# Patient Record
Sex: MALE | Race: WHITE | NOT HISPANIC OR LATINO | Employment: OTHER | URBAN - METROPOLITAN AREA
[De-identification: names, ages, dates, MRNs, and addresses within clinical notes are randomized per-mention and may not be internally consistent; named-entity substitution may affect disease eponyms.]

---

## 2024-01-08 ENCOUNTER — HOSPITAL ENCOUNTER (INPATIENT)
Facility: HOSPITAL | Age: 84
LOS: 9 days | Discharge: NURSING, REHAB OR LONG-TERM CARE | End: 2024-01-18
Attending: EMERGENCY MEDICINE | Admitting: INTERNAL MEDICINE
Payer: MEDICARE

## 2024-01-08 ENCOUNTER — APPOINTMENT (OUTPATIENT)
Dept: GENERAL RADIOLOGY | Facility: HOSPITAL | Age: 84
End: 2024-01-08
Payer: MEDICARE

## 2024-01-08 ENCOUNTER — APPOINTMENT (OUTPATIENT)
Dept: CT IMAGING | Facility: HOSPITAL | Age: 84
End: 2024-01-08
Payer: MEDICARE

## 2024-01-08 DIAGNOSIS — U07.1 COVID-19: Primary | ICD-10-CM

## 2024-01-08 DIAGNOSIS — R53.1 GENERALIZED WEAKNESS: ICD-10-CM

## 2024-01-08 DIAGNOSIS — R73.9 HYPERGLYCEMIA: ICD-10-CM

## 2024-01-08 PROBLEM — E11.9 TYPE 2 DIABETES MELLITUS, WITHOUT LONG-TERM CURRENT USE OF INSULIN: Status: ACTIVE | Noted: 2024-01-08

## 2024-01-08 LAB
ALBUMIN SERPL-MCNC: 3.1 G/DL (ref 3.5–5.2)
ALBUMIN/GLOB SERPL: 0.9 G/DL
ALP SERPL-CCNC: 71 U/L (ref 39–117)
ALT SERPL W P-5'-P-CCNC: 30 U/L (ref 1–41)
ANION GAP SERPL CALCULATED.3IONS-SCNC: 9 MMOL/L (ref 5–15)
AST SERPL-CCNC: 56 U/L (ref 1–40)
BASOPHILS # BLD AUTO: 0.02 10*3/MM3 (ref 0–0.2)
BASOPHILS NFR BLD AUTO: 0.4 % (ref 0–1.5)
BILIRUB SERPL-MCNC: 0.8 MG/DL (ref 0–1.2)
BUN SERPL-MCNC: 36 MG/DL (ref 8–23)
BUN/CREAT SERPL: 31.9 (ref 7–25)
CALCIUM SPEC-SCNC: 8.8 MG/DL (ref 8.6–10.5)
CHLORIDE SERPL-SCNC: 102 MMOL/L (ref 98–107)
CO2 SERPL-SCNC: 28 MMOL/L (ref 22–29)
CREAT SERPL-MCNC: 1.13 MG/DL (ref 0.76–1.27)
CRP SERPL-MCNC: 9.5 MG/DL (ref 0–0.5)
D DIMER PPP FEU-MCNC: 0.65 MCGFEU/ML (ref 0–0.84)
DEPRECATED RDW RBC AUTO: 41.5 FL (ref 37–54)
EGFRCR SERPLBLD CKD-EPI 2021: 64.1 ML/MIN/1.73
EOSINOPHIL # BLD AUTO: 0.01 10*3/MM3 (ref 0–0.4)
EOSINOPHIL NFR BLD AUTO: 0.2 % (ref 0.3–6.2)
ERYTHROCYTE [DISTWIDTH] IN BLOOD BY AUTOMATED COUNT: 12.4 % (ref 12.3–15.4)
FERRITIN SERPL-MCNC: 240.1 NG/ML (ref 30–400)
FLUAV RNA RESP QL NAA+PROBE: NOT DETECTED
FLUBV RNA RESP QL NAA+PROBE: NOT DETECTED
GLOBULIN UR ELPH-MCNC: 3.3 GM/DL
GLUCOSE BLDC GLUCOMTR-MCNC: 177 MG/DL (ref 70–130)
GLUCOSE BLDC GLUCOMTR-MCNC: 183 MG/DL (ref 70–130)
GLUCOSE SERPL-MCNC: 200 MG/DL (ref 65–99)
HCT VFR BLD AUTO: 41.3 % (ref 37.5–51)
HGB BLD-MCNC: 13.9 G/DL (ref 13–17.7)
IMM GRANULOCYTES # BLD AUTO: 0.02 10*3/MM3 (ref 0–0.05)
IMM GRANULOCYTES NFR BLD AUTO: 0.4 % (ref 0–0.5)
INR PPP: 1.35 (ref 0.89–1.12)
LDH SERPL-CCNC: 246 U/L (ref 135–225)
LYMPHOCYTES # BLD AUTO: 1.28 10*3/MM3 (ref 0.7–3.1)
LYMPHOCYTES NFR BLD AUTO: 22.5 % (ref 19.6–45.3)
MAGNESIUM SERPL-MCNC: 1.9 MG/DL (ref 1.6–2.4)
MCH RBC QN AUTO: 30.8 PG (ref 26.6–33)
MCHC RBC AUTO-ENTMCNC: 33.7 G/DL (ref 31.5–35.7)
MCV RBC AUTO: 91.6 FL (ref 79–97)
MONOCYTES # BLD AUTO: 0.93 10*3/MM3 (ref 0.1–0.9)
MONOCYTES NFR BLD AUTO: 16.3 % (ref 5–12)
NEUTROPHILS NFR BLD AUTO: 3.43 10*3/MM3 (ref 1.7–7)
NEUTROPHILS NFR BLD AUTO: 60.2 % (ref 42.7–76)
NRBC BLD AUTO-RTO: 0 /100 WBC (ref 0–0.2)
NT-PROBNP SERPL-MCNC: 518.2 PG/ML (ref 0–1800)
PLATELET # BLD AUTO: 151 10*3/MM3 (ref 140–450)
PMV BLD AUTO: 10.2 FL (ref 6–12)
POTASSIUM SERPL-SCNC: 4.6 MMOL/L (ref 3.5–5.2)
PROCALCITONIN SERPL-MCNC: 0.09 NG/ML (ref 0–0.25)
PROT SERPL-MCNC: 6.4 G/DL (ref 6–8.5)
PROTHROMBIN TIME: 16.8 SECONDS (ref 12.2–14.5)
RBC # BLD AUTO: 4.51 10*6/MM3 (ref 4.14–5.8)
SARS-COV-2 RNA RESP QL NAA+PROBE: DETECTED
SODIUM SERPL-SCNC: 139 MMOL/L (ref 136–145)
T4 FREE SERPL-MCNC: 1.98 NG/DL (ref 0.93–1.7)
TROPONIN T SERPL HS-MCNC: 29 NG/L
TSH SERPL DL<=0.05 MIU/L-ACNC: 0.44 UIU/ML (ref 0.27–4.2)
WBC NRBC COR # BLD AUTO: 5.69 10*3/MM3 (ref 3.4–10.8)

## 2024-01-08 PROCEDURE — 25810000003 SODIUM CHLORIDE 0.9 % SOLUTION: Performed by: INTERNAL MEDICINE

## 2024-01-08 PROCEDURE — 99221 1ST HOSP IP/OBS SF/LOW 40: CPT | Performed by: INTERNAL MEDICINE

## 2024-01-08 PROCEDURE — 85379 FIBRIN DEGRADATION QUANT: CPT | Performed by: INTERNAL MEDICINE

## 2024-01-08 PROCEDURE — 87636 SARSCOV2 & INF A&B AMP PRB: CPT | Performed by: EMERGENCY MEDICINE

## 2024-01-08 PROCEDURE — 84439 ASSAY OF FREE THYROXINE: CPT | Performed by: EMERGENCY MEDICINE

## 2024-01-08 PROCEDURE — G0378 HOSPITAL OBSERVATION PER HR: HCPCS

## 2024-01-08 PROCEDURE — 99285 EMERGENCY DEPT VISIT HI MDM: CPT

## 2024-01-08 PROCEDURE — 86140 C-REACTIVE PROTEIN: CPT | Performed by: INTERNAL MEDICINE

## 2024-01-08 PROCEDURE — 84484 ASSAY OF TROPONIN QUANT: CPT | Performed by: EMERGENCY MEDICINE

## 2024-01-08 PROCEDURE — 82728 ASSAY OF FERRITIN: CPT | Performed by: INTERNAL MEDICINE

## 2024-01-08 PROCEDURE — 84443 ASSAY THYROID STIM HORMONE: CPT | Performed by: EMERGENCY MEDICINE

## 2024-01-08 PROCEDURE — 63710000001 INSULIN LISPRO (HUMAN) PER 5 UNITS: Performed by: INTERNAL MEDICINE

## 2024-01-08 PROCEDURE — 83036 HEMOGLOBIN GLYCOSYLATED A1C: CPT | Performed by: INTERNAL MEDICINE

## 2024-01-08 PROCEDURE — 83735 ASSAY OF MAGNESIUM: CPT | Performed by: EMERGENCY MEDICINE

## 2024-01-08 PROCEDURE — 25810000003 SODIUM CHLORIDE 0.9 % SOLUTION: Performed by: EMERGENCY MEDICINE

## 2024-01-08 PROCEDURE — 83880 ASSAY OF NATRIURETIC PEPTIDE: CPT | Performed by: EMERGENCY MEDICINE

## 2024-01-08 PROCEDURE — 71045 X-RAY EXAM CHEST 1 VIEW: CPT

## 2024-01-08 PROCEDURE — 85610 PROTHROMBIN TIME: CPT | Performed by: EMERGENCY MEDICINE

## 2024-01-08 PROCEDURE — 70450 CT HEAD/BRAIN W/O DYE: CPT

## 2024-01-08 PROCEDURE — 93005 ELECTROCARDIOGRAM TRACING: CPT | Performed by: EMERGENCY MEDICINE

## 2024-01-08 PROCEDURE — 83615 LACTATE (LD) (LDH) ENZYME: CPT | Performed by: INTERNAL MEDICINE

## 2024-01-08 PROCEDURE — 85025 COMPLETE CBC W/AUTO DIFF WBC: CPT | Performed by: EMERGENCY MEDICINE

## 2024-01-08 PROCEDURE — 82948 REAGENT STRIP/BLOOD GLUCOSE: CPT

## 2024-01-08 PROCEDURE — 84145 PROCALCITONIN (PCT): CPT | Performed by: INTERNAL MEDICINE

## 2024-01-08 PROCEDURE — 80053 COMPREHEN METABOLIC PANEL: CPT | Performed by: EMERGENCY MEDICINE

## 2024-01-08 RX ORDER — SODIUM CHLORIDE 0.9 % (FLUSH) 0.9 %
10 SYRINGE (ML) INJECTION AS NEEDED
Status: DISCONTINUED | OUTPATIENT
Start: 2024-01-08 | End: 2024-01-18 | Stop reason: HOSPADM

## 2024-01-08 RX ORDER — ACETAMINOPHEN 650 MG/1
650 SUPPOSITORY RECTAL EVERY 4 HOURS PRN
Status: DISCONTINUED | OUTPATIENT
Start: 2024-01-08 | End: 2024-01-18 | Stop reason: HOSPADM

## 2024-01-08 RX ORDER — ACETAMINOPHEN 325 MG/1
650 TABLET ORAL EVERY 4 HOURS PRN
Status: DISCONTINUED | OUTPATIENT
Start: 2024-01-08 | End: 2024-01-18 | Stop reason: HOSPADM

## 2024-01-08 RX ORDER — INSULIN LISPRO 100 [IU]/ML
2-7 INJECTION, SOLUTION INTRAVENOUS; SUBCUTANEOUS
Status: DISCONTINUED | OUTPATIENT
Start: 2024-01-08 | End: 2024-01-18 | Stop reason: HOSPADM

## 2024-01-08 RX ORDER — POLYETHYLENE GLYCOL 3350 17 G/17G
17 POWDER, FOR SOLUTION ORAL DAILY PRN
Status: DISCONTINUED | OUTPATIENT
Start: 2024-01-08 | End: 2024-01-18 | Stop reason: HOSPADM

## 2024-01-08 RX ORDER — SODIUM CHLORIDE 9 MG/ML
40 INJECTION, SOLUTION INTRAVENOUS AS NEEDED
Status: DISCONTINUED | OUTPATIENT
Start: 2024-01-08 | End: 2024-01-18 | Stop reason: HOSPADM

## 2024-01-08 RX ORDER — NICOTINE POLACRILEX 4 MG
15 LOZENGE BUCCAL
Status: DISCONTINUED | OUTPATIENT
Start: 2024-01-08 | End: 2024-01-18 | Stop reason: HOSPADM

## 2024-01-08 RX ORDER — BENZONATATE 100 MG/1
200 CAPSULE ORAL 3 TIMES DAILY PRN
Status: DISCONTINUED | OUTPATIENT
Start: 2024-01-08 | End: 2024-01-18 | Stop reason: HOSPADM

## 2024-01-08 RX ORDER — BISACODYL 5 MG/1
5 TABLET, DELAYED RELEASE ORAL DAILY PRN
Status: DISCONTINUED | OUTPATIENT
Start: 2024-01-08 | End: 2024-01-18 | Stop reason: HOSPADM

## 2024-01-08 RX ORDER — DEXTROSE MONOHYDRATE 25 G/50ML
25 INJECTION, SOLUTION INTRAVENOUS
Status: DISCONTINUED | OUTPATIENT
Start: 2024-01-08 | End: 2024-01-18 | Stop reason: HOSPADM

## 2024-01-08 RX ORDER — SODIUM CHLORIDE 0.9 % (FLUSH) 0.9 %
10 SYRINGE (ML) INJECTION EVERY 12 HOURS SCHEDULED
Status: DISCONTINUED | OUTPATIENT
Start: 2024-01-08 | End: 2024-01-18 | Stop reason: HOSPADM

## 2024-01-08 RX ORDER — AMOXICILLIN 250 MG
2 CAPSULE ORAL 2 TIMES DAILY
Status: DISCONTINUED | OUTPATIENT
Start: 2024-01-08 | End: 2024-01-18 | Stop reason: HOSPADM

## 2024-01-08 RX ORDER — IBUPROFEN 600 MG/1
1 TABLET ORAL
Status: DISCONTINUED | OUTPATIENT
Start: 2024-01-08 | End: 2024-01-18 | Stop reason: HOSPADM

## 2024-01-08 RX ORDER — ACETAMINOPHEN 160 MG/5ML
650 SOLUTION ORAL EVERY 4 HOURS PRN
Status: DISCONTINUED | OUTPATIENT
Start: 2024-01-08 | End: 2024-01-18 | Stop reason: HOSPADM

## 2024-01-08 RX ORDER — BISACODYL 10 MG
10 SUPPOSITORY, RECTAL RECTAL DAILY PRN
Status: DISCONTINUED | OUTPATIENT
Start: 2024-01-08 | End: 2024-01-18 | Stop reason: HOSPADM

## 2024-01-08 RX ADMIN — SODIUM CHLORIDE 500 ML: 9 INJECTION, SOLUTION INTRAVENOUS at 19:41

## 2024-01-08 RX ADMIN — INSULIN LISPRO 2 UNITS: 100 INJECTION, SOLUTION INTRAVENOUS; SUBCUTANEOUS at 23:20

## 2024-01-08 RX ADMIN — APIXABAN 5 MG: 5 TABLET, FILM COATED ORAL at 23:21

## 2024-01-08 RX ADMIN — SODIUM CHLORIDE 500 ML: 9 INJECTION, SOLUTION INTRAVENOUS at 17:04

## 2024-01-08 RX ADMIN — Medication 10 ML: at 23:21

## 2024-01-08 RX ADMIN — Medication 10 ML: at 23:20

## 2024-01-08 NOTE — LETTER
"    EMS Transport Request  For use at [facility], [place], [place], [place], and [place] only   Patient Name: Apollo Haro : 1940   Weight:79.4 kg (175 lb) Pick-up Location: [facility] BLS/ALS: BLS/ALS: BLS   Insurance: [employer] MEDICARE REPLACEMENT Auth End Date: 2024   Pre-Cert #: D/C Summary complete:    Destination: Other [place]   Contact Precautions: None   Equipment (O2, Fluids, etc.): O2, settings 2L NC   Arrive By Date/Time: 2024 Stretcher/WC: Stretcher   CM Requesting: Artur Joseph RN Ext: [phone]   Notes/Medical Necessity: Impaired mobility, endurance, gait and balance.      ______________________________________________________________________    *Only 2 patient bags OR 1 carry-on size bag are permitted.  Wheelchairs and walkers CANNOT transported with the patient. Acknowledge: {Acknowledge:[id number]::\"Yes\"}    "

## 2024-01-08 NOTE — Clinical Note
Level of Care: Telemetry [5]   Diagnosis: COVID-19 virus infection [[id number]]   Admitting Physician: KARRI SHERWOOD [[id number]]   Attending Physician: KARRI SHERWOOD [[id number]]

## 2024-01-08 NOTE — LETTER
EMS Transport Request  For use at [facility], [place], [place], [place], and [place] only   Patient Name: Apollo Haro : 1940   Weight:79.4 kg (175 lb) Pick-up Location: [facility] BLS/ALS: BLS/ALS: BLS   Insurance: [employer] MEDICARE REPLACEMENT Auth End Date: 1/15/2024   Pre-Cert #: D/C Summary complete:    Destination: Other [place]   Contact Precautions: COVID   Equipment (O2, Fluids, etc.): O2, settings 2L NC   Arrive By Date/Time: 2024 Stretcher/WC: Stretcher   CM Requesting: Artur Joseph RN Ext: [phone]   Notes/Medical Necessity: Impaired mobility, Endurance, gait and balance     ______________________________________________________________________    *Only 2 patient bags OR 1 carry-on size bag are permitted.  Wheelchairs and walkers CANNOT transported with the patient. Acknowledge: Yes

## 2024-01-09 LAB
ANION GAP SERPL CALCULATED.3IONS-SCNC: 8 MMOL/L (ref 5–15)
BUN SERPL-MCNC: 28 MG/DL (ref 8–23)
BUN/CREAT SERPL: 31.8 (ref 7–25)
CALCIUM SPEC-SCNC: 8.1 MG/DL (ref 8.6–10.5)
CHLORIDE SERPL-SCNC: 101 MMOL/L (ref 98–107)
CK SERPL-CCNC: 1019 U/L (ref 20–200)
CO2 SERPL-SCNC: 29 MMOL/L (ref 22–29)
CREAT SERPL-MCNC: 0.88 MG/DL (ref 0.76–1.27)
CRP SERPL-MCNC: 7.46 MG/DL (ref 0–0.5)
EGFRCR SERPLBLD CKD-EPI 2021: 84.8 ML/MIN/1.73
GLUCOSE BLDC GLUCOMTR-MCNC: 128 MG/DL (ref 70–130)
GLUCOSE BLDC GLUCOMTR-MCNC: 137 MG/DL (ref 70–130)
GLUCOSE BLDC GLUCOMTR-MCNC: 143 MG/DL (ref 70–130)
GLUCOSE BLDC GLUCOMTR-MCNC: 270 MG/DL (ref 70–130)
GLUCOSE SERPL-MCNC: 139 MG/DL (ref 65–99)
HBA1C MFR BLD: 6.6 % (ref 4.8–5.6)
MAGNESIUM SERPL-MCNC: 1.7 MG/DL (ref 1.6–2.4)
POTASSIUM SERPL-SCNC: 3.8 MMOL/L (ref 3.5–5.2)
SODIUM SERPL-SCNC: 138 MMOL/L (ref 136–145)

## 2024-01-09 PROCEDURE — 97530 THERAPEUTIC ACTIVITIES: CPT

## 2024-01-09 PROCEDURE — 83735 ASSAY OF MAGNESIUM: CPT | Performed by: INTERNAL MEDICINE

## 2024-01-09 PROCEDURE — 82948 REAGENT STRIP/BLOOD GLUCOSE: CPT

## 2024-01-09 PROCEDURE — 63710000001 INSULIN LISPRO (HUMAN) PER 5 UNITS: Performed by: INTERNAL MEDICINE

## 2024-01-09 PROCEDURE — 82550 ASSAY OF CK (CPK): CPT | Performed by: INTERNAL MEDICINE

## 2024-01-09 PROCEDURE — 99232 SBSQ HOSP IP/OBS MODERATE 35: CPT | Performed by: INTERNAL MEDICINE

## 2024-01-09 PROCEDURE — 80048 BASIC METABOLIC PNL TOTAL CA: CPT | Performed by: INTERNAL MEDICINE

## 2024-01-09 PROCEDURE — 25810000003 SODIUM CHLORIDE 0.9 % SOLUTION: Performed by: INTERNAL MEDICINE

## 2024-01-09 PROCEDURE — 86140 C-REACTIVE PROTEIN: CPT | Performed by: INTERNAL MEDICINE

## 2024-01-09 PROCEDURE — 97162 PT EVAL MOD COMPLEX 30 MIN: CPT

## 2024-01-09 RX ORDER — TAMSULOSIN HYDROCHLORIDE 0.4 MG/1
0.4 CAPSULE ORAL DAILY
Status: DISCONTINUED | OUTPATIENT
Start: 2024-01-09 | End: 2024-01-18 | Stop reason: HOSPADM

## 2024-01-09 RX ORDER — PRAVASTATIN SODIUM 20 MG
20 TABLET ORAL DAILY
COMMUNITY

## 2024-01-09 RX ORDER — FLECAINIDE ACETATE 50 MG/1
50 TABLET ORAL EVERY 12 HOURS
COMMUNITY

## 2024-01-09 RX ORDER — SODIUM CHLORIDE 9 MG/ML
75 INJECTION, SOLUTION INTRAVENOUS CONTINUOUS
Status: ACTIVE | OUTPATIENT
Start: 2024-01-09 | End: 2024-01-10

## 2024-01-09 RX ORDER — CASTOR OIL AND BALSAM, PERU 788; 87 MG/G; MG/G
1 OINTMENT TOPICAL EVERY 12 HOURS SCHEDULED
Status: DISCONTINUED | OUTPATIENT
Start: 2024-01-09 | End: 2024-01-18 | Stop reason: HOSPADM

## 2024-01-09 RX ORDER — ASPIRIN 81 MG/1
81 TABLET ORAL DAILY
COMMUNITY

## 2024-01-09 RX ORDER — MAGNESIUM 200 MG
1 TABLET ORAL 2 TIMES DAILY
COMMUNITY

## 2024-01-09 RX ORDER — MULTIPLE VITAMINS W/ MINERALS TAB 9MG-400MCG
1 TAB ORAL DAILY
COMMUNITY

## 2024-01-09 RX ADMIN — APIXABAN 5 MG: 5 TABLET, FILM COATED ORAL at 07:44

## 2024-01-09 RX ADMIN — TAMSULOSIN HYDROCHLORIDE 0.4 MG: 0.4 CAPSULE ORAL at 14:48

## 2024-01-09 RX ADMIN — APIXABAN 5 MG: 5 TABLET, FILM COATED ORAL at 21:15

## 2024-01-09 RX ADMIN — MICONAZOLE NITRATE 1 APPLICATION: 20 POWDER TOPICAL at 14:49

## 2024-01-09 RX ADMIN — Medication 10 ML: at 21:15

## 2024-01-09 RX ADMIN — MICONAZOLE NITRATE 1 APPLICATION: 20 POWDER TOPICAL at 21:15

## 2024-01-09 RX ADMIN — INSULIN LISPRO 4 UNITS: 100 INJECTION, SOLUTION INTRAVENOUS; SUBCUTANEOUS at 21:44

## 2024-01-09 RX ADMIN — MICONAZOLE NITRATE 1 APPLICATION: 20 POWDER TOPICAL at 01:22

## 2024-01-09 RX ADMIN — SODIUM CHLORIDE 75 ML/HR: 9 INJECTION, SOLUTION INTRAVENOUS at 14:48

## 2024-01-09 RX ADMIN — SENNOSIDES AND DOCUSATE SODIUM 2 TABLET: 8.6; 5 TABLET ORAL at 21:15

## 2024-01-09 NOTE — THERAPY EVALUATION
Patient Name: Apollo Haro  : 1940    MRN: [id number]                              Today's Date: 2024       Admit Date: 2024    Visit Dx:     ICD-10-CM ICD-9-CM   1. COVID-19  U07.1 079.89   2. Hyperglycemia  R73.9 790.29   3. Generalized weakness  R53.1 780.79     Patient Active Problem List   Diagnosis    Weakness    Type 2 diabetes mellitus, without long-term current use of insulin     Past Medical History:   Diagnosis Date    A-fib     CAD (coronary artery disease)     DM (diabetes mellitus)      History reviewed. No pertinent surgical history.   General Information       Row Name 24 1451          Physical Therapy Time and Intention    Document Type evaluation  -MB     Mode of Treatment physical therapy  -MB       Row Name 24 1451          General Information    Patient Profile Reviewed yes  -MB     Prior Level of Function independent:;bed mobility;ADL's;transfer;all household mobility;gait  Prior to recent decline, pt. was independent w/ household ambulation w/ RW. Endorses recent falls.  -MB     Existing Precautions/Restrictions fall;oxygen therapy device and L/min;other (see comments)  Contact and airborne  -MB     Barriers to Rehab medically complex;previous functional deficit;cognitive status  -MB       Row Name 24 1451          Living Environment    People in Home spouse  -MB       Row Name 24 1451          Home Main Entrance    Number of Stairs, Main Entrance two  1+1  -MB     Stair Railings, Main Entrance none  -MB       Row Name 24 1451          Stairs Within Home, Primary    Number of Stairs, Within Home, Primary none  -MB       Row Name 24 1451          Cognition    Orientation Status (Cognition) oriented to;person;place  -MB       Row Name 24 1451          Safety Issues, Functional Mobility    Safety Issues Affecting Function (Mobility) ability to follow commands;awareness of need for assistance;insight into  deficits/self-awareness;judgment;positioning of assistive device;problem-solving;safety precaution awareness;safety precautions follow-through/compliance;sequencing abilities  -MB     Impairments Affecting Function (Mobility) balance;cognition;coordination;endurance/activity tolerance;sensation/sensory awareness;strength;pain;range of motion (ROM)  -MB               User Key  (r) = Recorded By, (t) = Taken By, (c) = Cosigned By      Initials Name Provider Type    MB Luli Stephens, PT Physical Therapist                   Mobility       Row Name 01/09/24 1548          Bed Mobility    Bed Mobility supine-sit;sit-supine;rolling left;rolling right  -MB     Rolling Left [place] (Bed Mobility) moderate assist (50% patient effort);verbal cues  -MB     Rolling Right [place] (Bed Mobility) moderate assist (50% patient effort);verbal cues  -MB     Supine-Sit [place] (Bed Mobility) maximum assist (25% patient effort);verbal cues  -MB     Sit-Supine [place] (Bed Mobility) maximum assist (25% patient effort);verbal cues  -MB     Assistive Device (Bed Mobility) bed rails;draw sheet;head of bed elevated  -MB     Comment, (Bed Mobility) Pt. rolled L/R for dependent hygiene and required assist to manage BLEs, support trunk, and scoot hips forward to EOB. Pt. unable to clear hips from bed; returned to supine and transferred to chair via mechanical lift.  -MB       Row Name 01/09/24 1548          Transfers    Comment, (Transfers) STS x 3 reps w/ assist for set up and VCs for safe hand placement and sequencing,. Pt. demo improved independence w/ STS from recliner.  -MB       Row Name 01/09/24 1548          Bed-Chair Transfer    Bed-Chair [place] (Transfers) dependent (less than 25% patient effort)  -MB     Assistive Device (Bed-Chair Transfers) lift device  -MB       Row Name 01/09/24 1548          Sit-Stand Transfer    Sit-Stand [place] (Transfers) moderate assist (50% patient effort);2 person assist   -MB     Assistive Device (Sit-Stand Transfers) walker, front-wheeled  -MB       Row Name 01/09/24 1548          Gait/Stairs (Locomotion)    [place] Level (Gait) unable to assess  -MB     Comment, (Gait/Stairs) Unable to safely take steps.  -MB               User Key  (r) = Recorded By, (t) = Taken By, (c) = Cosigned By      Initials Name Provider Type    MB Luli Stephens, PT Physical Therapist                   Obj/Interventions       Row Name 01/09/24 1550          Range of Motion Comprehensive    General Range of Motion upper extremity range of motion deficits identified;bilateral lower extremity ROM WFL  -MB     Comment, General Range of Motion BLEs grossly WFL for age, BUE shoulder AROM limited  -MB       Row Name 01/09/24 1550          Strength Comprehensive (MMT)    General Manual Muscle Testing (MMT) Assessment lower extremity strength deficits identified;upper extremity strength deficits identified  -MB     Comment, General Manual Muscle Testing (MMT) Assessment BLEs grossly 3+/5  -MB       Row Name 01/09/24 1550          Motor Skills    Therapeutic Exercise hip;knee;ankle  -MB       Row Name 01/09/24 1550          Hip (Therapeutic Exercise)    Hip (Therapeutic Exercise) strengthening exercise  -MB     Hip Strengthening (Therapeutic Exercise) bilateral;heel slides;aBduction;aDduction;5 repetitions  -MB       Row Name 01/09/24 1550          Knee (Therapeutic Exercise)    Knee (Therapeutic Exercise) strengthening exercise  -MB     Knee Strengthening (Therapeutic Exercise) bilateral;LAQ (long arc quad);5 repetitions  -MB       Row Name 01/09/24 1550          Ankle (Therapeutic Exercise)    Ankle (Therapeutic Exercise) AAROM (active assistive range of motion)  -MB     Ankle AAROM (Therapeutic Exercise) bilateral;dorsiflexion;plantarflexion;5 repetitions  -MB       Row Name 01/09/24 1550          Balance    Balance Assessment sitting static balance;standing static balance;standing dynamic balance  -MB      Static Sitting Balance contact guard  -MB     Position, Sitting Balance unsupported;sitting edge of bed  -MB     Static Standing Balance moderate assist  -MB     Dynamic Standing Balance maximum assist  -MB     Position/Device Used, Standing Balance supported;walker, front-wheeled  -MB     Balance Interventions standing;sit to stand;weight shifting activity  -MB       Row Name 01/09/24 1559          Sensory Assessment (Somatosensory)    Sensory Assessment (Somatosensory) bilateral LE  -MB     Bilateral LE Sensory Assessment light touch awareness;impaired  -MB               User Key  (r) = Recorded By, (t) = Taken By, (c) = Cosigned By      Initials Name Provider Type    Luli Hui, PT Physical Therapist                   Goals/Plan       Row Name 01/09/24 6228          Bed Mobility Goal 1 (PT)    Activity/Assistive Device (Bed Mobility Goal 1, PT) sit to supine/supine to sit  -MB     White Level/Cues Needed (Bed Mobility Goal 1, PT) moderate assist (50-74% patient effort)  -MB     Time Frame (Bed Mobility Goal 1, PT) 1 week  -MB       Row Name 01/09/24 1460          Transfer Goal 1 (PT)    Activity/Assistive Device (Transfer Goal 1, PT) sit-to-stand/stand-to-sit;bed-to-chair/chair-to-bed;walker, rolling  -MB     White Level/Cues Needed (Transfer Goal 1, PT) minimum assist (75% or more patient effort)  -MB     Time Frame (Transfer Goal 1, PT) 10 days  -MB       Row Name 01/09/24 3256          Gait Training Goal 1 (PT)    Activity/Assistive Device (Gait Training Goal 1, PT) gait (walking locomotion);decrease fall risk;improve balance and speed;increase endurance/gait distance;walker, rolling  -MB     White Level (Gait Training Goal 1, PT) moderate assist (50-74% patient effort)  -MB     Distance (Gait Training Goal 1, PT) 25  -MB     Time Frame (Gait Training Goal 1, PT) 10 days  -MB       Row Name 01/09/24 6667          Stairs Goal 1 (PT)    Activity/Assistive Device (Stairs Goal  1, PT) ascending stairs;descending stairs  -MB     [place] Level/Cues Needed (Stairs Goal 1, PT) minimum assist (75% or more patient effort)  -MB     Number of Stairs (Stairs Goal 1, PT) 1  -MB     Time Frame (Stairs Goal 1, PT) by discharge  -MB       Row Name 01/09/24 2847          Therapy Assessment/Plan (PT)    Planned Therapy Interventions (PT) balance training;bed mobility training;gait training;home exercise program;motor coordination training;patient/family education;postural re-education;strengthening;transfer training  -MB               User Key  (r) = Recorded By, (t) = Taken By, (c) = Cosigned By      Initials Name Provider Type    Luli Hui, PT Physical Therapist                   Clinical Impression       Row Name 01/09/24 7412          Pain    Additional Documentation Pain Scale: FACES Pre/Post-Treatment (Group)  -MB       Row Name 01/09/24 3374          Pain Scale: FACES Pre/Post-Treatment    Pain: FACES Scale, Pretreatment 2-->hurts little bit  -MB     Posttreatment Pain Rating 2-->hurts little bit  -MB     Pain Location lower  -MB     Pain Location - back  -MB       Row Name 01/09/24 8670          Plan of Care Review    Plan of Care Reviewed With patient;spouse  -MB     Progress no change  -MB     Outcome Evaluation PT eval completed. Patient presents w/ decreased strength, balance deficits, gait instability, decreased activity tolerance, and is below his baseline. He required max A  for bed mobility and mod A  x 2 for transfers; unable to safely take steps. Skilled IPPT warranted to promote return to PLOF. Recommend SNF rehab at D/C.  -MB       Row Name 01/09/24 1654          Therapy Assessment/Plan (PT)    Rehab Potential (PT) fair, will monitor progress closely  -MB     Criteria for Skilled Interventions Met (PT) yes;meets criteria;skilled treatment is necessary  -MB     Therapy Frequency (PT) daily  -MB       Row Name 01/09/24 1130          Vital Signs    Pre Systolic BP Rehab  117  -MB     Pre Treatment Diastolic BP 79  -MB     Pretreatment Heart Rate (beats/min) 78  -MB     Pre SpO2 (%) 96  -MB     O2 Delivery Pre Treatment nasal cannula  -MB     O2 Delivery Intra Treatment nasal cannula  -MB     O2 Delivery Post Treatment nasal cannula  -MB     Pre Patient Position Supine  -MB     Intra Patient Position Standing  -MB     Post Patient Position Sitting  -MB       Row Name 01/09/24 9452          Positioning and Restraints    Pre-Treatment Position in bed  -MB     Post Treatment Position chair  -MB     In Chair notified nsg;reclined;call light within reach;encouraged to call for assist;exit alarm on;with family/caregiver;on mechanical lift sling;waffle cushion;legs elevated  -MB               User Key  (r) = Recorded By, (t) = Taken By, (c) = Cosigned By      Initials Name Provider Type    Luli Hui, PT Physical Therapist                   Outcome Measures       Row Name 01/09/24 2946          How much help from another person do you currently need...    Turning from your back to your side while in flat bed without using bedrails? 2  -MB     Moving from lying on back to sitting on the side of a flat bed without bedrails? 2  -MB     Moving to and from a bed to a chair (including a wheelchair)? 1  -MB     Standing up from a chair using your arms (e.g., wheelchair, bedside chair)? 2  -MB     Climbing 3-5 steps with a railing? 1  -MB     To walk in hospital room? 2  -MB     AM-PAC 6 Clicks Score (PT) 10  -MB     Highest Level of Mobility Goal 4 --> Transfer to chair/commode  -MB       Row Name 01/09/24 0526          Functional Assessment    Outcome Measure Options AM-PAC 6 Clicks Basic Mobility (PT)  -MB               User Key  (r) = Recorded By, (t) = Taken By, (c) = Cosigned By      Initials Name Provider Type    Luli Hui, PT Physical Therapist                                 Physical Therapy Education       Title: PT OT SLP Therapies (Done)       Topic: Physical  Therapy (Done)       Point: Mobility training (Done)       Learning Progress Summary             Patient Acceptance, E,D, VU,NR by MB at 1/9/2024 1557   Family Acceptance, E,D, VU,NR by MB at 1/9/2024 1557                         Point: Home exercise program (Done)       Learning Progress Summary             Patient Acceptance, E,D, VU,NR by MB at 1/9/2024 1557   Family Acceptance, E,D, VU,NR by MB at 1/9/2024 1557                         Point: Body mechanics (Done)       Learning Progress Summary             Patient Acceptance, E,D, VU,NR by MB at 1/9/2024 1557   Family Acceptance, E,D, VU,NR by MB at 1/9/2024 1557                         Point: Precautions (Done)       Learning Progress Summary             Patient Acceptance, E,D, VU,NR by MB at 1/9/2024 1557   Family Acceptance, E,D, VU,NR by MB at 1/9/2024 1557                                         User Key       Initials Effective Dates Name Provider Type Discipline    MB 06/16/21 -  Luli Stephens, PT Physical Therapist PT                  PT Recommendation and Plan  Planned Therapy Interventions (PT): balance training, bed mobility training, gait training, home exercise program, motor coordination training, patient/family education, postural re-education, strengthening, transfer training  Plan of Care Reviewed With: patient, spouse  Progress: no change  Outcome Evaluation: PT eval completed. Patient presents w/ decreased strength, balance deficits, gait instability, decreased activity tolerance, and is below his baseline. He required max A  for bed mobility and mod A  x 2 for transfers; unable to safely take steps. Skilled IPPT warranted to promote return to PLOF. Recommend SNF rehab at D/C.     Time Calculation:   PT Evaluation Complexity  History, PT Evaluation Complexity: 1-2 personal factors and/or comorbidities  Examination of Body Systems (PT Eval Complexity): total of 3 or more elements  Clinical Presentation (PT Evaluation Complexity):  evolving  Clinical Decision Making (PT Evaluation Complexity): moderate complexity  Overall Complexity (PT Evaluation Complexity): moderate complexity     PT Charges       Row Name 01/09/24 1558             Time Calculation    Start Time 1451  -MB      PT Received On 01/09/24  -MB      PT Goal Re-Cert Due Date 01/19/24  -MB         Timed Charges    74691 - PT Therapeutic Activity Minutes 14  -MB         Untimed Charges    PT Eval/Re-eval Minutes 46  -MB         Total Minutes    Timed Charges Total Minutes 14  -MB      Untimed Charges Total Minutes 46  -MB       Total Minutes 60  -MB                User Key  (r) = Recorded By, (t) = Taken By, (c) = Cosigned By      Initials Name Provider Type    Luli Hui, PT Physical Therapist                  Therapy Charges for Today       Code Description Service Date Service Provider Modifiers Qty    84365744839 HC PT THERAPEUTIC ACT EA 15 MIN 1/9/2024 Luli Stephens, PT GP 1    10728513894 HC PT EVAL MOD COMPLEXITY 4 1/9/2024 Luli Stephens, PT GP 1            PT G-Codes  Outcome Measure Options: AM-PAC 6 Clicks Basic Mobility (PT)  AM-PAC 6 Clicks Score (PT): 10  PT Discharge Summary  Anticipated Discharge Disposition (PT): skilled nursing facility    Luli Stephens PT  1/9/2024

## 2024-01-09 NOTE — PLAN OF CARE
Goal Outcome Evaluation:  Plan of Care Reviewed With: patient, spouse        Progress: no change  Outcome Evaluation: PT eval completed. Patient presents w/ decreased strength, balance deficits, gait instability, decreased activity tolerance, and is below his baseline. He required max A  for bed mobility and mod A  x 2 for transfers; unable to safely take steps. Skilled IPPT warranted to promote return to PLOF. Recommend SNF rehab at D/C.      Anticipated Discharge Disposition (PT): skilled nursing facility

## 2024-01-09 NOTE — PROGRESS NOTES
[facility] Medicine Services  PROGRESS NOTE    Patient Name: Apollo Haro  : 1940  MRN: [id number]    Date of Admission: 2024  Primary Care Physician: Provider, No Known    Subjective   Subjective     CC:  Gen weakness    HPI:  Fatigued globally  No dyspnea  No cough currnetly  No n/v/d      Objective   Objective     Vital Signs:   Temp:  [97.1 °F (36.2 °C)-98.3 °F (36.8 °C)] 97.5 °F (36.4 °C)  Heart Rate:  [] 85  Resp:  [16-18] 16  BP: ()/(58-79) 117/79  Flow (L/min):  [2] 2     Physical Exam:  Constitutional:Alert, confused to details  Psych:Normal/appropriate affect  HEENT:NCAT, oropharynx clear  Neck: neck supple, full range of motion  Neuro: Face symmetric, speech clear, equal , moves all extremities, nonfocal exam  Cardiac: RRR; No pretibial pitting edema  Resp: CTAB, normal effort  GI: abd soft, nontender to palpation  Skin: sacral decub noted  Musculoskeletal/extremities: no cyanosis of extremities; no significant ankle edema          Results Reviewed:  LAB RESULTS:      Lab 24  0614 24  17324  1646   WBC  --   --  5.69   HEMOGLOBIN  --   --  13.9   HEMATOCRIT  --   --  41.3   PLATELETS  --   --  151   NEUTROS ABS  --   --  3.43   IMMATURE GRANS (ABS)  --   --  0.02   LYMPHS ABS  --   --  1.28   MONOS ABS  --   --  0.93*   EOS ABS  --   --  0.01   MCV  --   --  91.6   CRP 7.46* 9.50*  --    PROCALCITONIN  --  0.09  --    LDH  --  246*  --    PROTIME  --   --  16.8*   D DIMER QUANT  --   --  0.65         Lab 24  0614 24  17324  1646   SODIUM 138 139  --    POTASSIUM 3.8 4.6  --    CHLORIDE 101 102  --    CO2 29.0 28.0  --    ANION GAP 8.0 9.0  --    BUN 28* 36*  --    CREATININE 0.88 1.13  --    EGFR 84.8 64.1  --    GLUCOSE 139* 200*  --    CALCIUM 8.1* 8.8  --    MAGNESIUM 1.7 1.9  --    HEMOGLOBIN A1C  --   --  6.60*   TSH  --  0.438  --          Lab 24  1739   TOTAL PROTEIN 6.4   ALBUMIN 3.1*   GLOBULIN  3.3   ALT (SGPT) 30   AST (SGOT) 56*   BILIRUBIN 0.8   ALK PHOS 71         Lab 01/08/24  1739 01/08/24  1646   PROBNP 518.2  --    HSTROP T 29*  --    PROTIME  --  16.8*   INR  --  1.35*             Lab 01/08/24  1739   FERRITIN 240.10         Brief Urine Lab Results       None            Microbiology Results Abnormal       None            CT Head Without Contrast    Result Date: 1/8/2024  CT HEAD WO CONTRAST Date of Exam: 1/8/2024 4:44 PM CST Indication: gen weakness. Comparison: None available. Technique: Axial CT images were obtained of the head without contrast administration.  Automated exposure control and iterative construction methods were used. Findings: There is no evidence of acute territorial infarction. There is no acute intracranial hemorrhage. There are no extra-axial collections. Ventricles and CSF spaces are symmetrically prominent.. No mass effect nor hydrocephalus. Brain parenchyma appears normal for age.  There is near complete opacification of the left ethmoid air cells and left frontal sinus. Correlate for signs of sinusitis. Osseous structures and orbits appear intact.     Impression: Impression: 1.No acute intracranial process identified. 2.Generalized senescent changes of the brain. 3.Left ethmoid and frontal sinus mucosal disease. Correlate for signs of sinusitis. Electronically Signed: Wang Bingham MD  1/8/2024 4:55 PM CST  Workstation ID: [id number]    XR Chest 1 View    Result Date: 1/8/2024  XR CHEST 1 VW Date of Exam: 1/8/2024 4:20 PM EST Indication: gen weakness Comparison: None available. Findings: The lungs are clear. The heart and mediastinal contours appear normal. There is no pleural effusion. The pulmonary vasculature appears normal. The osseous structures appear intact.     Impression: Impression: No acute cardiopulmonary process. Electronically Signed: Apollo Abdalla MD  1/8/2024 4:34 PM EST  Workstation ID: [id number]         Current medications:  Scheduled Meds:apixaban, 5  mg, Oral, BID  castor oil-balsam peru, 1 application , Topical, Q12H  insulin lispro, 2-7 Units, Subcutaneous, 4x Daily AC & at Bedtime  miconazole, 1 application , Topical, Q12H  senna-docusate sodium, 2 tablet, Oral, BID  sodium chloride, 10 mL, Intravenous, Q12H      Continuous Infusions:Pharmacy Consult,   sodium chloride, 50 mL/hr      PRN Meds:.  acetaminophen **OR** acetaminophen **OR** acetaminophen    benzonatate    senna-docusate sodium **AND** polyethylene glycol **AND** bisacodyl **AND** bisacodyl    Calcium Replacement - Follow Nurse / BPA Driven Protocol    dextrose    dextrose    glucagon (human recombinant)    Magnesium Low Dose Replacement - Follow Nurse / BPA Driven Protocol    Pharmacy Consult    Phosphorus Replacement - Follow Nurse / BPA Driven Protocol    Potassium Replacement - Follow Nurse / BPA Driven Protocol    [COMPLETED] Insert Peripheral IV **AND** sodium chloride    sodium chloride    sodium chloride    Assessment & Plan   Assessment & Plan     Active Hospital Problems    Diagnosis  POA    **Weakness [R53.1]  Yes    Type 2 diabetes mellitus, without long-term current use of insulin [E11.9]  Yes      Resolved Hospital Problems   No resolved problems to display.        Brief Hospital Course to date:  Apollo Haro is a 83 y.o. male w/ hx parox afib, htn, hl, dm2, chronic back pain who was brought to [facility] ED due to generalized weakness. History if limited as no family bedside and patient poor historian and no answer when phone family. Patient states was diagnosed w/ covid 19 within past couple of weeks and treated as outpatient, but due to worsening generalized weakness in leg and walking was brought to [facility] for evaluation. Cxr negative. Ct head no acute intracranial process, left enthoid and frontal sinus musosal dz noted.       Generalized weakness w/ decreased mobility/ability to walk  Falls  Back pain  -recent [facility] procedure note 7/28/23 w/ diagnosis code of normal pressure hydrocephalus  (patient poor historian regarding this)  -exam nonfocal bedside on 1/9/24  -ct head negative  -tsh ok  -generalized weakness could be due to recent covid 19 infection as well  -u/a pending  -cpk 1019  -b12/folate in a.m.  -MRI L-& T spine  -pt/ot evals pending  -consider neurology consultation if w/u negative  -I have again tried to contact wife/family x 2 without success    Mild confusion  -ct head ok  -unable to contact family, plan to assess baseline mental status    Elevated cpk  ? Rhabdomyolysis  -cpk 1019  -hold statin  -NS 75cc/hr, trend cpk in na.m.    Cad  Afib  Htn  Hl  -continue eliquis (asking pharmacy to assist regarding home meds (? Flecainide, asa, or pravachol?)    Dm2, A1c 6.6  -ssi      Sacral decub, poa  -wound care      Am labs: cbc, cmp, b12/folate, cpk (f/u u/a)      Expected Discharge Location and Transportation: ? rehab  Expected Discharge ? 1/11/24 depending on clinical course    DVT prophylaxis:  Medical DVT prophylaxis orders are present.     AM-PAC 6 Clicks Score (PT): 10 (01/08/24 2629)    CODE STATUS:   Code Status and Medical Interventions:   Ordered at: 01/08/24 2053     Code Status (Patient has no pulse and is not breathing):    CPR (Attempt to Resuscitate)     Medical Interventions (Patient has pulse or is breathing):    Full Support       Rebel Santiago MD  01/09/24

## 2024-01-09 NOTE — CASE MANAGEMENT/SOCIAL WORK
Continued Stay Note  [facility]     Patient Name: Apollo Haro  MRN: [id number]  Today's Date: 1/9/2024    Admit Date: 1/8/2024    Plan: TBD   Discharge Plan       Row Name 01/09/24 1554       Plan    Plan TBD    Patient/Family in Agreement with Plan other (see comments)    Plan Comments I attempted to call spouse Bee Haro on number that is in Epic, received VM , I left  to return my call for IDP. I also called Dr. Payal Gomes office for any other phone numbers, I was provided w/ [phone], unable to leave VM on that number. Will continue trying to contact.    Final Discharge Disposition Code 30 - still a patient                   Discharge Codes    No documentation.                 Expected Discharge Date and Time       Expected Discharge Date Expected Discharge Time    Ata 10, 2024               Win Horowitz RN

## 2024-01-09 NOTE — H&P
"    [facility] Medicine Services  HISTORY AND PHYSICAL    Patient Name: Apollo Haro  : 1940  MRN: [id number]  Primary Care Physician: Provider, No Known  Date of admission: 2024      Subjective   Subjective     Chief Complaint:  weakness    HPI:  Apollo Haro is a 83 y.o. male w/ CAD, pAFib, HTN, HLD, DM2, chronic back pain, who was brought to the ED via EMS for weakness. There is no family at bedside and the phone number for his wife in the chart rings to voicemail (x2 attempts). He tells me he fell and broke his hip and \"broke my back in two places\" in October and was managed in [facility]. He states that \"about two weeks ago\" he was diagnosed with Covid 19 and managed outpatient, he thinks this was before [place]. Over the last week he has had progressive weakness in his legs with difficulty walking. Over the last 3 days he developed a dry cough. He otherwise denies acute complaints. The ED was unable to mobilize him and requested admission. He states he is on 4-5 medicines, however his medication fill history only shows eliquis.      Personal History     Past Medical History:   Diagnosis Date   • A-fib    • CAD (coronary artery disease)    • DM (diabetes mellitus)            History reviewed. No pertinent surgical history.    Family History: family history is not on file.     Social History:    Social History     Social History Narrative   • Not on file       Medications:  Available home medication information reviewed.  apixaban and metFORMIN    No Known Allergies    Objective   Objective     Vital Signs:   Temp:  [98 °F (36.7 °C)] 98 °F (36.7 °C)  Heart Rate:  [] 93  Resp:  [18] 18  BP: (105-122)/(59-78) 105/59       Physical Exam   Constitutional: Awake, alert, laying on ED stretcher in NAD  HENT: NCAT, mucous membranes moist  Respiratory: Clear to auscultation bilaterally, respiratory effort normal   Cardiovascular: RRR, palpable radial " pulses  Gastrointestinal: Positive bowel sounds, soft, nontender, nondistended  Musculoskeletal: No bilateral ankle edema  Psychiatric: Appropriate affect, cooperative  Neurologic: Speech clear and fluent    Result Review:  I have personally reviewed the results from the time of this admission to 1/8/2024 20:58 EST and agree with these findings:  []  Laboratory list / accordion  []  Microbiology  [x]  Radiology  []  EKG/Telemetry   []  Cardiology/Vascular   []  Pathology  []  Old records  []  Other:  Most notable findings include: clear CXR      LAB RESULTS:      Lab 01/08/24  1739 01/08/24  1646   WBC  --  5.69   HEMOGLOBIN  --  13.9   HEMATOCRIT  --  41.3   PLATELETS  --  151   NEUTROS ABS  --  3.43   IMMATURE GRANS (ABS)  --  0.02   LYMPHS ABS  --  1.28   MONOS ABS  --  0.93*   EOS ABS  --  0.01   MCV  --  91.6   CRP 9.50*  --    PROCALCITONIN 0.09  --    *  --    PROTIME  --  16.8*   INR  --  1.35*   D DIMER QUANT  --  0.65         Lab 01/08/24  1739   SODIUM 139   POTASSIUM 4.6   CHLORIDE 102   CO2 28.0   ANION GAP 9.0   BUN 36*   CREATININE 1.13   EGFR 64.1   GLUCOSE 200*   CALCIUM 8.8   MAGNESIUM 1.9   TSH 0.438         Lab 01/08/24  1739   TOTAL PROTEIN 6.4   ALBUMIN 3.1*   GLOBULIN 3.3   ALT (SGPT) 30   AST (SGOT) 56*   BILIRUBIN 0.8   ALK PHOS 71         Lab 01/08/24  1739   PROBNP 518.2   HSTROP T 29*             Lab 01/08/24  1739   FERRITIN 240.10             Microbiology Results (last 10 days)       Procedure Component Value - Date/Time    COVID-19 and FLU A/B PCR, 1 HR TAT - Swab, Nasopharynx [[id number]]  (Abnormal) Collected: 01/08/24 1646    Lab Status: Final result Specimen: Swab from Nasopharynx Updated: 01/08/24 1751     COVID19 Detected     Influenza A PCR Not Detected     Influenza B PCR Not Detected    Narrative:      Fact sheet for providers: https://www.fda.gov/media/910601/download    Fact sheet for patients: https://www.fda.gov/media/724512/download    Test performed by  PCR.  Influenza A and Influenza B negative results should be considered presumptive in samples that have a positive SARS-CoV-2 result.    Competitive inhibition studies showed that SARS-CoV-2 virus, when present at concentrations above 3.6E+04 copies/mL, can inhibit the detection and amplification of influenza A and influenza B virus RNA if present at or below 1.8E+02 copies/mL or 4.9E+02 copies/mL, respectively, and may lead to false negative influenza virus results. If co-infection with influenza A or influenza B virus is suspected in samples with a positive SARS-CoV-2 result, the sample should be re-tested with another FDA cleared, approved, or authorized influenza test, if influenza virus detection would change clinical management.            CT Head Without Contrast    Result Date: 1/8/2024  CT HEAD WO CONTRAST Date of Exam: 1/8/2024 4:44 PM CST Indication: gen weakness. Comparison: None available. Technique: Axial CT images were obtained of the head without contrast administration.  Automated exposure control and iterative construction methods were used. Findings: There is no evidence of acute territorial infarction. There is no acute intracranial hemorrhage. There are no extra-axial collections. Ventricles and CSF spaces are symmetrically prominent.. No mass effect nor hydrocephalus. Brain parenchyma appears normal for age.  There is near complete opacification of the left ethmoid air cells and left frontal sinus. Correlate for signs of sinusitis. Osseous structures and orbits appear intact.     Impression: Impression: 1.No acute intracranial process identified. 2.Generalized senescent changes of the brain. 3.Left ethmoid and frontal sinus mucosal disease. Correlate for signs of sinusitis. Electronically Signed: Wang Bingham MD  1/8/2024 4:55 PM CST  Workstation ID: [id number]    XR Chest 1 View    Result Date: 1/8/2024  XR CHEST 1 VW Date of Exam: 1/8/2024 4:20 PM EST Indication: gen weakness Comparison: None  available. Findings: The lungs are clear. The heart and mediastinal contours appear normal. There is no pleural effusion. The pulmonary vasculature appears normal. The osseous structures appear intact.     Impression: Impression: No acute cardiopulmonary process. Electronically Signed: Apollo Abdalla MD  1/8/2024 4:34 PM EST  Workstation ID: [id number]         Assessment & Plan   Assessment & Plan       Weakness    Type 2 diabetes mellitus, without long-term current use of insulin    Summary: This is an 82 y/o male w/ CAD, HTN, HLD, pAfib, DM2, chronic back pain, with reported recent Covid 19 infection, who was brought via EMS for evaluation of progressive weakness    *Home meds are unknown at this time, his home med list shows Metformin and Eliquis, his medication dispense history shows eliquis only, recent note from [facility] shows Pravastatin, Metformin, Eliquis, Flecainide, and ASA; pharmacy consult placed for the AM    Assessment/Plan    Generalized weakness w/ decreased ability to walk  -remote notes document chronic history of recurrent falls and weakness  -there is a recent [facility] procedure note 7/28/23 w/ a Diagnosis code of Normal Pressure Hydrocephalus  -weakness also may be from reported recent Covid 19 infection  -supportive care, PT/OT; need further details once we can get a hold of family, I have tried the number for his wife several times tonight with no answer    Recent Covid 19 infection  -reports he was diagnosed several days before [place] and managed at home, which would mean he is well clear of required isolation period, once we can get a family member or his PCP's office to corroborate we can drop his enhanced isolation requirements; currently I am not certain that he is clear on the exact timeline    CAD  HTN  HLD  pAfib  -cont home eliquis; need to clarify if he is actually taking flecainide, ASA, and/or pravastatin as noted    DM type 2, unknown A1c, w/o insulin use  -check A1c  -SSI      DVT  prophylaxis:  Medical DVT prophylaxis orders are present.      CODE STATUS:    Code Status and Medical Interventions:   Ordered at: 01/08/24 2053     Code Status (Patient has no pulse and is not breathing):    CPR (Attempt to Resuscitate)     Medical Interventions (Patient has pulse or is breathing):    Full Support       Expected Discharge   Expected Discharge Date: 1/10/2024; Expected Discharge Time:      Terrence Tong DO  01/08/24

## 2024-01-09 NOTE — ED PROVIDER NOTES
"Subjective   History of Present Illness  83-year-old male presents for evaluation of generalized weakness.  Of note, the patient tells me that he was diagnosed with COVID-19 about 2 weeks ago.  Since that time he has been experiencing progressively worsening generalized weakness and fatigue.  He tells me that today he felt so weak that \"he could not walk.\"  He called EMS today as he noted that his blood sugar was elevated at home.  He is unsure as to what might be spiking his blood sugar readings.  On EMS arrival, the patient was noted to be mildly hyperglycemic with a glucose of greater than 200.  He was subsequently brought to our facility to be evaluated.  His vital signs were reassuring.  He was not hypoxic.  The patient is not typically on home oxygen.      Review of Systems   Constitutional:  Positive for fatigue.   Neurological:  Positive for weakness.   All other systems reviewed and are negative.      No past medical history on file.    No Known Allergies    No past surgical history on file.    No family history on file.    Social History     Socioeconomic History    Marital status:            Objective   Physical Exam  Vitals and nursing note reviewed.   Constitutional:       General: He is not in acute distress.     Appearance: He is well-developed. He is not diaphoretic.      Comments: Chronically ill-appearing elderly male   HENT:      Head: Normocephalic and atraumatic.   Neck:      Vascular: No JVD.      Comments: No meningeal signs or nuchal rigidity  Cardiovascular:      Rate and Rhythm: Normal rate and regular rhythm.      Heart sounds: Normal heart sounds. No murmur heard.     No friction rub. No gallop.   Pulmonary:      Effort: Pulmonary effort is normal. No respiratory distress.      Breath sounds: Normal breath sounds. No wheezing or rales.   Abdominal:      General: Bowel sounds are normal. There is no distension.      Palpations: Abdomen is soft. There is no mass.      Tenderness: " There is no abdominal tenderness. There is no guarding.   Musculoskeletal:         General: Normal range of motion.      Cervical back: Normal range of motion.   Skin:     General: Skin is warm and dry.      Coloration: Skin is not pale.      Findings: No erythema or rash.   Neurological:      Mental Status: He is alert and oriented to person, place, and time.   Psychiatric:         Mood and Affect: Mood normal.         Thought Content: Thought content normal.         Judgment: Judgment normal.         Procedures           ED Course  ED Course as of 01/08/24 1943 Mon Jan 08, 2024 1646 83-year-old male presents for evaluation of generalized weakness.  Of note, the patient tells me that he was diagnosed with COVID-19 about 2 weeks ago.  Since that time he has been experiencing progressively worsening generalized weakness and fatigue.  He called EMS today as he noted his blood sugar to be elevated at home.  On EMS arrival, the patient was hyperglycemic with a blood glucose of greater than 200.  He was subsequently brought to our facility to be evaluated.  On arrival, patient is nontoxic-appearing.  Benign exam.  Differential diagnosis is quite broad.  We will obtain labs and imaging, and we will reassess following initial interventions. [DD]   1647 I personally and independently viewed the patient's x-ray images myself, and I am in agreement with the radiologist's reading for final interpretation--particularly, there is no pneumonia noted. [DD]   1754 COVID-19 swab is positive. [DD]   1833 I personally and independently reviewed the patient's CT images and findings, and I am in agreement with the radiologist regarding CT interpretation--particularly, there is no emergent intracranial process present. [DD]   1836 Aside from mild hyperglycemia, labs are otherwise unrevealing.  No DKA noted.  High-sensitivity troponin is mildly elevated but clinical presentation clearly is not consistent with ACS. [DD]   1915 I  attempted to stand and ambulate the patient but he was unable to stand, even with significant assistance.  Given his generalized weakness, he does not feel comfortable going home in his current state.  His wife does not feel that he is safe to go home either.  As a result, I reached out to our hospitalist, Dr. Dale, the patient will be admitted under her care for observation and further evaluation/treatment.  The patient is hemodynamically stable at this time and is aware/agreeable with the plan. [DD]      ED Course User Index  [DD] Apollo Griffin MD                                        Recent Results (from the past 24 hour(s))   COVID-19 and FLU A/B PCR, 1 HR TAT - Swab, Nasopharynx    Collection Time: 01/08/24  4:46 PM    Specimen: Nasopharynx; Swab   Result Value Ref Range    COVID19 Detected (C) Not Detected - Ref. Range    Influenza A PCR Not Detected Not Detected    Influenza B PCR Not Detected Not Detected   Protime-INR    Collection Time: 01/08/24  4:46 PM    Specimen: Blood   Result Value Ref Range    Protime 16.8 (H) 12.2 - 14.5 Seconds    INR 1.35 (H) 0.89 - 1.12   CBC Auto Differential    Collection Time: 01/08/24  4:46 PM    Specimen: Blood   Result Value Ref Range    WBC 5.69 3.40 - 10.80 10*3/mm3    RBC 4.51 4.14 - 5.80 10*6/mm3    Hemoglobin 13.9 13.0 - 17.7 g/dL    Hematocrit 41.3 37.5 - 51.0 %    MCV 91.6 79.0 - 97.0 fL    MCH 30.8 26.6 - 33.0 pg    MCHC 33.7 31.5 - 35.7 g/dL    RDW 12.4 12.3 - 15.4 %    RDW-SD 41.5 37.0 - 54.0 fl    MPV 10.2 6.0 - 12.0 fL    Platelets 151 140 - 450 10*3/mm3    Neutrophil % 60.2 42.7 - 76.0 %    Lymphocyte % 22.5 19.6 - 45.3 %    Monocyte % 16.3 (H) 5.0 - 12.0 %    Eosinophil % 0.2 (L) 0.3 - 6.2 %    Basophil % 0.4 0.0 - 1.5 %    Immature Grans % 0.4 0.0 - 0.5 %    Neutrophils, Absolute 3.43 1.70 - 7.00 10*3/mm3    Lymphocytes, Absolute 1.28 0.70 - 3.10 10*3/mm3    Monocytes, Absolute 0.93 (H) 0.10 - 0.90 10*3/mm3    Eosinophils, Absolute 0.01 0.00 - 0.40  10*3/mm3    Basophils, Absolute 0.02 0.00 - 0.20 10*3/mm3    Immature Grans, Absolute 0.02 0.00 - 0.05 10*3/mm3    nRBC 0.0 0.0 - 0.2 /100 WBC   ECG 12 Lead Other; gen weakness    Collection Time: 01/08/24  4:55 PM   Result Value Ref Range    QT Interval 370 ms    QTC Interval 477 ms   Comprehensive Metabolic Panel    Collection Time: 01/08/24  5:39 PM    Specimen: Blood   Result Value Ref Range    Glucose 200 (H) 65 - 99 mg/dL    BUN 36 (H) 8 - 23 mg/dL    Creatinine 1.13 0.76 - 1.27 mg/dL    Sodium 139 136 - 145 mmol/L    Potassium 4.6 3.5 - 5.2 mmol/L    Chloride 102 98 - 107 mmol/L    CO2 28.0 22.0 - 29.0 mmol/L    Calcium 8.8 8.6 - 10.5 mg/dL    Total Protein 6.4 6.0 - 8.5 g/dL    Albumin 3.1 (L) 3.5 - 5.2 g/dL    ALT (SGPT) 30 1 - 41 U/L    AST (SGOT) 56 (H) 1 - 40 U/L    Alkaline Phosphatase 71 39 - 117 U/L    Total Bilirubin 0.8 0.0 - 1.2 mg/dL    Globulin 3.3 gm/dL    A/G Ratio 0.9 g/dL    BUN/Creatinine Ratio 31.9 (H) 7.0 - 25.0    Anion Gap 9.0 5.0 - 15.0 mmol/L    eGFR 64.1 >60.0 mL/min/1.73   T4, Free    Collection Time: 01/08/24  5:39 PM    Specimen: Blood   Result Value Ref Range    Free T4 1.98 (H) 0.93 - 1.70 ng/dL   TSH    Collection Time: 01/08/24  5:39 PM    Specimen: Blood   Result Value Ref Range    TSH 0.438 0.270 - 4.200 uIU/mL   Magnesium    Collection Time: 01/08/24  5:39 PM    Specimen: Blood   Result Value Ref Range    Magnesium 1.9 1.6 - 2.4 mg/dL   Single High Sensitivity Troponin T    Collection Time: 01/08/24  5:39 PM    Specimen: Blood   Result Value Ref Range    HS Troponin T 29 (H) <22 ng/L   BNP    Collection Time: 01/08/24  5:39 PM    Specimen: Blood   Result Value Ref Range    proBNP 518.2 0.0 - 1,800.0 pg/mL   POC Glucose Once    Collection Time: 01/08/24  6:24 PM    Specimen: Blood   Result Value Ref Range    Glucose 183 (H) 70 - 130 mg/dL     Note: In addition to lab results from this visit, the labs listed above may include labs taken at another facility or during a  different encounter within the last 24 hours. Please correlate lab times with ED admission and discharge times for further clarification of the services performed during this visit.    CT Head Without Contrast   Final Result   Impression:   1.No acute intracranial process identified.   2.Generalized senescent changes of the brain.   3.Left ethmoid and frontal sinus mucosal disease. Correlate for signs of sinusitis.            Electronically Signed: Wang Bingham MD     1/8/2024 4:55 PM CST     Workstation ID: [id number]      XR Chest 1 View   Final Result   Impression:   No acute cardiopulmonary process.         Electronically Signed: Apollo Abdalla MD     1/8/2024 4:34 PM EST     Workstation ID: [id number]        Vitals:    01/08/24 1800 01/08/24 1807 01/08/24 1808 01/08/24 1809   BP: 105/59      Pulse: 105 102 99 93   Resp:       Temp:       TempSrc:       SpO2: 91% 95% 99% 99%   Weight:       Height:         Medications   sodium chloride 0.9 % flush 10 mL (has no administration in time range)   sodium chloride 0.9 % bolus 500 mL (500 mL Intravenous New Bag 1/8/24 1941)   sodium chloride 0.9 % bolus 500 mL (0 mL Intravenous Stopped 1/8/24 1800)     ECG/EMG Results (last 24 hours)       Procedure Component Value Units Date/Time    ECG 12 Lead Other; gen weakness [[id number]] Collected: 01/08/24 1655     Updated: 01/08/24 1655     QT Interval 370 ms      QTC Interval 477 ms     Narrative:      Test Reason : Other~  Blood Pressure :   */*   mmHG  Vent. Rate : 100 BPM     Atrial Rate : 100 BPM     P-R Int :   * ms          QRS Dur : 122 ms      QT Int : 370 ms       P-R-T Axes :   *   6  34 degrees     QTc Int : 477 ms    Atrial fibrillation  Cannot rule out Anterior infarct , age undetermined  Abnormal ECG  No previous ECGs available    Referred By: ERMD           Confirmed By:           ECG 12 Lead Other; gen weakness   Preliminary Result   Test Reason : Other~   Blood Pressure :   */*   mmHG   Vent. Rate : 100 BPM      Atrial Rate : 100 BPM      P-R Int :   * ms          QRS Dur : 122 ms       QT Int : 370 ms       P-R-T Axes :   *   6  34 degrees      QTc Int : 477 ms      Atrial fibrillation   Cannot rule out Anterior infarct , age undetermined   Abnormal ECG   No previous ECGs available      Referred By: ROBERTO           Confirmed By:                  Medical Decision Making  Amount and/or Complexity of Data Reviewed  Labs: ordered.  Radiology: ordered.  ECG/medicine tests: ordered.    Risk  Prescription drug management.  Decision regarding hospitalization.        Final diagnoses:   COVID-19   Hyperglycemia   Generalized weakness       ED Disposition  ED Disposition       ED Disposition   Decision to Admit    Condition   --    Comment   Level of Care: Telemetry [5]   Diagnosis: COVID-19 virus infection [[id number]]   Admitting Physician: KARRI SHERWOOD [[id number]]   Attending Physician: KARRI SHERWOOD [[id number]]                 No follow-up provider specified.       Medication List      No changes were made to your prescriptions during this visit.            Apollo Griffin MD  01/08/24 1943

## 2024-01-09 NOTE — PROGRESS NOTES
"                  Clinical Nutrition   Nutrition Support Assessment  Reason for Visit: Identified at risk by screening criteria, MST score 2+      Patient Name: Apollo Haro  YOB: 1940  MRN: [id number]  Date of Encounter: 01/09/24 13:59 EST  Admission date: 1/8/2024    Comments:    Nutrition Assessment   Admission Diagnosis:  COVID-19 virus infection [U07.1]    Problem List:    Weakness    Type 2 diabetes mellitus, without long-term current use of insulin      PMH:   He  has a past medical history of A-fib, CAD (coronary artery disease), and DM (diabetes mellitus).    PSH:  He  has no past surgical history on file.    Applicable Nutrition Concerns:   Skin: deep tissue injury on coccyx   Oral:  GI:    Applicable Interval History:       Reported/Observed/Food/Nutrition Related History:   1/7  Patient in [facility] isolation, attempt to call patient room without success.  Attempt to call family with no answer.     Anthropometrics     Flowsheet Rows      Flowsheet Row First Filed Value   Admission Height 175.3 cm (69\") Documented at 01/08/2024 1617   Admission Weight 79.4 kg (175 lb) Documented at 01/08/2024 1617     Height: Height: 175.3 cm (69\")  Last Filed Weight: Weight: 79.4 kg (175 lb) (01/08/24 1617)  Method:  none  BMI: BMI (Calculated): 25.8  BMI classification: Overweight: 25.0-29.9kg/m2   IBW:  160lb    UBW: unable to determine at this time, no weight history to review    Weight change:   unable to determine at this time     Nutrition Focused Physical Exam     Date:     1/9    Unable to perform exam due to: COVID Isolation     Current Nutrition Prescription   PO: Diet: Regular/House Diet; Texture: Regular Texture (IDDSI 7); Fluid Consistency: Thin (IDDSI 0)  Oral Nutrition Supplement:   Intake: Insufficient data 50% x 1 meal       Nutrition Diagnosis   Date:  1/9            Updated:    Problem Predicted suboptimal energy intake   Etiology COVID virus    Signs/Symptoms Issuf PO intake; unable to " interview patient regarding intake history.    Status:       Goal:   General: Nutrition support treatment  PO: Establish PO  EN/PN: N/A    Nutrition Intervention      Follow treatment progress, Care plan reviewed    - unable to complete MSA at this time.  - Monitor intake during admission     Monitoring/Evaluation:   Per protocol, I&O, PO intake, Pertinent labs, Symptoms      Karlee Romero RD  Time Spent:

## 2024-01-09 NOTE — ED NOTES
Apollo Haro    Nursing Report ED to Floor:  Mental status: gcs14  Ambulatory status: assist x2-3 with walker  Oxygen Therapy:  RA  Cardiac Rhythm: NSR  Admitted from: ED/HOME  Safety Concerns:  FALL RISK  Social Issues: NA  ED Room #:  16    ED Nurse Phone Extension - [phone] or may call [phone].      HPI:   Chief Complaint   Patient presents with    Hyperglycemia       Past Medical History:  Past Medical History:   Diagnosis Date    A-fib     CAD (coronary artery disease)     DM (diabetes mellitus)         Past Surgical History:  History reviewed. No pertinent surgical history.     Admitting Doctor:   Terrence Tong DO    Consulting Provider(s):  Consults       No orders found from 12/10/2023 to 1/9/2024.             Admitting Diagnosis:   The primary encounter diagnosis was COVID-19. Diagnoses of Hyperglycemia and Generalized weakness were also pertinent to this visit.    Most Recent Vitals:   Vitals:    01/08/24 1800 01/08/24 1807 01/08/24 1808 01/08/24 1809   BP: 105/59      Pulse: 105 102 99 93   Resp:       Temp:       TempSrc:       SpO2: 91% 95% 99% 99%   Weight:       Height:           Active LDAs/IV Access:   Lines, Drains & Airways       Active LDAs       Name Placement date Placement time Site Days    Peripheral IV 01/08/24 1615 Right Antecubital 01/08/24  1615  Antecubital  less than 1    Peripheral IV 01/08/24 1648 Posterior;Right Hand 01/08/24  1648  Hand  less than 1                    Labs (abnormal labs have a star):   Labs Reviewed   COVID-19 AND FLU A/B, NP SWAB IN TRANSPORT MEDIA 1 HR TAT - Abnormal; Notable for the following components:       Result Value    COVID19 Detected (*)     All other components within normal limits    Narrative:     Fact sheet for providers: https://www.fda.gov/media/785091/download    Fact sheet for patients: https://www.fda.gov/media/011806/download    Test performed by PCR.  Influenza A and Influenza B negative results should be considered presumptive in samples  that have a positive SARS-CoV-2 result.    Competitive inhibition studies showed that SARS-CoV-2 virus, when present at concentrations above 3.6E+04 copies/mL, can inhibit the detection and amplification of influenza A and influenza B virus RNA if present at or below 1.8E+02 copies/mL or 4.9E+02 copies/mL, respectively, and may lead to false negative influenza virus results. If co-infection with influenza A or influenza B virus is suspected in samples with a positive SARS-CoV-2 result, the sample should be re-tested with another FDA cleared, approved, or authorized influenza test, if influenza virus detection would change clinical management.   COMPREHENSIVE METABOLIC PANEL - Abnormal; Notable for the following components:    Glucose 200 (*)     BUN 36 (*)     Albumin 3.1 (*)     AST (SGOT) 56 (*)     BUN/Creatinine Ratio 31.9 (*)     All other components within normal limits    Narrative:     GFR Normal >60  Chronic Kidney Disease <60  Kidney Failure <15    The GFR formula is only valid for adults with stable renal function between ages 18 and 70.   PROTIME-INR - Abnormal; Notable for the following components:    Protime 16.8 (*)     INR 1.35 (*)     All other components within normal limits   T4, FREE - Abnormal; Notable for the following components:    Free T4 1.98 (*)     All other components within normal limits    Narrative:     Results may be falsely increased if patient taking Biotin.     SINGLE HSTROPONIN T - Abnormal; Notable for the following components:    HS Troponin T 29 (*)     All other components within normal limits    Narrative:     High Sensitive Troponin T Reference Range:  <14.0 ng/L- Negative Female for AMI  <22.0 ng/L- Negative Male for AMI  >=14 - Abnormal Female indicating possible myocardial injury.  >=22 - Abnormal Male indicating possible myocardial injury.   Clinicians would have to utilize clinical acumen, EKG, Troponin, and serial changes to determine if it is an Acute Myocardial  Infarction or myocardial injury due to an underlying chronic condition.        CBC WITH AUTO DIFFERENTIAL - Abnormal; Notable for the following components:    Monocyte % 16.3 (*)     Eosinophil % 0.2 (*)     Monocytes, Absolute 0.93 (*)     All other components within normal limits   C-REACTIVE PROTEIN - Abnormal; Notable for the following components:    C-Reactive Protein 9.50 (*)     All other components within normal limits   LACTATE DEHYDROGENASE - Abnormal; Notable for the following components:     (*)     All other components within normal limits   POCT GLUCOSE FINGERSTICK - Abnormal; Notable for the following components:    Glucose 183 (*)     All other components within normal limits   TSH - Normal   MAGNESIUM - Normal   BNP (IN-HOUSE) - Normal    Narrative:     This assay is used as an aid in the diagnosis of individuals suspected of having heart failure. It can be used as an aid in the diagnosis of acute decompensated heart failure (ADHF) in patients presenting with signs and symptoms of ADHF to the emergency department (ED). In addition, NT-proBNP of <300 pg/mL indicates ADHF is not likely.    Age Range Result Interpretation  NT-proBNP Concentration (pg/mL:      <50             Positive            >450                   Gray                 300-450                    Negative             <300    50-75           Positive            >900                  Gray                300-900                  Negative            <300      >75             Positive            >1800                  Gray                300-1800                  Negative            <300   D-DIMER, QUANTITATIVE - Normal    Narrative:     According to the assay 's published package insert, a normal (<0.50 MCGFEU/mL) D-dimer result in conjunction with a non-high clinical probability assessment, excludes deep vein thrombosis (DVT) and pulmonary embolism (PE) with high sensitivity.    D-dimer values increase with age and  "this can make VTE exclusion of an older population difficult. To address this, the [employer], based on best available evidence and recent guidelines, recommends that clinicians use age-adjusted D-dimer thresholds in patients greater than 50 years of age with: a) a low probability of PE who do not meet all Pulmonary Embolism Rule Out Criteria, or b) in those with intermediate probability of PE.   The formula for an age-adjusted D-dimer cut-off is \"age/100\".  For example, a 60 year old patient would have an age-adjusted cut-off of 0.60 MCGFEU/mL and an 80 year old 0.80 MCGFEU/mL.   PROCALCITONIN - Normal    Narrative:     As a Marker for Sepsis (Non-Neonates):    1. <0.5 ng/mL represents a low risk of severe sepsis and/or septic shock.  2. >2 ng/mL represents a high risk of severe sepsis and/or septic shock.    As a Marker for Lower Respiratory Tract Infections that require antibiotic therapy:    PCT on Admission    Antibiotic Therapy       6-12 Hrs later    >0.5                Strongly Recommended  >0.25 - <0.5        Recommended   0.1 - 0.25          Discouraged              Remeasure/reassess PCT  <0.1                Strongly Discouraged     Remeasure/reassess PCT    As 28 day mortality risk marker: \"Change in Procalcitonin Result\" (>80% or <=80%) if Day 0 (or Day 1) and Day 4 values are available. Refer to http://www.[employer]-pct-calculator.com    Change in PCT <=80%  A decrease of PCT levels below or equal to 80% defines a positive change in PCT test result representing a higher risk for 28-day all-cause mortality of patients diagnosed with severe sepsis for septic shock.    Change in PCT >80%  A decrease of PCT levels of more than 80% defines a negative change in PCT result representing a lower risk for 28-day all-cause mortality of patients diagnosed with severe sepsis or septic shock.      FERRITIN - Normal    Narrative:     Results may be falsely decreased if patient taking Biotin.   "   HEMOGLOBIN A1C   POCT GLUCOSE FINGERSTICK   POCT GLUCOSE FINGERSTICK   POCT GLUCOSE FINGERSTICK   POCT GLUCOSE FINGERSTICK   CBC AND DIFFERENTIAL    Narrative:     The following orders were created for panel order CBC & Differential.  Procedure                               Abnormality         Status                     ---------                               -----------         ------                     CBC Auto Differential[[id number]]        Abnormal            Final result                 Please view results for these tests on the individual orders.       Meds Given in ED:   Medications   sodium chloride 0.9 % flush 10 mL (has no administration in time range)   Pharmacy Consult (has no administration in time range)   dextrose (GLUTOSE) oral gel 15 g (has no administration in time range)   dextrose (D50W) (25 g/50 mL) IV injection 25 g (has no administration in time range)   glucagon (GLUCAGEN) injection 1 mg (has no administration in time range)   Insulin Lispro (humaLOG) injection 2-7 Units (has no administration in time range)   sodium chloride 0.9 % bolus 500 mL (0 mL Intravenous Stopped 1/8/24 1800)     [START ON 1/9/2024] Pharmacy Consult,

## 2024-01-09 NOTE — NURSING NOTE
"WOC consulted for pressure injury to coccyx    Patient presents with what appears to be a deep tissue pressure injury to the coccyx.  Wound edges are defined, with a \"red halo\" surrounding the central wound bed.  The wound bed is maroonish purple, nonblanching, skin intact and over bony prominence.  Patient states that he has not fallen recently.  Wound measures 3 x 1.5x0 cm.  Will order Venelex for sacrococcygeal deep tissue pressure injury.        Patient also has suspected deep tissue pressure injury at the right ischial tuberosity that measures 0.5 x 0.5 x 0 cm.     MASD related to urine and fecal incontinence noted at bilateral gluteals.  Treat per [facility] protocol.    Patient is on a waffle overlay mattress.  Unable to order low-air-loss mattress due to COVID-19 infection.  Patient is at high risk for pressure development.  Please turn every 2 hours per [facility] policy.    Will follow.    Garcia Vang RN, BSN, CCRN, CWOCN  Wound, Ostomy and Continence (WOC) Department  [facility]    "

## 2024-01-10 ENCOUNTER — APPOINTMENT (OUTPATIENT)
Dept: GENERAL RADIOLOGY | Facility: HOSPITAL | Age: 84
End: 2024-01-10
Payer: MEDICARE

## 2024-01-10 ENCOUNTER — APPOINTMENT (OUTPATIENT)
Dept: MRI IMAGING | Facility: HOSPITAL | Age: 84
End: 2024-01-10
Payer: MEDICARE

## 2024-01-10 LAB
ALBUMIN SERPL-MCNC: 2.8 G/DL (ref 3.5–5.2)
ALBUMIN/GLOB SERPL: 1.2 G/DL
ALP SERPL-CCNC: 66 U/L (ref 39–117)
ALT SERPL W P-5'-P-CCNC: 30 U/L (ref 1–41)
ANION GAP SERPL CALCULATED.3IONS-SCNC: 7 MMOL/L (ref 5–15)
AST SERPL-CCNC: 61 U/L (ref 1–40)
BACTERIA UR QL AUTO: ABNORMAL /HPF
BILIRUB SERPL-MCNC: 0.7 MG/DL (ref 0–1.2)
BILIRUB UR QL STRIP: NEGATIVE
BUN SERPL-MCNC: 20 MG/DL (ref 8–23)
BUN/CREAT SERPL: 22 (ref 7–25)
CALCIUM SPEC-SCNC: 8.6 MG/DL (ref 8.6–10.5)
CHLORIDE SERPL-SCNC: 104 MMOL/L (ref 98–107)
CK SERPL-CCNC: 696 U/L (ref 20–200)
CLARITY UR: CLEAR
CO2 SERPL-SCNC: 30 MMOL/L (ref 22–29)
COD CRY URNS QL: ABNORMAL /HPF
COLOR UR: YELLOW
CREAT SERPL-MCNC: 0.91 MG/DL (ref 0.76–1.27)
DEPRECATED RDW RBC AUTO: 40.9 FL (ref 37–54)
EGFRCR SERPLBLD CKD-EPI 2021: 83.1 ML/MIN/1.73
ERYTHROCYTE [DISTWIDTH] IN BLOOD BY AUTOMATED COUNT: 12.2 % (ref 12.3–15.4)
FOLATE SERPL-MCNC: >20 NG/ML (ref 4.78–24.2)
GLOBULIN UR ELPH-MCNC: 2.3 GM/DL
GLUCOSE BLDC GLUCOMTR-MCNC: 116 MG/DL (ref 70–130)
GLUCOSE BLDC GLUCOMTR-MCNC: 168 MG/DL (ref 70–130)
GLUCOSE BLDC GLUCOMTR-MCNC: 224 MG/DL (ref 70–130)
GLUCOSE BLDC GLUCOMTR-MCNC: 314 MG/DL (ref 70–130)
GLUCOSE SERPL-MCNC: 116 MG/DL (ref 65–99)
GLUCOSE UR STRIP-MCNC: ABNORMAL MG/DL
HCT VFR BLD AUTO: 36.3 % (ref 37.5–51)
HGB BLD-MCNC: 12.2 G/DL (ref 13–17.7)
HGB UR QL STRIP.AUTO: ABNORMAL
HYALINE CASTS UR QL AUTO: ABNORMAL /LPF
KETONES UR QL STRIP: ABNORMAL
LEUKOCYTE ESTERASE UR QL STRIP.AUTO: NEGATIVE
MCH RBC QN AUTO: 30.6 PG (ref 26.6–33)
MCHC RBC AUTO-ENTMCNC: 33.6 G/DL (ref 31.5–35.7)
MCV RBC AUTO: 91 FL (ref 79–97)
NITRITE UR QL STRIP: NEGATIVE
PH UR STRIP.AUTO: 6 [PH] (ref 5–8)
PLATELET # BLD AUTO: 144 10*3/MM3 (ref 140–450)
PMV BLD AUTO: 10.3 FL (ref 6–12)
POTASSIUM SERPL-SCNC: 4.3 MMOL/L (ref 3.5–5.2)
PROT SERPL-MCNC: 5.1 G/DL (ref 6–8.5)
PROT UR QL STRIP: NEGATIVE
RBC # BLD AUTO: 3.99 10*6/MM3 (ref 4.14–5.8)
RBC # UR STRIP: ABNORMAL /HPF
REF LAB TEST METHOD: ABNORMAL
SODIUM SERPL-SCNC: 141 MMOL/L (ref 136–145)
SP GR UR STRIP: 1.02 (ref 1–1.03)
SQUAMOUS #/AREA URNS HPF: ABNORMAL /HPF
UROBILINOGEN UR QL STRIP: ABNORMAL
VIT B12 BLD-MCNC: 1269 PG/ML (ref 211–946)
WBC # UR STRIP: ABNORMAL /HPF
WBC NRBC COR # BLD AUTO: 4.14 10*3/MM3 (ref 3.4–10.8)

## 2024-01-10 PROCEDURE — 82607 VITAMIN B-12: CPT | Performed by: INTERNAL MEDICINE

## 2024-01-10 PROCEDURE — 63710000001 INSULIN LISPRO (HUMAN) PER 5 UNITS: Performed by: INTERNAL MEDICINE

## 2024-01-10 PROCEDURE — 82550 ASSAY OF CK (CPK): CPT | Performed by: INTERNAL MEDICINE

## 2024-01-10 PROCEDURE — 85027 COMPLETE CBC AUTOMATED: CPT | Performed by: INTERNAL MEDICINE

## 2024-01-10 PROCEDURE — 97535 SELF CARE MNGMENT TRAINING: CPT

## 2024-01-10 PROCEDURE — 73552 X-RAY EXAM OF FEMUR 2/>: CPT

## 2024-01-10 PROCEDURE — 82746 ASSAY OF FOLIC ACID SERUM: CPT | Performed by: INTERNAL MEDICINE

## 2024-01-10 PROCEDURE — 81001 URINALYSIS AUTO W/SCOPE: CPT | Performed by: INTERNAL MEDICINE

## 2024-01-10 PROCEDURE — 80053 COMPREHEN METABOLIC PANEL: CPT | Performed by: INTERNAL MEDICINE

## 2024-01-10 PROCEDURE — 97166 OT EVAL MOD COMPLEX 45 MIN: CPT

## 2024-01-10 PROCEDURE — 73590 X-RAY EXAM OF LOWER LEG: CPT

## 2024-01-10 PROCEDURE — 82948 REAGENT STRIP/BLOOD GLUCOSE: CPT

## 2024-01-10 PROCEDURE — 99232 SBSQ HOSP IP/OBS MODERATE 35: CPT | Performed by: INTERNAL MEDICINE

## 2024-01-10 RX ORDER — ASPIRIN 81 MG/1
81 TABLET, CHEWABLE ORAL DAILY
Status: DISCONTINUED | OUTPATIENT
Start: 2024-01-10 | End: 2024-01-18 | Stop reason: HOSPADM

## 2024-01-10 RX ORDER — FLECAINIDE ACETATE 50 MG/1
50 TABLET ORAL EVERY 12 HOURS
Status: DISCONTINUED | OUTPATIENT
Start: 2024-01-10 | End: 2024-01-18 | Stop reason: HOSPADM

## 2024-01-10 RX ORDER — PANTOPRAZOLE SODIUM 40 MG/1
40 TABLET, DELAYED RELEASE ORAL
Status: DISCONTINUED | OUTPATIENT
Start: 2024-01-10 | End: 2024-01-18 | Stop reason: HOSPADM

## 2024-01-10 RX ADMIN — SENNOSIDES AND DOCUSATE SODIUM 2 TABLET: 8.6; 5 TABLET ORAL at 08:27

## 2024-01-10 RX ADMIN — FLECAINIDE ACETATE 50 MG: 50 TABLET ORAL at 22:19

## 2024-01-10 RX ADMIN — CASTOR OIL AND BALSAM, PERU 1 APPLICATION: 788; 87 OINTMENT TOPICAL at 11:44

## 2024-01-10 RX ADMIN — Medication 10 ML: at 22:19

## 2024-01-10 RX ADMIN — Medication 10 ML: at 08:28

## 2024-01-10 RX ADMIN — TAMSULOSIN HYDROCHLORIDE 0.4 MG: 0.4 CAPSULE ORAL at 08:27

## 2024-01-10 RX ADMIN — MICONAZOLE NITRATE 1 APPLICATION: 20 POWDER TOPICAL at 08:28

## 2024-01-10 RX ADMIN — BENZONATATE 200 MG: 100 CAPSULE ORAL at 22:18

## 2024-01-10 RX ADMIN — INSULIN LISPRO 3 UNITS: 100 INJECTION, SOLUTION INTRAVENOUS; SUBCUTANEOUS at 13:05

## 2024-01-10 RX ADMIN — INSULIN LISPRO 5 UNITS: 100 INJECTION, SOLUTION INTRAVENOUS; SUBCUTANEOUS at 17:45

## 2024-01-10 RX ADMIN — APIXABAN 5 MG: 5 TABLET, FILM COATED ORAL at 22:18

## 2024-01-10 RX ADMIN — PANTOPRAZOLE SODIUM 40 MG: 40 TABLET, DELAYED RELEASE ORAL at 17:45

## 2024-01-10 RX ADMIN — APIXABAN 5 MG: 5 TABLET, FILM COATED ORAL at 08:27

## 2024-01-10 RX ADMIN — CASTOR OIL AND BALSAM, PERU 1 APPLICATION: 788; 87 OINTMENT TOPICAL at 22:18

## 2024-01-10 RX ADMIN — INSULIN LISPRO 2 UNITS: 100 INJECTION, SOLUTION INTRAVENOUS; SUBCUTANEOUS at 22:18

## 2024-01-10 RX ADMIN — MICONAZOLE NITRATE 1 APPLICATION: 20 POWDER TOPICAL at 22:19

## 2024-01-10 RX ADMIN — ASPIRIN 81 MG: 81 TABLET, CHEWABLE ORAL at 17:45

## 2024-01-10 RX ADMIN — FLECAINIDE ACETATE 50 MG: 50 TABLET ORAL at 13:05

## 2024-01-10 NOTE — PLAN OF CARE
Goal Outcome Evaluation:  Plan of Care Reviewed With: patient           Outcome Evaluation: OT eval complete. Pt presents with weakness, confusion, decreased act july, and decline in mob and adl performance. Pt mod assist with cues for bed mob, mod assist for sts and t/f with stand pivot using ub support, dep for donning socks, and setup for washing face. Recommend IPOT and SNF at d/c.      Anticipated Discharge Disposition (OT): skilled nursing facility

## 2024-01-10 NOTE — PLAN OF CARE
Goal Outcome Evaluation:   Pt is NSR on monitor, vitals stable on room air, sometimes on 2LO2 nasal cannula. Pt was unable to ambulate in the [place] today due to hypotension and unsteadiness. Orthostatics performed and charted.

## 2024-01-10 NOTE — PROGRESS NOTES
[facility] Medicine Services  PROGRESS NOTE    Patient Name: Apollo Haro  : 1940  MRN: [id number]    Date of Admission: 2024  Primary Care Physician: Provider, No Known    Subjective   Subjective     CC:  Gen weakness    HPI:  Generalized weakness  Denies pain currently  No n/v/d  Worked w/ pt transferred to chair from bed  No dyspnea  No chest pain      Objective   Objective     Vital Signs:   Temp:  [98 °F (36.7 °C)-98.8 °F (37.1 °C)] 98.3 °F (36.8 °C)  Heart Rate:  [] 88  Resp:  [16] 16  BP: ()/(51-67) 113/66  Flow (L/min):  [1-2] 1     Physical Exam:  Constitutional:Alert, nontoxic appearing, sitting up in bed, normal work of breathing, no distress  Psych:Normal/appropriate affect  HEENT:NCAT, oropharynx clear  Neck: neck supple, full range of motion  Neuro: Face symmetric, speech clear, equal , moves all extremities, nonfocal exam  Cardiac: rrr; No pretibial pitting edema  Resp:ctab, normal effort  GI: abd soft, nontender to palpation  Skin: sacral decub noted  Musculoskeletal/extremities: no cyanosis of extremities; no significant ankle edema          Results Reviewed:  LAB RESULTS:      Lab 01/10/24  0624  1739 24  1646   WBC 4.14  --   --  5.69   HEMOGLOBIN 12.2*  --   --  13.9   HEMATOCRIT 36.3*  --   --  41.3   PLATELETS 144  --   --  151   NEUTROS ABS  --   --   --  3.43   IMMATURE GRANS (ABS)  --   --   --  0.02   LYMPHS ABS  --   --   --  1.28   MONOS ABS  --   --   --  0.93*   EOS ABS  --   --   --  0.01   MCV 91.0  --   --  91.6   CRP  --  7.46* 9.50*  --    PROCALCITONIN  --   --  0.09  --    LDH  --   --  246*  --    PROTIME  --   --   --  16.8*   D DIMER QUANT  --   --   --  0.65         Lab 01/10/24  0624  0614 24  1739 24  1646   SODIUM 141 138 139  --    POTASSIUM 4.3 3.8 4.6  --    CHLORIDE 104 101 102  --    CO2 30.0* 29.0 28.0  --    ANION GAP 7.0 8.0 9.0  --    BUN 20 28* 36*  --     CREATININE 0.91 0.88 1.13  --    EGFR 83.1 84.8 64.1  --    GLUCOSE 116* 139* 200*  --    CALCIUM 8.6 8.1* 8.8  --    MAGNESIUM  --  1.7 1.9  --    HEMOGLOBIN A1C  --   --   --  6.60*   TSH  --   --  0.438  --          Lab 01/10/24  0609 01/08/24  1739   TOTAL PROTEIN 5.1* 6.4   ALBUMIN 2.8* 3.1*   GLOBULIN 2.3 3.3   ALT (SGPT) 30 30   AST (SGOT) 61* 56*   BILIRUBIN 0.7 0.8   ALK PHOS 66 71         Lab 01/08/24  1739 01/08/24  1646   PROBNP 518.2  --    HSTROP T 29*  --    PROTIME  --  16.8*   INR  --  1.35*             Lab 01/10/24  0609 01/08/24  1739   FERRITIN  --  240.10   FOLATE >20.00  --    VITAMIN B 12 1,269*  --          Brief Urine Lab Results  (Last result in the past 365 days)        Color   Clarity   Blood   Leuk Est   Nitrite   Protein   CREAT   Urine HCG        01/10/24 1150 Yellow   Clear   Moderate (2+)   Negative   Negative   Negative                   Microbiology Results Abnormal       None            CT Head Without Contrast    Result Date: 1/8/2024  CT HEAD WO CONTRAST Date of Exam: 1/8/2024 4:44 PM CST Indication: gen weakness. Comparison: None available. Technique: Axial CT images were obtained of the head without contrast administration.  Automated exposure control and iterative construction methods were used. Findings: There is no evidence of acute territorial infarction. There is no acute intracranial hemorrhage. There are no extra-axial collections. Ventricles and CSF spaces are symmetrically prominent.. No mass effect nor hydrocephalus. Brain parenchyma appears normal for age.  There is near complete opacification of the left ethmoid air cells and left frontal sinus. Correlate for signs of sinusitis. Osseous structures and orbits appear intact.     Impression: Impression: 1.No acute intracranial process identified. 2.Generalized senescent changes of the brain. 3.Left ethmoid and frontal sinus mucosal disease. Correlate for signs of sinusitis. Electronically Signed: Wang Bingham MD   1/8/2024 4:55 PM CST  Workstation ID: [id number]    XR Chest 1 View    Result Date: 1/8/2024  XR CHEST 1 VW Date of Exam: 1/8/2024 4:20 PM EST Indication: gen weakness Comparison: None available. Findings: The lungs are clear. The heart and mediastinal contours appear normal. There is no pleural effusion. The pulmonary vasculature appears normal. The osseous structures appear intact.     Impression: Impression: No acute cardiopulmonary process. Electronically Signed: Apollo Abdalla MD  1/8/2024 4:34 PM EST  Workstation ID: [id number]         Current medications:  Scheduled Meds:apixaban, 5 mg, Oral, BID  castor oil-balsam peru, 1 application , Topical, Q12H  flecainide, 50 mg, Oral, Q12H  insulin lispro, 2-7 Units, Subcutaneous, 4x Daily AC & at Bedtime  miconazole, 1 application , Topical, Q12H  senna-docusate sodium, 2 tablet, Oral, BID  sodium chloride, 10 mL, Intravenous, Q12H  tamsulosin, 0.4 mg, Oral, Daily      Continuous Infusions:     PRN Meds:.  acetaminophen **OR** acetaminophen **OR** acetaminophen    benzonatate    senna-docusate sodium **AND** polyethylene glycol **AND** bisacodyl **AND** bisacodyl    Calcium Replacement - Follow Nurse / BPA Driven Protocol    dextrose    dextrose    glucagon (human recombinant)    Magnesium Low Dose Replacement - Follow Nurse / BPA Driven Protocol    Phosphorus Replacement - Follow Nurse / BPA Driven Protocol    Potassium Replacement - Follow Nurse / BPA Driven Protocol    [COMPLETED] Insert Peripheral IV **AND** sodium chloride    sodium chloride    sodium chloride    Assessment & Plan   Assessment & Plan     Active Hospital Problems    Diagnosis  POA    **Weakness [R53.1]  Yes    Type 2 diabetes mellitus, without long-term current use of insulin [E11.9]  Yes      Resolved Hospital Problems   No resolved problems to display.        Brief Hospital Course to date:  Apollo Haro is a 83 y.o. male w/ hx parox afib, htn, hl, dm2, chronic back pain who was brought to [facility] ED  due to generalized weakness. History if limited as no family bedside and patient poor historian and no answer when phone family. Patient states was diagnosed w/ covid 19 within past couple of weeks and treated as outpatient, but due to worsening generalized weakness in leg and walking was brought to [facility] for evaluation. Cxr negative. Ct head no acute intracranial process, left enthoid and frontal sinus musosal dz noted.       Covid 19 infection  -no hypoxia, mild illness  -tested + 1/8/24, out of isolation here 1/18/24 if doesn't progress to severe infection (I.e. covid w/ related hypoxemia, etc)    Generalized weakness w/ decreased mobility/ability to walk  Falls  Back pain  -recent [facility] procedure note 7/28/23 w/ diagnosis code of normal pressure hydrocephalus (patient poor historian regarding this)  -exam nonfocal bedside on 1/9/24  -ct head negative  -tsh ok  -generalized weakness could be due to recent covid 19 infection as well  -u/a benign  -b12 & folate wnl  -cpk 1019-->repeat 696 w/ gentle hydration  -MRI L-& T spine pending  -ot recommends snf; pt eval pending  -per nursing staff, wife will be here tomorrow 1/11/24, plan to speak with her then    Encephalopathy, likely due to covid 19 infection  -ct head ok  -improved    Mild rhabdomyolysis  -from falls; initial cpk 1019, repeat 696 w/ iv fluids  -holding statin for now, restart pravachol  in couple days    Cad  Afib  Htn  Hl  -restart home flecainide  -continue eliquis    Dm2, A1c 6.6  -ssi      Sacral decub, poa  -wound care      Am labs: cbc, bmp,mag, crp      Expected Discharge Location and Transportation: rehab facility  Expected Discharge  1/12/24 depending on clinical course    DVT prophylaxis:  Medical DVT prophylaxis orders are present.     AM-PAC 6 Clicks Score (PT): 9 (01/10/24 0800)    CODE STATUS:   Code Status and Medical Interventions:   Ordered at: 01/08/24 2053     Code Status (Patient has no pulse and is not breathing):    CPR (Attempt to  Resuscitate)     Medical Interventions (Patient has pulse or is breathing):    Full Support       Rebel Santiago MD  01/10/24

## 2024-01-10 NOTE — THERAPY EVALUATION
Patient Name: Apollo Haro  : 1940    MRN: [id number]                              Today's Date: 1/10/2024       Admit Date: 2024    Visit Dx:     ICD-10-CM ICD-9-CM   1. COVID-19  U07.1 079.89   2. Hyperglycemia  R73.9 790.29   3. Generalized weakness  R53.1 780.79     Patient Active Problem List   Diagnosis    Weakness    Type 2 diabetes mellitus, without long-term current use of insulin     Past Medical History:   Diagnosis Date    A-fib     CAD (coronary artery disease)     DM (diabetes mellitus)      History reviewed. No pertinent surgical history.   General Information       Row Name 01/10/24 [facility]0          OT Time and Intention    Document Type evaluation  -     Mode of Treatment occupational therapy  -[facility] Name 01/10/24 [facility]          General Information    Patient Profile Reviewed yes  -SW     Existing Precautions/Restrictions fall;oxygen therapy device and L/min;other (see comments)  Contact and airborne  -     Barriers to Rehab medically complex;previous functional deficit;cognitive status  -[facility] Name 01/10/24 131          Occupational Profile    Environmental Supports and Barriers (Occupational Profile) Pt states he has a supportive wife, son, and grandson.  He uses a w/c to get around in.  -[facility] Name 01/10/24 1310          Living Environment    People in Home spouse  -[facility] Name 01/10/24 1310          Home Main Entrance    Number of Stairs, Main Entrance two  -[facility] Name 01/10/24 1310          Stairs Within Home, Primary    Number of Stairs, Within Home, Primary none  -[facility] Name 01/10/24 1310          Cognition    Orientation Status (Cognition) oriented to;person;place  -[facility] Name 01/10/24 131          Safety Issues, Functional Mobility    Safety Issues Affecting Function (Mobility) awareness of need for assistance;friction/shear risk;insight into deficits/self-awareness;safety precautions follow-through/compliance;sequencing abilities   -     Impairments Affecting Function (Mobility) balance;cognition;coordination;endurance/activity tolerance;sensation/sensory awareness;strength;pain;range of motion (ROM)  -     Cognitive Impairments, Mobility Safety/Performance awareness, need for assistance;insight into deficits/self-awareness;safety precaution awareness;safety precaution follow-through;sequencing abilities  -               User Key  (r) = Recorded By, (t) = Taken By, (c) = Cosigned By      Initials Name Provider Type    Alissa Call OT Occupational Therapist                     Mobility/ADL's       Row Name 01/10/24 1310          Bed Mobility    Bed Mobility supine-sit  -SW     Supine-Sit Bond (Bed Mobility) moderate assist (50% patient effort);verbal cues  -     Assistive Device (Bed Mobility) bed rails;head of bed elevated  -SW       Row Name 01/10/24 1310          Transfers    Transfers bed-chair transfer;sit-stand transfer  -SW       Row Name 01/10/24 1310          Bed-Chair Transfer    Bed-Chair Bond (Transfers) moderate assist (50% patient effort);verbal cues  -     Assistive Device (Bed-Chair Transfers) other (see comments)  -     Comment, (Bed-Chair Transfer) UB support  -SW       Row Name 01/10/24 1310          Sit-Stand Transfer    Sit-Stand Bond (Transfers) moderate assist (50% patient effort);verbal cues  -     Comment, (Sit-Stand Transfer) UB support  -SW       Row Name 01/10/24 1310          Activities of Daily Living    BADL Assessment/Intervention lower body dressing;grooming  -SW       Row Name 01/10/24 1310          Lower Body Dressing Assessment/Training    Bond Level (Lower Body Dressing) lower body dressing skills;dependent (less than 25% patient effort)  -     Position (Lower Body Dressing) supine  -SW       Row Name 01/10/24 1310          Grooming Assessment/Training    Bond Level (Grooming) grooming skills;wash face, hands;set up  -     Position (Grooming)  sitting up in bed  -               User Key  (r) = Recorded By, (t) = Taken By, (c) = Cosigned By      Initials Name Provider Type    Ailssa Call OT Occupational Therapist                   Obj/Interventions       Row Name 01/10/24 1310          Sensory Assessment (Somatosensory)    Sensory Assessment (Somatosensory) UE sensation intact  -[facility] Name 01/10/24 1310          Vision Assessment/Intervention    Visual Impairment/Limitations WFL  -       Row Name 01/10/24 1310          Range of Motion Comprehensive    General Range of Motion upper extremity range of motion deficits identified  -     Comment, General Range of Motion BUEs limited  -[facility] Name 01/10/24 1310          Strength Comprehensive (MMT)    General Manual Muscle Testing (MMT) Assessment upper extremity strength deficits identified  -     Comment, General Manual Muscle Testing (MMT) Assessment limited by decreased rom  -[facility] Name 01/10/24 1310          Balance    Balance Assessment sitting dynamic balance;sitting static balance;sit to stand dynamic balance;standing dynamic balance;standing static balance  -     Static Sitting Balance supervision  -     Dynamic Sitting Balance contact guard  -     Position, Sitting Balance unsupported;sitting edge of bed  -     Sit to Stand Dynamic Balance moderate assist  -     Static Standing Balance moderate assist  -     Dynamic Standing Balance moderate assist  -     Position/Device Used, Standing Balance supported  -     Balance Interventions sitting;standing;sit to stand;supported;static;dynamic;minimal challenge;occupation based/functional task  -               User Key  (r) = Recorded By, (t) = Taken By, (c) = Cosigned By      Initials Name Provider Type    Alissa Call OT Occupational Therapist                   Goals/Plan       [facility] Name 01/10/24 1310          Transfer Goal 1 (OT)    Activity/Assistive Device (Transfer Goal 1, OT) toilet  -      Frio Level/Cues Needed (Transfer Goal 1, OT) minimum assist (75% or more patient effort)  -SW     Time Frame (Transfer Goal 1, OT) long term goal (LTG);by discharge  -SW     Progress/Outcome (Transfer Goal 1, OT) goal ongoing  -[facility] Name 01/10/24 1310          Dressing Goal 1 (OT)    Activity/Device (Dressing Goal 1, OT) lower body dressing  -     Frio/Cues Needed (Dressing Goal 1, OT) maximum assist (25-49% patient effort)  -     Time Frame (Dressing Goal 1, OT) long term goal (LTG);by discharge  -SW     Progress/Outcome (Dressing Goal 1, OT) goal ongoing  -[facility] Name 01/10/24 1310          Therapy Assessment/Plan (OT)    Planned Therapy Interventions (OT) activity tolerance training;adaptive equipment training;BADL retraining;functional balance retraining;patient/caregiver education/training;transfer/mobility retraining;strengthening exercise  -               User Key  (r) = Recorded By, (t) = Taken By, (c) = Cosigned By      Initials Name Provider Type    Alissa Call, DORIAN Occupational Therapist                   Clinical Impression       [facility] Name 01/10/24 1310          Pain Assessment    Pretreatment Pain Rating 0/10 - no pain  -SW     Posttreatment Pain Rating 0/10 - no pain  -SW     Pre/Posttreatment Pain Comment stated he didn't feel good, but did not c/o pain  -[facility] Name 01/10/24 1310          Plan of Care Review    Plan of Care Reviewed With patient  -SW     Outcome Evaluation OT eval complete. Pt presents with weakness, confusion, decreased act july, and decline in mob and adl performance. Pt mod assist with cues for bed mob, mod assist for sts and t/f with stand pivot using ub support, dep for donning socks, and setup for washing face. Recommend IPOT and SNF at d/c.  -[facility] Name 01/10/24 1310          Therapy Assessment/Plan (OT)    Rehab Potential (OT) good, to achieve stated therapy goals  -     Criteria for Skilled Therapeutic Interventions Met (OT)  yes;meets criteria;skilled treatment is necessary  -     Therapy Frequency (OT) daily  -       Row Name 01/10/24 1310          Therapy Plan Review/Discharge Plan (OT)    Anticipated Discharge Disposition (OT) skilled nursing facility  -[facility] Name 01/10/24 1310          Vital Signs    Pre Systolic BP Rehab 104  -SW     Pre Treatment Diastolic BP 57  -SW     Pretreatment Heart Rate (beats/min) 104  -SW     Pre SpO2 (%) 90  -SW     O2 Delivery Pre Treatment supplemental O2  -SW     O2 Delivery Intra Treatment supplemental O2  -SW     O2 Delivery Post Treatment supplemental O2  -SW     Pre Patient Position Supine  -SW     Intra Patient Position Standing  -SW     Post Patient Position Sitting  -[facility] Name 01/10/24 1310          Positioning and Restraints    Pre-Treatment Position in bed  -SW     Post Treatment Position chair  -SW     In Chair notified nsg;reclined;sitting;encouraged to call for assist;call light within reach;exit alarm on;waffle cushion;legs elevated  -SW               User Key  (r) = Recorded By, (t) = Taken By, (c) = Cosigned By      Initials Name Provider Type    Alissa Call, OT Occupational Therapist                   Outcome Measures       Row Name 01/10/24 1414          How much help from another is currently needed...    Putting on and taking off regular lower body clothing? 1  -SW     Bathing (including washing, rinsing, and drying) 2  -SW     Toileting (which includes using toilet bed pan or urinal) 1  -SW     Putting on and taking off regular upper body clothing 2  -SW     Taking care of personal grooming (such as brushing teeth) 3  -SW     Eating meals 3  -SW     AM-PAC 6 Clicks Score (OT) 12  -SW       [facility] Name 01/10/24 0800          How much help from another person do you currently need...    Turning from your back to your side while in flat bed without using bedrails? 2  -CB     Moving from lying on back to sitting on the side of a flat bed without bedrails? 2  -CB      Moving to and from a bed to a chair (including a wheelchair)? 1  -CB     Standing up from a chair using your arms (e.g., wheelchair, bedside chair)? 2  -CB     Climbing 3-5 steps with a railing? 1  -CB     To walk in hospital room? 1  -CB     AM-PAC 6 Clicks Score (PT) 9  -CB     Highest Level of Mobility Goal 3 --> Sit at edge of bed  -CB       Row Name 01/10/24 1414          Functional Assessment    Outcome Measure Options AM-PAC 6 Clicks Daily Activity (OT)  -               User Key  (r) = Recorded By, (t) = Taken By, (c) = Cosigned By      Initials Name Provider Type    Alissa Call OT Occupational Therapist    Dax Vicente, RN Registered Nurse                    Occupational Therapy Education       Title: PT OT SLP Therapies (In Progress)       Topic: Occupational Therapy (In Progress)       Point: ADL training (Done)       Description:   Instruct learner(s) on proper safety adaptation and remediation techniques during self care or transfers.   Instruct in proper use of assistive devices.                  Learning Progress Summary             Patient Acceptance, E, VU,NR by RAVINDRA at 1/10/2024 1415                         Point: Home exercise program (Not Started)       Description:   Instruct learner(s) on appropriate technique for monitoring, assisting and/or progressing therapeutic exercises/activities.                  Learner Progress:  Not documented in this visit.              Point: Precautions (Done)       Description:   Instruct learner(s) on prescribed precautions during self-care and functional transfers.                  Learning Progress Summary             Patient Acceptance, E, VU,NR by RAVINDRA at 1/10/2024 1415                         Point: Body mechanics (Not Started)       Description:   Instruct learner(s) on proper positioning and spine alignment during self-care, functional mobility activities and/or exercises.                  Learner Progress:  Not documented in this visit.                               User Key       Initials Effective Dates Name Provider Type Discipline     06/16/21 -  Alissa Arreola OT Occupational Therapist OT                  OT Recommendation and Plan  Planned Therapy Interventions (OT): activity tolerance training, adaptive equipment training, BADL retraining, functional balance retraining, patient/caregiver education/training, transfer/mobility retraining, strengthening exercise  Therapy Frequency (OT): daily  Plan of Care Review  Plan of Care Reviewed With: patient  Outcome Evaluation: OT eval complete. Pt presents with weakness, confusion, decreased act july, and decline in mob and adl performance. Pt mod assist with cues for bed mob, mod assist for sts and t/f with stand pivot using ub support, dep for donning socks, and setup for washing face. Recommend IPOT and SNF at d/c.     Time Calculation:   Evaluation Complexity (OT)  Review Occupational Profile/Medical/Therapy History Complexity: expanded/moderate complexity  Assessment, Occupational Performance/Identification of Deficit Complexity: 5 or more performance deficits  Clinical Decision Making Complexity (OT): detailed assessment/moderate complexity  Overall Complexity of Evaluation (OT): moderate complexity     Time Calculation- OT       Row Name 01/10/24 1310             Time Calculation- OT    OT Start Time 1310  -SW      OT Received On 01/10/24  -SW      OT Goal Re-Cert Due Date 01/20/24  -SW         Timed Charges    73073 - OT Self Care/Mgmt Minutes 25  -SW         Untimed Charges    OT Eval/Re-eval Minutes 38  -SW         Total Minutes    Timed Charges Total Minutes 25  -SW      Untimed Charges Total Minutes 38  -SW       Total Minutes 63  -SW                User Key  (r) = Recorded By, (t) = Taken By, (c) = Cosigned By      Initials Name Provider Type     Alissa Arreola OT Occupational Therapist                  Therapy Charges for Today       Code Description Service Date Service Provider Modifiers Qty     18974181897 HC OT SELF CARE/MGMT/TRAIN EA 15 MIN 1/10/2024 Alissa Arreola OT GO 2    96346578641 HC OT EVAL MOD COMPLEXITY 3 1/10/2024 Alissa Arreola OT GO 1                 Alissa Arreola OT  1/10/2024

## 2024-01-10 NOTE — CASE MANAGEMENT/SOCIAL WORK
Discharge Planning Assessment  [facility]     Patient Name: Apollo Haro  MRN: [id number]  Today's Date: 1/10/2024    Admit Date: 1/8/2024    Plan: IDP, rehab   Discharge Needs Assessment       Row Name 01/10/24 1121       Living Environment    People in Home spouse    Current Living Arrangements home    Potentially Unsafe Housing Conditions none    In the past 12 months has the electric, gas, oil, or water company threatened to shut off services in your home? No    Primary Care Provided by spouse/significant other;child(clifton)    Provides Primary Care For no one    Family Caregiver if Needed child(clifton), adult;spouse    Family Caregiver Names Bee spouse # in Epic. Daughter Maria De Jesus [phone]. Son Alex [phone].    Quality of Family Relationships helpful;supportive    Living Arrangement Comments Lives in home in [facility] w/spouse       Transportation Needs    In the past 12 months, has lack of transportation kept you from medical appointments or from getting medications? no    In the past 12 months, has lack of transportation kept you from meetings, work, or from getting things needed for daily living? No       Transition Planning    Patient/Family Anticipates Transition to inpatient rehabilitation facility    Patient/Family Anticipated Services at Transition     Transportation Anticipated health plan transportation       Discharge Needs Assessment    Equipment Currently Used at Home walker, rolling;wheelchair;shower chair;grab bar                   Discharge Plan       Row Name 01/10/24 1123       Plan    Plan IDP, rehab    Patient/Family in Agreement with Plan yes    Plan Comments I was able to reach Bee Haro spouse by phone. She requests that we leave messages and she will return phone calls. Insurance of [employer] medicare replacement confirmed. Fills scripts @ [facility] w/no issues.  Not current w/HH, has had inpatient rehab @ [facility] in the past, does not want referral to there for  SNF.Has wheelchair, RW, shower chair. Prior to fall and Covid, patient could ambulate in home w/RW, now dependent. Spouse plans to be here on Thursday, agreeable to SNF @ d/c.  PCP is Payal Gomes. SonAlex # [phone] works nightshift, assists w/bathing. Daughter Maria De Jesus [phone], transports. I reviewed list of SNF in network w/insurance, will provide in  person on Thursday. D/C plan will be SNF, will need to see how long isolation he will need prior to placement. CM will continue to follow.    Final Discharge Disposition Code 03 - skilled nursing facility (SNF)      Row Name 01/10/24 1012       Plan    Plan TBD    Patient/Family in Agreement with Plan other (see comments)    Plan Comments I attempted to call spouse, Bee Haro                  Continued Care and Services - Admitted Since 1/8/2024    Coordination has not been started for this encounter.       Expected Discharge Date and Time       Expected Discharge Date Expected Discharge Time    Ata 10, 2024            Demographic Summary       Row Name 01/10/24 1120       General Information    Admission Type inpatient    Arrived From home    Referral Source emergency department    Preferred Language English    General Information Comments Has POA/LW paperwork @ home not on file. PCP is Payal Gomes                   Functional Status       Row Name 01/10/24 1120       Functional Status    Usual Activity Tolerance poor    Current Activity Tolerance poor       Functional Status, IADL    Medications completely dependent    Meal Preparation completely dependent    Housekeeping completely dependent    Laundry completely dependent    Shopping completely dependent                   Psychosocial    No documentation.                  Abuse/Neglect    No documentation.                  Legal    No documentation.                  Substance Abuse    No documentation.                  Patient Forms    No documentation.                     Win Horowitz,  RN

## 2024-01-11 ENCOUNTER — APPOINTMENT (OUTPATIENT)
Dept: MRI IMAGING | Facility: HOSPITAL | Age: 84
End: 2024-01-11
Payer: MEDICARE

## 2024-01-11 LAB
ANION GAP SERPL CALCULATED.3IONS-SCNC: 9 MMOL/L (ref 5–15)
BUN SERPL-MCNC: 17 MG/DL (ref 8–23)
BUN/CREAT SERPL: 19.8 (ref 7–25)
CALCIUM SPEC-SCNC: 8.4 MG/DL (ref 8.6–10.5)
CHLORIDE SERPL-SCNC: 106 MMOL/L (ref 98–107)
CO2 SERPL-SCNC: 28 MMOL/L (ref 22–29)
CREAT SERPL-MCNC: 0.86 MG/DL (ref 0.76–1.27)
CRP SERPL-MCNC: 3.46 MG/DL (ref 0–0.5)
DEPRECATED RDW RBC AUTO: 41 FL (ref 37–54)
EGFRCR SERPLBLD CKD-EPI 2021: 85.4 ML/MIN/1.73
ERYTHROCYTE [DISTWIDTH] IN BLOOD BY AUTOMATED COUNT: 12.1 % (ref 12.3–15.4)
GLUCOSE BLDC GLUCOMTR-MCNC: 116 MG/DL (ref 70–130)
GLUCOSE BLDC GLUCOMTR-MCNC: 208 MG/DL (ref 70–130)
GLUCOSE BLDC GLUCOMTR-MCNC: 255 MG/DL (ref 70–130)
GLUCOSE BLDC GLUCOMTR-MCNC: 317 MG/DL (ref 70–130)
GLUCOSE SERPL-MCNC: 125 MG/DL (ref 65–99)
HCT VFR BLD AUTO: 36.8 % (ref 37.5–51)
HGB BLD-MCNC: 12.2 G/DL (ref 13–17.7)
MAGNESIUM SERPL-MCNC: 1.6 MG/DL (ref 1.6–2.4)
MCH RBC QN AUTO: 30.3 PG (ref 26.6–33)
MCHC RBC AUTO-ENTMCNC: 33.2 G/DL (ref 31.5–35.7)
MCV RBC AUTO: 91.5 FL (ref 79–97)
PLATELET # BLD AUTO: 151 10*3/MM3 (ref 140–450)
PMV BLD AUTO: 9.8 FL (ref 6–12)
POTASSIUM SERPL-SCNC: 4 MMOL/L (ref 3.5–5.2)
RBC # BLD AUTO: 4.02 10*6/MM3 (ref 4.14–5.8)
SODIUM SERPL-SCNC: 143 MMOL/L (ref 136–145)
WBC NRBC COR # BLD AUTO: 4.18 10*3/MM3 (ref 3.4–10.8)

## 2024-01-11 PROCEDURE — 97530 THERAPEUTIC ACTIVITIES: CPT

## 2024-01-11 PROCEDURE — 97110 THERAPEUTIC EXERCISES: CPT

## 2024-01-11 PROCEDURE — 83735 ASSAY OF MAGNESIUM: CPT | Performed by: INTERNAL MEDICINE

## 2024-01-11 PROCEDURE — 72146 MRI CHEST SPINE W/O DYE: CPT

## 2024-01-11 PROCEDURE — 25810000003 SODIUM CHLORIDE 0.9 % SOLUTION: Performed by: INTERNAL MEDICINE

## 2024-01-11 PROCEDURE — 85027 COMPLETE CBC AUTOMATED: CPT | Performed by: INTERNAL MEDICINE

## 2024-01-11 PROCEDURE — 63710000001 INSULIN LISPRO (HUMAN) PER 5 UNITS: Performed by: INTERNAL MEDICINE

## 2024-01-11 PROCEDURE — 99232 SBSQ HOSP IP/OBS MODERATE 35: CPT | Performed by: INTERNAL MEDICINE

## 2024-01-11 PROCEDURE — 82948 REAGENT STRIP/BLOOD GLUCOSE: CPT

## 2024-01-11 PROCEDURE — 86140 C-REACTIVE PROTEIN: CPT | Performed by: INTERNAL MEDICINE

## 2024-01-11 PROCEDURE — 80048 BASIC METABOLIC PNL TOTAL CA: CPT | Performed by: INTERNAL MEDICINE

## 2024-01-11 PROCEDURE — 72148 MRI LUMBAR SPINE W/O DYE: CPT

## 2024-01-11 RX ORDER — SODIUM CHLORIDE 9 MG/ML
50 INJECTION, SOLUTION INTRAVENOUS CONTINUOUS
Status: ACTIVE | OUTPATIENT
Start: 2024-01-11 | End: 2024-01-12

## 2024-01-11 RX ADMIN — FLECAINIDE ACETATE 50 MG: 50 TABLET ORAL at 12:27

## 2024-01-11 RX ADMIN — ASPIRIN 81 MG: 81 TABLET, CHEWABLE ORAL at 10:15

## 2024-01-11 RX ADMIN — INSULIN LISPRO 5 UNITS: 100 INJECTION, SOLUTION INTRAVENOUS; SUBCUTANEOUS at 12:27

## 2024-01-11 RX ADMIN — INSULIN LISPRO 3 UNITS: 100 INJECTION, SOLUTION INTRAVENOUS; SUBCUTANEOUS at 21:53

## 2024-01-11 RX ADMIN — BENZONATATE 200 MG: 100 CAPSULE ORAL at 21:52

## 2024-01-11 RX ADMIN — Medication 10 ML: at 10:13

## 2024-01-11 RX ADMIN — TAMSULOSIN HYDROCHLORIDE 0.4 MG: 0.4 CAPSULE ORAL at 10:12

## 2024-01-11 RX ADMIN — FLECAINIDE ACETATE 50 MG: 50 TABLET ORAL at 21:52

## 2024-01-11 RX ADMIN — SODIUM CHLORIDE 250 ML: 9 INJECTION, SOLUTION INTRAVENOUS at 17:51

## 2024-01-11 RX ADMIN — SENNOSIDES AND DOCUSATE SODIUM 2 TABLET: 8.6; 5 TABLET ORAL at 10:12

## 2024-01-11 RX ADMIN — MICONAZOLE NITRATE 1 APPLICATION: 20 POWDER TOPICAL at 10:14

## 2024-01-11 RX ADMIN — CASTOR OIL AND BALSAM, PERU 1 APPLICATION: 788; 87 OINTMENT TOPICAL at 21:54

## 2024-01-11 RX ADMIN — APIXABAN 5 MG: 5 TABLET, FILM COATED ORAL at 21:52

## 2024-01-11 RX ADMIN — APIXABAN 5 MG: 5 TABLET, FILM COATED ORAL at 10:12

## 2024-01-11 RX ADMIN — CASTOR OIL AND BALSAM, PERU 1 APPLICATION: 788; 87 OINTMENT TOPICAL at 10:14

## 2024-01-11 RX ADMIN — SODIUM CHLORIDE 50 ML/HR: 9 INJECTION, SOLUTION INTRAVENOUS at 17:51

## 2024-01-11 RX ADMIN — INSULIN LISPRO 4 UNITS: 100 INJECTION, SOLUTION INTRAVENOUS; SUBCUTANEOUS at 17:13

## 2024-01-11 RX ADMIN — PANTOPRAZOLE SODIUM 40 MG: 40 TABLET, DELAYED RELEASE ORAL at 05:57

## 2024-01-11 NOTE — CASE MANAGEMENT/SOCIAL WORK
Continued Stay Note   [facility]     Patient Name: Apollo Haro  MRN: [id number]  Today's Date: 1/11/2024    Admit Date: 1/8/2024    Plan: rehab?   Discharge Plan       Row Name 01/11/24 1632       Plan    Plan rehab?    Patient/Family in Agreement with Plan other (see comments)    Plan Comments Yesterday spouse stated that she was going to come to hospital today, I have not been contacted that she was here, I left  on her phone, bc I had a list of facilities in network w/[employer] that I wanted her to review. Left list on desk, she will hopefully return call, she called me the other day, once she reviewed VM's.    Final Discharge Disposition Code 03 - skilled nursing facility (SNF)                   Discharge Codes    No documentation.                 Expected Discharge Date and Time       Expected Discharge Date Expected Discharge Time    [place] 10, 2024               Win Horowitz RN

## 2024-01-11 NOTE — PLAN OF CARE
Goal Outcome Evaluation:   Pt ranges from NSR to Afib on monitor, vitals stable on 1LO2 nasal cannula. Pt was up in the chair with assist X2, MRI questionnaire completed and submitted.

## 2024-01-11 NOTE — PLAN OF CARE
Goal Outcome Evaluation:  Plan of Care Reviewed With: patient        Progress: no change  Outcome Evaluation: Pateint completed supine>sit with Mod asisst x1, bed>recliner with Max assist x2 and BUE support, cues for upright posture, step sequencing and reaching back when sitting. Completed B UE/LE exercises with cues for technique. Recommend SNF at D/C for best functional outcome.

## 2024-01-11 NOTE — PROGRESS NOTES
[facility] Medicine Services  PROGRESS NOTE    Patient Name: Apollo Haro  : 1940  MRN: [id number]    Date of Admission: 2024  Primary Care Physician: Provider, No Known    Subjective   Subjective     CC:  Gen weakness    HPI:  No pain  No n/v/d  No chest pain  No dyspnea      Objective   Objective     Vital Signs:   Temp:  [97.4 °F (36.3 °C)-98.5 °F (36.9 °C)] 97.4 °F (36.3 °C)  Heart Rate:  [] 101  Resp:  [16] 16  BP: ()/(58-71) 88/58  Flow (L/min):  [1] 1     Physical Exam:  Constitutional:Alert, nontoxic appearing, sitting up in bed, normal work of breathing, no distress, sitting up in bed  Psych:slightly flat affect but otherwise appropriate  HEENT:NCAT, oropharynx clear  Neck: neck supple, full range of motion  Neuro: Face symmetric, speech clear, equal , moves all extremities, nonfocal exam. Gait not assessed. Answers simple questions appropriately  Cardiac: rrr; No pretibial pitting edema  Resp:ctab, normal effort  GI: abd soft, nontender to palpation  Skin: sacral decub noted  Musculoskeletal/extremities: no cyanosis of extremities; no significant ankle edema          Results Reviewed:  LAB RESULTS:      Lab 24  0742 01/10/24  0609 24  0614 24  1739 24  1646   WBC 4.18 4.14  --   --  5.69   HEMOGLOBIN 12.2* 12.2*  --   --  13.9   HEMATOCRIT 36.8* 36.3*  --   --  41.3   PLATELETS 151 144  --   --  151   NEUTROS ABS  --   --   --   --  3.43   IMMATURE GRANS (ABS)  --   --   --   --  0.02   LYMPHS ABS  --   --   --   --  1.28   MONOS ABS  --   --   --   --  0.93*   EOS ABS  --   --   --   --  0.01   MCV 91.5 91.0  --   --  91.6   CRP 3.46*  --  7.46* 9.50*  --    PROCALCITONIN  --   --   --  0.09  --    LDH  --   --   --  246*  --    PROTIME  --   --   --   --  16.8*   D DIMER QUANT  --   --   --   --  0.65         Lab 24  0742 01/10/24  0609 24  0614 24  1739 24  1646   SODIUM 143 141 138 139  --     POTASSIUM 4.0 4.3 3.8 4.6  --    CHLORIDE 106 104 101 102  --    CO2 28.0 30.0* 29.0 28.0  --    ANION GAP 9.0 7.0 8.0 9.0  --    BUN 17 20 28* 36*  --    CREATININE 0.86 0.91 0.88 1.13  --    EGFR 85.4 83.1 84.8 64.1  --    GLUCOSE 125* 116* 139* 200*  --    CALCIUM 8.4* 8.6 8.1* 8.8  --    MAGNESIUM 1.6  --  1.7 1.9  --    HEMOGLOBIN A1C  --   --   --   --  6.60*   TSH  --   --   --  0.438  --          Lab 01/10/24  0609 01/08/24  1739   TOTAL PROTEIN 5.1* 6.4   ALBUMIN 2.8* 3.1*   GLOBULIN 2.3 3.3   ALT (SGPT) 30 30   AST (SGOT) 61* 56*   BILIRUBIN 0.7 0.8   ALK PHOS 66 71         Lab 01/08/24  1739 01/08/24  1646   PROBNP 518.2  --    HSTROP T 29*  --    PROTIME  --  16.8*   INR  --  1.35*             Lab 01/10/24  0609 01/08/24  1739   FERRITIN  --  240.10   FOLATE >20.00  --    VITAMIN B 12 1,269*  --          Brief Urine Lab Results  (Last result in the past 365 days)        Color   Clarity   Blood   Leuk Est   Nitrite   Protein   CREAT   Urine HCG        01/10/24 1150 Yellow   Clear   Moderate (2+)   Negative   Negative   Negative                   Microbiology Results Abnormal       None            MRI Thoracic Spine Without Contrast    Result Date: 1/11/2024  MRI THORACIC SPINE WO CONTRAST Date of Exam: 1/11/2024 1:23 AM EST Indication: back pain, falls, BLE weakness.  Comparison: None available. Technique:  Routine multiplanar/multisequence sequence images of the thoracic spine were obtained without contrast administration. Findings: There is evidence of prior compression deformity and subsequent kyphoplasty at T10 with minimal persistent height loss and no significant bony retropulsion or traumatic malalignment. There is a compression deformity noted at T12, appearing chronic without significant marrow edema present with significant underlying demineralization. Height loss is approximately 70% anteriorly. There is minimal bony retropulsion, with some mild focal kyphosis present. No additional fracture  is present. There is no listhesis or subluxation. The thoracic spinal cord is normal in caliber and signal throughout. The paraspinal soft tissues demonstrate no acute or suspicious findings. At the site of focal kyphosis and minimal bony retropulsion at T11-12, there is mild associated narrowing of the spinal canal. The spinal canal and neural foramina are otherwise patent throughout.     Impression: Impression: Chronic appearing compression deformities at T10 and T12 as above, without significant associated malalignment or canal compromise. The spinal canal and neural foramina are patent throughout. No acute fracture is noted. Electronically Signed: Bayron Wagner MD  1/11/2024 11:30 AM EST  Workstation ID: [id number]    MRI Lumbar Spine Without Contrast    Result Date: 1/11/2024  MRI LUMBAR SPINE WO CONTRAST Date of Exam: 1/11/2024 12:25 AM CST Indication: back pain, falls, bilateral lower extremity weakness.  Comparison: None available. Technique:  Routine multiplanar/multisequence sequence images of the lumbar spine were obtained without contrast administration.  Findings: ALIGNMENT: Normal DISK SPACE: There is advanced intervertebral disc base narrowing at L3/4 and at L5/S1 with chronic endplate changes. VERTEBRA: No acute loss of vertebral body height. A chronic compression injury is present at T12. CORD: Distal spinal cord and conus medullaris appear unremarkable terminating above the L2 level.  SOFT TISSUES: Paraspinal musculature and visualized abdominopelvic contents are unremarkable.  No mass nor lymphadenopathy. LEVELS: T12-L1: There is a broad-based disc bulge flattening the ventral thecal sac with disc material measuring 2.5 mm beyond the vertebral body margin. AP spinal canal diameter measures 12 mm. Ligamentum flavum hypertrophy and early facet changes are present without significant neural foramina stenosis. L1-L2: Mild broad-based disc bulge flattening the ventral thecal sac. Ligamentum flavum  hypertrophy and early facet changes are noted without significant central canal or neural foramina stenosis. L2-L3: Minimal disc bulge and facet changes without significant central canal or neural foramina stenosis. L3-L4: Broad-based disc bulge flattening the ventral thecal sac with AP spinal canal diameter measuring 10 mm. There is anterior spondylosis. Ligamentum flavum hypertrophy and facet changes are noted. Vertebral body osteophytes are present with findings contributing to severe right and moderate to severe left neural foramina stenosis with contact and compromise of bilateral exiting L3 nerve roots right greater than left. L4-L5: Broad-based disc bulge effacing the ventral thecal sac with increased T2 signal within disc material centrally compatible with annular fissure. Disc material measures 3 mm beyond the vertebral body margin in the AP spinal canal diameter is narrowed to 5 mm. There is prominent ligamentum flavum hypertrophy and facet changes. There is mild to moderate right and moderate left neural foramina stenosis. L5-S1: Mild broad-based disc bulge with facet hypertrophy. There are vertebral body osteophytes contributing to mild to moderate right and moderate left neural foramina stenosis.     Impression: Impression: Multilevel degenerative changes in the lumbar spine with prominent findings at level L3/4 where there is severe right and moderate to severe left neural foramina stenosis with compromise of bilateral exiting L3 nerve roots right greater than left. Severe central canal stenosis at L4/5 with moderate neural foramina stenosis. Electronically Signed: Maricruz Bingham MD  1/11/2024 6:17 AM CST  Workstation ID: [id number]    XR Femur 2 View Bilateral    Result Date: 1/10/2024  XR FEMUR 2 VW BILATERAL Date of Exam: 1/10/2024 4:01 PM EST Indication: MRI Clearance Comparison: None available. Findings: There is an intramedullary hermann with a couple screws along the left proximal femur. No metal is  identified along the visualized right lower extremity.     Impression: Impression: 1.Intramedullary hermann with a couple screws along proximal left femur. 2.No metal identified along visualized right lower extremity. Electronically Signed: Apollo Abdalla MD  1/10/2024 4:57 PM EST  Workstation ID: [id number]    XR Tibia Fibula 2 View Bilateral    Result Date: 1/10/2024  XR TIBIA FIBULA 2 VW BILATERAL Date of Exam: 1/10/2024 4:00 PM EST Indication: MRI Clearance Comparison: None available. Findings: Right tibia and fibula: 2 views. The shafts of the tibia and fibula appear intact. There are no radiopaque foreign bodies in the soft tissues. Left tibia and fibula: 2 views. The shafts of the tibia and fibula appear intact. There are no radiopaque foreign bodies in the soft tissues.     Impression: Impression: No significant findings. Electronically Signed: Debbie Palomino MD  1/10/2024 4:40 PM EST  Workstation ID: [id number]         Current medications:  Scheduled Meds:apixaban, 5 mg, Oral, BID  aspirin, 81 mg, Oral, Daily  castor oil-balsam peru, 1 application , Topical, Q12H  flecainide, 50 mg, Oral, Q12H  insulin lispro, 2-7 Units, Subcutaneous, 4x Daily AC & at Bedtime  miconazole, 1 application , Topical, Q12H  pantoprazole, 40 mg, Oral, Q AM  senna-docusate sodium, 2 tablet, Oral, BID  sodium chloride, 10 mL, Intravenous, Q12H  tamsulosin, 0.4 mg, Oral, Daily      Continuous Infusions:     PRN Meds:.  acetaminophen **OR** acetaminophen **OR** acetaminophen    benzonatate    senna-docusate sodium **AND** polyethylene glycol **AND** bisacodyl **AND** bisacodyl    Calcium Replacement - Follow Nurse / BPA Driven Protocol    dextrose    dextrose    glucagon (human recombinant)    Magnesium Low Dose Replacement - Follow Nurse / BPA Driven Protocol    Phosphorus Replacement - Follow Nurse / BPA Driven Protocol    Potassium Replacement - Follow Nurse / BPA Driven Protocol    [COMPLETED] Insert Peripheral IV **AND** sodium  chloride    sodium chloride    sodium chloride    Assessment & Plan   Assessment & Plan     Active Hospital Problems    Diagnosis  POA    **Weakness [R53.1]  Yes    Type 2 diabetes mellitus, without long-term current use of insulin [E11.9]  Yes      Resolved Hospital Problems   No resolved problems to display.        Brief Hospital Course to date:  Apollo Haro is a 83 y.o. male w/ hx parox afib, htn, hl, dm2, chronic back pain who was brought to [facility] ED due to generalized weakness. History if limited as no family bedside and patient poor historian and no answer when phone family. Patient states was diagnosed w/ covid 19 within past couple of weeks and treated as outpatient, but due to worsening generalized weakness in leg and walking was brought to [facility] for evaluation. Cxr negative. Ct head no acute intracranial process, left enthoid and frontal sinus musosal dz noted.       Covid 19 infection  -no hypoxia, mild illness  -tested + 1/8/24, out of isolation here 1/18/24 if doesn't progress to severe infection (I.e. covid w/ related hypoxemia, etc)    Generalized weakness w/ decreased mobility/ability to walk  Multilevel degenerative lumbar spine changes w/ bilateral (right severe, left moderate-severe) left neural foraminal stenosis w/ bilateral L3 nerve root compromise (on MRI)  Chronic T10 & T12 compression fractures (without canal compromise)  Recent falls  -recent [facility] procedure note 7/28/23 w/ diagnosis code of normal pressure hydrocephalus (patient poor historian regarding this)  -exam nonfocal bedside on 1/9/24  -ct head negative here  -tsh ok  -generalized weakness could be due to recent covid 19 infection as well  -u/a benign  -b12 & folate wnl  -cpk 1019-->repeat 696 w/ gentle hydration  -MRI T&L spine: revealed multilevel deg lumbar spine changes w/ bilateral neural foraminal stenosis w/ bilateral L3 nerve root sompromise.  -discussed results of MRI with patient; he has mobilized w/ PT & OT. Per 11/11/24 notes  "requiring moderate assist x 1  for supine to sit; required max assist x 2 & bilateral upper extremity support and cues for standing and walking (recommended SNF @ discharge)   -Patient currently denies any back or leg pain. I discussed mri findings with patient, and he is not interested at all in pursuing any surgical intervention \"no matter what\" at this point; furthermore patient is on eliquis. I attempted to call wife x 2 and daughter x 1 without success (messages left) to inform them as well. At this point, plan to pursue pt/ot and defer neurosurgery consultation unless patient changes his mind. WILL TRY WIFE AGAIN LATER/TOMORROW     Encephalopathy, likely due to covid 19 infection; resolved  -ct head ok  -improved    Mild rhabdomyolysis, improved  -from falls; initial cpk 1019, repeat 696 w/ iv fluids  -holding statin for now, restart pravachol  in couple days    Cad  Afib  Htn  Hl  -restarted home flecainide  -continue eliquis    Dm2, A1c 6.6  -ssi      Sacral decub, poa  -wound care      Am labs: cbc, bmp,mag      Expected Discharge Location and Transportation: rehab facility  Expected Discharge  1/12/24 depending on clinical course    DVT prophylaxis:  Medical DVT prophylaxis orders are present.     AM-PAC 6 Clicks Score (PT): 10 (01/11/24 1050)    CODE STATUS:   Code Status and Medical Interventions:   Ordered at: 01/08/24 2053     Code Status (Patient has no pulse and is not breathing):    CPR (Attempt to Resuscitate)     Medical Interventions (Patient has pulse or is breathing):    Full Support       Rebel Santiago MD  01/11/24      "

## 2024-01-11 NOTE — PLAN OF CARE
Goal Outcome Evaluation:   Vitals WNL. Currently on RA with 91% o2. Occasionally needs 2L of 02. A/O x3. Airborne & contact precautions. Awaiting MRI results. No c/o pain. Q2 rounding complete. Safety measures in place. Call light within reach.

## 2024-01-11 NOTE — THERAPY TREATMENT NOTE
Patient Name: Apollo Haro  : 1940    MRN: [id number]                              Today's Date: 2024       Admit Date: 2024    Visit Dx:     ICD-10-CM ICD-9-CM   1. COVID-19  U07.1 079.89   2. Hyperglycemia  R73.9 790.29   3. Generalized weakness  R53.1 780.79     Patient Active Problem List   Diagnosis    Weakness    Type 2 diabetes mellitus, without long-term current use of insulin     Past Medical History:   Diagnosis Date    A-fib     CAD (coronary artery disease)     DM (diabetes mellitus)      History reviewed. No pertinent surgical history.   General Information       Row Name 24 1042          Physical Therapy Time and Intention    Document Type therapy note (daily note)  -AS     Mode of Treatment physical therapy  -AS       Row Name 24 1042          General Information    Patient Profile Reviewed yes  -AS     Existing Precautions/Restrictions fall;other (see comments)  CONTACT/[place] PRECAUTIONS(COVID)  -AS     Barriers to Rehab medically complex;previous functional deficit;cognitive status  -AS       Row Name 24 1042          Cognition    Orientation Status (Cognition) oriented to;person;place  -AS       Row Name 24 1042          Safety Issues, Functional Mobility    Safety Issues Affecting Function (Mobility) awareness of need for assistance;insight into deficits/self-awareness;friction/shear risk;safety precaution awareness;safety precautions follow-through/compliance;sequencing abilities  -AS     Impairments Affecting Function (Mobility) balance;cognition;coordination;endurance/activity tolerance;sensation/sensory awareness;strength;pain;range of motion (ROM)  -AS     Cognitive Impairments, Mobility Safety/Performance awareness, need for assistance;insight into deficits/self-awareness;safety precaution awareness;safety precaution follow-through;sequencing abilities  -AS     Comment, Safety Issues/Impairments (Mobility) alert to self and place  -AS                User Key  (r) = Recorded By, (t) = Taken By, (c) = Cosigned By      Initials Name Provider Type    AS Danay Dolan PTA Physical Therapist Assistant                   Mobility       Row Name 01/11/24 1043          Bed Mobility    Rolling Left [place] (Bed Mobility) verbal cues;moderate assist (50% patient effort);1 person assist  -AS     Supine-Sit [place] (Bed Mobility) verbal cues;moderate assist (50% patient effort)  -AS     Assistive Device (Bed Mobility) head of bed elevated;bed rails  -AS     Comment, (Bed Mobility) rolled to be cleaned secondary to BM and nursing placed bandage over wound(coccyx)  -AS       Row Name 01/11/24 1043          Transfers    Comment, (Transfers) bed>recliner with max assist x2 and B UE support  -AS       Row Name 01/11/24 1043          Bed-Chair Transfer    Bed-Chair [place] (Transfers) maximum assist (25% patient effort);2 person assist  -AS     Assistive Device (Bed-Chair Transfers) other (see comments)  -AS     Comment, (Bed-Chair Transfer) B UE support  -AS       Row Name 01/11/24 1043          Sit-Stand Transfer    Sit-Stand [place] (Transfers) verbal cues;maximum assist (25% patient effort);2 person assist  -AS     Comment, (Sit-Stand Transfer) B UE support, cues for sequencing and reaching back for chair when sitting  -AS       Row Name 01/11/24 1043          Gait/Stairs (Locomotion)    [place] Level (Gait) unable to assess  -AS               User Key  (r) = Recorded By, (t) = Taken By, (c) = Cosigned By      Initials Name Provider Type    AS Danay Dolan PTA Physical Therapist Assistant                   Obj/Interventions       Row Name 01/11/24 1047          Motor Skills    Therapeutic Exercise knee;ankle;shoulder  -AS       Row Name 01/11/24 1047          Shoulder (Therapeutic Exercise)    Shoulder (Therapeutic Exercise) AROM (active range of motion)  -AS     Shoulder AROM (Therapeutic Exercise)  bilateral;flexion;extension;aBduction;aDduction;sitting;10 repetitions  bicep curls  -AS       Row Name 01/11/24 1047          Knee (Therapeutic Exercise)    Knee (Therapeutic Exercise) strengthening exercise  -AS     Knee Strengthening (Therapeutic Exercise) bilateral;marching while seated;LAQ (long arc quad);sitting;10 repetitions  -AS       Row Name 01/11/24 1047          Ankle (Therapeutic Exercise)    Ankle (Therapeutic Exercise) AROM (active range of motion)  -AS     Ankle AAROM (Therapeutic Exercise) bilateral;dorsiflexion;plantarflexion;sitting;10 repetitions  -AS       Row Name 01/11/24 1047          Balance    Dynamic Standing Balance verbal cues;maximum assist;2-person assist  -AS     Position/Device Used, Standing Balance supported  -AS     Comment, Balance max x2 and B UE support to complete transfer  -AS               User Key  (r) = Recorded By, (t) = Taken By, (c) = Cosigned By      Initials Name Provider Type    AS Danay Dolan PTA Physical Therapist Assistant                   Goals/Plan    No documentation.                  Clinical Impression       Row Name 01/11/24 1048          Pain    Pretreatment Pain Rating 0/10 - no pain  -AS     Posttreatment Pain Rating 0/10 - no pain  -AS       [facility] Name 01/11/24 1048          Plan of Care Review    Plan of Care Reviewed With patient  -AS     Progress no change  -AS     Outcome Evaluation Pateint completed supine>sit with Mod asisst x1, bed>recliner with Max assist x2 and BUE support, cues for upright posture, step sequencing and reaching back when sitting. Completed B UE/LE exercises with cues for technique. Recommend SNF at D/C for best functional outcome.  -AS       Row Name 01/11/24 1048          Positioning and Restraints    Pre-Treatment Position in bed  -AS     Post Treatment Position chair  -AS     In Chair reclined;call light within reach;encouraged to call for assist;exit alarm on;waffle cushion;on mechanical lift sling;legs  elevated;waffle boot/both  -AS               User Key  (r) = Recorded By, (t) = Taken By, (c) = Cosigned By      Initials Name Provider Type    AS Danay Dolan PTA Physical Therapist Assistant                   Outcome Measures       Row Name 01/11/24 1050          How much help from another person do you currently need...    Turning from your back to your side while in flat bed without using bedrails? 2  -AS     Moving from lying on back to sitting on the side of a flat bed without bedrails? 2  -AS     Moving to and from a bed to a chair (including a wheelchair)? 2  -AS     Standing up from a chair using your arms (e.g., wheelchair, bedside chair)? 2  -AS     Climbing 3-5 steps with a railing? 1  -AS     To walk in hospital room? 1  -AS     AM-PAC 6 Clicks Score (PT) 10  -AS     Highest Level of Mobility Goal 4 --> Transfer to chair/commode  -AS       Row Name 01/11/24 1050          Functional Assessment    Outcome Measure Options AM-PAC 6 Clicks Basic Mobility (PT)  -AS               User Key  (r) = Recorded By, (t) = Taken By, (c) = Cosigned By      Initials Name Provider Type    AS Danay Dolan PTA Physical Therapist Assistant                                 Physical Therapy Education       Title: PT OT SLP Therapies (In Progress)       Topic: Physical Therapy (In Progress)       Point: Mobility training (In Progress)       Learning Progress Summary             Patient Acceptance, E, NR by AS at 1/11/2024 1051    Acceptance, E,D, VU,NR by MB at 1/9/2024 1557   Family Acceptance, E,D, VU,NR by MB at 1/9/2024 1557                         Point: Home exercise program (In Progress)       Learning Progress Summary             Patient Acceptance, E, NR by AS at 1/11/2024 1051    Acceptance, E,D, VU,NR by MB at 1/9/2024 1557   Family Acceptance, E,D, VU,NR by MB at 1/9/2024 1557                         Point: Body mechanics (In Progress)       Learning Progress Summary             Patient Acceptance, E,  NR by AS at 1/11/2024 1051    Acceptance, E,D, VU,NR by MB at 1/9/2024 1557   Family Acceptance, E,D, VU,NR by MB at 1/9/2024 1557                         Point: Precautions (In Progress)       Learning Progress Summary             Patient Acceptance, E, NR by AS at 1/11/2024 1051    Acceptance, E,D, VU,NR by MB at 1/9/2024 1557   Family Acceptance, E,D, VU,NR by MB at 1/9/2024 1557                                         User Key       Initials Effective Dates Name Provider Type Discipline    AS 04/28/23 -  Danay Dolan PTA Physical Therapist Assistant PT    MB 06/16/21 -  Luli Stephens PT Physical Therapist PT                  PT Recommendation and Plan     Plan of Care Reviewed With: patient  Progress: no change  Outcome Evaluation: Pateint completed supine>sit with Mod asisst x1, bed>recliner with Max assist x2 and BUE support, cues for upright posture, step sequencing and reaching back when sitting. Completed B UE/LE exercises with cues for technique. Recommend SNF at D/C for best functional outcome.     Time Calculation:         PT Charges       Row Name 01/11/24 1051             Time Calculation    Start Time 1002  -AS      PT Received On 01/11/24  -AS      PT Goal Re-Cert Due Date 01/19/24  -AS         Timed Charges    33038 - PT Therapeutic Exercise Minutes 11  -AS      07586 - PT Therapeutic Activity Minutes 13  -AS         Total Minutes    Timed Charges Total Minutes 24  -AS       Total Minutes 24  -AS                User Key  (r) = Recorded By, (t) = Taken By, (c) = Cosigned By      Initials Name Provider Type    AS Danay Dolan PTA Physical Therapist Assistant                  Therapy Charges for Today       Code Description Service Date Service Provider Modifiers Qty    36169143086 HC PT THER PROC EA 15 MIN 1/11/2024 Danay Dolan PTA GP 1    10219540781 HC PT THERAPEUTIC ACT EA 15 MIN 1/11/2024 Danay Dolan PTA GP 1            PT G-Codes  Outcome Measure Options:  AM-PAC 6 Clicks Basic Mobility (PT)  AM-PAC 6 Clicks Score (PT): 10  AM-PAC 6 Clicks Score (OT): 12       Danay Dolan, PTA  1/11/2024

## 2024-01-12 LAB
ANION GAP SERPL CALCULATED.3IONS-SCNC: 10 MMOL/L (ref 5–15)
BUN SERPL-MCNC: 17 MG/DL (ref 8–23)
BUN/CREAT SERPL: 18.3 (ref 7–25)
CALCIUM SPEC-SCNC: 8.1 MG/DL (ref 8.6–10.5)
CHLORIDE SERPL-SCNC: 105 MMOL/L (ref 98–107)
CO2 SERPL-SCNC: 28 MMOL/L (ref 22–29)
CREAT SERPL-MCNC: 0.93 MG/DL (ref 0.76–1.27)
DEPRECATED RDW RBC AUTO: 39.8 FL (ref 37–54)
EGFRCR SERPLBLD CKD-EPI 2021: 81 ML/MIN/1.73
ERYTHROCYTE [DISTWIDTH] IN BLOOD BY AUTOMATED COUNT: 12.1 % (ref 12.3–15.4)
GLUCOSE BLDC GLUCOMTR-MCNC: 182 MG/DL (ref 70–130)
GLUCOSE BLDC GLUCOMTR-MCNC: 236 MG/DL (ref 70–130)
GLUCOSE BLDC GLUCOMTR-MCNC: 314 MG/DL (ref 70–130)
GLUCOSE BLDC GLUCOMTR-MCNC: 364 MG/DL (ref 70–130)
GLUCOSE SERPL-MCNC: 142 MG/DL (ref 65–99)
HCT VFR BLD AUTO: 36 % (ref 37.5–51)
HGB BLD-MCNC: 12 G/DL (ref 13–17.7)
MAGNESIUM SERPL-MCNC: 1.5 MG/DL (ref 1.6–2.4)
MCH RBC QN AUTO: 29.9 PG (ref 26.6–33)
MCHC RBC AUTO-ENTMCNC: 33.3 G/DL (ref 31.5–35.7)
MCV RBC AUTO: 89.8 FL (ref 79–97)
PLATELET # BLD AUTO: 159 10*3/MM3 (ref 140–450)
PMV BLD AUTO: 10.2 FL (ref 6–12)
POTASSIUM SERPL-SCNC: 4.3 MMOL/L (ref 3.5–5.2)
RBC # BLD AUTO: 4.01 10*6/MM3 (ref 4.14–5.8)
SODIUM SERPL-SCNC: 143 MMOL/L (ref 136–145)
WBC NRBC COR # BLD AUTO: 5.43 10*3/MM3 (ref 3.4–10.8)

## 2024-01-12 PROCEDURE — 63710000001 INSULIN LISPRO (HUMAN) PER 5 UNITS: Performed by: INTERNAL MEDICINE

## 2024-01-12 PROCEDURE — 82948 REAGENT STRIP/BLOOD GLUCOSE: CPT

## 2024-01-12 PROCEDURE — 80048 BASIC METABOLIC PNL TOTAL CA: CPT | Performed by: INTERNAL MEDICINE

## 2024-01-12 PROCEDURE — 25010000002 MAGNESIUM SULFATE IN D5W 1G/100ML (PREMIX) 1-5 GM/100ML-% SOLUTION: Performed by: INTERNAL MEDICINE

## 2024-01-12 PROCEDURE — 83735 ASSAY OF MAGNESIUM: CPT | Performed by: INTERNAL MEDICINE

## 2024-01-12 PROCEDURE — 99232 SBSQ HOSP IP/OBS MODERATE 35: CPT | Performed by: INTERNAL MEDICINE

## 2024-01-12 PROCEDURE — 85027 COMPLETE CBC AUTOMATED: CPT | Performed by: INTERNAL MEDICINE

## 2024-01-12 RX ORDER — MAGNESIUM SULFATE 1 G/100ML
1 INJECTION INTRAVENOUS
Status: COMPLETED | OUTPATIENT
Start: 2024-01-12 | End: 2024-01-12

## 2024-01-12 RX ADMIN — ASPIRIN 81 MG: 81 TABLET, CHEWABLE ORAL at 08:43

## 2024-01-12 RX ADMIN — INSULIN LISPRO 5 UNITS: 100 INJECTION, SOLUTION INTRAVENOUS; SUBCUTANEOUS at 11:50

## 2024-01-12 RX ADMIN — MAGNESIUM SULFATE HEPTAHYDRATE 1 G: 1 INJECTION, SOLUTION INTRAVENOUS at 08:43

## 2024-01-12 RX ADMIN — APIXABAN 5 MG: 5 TABLET, FILM COATED ORAL at 20:11

## 2024-01-12 RX ADMIN — INSULIN LISPRO 6 UNITS: 100 INJECTION, SOLUTION INTRAVENOUS; SUBCUTANEOUS at 17:07

## 2024-01-12 RX ADMIN — ACETAMINOPHEN 650 MG: 325 TABLET ORAL at 20:11

## 2024-01-12 RX ADMIN — MAGNESIUM SULFATE HEPTAHYDRATE 1 G: 1 INJECTION, SOLUTION INTRAVENOUS at 10:23

## 2024-01-12 RX ADMIN — Medication 10 ML: at 08:44

## 2024-01-12 RX ADMIN — TAMSULOSIN HYDROCHLORIDE 0.4 MG: 0.4 CAPSULE ORAL at 08:43

## 2024-01-12 RX ADMIN — INSULIN LISPRO 3 UNITS: 100 INJECTION, SOLUTION INTRAVENOUS; SUBCUTANEOUS at 22:12

## 2024-01-12 RX ADMIN — SENNOSIDES AND DOCUSATE SODIUM 2 TABLET: 8.6; 5 TABLET ORAL at 20:11

## 2024-01-12 RX ADMIN — CASTOR OIL AND BALSAM, PERU 1 APPLICATION: 788; 87 OINTMENT TOPICAL at 08:43

## 2024-01-12 RX ADMIN — MAGNESIUM SULFATE HEPTAHYDRATE 1 G: 1 INJECTION, SOLUTION INTRAVENOUS at 11:50

## 2024-01-12 RX ADMIN — MICONAZOLE NITRATE 1 APPLICATION: 20 POWDER TOPICAL at 20:10

## 2024-01-12 RX ADMIN — CASTOR OIL AND BALSAM, PERU 1 APPLICATION: 788; 87 OINTMENT TOPICAL at 20:10

## 2024-01-12 RX ADMIN — APIXABAN 5 MG: 5 TABLET, FILM COATED ORAL at 08:43

## 2024-01-12 RX ADMIN — PANTOPRAZOLE SODIUM 40 MG: 40 TABLET, DELAYED RELEASE ORAL at 05:09

## 2024-01-12 RX ADMIN — Medication 10 ML: at 20:11

## 2024-01-12 RX ADMIN — FLECAINIDE ACETATE 50 MG: 50 TABLET ORAL at 22:13

## 2024-01-12 RX ADMIN — MICONAZOLE NITRATE 1 APPLICATION: 20 POWDER TOPICAL at 08:44

## 2024-01-12 RX ADMIN — FLECAINIDE ACETATE 50 MG: 50 TABLET ORAL at 11:50

## 2024-01-12 RX ADMIN — SENNOSIDES AND DOCUSATE SODIUM 2 TABLET: 8.6; 5 TABLET ORAL at 08:43

## 2024-01-12 NOTE — DISCHARGE PLACEMENT REQUEST
"Elizabeth Haro (83 y.o. Male)     Case Management  [phone]    Patient in isolation for Covid until         Date of Birth   1940    Social Security Number       [place]   [facility] [address]    Home Phone   [phone]    MRN   [id number]       Church   None    Marital Status                               Admission Date   24    Admission Type   Emergency    Admitting Provider   Rebel Santiago MD    Attending Provider   Rebel Satniago MD    Department, Room/Bed   [address], S482/1       Discharge Date       Discharge Disposition       Discharge Destination                                 Attending Provider: Rebel Santiago MD    Allergies: No Known Allergies    Isolation: Contact Air   Infection: COVID (confirmed) (24)   Code Status: CPR    Ht: 175.3 cm (69\")   Wt: 79.4 kg (175 lb)    Admission Cmt: None   Principal Problem: Weakness [R53.1]                   Active Insurance as of 2024       Primary Coverage       Payor Plan Insurance Group Employer/Plan Group    AETNA MEDICARE REPLACEMENT AETNA MED ADV HMO [id number]       Payor Plan Address Payor Plan Phone Number Payor Plan Fax Number Effective Dates    [address] [phone]  2024 - None Entered    [facility] [id number]         Subscriber Name Subscriber Birth Date Member ID       ELIZABETH HARO 1940 [id number]                     Emergency Contacts        (Rel.) Home Phone Work Phone Mobile Phone    ANTONIA HARO (Spouse) [phone] -- [phone]    Maria De Jesus Paredes (Daughter) [phone] -- [phone]                 History & Physical        Terrence Tong DO at 24 2058              [facility] Medicine Services  HISTORY AND PHYSICAL    Patient Name: Elizabeth Haro  : 1940  MRN: [id number]  Primary Care Physician: Provider, No Known  Date of admission: 2024      Subjective  Subjective " "    Chief Complaint:  weakness    HPI:  Apollo Haro is a 83 y.o. male w/ CAD, pAFib, HTN, HLD, DM2, chronic back pain, who was brought to the ED via EMS for weakness. There is no family at bedside and the phone number for his wife in the chart rings to voicemail (x2 attempts). He tells me he fell and broke his hip and \"broke my back in two places\" in October and was managed in [facility]. He states that \"about two weeks ago\" he was diagnosed with Covid 19 and managed outpatient, he thinks this was before [place]. Over the last week he has had progressive weakness in his legs with difficulty walking. Over the last 3 days he developed a dry cough. He otherwise denies acute complaints. The ED was unable to mobilize him and requested admission. He states he is on 4-5 medicines, however his medication fill history only shows eliquis.      Personal History     Past Medical History:   Diagnosis Date    A-fib     CAD (coronary artery disease)     DM (diabetes mellitus)            History reviewed. No pertinent surgical history.    Family History: family history is not on file.     Social History:    Social History     Social History Narrative    Not on file       Medications:  Available home medication information reviewed.  apixaban and metFORMIN    No Known Allergies    Objective  Objective     Vital Signs:   Temp:  [98 °F (36.7 °C)] 98 °F (36.7 °C)  Heart Rate:  [] 93  Resp:  [18] 18  BP: (105-122)/(59-78) 105/59       Physical Exam   Constitutional: Awake, alert, laying on ED stretcher in NAD  HENT: NCAT, mucous membranes moist  Respiratory: Clear to auscultation bilaterally, respiratory effort normal   Cardiovascular: RRR, palpable radial pulses  Gastrointestinal: Positive bowel sounds, soft, nontender, nondistended  Musculoskeletal: No bilateral ankle edema  Psychiatric: Appropriate affect, cooperative  Neurologic: Speech clear and fluent    Result Review:  I have personally reviewed the results from " the time of this admission to 1/8/2024 20:58 EST and agree with these findings:  []  Laboratory list / accordion  []  Microbiology  [x]  Radiology  []  EKG/Telemetry   []  Cardiology/Vascular   []  Pathology  []  Old records  []  Other:  Most notable findings include: clear CXR      LAB RESULTS:      Lab 01/08/24  1739 01/08/24  1646   WBC  --  5.69   HEMOGLOBIN  --  13.9   HEMATOCRIT  --  41.3   PLATELETS  --  151   NEUTROS ABS  --  3.43   IMMATURE GRANS (ABS)  --  0.02   LYMPHS ABS  --  1.28   MONOS ABS  --  0.93*   EOS ABS  --  0.01   MCV  --  91.6   CRP 9.50*  --    PROCALCITONIN 0.09  --    *  --    PROTIME  --  16.8*   INR  --  1.35*   D DIMER QUANT  --  0.65         Lab 01/08/24  1739   SODIUM 139   POTASSIUM 4.6   CHLORIDE 102   CO2 28.0   ANION GAP 9.0   BUN 36*   CREATININE 1.13   EGFR 64.1   GLUCOSE 200*   CALCIUM 8.8   MAGNESIUM 1.9   TSH 0.438         Lab 01/08/24  1739   TOTAL PROTEIN 6.4   ALBUMIN 3.1*   GLOBULIN 3.3   ALT (SGPT) 30   AST (SGOT) 56*   BILIRUBIN 0.8   ALK PHOS 71         Lab 01/08/24  1739   PROBNP 518.2   HSTROP T 29*             Lab 01/08/24  1739   FERRITIN 240.10             Microbiology Results (last 10 days)       Procedure Component Value - Date/Time    COVID-19 and FLU A/B PCR, 1 HR TAT - Swab, Nasopharynx [[id number]]  (Abnormal) Collected: 01/08/24 1646    Lab Status: Final result Specimen: Swab from Nasopharynx Updated: 01/08/24 1751     COVID19 Detected     Influenza A PCR Not Detected     Influenza B PCR Not Detected    Narrative:      Fact sheet for providers: https://www.fda.gov/media/613775/download    Fact sheet for patients: https://www.fda.gov/media/486846/download    Test performed by PCR.  Influenza A and Influenza B negative results should be considered presumptive in samples that have a positive SARS-CoV-2 result.    Competitive inhibition studies showed that SARS-CoV-2 virus, when present at concentrations above 3.6E+04 copies/mL, can inhibit the detection  and amplification of influenza A and influenza B virus RNA if present at or below 1.8E+02 copies/mL or 4.9E+02 copies/mL, respectively, and may lead to false negative influenza virus results. If co-infection with influenza A or influenza B virus is suspected in samples with a positive SARS-CoV-2 result, the sample should be re-tested with another FDA cleared, approved, or authorized influenza test, if influenza virus detection would change clinical management.            CT Head Without Contrast    Result Date: 1/8/2024  CT HEAD WO CONTRAST Date of Exam: 1/8/2024 4:44 PM CST Indication: gen weakness. Comparison: None available. Technique: Axial CT images were obtained of the head without contrast administration.  Automated exposure control and iterative construction methods were used. Findings: There is no evidence of acute territorial infarction. There is no acute intracranial hemorrhage. There are no extra-axial collections. Ventricles and CSF spaces are symmetrically prominent.. No mass effect nor hydrocephalus. Brain parenchyma appears normal for age.  There is near complete opacification of the left ethmoid air cells and left frontal sinus. Correlate for signs of sinusitis. Osseous structures and orbits appear intact.     Impression: Impression: 1.No acute intracranial process identified. 2.Generalized senescent changes of the brain. 3.Left ethmoid and frontal sinus mucosal disease. Correlate for signs of sinusitis. Electronically Signed: Wang Bingham MD  1/8/2024 4:55 PM CST  Workstation ID: [id number]    XR Chest 1 View    Result Date: 1/8/2024  XR CHEST 1 VW Date of Exam: 1/8/2024 4:20 PM EST Indication: gen weakness Comparison: None available. Findings: The lungs are clear. The heart and mediastinal contours appear normal. There is no pleural effusion. The pulmonary vasculature appears normal. The osseous structures appear intact.     Impression: Impression: No acute cardiopulmonary process. Electronically  Signed: Apollo Abdalla MD  1/8/2024 4:34 PM EST  Workstation ID: [id number]         Assessment & Plan  Assessment & Plan       Weakness    Type 2 diabetes mellitus, without long-term current use of insulin    Summary: This is an 82 y/o male w/ CAD, HTN, HLD, pAfib, DM2, chronic back pain, with reported recent Covid 19 infection, who was brought via EMS for evaluation of progressive weakness    *Home meds are unknown at this time, his home med list shows Metformin and Eliquis, his medication dispense history shows eliquis only, recent note from [facility] shows Pravastatin, Metformin, Eliquis, Flecainide, and ASA; pharmacy consult placed for the AM    Assessment/Plan    Generalized weakness w/ decreased ability to walk  -remote notes document chronic history of recurrent falls and weakness  -there is a recent [facility] procedure note 7/28/23 w/ a Diagnosis code of Normal Pressure Hydrocephalus  -weakness also may be from reported recent Covid 19 infection  -supportive care, PT/OT; need further details once we can get a hold of family, I have tried the number for his wife several times tonight with no answer    Recent Covid 19 infection  -reports he was diagnosed several days before [place] and managed at home, which would mean he is well clear of required isolation period, once we can get a family member or his PCP's office to corroborate we can drop his enhanced isolation requirements; currently I am not certain that he is clear on the exact timeline    CAD  HTN  HLD  pAfib  -cont home eliquis; need to clarify if he is actually taking flecainide, ASA, and/or pravastatin as noted    DM type 2, unknown A1c, w/o insulin use  -check A1c  -SSI      DVT prophylaxis:  Medical DVT prophylaxis orders are present.      CODE STATUS:    Code Status and Medical Interventions:   Ordered at: 01/08/24 2053     Code Status (Patient has no pulse and is not breathing):    CPR (Attempt to Resuscitate)     Medical Interventions (Patient has pulse  or is breathing):    Full Support       Expected Discharge   Expected Discharge Date: 1/10/2024; Expected Discharge Time:      Terrence Tong DO  01/08/24      Electronically signed by Terrence Tong DO at 01/08/24 2120       Current Facility-Administered Medications   Medication Dose Route Frequency Provider Last Rate Last Admin    acetaminophen (TYLENOL) tablet 650 mg  650 mg Oral Q4H PRN Terrence Tong DO        Or    acetaminophen (TYLENOL) 160 MG/5ML oral solution 650 mg  650 mg Oral Q4H PRN Terrence Tong DO        Or    acetaminophen (TYLENOL) suppository 650 mg  650 mg Rectal Q4H PRN Terrence Tong DO        apixaban (ELIQUIS) tablet 5 mg  5 mg Oral BID Terrence Tong DO   5 mg at 01/12/24 0843    aspirin chewable tablet 81 mg  81 mg Oral Daily Rebel Santiago MD   81 mg at 01/12/24 0843    benzonatate (TESSALON) capsule 200 mg  200 mg Oral TID PRN Terrence Tong DO   200 mg at 01/11/24 2152    sennosides-docusate (PERICOLACE) 8.6-50 MG per tablet 2 tablet  2 tablet Oral BID Terrence Tong DO   2 tablet at 01/12/24 0843    And    polyethylene glycol (MIRALAX) packet 17 g  17 g Oral Daily PRN Terrence Tong DO        And    bisacodyl (DULCOLAX) EC tablet 5 mg  5 mg Oral Daily PRN Terrence Tong DO        And    bisacodyl (DULCOLAX) suppository 10 mg  10 mg Rectal Daily PRN Terrence Tong DO        Calcium Replacement - Follow Nurse / BPA Driven Protocol   Does not apply PRN Terrence Tong DO        castor oil-balsam peru (VENELEX) ointment 1 application   1 application  Topical Q12H Rebel Santiago MD   1 application  at 01/12/24 0843    dextrose (D50W) (25 g/50 mL) IV injection 25 g  25 g Intravenous Q15 Min PRN Terrence Tong DO        dextrose (GLUTOSE) oral gel 15 g  15 g Oral Q15 Min PRN Terrence Tong Alan, DO        flecainide (TAMBOCOR) tablet 50 mg  50 mg Oral Q12H Rebel Santiago MD   50 mg at  24 1150    glucagon (GLUCAGEN) injection 1 mg  1 mg Intramuscular Q15 Min PRN Terrence Tong DO        Insulin Lispro (humaLOG) injection 2-7 Units  2-7 Units Subcutaneous 4x Daily AC & at Bedtime Terrence Tong DO   5 Units at 24 1150    Magnesium Low Dose Replacement - Follow Nurse / BPA Driven Protocol   Does not apply PRN Terrence Tong DO        magnesium sulfate in D5W 1g/100mL (PREMIX)  1 g Intravenous Q1H Rebel Santiago MD   1 g at 24 1150    miconazole (MICOTIN) 2 % powder 1 application   1 application  Topical Q12H Mikayla Castañeda, APRN   1 application  at 24 0844    pantoprazole (PROTONIX) EC tablet 40 mg  40 mg Oral Q AM Rebel Santiago MD   40 mg at 24 0509    Phosphorus Replacement - Follow Nurse / BPA Driven Protocol   Does not apply PRN Terrence Tong DO        Potassium Replacement - Follow Nurse / BPA Driven Protocol   Does not apply PRN Terrence Tong DO        sodium chloride 0.9 % flush 10 mL  10 mL Intravenous PRN Apollo Griffin MD   10 mL at 24 2320    sodium chloride 0.9 % flush 10 mL  10 mL Intravenous Q12H Terrence Tong, DO   10 mL at 24 0844    sodium chloride 0.9 % flush 10 mL  10 mL Intravenous PRN Terrence Tong,         sodium chloride 0.9 % infusion 40 mL  40 mL Intravenous PRN Terrence Tong DO        tamsulosin (FLOMAX) 24 hr capsule 0.4 mg  0.4 mg Oral Daily Rebel Santiago MD   0.4 mg at 24 0843     Medical Student Notes (most recent note)    No notes of this type exist for this encounter.          Physical Therapy Notes (most recent note)        Danay Dolan PTA at 24 1002  Version 1 of 1         Patient Name: Apollo Haro  : 1940    MRN: [id number]                              Today's Date: 2024       Admit Date: 2024    Visit Dx:     ICD-10-CM ICD-9-CM   1. COVID-19  U07.1 079.89   2. Hyperglycemia  R73.9 790.29   3.  Generalized weakness  R53.1 780.79     Patient Active Problem List   Diagnosis    Weakness    Type 2 diabetes mellitus, without long-term current use of insulin     Past Medical History:   Diagnosis Date    A-fib     CAD (coronary artery disease)     DM (diabetes mellitus)      History reviewed. No pertinent surgical history.   General Information       Row Name 01/11/24 1042          Physical Therapy Time and Intention    Document Type therapy note (daily note)  -AS     Mode of Treatment physical therapy  -AS       Row Name 01/11/24 1042          General Information    Patient Profile Reviewed yes  -AS     Existing Precautions/Restrictions fall;other (see comments)  CONTACT/A[place]BORNE PRECAUTIONS(COVID)  -AS     Barriers to Rehab medically complex;previous functional deficit;cognitive status  -AS       Row Name 01/11/24 1042          Cognition    Orientation Status (Cognition) oriented to;person;place  -AS       Row Name 01/11/24 1042          Safety Issues, Functional Mobility    Safety Issues Affecting Function (Mobility) awareness of need for assistance;insight into deficits/self-awareness;friction/shear risk;safety precaution awareness;safety precautions follow-through/compliance;sequencing abilities  -AS     Impairments Affecting Function (Mobility) balance;cognition;coordination;endurance/activity tolerance;sensation/sensory awareness;strength;pain;range of motion (ROM)  -AS     Cognitive Impairments, Mobility Safety/Performance awareness, need for assistance;insight into deficits/self-awareness;safety precaution awareness;safety precaution follow-through;sequencing abilities  -AS     Comment, Safety Issues/Impairments (Mobility) alert to self and place  -AS               User Key  (r) = Recorded By, (t) = Taken By, (c) = Cosigned By      Initials Name Provider Type    AS Danay Dolan PTA Physical Therapist Assistant                   Mobility       Row Name 01/11/24 1043          Bed Mobility     Rolling Left [place] (Bed Mobility) verbal cues;moderate assist (50% patient effort);1 person assist  -AS     Supine-Sit [place] (Bed Mobility) verbal cues;moderate assist (50% patient effort)  -AS     Assistive Device (Bed Mobility) head of bed elevated;bed rails  -AS     Comment, (Bed Mobility) rolled to be cleaned secondary to BM and nursing placed bandage over wound(coccyx)  -AS       Row Name 01/11/24 1043          Transfers    Comment, (Transfers) bed>recliner with max assist x2 and B UE support  -AS       Row Name 01/11/24 1043          Bed-Chair Transfer    Bed-Chair [place] (Transfers) maximum assist (25% patient effort);2 person assist  -AS     Assistive Device (Bed-Chair Transfers) other (see comments)  -AS     Comment, (Bed-Chair Transfer) B UE support  -AS       Row Name 01/11/24 1043          Sit-Stand Transfer    Sit-Stand [place] (Transfers) verbal cues;maximum assist (25% patient effort);2 person assist  -AS     Comment, (Sit-Stand Transfer) B UE support, cues for sequencing and reaching back for chair when sitting  -AS       Row Name 01/11/24 1043          Gait/Stairs (Locomotion)    [place] Level (Gait) unable to assess  -AS               User Key  (r) = Recorded By, (t) = Taken By, (c) = Cosigned By      Initials Name Provider Type    AS Danay Dolan PTA Physical Therapist Assistant                   Obj/Interventions       Row Name 01/11/24 1047          Motor Skills    Therapeutic Exercise knee;ankle;shoulder  -AS       Row Name 01/11/24 1047          Shoulder (Therapeutic Exercise)    Shoulder (Therapeutic Exercise) AROM (active range of motion)  -AS     Shoulder AROM (Therapeutic Exercise) bilateral;flexion;extension;aBduction;aDduction;sitting;10 repetitions  bicep curls  -AS       Row Name 01/11/24 1047          Knee (Therapeutic Exercise)    Knee (Therapeutic Exercise) strengthening exercise  -AS     Knee Strengthening (Therapeutic Exercise)  bilateral;marching while seated;LAQ (long arc quad);sitting;10 repetitions  -AS       Row Name 01/11/24 1047          Ankle (Therapeutic Exercise)    Ankle (Therapeutic Exercise) AROM (active range of motion)  -AS     Ankle AAROM (Therapeutic Exercise) bilateral;dorsiflexion;plantarflexion;sitting;10 repetitions  -AS       Row Name 01/11/24 1047          Balance    Dynamic Standing Balance verbal cues;maximum assist;2-person assist  -AS     Position/Device Used, Standing Balance supported  -AS     Comment, Balance max x2 and B UE support to complete transfer  -AS               User Key  (r) = Recorded By, (t) = Taken By, (c) = Cosigned By      Initials Name Provider Type    AS Danay Dolan, GREGORIO Physical Therapist Assistant                   Goals/Plan    No documentation.                  Clinical Impression       Row Name 01/11/24 1048          Pain    Pretreatment Pain Rating 0/10 - no pain  -AS     Posttreatment Pain Rating 0/10 - no pain  -AS       Row Name 01/11/24 1048          Plan of Care Review    Plan of Care Reviewed With patient  -AS     Progress no change  -AS     Outcome Evaluation Pateint completed supine>sit with Mod asisst x1, bed>recliner with Max assist x2 and BUE support, cues for upright posture, step sequencing and reaching back when sitting. Completed B UE/LE exercises with cues for technique. Recommend SNF at D/C for best functional outcome.  -AS       Row Name 01/11/24 1048          Positioning and Restraints    Pre-Treatment Position in bed  -AS     Post Treatment Position chair  -AS     In Chair reclined;call light within reach;encouraged to call for assist;exit alarm on;waffle cushion;on mechanical lift sling;legs elevated;waffle boot/both  -AS               User Key  (r) = Recorded By, (t) = Taken By, (c) = Cosigned By      Initials Name Provider Type    AS Danay Dolan, GREGORIO Physical Therapist Assistant                   Outcome Measures       Row Name 01/11/24 1058           How much help from another person do you currently need...    Turning from your back to your side while in flat bed without using bedrails? 2  -AS     Moving from lying on back to sitting on the side of a flat bed without bedrails? 2  -AS     Moving to and from a bed to a chair (including a wheelchair)? 2  -AS     Standing up from a chair using your arms (e.g., wheelchair, bedside chair)? 2  -AS     Climbing 3-5 steps with a railing? 1  -AS     To walk in hospital room? 1  -AS     AM-PAC 6 Clicks Score (PT) 10  -AS     Highest Level of Mobility Goal 4 --> Transfer to chair/commode  -AS       Row Name 01/11/24 1050          Functional Assessment    Outcome Measure Options AM-PAC 6 Clicks Basic Mobility (PT)  -AS               User Key  (r) = Recorded By, (t) = Taken By, (c) = Cosigned By      Initials Name Provider Type    AS Danay Dolan PTA Physical Therapist Assistant                                 Physical Therapy Education       Title: PT OT SLP Therapies (In Progress)       Topic: Physical Therapy (In Progress)       Point: Mobility training (In Progress)       Learning Progress Summary             Patient Acceptance, E, NR by AS at 1/11/2024 1051    Acceptance, E,D, VU,NR by MB at 1/9/2024 1557   Family Acceptance, E,D, VU,NR by MB at 1/9/2024 1557                         Point: Home exercise program (In Progress)       Learning Progress Summary             Patient Acceptance, E, NR by AS at 1/11/2024 1051    Acceptance, E,D, VU,NR by MB at 1/9/2024 1557   Family Acceptance, E,D, VU,NR by MB at 1/9/2024 1557                         Point: Body mechanics (In Progress)       Learning Progress Summary             Patient Acceptance, E, NR by AS at 1/11/2024 1051    Acceptance, E,D, VU,NR by MB at 1/9/2024 1557   Family Acceptance, E,D, VU,NR by MB at 1/9/2024 1557                         Point: Precautions (In Progress)       Learning Progress Summary             Patient Acceptance, E, NR by AS at  1/11/2024 1051    Acceptance, E,D, VU,NR by MB at 1/9/2024 1557   Family Acceptance, E,D, VU,NR by MB at 1/9/2024 1557                                         User Key       Initials Effective Dates Name Provider Type Discipline    AS 04/28/23 -  Danay Dolan PTA Physical Therapist Assistant PT    MB 06/16/21 -  Luli Stephens PT Physical Therapist PT                  PT Recommendation and Plan     Plan of Care Reviewed With: patient  Progress: no change  Outcome Evaluation: Pateint completed supine>sit with Mod asisst x1, bed>recliner with Max assist x2 and BUE support, cues for upright posture, step sequencing and reaching back when sitting. Completed B UE/LE exercises with cues for technique. Recommend SNF at D/C for best functional outcome.     Time Calculation:         PT Charges       Row Name 01/11/24 1051             Time Calculation    Start Time 1002  -AS      PT Received On 01/11/24  -AS      PT Goal Re-Cert Due Date 01/19/24  -AS         Timed Charges    57867 - PT Therapeutic Exercise Minutes 11  -AS      39923 - PT Therapeutic Activity Minutes 13  -AS         Total Minutes    Timed Charges Total Minutes 24  -AS       Total Minutes 24  -AS                User Key  (r) = Recorded By, (t) = Taken By, (c) = Cosigned By      Initials Name Provider Type    AS Danay Dolan PTA Physical Therapist Assistant                  Therapy Charges for Today       Code Description Service Date Service Provider Modifiers Qty    95083296659 HC PT THER PROC EA 15 MIN 1/11/2024 Danay Dolan PTA GP 1    69449318112 HC PT THERAPEUTIC ACT EA 15 MIN 1/11/2024 Danay Dolan PTA GP 1            PT G-Codes  Outcome Measure Options: AM-PAC 6 Clicks Basic Mobility (PT)  AM-PAC 6 Clicks Score (PT): 10  AM-PAC 6 Clicks Score (OT): 12       Danay Dolan PTA  1/11/2024      Electronically signed by Danay Dolan PTA at 01/11/24 1051          Occupational Therapy Notes (most recent note)         Alissa Arreola, OT at 01/10/24 1310          Patient Name: Apollo Haro  : 1940    MRN: [id number]                              Today's Date: 1/10/2024       Admit Date: 2024    Visit Dx:     ICD-10-CM ICD-9-CM   1. COVID-19  U07.1 079.89   2. Hyperglycemia  R73.9 790.29   3. Generalized weakness  R53.1 780.79     Patient Active Problem List   Diagnosis    Weakness    Type 2 diabetes mellitus, without long-term current use of insulin     Past Medical History:   Diagnosis Date    A-fib     CAD (coronary artery disease)     DM (diabetes mellitus)      History reviewed. No pertinent surgical history.   General Information       Row Name 01/10/24 1310          OT Time and Intention    Document Type evaluation  -     Mode of Treatment occupational therapy  -SW       Row Name 01/10/24 1310          General Information    Patient Profile Reviewed yes  -SW     Existing Precautions/Restrictions fall;oxygen therapy device and L/min;other (see comments)  Contact and airborne  -     Barriers to Rehab medically complex;previous functional deficit;cognitive status  -SW       Row Name 01/10/24 1310          Occupational Profile    Environmental Supports and Barriers (Occupational Profile) Pt states he has a supportive wife, son, and grandson.  He uses a w/c to get around in.  -[facility] Name 01/10/24 131          Living Environment    People in Home spouse  -SW       Row Name 01/10/24 [facility]          Home Main Entrance    Number of Stairs, Main Entrance two  -SW       Row Name 01/10/24 131          Stairs Within Home, Primary    Number of Stairs, Within Home, Primary none  -SW       Row Name 01/10/24 1310          Cognition    Orientation Status (Cognition) oriented to;person;place  -SW       Row Name 01/10/24 1310          Safety Issues, Functional Mobility    Safety Issues Affecting Function (Mobility) awareness of need for assistance;friction/shear risk;insight into deficits/self-awareness;safety  precautions follow-through/compliance;sequencing abilities  -     Impairments Affecting Function (Mobility) balance;cognition;coordination;endurance/activity tolerance;sensation/sensory awareness;strength;pain;range of motion (ROM)  -     Cognitive Impairments, Mobility Safety/Performance awareness, need for assistance;insight into deficits/self-awareness;safety precaution awareness;safety precaution follow-through;sequencing abilities  -               User Key  (r) = Recorded By, (t) = Taken By, (c) = Cosigned By      Initials Name Provider Type    Alissa Call OT Occupational Therapist                     Mobility/ADL's       Row Name 01/10/24 1310          Bed Mobility    Bed Mobility supine-sit  -     Supine-Sit [place] (Bed Mobility) moderate assist (50% patient effort);verbal cues  -     Assistive Device (Bed Mobility) bed rails;head of bed elevated  -[facility] Name 01/10/24 1310          Transfers    Transfers bed-chair transfer;sit-stand transfer  -[facility] Name 01/10/24 1310          Bed-Chair Transfer    Bed-Chair [place] (Transfers) moderate assist (50% patient effort);verbal cues  -     Assistive Device (Bed-Chair Transfers) other (see comments)  -     Comment, (Bed-Chair Transfer) UB support  -[facility] Name 01/10/24 1310          Sit-Stand Transfer    Sit-Stand [place] (Transfers) moderate assist (50% patient effort);verbal cues  -     Comment, (Sit-Stand Transfer) UB support  -[facility] Name 01/10/24 1310          Activities of Daily Living    BADL Assessment/Intervention lower body dressing;grooming  -[facility] Name 01/10/24 1310          Lower Body Dressing Assessment/Training    [place] Level (Lower Body Dressing) lower body dressing skills;dependent (less than 25% patient effort)  -     Position (Lower Body Dressing) supine  -       Row Name 01/10/24 1310          Grooming Assessment/Training    [place] Level (Grooming) grooming  skills;wash face, hands;set up  -     Position (Grooming) sitting up in bed  -               User Key  (r) = Recorded By, (t) = Taken By, (c) = Cosigned By      Initials Name Provider Type    Alissa Call OT Occupational Therapist                   Obj/Interventions       Row Name 01/10/24 1310          Sensory Assessment (Somatosensory)    Sensory Assessment (Somatosensory) UE sensation intact  -[facility] Name 01/10/24 1310          Vision Assessment/Intervention    Visual Impairment/Limitations WFL  -       Row Name 01/10/24 1310          Range of Motion Comprehensive    General Range of Motion upper extremity range of motion deficits identified  -     Comment, General Range of Motion BUEs limited  -[facility] Name 01/10/24 1310          Strength Comprehensive (MMT)    General Manual Muscle Testing (MMT) Assessment upper extremity strength deficits identified  -     Comment, General Manual Muscle Testing (MMT) Assessment limited by decreased rom  -[facility] Name 01/10/24 1310          Balance    Balance Assessment sitting dynamic balance;sitting static balance;sit to stand dynamic balance;standing dynamic balance;standing static balance  -     Static Sitting Balance supervision  -     Dynamic Sitting Balance contact guard  -SW     Position, Sitting Balance unsupported;sitting edge of bed  -     Sit to Stand Dynamic Balance moderate assist  -SW     Static Standing Balance moderate assist  -SW     Dynamic Standing Balance moderate assist  -SW     Position/Device Used, Standing Balance supported  -     Balance Interventions sitting;standing;sit to stand;supported;static;dynamic;minimal challenge;occupation based/functional task  -               User Key  (r) = Recorded By, (t) = Taken By, (c) = Cosigned By      Initials Name Provider Type    Alissa Call OT Occupational Therapist                   Goals/Plan       Row Name 01/10/24 1310          Transfer Goal 1 (OT)     Activity/Assistive Device (Transfer Goal 1, OT) toilet  -     [place] Level/Cues Needed (Transfer Goal 1, OT) minimum assist (75% or more patient effort)  -     Time Frame (Transfer Goal 1, OT) long term goal (LTG);by discharge  -SW     Progress/Outcome (Transfer Goal 1, OT) goal ongoing  -       Row Name 01/10/24 1310          Dressing Goal 1 (OT)    Activity/Device (Dressing Goal 1, OT) lower body dressing  -     [place]/Cues Needed (Dressing Goal 1, OT) maximum assist (25-49% patient effort)  -SW     Time Frame (Dressing Goal 1, OT) long term goal (LTG);by discharge  -     Progress/Outcome (Dressing Goal 1, OT) goal ongoing  -[facility] Name 01/10/24 1310          Therapy Assessment/Plan (OT)    Planned Therapy Interventions (OT) activity tolerance training;adaptive equipment training;BADL retraining;functional balance retraining;patient/caregiver education/training;transfer/mobility retraining;strengthening exercise  -               User Key  (r) = Recorded By, (t) = Taken By, (c) = Cosigned By      Initials Name Provider Type    Alissa Call, DORIAN Occupational Therapist                   Clinical Impression       [facility] Name 01/10/24 1310          Pain Assessment    Pretreatment Pain Rating 0/10 - no pain  -     Posttreatment Pain Rating 0/10 - no pain  -SW     Pre/Posttreatment Pain Comment stated he didn't feel good, but did not c/o pain  -[facility] Name 01/10/24 1310          Plan of Care Review    Plan of Care Reviewed With patient  -     Outcome Evaluation OT eval complete. Pt presents with weakness, confusion, decreased act july, and decline in mob and adl performance. Pt mod assist with cues for bed mob, mod assist for sts and t/f with stand pivot using ub support, dep for donning socks, and setup for washing face. Recommend IPOT and SNF at d/c.  -       Row Name 01/10/24 1310          Therapy Assessment/Plan (OT)    Rehab Potential (OT) good, to achieve stated therapy goals   -     Criteria for Skilled Therapeutic Interventions Met (OT) yes;meets criteria;skilled treatment is necessary  -     Therapy Frequency (OT) daily  -       Row Name 01/10/24 1310          Therapy Plan Review/Discharge Plan (OT)    Anticipated Discharge Disposition (OT) skilled nursing facility  -       Row Name 01/10/24 1310          Vital Signs    Pre Systolic BP Rehab 104  -SW     Pre Treatment Diastolic BP 57  -SW     Pretreatment Heart Rate (beats/min) 104  -SW     Pre SpO2 (%) 90  -SW     O2 Delivery Pre Treatment supplemental O2  -SW     O2 Delivery Intra Treatment supplemental O2  -SW     O2 Delivery Post Treatment supplemental O2  -SW     Pre Patient Position Supine  -SW     Intra Patient Position Standing  -SW     Post Patient Position Sitting  -SW       Row Name 01/10/24 1310          Positioning and Restraints    Pre-Treatment Position in bed  -SW     Post Treatment Position chair  -SW     In Chair notified nsg;reclined;sitting;encouraged to call for assist;call light within reach;exit alarm on;waffle cushion;legs elevated  -SW               User Key  (r) = Recorded By, (t) = Taken By, (c) = Cosigned By      Initials Name Provider Type    Alissa Call, OT Occupational Therapist                   Outcome Measures       Row Name 01/10/24 1414          How much help from another is currently needed...    Putting on and taking off regular lower body clothing? 1  -SW     Bathing (including washing, rinsing, and drying) 2  -SW     Toileting (which includes using toilet bed pan or urinal) 1  -SW     Putting on and taking off regular upper body clothing 2  -SW     Taking care of personal grooming (such as brushing teeth) 3  -SW     Eating meals 3  -SW     AM-PAC 6 Clicks Score (OT) 12  -SW       [facility] Name 01/10/24 0800          How much help from another person do you currently need...    Turning from your back to your side while in flat bed without using bedrails? 2  -CB     Moving from lying on back  to sitting on the side of a flat bed without bedrails? 2  -CB     Moving to and from a bed to a chair (including a wheelchair)? 1  -CB     Standing up from a chair using your arms (e.g., wheelchair, bedside chair)? 2  -CB     Climbing 3-5 steps with a railing? 1  -CB     To walk in hospital room? 1  -CB     AM-PAC 6 Clicks Score (PT) 9  -CB     Highest Level of Mobility Goal 3 --> Sit at edge of bed  -CB       Row Name 01/10/24 1414          Functional Assessment    Outcome Measure Options AM-PAC 6 Clicks Daily Activity (OT)  -               User Key  (r) = Recorded By, (t) = Taken By, (c) = Cosigned By      Initials Name Provider Type    Alissa Call OT Occupational Therapist    Dax Vicente, RN Registered Nurse                    Occupational Therapy Education       Title: PT OT SLP Therapies (In Progress)       Topic: Occupational Therapy (In Progress)       Point: ADL training (Done)       Description:   Instruct learner(s) on proper safety adaptation and remediation techniques during self care or transfers.   Instruct in proper use of assistive devices.                  Learning Progress Summary             Patient Acceptance, E, VU,NR by RAVINDRA at 1/10/2024 1415                         Point: Home exercise program (Not Started)       Description:   Instruct learner(s) on appropriate technique for monitoring, assisting and/or progressing therapeutic exercises/activities.                  Learner Progress:  Not documented in this visit.              Point: Precautions (Done)       Description:   Instruct learner(s) on prescribed precautions during self-care and functional transfers.                  Learning Progress Summary             Patient Acceptance, E, VU,NR by RAVINDRA at 1/10/2024 1415                         Point: Body mechanics (Not Started)       Description:   Instruct learner(s) on proper positioning and spine alignment during self-care, functional mobility activities and/or exercises.                   Learner Progress:  Not documented in this visit.                              User Key       Initials Effective Dates Name Provider Type Discipline     06/16/21 -  Alissa Arreola OT Occupational Therapist OT                  OT Recommendation and Plan  Planned Therapy Interventions (OT): activity tolerance training, adaptive equipment training, BADL retraining, functional balance retraining, patient/caregiver education/training, transfer/mobility retraining, strengthening exercise  Therapy Frequency (OT): daily  Plan of Care Review  Plan of Care Reviewed With: patient  Outcome Evaluation: OT eval complete. Pt presents with weakness, confusion, decreased act july, and decline in mob and adl performance. Pt mod assist with cues for bed mob, mod assist for sts and t/f with stand pivot using ub support, dep for donning socks, and setup for washing face. Recommend IPOT and SNF at d/c.     Time Calculation:   Evaluation Complexity (OT)  Review Occupational Profile/Medical/Therapy History Complexity: expanded/moderate complexity  Assessment, Occupational Performance/Identification of Deficit Complexity: 5 or more performance deficits  Clinical Decision Making Complexity (OT): detailed assessment/moderate complexity  Overall Complexity of Evaluation (OT): moderate complexity     Time Calculation- OT       Row Name 01/10/24 1310             Time Calculation- OT    OT Start Time 1310  -SW      OT Received On 01/10/24  -SW      OT Goal Re-Cert Due Date 01/20/24  -SW         Timed Charges    08114 - OT Self Care/Mgmt Minutes 25  -SW         Untimed Charges    OT Eval/Re-eval Minutes 38  -SW         Total Minutes    Timed Charges Total Minutes 25  -SW      Untimed Charges Total Minutes 38  -SW       Total Minutes 63  -SW                User Key  (r) = Recorded By, (t) = Taken By, (c) = Cosigned By      Initials Name Provider Type     Alissa Arreola OT Occupational Therapist                  Therapy Charges for Today        Code Description Service Date Service Provider Modifiers Qty    92874429064 HC OT SELF CARE/MGMT/TRAIN EA 15 MIN 1/10/2024 Alissa Arreola OT GO 2    [id number]  OT EVAL MOD COMPLEXITY 3 1/10/2024 Alissa Arreola OT GO 1                 Alissa Arreola OT  1/10/2024    Electronically signed by Alissa Arreola OT at 01/10/24 1416       Speech Language Pathology Notes (most recent note)    No notes exist for this encounter.

## 2024-01-12 NOTE — PROGRESS NOTES
"    [facility] Medicine Services  PROGRESS NOTE    Patient Name: Apollo Haro  : 1940  MRN: [id number]    Date of Admission: 2024  Primary Care Physician: Provider, No Known    Subjective   Subjective     CC:  Gen weakness    HPI:  Overall feels better and better, no pain. Specificially no back pain or leg pain. Feels \"stronger\" today, worked w/ therapy today w/ transfers      Objective   Objective     Vital Signs:   Temp:  [98.7 °F (37.1 °C)-98.9 °F (37.2 °C)] 98.7 °F (37.1 °C)  Heart Rate:  [70-83] 77  Resp:  [18] 18  BP: ()/(55-77) 115/62     Physical Exam:  Constitutional:Alert, nontoxic appearing, sitting up in bed, normal work of breathing, no distress, sitting up in bed  Psych:slightly flat affect but otherwise appropriate  HEENT:NCAT, oropharynx clear  Neck: neck supple, full range of motion  Neuro: Face symmetric, speech clear, equal , moves all extremities, nonfocal exam. Moves both legs (knee flexion/extenion, plantar flexion/extension; gait not assessed  Cardiac: rrr; No pretibial pitting edema  Resp:ctab normal effort  GI: abd soft, nontender to palpation  Skin: sacral decub noted  Musculoskeletal/extremities: no cyanosis of extremities; no significant ankle edema          Results Reviewed:  LAB RESULTS:      Lab 24  0506 24  0742 01/10/24  0609 24  0614 24  1739 24  1646   WBC 5.43 4.18 4.14  --   --  5.69   HEMOGLOBIN 12.0* 12.2* 12.2*  --   --  13.9   HEMATOCRIT 36.0* 36.8* 36.3*  --   --  41.3   PLATELETS 159 151 144  --   --  151   NEUTROS ABS  --   --   --   --   --  3.43   IMMATURE GRANS (ABS)  --   --   --   --   --  0.02   LYMPHS ABS  --   --   --   --   --  1.28   MONOS ABS  --   --   --   --   --  0.93*   EOS ABS  --   --   --   --   --  0.01   MCV 89.8 91.5 91.0  --   --  91.6   CRP  --  3.46*  --  7.46* 9.50*  --    PROCALCITONIN  --   --   --   --  0.09  --    LDH  --   --   --   --  246*  --    PROTIME  --   --   " --   --   --  16.8*   D DIMER QUANT  --   --   --   --   --  0.65         Lab 01/12/24  0505 01/11/24  0742 01/10/24  0609 01/09/24  0614 01/08/24  1739 01/08/24  1646   SODIUM 143 143 141 138 139  --    POTASSIUM 4.3 4.0 4.3 3.8 4.6  --    CHLORIDE 105 106 104 101 102  --    CO2 28.0 28.0 30.0* 29.0 28.0  --    ANION GAP 10.0 9.0 7.0 8.0 9.0  --    BUN 17 17 20 28* 36*  --    CREATININE 0.93 0.86 0.91 0.88 1.13  --    EGFR 81.0 85.4 83.1 84.8 64.1  --    GLUCOSE 142* 125* 116* 139* 200*  --    CALCIUM 8.1* 8.4* 8.6 8.1* 8.8  --    MAGNESIUM 1.5* 1.6  --  1.7 1.9  --    HEMOGLOBIN A1C  --   --   --   --   --  6.60*   TSH  --   --   --   --  0.438  --          Lab 01/10/24  0609 01/08/24  1739   TOTAL PROTEIN 5.1* 6.4   ALBUMIN 2.8* 3.1*   GLOBULIN 2.3 3.3   ALT (SGPT) 30 30   AST (SGOT) 61* 56*   BILIRUBIN 0.7 0.8   ALK PHOS 66 71         Lab 01/08/24  1739 01/08/24  1646   PROBNP 518.2  --    HSTROP T 29*  --    PROTIME  --  16.8*   INR  --  1.35*             Lab 01/10/24  0609 01/08/24  1739   FERRITIN  --  240.10   FOLATE >20.00  --    VITAMIN B 12 1,269*  --          Brief Urine Lab Results  (Last result in the past 365 days)        Color   Clarity   Blood   Leuk Est   Nitrite   Protein   CREAT   Urine HCG        01/10/24 1150 Yellow   Clear   Moderate (2+)   Negative   Negative   Negative                   Microbiology Results Abnormal       None            MRI Thoracic Spine Without Contrast    Result Date: 1/11/2024  MRI THORACIC SPINE WO CONTRAST Date of Exam: 1/11/2024 1:23 AM EST Indication: back pain, falls, BLE weakness.  Comparison: None available. Technique:  Routine multiplanar/multisequence sequence images of the thoracic spine were obtained without contrast administration. Findings: There is evidence of prior compression deformity and subsequent kyphoplasty at T10 with minimal persistent height loss and no significant bony retropulsion or traumatic malalignment. There is a compression deformity  noted at T12, appearing chronic without significant marrow edema present with significant underlying demineralization. Height loss is approximately 70% anteriorly. There is minimal bony retropulsion, with some mild focal kyphosis present. No additional fracture is present. There is no listhesis or subluxation. The thoracic spinal cord is normal in caliber and signal throughout. The paraspinal soft tissues demonstrate no acute or suspicious findings. At the site of focal kyphosis and minimal bony retropulsion at T11-12, there is mild associated narrowing of the spinal canal. The spinal canal and neural foramina are otherwise patent throughout.     Impression: Impression: Chronic appearing compression deformities at T10 and T12 as above, without significant associated malalignment or canal compromise. The spinal canal and neural foramina are patent throughout. No acute fracture is noted. Electronically Signed: Bayron Wagner MD  1/11/2024 11:30 AM EST  Workstation ID: [id number]    MRI Lumbar Spine Without Contrast    Result Date: 1/11/2024  MRI LUMBAR SPINE WO CONTRAST Date of Exam: 1/11/2024 12:25 AM CST Indication: back pain, falls, bilateral lower extremity weakness.  Comparison: None available. Technique:  Routine multiplanar/multisequence sequence images of the lumbar spine were obtained without contrast administration.  Findings: ALIGNMENT: Normal DISK SPACE: There is advanced intervertebral disc base narrowing at L3/4 and at L5/S1 with chronic endplate changes. VERTEBRA: No acute loss of vertebral body height. A chronic compression injury is present at T12. CORD: Distal spinal cord and conus medullaris appear unremarkable terminating above the L2 level.  SOFT TISSUES: Paraspinal musculature and visualized abdominopelvic contents are unremarkable.  No mass nor lymphadenopathy. LEVELS: T12-L1: There is a broad-based disc bulge flattening the ventral thecal sac with disc material measuring 2.5 mm beyond the  vertebral body margin. AP spinal canal diameter measures 12 mm. Ligamentum flavum hypertrophy and early facet changes are present without significant neural foramina stenosis. L1-L2: Mild broad-based disc bulge flattening the ventral thecal sac. Ligamentum flavum hypertrophy and early facet changes are noted without significant central canal or neural foramina stenosis. L2-L3: Minimal disc bulge and facet changes without significant central canal or neural foramina stenosis. L3-L4: Broad-based disc bulge flattening the ventral thecal sac with AP spinal canal diameter measuring 10 mm. There is anterior spondylosis. Ligamentum flavum hypertrophy and facet changes are noted. Vertebral body osteophytes are present with findings contributing to severe right and moderate to severe left neural foramina stenosis with contact and compromise of bilateral exiting L3 nerve roots right greater than left. L4-L5: Broad-based disc bulge effacing the ventral thecal sac with increased T2 signal within disc material centrally compatible with annular fissure. Disc material measures 3 mm beyond the vertebral body margin in the AP spinal canal diameter is narrowed to 5 mm. There is prominent ligamentum flavum hypertrophy and facet changes. There is mild to moderate right and moderate left neural foramina stenosis. L5-S1: Mild broad-based disc bulge with facet hypertrophy. There are vertebral body osteophytes contributing to mild to moderate right and moderate left neural foramina stenosis.     Impression: Impression: Multilevel degenerative changes in the lumbar spine with prominent findings at level L3/4 where there is severe right and moderate to severe left neural foramina stenosis with compromise of bilateral exiting L3 nerve roots right greater than left. Severe central canal stenosis at L4/5 with moderate neural foramina stenosis. Electronically Signed: Maricruz Bingham MD  1/11/2024 6:17 AM [facility]  Workstation ID: [id number]    XR  Femur 2 View Bilateral    Result Date: 1/10/2024  XR FEMUR 2 VW BILATERAL Date of Exam: 1/10/2024 4:01 PM EST Indication: MRI Clearance Comparison: None available. Findings: There is an intramedullary hermann with a couple screws along the left proximal femur. No metal is identified along the visualized right lower extremity.     Impression: Impression: 1.Intramedullary hermann with a couple screws along proximal left femur. 2.No metal identified along visualized right lower extremity. Electronically Signed: Apollo Abdalla MD  1/10/2024 4:57 PM EST  Workstation ID: [id number]    XR Tibia Fibula 2 View Bilateral    Result Date: 1/10/2024  XR TIBIA FIBULA 2 VW BILATERAL Date of Exam: 1/10/2024 4:00 PM EST Indication: MRI Clearance Comparison: None available. Findings: Right tibia and fibula: 2 views. The shafts of the tibia and fibula appear intact. There are no radiopaque foreign bodies in the soft tissues. Left tibia and fibula: 2 views. The shafts of the tibia and fibula appear intact. There are no radiopaque foreign bodies in the soft tissues.     Impression: Impression: No significant findings. Electronically Signed: Debbie Palomino MD  1/10/2024 4:40 PM EST  Workstation ID: [id number]         Current medications:  Scheduled Meds:apixaban, 5 mg, Oral, BID  aspirin, 81 mg, Oral, Daily  castor oil-balsam peru, 1 application , Topical, Q12H  flecainide, 50 mg, Oral, Q12H  insulin lispro, 2-7 Units, Subcutaneous, 4x Daily AC & at Bedtime  miconazole, 1 application , Topical, Q12H  pantoprazole, 40 mg, Oral, Q AM  senna-docusate sodium, 2 tablet, Oral, BID  sodium chloride, 10 mL, Intravenous, Q12H  tamsulosin, 0.4 mg, Oral, Daily      Continuous Infusions:     PRN Meds:.  acetaminophen **OR** acetaminophen **OR** acetaminophen    benzonatate    senna-docusate sodium **AND** polyethylene glycol **AND** bisacodyl **AND** bisacodyl    Calcium Replacement - Follow Nurse / BPA Driven Protocol    dextrose    dextrose    glucagon  (human recombinant)    Magnesium Low Dose Replacement - Follow Nurse / BPA Driven Protocol    Phosphorus Replacement - Follow Nurse / BPA Driven Protocol    Potassium Replacement - Follow Nurse / BPA Driven Protocol    [COMPLETED] Insert Peripheral IV **AND** sodium chloride    sodium chloride    sodium chloride    Assessment & Plan   Assessment & Plan     Active Hospital Problems    Diagnosis  POA    **Weakness [R53.1]  Yes    Type 2 diabetes mellitus, without long-term current use of insulin [E11.9]  Yes      Resolved Hospital Problems   No resolved problems to display.        Brief Hospital Course to date:  Apollo Haro is a 83 y.o. male w/ hx parox afib, htn, hl, dm2, chronic back pain who was brought to [facility] ED due to generalized weakness. History if limited as no family bedside and patient poor historian and no answer when phone family. Patient states was diagnosed w/ covid 19 within past couple of weeks and treated as outpatient, but due to worsening generalized weakness in leg and walking was brought to [facility] for evaluation. Cxr negative. Ct head no acute intracranial process, left enthoid and frontal sinus musosal dz noted.       Covid 19 infection  -no hypoxia, mild illness  -tested + 1/8/24, out of isolation here 1/18/24 if doesn't progress to severe infection (I.e. covid w/ related hypoxemia, etc)    Generalized weakness w/ decreased mobility  Multilevel degenerative lumbar spine changes w/ bilateral (right severe, left moderate-severe) left neural foraminal stenosis w/ bilateral L3 nerve root compromise (on MRI)  Chronic T10 & T12 compression fractures (without canal compromise)  Recent falls  -recent [facility] procedure note 7/28/23 w/ diagnosis code of normal pressure hydrocephalus (patient poor historian regarding this)  -exam nonfocal bedside on 1/9/24  -ct head negative here  -tsh ok  -generalized weakness could be due to recent covid 19 infection as well  -u/a benign  -b12 & folate wnl  -cpk 1019-->repeat  "696 w/ gentle hydration  -patient has previously seen a spinal surgeon at [facility] (over a year ago per wife report) who was considering possible surgical intervention on his spine; patient did not wish for surgery and he never followed up  -MRI T&L spine: revealed multilevel deg lumbar spine changes w/ bilateral neural foraminal stenosis w/ bilateral L3 nerve root sompromise.  -discussed results of MRI with patient; he has mobilized w/ PT & OT. Per 11/11/24 notes requiring moderate assist x 1  for supine to sit; required max assist x 2 & bilateral upper extremity support and cues for standing and walking (recommended SNF @ discharge)   -overall patient's mobility is improved from admission, likely combination of chronic diabetic neuropathy, longstanding lumbar disk/spinal dz, and covid/viral related myopathy which is resolving  -patient currently denies any back or leg pain.I discussed mri findings with patient and wife on 1/12/24; patient is not interested at all in pursuing any surgical intervention \"no matter what\" (I clarified that patient means even if he may never walk again). Since patient is currently making progress w/ PT/OT I have recommended inpt rehab at discharge and keep follow up appointment with previous spinal surgeon at [facility] as outpatient if changes mind about further surgical evaluation for lumbar spine        Encephalopathy, likely due to covid 19 infection; resolved  -ct head ok  -improved    Mild rhabdomyolysis, improved  -from falls; initial cpk 1019, repeat 696 w/ iv fluids  -holding statin for now, restart pravachol  in couple days    Cad  Afib  Htn  Hl  -restarted home flecainide  -continue eliquis    Dm2, A1c 6.6  -ssi      Sacral decub, poa  -wound care          Expected Discharge Location and Transportation:  [facility], now awaiting insurance  Expected Discharge  medically ready for discharge. ? This weekend vs first of week    DVT prophylaxis:  Medical DVT prophylaxis " orders are present.     AM-PAC 6 Clicks Score (PT): 10 (01/11/24 1050)    CODE STATUS:   Code Status and Medical Interventions:   Ordered at: 01/08/24 2053     Code Status (Patient has no pulse and is not breathing):    CPR (Attempt to Resuscitate)     Medical Interventions (Patient has pulse or is breathing):    Full Support       Rebel Santiago MD  01/12/24

## 2024-01-12 NOTE — CASE MANAGEMENT/SOCIAL WORK
Continued Stay Note  [facility]     Patient Name: Apollo Haro  MRN: [id number]  Today's Date: 1/12/2024    Admit Date: 1/8/2024    Plan: SNF   Discharge Plan       Row Name 01/12/24 1222       Plan    Plan SNF    Plan Comments Discussed patient in MDR.  Patient is medically ready for discharge.  Spoke with patient's wife in [place].  There are only two SNFs in [facility] that are in network with patient's insurance that can accept patient in isolation for Covid.  Those are [facility] and [facility].  Referral called to Romelia with [facility] and faxed to [facility].  Wife prefers [facility].  CM will continue to follow.    Update: Spoke with Romelia and she can offer patient a bed at [facility].  Spoke with patient's wife by phone and she is agreeable.  Left Romelia a voicemail to initiate precert with patient's insurance.      Final Discharge Disposition Code 03 - skilled nursing facility (SNF)                   Discharge Codes    No documentation.                 Expected Discharge Date and Time       Expected Discharge Date Expected Discharge Time    Jan 19, 2024               Bettie Diaz RN

## 2024-01-13 LAB
ANION GAP SERPL CALCULATED.3IONS-SCNC: 7 MMOL/L (ref 5–15)
BUN SERPL-MCNC: 17 MG/DL (ref 8–23)
BUN/CREAT SERPL: 19.3 (ref 7–25)
CALCIUM SPEC-SCNC: 8.4 MG/DL (ref 8.6–10.5)
CHLORIDE SERPL-SCNC: 104 MMOL/L (ref 98–107)
CO2 SERPL-SCNC: 29 MMOL/L (ref 22–29)
CREAT SERPL-MCNC: 0.88 MG/DL (ref 0.76–1.27)
EGFRCR SERPLBLD CKD-EPI 2021: 84.8 ML/MIN/1.73
GLUCOSE BLDC GLUCOMTR-MCNC: 150 MG/DL (ref 70–130)
GLUCOSE BLDC GLUCOMTR-MCNC: 203 MG/DL (ref 70–130)
GLUCOSE BLDC GLUCOMTR-MCNC: 228 MG/DL (ref 70–130)
GLUCOSE BLDC GLUCOMTR-MCNC: 253 MG/DL (ref 70–130)
GLUCOSE BLDC GLUCOMTR-MCNC: 269 MG/DL (ref 70–130)
GLUCOSE SERPL-MCNC: 141 MG/DL (ref 65–99)
MAGNESIUM SERPL-MCNC: 1.7 MG/DL (ref 1.6–2.4)
POTASSIUM SERPL-SCNC: 4.2 MMOL/L (ref 3.5–5.2)
SODIUM SERPL-SCNC: 140 MMOL/L (ref 136–145)

## 2024-01-13 PROCEDURE — 99232 SBSQ HOSP IP/OBS MODERATE 35: CPT | Performed by: INTERNAL MEDICINE

## 2024-01-13 PROCEDURE — 82948 REAGENT STRIP/BLOOD GLUCOSE: CPT

## 2024-01-13 PROCEDURE — 83735 ASSAY OF MAGNESIUM: CPT | Performed by: INTERNAL MEDICINE

## 2024-01-13 PROCEDURE — 80048 BASIC METABOLIC PNL TOTAL CA: CPT | Performed by: INTERNAL MEDICINE

## 2024-01-13 PROCEDURE — 63710000001 INSULIN LISPRO (HUMAN) PER 5 UNITS: Performed by: INTERNAL MEDICINE

## 2024-01-13 RX ADMIN — FLECAINIDE ACETATE 50 MG: 50 TABLET ORAL at 22:08

## 2024-01-13 RX ADMIN — INSULIN LISPRO 3 UNITS: 100 INJECTION, SOLUTION INTRAVENOUS; SUBCUTANEOUS at 22:05

## 2024-01-13 RX ADMIN — INSULIN LISPRO 2 UNITS: 100 INJECTION, SOLUTION INTRAVENOUS; SUBCUTANEOUS at 08:34

## 2024-01-13 RX ADMIN — PANTOPRAZOLE SODIUM 40 MG: 40 TABLET, DELAYED RELEASE ORAL at 06:13

## 2024-01-13 RX ADMIN — Medication 10 ML: at 08:35

## 2024-01-13 RX ADMIN — SENNOSIDES AND DOCUSATE SODIUM 2 TABLET: 8.6; 5 TABLET ORAL at 08:34

## 2024-01-13 RX ADMIN — CASTOR OIL AND BALSAM, PERU 1 APPLICATION: 788; 87 OINTMENT TOPICAL at 08:34

## 2024-01-13 RX ADMIN — MICONAZOLE NITRATE 1 APPLICATION: 20 POWDER TOPICAL at 22:07

## 2024-01-13 RX ADMIN — ASPIRIN 81 MG: 81 TABLET, CHEWABLE ORAL at 08:34

## 2024-01-13 RX ADMIN — CASTOR OIL AND BALSAM, PERU 1 APPLICATION: 788; 87 OINTMENT TOPICAL at 22:07

## 2024-01-13 RX ADMIN — ACETAMINOPHEN 650 MG: 325 TABLET ORAL at 22:06

## 2024-01-13 RX ADMIN — INSULIN LISPRO 4 UNITS: 100 INJECTION, SOLUTION INTRAVENOUS; SUBCUTANEOUS at 18:15

## 2024-01-13 RX ADMIN — TAMSULOSIN HYDROCHLORIDE 0.4 MG: 0.4 CAPSULE ORAL at 08:34

## 2024-01-13 RX ADMIN — Medication 10 ML: at 22:06

## 2024-01-13 RX ADMIN — INSULIN LISPRO 3 UNITS: 100 INJECTION, SOLUTION INTRAVENOUS; SUBCUTANEOUS at 12:21

## 2024-01-13 RX ADMIN — FLECAINIDE ACETATE 50 MG: 50 TABLET ORAL at 12:21

## 2024-01-13 RX ADMIN — APIXABAN 5 MG: 5 TABLET, FILM COATED ORAL at 08:34

## 2024-01-13 RX ADMIN — APIXABAN 5 MG: 5 TABLET, FILM COATED ORAL at 22:06

## 2024-01-13 RX ADMIN — MICONAZOLE NITRATE 1 APPLICATION: 20 POWDER TOPICAL at 08:34

## 2024-01-13 NOTE — PROGRESS NOTES
[facility] Medicine Services  PROGRESS NOTE    Patient Name: Apollo Haro  : 1940  MRN: [id number]    Date of Admission: 2024  Primary Care Physician: Provider, No Known    Subjective   Subjective     CC:  Weakness    HPI:  Patient is doing well this morning.  Continues to feel weak.  No cough or shortness of breath.      Objective   Objective     Vital Signs:   Temp:  [98.7 °F (37.1 °C)] 98.7 °F (37.1 °C)  Heart Rate:  [64-92] 71  Resp:  [16-18] 16  BP: ()/(52-70) 121/59  Flow (L/min):  [1] 1     Physical Exam:  Constitutional: No acute distress, awake, alert  Respiratory: Clear to auscultation bilaterally, respiratory effort normal   Cardiovascular: RRR  Gastrointestinal: Positive bowel sounds, soft, nontender, nondistended  Musculoskeletal: No bilateral ankle edema  Psychiatric: Appropriate affect, cooperative  Neurologic: Oriented x 3, no focal deficits        Results Reviewed:  LAB RESULTS:      Lab 24  0506 24  0742 01/10/24  0609 24  0614 24  1739 24  1646   WBC 5.43 4.18 4.14  --   --  5.69   HEMOGLOBIN 12.0* 12.2* 12.2*  --   --  13.9   HEMATOCRIT 36.0* 36.8* 36.3*  --   --  41.3   PLATELETS 159 151 144  --   --  151   NEUTROS ABS  --   --   --   --   --  3.43   IMMATURE GRANS (ABS)  --   --   --   --   --  0.02   LYMPHS ABS  --   --   --   --   --  1.28   MONOS ABS  --   --   --   --   --  0.93*   EOS ABS  --   --   --   --   --  0.01   MCV 89.8 91.5 91.0  --   --  91.6   CRP  --  3.46*  --  7.46* 9.50*  --    PROCALCITONIN  --   --   --   --  0.09  --    LDH  --   --   --   --  246*  --    PROTIME  --   --   --   --   --  16.8*   D DIMER QUANT  --   --   --   --   --  0.65         Lab 24  0657 24  0505 24  0742 01/10/24  0609 24  0614 24  1739 24  1739 24  1646   SODIUM 140 143 143 141 138   < > 139  --    POTASSIUM 4.2 4.3 4.0 4.3 3.8   < > 4.6  --    CHLORIDE 104 105 106 104 101   < > 102   --    CO2 29.0 28.0 28.0 30.0* 29.0   < > 28.0  --    ANION GAP 7.0 10.0 9.0 7.0 8.0   < > 9.0  --    BUN 17 17 17 20 28*   < > 36*  --    CREATININE 0.88 0.93 0.86 0.91 0.88   < > 1.13  --    EGFR 84.8 81.0 85.4 83.1 84.8   < > 64.1  --    GLUCOSE 141* 142* 125* 116* 139*   < > 200*  --    CALCIUM 8.4* 8.1* 8.4* 8.6 8.1*   < > 8.8  --    MAGNESIUM 1.7 1.5* 1.6  --  1.7  --  1.9  --    HEMOGLOBIN A1C  --   --   --   --   --   --   --  6.60*   TSH  --   --   --   --   --   --  0.438  --     < > = values in this interval not displayed.         Lab 01/10/24  0609 01/08/24  1739   TOTAL PROTEIN 5.1* 6.4   ALBUMIN 2.8* 3.1*   GLOBULIN 2.3 3.3   ALT (SGPT) 30 30   AST (SGOT) 61* 56*   BILIRUBIN 0.7 0.8   ALK PHOS 66 71         Lab 01/08/24  1739 01/08/24  1646   PROBNP 518.2  --    HSTROP T 29*  --    PROTIME  --  16.8*   INR  --  1.35*             Lab 01/10/24  0609 01/08/24  1739   FERRITIN  --  240.10   FOLATE >20.00  --    VITAMIN B 12 1,269*  --          Brief Urine Lab Results  (Last result in the past 365 days)        Color   Clarity   Blood   Leuk Est   Nitrite   Protein   CREAT   Urine HCG        01/10/24 1150 Yellow   Clear   Moderate (2+)   Negative   Negative   Negative                   Microbiology Results Abnormal       None            No radiology results from the last 24 hrs        Current medications:  Scheduled Meds:apixaban, 5 mg, Oral, BID  aspirin, 81 mg, Oral, Daily  castor oil-balsam peru, 1 application , Topical, Q12H  flecainide, 50 mg, Oral, Q12H  insulin lispro, 2-7 Units, Subcutaneous, 4x Daily AC & at Bedtime  miconazole, 1 application , Topical, Q12H  pantoprazole, 40 mg, Oral, Q AM  senna-docusate sodium, 2 tablet, Oral, BID  sodium chloride, 10 mL, Intravenous, Q12H  tamsulosin, 0.4 mg, Oral, Daily      Continuous Infusions:   PRN Meds:.  acetaminophen **OR** acetaminophen **OR** acetaminophen    benzonatate    senna-docusate sodium **AND** polyethylene glycol **AND** bisacodyl **AND**  bisacodyl    Calcium Replacement - Follow Nurse / BPA Driven Protocol    dextrose    dextrose    glucagon (human recombinant)    Magnesium Low Dose Replacement - Follow Nurse / BPA Driven Protocol    Phosphorus Replacement - Follow Nurse / BPA Driven Protocol    Potassium Replacement - Follow Nurse / BPA Driven Protocol    [COMPLETED] Insert Peripheral IV **AND** sodium chloride    sodium chloride    sodium chloride    Assessment & Plan   Assessment & Plan     Active Hospital Problems    Diagnosis  POA    **Weakness [R53.1]  Yes    Type 2 diabetes mellitus, without long-term current use of insulin [E11.9]  Yes      Resolved Hospital Problems   No resolved problems to display.        Brief Hospital Course to date:  Apollo Haro is a 83 y.o. male w/ hx parox afib, htn, hl, dm2, chronic back pain who was brought to [facility] ED due to generalized weakness. History if limited as no family bedside and patient poor historian and no answer when phone family. Patient states was diagnosed w/ covid 19 within past couple of weeks and treated as outpatient, but due to worsening generalized weakness in leg and walking was brought to [facility] for evaluation. Cxr negative. Ct head no acute intracranial process, left enthoid and frontal sinus musosal dz noted.      Covid 19 infection  -no hypoxia, mild illness  -Tested positive on 1/8/2024, out of isolation on 1/18/2024       Generalized weakness w/ decreased mobility  Multilevel degenerative lumbar spine changes w/ bilateral (right severe, left moderate-severe) left neural foraminal stenosis w/ bilateral L3 nerve root compromise (on MRI)  Chronic T10 & T12 compression fractures (without canal compromise)  Recent falls  -recent [facility] procedure note 7/28/23 w/ diagnosis code of normal pressure hydrocephalus (patient poor historian regarding this)  -CT head without acute findings, B12 and folate within normal limits.  TSH within normal limits.    -MRI T&L spine: revealed multilevel deg lumbar  spine changes w/ bilateral neural foraminal stenosis w/ bilateral L3 nerve root sompromise. patient has previously seen a spinal surgeon at [facility] (over a year ago per wife report) who was considering possible surgical intervention on his spine; patient did not wish for surgery and he ne I partner, Dr. Santiago, discussed MRI results on 1/12 with patient and patient was not interested in any surgical intervention.  -Weakness is likely multifactorial in the setting of chronic diabetic neuropathy, longstanding lumbar disks, spinal disease and COVID infection.    Encephalopathy, likely due to covid 19 infection; resolved  -Improved     Mild rhabdomyolysis, improved  -Did have a mildly elevated CPK at 1000, repeat with improvement after gentle hydration.  -holding statin for now, restart pravachol  in couple days     Cad  Afib  Htn  Hl  -Continue home flecainide  -continue eliquis     Dm2, A1c 6.6  -ssi     Sacral decub, poa  -wound care          Expected Discharge Location and Transportation: [place], awaiting insurance  Expected Discharge   Expected Discharge Date: 1/19/2024; Expected Discharge Time:      DVT prophylaxis:  Medical DVT prophylaxis orders are present.     AM-PAC 6 Clicks Score (PT): 10 (01/12/24 2010)    CODE STATUS:   Code Status and Medical Interventions:   Ordered at: 01/08/24 2053     Code Status (Patient has no pulse and is not breathing):    CPR (Attempt to Resuscitate)     Medical Interventions (Patient has pulse or is breathing):    Full Support     This patient's problems and plans were partially entered by my partner and updated as appropriate by me 01/13/24. Today is my first day evaluating this patient's active medical problems. I Personally reviewed chart and adjusted note to reflect daily changes in management/clinical condition. Copied text in this note has been reviewed and is accurate as of  01/13/24      Jodie Lucero MD  01/13/24

## 2024-01-14 LAB
GLUCOSE BLDC GLUCOMTR-MCNC: 161 MG/DL (ref 70–130)
GLUCOSE BLDC GLUCOMTR-MCNC: 168 MG/DL (ref 70–130)
GLUCOSE BLDC GLUCOMTR-MCNC: 173 MG/DL (ref 70–130)
GLUCOSE BLDC GLUCOMTR-MCNC: 212 MG/DL (ref 70–130)
QT INTERVAL: 370 MS
QTC INTERVAL: 477 MS

## 2024-01-14 PROCEDURE — 82948 REAGENT STRIP/BLOOD GLUCOSE: CPT

## 2024-01-14 PROCEDURE — 63710000001 INSULIN LISPRO (HUMAN) PER 5 UNITS: Performed by: INTERNAL MEDICINE

## 2024-01-14 PROCEDURE — 99231 SBSQ HOSP IP/OBS SF/LOW 25: CPT | Performed by: INTERNAL MEDICINE

## 2024-01-14 RX ORDER — INSULIN LISPRO 100 [IU]/ML
3 INJECTION, SOLUTION INTRAVENOUS; SUBCUTANEOUS
Status: DISCONTINUED | OUTPATIENT
Start: 2024-01-14 | End: 2024-01-18 | Stop reason: HOSPADM

## 2024-01-14 RX ADMIN — TAMSULOSIN HYDROCHLORIDE 0.4 MG: 0.4 CAPSULE ORAL at 09:30

## 2024-01-14 RX ADMIN — INSULIN LISPRO 2 UNITS: 100 INJECTION, SOLUTION INTRAVENOUS; SUBCUTANEOUS at 09:30

## 2024-01-14 RX ADMIN — MICONAZOLE NITRATE 1 APPLICATION: 20 POWDER TOPICAL at 09:31

## 2024-01-14 RX ADMIN — MICONAZOLE NITRATE 1 APPLICATION: 20 POWDER TOPICAL at 22:16

## 2024-01-14 RX ADMIN — FLECAINIDE ACETATE 50 MG: 50 TABLET ORAL at 12:54

## 2024-01-14 RX ADMIN — INSULIN LISPRO 3 UNITS: 100 INJECTION, SOLUTION INTRAVENOUS; SUBCUTANEOUS at 12:53

## 2024-01-14 RX ADMIN — INSULIN LISPRO 2 UNITS: 100 INJECTION, SOLUTION INTRAVENOUS; SUBCUTANEOUS at 17:52

## 2024-01-14 RX ADMIN — APIXABAN 5 MG: 5 TABLET, FILM COATED ORAL at 09:30

## 2024-01-14 RX ADMIN — INSULIN LISPRO 2 UNITS: 100 INJECTION, SOLUTION INTRAVENOUS; SUBCUTANEOUS at 12:53

## 2024-01-14 RX ADMIN — INSULIN LISPRO 3 UNITS: 100 INJECTION, SOLUTION INTRAVENOUS; SUBCUTANEOUS at 17:52

## 2024-01-14 RX ADMIN — PANTOPRAZOLE SODIUM 40 MG: 40 TABLET, DELAYED RELEASE ORAL at 05:45

## 2024-01-14 RX ADMIN — CASTOR OIL AND BALSAM, PERU 1 APPLICATION: 788; 87 OINTMENT TOPICAL at 22:16

## 2024-01-14 RX ADMIN — FLECAINIDE ACETATE 50 MG: 50 TABLET ORAL at 23:24

## 2024-01-14 RX ADMIN — INSULIN LISPRO 3 UNITS: 100 INJECTION, SOLUTION INTRAVENOUS; SUBCUTANEOUS at 09:29

## 2024-01-14 RX ADMIN — APIXABAN 5 MG: 5 TABLET, FILM COATED ORAL at 22:15

## 2024-01-14 RX ADMIN — Medication 10 ML: at 22:15

## 2024-01-14 RX ADMIN — Medication 10 ML: at 09:31

## 2024-01-14 RX ADMIN — CASTOR OIL AND BALSAM, PERU 1 APPLICATION: 788; 87 OINTMENT TOPICAL at 09:31

## 2024-01-14 RX ADMIN — ACETAMINOPHEN 650 MG: 325 TABLET ORAL at 22:15

## 2024-01-14 RX ADMIN — ASPIRIN 81 MG: 81 TABLET, CHEWABLE ORAL at 09:30

## 2024-01-14 RX ADMIN — INSULIN LISPRO 3 UNITS: 100 INJECTION, SOLUTION INTRAVENOUS; SUBCUTANEOUS at 22:15

## 2024-01-15 LAB
GLUCOSE BLDC GLUCOMTR-MCNC: 141 MG/DL (ref 70–130)
GLUCOSE BLDC GLUCOMTR-MCNC: 159 MG/DL (ref 70–130)
GLUCOSE BLDC GLUCOMTR-MCNC: 192 MG/DL (ref 70–130)
GLUCOSE BLDC GLUCOMTR-MCNC: 204 MG/DL (ref 70–130)

## 2024-01-15 PROCEDURE — 97110 THERAPEUTIC EXERCISES: CPT

## 2024-01-15 PROCEDURE — 63710000001 INSULIN DETEMIR PER 5 UNITS: Performed by: NURSE PRACTITIONER

## 2024-01-15 PROCEDURE — 63710000001 INSULIN LISPRO (HUMAN) PER 5 UNITS: Performed by: INTERNAL MEDICINE

## 2024-01-15 PROCEDURE — 99232 SBSQ HOSP IP/OBS MODERATE 35: CPT | Performed by: NURSE PRACTITIONER

## 2024-01-15 PROCEDURE — 82948 REAGENT STRIP/BLOOD GLUCOSE: CPT

## 2024-01-15 RX ADMIN — INSULIN LISPRO 2 UNITS: 100 INJECTION, SOLUTION INTRAVENOUS; SUBCUTANEOUS at 21:33

## 2024-01-15 RX ADMIN — CASTOR OIL AND BALSAM, PERU 1 APPLICATION: 788; 87 OINTMENT TOPICAL at 21:33

## 2024-01-15 RX ADMIN — INSULIN LISPRO 2 UNITS: 100 INJECTION, SOLUTION INTRAVENOUS; SUBCUTANEOUS at 18:36

## 2024-01-15 RX ADMIN — FLECAINIDE ACETATE 50 MG: 50 TABLET ORAL at 12:51

## 2024-01-15 RX ADMIN — ASPIRIN 81 MG: 81 TABLET, CHEWABLE ORAL at 10:50

## 2024-01-15 RX ADMIN — APIXABAN 5 MG: 5 TABLET, FILM COATED ORAL at 21:33

## 2024-01-15 RX ADMIN — ACETAMINOPHEN 650 MG: 325 TABLET ORAL at 05:53

## 2024-01-15 RX ADMIN — MICONAZOLE NITRATE 1 APPLICATION: 20 POWDER TOPICAL at 21:33

## 2024-01-15 RX ADMIN — INSULIN LISPRO 3 UNITS: 100 INJECTION, SOLUTION INTRAVENOUS; SUBCUTANEOUS at 12:51

## 2024-01-15 RX ADMIN — PANTOPRAZOLE SODIUM 40 MG: 40 TABLET, DELAYED RELEASE ORAL at 05:52

## 2024-01-15 RX ADMIN — CASTOR OIL AND BALSAM, PERU 1 APPLICATION: 788; 87 OINTMENT TOPICAL at 10:50

## 2024-01-15 RX ADMIN — INSULIN LISPRO 3 UNITS: 100 INJECTION, SOLUTION INTRAVENOUS; SUBCUTANEOUS at 18:37

## 2024-01-15 RX ADMIN — FLECAINIDE ACETATE 50 MG: 50 TABLET ORAL at 23:29

## 2024-01-15 RX ADMIN — TAMSULOSIN HYDROCHLORIDE 0.4 MG: 0.4 CAPSULE ORAL at 10:50

## 2024-01-15 RX ADMIN — APIXABAN 5 MG: 5 TABLET, FILM COATED ORAL at 10:50

## 2024-01-15 RX ADMIN — Medication 10 ML: at 10:50

## 2024-01-15 RX ADMIN — MICONAZOLE NITRATE 1 APPLICATION: 20 POWDER TOPICAL at 10:50

## 2024-01-15 RX ADMIN — Medication 10 ML: at 21:34

## 2024-01-15 RX ADMIN — INSULIN DETEMIR 5 UNITS: 100 INJECTION, SOLUTION SUBCUTANEOUS at 21:32

## 2024-01-15 RX ADMIN — SENNOSIDES AND DOCUSATE SODIUM 2 TABLET: 8.6; 5 TABLET ORAL at 10:50

## 2024-01-15 NOTE — PLAN OF CARE
Goal Outcome Evaluation:  Plan of Care Reviewed With: patient        Progress: improving  Outcome Evaluation: Physical therapy treatment complete.Patient with increased fatigue and declined to complete OOB activity, however agreeable to complete supine exercises. Patient completed UE/LE exercises. The patient continues to present below baseline for functional mobility. The patient would continue to benefit from skilled PT to address strength, balance and activity tolerance deficits. Continue to current PT POC      Anticipated Discharge Disposition (PT): skilled nursing facility

## 2024-01-15 NOTE — PROGRESS NOTES
"                  Clinical Nutrition   Nutrition Support Assessment  Reason for Visit: Follow-up protocol      Patient Name: Apollo Haro  YOB: 1940  MRN: [id number]  Date of Encounter: 01/15/24 07:19 EST  Admission date: 1/8/2024    Comments:    Pt noted to have adequate PO intake. RD will sign off at this time. If note that PO intake decrease to avg of <50%, please consult RD to re-evaluate interventions in place.     Nutrition Assessment   Admission Diagnosis:  COVID-19 virus infection [U07.1]  Weakness [R53.1]    Problem List:    Weakness    Type 2 diabetes mellitus, without long-term current use of insulin      PMH:   He  has a past medical history of A-fib, CAD (coronary artery disease), and DM (diabetes mellitus).    PSH:  He  has no past surgical history on file.    Applicable Nutrition Concerns:   Skin: deep tissue injury on coccyx   Oral:  GI:    Applicable Interval History:       Reported/Observed/Food/Nutrition Related History:   1/7  Patient in COVID isolation, attempt to call patient room without success.  Attempt to call family with no answer.     Anthropometrics     Flowsheet Rows      Flowsheet Row First Filed Value   Admission Height 175.3 cm (69\") Documented at 01/08/2024 1617   Admission Weight 79.4 kg (175 lb) Documented at 01/08/2024 1617     Height: Height: 175.3 cm (69\")  Last Filed Weight: Weight: 79.4 kg (175 lb) (01/08/24 1617)  Method:  none  BMI: BMI (Calculated): 25.8  BMI classification: Overweight: 25.0-29.9kg/m2   IBW:  160lb    UBW: unable to determine at this time, no weight history to review    Weight change:   unable to determine at this time     Nutrition Focused Physical Exam     Date:     1/9    Unable to perform exam due to: COVID Isolation     Current Nutrition Prescription   PO: Diet: Regular/House Diet; Texture: Regular Texture (IDDSI 7); Fluid Consistency: Thin (IDDSI 0)  Oral Nutrition Supplement:   Intake: 68% x 11 meals      Nutrition Diagnosis   Date: "  1/9            Updated:    Problem Predicted suboptimal energy intake   Etiology COVID virus    Signs/Symptoms Issuf PO intake; unable to interview patient regarding intake history.    Status: resolved    Goal:   General: Maintain nutrition  PO: Maintain intake  EN/PN: N/A    Nutrition Intervention      Follow treatment progress, Care plan reviewed    Monitoring/Evaluation:   Per protocol      Sue Keita MS,RD,LD  Time Spent: 10

## 2024-01-15 NOTE — CASE MANAGEMENT/SOCIAL WORK
Continued Stay Note  [facility]     Patient Name: Apollo Haro  MRN: [id number]  Today's Date: 1/15/2024    Admit Date: 1/8/2024    Plan: [place]   Discharge Plan       Row Name 01/15/24 0902       Plan    Plan [place]    Patient/Family in Agreement with Plan yes    Plan Comments Spoke with spouse, Linda by phone. Plan is [place] pending insurance approval and patient being out of isolation. CM will arrange transport when ready. CM will continue to follow.    Final Discharge Disposition Code 03 - skilled nursing facility (SNF)                   Discharge Codes    No documentation.                 Expected Discharge Date and Time       Expected Discharge Date Expected Discharge Time    Jan 19, 2024               Artur Joseph RN

## 2024-01-15 NOTE — CASE MANAGEMENT/SOCIAL WORK
Continued Stay Note  [facility]     Patient Name: Apollo Haro  MRN: [id number]  Today's Date: 1/15/2024    Admit Date: 1/8/2024    Plan: [place]   Discharge Plan       Row Name 01/15/24 1125       Plan    Plan [place]    Patient/Family in Agreement with Plan yes    Plan Comments Plan is [place]. [facility] is scheduled for Tue. 1/16 at 12:15. PCS is in [employer]. CM will continue to follow.    Final Discharge Disposition Code 03 - skilled nursing facility (SNF)      Row Name 01/15/24 0902       Plan    Plan [place]    Patient/Family in Agreement with Plan yes    Plan Comments Spoke with spouse, Linda by phone. Plan is [place] pending insurance approval and patient being out of isolation. CM will arrange transport when ready. CM will continue to follow.    Final Discharge Disposition Code 03 - skilled nursing facility (SNF)                   Discharge Codes    No documentation.                 Expected Discharge Date and Time       Expected Discharge Date Expected Discharge Time    Jan 19, 2024               Artur Joseph RN

## 2024-01-15 NOTE — PROGRESS NOTES
[facility] Medicine Services  PROGRESS NOTE    Patient Name: Apollo Haro  : 1940  MRN: [id number]    Date of Admission: 2024  Primary Care Physician: Provider, No Known    Subjective   Subjective     CC:  Weakness    HPI:  Patient was seen acute distress.  Awake and alert.  Remains in COVID isolation.  Reports no shortness of breath.  Still weak and having occasional cough.  Working with therapy.  No new issues.  Awaiting rehab.      Objective   Objective     Vital Signs:   Temp:  [95.9 °F (35.5 °C)-98 °F (36.7 °C)] 95.9 °F (35.5 °C)  Heart Rate:  [] 85  Resp:  [18-20] 18  BP: (103-118)/(62-73) 118/65  Flow (L/min):  [2-3] 2     Physical Exam:  Constitutional: Awake and alert.  Resting up in bed.  No acute distress.  Chronically ill-appearing.  Respiratory: Clear to auscultation bilaterally but decreased at bases, respiratory effort normal on 2 LNC with sats 96%.  Cardiovascular: RRR  Gastrointestinal: Positive bowel sounds, soft, nontender, nondistended  Musculoskeletal: No bilateral ankle edema.  Leon spontaneously.  Psychiatric: Appropriate affect, cooperative  Neurologic: Oriented x 3, no gross focal neurological deficits.  Speech clear and appropriate.  Follows commands.  Skin: No rashes.  Sacral decub dressing.  Did not remove.      Results Reviewed:  LAB RESULTS:      Lab 24  0506 24  0742 01/10/24  0609 24  0614 24  1739 24  1646   WBC 5.43 4.18 4.14  --   --  5.69   HEMOGLOBIN 12.0* 12.2* 12.2*  --   --  13.9   HEMATOCRIT 36.0* 36.8* 36.3*  --   --  41.3   PLATELETS 159 151 144  --   --  151   NEUTROS ABS  --   --   --   --   --  3.43   IMMATURE GRANS (ABS)  --   --   --   --   --  0.02   LYMPHS ABS  --   --   --   --   --  1.28   MONOS ABS  --   --   --   --   --  0.93*   EOS ABS  --   --   --   --   --  0.01   MCV 89.8 91.5 91.0  --   --  91.6   CRP  --  3.46*  --  7.46* 9.50*  --    PROCALCITONIN  --   --   --   --  0.09  --    LDH   --   --   --   --  246*  --    PROTIME  --   --   --   --   --  16.8*   D DIMER QUANT  --   --   --   --   --  0.65         Lab 01/13/24  0657 01/12/24  0505 01/11/24  0742 01/10/24  0609 01/09/24  0614 01/08/24  1739 01/08/24  1739 01/08/24  1646   SODIUM 140 143 143 141 138   < > 139  --    POTASSIUM 4.2 4.3 4.0 4.3 3.8   < > 4.6  --    CHLORIDE 104 105 106 104 101   < > 102  --    CO2 29.0 28.0 28.0 30.0* 29.0   < > 28.0  --    ANION GAP 7.0 10.0 9.0 7.0 8.0   < > 9.0  --    BUN 17 17 17 20 28*   < > 36*  --    CREATININE 0.88 0.93 0.86 0.91 0.88   < > 1.13  --    EGFR 84.8 81.0 85.4 83.1 84.8   < > 64.1  --    GLUCOSE 141* 142* 125* 116* 139*   < > 200*  --    CALCIUM 8.4* 8.1* 8.4* 8.6 8.1*   < > 8.8  --    MAGNESIUM 1.7 1.5* 1.6  --  1.7  --  1.9  --    HEMOGLOBIN A1C  --   --   --   --   --   --   --  6.60*   TSH  --   --   --   --   --   --  0.438  --     < > = values in this interval not displayed.         Lab 01/10/24  0609 01/08/24  1739   TOTAL PROTEIN 5.1* 6.4   ALBUMIN 2.8* 3.1*   GLOBULIN 2.3 3.3   ALT (SGPT) 30 30   AST (SGOT) 61* 56*   BILIRUBIN 0.7 0.8   ALK PHOS 66 71         Lab 01/08/24 1739 01/08/24  1646   PROBNP 518.2  --    HSTROP T 29*  --    PROTIME  --  16.8*   INR  --  1.35*             Lab 01/10/24  0609 01/08/24  1739   FERRITIN  --  240.10   FOLATE >20.00  --    VITAMIN B 12 1,269*  --          Brief Urine Lab Results  (Last result in the past 365 days)        Color   Clarity   Blood   Leuk Est   Nitrite   Protein   CREAT   Urine HCG        01/10/24 1150 Yellow   Clear   Moderate (2+)   Negative   Negative   Negative                   Microbiology Results Abnormal       None            No radiology results from the last 24 hrs        Current medications:  Scheduled Meds:apixaban, 5 mg, Oral, BID  aspirin, 81 mg, Oral, Daily  castor oil-balsam peru, 1 application , Topical, Q12H  flecainide, 50 mg, Oral, Q12H  insulin lispro, 2-7 Units, Subcutaneous, 4x Daily AC & at Bedtime  Insulin  Lispro, 3 Units, Subcutaneous, TID With Meals  miconazole, 1 application , Topical, Q12H  pantoprazole, 40 mg, Oral, Q AM  senna-docusate sodium, 2 tablet, Oral, BID  sodium chloride, 10 mL, Intravenous, Q12H  tamsulosin, 0.4 mg, Oral, Daily      Continuous Infusions:   PRN Meds:.  acetaminophen **OR** acetaminophen **OR** acetaminophen    benzonatate    senna-docusate sodium **AND** polyethylene glycol **AND** bisacodyl **AND** bisacodyl    Calcium Replacement - Follow Nurse / BPA Driven Protocol    dextrose    dextrose    glucagon (human recombinant)    Magnesium Low Dose Replacement - Follow Nurse / BPA Driven Protocol    Phosphorus Replacement - Follow Nurse / BPA Driven Protocol    Potassium Replacement - Follow Nurse / BPA Driven Protocol    [COMPLETED] Insert Peripheral IV **AND** sodium chloride    sodium chloride    sodium chloride    Assessment & Plan   Assessment & Plan     Active Hospital Problems    Diagnosis  POA    **Weakness [R53.1]  Yes    Type 2 diabetes mellitus, without long-term current use of insulin [E11.9]  Yes      Resolved Hospital Problems   No resolved problems to display.        Brief Hospital Course to date:  Apollo Haro is a 83 y.o. male w/ hx parox afib, htn, hl, dm2, chronic back pain who was brought to [facility] ED due to generalized weakness. History if limited as no family bedside and patient poor historian and no answer when phone family. Patient states was diagnosed w/ covid 19 within past couple of weeks and treated as outpatient, but due to worsening generalized weakness in leg and walking was brought to [facility] for evaluation. Cxr negative. Ct head no acute intracranial process, left enthoid and frontal sinus musosal dz noted.     This patient's problems and plans were partially entered by my partner and updated as appropriate by me 01/15/24.    Assessment/Plan:  Pt is new to me today      Covid 19 infection  -no hypoxia, mild illness.  Occasional cough.  No shortness of  breath.  -Tested positive on 1/8/2024, out of isolation on 1/18/2024     Generalized weakness w/ decreased mobility  Multilevel degenerative lumbar spine changes w/ bilateral (right severe, left moderate-severe) left neural foraminal stenosis w/ bilateral L3 nerve root compromise (on MRI)  Chronic T10 & T12 compression fractures (without canal compromise)  Recent falls  -recent [facility] procedure note 7/28/23 w/ diagnosis code of normal pressure hydrocephalus (patient poor historian regarding this)  -CT head without acute findings, B12 and folate within normal limits.  TSH within normal limits.    -MRI T&L spine: revealed multilevel deg lumbar spine changes w/ bilateral neural foraminal stenosis w/ bilateral L3 nerve root sompromise. patient has previously seen a spinal surgeon at [facility] (over a year ago per wife report) who was considering possible surgical intervention on his spine; patient did not wish for surgery and he ne I partner, Dr. Santiago, discussed MRI results on 1/12 with patient and patient was not interested in any surgical intervention.  -Weakness is likely multifactorial in the setting of chronic diabetic neuropathy, longstanding lumbar disks, spinal disease and COVID infection.  --Awaiting rehab at [place].  Pending insurance pre-CERT.     Encephalopathy, likely due to covid 19 infection; resolved  -Improved     Mild rhabdomyolysis, improved  -Did have a mildly elevated CPK at 1000, repeat with improvement after gentle hydration.  -holding statin for now, restart pravachol  in couple days     Cad  Afib  Htn  Hl  -Continue home flecainide  -continue eliquis     Dm2, A1c 6.6  -ssi  -Partner prior added lispro 3u TID.  Glucose 141-212 last 24 hours.  Add very low-dose Levemir 5 units nightly tonight.     Sacral decub, poa  -wound care following.        Expected Discharge Location and Transportation: [place], awaiting insurance  Expected Discharge   Expected Discharge Date: 1/19/2024; Expected Discharge  Time:      DVT prophylaxis:  Medical DVT prophylaxis orders are present.     AM-PAC 6 Clicks Score (PT): 11 (01/14/24 2010)    CODE STATUS:   Code Status and Medical Interventions:   Ordered at: 01/08/24 2053     Code Status (Patient has no pulse and is not breathing):    CPR (Attempt to Resuscitate)     Medical Interventions (Patient has pulse or is breathing):    Full Support         Eun Myers, APRN  01/15/24

## 2024-01-15 NOTE — THERAPY TREATMENT NOTE
Patient Name: Apollo Haro  : 1940    MRN: [id number]                              Today's Date: 1/15/2024       Admit Date: 2024    Visit Dx:     ICD-10-CM ICD-9-CM   1. COVID-19  U07.1 079.89   2. Hyperglycemia  R73.9 790.29   3. Generalized weakness  R53.1 780.79     Patient Active Problem List   Diagnosis    Weakness    Type 2 diabetes mellitus, without long-term current use of insulin     Past Medical History:   Diagnosis Date    A-fib     CAD (coronary artery disease)     DM (diabetes mellitus)      History reviewed. No pertinent surgical history.   General Information       Row Name 01/15/24 1134          Physical Therapy Time and Intention    Document Type therapy note (daily note)  -KW     Mode of Treatment physical therapy  -       Row Name 01/15/24 1134          General Information    Patient Profile Reviewed yes  -KW     Existing Precautions/Restrictions fall;other (see comments)  -               User Key  (r) = Recorded By, (t) = Taken By, (c) = Cosigned By      Initials Name Provider Type    KW Silvina Hawkins, PT Physical Therapist                   Mobility    No documentation.                  Obj/Interventions    No documentation.                  Goals/Plan    No documentation.                  Clinical Impression       Row Name 01/15/24 1134          Pain    Pretreatment Pain Rating 0/10 - no pain  -KW       Row Name 01/15/24 1134          Plan of Care Review    Plan of Care Reviewed With patient  -KW     Progress improving  -     Outcome Evaluation Physical therapy treatment complete.Patient with increased fatigue and declined to complete OOB activity, however agreeable to complete supine exercises. Patient completed UE/LE exercises. The patient continues to present below baseline for functional mobility. The patient would continue to benefit from skilled PT to address strength, balance and activity tolerance deficits. Continue to current PT POC  -KW       Row Name 01/15/24  1134          Positioning and Restraints    Pre-Treatment Position in bed  -KW     Post Treatment Position bed  -KW     In Bed supine;call light within reach;encouraged to call for assist;exit alarm on  -KW               User Key  (r) = Recorded By, (t) = Taken By, (c) = Cosigned By      Initials Name Provider Type    Silvina Tariq, PT Physical Therapist                   Outcome Measures    No documentation.                                Physical Therapy Education       Title: PT OT SLP Therapies (In Progress)       Topic: Physical Therapy (In Progress)       Point: Mobility training (In Progress)       Learning Progress Summary             Patient Acceptance, E, NR by AS at 1/11/2024 1051    Acceptance, E,D, VU,NR by MB at 1/9/2024 1557   Family Acceptance, E,D, VU,NR by MB at 1/9/2024 1557                         Point: Home exercise program (In Progress)       Learning Progress Summary             Patient Acceptance, E, NR by AS at 1/11/2024 1051    Acceptance, E,D, VU,NR by MB at 1/9/2024 1557   Family Acceptance, E,D, VU,NR by MB at 1/9/2024 1557                         Point: Body mechanics (In Progress)       Learning Progress Summary             Patient Acceptance, E, NR by AS at 1/11/2024 1051    Acceptance, E,D, VU,NR by MB at 1/9/2024 1557   Family Acceptance, E,D, VU,NR by MB at 1/9/2024 1557                         Point: Precautions (In Progress)       Learning Progress Summary             Patient Acceptance, E, NR by AS at 1/11/2024 1051    Acceptance, E,D, VU,NR by MB at 1/9/2024 1557   Family Acceptance, E,D, VU,NR by MB at 1/9/2024 1557                                         User Key       Initials Effective Dates Name Provider Type Discipline    AS 04/28/23 -  Danay Dolan, PTA Physical Therapist Assistant PT    MB 06/16/21 -  Luli Stephens PT Physical Therapist PT                  PT Recommendation and Plan     Plan of Care Reviewed With: patient  Progress:  improving  Outcome Evaluation: Physical therapy treatment complete.Patient with increased fatigue and declined to complete OOB activity, however agreeable to complete supine exercises. Patient completed UE/LE exercises. The patient continues to present below baseline for functional mobility. The patient would continue to benefit from skilled PT to address strength, balance and activity tolerance deficits. Continue to current PT POC     Time Calculation:         PT Charges       Row Name 01/15/24 1134             Time Calculation    Start Time 1134  -KW      PT Received On 01/15/24  -KW         Timed Charges    39989 - PT Therapeutic Exercise Minutes 26  -KW         Total Minutes    Timed Charges Total Minutes 26  -KW       Total Minutes 26  -KW                User Key  (r) = Recorded By, (t) = Taken By, (c) = Cosigned By      Initials Name Provider Type    Silvina Tariq PT Physical Therapist                  Therapy Charges for Today       Code Description Service Date Service Provider Modifiers Qty    [id number] HC PT THER PROC EA 15 MIN 1/15/2024 Silvina Hawkins PT GP 2            PT G-Codes  Outcome Measure Options: AM-PAC 6 Clicks Basic Mobility (PT)  AM-PAC 6 Clicks Score (PT): 11  AM-PAC 6 Clicks Score (OT): 12  PT Discharge Summary  Anticipated Discharge Disposition (PT): skilled nursing facility    Silvina Hawkins PT  1/15/2024

## 2024-01-16 LAB
GLUCOSE BLDC GLUCOMTR-MCNC: 131 MG/DL (ref 70–130)
GLUCOSE BLDC GLUCOMTR-MCNC: 156 MG/DL (ref 70–130)
GLUCOSE BLDC GLUCOMTR-MCNC: 157 MG/DL (ref 70–130)
GLUCOSE BLDC GLUCOMTR-MCNC: 208 MG/DL (ref 70–130)

## 2024-01-16 PROCEDURE — 82948 REAGENT STRIP/BLOOD GLUCOSE: CPT

## 2024-01-16 PROCEDURE — 97110 THERAPEUTIC EXERCISES: CPT

## 2024-01-16 PROCEDURE — 63710000001 INSULIN LISPRO (HUMAN) PER 5 UNITS: Performed by: INTERNAL MEDICINE

## 2024-01-16 PROCEDURE — 97535 SELF CARE MNGMENT TRAINING: CPT

## 2024-01-16 PROCEDURE — 99232 SBSQ HOSP IP/OBS MODERATE 35: CPT | Performed by: NURSE PRACTITIONER

## 2024-01-16 PROCEDURE — 97530 THERAPEUTIC ACTIVITIES: CPT

## 2024-01-16 RX ADMIN — CASTOR OIL AND BALSAM, PERU 1 APPLICATION: 788; 87 OINTMENT TOPICAL at 09:29

## 2024-01-16 RX ADMIN — MICONAZOLE NITRATE 1 APPLICATION: 20 POWDER TOPICAL at 21:18

## 2024-01-16 RX ADMIN — INSULIN LISPRO 3 UNITS: 100 INJECTION, SOLUTION INTRAVENOUS; SUBCUTANEOUS at 18:02

## 2024-01-16 RX ADMIN — APIXABAN 5 MG: 5 TABLET, FILM COATED ORAL at 09:25

## 2024-01-16 RX ADMIN — INSULIN LISPRO 3 UNITS: 100 INJECTION, SOLUTION INTRAVENOUS; SUBCUTANEOUS at 09:25

## 2024-01-16 RX ADMIN — ASPIRIN 81 MG: 81 TABLET, CHEWABLE ORAL at 09:25

## 2024-01-16 RX ADMIN — FLECAINIDE ACETATE 50 MG: 50 TABLET ORAL at 13:04

## 2024-01-16 RX ADMIN — FLECAINIDE ACETATE 50 MG: 50 TABLET ORAL at 23:33

## 2024-01-16 RX ADMIN — CASTOR OIL AND BALSAM, PERU 1 APPLICATION: 788; 87 OINTMENT TOPICAL at 21:18

## 2024-01-16 RX ADMIN — Medication 10 ML: at 09:29

## 2024-01-16 RX ADMIN — INSULIN LISPRO 3 UNITS: 100 INJECTION, SOLUTION INTRAVENOUS; SUBCUTANEOUS at 12:08

## 2024-01-16 RX ADMIN — INSULIN LISPRO 2 UNITS: 100 INJECTION, SOLUTION INTRAVENOUS; SUBCUTANEOUS at 12:06

## 2024-01-16 RX ADMIN — SENNOSIDES AND DOCUSATE SODIUM 2 TABLET: 8.6; 5 TABLET ORAL at 09:25

## 2024-01-16 RX ADMIN — TAMSULOSIN HYDROCHLORIDE 0.4 MG: 0.4 CAPSULE ORAL at 09:25

## 2024-01-16 RX ADMIN — MICONAZOLE NITRATE 1 APPLICATION: 20 POWDER TOPICAL at 09:29

## 2024-01-16 RX ADMIN — APIXABAN 5 MG: 5 TABLET, FILM COATED ORAL at 21:18

## 2024-01-16 RX ADMIN — PANTOPRAZOLE SODIUM 40 MG: 40 TABLET, DELAYED RELEASE ORAL at 05:46

## 2024-01-16 NOTE — CASE MANAGEMENT/SOCIAL WORK
Continued Stay Note  [facility]     Patient Name: Apollo Haro  MRN: [id number]  Today's Date: 1/16/2024    Admit Date: 1/8/2024    Plan: [place]   Discharge Plan       Row Name 01/16/24 0850       Plan    Plan [place]    Patient/Family in Agreement with Plan yes    Plan Comments Spoke to Leilani at [facility] and we are still waiting on insurance precert from [facility] Medicare. CM will cancell ambulance closer to 12:15 when [facility] is scheduled. CM will continue to follow.    Final Discharge Disposition Code 03 - skilled nursing facility (SNF)                   Discharge Codes    No documentation.                 Expected Discharge Date and Time       Expected Discharge Date Expected Discharge Time    Jan 19, 2024               Artur Joseph RN

## 2024-01-16 NOTE — THERAPY TREATMENT NOTE
Patient Name: Apollo Haro  : 1940    MRN: [id number]                              Today's Date: 2024       Admit Date: 2024    Visit Dx:     ICD-10-CM ICD-9-CM   1. COVID-19  U07.1 079.89   2. Hyperglycemia  R73.9 790.29   3. Generalized weakness  R53.1 780.79     Patient Active Problem List   Diagnosis    Weakness    Type 2 diabetes mellitus, without long-term current use of insulin     Past Medical History:   Diagnosis Date    A-fib     CAD (coronary artery disease)     DM (diabetes mellitus)      History reviewed. No pertinent surgical history.   General Information       Row Name 24 1455          OT Time and Intention    Document Type evaluation  -     Mode of Treatment occupational therapy  -       Row Name 24 1455          General Information    Patient Profile Reviewed yes  -     Existing Precautions/Restrictions fall;oxygen therapy device and L/min;other (see comments)  Contact and airborne  -[facility] Name 24 1455          Cognition    Orientation Status (Cognition) oriented to;person;place  -[facility] Name 24 1455          Safety Issues, Functional Mobility    Impairments Affecting Function (Mobility) balance;cognition;coordination;endurance/activity tolerance;sensation/sensory awareness;strength;pain;range of motion (ROM)  -               User Key  (r) = Recorded By, (t) = Taken By, (c) = Cosigned By      Initials Name Provider Type    Alissa Call OT Occupational Therapist                     Mobility/ADL's       Row Name 24 1455          Bed Mobility    Bed Mobility supine-sit;scooting/bridging  -     Scooting/Bridging Anderson (Bed Mobility) moderate assist (50% patient effort);verbal cues  -     Supine-Sit Anderson (Bed Mobility) verbal cues;minimum assist (75% patient effort)  -     Assistive Device (Bed Mobility) bed rails;head of bed elevated  -       Row Name 24 1455          Transfers    Transfers bed-chair  transfer;sit-stand transfer  -       Row Name 01/16/24 1455          Bed-Chair Transfer    Bed-Chair Martin (Transfers) minimum assist (75% patient effort);verbal cues  -     Assistive Device (Bed-Chair Transfers) walker, front-wheeled  -SW       Row Name 01/16/24 1455          Sit-Stand Transfer    Sit-Stand Martin (Transfers) minimum assist (75% patient effort);verbal cues  -SW     Assistive Device (Sit-Stand Transfers) walker, front-wheeled  -SW     Comment, (Sit-Stand Transfer) Worked on sts from bed pushing up from bed rails multiple times. Pt had fear of falling, but required cues and min assist for sts.  -       Row Name 01/16/24 1455          Activities of Daily Living    BADL Assessment/Intervention grooming;toileting;lower body dressing  -[facility] Name 01/16/24 1455          Lower Body Dressing Assessment/Training    Martin Level (Lower Body Dressing) lower body dressing skills;dependent (less than 25% patient effort)  -SW     Position (Lower Body Dressing) edge of bed sitting  -       Row Name 01/16/24 1455          Grooming Assessment/Training    Martin Level (Grooming) grooming skills;wash face, hands;set up  -     Position (Grooming) sitting up in bed  -       Row Name 01/16/24 1455          Toileting Assessment/Training    Martin Level (Toileting) toileting skills;dependent (less than 25% patient effort)  -SW     Position (Toileting) unsupported sitting  -               User Key  (r) = Recorded By, (t) = Taken By, (c) = Cosigned By      Initials Name Provider Type    Alissa Call OT Occupational Therapist                   Obj/Interventions       Row Name 01/16/24 1455          Shoulder (Therapeutic Exercise)    Shoulder (Therapeutic Exercise) AROM (active range of motion)  -     Shoulder AROM (Therapeutic Exercise) bilateral;flexion;extension;10 repetitions;2 sets  -[facility] Name 01/16/24 1455          Elbow/Forearm (Therapeutic Exercise)     Elbow/Forearm (Therapeutic Exercise) AROM (active range of motion)  -     Elbow/Forearm AROM (Therapeutic Exercise) bilateral;flexion;extension;10 repetitions;sitting;2 sets  -       Row Name 01/16/24 1455          Motor Skills    Therapeutic Exercise shoulder;elbow/forearm  -[facility] Name 01/16/24 1455          Balance    Balance Assessment sitting static balance;sitting dynamic balance;sit to stand dynamic balance;standing static balance;standing dynamic balance  -     Static Sitting Balance supervision  -     Dynamic Sitting Balance contact guard  -     Position, Sitting Balance unsupported  -     Sit to Stand Dynamic Balance minimal assist;verbal cues  -     Static Standing Balance minimal assist;verbal cues  -     Dynamic Standing Balance minimal assist;verbal cues  -     Position/Device Used, Standing Balance supported  -     Balance Interventions sitting;standing;sit to stand;supported;dynamic;static;minimal challenge;occupation based/functional task  -               User Key  (r) = Recorded By, (t) = Taken By, (c) = Cosigned By      Initials Name Provider Type     Alissa Arreola OT Occupational Therapist                   Goals/Plan    No documentation.                  Clinical Impression       [facility] Name 01/16/24 1455          Pain Assessment    Pretreatment Pain Rating 0/10 - no pain  -     Posttreatment Pain Rating 0/10 - no pain  -[facility] Name 01/16/24 1455          Plan of Care Review    Plan of Care Reviewed With patient  -     Progress improving  -     Outcome Evaluation OT promoted oob activity with pt demo improved fx.  Supine to sit with min assist and mod assist to scoot to eob. He completed multiple sts from bed pushing and supporting self from bed rails. He utilized rw to stand and take steps to chair with min assist and cues (noted with fear of falling).  Setup for grooming, dep for lbd and toileting.  -       Row Name 01/16/24 1455          Vital Signs     Pre Systolic BP Rehab 98  -SW     Pre Treatment Diastolic BP 67  -SW     Intra Systolic BP Rehab 113  -SW     Intra Treatment Diastolic BP 66  -SW     Pretreatment Heart Rate (beats/min) 101  -SW     Pre SpO2 (%) 96  -SW     O2 Delivery Pre Treatment room air  -SW     O2 Delivery Intra Treatment room air  -SW     O2 Delivery Post Treatment room air  -SW     Pre Patient Position Supine  -SW     Intra Patient Position Standing  -SW     Post Patient Position Sitting  -SW       Row Name 01/16/24 1455          Positioning and Restraints    Pre-Treatment Position in bed  -SW     Post Treatment Position chair  -SW     In Chair notified nsg;reclined;sitting;call light within reach;encouraged to call for assist;exit alarm on;waffle cushion;legs elevated  -SW               User Key  (r) = Recorded By, (t) = Taken By, (c) = Cosigned By      Initials Name Provider Type    Alissa Call, DORIAN Occupational Therapist                   Outcome Measures       Row Name 01/16/24 1541          How much help from another is currently needed...    Putting on and taking off regular lower body clothing? 1  -SW     Bathing (including washing, rinsing, and drying) 2  -SW     Toileting (which includes using toilet bed pan or urinal) 1  -SW     Putting on and taking off regular upper body clothing 2  -SW     Taking care of personal grooming (such as brushing teeth) 4  -SW     Eating meals 4  -SW     AM-PAC 6 Clicks Score (OT) 14  -SW       Row Name 01/16/24 0800          How much help from another person do you currently need...    Turning from your back to your side while in flat bed without using bedrails? 3  -SA     Moving from lying on back to sitting on the side of a flat bed without bedrails? 2  -SA     Moving to and from a bed to a chair (including a wheelchair)? 2  -SA     Standing up from a chair using your arms (e.g., wheelchair, bedside chair)? 2  -SA     Climbing 3-5 steps with a railing? 1  -SA     To walk in hospital room? 1  -SA      AM-PAC 6 Clicks Score (PT) 11  -SA     Highest Level of Mobility Goal 4 --> Transfer to chair/commode  -       Row Name 01/16/24 1541          Functional Assessment    Outcome Measure Options AM-PAC 6 Clicks Daily Activity (OT)  -               User Key  (r) = Recorded By, (t) = Taken By, (c) = Cosigned By      Initials Name Provider Type     Alissa Arreola OT Occupational Therapist    Keiko Conley RN Registered Nurse                    Occupational Therapy Education       Title: PT OT SLP Therapies (In Progress)       Topic: Occupational Therapy (Done)       Point: ADL training (Done)       Description:   Instruct learner(s) on proper safety adaptation and remediation techniques during self care or transfers.   Instruct in proper use of assistive devices.                  Learning Progress Summary             Patient Acceptance, E, VU,NR by  at 1/16/2024 1542    Acceptance, E, VU,NR by  at 1/10/2024 1415                         Point: Home exercise program (Done)       Description:   Instruct learner(s) on appropriate technique for monitoring, assisting and/or progressing therapeutic exercises/activities.                  Learning Progress Summary             Patient Acceptance, E, VU,NR by  at 1/16/2024 1542                         Point: Precautions (Done)       Description:   Instruct learner(s) on prescribed precautions during self-care and functional transfers.                  Learning Progress Summary             Patient Acceptance, E, VU,NR by  at 1/10/2024 1415                         Point: Body mechanics (Done)       Description:   Instruct learner(s) on proper positioning and spine alignment during self-care, functional mobility activities and/or exercises.                  Learning Progress Summary             Patient Acceptance, E, VU,NR by  at 1/16/2024 1542                                         User Key       Initials Effective Dates Name Provider Type [facility] 06/16/21  -  Alissa Arreola OT Occupational Therapist OT                  OT Recommendation and Plan  Planned Therapy Interventions (OT): activity tolerance training, adaptive equipment training, BADL retraining, functional balance retraining, patient/caregiver education/training, transfer/mobility retraining, strengthening exercise  Therapy Frequency (OT): daily  Plan of Care Review  Plan of Care Reviewed With: patient  Progress: improving  Outcome Evaluation: OT promoted oob activity with pt demo improved fx.  Supine to sit with min assist and mod assist to scoot to eob. He completed multiple sts from bed pushing and supporting self from bed rails. He utilized rw to stand and take steps to chair with min assist and cues (noted with fear of falling).  Setup for grooming, dep for lbd and toileting.     Time Calculation:   Evaluation Complexity (OT)  Review Occupational Profile/Medical/Therapy History Complexity: expanded/moderate complexity  Assessment, Occupational Performance/Identification of Deficit Complexity: 5 or more performance deficits  Clinical Decision Making Complexity (OT): detailed assessment/moderate complexity  Overall Complexity of Evaluation (OT): moderate complexity     Time Calculation- OT       Row Name 01/16/24 1455             Time Calculation- OT    OT Start Time 1455  -SW      OT Received On 01/16/24  -SW         Timed Charges    90224 - OT Therapeutic Exercise Minutes 10  -SW      96792 - OT Therapeutic Activity Minutes 15  -SW      31112 - OT Self Care/Mgmt Minutes 15  -SW         Total Minutes    Timed Charges Total Minutes 40  -SW       Total Minutes 40  -SW                User Key  (r) = Recorded By, (t) = Taken By, (c) = Cosigned By      Initials Name Provider Type    SW Alissa Arreola OT Occupational Therapist                  Therapy Charges for Today       Code Description Service Date Service Provider Modifiers Qty    70148953754  OT THER PROC EA 15 MIN 1/16/2024 Alissa Arreola OT GO 1     86783520767 HC OT THERAPEUTIC ACT EA 15 MIN 1/16/2024 Alissa Arreola OT GO 1    98509258370 HC OT SELF CARE/MGMT/TRAIN EA 15 MIN 1/16/2024 Alissa Arreola OT GO 1                 Alissa Arreola OT  1/16/2024

## 2024-01-16 NOTE — PLAN OF CARE
Goal Outcome Evaluation:  Plan of Care Reviewed With: patient        Progress: improving  Outcome Evaluation: OT promoted oob activity with pt demo improved fx.  Supine to sit with min assist and mod assist to scoot to eob. He completed multiple sts from bed pushing and supporting self from bed rails. He utilized rw to stand and take steps to chair with min assist and cues (noted with fear of falling).  Setup for grooming, dep for lbd and toileting.      Anticipated Discharge Disposition (OT): skilled nursing facility

## 2024-01-16 NOTE — PROGRESS NOTES
[facility] Medicine Services  PROGRESS NOTE    Patient Name: Apollo Haro  : 1940  MRN: [id number]    Date of Admission: 2024  Primary Care Physician: Provider, No Known    Subjective   Subjective     CC:  Weakness    HPI:  Patient seen resting up in bed in no acute distress.  Awake and alert.  States his cough is a little worse today.  No shortness of breath.  No current pain.  Tolerating diet.  Awaiting rehab.      Objective   Objective     Vital Signs:   Temp:  [96.5 °F (35.8 °C)-98.4 °F (36.9 °C)] 98.4 °F (36.9 °C)  Heart Rate:  [] 91  Resp:  [16-18] 18  BP: (102-119)/(57-66) 103/59  Flow (L/min):  [2] 2     Physical Exam:  Constitutional: Awake and alert.  Resting up in bed.  No acute distress.  Chronically ill-appearing.  Respiratory: Clear to auscultation bilaterally but decreased at bases, respiratory effort normal on room air with sats 95%.  Occasional cough during exam.  Cardiovascular: RRR  Gastrointestinal: Positive bowel sounds, soft, nontender, nondistended  Musculoskeletal: No bilateral ankle edema.  Leon spontaneously.  Psychiatric: Appropriate affect, cooperative  Neurologic: Oriented x 3, no gross focal neurological deficits.  Speech clear and appropriate.  Follows commands.  Skin: No rashes.  Sacral decub (POA)      Results Reviewed:  LAB RESULTS:      Lab 24  0506 24  0742 01/10/24  0609   WBC 5.43 4.18 4.14   HEMOGLOBIN 12.0* 12.2* 12.2*   HEMATOCRIT 36.0* 36.8* 36.3*   PLATELETS 159 151 144   MCV 89.8 91.5 91.0   CRP  --  3.46*  --          Lab 24  0657 24  0505 24  0742 01/10/24  0609   SODIUM 140 143 143 141   POTASSIUM 4.2 4.3 4.0 4.3   CHLORIDE 104 105 106 104   CO2 29.0 28.0 28.0 30.0*   ANION GAP 7.0 10.0 9.0 7.0   BUN 17 17 17 20   CREATININE 0.88 0.93 0.86 0.91   EGFR 84.8 81.0 85.4 83.1   GLUCOSE 141* 142* 125* 116*   CALCIUM 8.4* 8.1* 8.4* 8.6   MAGNESIUM 1.7 1.5* 1.6  --          Lab 01/10/24  0609   TOTAL  PROTEIN 5.1*   ALBUMIN 2.8*   GLOBULIN 2.3   ALT (SGPT) 30   AST (SGOT) 61*   BILIRUBIN 0.7   ALK PHOS 66                   Lab 01/10/24  0609   FOLATE >20.00   VITAMIN B 12 1,269*         Brief Urine Lab Results  (Last result in the past 365 days)        Color   Clarity   Blood   Leuk Est   Nitrite   Protein   CREAT   Urine HCG        01/10/24 1150 Yellow   Clear   Moderate (2+)   Negative   Negative   Negative                   Microbiology Results Abnormal       None            No radiology results from the last 24 hrs        Current medications:  Scheduled Meds:apixaban, 5 mg, Oral, BID  aspirin, 81 mg, Oral, Daily  castor oil-balsam peru, 1 application , Topical, Q12H  flecainide, 50 mg, Oral, Q12H  insulin detemir, 5 Units, Subcutaneous, Nightly  insulin lispro, 2-7 Units, Subcutaneous, 4x Daily AC & at Bedtime  Insulin Lispro, 3 Units, Subcutaneous, TID With Meals  miconazole, 1 application , Topical, Q12H  pantoprazole, 40 mg, Oral, Q AM  senna-docusate sodium, 2 tablet, Oral, BID  sodium chloride, 10 mL, Intravenous, Q12H  tamsulosin, 0.4 mg, Oral, Daily      Continuous Infusions:   PRN Meds:.  acetaminophen **OR** acetaminophen **OR** acetaminophen    benzonatate    senna-docusate sodium **AND** polyethylene glycol **AND** bisacodyl **AND** bisacodyl    Calcium Replacement - Follow Nurse / BPA Driven Protocol    dextrose    dextrose    glucagon (human recombinant)    Magnesium Low Dose Replacement - Follow Nurse / BPA Driven Protocol    Phosphorus Replacement - Follow Nurse / BPA Driven Protocol    Potassium Replacement - Follow Nurse / BPA Driven Protocol    [COMPLETED] Insert Peripheral IV **AND** sodium chloride    sodium chloride    sodium chloride    Assessment & Plan   Assessment & Plan     Active Hospital Problems    Diagnosis  POA    **Weakness [R53.1]  Yes    Type 2 diabetes mellitus, without long-term current use of insulin [E11.9]  Yes      Resolved Hospital Problems   No resolved problems to  display.        Brief Hospital Course to date:  Apollo Haro is a 83 y.o. male w/ hx parox afib, htn, hl, dm2, chronic back pain who was brought to [facility] ED due to generalized weakness. History if limited as no family bedside and patient poor historian and no answer when phone family. Patient states was diagnosed w/ covid 19 within past couple of weeks and treated as outpatient, but due to worsening generalized weakness in leg and walking was brought to [facility] for evaluation. Cxr negative. Ct head no acute intracranial process, left enthoid and frontal sinus musosal dz noted.     This patient's problems and plans were partially entered by my partner and updated as appropriate by me 01/16/24.    Assessment/Plan:     Covid 19 infection  -no hypoxia, mild illness.  Occasional cough.  No shortness of breath.  -Tested positive on 1/8/2024, out of isolation on 1/18/2024     Generalized weakness w/ decreased mobility  Multilevel degenerative lumbar spine changes w/ bilateral (right severe, left moderate-severe) left neural foraminal stenosis w/ bilateral L3 nerve root compromise (on MRI)  Chronic T10 & T12 compression fractures (without canal compromise)  Recent falls  -recent [facility] procedure note 7/28/23 w/ diagnosis code of normal pressure hydrocephalus (patient poor historian regarding this)  -CT head without acute findings, B12 and folate within normal limits.  TSH within normal limits.    -MRI T&L spine: revealed multilevel deg lumbar spine changes w/ bilateral neural foraminal stenosis w/ bilateral L3 nerve root sompromise. patient has previously seen a spinal surgeon at [facility] (over a year ago per wife report) who was considering possible surgical intervention on his spine; patient did not wish for surgery and he ne I partner, Dr. Santiago, discussed MRI results on 1/12 with patient and patient was not interested in any surgical intervention.  -Weakness is likely multifactorial in the setting of chronic diabetic neuropathy,  longstanding lumbar disks, spinal disease and COVID infection.  --Awaiting rehab at [place].  Pending insurance pre-CERT.     Encephalopathy, likely due to covid 19 infection; resolved  -Improved     Mild rhabdomyolysis, improved  -Did have a mildly elevated CPK at 1000, repeat with improvement after gentle hydration.  -holding statin for now, restart pravachol  in couple days.  Considering at discharge.     Cad  Afib  Htn  Hl  -Continue home flecainide  -continue eliquis     Dm2, A1c 6.6  -ssi  -Partner prior added lispro 3u TID.  Glucose 141-212 last 24 hours.  Added very low-dose Levemir 5 units nightly tonight.     Sacral decub, poa  -wound care following.        Expected Discharge Location and Transportation: [place], awaiting insurance  Expected Discharge   Expected Discharge Date: 1/19/2024; Expected Discharge Time:      DVT prophylaxis:  Medical DVT prophylaxis orders are present.     AM-PAC 6 Clicks Score (PT): 11 (01/16/24 0800)    CODE STATUS:   Code Status and Medical Interventions:   Ordered at: 01/08/24 2053     Code Status (Patient has no pulse and is not breathing):    CPR (Attempt to Resuscitate)     Medical Interventions (Patient has pulse or is breathing):    Full Support         Eun Myers, APRN  01/16/24

## 2024-01-16 NOTE — CASE MANAGEMENT/SOCIAL WORK
Continued Stay Note  [facility]     Patient Name: Apollo Haro  MRN: [id number]  Today's Date: 1/16/2024    Admit Date: 1/8/2024    Plan: [facility]   Discharge Plan       Row Name 01/16/24 1254       Plan    Plan [facility]    Patient/Family in Agreement with Plan yes    Plan Comments Spoke with Leilani at [facility] and we are still waiting on insurance precert. [facility] was cancelled. [employer] said it can take up to 7 days for approval. CM will rescheduled the ambulance when we have insurance approval. CM will continue to follow.    Final Discharge Disposition Code 03 - skilled nursing facility (SNF)                   Discharge Codes    No documentation.                 Expected Discharge Date and Time       Expected Discharge Date Expected Discharge Time    Jan 19, 2024               Artur Joseph RN

## 2024-01-16 NOTE — PLAN OF CARE
Goal Outcome Evaluation:      Patient Alert and disoriented to time, On room air with O2 > 90%, wound care done, was in the chair for most of the afternoon. Call light within reach.

## 2024-01-17 LAB
GLUCOSE BLDC GLUCOMTR-MCNC: 151 MG/DL (ref 70–130)
GLUCOSE BLDC GLUCOMTR-MCNC: 183 MG/DL (ref 70–130)
GLUCOSE BLDC GLUCOMTR-MCNC: 190 MG/DL (ref 70–130)
GLUCOSE BLDC GLUCOMTR-MCNC: 241 MG/DL (ref 70–130)
QT INTERVAL: 362 MS
QTC INTERVAL: 466 MS

## 2024-01-17 PROCEDURE — 93005 ELECTROCARDIOGRAM TRACING: CPT | Performed by: FAMILY MEDICINE

## 2024-01-17 PROCEDURE — 93010 ELECTROCARDIOGRAM REPORT: CPT | Performed by: INTERNAL MEDICINE

## 2024-01-17 PROCEDURE — 63710000001 INSULIN LISPRO (HUMAN) PER 5 UNITS: Performed by: INTERNAL MEDICINE

## 2024-01-17 PROCEDURE — 63710000001 INSULIN DETEMIR PER 5 UNITS: Performed by: NURSE PRACTITIONER

## 2024-01-17 PROCEDURE — 97116 GAIT TRAINING THERAPY: CPT

## 2024-01-17 PROCEDURE — 99232 SBSQ HOSP IP/OBS MODERATE 35: CPT | Performed by: NURSE PRACTITIONER

## 2024-01-17 PROCEDURE — 97530 THERAPEUTIC ACTIVITIES: CPT

## 2024-01-17 PROCEDURE — 82948 REAGENT STRIP/BLOOD GLUCOSE: CPT

## 2024-01-17 RX ADMIN — CASTOR OIL AND BALSAM, PERU 1 APPLICATION: 788; 87 OINTMENT TOPICAL at 20:32

## 2024-01-17 RX ADMIN — APIXABAN 5 MG: 5 TABLET, FILM COATED ORAL at 20:32

## 2024-01-17 RX ADMIN — FLECAINIDE ACETATE 50 MG: 50 TABLET ORAL at 12:31

## 2024-01-17 RX ADMIN — MICONAZOLE NITRATE 1 APPLICATION: 20 POWDER TOPICAL at 20:32

## 2024-01-17 RX ADMIN — INSULIN LISPRO 3 UNITS: 100 INJECTION, SOLUTION INTRAVENOUS; SUBCUTANEOUS at 12:03

## 2024-01-17 RX ADMIN — INSULIN LISPRO 3 UNITS: 100 INJECTION, SOLUTION INTRAVENOUS; SUBCUTANEOUS at 09:14

## 2024-01-17 RX ADMIN — Medication 10 ML: at 20:32

## 2024-01-17 RX ADMIN — INSULIN LISPRO 2 UNITS: 100 INJECTION, SOLUTION INTRAVENOUS; SUBCUTANEOUS at 12:02

## 2024-01-17 RX ADMIN — FLECAINIDE ACETATE 50 MG: 50 TABLET ORAL at 23:23

## 2024-01-17 RX ADMIN — CASTOR OIL AND BALSAM, PERU 1 APPLICATION: 788; 87 OINTMENT TOPICAL at 09:15

## 2024-01-17 RX ADMIN — MICONAZOLE NITRATE 1 APPLICATION: 20 POWDER TOPICAL at 09:15

## 2024-01-17 RX ADMIN — INSULIN LISPRO 2 UNITS: 100 INJECTION, SOLUTION INTRAVENOUS; SUBCUTANEOUS at 20:31

## 2024-01-17 RX ADMIN — INSULIN DETEMIR 5 UNITS: 100 INJECTION, SOLUTION SUBCUTANEOUS at 20:31

## 2024-01-17 RX ADMIN — SENNOSIDES AND DOCUSATE SODIUM 2 TABLET: 8.6; 5 TABLET ORAL at 09:14

## 2024-01-17 RX ADMIN — SENNOSIDES AND DOCUSATE SODIUM 2 TABLET: 8.6; 5 TABLET ORAL at 20:32

## 2024-01-17 RX ADMIN — TAMSULOSIN HYDROCHLORIDE 0.4 MG: 0.4 CAPSULE ORAL at 09:14

## 2024-01-17 RX ADMIN — Medication 10 ML: at 09:14

## 2024-01-17 RX ADMIN — ASPIRIN 81 MG: 81 TABLET, CHEWABLE ORAL at 09:14

## 2024-01-17 RX ADMIN — APIXABAN 5 MG: 5 TABLET, FILM COATED ORAL at 09:14

## 2024-01-17 RX ADMIN — PANTOPRAZOLE SODIUM 40 MG: 40 TABLET, DELAYED RELEASE ORAL at 08:37

## 2024-01-17 RX ADMIN — INSULIN LISPRO 3 UNITS: 100 INJECTION, SOLUTION INTRAVENOUS; SUBCUTANEOUS at 17:48

## 2024-01-17 RX ADMIN — INSULIN LISPRO 3 UNITS: 100 INJECTION, SOLUTION INTRAVENOUS; SUBCUTANEOUS at 17:47

## 2024-01-17 RX ADMIN — INSULIN LISPRO 2 UNITS: 100 INJECTION, SOLUTION INTRAVENOUS; SUBCUTANEOUS at 09:13

## 2024-01-17 NOTE — PLAN OF CARE
Goal Outcome Evaluation:      Pt ambulate with PT about 12 feet, no complain of pain, wound care done, was in the chair for a few hours, on room air O2> 90%, VSS. Call light within reach

## 2024-01-17 NOTE — THERAPY TREATMENT NOTE
Patient Name: Apollo Haro  : 1940    MRN: [id number]                              Today's Date: 2024       Admit Date: 2024    Visit Dx:     ICD-10-CM ICD-9-CM   1. COVID-19  U07.1 079.89   2. Hyperglycemia  R73.9 790.29   3. Generalized weakness  R53.1 780.79     Patient Active Problem List   Diagnosis    Weakness    Type 2 diabetes mellitus, without long-term current use of insulin     Past Medical History:   Diagnosis Date    A-fib     CAD (coronary artery disease)     DM (diabetes mellitus)      History reviewed. No pertinent surgical history.   General Information       Row Name 24 1447          Physical Therapy Time and Intention    Document Type therapy note (daily note)  -     Mode of Treatment physical therapy;individual therapy  -       Row Name 24 1447          General Information    Existing Precautions/Restrictions fall  -     Barriers to Rehab medically complex;previous functional deficit;cognitive status  -[facility] Name 24 1447          Cognition    Orientation Status (Cognition) oriented to;person;place;situation  -       Row Name 24 1447          Safety Issues, Functional Mobility    Safety Issues Affecting Function (Mobility) awareness of need for assistance;insight into deficits/self-awareness;positioning of assistive device;safety precaution awareness;sequencing abilities  -     Impairments Affecting Function (Mobility) balance;endurance/activity tolerance;sensation/sensory awareness;strength;pain;range of motion (ROM);coordination  -               User Key  (r) = Recorded By, (t) = Taken By, (c) = Cosigned By      Initials Name Provider Type     Damien Rizvi, PT Physical Therapist                   Mobility       Row Name 24 1447          Bed Mobility    Bed Mobility supine-sit  -     Supine-Sit [place] (Bed Mobility) minimum assist (75% patient effort);1 person assist;verbal cues;nonverbal cues (demo/gesture)  -      Assistive Device (Bed Mobility) bed rails;head of bed elevated;draw sheet  -     Comment, (Bed Mobility) Pt required considerable increase in time and effort to achieve sitting EOB, draw sheet required to assist scooting to EOB.  -       Row Name 01/17/24 1447          Sit-Stand Transfer    Sit-Stand [place] (Transfers) minimum assist (75% patient effort);1 person assist;verbal cues;nonverbal cues (demo/gesture)  -     Assistive Device (Sit-Stand Transfers) walker, front-wheeled  -     Comment, (Sit-Stand Transfer) Pt required cues for proper set up including positioning of RW and hand placement. Cues for proper anterior weight shifting. Pt with mild/moderate forward flexed posture upon standing and narrow KERRY requiring cues to correct.  -       Row Name 01/17/24 1447          Gait/Stairs (Locomotion)    [place] Level (Gait) minimum assist (75% patient effort);1 person assist;verbal cues;nonverbal cues (demo/gesture)  -     Assistive Device (Gait) walker, front-wheeled  -     Patient was able to Ambulate yes  -     Distance in Feet (Gait) 12  -     Deviations/Abnormal Patterns (Gait) bilateral deviations;base of support, narrow;cindy decreased;festinating/shuffling;gait speed decreased;stride length decreased  -     Bilateral Gait Deviations forward flexed posture;heel strike decreased  -     [place] Level (Stairs) not tested  -     Comment, (Gait/Stairs) Pt demonstrated step through gait pattern however with very short, shuffled steps as pt with poor BLE foot clearance. Pt also with moderate forward flexed posture throughout. Pt with difficulty positioning RW during ambulation requiring occasional assist. Distance limted d/t weakness and fatigue  -               User Key  (r) = Recorded By, (t) = Taken By, (c) = Cosigned By      Initials Name Provider Type     Damien Rizvi, PT Physical Therapist                   Obj/Interventions       Row Name 01/17/24 3899           Motor Skills    Therapeutic Exercise hip;knee;ankle  -[facility] Name 01/17/24 1451          Hip (Therapeutic Exercise)    Hip (Therapeutic Exercise) AROM (active range of motion)  -     Hip AROM (Therapeutic Exercise) bilateral;flexion;extension;aDduction;aBduction;sitting;10 repetitions  -[facility] Name 01/17/24 1451          Knee (Therapeutic Exercise)    Knee (Therapeutic Exercise) AROM (active range of motion)  -     Knee AROM (Therapeutic Exercise) bilateral;flexion;extension;LAQ (long arc quad);SLR (straight leg raise);heel slides;sitting;10 repetitions  -[facility] Name 01/17/24 1451          Ankle (Therapeutic Exercise)    Ankle AROM (Therapeutic Exercise) bilateral;dorsiflexion;plantarflexion;sitting;10 repetitions  -[facility] Name 01/17/24 1451          Balance    Balance Assessment sitting static balance;sitting dynamic balance;standing static balance;standing dynamic balance  -     Static Sitting Balance supervision  -     Dynamic Sitting Balance standby assist  -     Position, Sitting Balance unsupported;sitting in chair;sitting edge of bed  -     Static Standing Balance contact guard;1-person assist  -     Dynamic Standing Balance minimal assist;modified independence  -     Position/Device Used, Standing Balance supported;walker, front-wheeled  -     Comment, Balance Pt with mild instability during OOB mobiltiy with decreasing balance with further ambulation and increasing fatigue.  -               User Key  (r) = Recorded By, (t) = Taken By, (c) = Cosigned By      Initials Name Provider Type     Damien Rizvi, PT Physical Therapist                   Goals/Plan    No documentation.                  Clinical Impression       [facility] Name 01/17/24 1453          Pain    Pain Intervention(s) Repositioned;Ambulation/increased activity  -[facility] Name 01/17/24 1453          Pain Scale: FACES Pre/Post-Treatment    Pain: FACES Scale, Pretreatment 4-->hurts little more   "-     Posttreatment Pain Rating 4-->hurts little more  -     Pain Location generalized  -     Pre/Posttreatment Pain Comment Reports pain \"all over\"  -       Row Name 01/17/24 1453          Plan of Care Review    Plan of Care Reviewed With patient  -     Progress improving  -     Outcome Evaluation Pt with good participation in therapy session this date demonstrating good improvements in functional activity tolerance, independence, and strength. Pt ambulated 12ft with MinAx1 and RW. Pt continuing below his baseline level of function, continuing to require skilled PT services. PT reviewed BLE HEP. PT recommending inpatient rehab at [facility].  -       Row Name 01/17/24 1453          Vital Signs    Pre Systolic BP Rehab 121  -     Pre Treatment Diastolic BP 78  -     Pretreatment Heart Rate (beats/min) 92  -     Pre SpO2 (%) 98  -     O2 Delivery Pre Treatment room air  -     O2 Delivery Intra Treatment room air  -     O2 Delivery Post Treatment room air  -     Pre Patient Position Supine  -     Intra Patient Position Standing  -     Post Patient Position Sitting  -       Row Name 01/17/24 1453          Positioning and Restraints    Pre-Treatment Position in bed  -     Post Treatment Position chair  -     In Chair reclined;call light within reach;encouraged to call for assist;exit alarm on;waffle cushion;legs elevated;heels elevated  -               User Key  (r) = Recorded By, (t) = Taken By, (c) = Cosigned By      Initials Name Provider Type     Damien Rizvi, PT Physical Therapist                   Outcome Measures       Row Name 01/17/24 1457 01/17/24 0801       How much help from another person do you currently need...    Turning from your back to your side while in flat bed without using bedrails? 3  - 3  -SA    Moving from lying on back to sitting on the side of a flat bed without bedrails? 3  - 2  -SA    Moving to and from a bed to a chair (including a wheelchair)? 3  " - 2  -    Standing up from a chair using your arms (e.g., wheelchair, bedside chair)? 3  - 2  -SA    Climbing 3-5 steps with a railing? 1  - 1  -    To walk in hospital room? 3  - 1  -SA    AM-PAC 6 Clicks Score (PT) 16  - 11  -    Highest Level of Mobility Goal 5 --> Static standing  - 4 --> Transfer to chair/commode  -      Row Name 01/17/24 1457          Functional Assessment    Outcome Measure Options AM-PAC 6 Clicks Basic Mobility (PT)  -               User Key  (r) = Recorded By, (t) = Taken By, (c) = Cosigned By      Initials Name Provider Type     Damien Rizvi, PT Physical Therapist    Keiko Conley RN Registered Nurse                                 Physical Therapy Education       Title: PT OT SLP Therapies (Done)       Topic: Physical Therapy (Done)       Point: Mobility training (Done)       Learning Progress Summary             Patient Acceptance, E, VU,NR by  at 1/17/2024 1458    Acceptance, E, NR by AS at 1/11/2024 1051    Acceptance, E,D, VU,NR by MB at 1/9/2024 1557   Family Acceptance, E,D, VU,NR by MB at 1/9/2024 1557                         Point: Home exercise program (Done)       Learning Progress Summary             Patient Acceptance, E, VU,NR by  at 1/17/2024 1458    Acceptance, E, NR by AS at 1/11/2024 1051    Acceptance, E,D, VU,NR by MB at 1/9/2024 1557   Family Acceptance, E,D, VU,NR by MB at 1/9/2024 1557                         Point: Body mechanics (Done)       Learning Progress Summary             Patient Acceptance, E, VU,NR by  at 1/17/2024 1458    Acceptance, E, NR by AS at 1/11/2024 1051    Acceptance, E,D, VU,NR by MB at 1/9/2024 1557   Family Acceptance, E,D, VU,NR by MB at 1/9/2024 1557                         Point: Precautions (Done)       Learning Progress Summary             Patient Acceptance, E, VU,NR by  at 1/17/2024 1458    Acceptance, E, NR by AS at 1/11/2024 1051    Acceptance, E,D, VU,NR by MB at 1/9/2024 1557   Family  Acceptance, E,D, VU,NR by MB at 1/9/2024 1557                                         User Key       Initials Effective Dates Name Provider Type Discipline    AS 04/28/23 -  Danay Dolan, PTA Physical Therapist Assistant PT    MB 06/16/21 -  Luli Stephens, PT Physical Therapist PT     09/21/21 -  Damien Rizvi, PT Physical Therapist PT                  PT Recommendation and Plan     Plan of Care Reviewed With: patient  Progress: improving  Outcome Evaluation: Pt with good participation in therapy session this date demonstrating good improvements in functional activity tolerance, independence, and strength. Pt ambulated 12ft with MinAx1 and RW. Pt continuing below his baseline level of function, continuing to require skilled PT services. PT reviewed BLE HEP. PT recommending inpatient rehab at [place].     Time Calculation:         PT Charges       Row Name 01/17/24 1458             Time Calculation    Start Time 1412  -      PT Received On 01/17/24  -      PT Goal Re-Cert Due Date 01/19/24  -         Timed Charges    81929 - PT Therapeutic Exercise Minutes 9  -      25455 - Gait Training Minutes  10  -      50955 - PT Therapeutic Activity Minutes 10  -         Total Minutes    Timed Charges Total Minutes 29  -       Total Minutes 29  -                User Key  (r) = Recorded By, (t) = Taken By, (c) = Cosigned By      Initials Name Provider Type     Damien Rizvi, PT Physical Therapist                  Therapy Charges for Today       Code Description Service Date Service Provider Modifiers Qty    24810147379 HC GAIT TRAINING EA 15 MIN 1/17/2024 Damien Rizvi, PT GP 1    04247314662 HC PT THERAPEUTIC ACT EA 15 MIN 1/17/2024 Damien Rizvi, PT GP 1            PT G-Codes  Outcome Measure Options: AM-PAC 6 Clicks Basic Mobility (PT)  AM-PAC 6 Clicks Score (PT): 16  AM-PAC 6 Clicks Score (OT): 14  PT Discharge Summary  Anticipated Discharge Disposition (PT): inpatient rehabilitation  facility    Damien Rizvi, PT  1/17/2024

## 2024-01-17 NOTE — PROGRESS NOTES
[facility] Medicine Services  PROGRESS NOTE    Patient Name: Apollo Haro  : 1940  MRN: [id number]    Date of Admission: 2024  Primary Care Physician: Provider, No Known    Subjective   Subjective     CC:  Weakness     HPI:  Patient is resting in bed in NAD. He denies any soa or pain. Plan for rehab       Objective   Objective     Vital Signs:   Temp:  [97.5 °F (36.4 °C)-98 °F (36.7 °C)] 97.5 °F (36.4 °C)  Heart Rate:  [] 77  Resp:  [18] 18  BP: ()/(62-67) 111/64  Flow (L/min):  [2] 2     Physical Exam:  Constitutional: No acute distress, awake, alert  HENT: NCAT, mucous membranes moist  Respiratory: Clear to auscultation bilaterally, respiratory effort normal room air 96%  Cardiovascular: RRR, no murmurs, rubs, or gallops  Gastrointestinal: Positive bowel sounds, soft, nontender, nondistended  Musculoskeletal: No bilateral ankle edema  Psychiatric: Appropriate affect, cooperative  Neurologic: Oriented x 3, strength symmetric in all extremities, Cranial Nerves grossly intact to confrontation, speech clear  Skin: No rashes, pale       Results Reviewed:  LAB RESULTS:      Lab 24  0506 24  0742   WBC 5.43 4.18   HEMOGLOBIN 12.0* 12.2*   HEMATOCRIT 36.0* 36.8*   PLATELETS 159 151   MCV 89.8 91.5   CRP  --  3.46*         Lab 24  0657 24  0505 24  0742   SODIUM 140 143 143   POTASSIUM 4.2 4.3 4.0   CHLORIDE 104 105 106   CO2 29.0 28.0 28.0   ANION GAP 7.0 10.0 9.0   BUN 17 17 17   CREATININE 0.88 0.93 0.86   EGFR 84.8 81.0 85.4   GLUCOSE 141* 142* 125*   CALCIUM 8.4* 8.1* 8.4*   MAGNESIUM 1.7 1.5* 1.6                         Brief Urine Lab Results  (Last result in the past 365 days)        Color   Clarity   Blood   Leuk Est   Nitrite   Protein   CREAT   Urine HCG        01/10/24 1150 Yellow   Clear   Moderate (2+)   Negative   Negative   Negative                   Microbiology Results Abnormal       None            No radiology results from  the last 24 hrs        Current medications:  Scheduled Meds:apixaban, 5 mg, Oral, BID  aspirin, 81 mg, Oral, Daily  castor oil-balsam peru, 1 application , Topical, Q12H  flecainide, 50 mg, Oral, Q12H  insulin detemir, 5 Units, Subcutaneous, Nightly  insulin lispro, 2-7 Units, Subcutaneous, 4x Daily AC & at Bedtime  Insulin Lispro, 3 Units, Subcutaneous, TID With Meals  miconazole, 1 application , Topical, Q12H  pantoprazole, 40 mg, Oral, Q AM  senna-docusate sodium, 2 tablet, Oral, BID  sodium chloride, 10 mL, Intravenous, Q12H  tamsulosin, 0.4 mg, Oral, Daily      Continuous Infusions:   PRN Meds:.  acetaminophen **OR** acetaminophen **OR** acetaminophen    benzonatate    senna-docusate sodium **AND** polyethylene glycol **AND** bisacodyl **AND** bisacodyl    Calcium Replacement - Follow Nurse / BPA Driven Protocol    dextrose    dextrose    glucagon (human recombinant)    Magnesium Low Dose Replacement - Follow Nurse / BPA Driven Protocol    Phosphorus Replacement - Follow Nurse / BPA Driven Protocol    Potassium Replacement - Follow Nurse / BPA Driven Protocol    [COMPLETED] Insert Peripheral IV **AND** sodium chloride    sodium chloride    sodium chloride    Assessment & Plan   Assessment & Plan     Active Hospital Problems    Diagnosis  POA    **Weakness [R53.1]  Yes    Type 2 diabetes mellitus, without long-term current use of insulin [E11.9]  Yes      Resolved Hospital Problems   No resolved problems to display.        Brief Hospital Course to date:  Apollo Haro is a 83 y.o. male w/ hx parox afib, htn, hl, dm2, chronic back pain who was brought to [facility] ED due to generalized weakness. History if limited as no family bedside and patient poor historian and no answer when phone family. Patient states was diagnosed w/ covid 19 within past couple of weeks and treated as outpatient, but due to worsening generalized weakness in leg and walking was brought to [facility] for evaluation. Cxr negative. Ct head no acute  intracranial process, left enthoid and frontal sinus musosal dz noted.      This patient's problems and plans were partially entered by my partner and updated as appropriate by me 01/17/24.     Assessment/Plan:     Covid 19 infection  -no hypoxia, mild illness.  Occasional cough.  No shortness of breath.  -Tested positive on 1/8/2024, out of isolation on 1/18/2024     Generalized weakness w/ decreased mobility  Multilevel degenerative lumbar spine changes w/ bilateral (right severe, left moderate-severe) left neural foraminal stenosis w/ bilateral L3 nerve root compromise (on MRI)  Chronic T10 & T12 compression fractures (without canal compromise)  Recent falls  -recent [facility] procedure note 7/28/23 w/ diagnosis code of normal pressure hydrocephalus (patient poor historian regarding this)  -CT head without acute findings, B12 and folate within normal limits.  TSH within normal limits.    -MRI T&L spine: revealed multilevel deg lumbar spine changes w/ bilateral neural foraminal stenosis w/ bilateral L3 nerve root sompromise. patient has previously seen a spinal surgeon at [facility] (over a year ago per wife report) who was considering possible surgical intervention on his spine; patient did not wish for surgery and he ne I partner, Dr. Santiago, discussed MRI results on 1/12 with patient and patient was not interested in any surgical intervention.  -Weakness is likely multifactorial in the setting of chronic diabetic neuropathy, longstanding lumbar disks, spinal disease and COVID infection.  --Awaiting rehab at [place].  Pending insurance pre-CERT.     Encephalopathy, likely due to covid 19 infection; resolved  -Improved     Mild rhabdomyolysis, improved  -Did have a mildly elevated CPK at 1000, repeat with improvement after gentle hydration.  -holding statin for now, restart pravachol  in couple days.  Considering at discharge.     Cad  Afib  Htn  Hl  -Continue home flecainide  -continue eliquis     Dm2, A1c 6.6  -ssi,  basal and bolus   -  Glucose 131-208 last 24 hours.       Sacral decub, poa  -wound care following.    Expected Discharge Location and Transportation: rehab     Expected Discharge   Expected Discharge Date: 1/19/2024; Expected Discharge Time:      DVT prophylaxis:  Medical DVT prophylaxis orders are present.     AM-PAC 6 Clicks Score (PT): 11 (01/17/24 0801)    CODE STATUS:   Code Status and Medical Interventions:   Ordered at: 01/08/24 2053     Code Status (Patient has no pulse and is not breathing):    CPR (Attempt to Resuscitate)     Medical Interventions (Patient has pulse or is breathing):    Full Support       Ely Lynne, APRN  01/17/24

## 2024-01-17 NOTE — PLAN OF CARE
Goal Outcome Evaluation:  Plan of Care Reviewed With: patient        Progress: improving  Outcome Evaluation: Pt with good participation in therapy session this date demonstrating good improvements in functional activity tolerance, independence, and strength. Pt ambulated 12ft with MinAx1 and RW. Pt continuing below his baseline level of function, continuing to require skilled PT services. PT reviewed BLE HEP. PT recommending inpatient rehab at [facility].      Anticipated Discharge Disposition (PT): inpatient rehabilitation facility

## 2024-01-18 VITALS
HEART RATE: 80 BPM | SYSTOLIC BLOOD PRESSURE: 110 MMHG | OXYGEN SATURATION: 95 % | HEIGHT: 69 IN | BODY MASS INDEX: 25.92 KG/M2 | WEIGHT: 175 LBS | RESPIRATION RATE: 16 BRPM | DIASTOLIC BLOOD PRESSURE: 68 MMHG | TEMPERATURE: 97.7 F

## 2024-01-18 PROBLEM — U07.1 RHABDOMYOLYSIS DUE TO COVID-19: Status: ACTIVE | Noted: 2024-01-18

## 2024-01-18 PROBLEM — I10 HTN (HYPERTENSION): Status: ACTIVE | Noted: 2024-01-18

## 2024-01-18 PROBLEM — I25.10 CAD (CORONARY ARTERY DISEASE): Status: ACTIVE | Noted: 2024-01-18

## 2024-01-18 PROBLEM — U07.1 ENCEPHALOPATHY DUE TO COVID-19 VIRUS: Status: ACTIVE | Noted: 2024-01-18

## 2024-01-18 PROBLEM — I48.20 ATRIAL FIBRILLATION, CHRONIC: Status: ACTIVE | Noted: 2024-01-18

## 2024-01-18 PROBLEM — G93.49 ENCEPHALOPATHY DUE TO COVID-19 VIRUS: Status: ACTIVE | Noted: 2024-01-18

## 2024-01-18 PROBLEM — M62.82 RHABDOMYOLYSIS DUE TO COVID-19: Status: ACTIVE | Noted: 2024-01-18

## 2024-01-18 LAB
GLUCOSE BLDC GLUCOMTR-MCNC: 149 MG/DL (ref 70–130)
GLUCOSE BLDC GLUCOMTR-MCNC: 303 MG/DL (ref 70–130)

## 2024-01-18 PROCEDURE — 63710000001 INSULIN LISPRO (HUMAN) PER 5 UNITS: Performed by: INTERNAL MEDICINE

## 2024-01-18 PROCEDURE — 99238 HOSP IP/OBS DSCHRG MGMT 30/<: CPT | Performed by: INTERNAL MEDICINE

## 2024-01-18 PROCEDURE — 82948 REAGENT STRIP/BLOOD GLUCOSE: CPT

## 2024-01-18 RX ORDER — ACETAMINOPHEN 325 MG/1
650 TABLET ORAL EVERY 4 HOURS PRN
Start: 2024-01-18

## 2024-01-18 RX ORDER — TAMSULOSIN HYDROCHLORIDE 0.4 MG/1
0.4 CAPSULE ORAL DAILY
Start: 2024-01-19

## 2024-01-18 RX ADMIN — INSULIN LISPRO 3 UNITS: 100 INJECTION, SOLUTION INTRAVENOUS; SUBCUTANEOUS at 12:29

## 2024-01-18 RX ADMIN — SENNOSIDES AND DOCUSATE SODIUM 2 TABLET: 8.6; 5 TABLET ORAL at 09:33

## 2024-01-18 RX ADMIN — TAMSULOSIN HYDROCHLORIDE 0.4 MG: 0.4 CAPSULE ORAL at 09:33

## 2024-01-18 RX ADMIN — APIXABAN 5 MG: 5 TABLET, FILM COATED ORAL at 09:33

## 2024-01-18 RX ADMIN — INSULIN LISPRO 5 UNITS: 100 INJECTION, SOLUTION INTRAVENOUS; SUBCUTANEOUS at 12:29

## 2024-01-18 RX ADMIN — MICONAZOLE NITRATE 1 APPLICATION: 20 POWDER TOPICAL at 09:34

## 2024-01-18 RX ADMIN — INSULIN LISPRO 3 UNITS: 100 INJECTION, SOLUTION INTRAVENOUS; SUBCUTANEOUS at 09:33

## 2024-01-18 RX ADMIN — ASPIRIN 81 MG: 81 TABLET, CHEWABLE ORAL at 09:34

## 2024-01-18 RX ADMIN — Medication 10 ML: at 09:34

## 2024-01-18 RX ADMIN — PANTOPRAZOLE SODIUM 40 MG: 40 TABLET, DELAYED RELEASE ORAL at 05:23

## 2024-01-18 RX ADMIN — CASTOR OIL AND BALSAM, PERU 1 APPLICATION: 788; 87 OINTMENT TOPICAL at 09:34

## 2024-01-18 RX ADMIN — FLECAINIDE ACETATE 50 MG: 50 TABLET ORAL at 12:30

## 2024-01-18 NOTE — CASE MANAGEMENT/SOCIAL WORK
Case Management Discharge Note      Final Note: Plan is [place]. [facility] van is scheduled for 15:30. Please have the patient in the 1700 building lobby by 15:15. Nurse to call report to [phone] and fax discharge summary to [phone]. Patient and spouse are in agreement with the plan.         Selected Continued Care - Admitted Since 1/8/2024       Destination Coordination complete.      Service Provider Selected Services Address Phone Fax Patient Preferred    [facility] [address] [address] [phone] --              Durable Medical Equipment    No services have been selected for the patient.                Dialysis/Infusion    No services have been selected for the patient.                Home Medical Care    No services have been selected for the patient.                Therapy    No services have been selected for the patient.                Community Resources    No services have been selected for the patient.                Community & DME    No services have been selected for the patient.                         Final Discharge Disposition Code: 03 - skilled nursing facility (SNF)

## 2024-01-18 NOTE — CASE MANAGEMENT/SOCIAL WORK
Continued Stay Note  [facility]     Patient Name: Apollo Haro  MRN: [id number]  Today's Date: 1/18/2024    Admit Date: 1/8/2024    Plan: [place]   Discharge Plan       Row Name 01/18/24 0934       Plan    Plan [facility]    Patient/Family in Agreement with Plan yes    Plan Comments Spoke with Leilani at [facility] and [facility] was requesting updated PT/OT notes, which Leilani submitted. Plan is [place] pending insurance approval. Patient will need [facility] for transport. CM will continue to follow.    Final Discharge Disposition Code 03 - skilled nursing facility (SNF)                   Discharge Codes    No documentation.                 Expected Discharge Date and Time       Expected Discharge Date Expected Discharge Time    Jan 19, 2024               Artur Joseph RN

## 2024-01-18 NOTE — DISCHARGE PLACEMENT REQUEST
"Elizabeth Haro (83 y.o. Male)       Date of Birth   1940    Social Security Number       Address   [place] [id number]    Home Phone   [phone]    MRN   [id number]       Episcopalian   None    Marital Status                               Admission Date   24    Admission Type   Emergency    Admitting Provider   Brittney Haro MD    Attending Provider   Brittney Haro MD    Department, Room/Bed   [facility] 4H, S482/1       Discharge Date       Discharge Disposition   Skilled Nursing Facility (DC - External)    Discharge Destination                                 Attending Provider: Brittney Haro MD    Allergies: No Known Allergies    Isolation: None   Infection: COVID (History) (01/15/24)   Code Status: CPR    Ht: 175.3 cm (69\")   Wt: 79.4 kg (175 lb)    Admission Cmt: None   Principal Problem: Weakness [R53.1]                   Active Insurance as of 2024       Primary Coverage       Payor Plan Insurance Group Employer/Plan Group    AETNA MEDICARE REPLACEMENT AETNA MED ADV HMO [id number]       Payor Plan Address Payor Plan Phone Number Payor Plan Fax Number Effective Dates    [address] [phone]  2024 - None Entered    [place] [address]         Subscriber Name Subscriber Birth Date Member ID       ELIZABETH HARO 1940 [id number]                     Emergency Contacts        (Rel.) Home Phone Work Phone Mobile Phone    ANTONIA HARO (Spouse) [phone] -- [phone]    Maria De Jesus Paredes (Daughter) [phone] -- [phone]                 Discharge Summary        Birttney Haro MD at 24 1325              [facility] Medicine Services  DISCHARGE SUMMARY    Patient Name: Elizabeth Haro  : 1940  MRN: [id number]    Date of Admission: 2024  4:14 PM  Date of Discharge:  2024  Primary Care Physician: Provider, No Known    Consults       No orders found from 12/10/2023 to 2024.      "       Hospital Course     Presenting Problem: Weakness    Active Hospital Problems    Diagnosis  POA    **Weakness [R53.1]  Yes    Rhabdomyolysis due to COVID-19 [U07.1, M62.82]  Yes    CAD (coronary artery disease) [I25.10]  Yes    HTN (hypertension) [I10]  Yes    Atrial fibrillation, chronic [I48.20]  Yes    Encephalopathy due to COVID-19 virus [U07.1, G93.49]  Yes    Type 2 diabetes mellitus, without long-term current use of insulin [E11.9]  Yes      Resolved Hospital Problems   No resolved problems to display.          Hospital Course:  Apollo Haro is a 83 y.o. male with paroxysmal atrial fibrillation, HTN, HLD, DM2, chronic back pain who was brought to [facility] ED due to generalized weakness. He had recently been treated for COVID as an outpatient.  CT head no acute intracranial process.     This patient's problems and plans were partially entered by my partner and updated as appropriate by me 01/18/24.      Covid 19 infection  -No hypoxia  -Tested positive on 1/8/2024, now out of isolation      Generalized weakness w/ decreased mobility  Multilevel degenerative lumbar spine changes w/ bilateral (right severe, left moderate-severe) left neural foraminal stenosis w/ bilateral L3 nerve root compromise (on MRI)  Chronic T10 & T12 compression fractures (without canal compromise)  Recent falls  -Recent [facility] procedure note 7/28/23 with diagnosis code of normal pressure hydrocephalus (patient poor historian regarding this)  -CT head without acute findings, B12 and folate within normal limits.  TSH within normal limits.    -MRI T&L spine: revealed multilevel deg lumbar spine changes w/ bilateral neural foraminal stenosis w/ bilateral L3 nerve root sompromise. patient has previously seen a spinal surgeon at [place] (over a year ago per wife report) who was considering possible surgical intervention on his spine; patient did not wish for surgery and my partner, Dr. Santiago, discussed MRI results on 1/12 with patient and patient  was not interested in any surgical intervention.  -Weakness is likely multifactorial in the setting of chronic diabetic neuropathy, longstanding lumbar disks, spinal disease and COVID infection.  -Planning rehab at [place].      Encephalopathy, likely due to covid 19 infection; resolved  -Improved     Mild rhabdomyolysis, improved  -Did have a mildly elevated CPK at 1000, repeat with improvement after gentle hydration.  -Restart statin at discharge     CAD  Atrial fibrillation  HTN  HLD  -Continue home flecainide and eliquis     DM2  -A1c 6.6%  -Resume metformin     Sacral decubitus ulcer, POA  -Wound care following.  Wound care instructions below.    Discharge Follow Up Recommendations for outpatient labs/diagnostics:   F/U PCP within 1 week of rehab discharge    Day of Discharge     HPI:   He is doing OK today.  No pain or other issues.      Vital Signs:   Temp:  [97.7 °F (36.5 °C)-98.2 °F (36.8 °C)] 97.7 °F (36.5 °C)  Heart Rate:  [] 80  Resp:  [16-18] 16  BP: ()/(47-83) 110/68  Flow (L/min):  [2] 2      Physical Exam:  Constitutional: No acute distress, awake, alert, sitting up in bed  HENT: NCAT, mucous membranes moist  Respiratory: Clear to auscultation bilaterally, respiratory effort normal   Musculoskeletal: No bilateral ankle edema  Psychiatric: Appropriate affect, cooperative  Skin: No rashes on exposed surfaces    Pertinent  and/or Most Recent Results     LAB RESULTS:      Lab 01/12/24  0506   WBC 5.43   HEMOGLOBIN 12.0*   HEMATOCRIT 36.0*   PLATELETS 159   MCV 89.8         Lab 01/13/24  0657 01/12/24  0505   SODIUM 140 143   POTASSIUM 4.2 4.3   CHLORIDE 104 105   CO2 29.0 28.0   ANION GAP 7.0 10.0   BUN 17 17   CREATININE 0.88 0.93   EGFR 84.8 81.0   GLUCOSE 141* 142*   CALCIUM 8.4* 8.1*   MAGNESIUM 1.7 1.5*                         Brief Urine Lab Results  (Last result in the past 365 days)        Color   Clarity   Blood   Leuk Est   Nitrite   Protein   CREAT   Urine HCG        01/10/24  1150 Yellow   Clear   Moderate (2+)   Negative   Negative   Negative                 Microbiology Results (last 10 days)       Procedure Component Value - Date/Time    COVID-19 and FLU A/B PCR, 1 HR TAT - Swab, Nasopharynx [[id number]]  (Abnormal) Collected: 01/08/24 1646    Lab Status: Final result Specimen: Swab from Nasopharynx Updated: 01/08/24 1751     COVID19 Detected     Influenza A PCR Not Detected     Influenza B PCR Not Detected    Narrative:      Fact sheet for providers: https://www.fda.gov/media/916756/download    Fact sheet for patients: https://www.fda.gov/media/328139/download    Test performed by PCR.  Influenza A and Influenza B negative results should be considered presumptive in samples that have a positive SARS-CoV-2 result.    Competitive inhibition studies showed that SARS-CoV-2 virus, when present at concentrations above 3.6E+04 copies/mL, can inhibit the detection and amplification of influenza A and influenza B virus RNA if present at or below 1.8E+02 copies/mL or 4.9E+02 copies/mL, respectively, and may lead to false negative influenza virus results. If co-infection with influenza A or influenza B virus is suspected in samples with a positive SARS-CoV-2 result, the sample should be re-tested with another FDA cleared, approved, or authorized influenza test, if influenza virus detection would change clinical management.            MRI Thoracic Spine Without Contrast    Result Date: 1/11/2024  MRI THORACIC SPINE WO CONTRAST Date of Exam: 1/11/2024 1:23 AM EST Indication: back pain, falls, BLE weakness.  Comparison: None available. Technique:  Routine multiplanar/multisequence sequence images of the thoracic spine were obtained without contrast administration. Findings: There is evidence of prior compression deformity and subsequent kyphoplasty at T10 with minimal persistent height loss and no significant bony retropulsion or traumatic malalignment. There is a compression deformity noted at  T12, appearing chronic without significant marrow edema present with significant underlying demineralization. Height loss is approximately 70% anteriorly. There is minimal bony retropulsion, with some mild focal kyphosis present. No additional fracture is present. There is no listhesis or subluxation. The thoracic spinal cord is normal in caliber and signal throughout. The paraspinal soft tissues demonstrate no acute or suspicious findings. At the site of focal kyphosis and minimal bony retropulsion at T11-12, there is mild associated narrowing of the spinal canal. The spinal canal and neural foramina are otherwise patent throughout.     Impression: Chronic appearing compression deformities at T10 and T12 as above, without significant associated malalignment or canal compromise. The spinal canal and neural foramina are patent throughout. No acute fracture is noted. Electronically Signed: Bayron Wagner MD  1/11/2024 11:30 AM EST  Workstation ID: [id number]    MRI Lumbar Spine Without Contrast    Result Date: 1/11/2024  MRI LUMBAR SPINE WO CONTRAST Date of Exam: 1/11/2024 12:25 AM CST Indication: back pain, falls, bilateral lower extremity weakness.  Comparison: None available. Technique:  Routine multiplanar/multisequence sequence images of the lumbar spine were obtained without contrast administration.  Findings: ALIGNMENT: Normal DISK SPACE: There is advanced intervertebral disc base narrowing at L3/4 and at L5/S1 with chronic endplate changes. VERTEBRA: No acute loss of vertebral body height. A chronic compression injury is present at T12. CORD: Distal spinal cord and conus medullaris appear unremarkable terminating above the L2 level.  SOFT TISSUES: Paraspinal musculature and visualized abdominopelvic contents are unremarkable.  No mass nor lymphadenopathy. LEVELS: T12-L1: There is a broad-based disc bulge flattening the ventral thecal sac with disc material measuring 2.5 mm beyond the vertebral body margin. AP  spinal canal diameter measures 12 mm. Ligamentum flavum hypertrophy and early facet changes are present without significant neural foramina stenosis. L1-L2: Mild broad-based disc bulge flattening the ventral thecal sac. Ligamentum flavum hypertrophy and early facet changes are noted without significant central canal or neural foramina stenosis. L2-L3: Minimal disc bulge and facet changes without significant central canal or neural foramina stenosis. L3-L4: Broad-based disc bulge flattening the ventral thecal sac with AP spinal canal diameter measuring 10 mm. There is anterior spondylosis. Ligamentum flavum hypertrophy and facet changes are noted. Vertebral body osteophytes are present with findings contributing to severe right and moderate to severe left neural foramina stenosis with contact and compromise of bilateral exiting L3 nerve roots right greater than left. L4-L5: Broad-based disc bulge effacing the ventral thecal sac with increased T2 signal within disc material centrally compatible with annular fissure. Disc material measures 3 mm beyond the vertebral body margin in the AP spinal canal diameter is narrowed to 5 mm. There is prominent ligamentum flavum hypertrophy and facet changes. There is mild to moderate right and moderate left neural foramina stenosis. L5-S1: Mild broad-based disc bulge with facet hypertrophy. There are vertebral body osteophytes contributing to mild to moderate right and moderate left neural foramina stenosis.     Impression: Multilevel degenerative changes in the lumbar spine with prominent findings at level L3/4 where there is severe right and moderate to severe left neural foramina stenosis with compromise of bilateral exiting L3 nerve roots right greater than left. Severe central canal stenosis at L4/5 with moderate neural foramina stenosis. Electronically Signed: Maricruz Bingham MD  1/11/2024 6:17 AM [facility]  Workstation ID: [id number]    XR Femur 2 View Bilateral    Result Date:  1/10/2024  XR FEMUR 2 VW BILATERAL Date of Exam: 1/10/2024 4:01 PM EST Indication: MRI Clearance Comparison: None available. Findings: There is an intramedullary hermann with a couple screws along the left proximal femur. No metal is identified along the visualized right lower extremity.     Impression: 1.Intramedullary hermann with a couple screws along proximal left femur. 2.No metal identified along visualized right lower extremity. Electronically Signed: Apollo Abdalla MD  1/10/2024 4:57 PM EST  Workstation ID: [id number]    XR Tibia Fibula 2 View Bilateral    Result Date: 1/10/2024  XR TIBIA FIBULA 2 VW BILATERAL Date of Exam: 1/10/2024 4:00 PM EST Indication: MRI Clearance Comparison: None available. Findings: Right tibia and fibula: 2 views. The shafts of the tibia and fibula appear intact. There are no radiopaque foreign bodies in the soft tissues. Left tibia and fibula: 2 views. The shafts of the tibia and fibula appear intact. There are no radiopaque foreign bodies in the soft tissues.     Impression: No significant findings. Electronically Signed: Debbie Palomino MD  1/10/2024 4:40 PM EST  Workstation ID: [id number]    CT Head Without Contrast    Result Date: 1/8/2024  CT HEAD WO CONTRAST Date of Exam: 1/8/2024 4:44 PM CST Indication: gen weakness. Comparison: None available. Technique: Axial CT images were obtained of the head without contrast administration.  Automated exposure control and iterative construction methods were used. Findings: There is no evidence of acute territorial infarction. There is no acute intracranial hemorrhage. There are no extra-axial collections. Ventricles and CSF spaces are symmetrically prominent.. No mass effect nor hydrocephalus. Brain parenchyma appears normal for age.  There is near complete opacification of the left ethmoid air cells and left frontal sinus. Correlate for signs of sinusitis. Osseous structures and orbits appear intact.     Impression: 1.No acute intracranial  process identified. 2.Generalized senescent changes of the brain. 3.Left ethmoid and frontal sinus mucosal disease. Correlate for signs of sinusitis. Electronically Signed: Wang Bingham MD  1/8/2024 4:55 PM CST  Workstation ID: [id number]    XR Chest 1 View    Result Date: 1/8/2024  XR CHEST 1 VW Date of Exam: 1/8/2024 4:20 PM EST Indication: gen weakness Comparison: None available. Findings: The lungs are clear. The heart and mediastinal contours appear normal. There is no pleural effusion. The pulmonary vasculature appears normal. The osseous structures appear intact.     Impression: No acute cardiopulmonary process. Electronically Signed: Apollo Abdalla MD  1/8/2024 4:34 PM EST  Workstation ID: [id number]                 Plan for Follow-up of Pending Labs/Results: None pending    Discharge Details        Discharge Medications        New Medications        Instructions Start Date   acetaminophen 325 MG tablet  Commonly known as: TYLENOL   650 mg, Oral, Every 4 Hours PRN      tamsulosin 0.4 MG capsule 24 hr capsule  Commonly known as: FLOMAX   0.4 mg, Oral, Daily   Start Date: January 19, 2024            Continue These Medications        Instructions Start Date   apixaban 5 MG tablet tablet  Commonly known as: ELIQUIS   5 mg, Oral, 2 Times Daily      aspirin 81 MG EC tablet   81 mg, Oral, Daily      flecainide 50 MG tablet  Commonly known as: TAMBOCOR   50 mg, Oral, Every 12 Hours      Magnesium 200 MG tablet   1 tablet, Oral, 2 Times Daily      metFORMIN 500 MG tablet  Commonly known as: GLUCOPHAGE   500 mg, Oral, 2 Times Daily With Meals      multivitamin with minerals tablet tablet   1 tablet, Oral, Daily      pravastatin 20 MG tablet  Commonly known as: PRAVACHOL   20 mg, Oral, Daily               No Known Allergies    Wound Care:  Bilateral Gluteal MASD  Clean skin gently with no-rinse PH-balanced cleanser and soft, disposable cloth (barrier wipes-blue pack).      2.   Apply zinc-oxide moisture barrier cream  (z-guard) BID and after incontinence episodes.    3.   No more than 2 incontinence pads under patient.  Adding more pads does not improve MASD.    4.   Consider applying external catheter.   b. Patient should also be offered toileting q2h when turning.    5.   Keep skin clean and dry.    Medial Sacral Spine Pressure Injury  Cleanse: Cleansing foam  Periwound: Barrier wipe  Intervention: Venelex-thin layer  Securement: Silicone Border Dressing    Discharge Disposition:  Skilled Nursing Facility (DC - External)    Diet:  Hospital:  Diet Order   Procedures    Diet: Regular/House Diet; Texture: Regular Texture (IDDSI 7); Fluid Consistency: Thin (IDDSI 0)            CODE STATUS:    Code Status and Medical Interventions:   Ordered at: 01/08/24 2053     Code Status (Patient has no pulse and is not breathing):    CPR (Attempt to Resuscitate)     Medical Interventions (Patient has pulse or is breathing):    Full Support       No future appointments.              Brittney Haro MD  01/18/24      Time Spent on Discharge:  I spent  22  minutes on this discharge activity which included: face-to-face encounter with the patient, reviewing the data in the system, coordination of the care with the nursing staff as well as consultants, documentation, and entering orders.           Electronically signed by Brittney Haro MD at 01/18/24 3731

## 2024-01-18 NOTE — PLAN OF CARE
Problem: Adult Inpatient Plan of Care  Goal: Plan of Care Review  Outcome: Ongoing, Progressing  Goal: Patient-Specific Goal (Individualized)  Outcome: Ongoing, Progressing  Goal: Absence of Hospital-Acquired Illness or Injury  Outcome: Ongoing, Progressing  Intervention: Identify and Manage Fall Risk  Recent Flowsheet Documentation  Taken 1/18/2024 0400 by Susana Grande RN  Safety Promotion/Fall Prevention:   activity supervised   assistive device/personal items within reach   clutter free environment maintained   fall prevention program maintained   nonskid shoes/slippers when out of bed   room organization consistent   safety round/check completed   toileting scheduled  Taken 1/18/2024 0000 by Susana Grande, RN  Safety Promotion/Fall Prevention:   activity supervised   assistive device/personal items within reach   clutter free environment maintained   fall prevention program maintained   nonskid shoes/slippers when out of bed   room organization consistent   safety round/check completed   toileting scheduled  Taken 1/17/2024 2000 by Susana Grande RN  Safety Promotion/Fall Prevention:   activity supervised   assistive device/personal items within reach   clutter free environment maintained   fall prevention program maintained   nonskid shoes/slippers when out of bed   room organization consistent   safety round/check completed   toileting scheduled  Intervention: Prevent Skin Injury  Recent Flowsheet Documentation  Taken 1/18/2024 0400 by Susana Grande, RN  Body Position:   weight shifting   right  Skin Protection:   adhesive use limited   incontinence pads utilized   tubing/devices free from skin contact  Taken 1/18/2024 0000 by Susana Grande, RN  Body Position:   weight shifting   left  Skin Protection:   adhesive use limited   incontinence pads utilized   tubing/devices free from skin contact  Taken 1/17/2024 2000 by Susana Grande, RN  Body Position:   weight shifting   right  Skin  Protection:   adhesive use limited   incontinence pads utilized   tubing/devices free from skin contact  Intervention: Prevent and Manage VTE (Venous Thromboembolism) Risk  Recent Flowsheet Documentation  Taken 1/18/2024 0400 by Susana Grande RN  Activity Management: activity encouraged  Taken 1/18/2024 0000 by Susana Grande RN  Activity Management: activity encouraged  Taken 1/17/2024 2000 by Susana Grande RN  Activity Management: activity encouraged  Range of Motion: active ROM (range of motion) encouraged  Intervention: Prevent Infection  Recent Flowsheet Documentation  Taken 1/18/2024 0400 by Susana Grande RN  Infection Prevention:   environmental surveillance performed   hand hygiene promoted   rest/sleep promoted   single patient room provided  Taken 1/18/2024 0000 by Susana Grande RN  Infection Prevention:   environmental surveillance performed   hand hygiene promoted   rest/sleep promoted   single patient room provided  Taken 1/17/2024 2000 by Susana Grande RN  Infection Prevention:   environmental surveillance performed   hand hygiene promoted   rest/sleep promoted   single patient room provided  Goal: Optimal Comfort and Wellbeing  Outcome: Ongoing, Progressing  Intervention: Provide Person-Centered Care  Recent Flowsheet Documentation  Taken 1/17/2024 2000 by Susana Grande RN  Trust Relationship/Rapport:   care explained   choices provided   emotional support provided   questions answered   empathic listening provided   questions encouraged   reassurance provided   other (see comments)  Goal: Readiness for Transition of Care  Outcome: Ongoing, Progressing     Problem: Skin Injury Risk Increased  Goal: Skin Health and Integrity  Outcome: Ongoing, Progressing  Intervention: Optimize Skin Protection  Recent Flowsheet Documentation  Taken 1/18/2024 0400 by Susana Grande RN  Pressure Reduction Techniques:   frequent weight shift encouraged   heels elevated off bed    positioned off wounds   weight shift assistance provided  Head of Bed (HOB) Positioning: HOB at 20-30 degrees  Pressure Reduction Devices:   pressure-redistributing mattress utilized   positioning supports utilized   heel offloading device utilized  Skin Protection:   adhesive use limited   incontinence pads utilized   tubing/devices free from skin contact  Taken 1/18/2024 0000 by Susana Grande RN  Pressure Reduction Techniques:   frequent weight shift encouraged   heels elevated off bed   positioned off wounds   weight shift assistance provided  Head of Bed (HOB) Positioning: HOB at 20-30 degrees  Pressure Reduction Devices:   pressure-redistributing mattress utilized   positioning supports utilized   heel offloading device utilized  Skin Protection:   adhesive use limited   incontinence pads utilized   tubing/devices free from skin contact  Taken 1/17/2024 2000 by Susana Grande RN  Pressure Reduction Techniques:   frequent weight shift encouraged   weight shift assistance provided   positioned off wounds  Head of Bed (HOB) Positioning: HOB at 20-30 degrees  Pressure Reduction Devices:   positioning supports utilized   pressure-redistributing mattress utilized  Skin Protection:   adhesive use limited   incontinence pads utilized   tubing/devices free from skin contact     Problem: Fall Injury Risk  Goal: Absence of Fall and Fall-Related Injury  Outcome: Ongoing, Progressing  Intervention: Identify and Manage Contributors  Recent Flowsheet Documentation  Taken 1/17/2024 2000 by Susana Grande, RN  Medication Review/Management: medications reviewed  Intervention: Promote Injury-Free Environment  Recent Flowsheet Documentation  Taken 1/18/2024 0400 by Susana Grande, RN  Safety Promotion/Fall Prevention:   activity supervised   assistive device/personal items within reach   clutter free environment maintained   fall prevention program maintained   nonskid shoes/slippers when out of bed   room  organization consistent   safety round/check completed   toileting scheduled  Taken 1/18/2024 0000 by Susana Grande, RN  Safety Promotion/Fall Prevention:   activity supervised   assistive device/personal items within reach   clutter free environment maintained   fall prevention program maintained   nonskid shoes/slippers when out of bed   room organization consistent   safety round/check completed   toileting scheduled  Taken 1/17/2024 2000 by Susana Grande, RN  Safety Promotion/Fall Prevention:   activity supervised   assistive device/personal items within reach   clutter free environment maintained   fall prevention program maintained   nonskid shoes/slippers when out of bed   room organization consistent   safety round/check completed   toileting scheduled   Goal Outcome Evaluation:

## 2024-01-18 NOTE — DISCHARGE SUMMARY
[facility]  DISCHARGE SUMMARY    Patient Name: Apollo Haro  : 1940  MRN: [id number]    Date of Admission: 2024  4:14 PM  Date of Discharge:  2024  Primary Care Physician: Provider, No Known    Consults       No orders found from 12/10/2023 to 2024.            Hospital Course     Presenting Problem: Weakness    Active Hospital Problems    Diagnosis  POA   • **Weakness [R53.1]  Yes   • Rhabdomyolysis due to COVID-19 [U07.1, M62.82]  Yes   • CAD (coronary artery disease) [I25.10]  Yes   • HTN (hypertension) [I10]  Yes   • Atrial fibrillation, chronic [I48.20]  Yes   • Encephalopathy due to COVID-19 virus [U07.1, G93.49]  Yes   • Type 2 diabetes mellitus, without long-term current use of insulin [E11.9]  Yes      Resolved Hospital Problems   No resolved problems to display.          Hospital Course:  Apollo Haro is a 83 y.o. male with paroxysmal atrial fibrillation, HTN, HLD, DM2, chronic back pain who was brought to [facility] ED due to generalized weakness. He had recently been treated for COVID as an outpatient.  CT head no acute intracranial process.     This patient's problems and plans were partially entered by my partner and updated as appropriate by me 24.      Covid 19 infection  -No hypoxia  -Tested positive on 2024, now out of isolation      Generalized weakness w/ decreased mobility  Multilevel degenerative lumbar spine changes w/ bilateral (right severe, left moderate-severe) left neural foraminal stenosis w/ bilateral L3 nerve root compromise (on MRI)  Chronic T10 & T12 compression fractures (without canal compromise)  Recent falls  -Recent [facility] procedure note 23 with diagnosis code of normal pressure hydrocephalus (patient poor historian regarding this)  -CT head without acute findings, B12 and folate within normal limits.  TSH within normal limits.    -MRI T&L spine: revealed multilevel deg lumbar spine changes w/ bilateral neural  foraminal stenosis w/ bilateral L3 nerve root sompromise. patient has previously seen a spinal surgeon at [place] (over a year ago per wife report) who was considering possible surgical intervention on his spine; patient did not wish for surgery and my partner, Dr. Santiago, discussed MRI results on 1/12 with patient and patient was not interested in any surgical intervention.  -Weakness is likely multifactorial in the setting of chronic diabetic neuropathy, longstanding lumbar disks, spinal disease and COVID infection.  -Planning rehab at [place].      Encephalopathy, likely due to covid 19 infection; resolved  -Improved     Mild rhabdomyolysis, improved  -Did have a mildly elevated CPK at 1000, repeat with improvement after gentle hydration.  -Restart statin at discharge     CAD  Atrial fibrillation  HTN  HLD  -Continue home flecainide and eliquis     DM2  -A1c 6.6%  -Resume metformin     Sacral decubitus ulcer, POA  -Wound care following.  Wound care instructions below.    Discharge Follow Up Recommendations for outpatient labs/diagnostics:   F/U PCP within 1 week of rehab discharge    Day of Discharge     HPI:   He is doing OK today.  No pain or other issues.      Vital Signs:   Temp:  [97.7 °F (36.5 °C)-98.2 °F (36.8 °C)] 97.7 °F (36.5 °C)  Heart Rate:  [] 80  Resp:  [16-18] 16  BP: ()/(47-83) 110/68  Flow (L/min):  [2] 2      Physical Exam:  Constitutional: No acute distress, awake, alert, sitting up in bed  HENT: NCAT, mucous membranes moist  Respiratory: Clear to auscultation bilaterally, respiratory effort normal   Musculoskeletal: No bilateral ankle edema  Psychiatric: Appropriate affect, cooperative  Skin: No rashes on exposed surfaces    Pertinent  and/or Most Recent Results     LAB RESULTS:      Lab 01/12/24  0506   WBC 5.43   HEMOGLOBIN 12.0*   HEMATOCRIT 36.0*   PLATELETS 159   MCV 89.8         Lab 01/13/24  0657 01/12/24  0505   SODIUM 140 143   POTASSIUM 4.2 4.3   CHLORIDE 104 105   CO2  29.0 28.0   ANION GAP 7.0 10.0   BUN 17 17   CREATININE 0.88 0.93   EGFR 84.8 81.0   GLUCOSE 141* 142*   CALCIUM 8.4* 8.1*   MAGNESIUM 1.7 1.5*                         Brief Urine Lab Results  (Last result in the past 365 days)        Color   Clarity   Blood   Leuk Est   Nitrite   Protein   CREAT   Urine HCG        01/10/24 1150 Yellow   Clear   Moderate (2+)   Negative   Negative   Negative                 Microbiology Results (last 10 days)       Procedure Component Value - Date/Time    COVID-19 and FLU A/B PCR, 1 HR TAT - Swab, Nasopharynx [[id number]]  (Abnormal) Collected: 01/08/24 1646    Lab Status: Final result Specimen: Swab from Nasopharynx Updated: 01/08/24 1751     COVID19 Detected     Influenza A PCR Not Detected     Influenza B PCR Not Detected    Narrative:      Fact sheet for providers: https://www.fda.gov/media/352955/download    Fact sheet for patients: https://www.fda.gov/media/136884/download    Test performed by PCR.  Influenza A and Influenza B negative results should be considered presumptive in samples that have a positive SARS-CoV-2 result.    Competitive inhibition studies showed that SARS-CoV-2 virus, when present at concentrations above 3.6E+04 copies/mL, can inhibit the detection and amplification of influenza A and influenza B virus RNA if present at or below 1.8E+02 copies/mL or 4.9E+02 copies/mL, respectively, and may lead to false negative influenza virus results. If co-infection with influenza A or influenza B virus is suspected in samples with a positive SARS-CoV-2 result, the sample should be re-tested with another FDA cleared, approved, or authorized influenza test, if influenza virus detection would change clinical management.            MRI Thoracic Spine Without Contrast    Result Date: 1/11/2024  MRI THORACIC SPINE WO CONTRAST Date of Exam: 1/11/2024 1:23 AM EST Indication: back pain, falls, BLE weakness.  Comparison: None available. Technique:  Routine  multiplanar/multisequence sequence images of the thoracic spine were obtained without contrast administration. Findings: There is evidence of prior compression deformity and subsequent kyphoplasty at T10 with minimal persistent height loss and no significant bony retropulsion or traumatic malalignment. There is a compression deformity noted at T12, appearing chronic without significant marrow edema present with significant underlying demineralization. Height loss is approximately 70% anteriorly. There is minimal bony retropulsion, with some mild focal kyphosis present. No additional fracture is present. There is no listhesis or subluxation. The thoracic spinal cord is normal in caliber and signal throughout. The paraspinal soft tissues demonstrate no acute or suspicious findings. At the site of focal kyphosis and minimal bony retropulsion at T11-12, there is mild associated narrowing of the spinal canal. The spinal canal and neural foramina are otherwise patent throughout.     Impression: Chronic appearing compression deformities at T10 and T12 as above, without significant associated malalignment or canal compromise. The spinal canal and neural foramina are patent throughout. No acute fracture is noted. Electronically Signed: Bayron Wagner MD  1/11/2024 11:30 AM EST  Workstation ID: [id number]    MRI Lumbar Spine Without Contrast    Result Date: 1/11/2024  MRI LUMBAR SPINE WO CONTRAST Date of Exam: 1/11/2024 12:25 AM CST Indication: back pain, falls, bilateral lower extremity weakness.  Comparison: None available. Technique:  Routine multiplanar/multisequence sequence images of the lumbar spine were obtained without contrast administration.  Findings: ALIGNMENT: Normal DISK SPACE: There is advanced intervertebral disc base narrowing at L3/4 and at L5/S1 with chronic endplate changes. VERTEBRA: No acute loss of vertebral body height. A chronic compression injury is present at T12. CORD: Distal spinal cord and conus  medullaris appear unremarkable terminating above the L2 level.  SOFT TISSUES: Paraspinal musculature and visualized abdominopelvic contents are unremarkable.  No mass nor lymphadenopathy. LEVELS: T12-L1: There is a broad-based disc bulge flattening the ventral thecal sac with disc material measuring 2.5 mm beyond the vertebral body margin. AP spinal canal diameter measures 12 mm. Ligamentum flavum hypertrophy and early facet changes are present without significant neural foramina stenosis. L1-L2: Mild broad-based disc bulge flattening the ventral thecal sac. Ligamentum flavum hypertrophy and early facet changes are noted without significant central canal or neural foramina stenosis. L2-L3: Minimal disc bulge and facet changes without significant central canal or neural foramina stenosis. L3-L4: Broad-based disc bulge flattening the ventral thecal sac with AP spinal canal diameter measuring 10 mm. There is anterior spondylosis. Ligamentum flavum hypertrophy and facet changes are noted. Vertebral body osteophytes are present with findings contributing to severe right and moderate to severe left neural foramina stenosis with contact and compromise of bilateral exiting L3 nerve roots right greater than left. L4-L5: Broad-based disc bulge effacing the ventral thecal sac with increased T2 signal within disc material centrally compatible with annular fissure. Disc material measures 3 mm beyond the vertebral body margin in the AP spinal canal diameter is narrowed to 5 mm. There is prominent ligamentum flavum hypertrophy and facet changes. There is mild to moderate right and moderate left neural foramina stenosis. L5-S1: Mild broad-based disc bulge with facet hypertrophy. There are vertebral body osteophytes contributing to mild to moderate right and moderate left neural foramina stenosis.     Impression: Multilevel degenerative changes in the lumbar spine with prominent findings at level L3/4 where there is severe right and  moderate to severe left neural foramina stenosis with compromise of bilateral exiting L3 nerve roots right greater than left. Severe central canal stenosis at L4/5 with moderate neural foramina stenosis. Electronically Signed: Maricruz Bingham MD  1/11/2024 6:17 AM CST  Workstation ID: [id number]    XR Femur 2 View Bilateral    Result Date: 1/10/2024  XR FEMUR 2 VW BILATERAL Date of Exam: 1/10/2024 4:01 PM EST Indication: MRI Clearance Comparison: None available. Findings: There is an intramedullary hermann with a couple screws along the left proximal femur. No metal is identified along the visualized right lower extremity.     Impression: 1.Intramedullary hermann with a couple screws along proximal left femur. 2.No metal identified along visualized right lower extremity. Electronically Signed: Apollo Abdalla MD  1/10/2024 4:57 PM EST  Workstation ID: [id number]    XR Tibia Fibula 2 View Bilateral    Result Date: 1/10/2024  XR TIBIA FIBULA 2 VW BILATERAL Date of Exam: 1/10/2024 4:00 PM EST Indication: MRI Clearance Comparison: None available. Findings: Right tibia and fibula: 2 views. The shafts of the tibia and fibula appear intact. There are no radiopaque foreign bodies in the soft tissues. Left tibia and fibula: 2 views. The shafts of the tibia and fibula appear intact. There are no radiopaque foreign bodies in the soft tissues.     Impression: No significant findings. Electronically Signed: Debbie Palomino MD  1/10/2024 4:40 PM EST  Workstation ID: [id number]    CT Head Without Contrast    Result Date: 1/8/2024  CT HEAD WO CONTRAST Date of Exam: 1/8/2024 4:44 PM CST Indication: gen weakness. Comparison: None available. Technique: Axial CT images were obtained of the head without contrast administration.  Automated exposure control and iterative construction methods were used. Findings: There is no evidence of acute territorial infarction. There is no acute intracranial hemorrhage. There are no extra-axial collections.  Ventricles and CSF spaces are symmetrically prominent.. No mass effect nor hydrocephalus. Brain parenchyma appears normal for age.  There is near complete opacification of the left ethmoid air cells and left frontal sinus. Correlate for signs of sinusitis. Osseous structures and orbits appear intact.     Impression: 1.No acute intracranial process identified. 2.Generalized senescent changes of the brain. 3.Left ethmoid and frontal sinus mucosal disease. Correlate for signs of sinusitis. Electronically Signed: Wang Bingham MD  1/8/2024 4:55 PM CST  Workstation ID: [id number]    XR Chest 1 View    Result Date: 1/8/2024  XR CHEST 1 VW Date of Exam: 1/8/2024 4:20 PM EST Indication: gen weakness Comparison: None available. Findings: The lungs are clear. The heart and mediastinal contours appear normal. There is no pleural effusion. The pulmonary vasculature appears normal. The osseous structures appear intact.     Impression: No acute cardiopulmonary process. Electronically Signed: Apollo Abdalla MD  1/8/2024 4:34 PM EST  Workstation ID: [id number]                 Plan for Follow-up of Pending Labs/Results: None pending    Discharge Details        Discharge Medications        New Medications        Instructions Start Date   acetaminophen 325 MG tablet  Commonly known as: TYLENOL   650 mg, Oral, Every 4 Hours PRN      tamsulosin 0.4 MG capsule 24 hr capsule  Commonly known as: FLOMAX   0.4 mg, Oral, Daily   Start Date: January 19, 2024            Continue These Medications        Instructions Start Date   apixaban 5 MG tablet tablet  Commonly known as: ELIQUIS   5 mg, Oral, 2 Times Daily      aspirin 81 MG EC tablet   81 mg, Oral, Daily      flecainide 50 MG tablet  Commonly known as: TAMBOCOR   50 mg, Oral, Every 12 Hours      Magnesium 200 MG tablet   1 tablet, Oral, 2 Times Daily      metFORMIN 500 MG tablet  Commonly known as: GLUCOPHAGE   500 mg, Oral, 2 Times Daily With Meals      multivitamin with minerals tablet  tablet   1 tablet, Oral, Daily      pravastatin 20 MG tablet  Commonly known as: PRAVACHOL   20 mg, Oral, Daily               No Known Allergies    Wound Care:  Bilateral Gluteal MASD  Clean skin gently with no-rinse PH-balanced cleanser and soft, disposable cloth (barrier wipes-blue pack).      2.   Apply zinc-oxide moisture barrier cream (z-guard) BID and after incontinence episodes.    3.   No more than 2 incontinence pads under patient.  Adding more pads does not improve MASD.    4.   Consider applying external catheter.   b. Patient should also be offered toileting q2h when turning.    5.   Keep skin clean and dry.    Medial Sacral Spine Pressure Injury  Cleanse: Cleansing foam  Periwound: Barrier wipe  Intervention: Venelex-thin layer  Securement: Silicone Border Dressing    Discharge Disposition:  Skilled Nursing Facility (DC - External)    Diet:  Hospital:  Diet Order   Procedures   • Diet: Regular/House Diet; Texture: Regular Texture (IDDSI 7); Fluid Consistency: Thin (IDDSI 0)            CODE STATUS:    Code Status and Medical Interventions:   Ordered at: 01/08/24 2053     Code Status (Patient has no pulse and is not breathing):    CPR (Attempt to Resuscitate)     Medical Interventions (Patient has pulse or is breathing):    Full Support       No future appointments.              Brittney Haro MD  01/18/24      Time Spent on Discharge:  I spent  22  minutes on this discharge activity which included: face-to-face encounter with the patient, reviewing the data in the system, coordination of the care with the nursing staff as well as consultants, documentation, and entering orders.

## 2024-01-19 NOTE — PAYOR COMM NOTE
"Ref# [id number]   Discharge Summary    Utilization Review  Phone [phone]  Fax [phone]    [place] [id number]       Elizabeth Haro (83 y.o. Male)       Date of Birth   1940    Social Security Number       Address   [address] [place] [address]    Home Phone   [phone]    MRN   [id number]       Sabianist   None    Marital Status                               Admission Date   24    Admission Type   Emergency    Admitting Provider   Brittney Haro MD    Attending Provider       Department, Room/Bed   [facility] 4H, S482/1       Discharge Date   2024    Discharge Disposition   Skilled Nursing Facility (DC - External)    Discharge Destination                                 Attending Provider: (none)   Allergies: No Known Allergies    Isolation: None   Infection: COVID (History) (01/15/24)   Code Status: Prior    Ht: 175.3 cm (69\")   Wt: 79.4 kg (175 lb)    Admission Cmt: None   Principal Problem: Weakness [R53.1]                   Active Insurance as of 2024       Primary Coverage       Payor Plan Insurance Group Employer/Plan Group    AETNA MEDICARE REPLACEMENT AETNA MED ADV HMO [id number]       Payor Plan Address Payor Plan Phone Number Payor Plan Fax Number Effective Dates    [address] [phone]  2024 - None Entered    [facility] [id number]         Subscriber Name Subscriber Birth Date Member ID       ELIZABETH HARO 1940 [id number]                     Emergency Contacts        (Rel.) Home Phone Work Phone Mobile Phone    ANTONIA HARO (Spouse) [phone] -- [phone]    Maria De Jesus Paredes (Daughter) [phone] -- [phone]                 Discharge Summary        Brittney Haro MD at 24 1325              [facility]  DISCHARGE SUMMARY    Patient Name: Elizabeth Haro  : 1940  MRN: [id number]    Date of Admission: " 1/8/2024  4:14 PM  Date of Discharge:  1/18/2024  Primary Care Physician: Provider, No Known    Consults       No orders found from 12/10/2023 to 1/9/2024.            Hospital Course     Presenting Problem: Weakness    Active Hospital Problems    Diagnosis  POA    **Weakness [R53.1]  Yes    Rhabdomyolysis due to COVID-19 [U07.1, M62.82]  Yes    CAD (coronary artery disease) [I25.10]  Yes    HTN (hypertension) [I10]  Yes    Atrial fibrillation, chronic [I48.20]  Yes    Encephalopathy due to COVID-19 virus [U07.1, G93.49]  Yes    Type 2 diabetes mellitus, without long-term current use of insulin [E11.9]  Yes      Resolved Hospital Problems   No resolved problems to display.          Hospital Course:  Apollo Haro is a 83 y.o. male with paroxysmal atrial fibrillation, HTN, HLD, DM2, chronic back pain who was brought to [facility] ED due to generalized weakness. He had recently been treated for COVID as an outpatient.  CT head no acute intracranial process.     This patient's problems and plans were partially entered by my partner and updated as appropriate by me 01/18/24.      Covid 19 infection  -No hypoxia  -Tested positive on 1/8/2024, now out of isolation      Generalized weakness w/ decreased mobility  Multilevel degenerative lumbar spine changes w/ bilateral (right severe, left moderate-severe) left neural foraminal stenosis w/ bilateral L3 nerve root compromise (on MRI)  Chronic T10 & T12 compression fractures (without canal compromise)  Recent falls  -Recent [facility] procedure note 7/28/23 with diagnosis code of normal pressure hydrocephalus (patient poor historian regarding this)  -CT head without acute findings, B12 and folate within normal limits.  TSH within normal limits.    -MRI T&L spine: revealed multilevel deg lumbar spine changes w/ bilateral neural foraminal stenosis w/ bilateral L3 nerve root sompromise. patient has previously seen a spinal surgeon at [place] (over a year ago per wife report) who was considering  possible surgical intervention on his spine; patient did not wish for surgery and my partner, Dr. Santiago, discussed MRI results on 1/12 with patient and patient was not interested in any surgical intervention.  -Weakness is likely multifactorial in the setting of chronic diabetic neuropathy, longstanding lumbar disks, spinal disease and COVID infection.  -Planning rehab at [facility].      Encephalopathy, likely due to covid 19 infection; resolved  -Improved     Mild rhabdomyolysis, improved  -Did have a mildly elevated CPK at 1000, repeat with improvement after gentle hydration.  -Restart statin at discharge     CAD  Atrial fibrillation  HTN  HLD  -Continue home flecainide and eliquis     DM2  -A1c 6.6%  -Resume metformin     Sacral decubitus ulcer, POA  -Wound care following.  Wound care instructions below.    Discharge Follow Up Recommendations for outpatient labs/diagnostics:   F/U PCP within 1 week of rehab discharge    Day of Discharge     HPI:   He is doing OK today.  No pain or other issues.      Vital Signs:   Temp:  [97.7 °F (36.5 °C)-98.2 °F (36.8 °C)] 97.7 °F (36.5 °C)  Heart Rate:  [] 80  Resp:  [16-18] 16  BP: ()/(47-83) 110/68  Flow (L/min):  [2] 2      Physical Exam:  Constitutional: No acute distress, awake, alert, sitting up in bed  HENT: NCAT, mucous membranes moist  Respiratory: Clear to auscultation bilaterally, respiratory effort normal   Musculoskeletal: No bilateral ankle edema  Psychiatric: Appropriate affect, cooperative  Skin: No rashes on exposed surfaces    Pertinent  and/or Most Recent Results     LAB RESULTS:      Lab 01/12/24  0506   WBC 5.43   HEMOGLOBIN 12.0*   HEMATOCRIT 36.0*   PLATELETS 159   MCV 89.8         Lab 01/13/24  0657 01/12/24  0505   SODIUM 140 143   POTASSIUM 4.2 4.3   CHLORIDE 104 105   CO2 29.0 28.0   ANION GAP 7.0 10.0   BUN 17 17   CREATININE 0.88 0.93   EGFR 84.8 81.0   GLUCOSE 141* 142*   CALCIUM 8.4* 8.1*   MAGNESIUM 1.7 1.5*                          Brief Urine Lab Results  (Last result in the past 365 days)        Color   Clarity   Blood   Leuk Est   Nitrite   Protein   CREAT   Urine HCG        01/10/24 1150 Yellow   Clear   Moderate (2+)   Negative   Negative   Negative                 Microbiology Results (last 10 days)       Procedure Component Value - Date/Time    COVID-19 and FLU A/B PCR, 1 HR TAT - Swab, Nasopharynx [[id number]]  (Abnormal) Collected: 01/08/24 1646    Lab Status: Final result Specimen: Swab from Nasopharynx Updated: 01/08/24 1751     COVID19 Detected     Influenza A PCR Not Detected     Influenza B PCR Not Detected    Narrative:      Fact sheet for providers: https://www.fda.gov/media/930100/download    Fact sheet for patients: https://www.fda.gov/media/490960/download    Test performed by PCR.  Influenza A and Influenza B negative results should be considered presumptive in samples that have a positive SARS-CoV-2 result.    Competitive inhibition studies showed that SARS-CoV-2 virus, when present at concentrations above 3.6E+04 copies/mL, can inhibit the detection and amplification of influenza A and influenza B virus RNA if present at or below 1.8E+02 copies/mL or 4.9E+02 copies/mL, respectively, and may lead to false negative influenza virus results. If co-infection with influenza A or influenza B virus is suspected in samples with a positive SARS-CoV-2 result, the sample should be re-tested with another FDA cleared, approved, or authorized influenza test, if influenza virus detection would change clinical management.            MRI Thoracic Spine Without Contrast    Result Date: 1/11/2024  MRI THORACIC SPINE WO CONTRAST Date of Exam: 1/11/2024 1:23 AM EST Indication: back pain, falls, BLE weakness.  Comparison: None available. Technique:  Routine multiplanar/multisequence sequence images of the thoracic spine were obtained without contrast administration. Findings: There is evidence of prior compression deformity and subsequent  kyphoplasty at T10 with minimal persistent height loss and no significant bony retropulsion or traumatic malalignment. There is a compression deformity noted at T12, appearing chronic without significant marrow edema present with significant underlying demineralization. Height loss is approximately 70% anteriorly. There is minimal bony retropulsion, with some mild focal kyphosis present. No additional fracture is present. There is no listhesis or subluxation. The thoracic spinal cord is normal in caliber and signal throughout. The paraspinal soft tissues demonstrate no acute or suspicious findings. At the site of focal kyphosis and minimal bony retropulsion at T11-12, there is mild associated narrowing of the spinal canal. The spinal canal and neural foramina are otherwise patent throughout.     Impression: Chronic appearing compression deformities at T10 and T12 as above, without significant associated malalignment or canal compromise. The spinal canal and neural foramina are patent throughout. No acute fracture is noted. Electronically Signed: Bayron Wagner MD  1/11/2024 11:30 AM EST  Workstation ID: [id number]    MRI Lumbar Spine Without Contrast    Result Date: 1/11/2024  MRI LUMBAR SPINE WO CONTRAST Date of Exam: 1/11/2024 12:25 AM CST Indication: back pain, falls, bilateral lower extremity weakness.  Comparison: None available. Technique:  Routine multiplanar/multisequence sequence images of the lumbar spine were obtained without contrast administration.  Findings: ALIGNMENT: Normal DISK SPACE: There is advanced intervertebral disc base narrowing at L3/4 and at L5/S1 with chronic endplate changes. VERTEBRA: No acute loss of vertebral body height. A chronic compression injury is present at T12. CORD: Distal spinal cord and conus medullaris appear unremarkable terminating above the L2 level.  SOFT TISSUES: Paraspinal musculature and visualized abdominopelvic contents are unremarkable.  No mass nor  lymphadenopathy. LEVELS: T12-L1: There is a broad-based disc bulge flattening the ventral thecal sac with disc material measuring 2.5 mm beyond the vertebral body margin. AP spinal canal diameter measures 12 mm. Ligamentum flavum hypertrophy and early facet changes are present without significant neural foramina stenosis. L1-L2: Mild broad-based disc bulge flattening the ventral thecal sac. Ligamentum flavum hypertrophy and early facet changes are noted without significant central canal or neural foramina stenosis. L2-L3: Minimal disc bulge and facet changes without significant central canal or neural foramina stenosis. L3-L4: Broad-based disc bulge flattening the ventral thecal sac with AP spinal canal diameter measuring 10 mm. There is anterior spondylosis. Ligamentum flavum hypertrophy and facet changes are noted. Vertebral body osteophytes are present with findings contributing to severe right and moderate to severe left neural foramina stenosis with contact and compromise of bilateral exiting L3 nerve roots right greater than left. L4-L5: Broad-based disc bulge effacing the ventral thecal sac with increased T2 signal within disc material centrally compatible with annular fissure. Disc material measures 3 mm beyond the vertebral body margin in the AP spinal canal diameter is narrowed to 5 mm. There is prominent ligamentum flavum hypertrophy and facet changes. There is mild to moderate right and moderate left neural foramina stenosis. L5-S1: Mild broad-based disc bulge with facet hypertrophy. There are vertebral body osteophytes contributing to mild to moderate right and moderate left neural foramina stenosis.     Impression: Multilevel degenerative changes in the lumbar spine with prominent findings at level L3/4 where there is severe right and moderate to severe left neural foramina stenosis with compromise of bilateral exiting L3 nerve roots right greater than left. Severe central canal stenosis at L4/5 with  moderate neural foramina stenosis. Electronically Signed: Maricruz Bingham MD  1/11/2024 6:17 AM CST  Workstation ID: [id number]    XR Femur 2 View Bilateral    Result Date: 1/10/2024  XR FEMUR 2 VW BILATERAL Date of Exam: 1/10/2024 4:01 PM EST Indication: MRI Clearance Comparison: None available. Findings: There is an intramedullary hermann with a couple screws along the left proximal femur. No metal is identified along the visualized right lower extremity.     Impression: 1.Intramedullary hermann with a couple screws along proximal left femur. 2.No metal identified along visualized right lower extremity. Electronically Signed: Apollo Abdalla MD  1/10/2024 4:57 PM EST  Workstation ID: [id number]    XR Tibia Fibula 2 View Bilateral    Result Date: 1/10/2024  XR TIBIA FIBULA 2 VW BILATERAL Date of Exam: 1/10/2024 4:00 PM EST Indication: MRI Clearance Comparison: None available. Findings: Right tibia and fibula: 2 views. The shafts of the tibia and fibula appear intact. There are no radiopaque foreign bodies in the soft tissues. Left tibia and fibula: 2 views. The shafts of the tibia and fibula appear intact. There are no radiopaque foreign bodies in the soft tissues.     Impression: No significant findings. Electronically Signed: Debbie Palomino MD  1/10/2024 4:40 PM EST  Workstation ID: [id number]    CT Head Without Contrast    Result Date: 1/8/2024  CT HEAD WO CONTRAST Date of Exam: 1/8/2024 4:44 PM CST Indication: gen weakness. Comparison: None available. Technique: Axial CT images were obtained of the head without contrast administration.  Automated exposure control and iterative construction methods were used. Findings: There is no evidence of acute territorial infarction. There is no acute intracranial hemorrhage. There are no extra-axial collections. Ventricles and CSF spaces are symmetrically prominent.. No mass effect nor hydrocephalus. Brain parenchyma appears normal for age.  There is near complete opacification of  the left ethmoid air cells and left frontal sinus. Correlate for signs of sinusitis. Osseous structures and orbits appear intact.     Impression: 1.No acute intracranial process identified. 2.Generalized senescent changes of the brain. 3.Left ethmoid and frontal sinus mucosal disease. Correlate for signs of sinusitis. Electronically Signed: Wang Bingham MD  1/8/2024 4:55 PM CST  Workstation ID: [id number]    XR Chest 1 View    Result Date: 1/8/2024  XR CHEST 1 VW Date of Exam: 1/8/2024 4:20 PM EST Indication: gen weakness Comparison: None available. Findings: The lungs are clear. The heart and mediastinal contours appear normal. There is no pleural effusion. The pulmonary vasculature appears normal. The osseous structures appear intact.     Impression: No acute cardiopulmonary process. Electronically Signed: Apollo Abdalla MD  1/8/2024 4:34 PM EST  Workstation ID: [id number]                 Plan for Follow-up of Pending Labs/Results: None pending    Discharge Details        Discharge Medications        New Medications        Instructions Start Date   acetaminophen 325 MG tablet  Commonly known as: TYLENOL   650 mg, Oral, Every 4 Hours PRN      tamsulosin 0.4 MG capsule 24 hr capsule  Commonly known as: FLOMAX   0.4 mg, Oral, Daily   Start Date: January 19, 2024            Continue These Medications        Instructions Start Date   apixaban 5 MG tablet tablet  Commonly known as: ELIQUIS   5 mg, Oral, 2 Times Daily      aspirin 81 MG EC tablet   81 mg, Oral, Daily      flecainide 50 MG tablet  Commonly known as: TAMBOCOR   50 mg, Oral, Every 12 Hours      Magnesium 200 MG tablet   1 tablet, Oral, 2 Times Daily      metFORMIN 500 MG tablet  Commonly known as: GLUCOPHAGE   500 mg, Oral, 2 Times Daily With Meals      multivitamin with minerals tablet tablet   1 tablet, Oral, Daily      pravastatin 20 MG tablet  Commonly known as: PRAVACHOL   20 mg, Oral, Daily               No Known Allergies    Wound Care:  Bilateral  Gluteal MASD  Clean skin gently with no-rinse PH-balanced cleanser and soft, disposable cloth (barrier wipes-blue pack).      2.   Apply zinc-oxide moisture barrier cream (z-guard) BID and after incontinence episodes.    3.   No more than 2 incontinence pads under patient.  Adding more pads does not improve MASD.    4.   Consider applying external catheter.   b. Patient should also be offered toileting q2h when turning.    5.   Keep skin clean and dry.    Medial Sacral Spine Pressure Injury  Cleanse: Cleansing foam  Periwound: Barrier wipe  Intervention: Venelex-thin layer  Securement: Silicone Border Dressing    Discharge Disposition:  Skilled Nursing Facility (DC - External)    Diet:  Hospital:  Diet Order   Procedures    Diet: Regular/House Diet; Texture: Regular Texture (IDDSI 7); Fluid Consistency: Thin (IDDSI 0)            CODE STATUS:    Code Status and Medical Interventions:   Ordered at: 01/08/24 2053     Code Status (Patient has no pulse and is not breathing):    CPR (Attempt to Resuscitate)     Medical Interventions (Patient has pulse or is breathing):    Full Support       No future appointments.              Parrish Haro MD  01/18/24      Time Spent on Discharge:  I spent  22  minutes on this discharge activity which included: face-to-face encounter with the patient, reviewing the data in the system, coordination of the care with the nursing staff as well as consultants, documentation, and entering orders.           Electronically signed by Parrish Haro MD at 01/18/24 1346       Discharge Order (From admission, onward)       Start     Ordered    01/18/24 1320  Discharge patient  Once        Expected Discharge Date: 01/18/24   Discharge Disposition: Skilled Nursing Facility (DC - External)   Physician of Record for Attribution - Please select from Treatment Team: PARRISH HARO [[id number]]   Review needed by CMO to determine Physician of Record: No      Question Answer Comment   Physician of Record for  Attribution - Please select from Treatment Team PARRISH TAMEZ    Review needed by CMO to determine Physician of Record No        01/18/24 5245

## 2024-01-31 ENCOUNTER — APPOINTMENT (OUTPATIENT)
Dept: GENERAL RADIOLOGY | Facility: HOSPITAL | Age: 84
End: 2024-01-31
Payer: MEDICARE

## 2024-01-31 ENCOUNTER — APPOINTMENT (OUTPATIENT)
Dept: CT IMAGING | Facility: HOSPITAL | Age: 84
End: 2024-01-31
Payer: MEDICARE

## 2024-01-31 ENCOUNTER — APPOINTMENT (OUTPATIENT)
Dept: MRI IMAGING | Facility: HOSPITAL | Age: 84
End: 2024-01-31
Payer: MEDICARE

## 2024-01-31 ENCOUNTER — HOSPITAL ENCOUNTER (INPATIENT)
Facility: HOSPITAL | Age: 84
LOS: 6 days | Discharge: NURSING, REHAB OR LONG-TERM CARE | End: 2024-02-07
Attending: STUDENT IN AN ORGANIZED HEALTH CARE EDUCATION/TRAINING PROGRAM | Admitting: INTERNAL MEDICINE
Payer: MEDICARE

## 2024-01-31 DIAGNOSIS — S06.5X9A TRAUMATIC SUBDURAL HEMATOMA WITH LOSS OF CONSCIOUSNESS, INITIAL ENCOUNTER: Primary | ICD-10-CM

## 2024-01-31 DIAGNOSIS — R53.1 WEAKNESS: ICD-10-CM
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
## 2024-01-31 DIAGNOSIS — G93.40 ENCEPHALOPATHY: ICD-10-CM

## 2024-01-31 DIAGNOSIS — I48.20 ATRIAL FIBRILLATION, CHRONIC: ICD-10-CM

## 2024-01-31 PROBLEM — S06.5XAA SDH (SUBDURAL HEMATOMA): Status: ACTIVE | Noted: 2024-01-31

## 2024-01-31 LAB
ALBUMIN SERPL-MCNC: 3.9 G/DL (ref 3.5–5.2)
ALBUMIN/GLOB SERPL: 1.1 G/DL
ALP SERPL-CCNC: 116 U/L (ref 39–117)
ALT SERPL W P-5'-P-CCNC: 15 U/L (ref 1–41)
AMMONIA BLD-SCNC: 34 UMOL/L (ref 16–60)
AMPHET+METHAMPHET UR QL: NEGATIVE
AMPHETAMINES UR QL: NEGATIVE
ANION GAP SERPL CALCULATED.3IONS-SCNC: 13 MMOL/L (ref 5–15)
APAP SERPL-MCNC: <5 MCG/ML (ref 0–30)
AST SERPL-CCNC: 22 U/L (ref 1–40)
BARBITURATES UR QL SCN: NEGATIVE
BASOPHILS # BLD AUTO: 0.02 10*3/MM3 (ref 0–0.2)
BASOPHILS NFR BLD AUTO: 0.3 % (ref 0–1.5)
BENZODIAZ UR QL SCN: NEGATIVE
BILIRUB SERPL-MCNC: 1 MG/DL (ref 0–1.2)
BILIRUB UR QL STRIP: NEGATIVE
BUN SERPL-MCNC: 16 MG/DL (ref 8–23)
BUN/CREAT SERPL: 15.8 (ref 7–25)
BUPRENORPHINE SERPL-MCNC: NEGATIVE NG/ML
CALCIUM SPEC-SCNC: 9.4 MG/DL (ref 8.6–10.5)
CANNABINOIDS SERPL QL: NEGATIVE
CHLORIDE SERPL-SCNC: 99 MMOL/L (ref 98–107)
CLARITY UR: CLEAR
CO2 SERPL-SCNC: 28 MMOL/L (ref 22–29)
COCAINE UR QL: NEGATIVE
COLOR UR: YELLOW
CREAT SERPL-MCNC: 1.01 MG/DL (ref 0.76–1.27)
DEPRECATED RDW RBC AUTO: 44.6 FL (ref 37–54)
EGFRCR SERPLBLD CKD-EPI 2021: 73.3 ML/MIN/1.73
EOSINOPHIL # BLD AUTO: 0.15 10*3/MM3 (ref 0–0.4)
EOSINOPHIL NFR BLD AUTO: 2.3 % (ref 0.3–6.2)
ERYTHROCYTE [DISTWIDTH] IN BLOOD BY AUTOMATED COUNT: 13.2 % (ref 12.3–15.4)
ETHANOL BLD-MCNC: <10 MG/DL (ref 0–10)
FENTANYL UR-MCNC: NEGATIVE NG/ML
FLUAV RNA RESP QL NAA+PROBE: NOT DETECTED
FLUBV RNA RESP QL NAA+PROBE: NOT DETECTED
GLOBULIN UR ELPH-MCNC: 3.5 GM/DL
GLUCOSE SERPL-MCNC: 133 MG/DL (ref 65–99)
GLUCOSE UR STRIP-MCNC: NEGATIVE MG/DL
HCT VFR BLD AUTO: 43.5 % (ref 37.5–51)
HGB BLD-MCNC: 14.1 G/DL (ref 13–17.7)
HGB UR QL STRIP.AUTO: NEGATIVE
IMM GRANULOCYTES # BLD AUTO: 0.02 10*3/MM3 (ref 0–0.05)
IMM GRANULOCYTES NFR BLD AUTO: 0.3 % (ref 0–0.5)
KETONES UR QL STRIP: ABNORMAL
LEUKOCYTE ESTERASE UR QL STRIP.AUTO: NEGATIVE
LYMPHOCYTES # BLD AUTO: 1.63 10*3/MM3 (ref 0.7–3.1)
LYMPHOCYTES NFR BLD AUTO: 25.2 % (ref 19.6–45.3)
MAGNESIUM SERPL-MCNC: 1.7 MG/DL (ref 1.6–2.4)
MCH RBC QN AUTO: 30 PG (ref 26.6–33)
MCHC RBC AUTO-ENTMCNC: 32.4 G/DL (ref 31.5–35.7)
MCV RBC AUTO: 92.6 FL (ref 79–97)
METHADONE UR QL SCN: NEGATIVE
MONOCYTES # BLD AUTO: 0.69 10*3/MM3 (ref 0.1–0.9)
MONOCYTES NFR BLD AUTO: 10.7 % (ref 5–12)
NEUTROPHILS NFR BLD AUTO: 3.96 10*3/MM3 (ref 1.7–7)
NEUTROPHILS NFR BLD AUTO: 61.2 % (ref 42.7–76)
NITRITE UR QL STRIP: NEGATIVE
NRBC BLD AUTO-RTO: 0 /100 WBC (ref 0–0.2)
OPIATES UR QL: NEGATIVE
OXYCODONE UR QL SCN: NEGATIVE
PCP UR QL SCN: NEGATIVE
PH UR STRIP.AUTO: 7 [PH] (ref 5–8)
PLATELET # BLD AUTO: 197 10*3/MM3 (ref 140–450)
PMV BLD AUTO: 10.6 FL (ref 6–12)
POTASSIUM SERPL-SCNC: 4 MMOL/L (ref 3.5–5.2)
PROT SERPL-MCNC: 7.4 G/DL (ref 6–8.5)
PROT UR QL STRIP: NEGATIVE
RBC # BLD AUTO: 4.7 10*6/MM3 (ref 4.14–5.8)
RSV RNA RESP QL NAA+PROBE: NOT DETECTED
SALICYLATES SERPL-MCNC: <0.3 MG/DL
SARS-COV-2 RNA RESP QL NAA+PROBE: DETECTED
SODIUM SERPL-SCNC: 140 MMOL/L (ref 136–145)
SP GR UR STRIP: 1.01 (ref 1–1.03)
T4 FREE SERPL-MCNC: 1.85 NG/DL (ref 0.93–1.7)
TRICYCLICS UR QL SCN: NEGATIVE
TSH SERPL DL<=0.05 MIU/L-ACNC: 1.22 UIU/ML (ref 0.27–4.2)
UROBILINOGEN UR QL STRIP: ABNORMAL
WBC NRBC COR # BLD AUTO: 6.47 10*3/MM3 (ref 3.4–10.8)

## 2024-01-31 PROCEDURE — 99223 1ST HOSP IP/OBS HIGH 75: CPT | Performed by: INTERNAL MEDICINE

## 2024-01-31 PROCEDURE — 82140 ASSAY OF AMMONIA: CPT | Performed by: STUDENT IN AN ORGANIZED HEALTH CARE EDUCATION/TRAINING PROGRAM

## 2024-01-31 PROCEDURE — 80179 DRUG ASSAY SALICYLATE: CPT | Performed by: STUDENT IN AN ORGANIZED HEALTH CARE EDUCATION/TRAINING PROGRAM

## 2024-01-31 PROCEDURE — P9612 CATHETERIZE FOR URINE SPEC: HCPCS

## 2024-01-31 PROCEDURE — 81003 URINALYSIS AUTO W/O SCOPE: CPT | Performed by: STUDENT IN AN ORGANIZED HEALTH CARE EDUCATION/TRAINING PROGRAM

## 2024-01-31 PROCEDURE — 87637 SARSCOV2&INF A&B&RSV AMP PRB: CPT | Performed by: STUDENT IN AN ORGANIZED HEALTH CARE EDUCATION/TRAINING PROGRAM

## 2024-01-31 PROCEDURE — 85025 COMPLETE CBC W/AUTO DIFF WBC: CPT | Performed by: STUDENT IN AN ORGANIZED HEALTH CARE EDUCATION/TRAINING PROGRAM

## 2024-01-31 PROCEDURE — 83735 ASSAY OF MAGNESIUM: CPT | Performed by: STUDENT IN AN ORGANIZED HEALTH CARE EDUCATION/TRAINING PROGRAM

## 2024-01-31 PROCEDURE — 80053 COMPREHEN METABOLIC PANEL: CPT | Performed by: STUDENT IN AN ORGANIZED HEALTH CARE EDUCATION/TRAINING PROGRAM

## 2024-01-31 PROCEDURE — 82077 ASSAY SPEC XCP UR&BREATH IA: CPT | Performed by: STUDENT IN AN ORGANIZED HEALTH CARE EDUCATION/TRAINING PROGRAM

## 2024-01-31 PROCEDURE — 99291 CRITICAL CARE FIRST HOUR: CPT

## 2024-01-31 PROCEDURE — 70450 CT HEAD/BRAIN W/O DYE: CPT

## 2024-01-31 PROCEDURE — 36415 COLL VENOUS BLD VENIPUNCTURE: CPT

## 2024-01-31 PROCEDURE — 80143 DRUG ASSAY ACETAMINOPHEN: CPT | Performed by: STUDENT IN AN ORGANIZED HEALTH CARE EDUCATION/TRAINING PROGRAM

## 2024-01-31 PROCEDURE — 84443 ASSAY THYROID STIM HORMONE: CPT | Performed by: STUDENT IN AN ORGANIZED HEALTH CARE EDUCATION/TRAINING PROGRAM

## 2024-01-31 PROCEDURE — 80307 DRUG TEST PRSMV CHEM ANLYZR: CPT | Performed by: STUDENT IN AN ORGANIZED HEALTH CARE EDUCATION/TRAINING PROGRAM

## 2024-01-31 PROCEDURE — 84439 ASSAY OF FREE THYROXINE: CPT | Performed by: STUDENT IN AN ORGANIZED HEALTH CARE EDUCATION/TRAINING PROGRAM

## 2024-01-31 PROCEDURE — G0378 HOSPITAL OBSERVATION PER HR: HCPCS

## 2024-01-31 PROCEDURE — 30283B1 TRANSFUSION OF NONAUTOLOGOUS 4-FACTOR PROTHROMBIN COMPLEX CONCENTRATE INTO VEIN, PERCUTANEOUS APPROACH: ICD-10-PCS | Performed by: STUDENT IN AN ORGANIZED HEALTH CARE EDUCATION/TRAINING PROGRAM

## 2024-01-31 PROCEDURE — 71045 X-RAY EXAM CHEST 1 VIEW: CPT

## 2024-01-31 PROCEDURE — 25010000002 PROTHROMBIN COMPLEX CONC HUMAN 1000 UNITS KIT: Performed by: STUDENT IN AN ORGANIZED HEALTH CARE EDUCATION/TRAINING PROGRAM

## 2024-01-31 RX ADMIN — PROTHROMBIN, COAGULATION FACTOR VII HUMAN, COAGULATION FACTOR IX HUMAN, COAGULATION FACTOR X HUMAN, PROTEIN C, PROTEIN S HUMAN, AND WATER 4000 UNITS: KIT at 19:46

## 2024-01-31 NOTE — Clinical Note
Level of Care: Telemetry [5]   Diagnosis: SDH (subdural hematoma) [193067]   Admitting Physician: DOUG MCGARRY [[id number]]   Attending Physician: DOUG MCGARRY [[id number]]   Bed Request Comments: tele

## 2024-02-01 ENCOUNTER — APPOINTMENT (OUTPATIENT)
Dept: MRI IMAGING | Facility: HOSPITAL | Age: 84
End: 2024-02-01
Payer: MEDICARE

## 2024-02-01 ENCOUNTER — APPOINTMENT (OUTPATIENT)
Dept: CT IMAGING | Facility: HOSPITAL | Age: 84
End: 2024-02-01
Payer: MEDICARE

## 2024-02-01 PROBLEM — R41.82 AMS (ALTERED MENTAL STATUS): Status: ACTIVE | Noted: 2024-02-01

## 2024-02-01 PROBLEM — R41.82 ALTERED MENTAL STATUS: Status: ACTIVE | Noted: 2024-02-01

## 2024-02-01 LAB
ANION GAP SERPL CALCULATED.3IONS-SCNC: 11 MMOL/L (ref 5–15)
ATMOSPHERIC PRESS: ABNORMAL MM[HG]
BASE EXCESS BLDV CALC-SCNC: 3.5 MMOL/L (ref -2–2)
BASOPHILS # BLD AUTO: 0.03 10*3/MM3 (ref 0–0.2)
BASOPHILS NFR BLD AUTO: 0.5 % (ref 0–1.5)
BDY SITE: ABNORMAL
BODY TEMPERATURE: 37
BUN SERPL-MCNC: 14 MG/DL (ref 8–23)
BUN/CREAT SERPL: 14 (ref 7–25)
CALCIUM SPEC-SCNC: 9.3 MG/DL (ref 8.6–10.5)
CHLORIDE SERPL-SCNC: 101 MMOL/L (ref 98–107)
CO2 BLDA-SCNC: 30.3 MMOL/L (ref 22–33)
CO2 SERPL-SCNC: 27 MMOL/L (ref 22–29)
COHGB MFR BLD: 1.4 %
CREAT SERPL-MCNC: 1 MG/DL (ref 0.76–1.27)
DEPRECATED RDW RBC AUTO: 43.4 FL (ref 37–54)
EGFRCR SERPLBLD CKD-EPI 2021: 74.2 ML/MIN/1.73
EOSINOPHIL # BLD AUTO: 0.19 10*3/MM3 (ref 0–0.4)
EOSINOPHIL NFR BLD AUTO: 2.9 % (ref 0.3–6.2)
EPAP: 0
ERYTHROCYTE [DISTWIDTH] IN BLOOD BY AUTOMATED COUNT: 13.1 % (ref 12.3–15.4)
GLUCOSE SERPL-MCNC: 163 MG/DL (ref 65–99)
HBA1C MFR BLD: 7 % (ref 4.8–5.6)
HCO3 BLDV-SCNC: 28.9 MMOL/L (ref 22–28)
HCT VFR BLD AUTO: 40 % (ref 37.5–51)
HGB BLD-MCNC: 13.3 G/DL (ref 13–17.7)
HGB BLDA-MCNC: 13.9 G/DL (ref 13.5–17.5)
IMM GRANULOCYTES # BLD AUTO: 0.02 10*3/MM3 (ref 0–0.05)
IMM GRANULOCYTES NFR BLD AUTO: 0.3 % (ref 0–0.5)
INHALED O2 CONCENTRATION: 21 %
INR PPP: 1.08 (ref 0.89–1.12)
IPAP: 0
LYMPHOCYTES # BLD AUTO: 1.65 10*3/MM3 (ref 0.7–3.1)
LYMPHOCYTES NFR BLD AUTO: 24.9 % (ref 19.6–45.3)
MCH RBC QN AUTO: 30.2 PG (ref 26.6–33)
MCHC RBC AUTO-ENTMCNC: 33.3 G/DL (ref 31.5–35.7)
MCV RBC AUTO: 90.9 FL (ref 79–97)
METHGB BLD QL: 0.7 %
MODALITY: ABNORMAL
MONOCYTES # BLD AUTO: 0.79 10*3/MM3 (ref 0.1–0.9)
MONOCYTES NFR BLD AUTO: 11.9 % (ref 5–12)
NEUTROPHILS NFR BLD AUTO: 3.94 10*3/MM3 (ref 1.7–7)
NEUTROPHILS NFR BLD AUTO: 59.5 % (ref 42.7–76)
NRBC BLD AUTO-RTO: 0 /100 WBC (ref 0–0.2)
OXYHGB MFR BLDV: 59.7 %
PAW @ PEAK INSP FLOW SETTING VENT: 0 CMH2O
PCO2 BLDV: 45.4 MM HG (ref 41–51)
PH BLDV: 7.41 PH UNITS (ref 7.31–7.41)
PLATELET # BLD AUTO: 168 10*3/MM3 (ref 140–450)
PMV BLD AUTO: 10.1 FL (ref 6–12)
PO2 BLDV: 32.2 MM HG (ref 27–53)
POTASSIUM SERPL-SCNC: 4.1 MMOL/L (ref 3.5–5.2)
PROTHROMBIN TIME: 14.1 SECONDS (ref 12.2–14.5)
QT INTERVAL: 390 MS
QTC INTERVAL: 458 MS
RBC # BLD AUTO: 4.4 10*6/MM3 (ref 4.14–5.8)
SODIUM SERPL-SCNC: 139 MMOL/L (ref 136–145)
TOTAL RATE: 0 BREATHS/MINUTE
WBC NRBC COR # BLD AUTO: 6.62 10*3/MM3 (ref 3.4–10.8)

## 2024-02-01 PROCEDURE — 97535 SELF CARE MNGMENT TRAINING: CPT

## 2024-02-01 PROCEDURE — 99232 SBSQ HOSP IP/OBS MODERATE 35: CPT | Performed by: FAMILY MEDICINE

## 2024-02-01 PROCEDURE — 97162 PT EVAL MOD COMPLEX 30 MIN: CPT

## 2024-02-01 PROCEDURE — 93010 ELECTROCARDIOGRAM REPORT: CPT | Performed by: INTERNAL MEDICINE

## 2024-02-01 PROCEDURE — 85025 COMPLETE CBC W/AUTO DIFF WBC: CPT | Performed by: INTERNAL MEDICINE

## 2024-02-01 PROCEDURE — 93005 ELECTROCARDIOGRAM TRACING: CPT | Performed by: INTERNAL MEDICINE

## 2024-02-01 PROCEDURE — 70450 CT HEAD/BRAIN W/O DYE: CPT

## 2024-02-01 PROCEDURE — A9577 INJ MULTIHANCE: HCPCS | Performed by: INTERNAL MEDICINE

## 2024-02-01 PROCEDURE — 25810000003 SODIUM CHLORIDE 0.9 % SOLUTION: Performed by: INTERNAL MEDICINE

## 2024-02-01 PROCEDURE — 97166 OT EVAL MOD COMPLEX 45 MIN: CPT

## 2024-02-01 PROCEDURE — 70553 MRI BRAIN STEM W/O & W/DYE: CPT

## 2024-02-01 PROCEDURE — 0 GADOBENATE DIMEGLUMINE 529 MG/ML SOLUTION: Performed by: INTERNAL MEDICINE

## 2024-02-01 PROCEDURE — 83036 HEMOGLOBIN GLYCOSYLATED A1C: CPT | Performed by: INTERNAL MEDICINE

## 2024-02-01 PROCEDURE — 99222 1ST HOSP IP/OBS MODERATE 55: CPT

## 2024-02-01 PROCEDURE — 85610 PROTHROMBIN TIME: CPT | Performed by: INTERNAL MEDICINE

## 2024-02-01 PROCEDURE — 82805 BLOOD GASES W/O2 SATURATION: CPT

## 2024-02-01 PROCEDURE — 80048 BASIC METABOLIC PNL TOTAL CA: CPT | Performed by: INTERNAL MEDICINE

## 2024-02-01 RX ORDER — SODIUM CHLORIDE 9 MG/ML
75 INJECTION, SOLUTION INTRAVENOUS CONTINUOUS
Status: ACTIVE | OUTPATIENT
Start: 2024-02-01 | End: 2024-02-01

## 2024-02-01 RX ORDER — POLYETHYLENE GLYCOL 3350 17 G/17G
17 POWDER, FOR SOLUTION ORAL DAILY PRN
Status: DISCONTINUED | OUTPATIENT
Start: 2024-02-01 | End: 2024-02-07 | Stop reason: HOSPADM

## 2024-02-01 RX ORDER — BISACODYL 5 MG/1
5 TABLET, DELAYED RELEASE ORAL DAILY PRN
Status: DISCONTINUED | OUTPATIENT
Start: 2024-02-01 | End: 2024-02-07 | Stop reason: HOSPADM

## 2024-02-01 RX ORDER — MULTIPLE VITAMINS W/ MINERALS TAB 9MG-400MCG
1 TAB ORAL DAILY
Status: DISCONTINUED | OUTPATIENT
Start: 2024-02-01 | End: 2024-02-07 | Stop reason: HOSPADM

## 2024-02-01 RX ORDER — FLECAINIDE ACETATE 50 MG/1
50 TABLET ORAL EVERY 12 HOURS SCHEDULED
Status: DISCONTINUED | OUTPATIENT
Start: 2024-02-01 | End: 2024-02-07 | Stop reason: HOSPADM

## 2024-02-01 RX ORDER — QUETIAPINE FUMARATE 25 MG/1
12.5 TABLET, FILM COATED ORAL NIGHTLY
Status: DISCONTINUED | OUTPATIENT
Start: 2024-02-01 | End: 2024-02-06

## 2024-02-01 RX ORDER — AMOXICILLIN 250 MG
2 CAPSULE ORAL 2 TIMES DAILY
Status: DISCONTINUED | OUTPATIENT
Start: 2024-02-01 | End: 2024-02-07 | Stop reason: HOSPADM

## 2024-02-01 RX ORDER — SODIUM CHLORIDE 0.9 % (FLUSH) 0.9 %
10 SYRINGE (ML) INJECTION EVERY 12 HOURS SCHEDULED
Status: DISCONTINUED | OUTPATIENT
Start: 2024-02-01 | End: 2024-02-07

## 2024-02-01 RX ORDER — BISACODYL 10 MG
10 SUPPOSITORY, RECTAL RECTAL DAILY PRN
Status: DISCONTINUED | OUTPATIENT
Start: 2024-02-01 | End: 2024-02-07 | Stop reason: HOSPADM

## 2024-02-01 RX ORDER — SODIUM CHLORIDE 9 MG/ML
40 INJECTION, SOLUTION INTRAVENOUS AS NEEDED
Status: DISCONTINUED | OUTPATIENT
Start: 2024-02-01 | End: 2024-02-07 | Stop reason: HOSPADM

## 2024-02-01 RX ORDER — SODIUM CHLORIDE 0.9 % (FLUSH) 0.9 %
10 SYRINGE (ML) INJECTION AS NEEDED
Status: DISCONTINUED | OUTPATIENT
Start: 2024-02-01 | End: 2024-02-07 | Stop reason: HOSPADM

## 2024-02-01 RX ORDER — ONDANSETRON 4 MG/1
4 TABLET, ORALLY DISINTEGRATING ORAL EVERY 6 HOURS PRN
Status: DISCONTINUED | OUTPATIENT
Start: 2024-02-01 | End: 2024-02-07 | Stop reason: HOSPADM

## 2024-02-01 RX ORDER — QUETIAPINE FUMARATE 25 MG/1
25 TABLET, FILM COATED ORAL NIGHTLY
Status: DISCONTINUED | OUTPATIENT
Start: 2024-02-01 | End: 2024-02-01

## 2024-02-01 RX ORDER — ONDANSETRON 2 MG/ML
4 INJECTION INTRAMUSCULAR; INTRAVENOUS EVERY 6 HOURS PRN
Status: DISCONTINUED | OUTPATIENT
Start: 2024-02-01 | End: 2024-02-07 | Stop reason: HOSPADM

## 2024-02-01 RX ORDER — PRAVASTATIN SODIUM 20 MG
20 TABLET ORAL DAILY
Status: DISCONTINUED | OUTPATIENT
Start: 2024-02-01 | End: 2024-02-07 | Stop reason: HOSPADM

## 2024-02-01 RX ORDER — ACETAMINOPHEN 325 MG/1
650 TABLET ORAL EVERY 4 HOURS PRN
Status: DISCONTINUED | OUTPATIENT
Start: 2024-02-01 | End: 2024-02-07 | Stop reason: HOSPADM

## 2024-02-01 RX ORDER — TAMSULOSIN HYDROCHLORIDE 0.4 MG/1
0.4 CAPSULE ORAL DAILY
Status: DISCONTINUED | OUTPATIENT
Start: 2024-02-01 | End: 2024-02-07 | Stop reason: HOSPADM

## 2024-02-01 RX ADMIN — FLECAINIDE ACETATE 50 MG: 50 TABLET ORAL at 02:59

## 2024-02-01 RX ADMIN — GADOBENATE DIMEGLUMINE 15 ML: 529 INJECTION, SOLUTION INTRAVENOUS at 02:07

## 2024-02-01 RX ADMIN — SENNOSIDES AND DOCUSATE SODIUM 2 TABLET: 8.6; 5 TABLET ORAL at 09:28

## 2024-02-01 RX ADMIN — QUETIAPINE FUMARATE 25 MG: 25 TABLET ORAL at 02:59

## 2024-02-01 RX ADMIN — TAMSULOSIN HYDROCHLORIDE 0.4 MG: 0.4 CAPSULE ORAL at 09:28

## 2024-02-01 RX ADMIN — SODIUM CHLORIDE 75 ML/HR: 9 INJECTION, SOLUTION INTRAVENOUS at 02:59

## 2024-02-01 RX ADMIN — QUETIAPINE FUMARATE 12.5 MG: 25 TABLET ORAL at 21:04

## 2024-02-01 RX ADMIN — Medication 1 TABLET: at 09:28

## 2024-02-01 RX ADMIN — SENNOSIDES AND DOCUSATE SODIUM 2 TABLET: 8.6; 5 TABLET ORAL at 21:04

## 2024-02-01 RX ADMIN — PRAVASTATIN SODIUM 20 MG: 20 TABLET ORAL at 09:28

## 2024-02-01 RX ADMIN — FLECAINIDE ACETATE 50 MG: 50 TABLET ORAL at 21:04

## 2024-02-01 NOTE — CONSULTS
[facility] Neurology  Consult Note    Patient Name: Apollo Haro  : 1940  MRN: [id number]  Primary Care Physician:  Provider, No Known  Referring Physician: No ref. provider found  Date of admission: 2024    Subjective     Reason for Consult/ Chief Complaint: Altered mental status s/p fall    Apollo Haro is a 83 y.o. male with past medical history of A-fib on Eliquis and flecainide, T2DM, recent COVID with hospitalization, CAD, HTN, HLD, falls chronic compression fractures and questionable NPH who presented to [facility] ED from skilled nursing facility after experiencing an unwitnessed fall.  Per chart review patient was discharged from [facility] on 2024 after being treated for COVID and generalized weakness.      Patient was found to have a subdural hematoma without midline shift or mass effect; repeat CT head was negative.  Neurosurgery recommended no surgical intervention at this time and to hold Eliquis for 10 days.    Upon assessment patient was extremely lethargic.  Per chart review he did receive Seroquel at approximately 3 AM this morning.  Was able to state his name, his birthday and his wife's name.  He was able to move all extremities and follow very basic commands.  Per chart review patient has had issues with incontinence, however difficult to confirm due to patient's lethargy.    Review Of Systems   Difficult to assess due to lethargy  Personal History     Past Medical History:   Diagnosis Date    A-fib     CAD (coronary artery disease)     DM (diabetes mellitus)        History reviewed. No pertinent surgical history.    Family History: family history is not on file. Otherwise pertinent FHx was reviewed and not pertinent to current issue.    Social History:  reports that he has never smoked. He has never been exposed to tobacco smoke. He has never used smokeless tobacco. He reports that he does not drink alcohol and does not use drugs.    Home Medications:   Magnesium, acetaminophen, apixaban,  aspirin, flecainide, metFORMIN, multivitamin with minerals, pravastatin, and tamsulosin    Current Medications:     Current Facility-Administered Medications:     acetaminophen (TYLENOL) tablet 650 mg, 650 mg, Oral, Q4H PRN, Elizabeth Chaney,     sennosides-docusate (PERICOLACE) 8.6-50 MG per tablet 2 tablet, 2 tablet, Oral, BID, 2 tablet at 02/01/24 0928 **AND** polyethylene glycol (MIRALAX) packet 17 g, 17 g, Oral, Daily PRN **AND** bisacodyl (DULCOLAX) EC tablet 5 mg, 5 mg, Oral, Daily PRN **AND** bisacodyl (DULCOLAX) suppository 10 mg, 10 mg, Rectal, Daily PRN, Elizabeth Chaney, DO    Calcium Replacement - Follow Nurse / BPA Driven Protocol, , Does not apply, PRN, Elizabeth Chaney, DO    flecainide (TAMBOCOR) tablet 50 mg, 50 mg, Oral, Q12H, Elizabeth Chaney, DO, 50 mg at 02/01/24 0259    Magnesium Standard Dose Replacement - Follow Nurse / BPA Driven Protocol, , Does not apply, PRN, Elizabeth Chaney, DO    multivitamin with minerals 1 tablet, 1 tablet, Oral, Daily, Elizabeth Chaney, DO, 1 tablet at 02/01/24 0928    ondansetron ODT (ZOFRAN-ODT) disintegrating tablet 4 mg, 4 mg, Oral, Q6H PRN **OR** ondansetron (ZOFRAN) injection 4 mg, 4 mg, Intravenous, Q6H PRN, Elizabeth Chaney, DO    Phosphorus Replacement - Follow Nurse / BPA Driven Protocol, , Does not apply, PRN, Elizabeth Chaney G, DO    Potassium Replacement - Follow Nurse / BPA Driven Protocol, , Does not apply, PRN, Elizabeth Chaney G, DO    pravastatin (PRAVACHOL) tablet 20 mg, 20 mg, Oral, Daily, Elizabeth Chaney G, DO, 20 mg at 02/01/24 0928    QUEtiapine (SEROquel) tablet 25 mg, 25 mg, Oral, Nightly, Kathy Chaneye G, DO, 25 mg at 02/01/24 0259    sodium chloride 0.9 % flush 10 mL, 10 mL, Intravenous, Q12H, Shiv, Elizabeth G, DO    sodium chloride 0.9 % flush 10 mL, 10 mL, Intravenous, PRN, Shiv, Elizabeth G, DO    sodium chloride 0.9 % infusion 40 mL, 40 mL, Intravenous, PRN, Shiv, Elizabeth G, DO    sodium chloride 0.9 % infusion, 75 mL/hr, Intravenous, Continuous,  Shiv, Elizabeth G, DO, Last Rate: 75 mL/hr at 02/01/24 0259, 75 mL/hr at 02/01/24 0259    tamsulosin (FLOMAX) 24 hr capsule 0.4 mg, 0.4 mg, Oral, Daily, Kathy Chaneye G, DO, 0.4 mg at 02/01/24 0928     Allergies:  Allergies   Allergen Reactions    Lidocaine Angioedema       Objective     Physical Exam  Vitals and nursing note reviewed.   Constitutional:       General: He is not in acute distress.     Appearance: He is not ill-appearing.   Eyes:      Extraocular Movements: Extraocular movements intact.      Pupils: Pupils are equal, round, and reactive to light.   Neurological:      Mental Status: He is lethargic.      Cranial Nerves: No facial asymmetry.      Sensory: No sensory deficit.      Deep Tendon Reflexes: Babinski sign present on the right side. Babinski sign absent on the left side.      Reflex Scores:       Bicep reflexes are 1+ on the right side and 1+ on the left side.       Patellar reflexes are 1+ on the right side and 1+ on the left side.       Achilles reflexes are 1+ on the right side and 1+ on the left side.     Comments:     Exam limited due to patient lethargy    Cranial Nerves   CN II: Pupils are equal, round, and reactive to light. Normal visual acuity and visual fields.    CN III IV VI: Extraocular movements are full without nystagmus.  CN V: Normal facial sensation and strength of muscles of mastication.  CN VII: Facial movements are symmetric. No weakness.  CN VIII:  Auditory acuity is normal.    No myoclonus noted on exam         Vitals:  Temp:  [97.7 °F (36.5 °C)-98.3 °F (36.8 °C)] 98.3 °F (36.8 °C)  Heart Rate:  [] 88  Resp:  [16-18] 16  BP: (100-148)/(59-90) 121/81    Laboratory Results:   Lab Results   Component Value Date    GLUCOSE 163 (H) 02/01/2024    CALCIUM 9.3 02/01/2024     02/01/2024    K 4.1 02/01/2024    CO2 27.0 02/01/2024     02/01/2024    BUN 14 02/01/2024    CREATININE 1.00 02/01/2024    BCR 14.0 02/01/2024    ANIONGAP 11.0 02/01/2024     Lab Results  "  Component Value Date    WBC 6.62 02/01/2024    HGB 13.3 02/01/2024    HCT 40.0 02/01/2024    MCV 90.9 02/01/2024     02/01/2024     No results found for: \"CHOL\"  No results found for: \"HDL\"  No results found for: \"LDL\"  No results found for: \"TRIG\"  Lab Results   Component Value Date    HGBA1C 7.00 (H) 02/01/2024     Lab Results   Component Value Date    INR 1.08 02/01/2024    INR 1.35 (H) 01/08/2024    PROTIME 14.1 02/01/2024    PROTIME 16.8 (H) 01/08/2024     Lab Results   Component Value Date    PZAZMCAG51 1,269 (H) 01/10/2024     Lab Results   Component Value Date    FOLATE >20.00 01/10/2024       MRI Brain With & Without Contrast    Result Date: 2/1/2024  MRI BRAIN W WO CONTRAST Date of Exam: 2/1/2024 1:50 AM EST Indication: Mental status change, unknown cause.  Comparison: 2/1/2024, 1/31/2024. Technique:  Routine multiplanar/multisequence sequence images of the brain were obtained before and after the uneventful administration of 15 Multihance. Findings: There is no diffusion restriction to suggest acute infarct. There is no evidence of acute or chronic intracranial hemorrhage. Tiny subdural seen along the falx on the right appears similar as compared to the previous study (series 13 image 67). Ventricles appear enlarged, similar as compared to the previous study. There is global volume loss. Mild to moderate periventricular and subcortical FLAIR signal changes are present. No suspicious or abnormal enhancement identified. No evidence of focal lesions. No mass effect or midline shift. No abnormal extra-axial collections. The major vascular flow voids appear intact. The basal ganglia, brainstem and cerebellum appear within normal limits. Calvarial and superficial soft tissue signal is within normal limits. Orbits appear unremarkable. The paranasal sinuses and the mastoid air cells appear well aerated. Midline structures are intact.     Impression: Impression: 1.No evidence of intracranial hemorrhage, " mass effect or midline shift. No evidence of recent or acute ischemia. No suspicious or abnormal enhancement. Stable tiny subdural hematoma along the falx on the right. 2.Mild to moderate periventricular and subcortical FLAIR signal changes likely related to chronic microvascular ischemic change. 3.Global volume loss with ventriculomegaly, similar as compared to the previous study. Electronically Signed: Carolina Hassan MD  2/1/2024 2:29 AM EST  Workstation ID: [id number]      Assessment / Plan   Brief Patient Summary:  Apollo Haro is a 83 y.o. male with past medical history of A-fib on Eliquis and flecainide, T2DM, recent COVID with hospitalization (1/8/24), CAD, HTN, HLD, falls chronic compression fractures and questionable NPH who presented to [facility] ED from skilled nursing facility after experiencing an unwitnessed fall.     MRI showed no sign of acute abnormality.  Stable continue subdural hematoma noted global volume loss noted with ventriculomegaly, comparable to previous scan.  Mild to moderate FLAIR changes likely related to chronic microvascular ischemic changes.    Plan:   Altered Mental Status   Questionable NPH   Decrease Seroquel to 12.5 mg nightly   Difficult to assess patient's incontinence due to patient's lethargy.  Continue to assess for urinary retention, bladder scans as needed  Spoke with physical therapy about attempting to watch patient walk.  However, concerned patient will be too lethargic.  Continue fall precautions  QTc 458    Subdural Hematoma   Repeat CT head stable.  Neurosurgery recommended holding Eliquis for 10 days.  No surgical intervention at this time.    COVID-19 virus detected  Originally positive on 1/8/2024  No isolation needed per chart review      I have discussed the above with the patient, bedside RN and Dr. Karimi  Time spent with patient: 60 minutes in face-to-face evaluation and management of the patient.       Allison Madrigal, APRN

## 2024-02-01 NOTE — PLAN OF CARE
Problem: Adult Inpatient Plan of Care  Goal: Plan of Care Review  Outcome: Ongoing, Progressing  Goal: Patient-Specific Goal (Individualized)  Outcome: Ongoing, Progressing  Goal: Absence of Hospital-Acquired Illness or Injury  Outcome: Ongoing, Progressing  Intervention: Identify and Manage Fall Risk  Recent Flowsheet Documentation  Taken 2/1/2024 0400 by Tonya Subramanian RN  Safety Promotion/Fall Prevention:   assistive device/personal items within reach   clutter free environment maintained   fall prevention program maintained   nonskid shoes/slippers when out of bed   room organization consistent   safety round/check completed  Taken 2/1/2024 0248 by Tonya Subramanian RN  Safety Promotion/Fall Prevention:   assistive device/personal items within reach   clutter free environment maintained   fall prevention program maintained   nonskid shoes/slippers when out of bed   room organization consistent   safety round/check completed  Intervention: Prevent Skin Injury  Recent Flowsheet Documentation  Taken 2/1/2024 0400 by Tonya Subramanian RN  Skin Protection:   adhesive use limited   tubing/devices free from skin contact  Taken 2/1/2024 0248 by Tonya Subramanian RN  Body Position:   neutral body alignment   neutral head position  Skin Protection:   adhesive use limited   incontinence pads utilized   tubing/devices free from skin contact  Goal: Optimal Comfort and Wellbeing  Outcome: Ongoing, Progressing  Goal: Readiness for Transition of Care  Outcome: Ongoing, Progressing  Intervention: Mutually Develop Transition Plan  Recent Flowsheet Documentation  Taken 2/1/2024 0246 by Tonya Subramanian RN  Equipment Currently Used at Home:   wheelchair   walker, rolling     Problem: Fall Injury Risk  Goal: Absence of Fall and Fall-Related Injury  Outcome: Ongoing, Progressing  Intervention: Promote Injury-Free Environment  Recent Flowsheet Documentation  Taken 2/1/2024 0400 by Tonya Subramanian RN  Safety Promotion/Fall Prevention:   assistive  device/personal items within reach   clutter free environment maintained   fall prevention program maintained   nonskid shoes/slippers when out of bed   room organization consistent   safety round/check completed  Taken 2/1/2024 0248 by Tonya Subramanian RN  Safety Promotion/Fall Prevention:   assistive device/personal items within reach   clutter free environment maintained   fall prevention program maintained   nonskid shoes/slippers when out of bed   room organization consistent   safety round/check completed     Problem: Skin Injury Risk Increased  Goal: Skin Health and Integrity  Outcome: Ongoing, Progressing  Intervention: Optimize Skin Protection  Recent Flowsheet Documentation  Taken 2/1/2024 0400 by Tonya Subramanian RN  Pressure Reduction Techniques:   frequent weight shift encouraged   positioned off wounds   pressure points protected  Head of Bed (HOB) Positioning: HOB elevated  Pressure Reduction Devices:   positioning supports utilized   pressure-redistributing mattress utilized  Skin Protection:   adhesive use limited   tubing/devices free from skin contact  Taken 2/1/2024 0248 by Tonya Subramanian RN  Pressure Reduction Techniques:   frequent weight shift encouraged   positioned off wounds   pressure points protected  Head of Bed (HOB) Positioning: HOB elevated  Pressure Reduction Devices:   positioning supports utilized   pressure-redistributing mattress utilized  Skin Protection:   adhesive use limited   incontinence pads utilized   tubing/devices free from skin contact   Goal Outcome Evaluation:

## 2024-02-01 NOTE — ED NOTES
Apollo Haro    Nursing Report ED to Floor:  Mental status: oriented to self only - this is baseline   Ambulatory status: 2 assist   Oxygen Therapy:  room air   Cardiac Rhythm: afib   Admitted from: ed  Safety Concerns:  confused, agitated , covid +  Social Issues: n/a  ED Room #:  19    ED Nurse Phone Extension - [phone] or may call [phone].      HPI:   Chief Complaint   Patient presents with    Fall       Past Medical History:  Past Medical History:   Diagnosis Date    A-fib     CAD (coronary artery disease)     DM (diabetes mellitus)         Past Surgical History:  No past surgical history on file.     Admitting Doctor:   Elizabeth Chaney DO    Consulting Provider(s):  Consults       No orders found for last 30 day(s).             Admitting Diagnosis:   The primary encounter diagnosis was Traumatic subdural hematoma with loss of consciousness, initial encounter. Diagnoses of Encephalopathy, Fall, initial encounter, and Atrial fibrillation, chronic were also pertinent to this visit.    Most Recent Vitals:   Vitals:    01/31/24 2030 01/31/24 2100 01/31/24 2130 01/31/24 2200   BP: 110/81 120/62 102/59 112/80   BP Location:       Patient Position:       Pulse: 100 87 85 101   Resp:       Temp:       SpO2: 94% 95% 93% 94%   Weight:       Height:           Active LDAs/IV Access:   Lines, Drains & Airways       Active LDAs       Name Placement date Placement time Site Days    Peripheral IV 01/31/24 1721 Anterior;Left Wrist 01/31/24  1721  Wrist  less than 1    Peripheral IV 01/31/24 1741 Left Antecubital 01/31/24  1741  Antecubital  less than 1                    Labs (abnormal labs have a star):   Labs Reviewed   COVID-19/FLUA&B/RSV, NP SWAB IN TRANSPORT MEDIA 1 HR TAT - Abnormal; Notable for the following components:       Result Value    COVID19 Detected (*)     All other components within normal limits    Narrative:     Fact sheet for providers: https://www.fda.gov/media/013070/download    Fact sheet for patients:  https://www.fda.gov/media/915293/download    Test performed by PCR.   COMPREHENSIVE METABOLIC PANEL - Abnormal; Notable for the following components:    Glucose 133 (*)     All other components within normal limits    Narrative:     GFR Normal >60  Chronic Kidney Disease <60  Kidney Failure <15    The GFR formula is only valid for adults with stable renal function between ages 18 and 70.   URINALYSIS W/ MICROSCOPIC IF INDICATED (NO CULTURE) - Abnormal; Notable for the following components:    Ketones, UA Trace (*)     All other components within normal limits    Narrative:     Urine microscopic not indicated.   T4, FREE - Abnormal; Notable for the following components:    Free T4 1.85 (*)     All other components within normal limits    Narrative:     Results may be falsely increased if patient taking Biotin.     MAGNESIUM - Normal   TSH - Normal   CBC WITH AUTO DIFFERENTIAL - Normal   URINE DRUG SCREEN - Normal    Narrative:     Cutoff For Drugs Screened:    Amphetamines               500 ng/ml  Barbiturates               200 ng/ml  Benzodiazepines            150 ng/ml  Cocaine                    150 ng/ml  Methadone                  200 ng/ml  Opiates                    100 ng/ml  Phencyclidine               25 ng/ml  THC                         50 ng/ml  Methamphetamine            500 ng/ml  Tricyclic Antidepressants  300 ng/ml  Oxycodone                  100 ng/ml  Buprenorphine               10 ng/ml    The normal value for all drugs tested is negative. This report includes unconfirmed screening results, with the cutoff values listed, to be used for medical treatment purposes only.  Unconfirmed results must not be used for non-medical purposes such as employment or legal testing.  Clinical consideration should be applied to any drug of abuse test, particularly when unconfirmed results are used.     SALICYLATE LEVEL - Normal   ETHANOL - Normal    Narrative:     Elevated lactic acid concentration and lactate  dehydrogenase(LD) activity may falsely elevate enzymatically determined ethanol levels. Not for legal purposes.    ACETAMINOPHEN LEVEL - Normal   AMMONIA - Normal   FENTANYL, URINE - Normal    Narrative:     Negative Threshold:      Fentanyl 5 ng/mL     The normal value for the drug tested is negative. This report includes final unconfirmed screening results to be used for medical treatment purposes only. Unconfirmed results must not be used for non-medical purposes such as employment or legal testing. Clinical consideration should be applied to any drug of abuse test, particularly when unconfirmed results are used.          COVID PRE-OP / PRE-PROCEDURE SCREENING ORDER (NO ISOLATION)    Narrative:     The following orders were created for panel order COVID PRE-OP / PRE-PROCEDURE SCREENING ORDER (NO ISOLATION) - Swab, Nasopharynx.  Procedure                               Abnormality         Status                     ---------                               -----------         ------                     COVID-19, FLU A/B, RSV P...[[id number]]  Abnormal            Final result                 Please view results for these tests on the individual orders.   CBC AND DIFFERENTIAL    Narrative:     The following orders were created for panel order CBC & Differential.  Procedure                               Abnormality         Status                     ---------                               -----------         ------                     CBC Auto Differential[[id number]]        Normal              Final result                 Please view results for these tests on the individual orders.       Meds Given in ED:   Medications   prothrombin complex conc human (KCentra) IV solution 4,000 Units (4,000 Units Intravenous Given 1/31/24 1946)     No current facility-administered medications for this encounter.

## 2024-02-01 NOTE — PROGRESS NOTES
[facility] Medicine Services  PROGRESS NOTE    Patient Name: Apollo Haro  : 1940  MRN: [id number]    Date of Admission: 2024  Primary Care Physician: Provider, No Known    Subjective   Subjective     CC:  Fall with head trauma    HPI:  Patient is an 83-year-old who presented to the emergency department for evaluation after falling while at a nursing facility and hitting his head.  He was found to have a small subdural hematoma.  This morning he is very sleepy after getting Seroquel and able to answer yes and no questions knew he was in [place] but could not tell me what building.  Reportedly he has had confusion and encephalopathy since having COVID earlier this year.      Objective   Objective     Vital Signs:   Temp:  [97.7 °F (36.5 °C)-98.2 °F (36.8 °C)] 98.2 °F (36.8 °C)  Heart Rate:  [] 88  Resp:  [16-18] 16  BP: (100-148)/(59-90) 121/81     Physical Exam:  Constitutional: No acute distress, sleeping but arousable  HENT: No visible bruising anteriorly  Respiratory: Clear to auscultation bilaterally, respiratory effort normal   Cardiovascular: RRR  Gastrointestinal: Positive bowel sounds, soft, nontender, nondistended  Musculoskeletal: Generally weak, lying in bed but able to move all 4 extremities  Psychiatric: Lying with eyes closed but open spontaneously, answers questions yes and no  Neurologic: Oriented to person and city but not to time or building, generally weak, Cranial Nerves grossly intact to confrontation, speech clear  Skin: No rashes      Results Reviewed:  LAB RESULTS:      Lab 24  0255 24  1806   WBC 6.62 6.47   HEMOGLOBIN 13.3 14.1   HEMATOCRIT 40.0 43.5   PLATELETS 168 197   NEUTROS ABS 3.94 3.96   IMMATURE GRANS (ABS) 0.02 0.02   LYMPHS ABS 1.65 1.63   MONOS ABS 0.79 0.69   EOS ABS 0.19 0.15   MCV 90.9 92.6   PROTIME 14.1  --          Lab 24  0255 24  1806   SODIUM 139 140   POTASSIUM 4.1 4.0   CHLORIDE 101 99   CO2 27.0  28.0   ANION GAP 11.0 13.0   BUN 14 16   CREATININE 1.00 1.01   EGFR 74.2 73.3   GLUCOSE 163* 133*   CALCIUM 9.3 9.4   MAGNESIUM  --  1.7   HEMOGLOBIN A1C 7.00*  --    TSH  --  1.220         Lab 01/31/24  1806   TOTAL PROTEIN 7.4   ALBUMIN 3.9   GLOBULIN 3.5   ALT (SGPT) 15   AST (SGOT) 22   BILIRUBIN 1.0   ALK PHOS 116         Lab 02/01/24  0255   PROTIME 14.1   INR 1.08                 Lab 02/01/24  0257   FIO2 21   CARBOXYHEMOGLOBIN (VENOUS) 1.4     Brief Urine Lab Results  (Last result in the past 365 days)        Color   Clarity   Blood   Leuk Est   Nitrite   Protein   CREAT   Urine HCG        01/31/24 1823 Yellow   Clear   Negative   Negative   Negative   Negative                   Microbiology Results Abnormal       None            MRI Brain With & Without Contrast    Result Date: 2/1/2024  MRI BRAIN W WO CONTRAST Date of Exam: 2/1/2024 1:50 AM EST Indication: Mental status change, unknown cause.  Comparison: 2/1/2024, 1/31/2024. Technique:  Routine multiplanar/multisequence sequence images of the brain were obtained before and after the uneventful administration of 15 Multihance. Findings: There is no diffusion restriction to suggest acute infarct. There is no evidence of acute or chronic intracranial hemorrhage. Tiny subdural seen along the falx on the right appears similar as compared to the previous study (series 13 image 67). Ventricles appear enlarged, similar as compared to the previous study. There is global volume loss. Mild to moderate periventricular and subcortical FLAIR signal changes are present. No suspicious or abnormal enhancement identified. No evidence of focal lesions. No mass effect or midline shift. No abnormal extra-axial collections. The major vascular flow voids appear intact. The basal ganglia, brainstem and cerebellum appear within normal limits. Calvarial and superficial soft tissue signal is within normal limits. Orbits appear unremarkable. The paranasal sinuses and the mastoid  air cells appear well aerated. Midline structures are intact.     Impression: Impression: 1.No evidence of intracranial hemorrhage, mass effect or midline shift. No evidence of recent or acute ischemia. No suspicious or abnormal enhancement. Stable tiny subdural hematoma along the falx on the right. 2.Mild to moderate periventricular and subcortical FLAIR signal changes likely related to chronic microvascular ischemic change. 3.Global volume loss with ventriculomegaly, similar as compared to the previous study. Electronically Signed: Carolina Hassan MD  2/1/2024 2:29 AM EST  Workstation ID: [id number]    CT Head Without Contrast    Result Date: 2/1/2024  CT HEAD WO CONTRAST Date of Exam: 2/1/2024 12:14 AM EST Indication: Serial exam of traumatic subdural. Comparison: 1/31/2024. Technique: Axial CT images were obtained of the head without contrast administration.  Automated exposure control and iterative construction methods were used. Findings: Tiny focal subdural hemorrhage seen along the right cerebral convexity along the falx measuring up to 5 mm, unchanged as compared to the previous study. No additional area of hemorrhage identified.. There is no extracerebral collection. The ventricles appear prominent in size, stable as compared to the previous study..  Posterior fossa is within normal limits. Calvarium and skull base appear intact.   Visualized sinuses show no air fluid levels. Visualized orbits are unremarkable.     Impression: Impression: Stable tiny subdural hemorrhage along the right cerebral convexity along the falx. No new area of hemorrhage identified. Otherwise, stable exam. Electronically Signed: Carolina Hassan MD  2/1/2024 12:35 AM EST  Workstation ID: [id number]    XR Chest 1 View    Result Date: 1/31/2024  XR CHEST 1 VW Date of Exam: 1/31/2024 7:40 PM EST Indication: Altered mental status, infectious workup Comparison: 1/8/2024 Findings: Lungs are moderately well expanded and appear clear except for what  appears to be stable linear scarring or discoid atelectasis in the left base. No edema effusion or pneumothorax is seen. Heart is in the upper range of normal size. Vasculature appears normal.     Impression: Impression: Mild left basilar discoid atelectasis. No other evidence of active chest disease. Electronically Signed: Mikel Joseph MD  1/31/2024 8:01 PM EST  Workstation ID: [id number]    CT Head Without Contrast    Result Date: 1/31/2024  CT HEAD WO CONTRAST Date of Exam: 1/31/2024 6:33 PM EST Indication: fall on thinners, occipital swelling, confused (likely baseline). Comparison: 1/8/2024 Technique: Axial CT images were obtained of the head without contrast administration.  Automated exposure control and iterative construction methods were used. Findings: Parenchyma:No acute intraparenchymal hemorrhage. No loss of gray-white differentiation to suggest large territory infarct. Moderate parenchymal volume loss. Scattered periventricular and subcortical white matter hypodensities, nonspecific, but most often  consistent with small vessel ischemic changes. No midline shift or herniation. Ventricles and extra axial spaces:Disproportionate enlargement of the ventricles with crowding of the sulci at the vertex. Small right frontal parafalcine subdural hematoma measuring up to 5 mm in thickness. Other: Right lens replacement. Scattered paranasal sinus mucosal thickening. Small left mastoid effusion. Calvarium is intact. Intracranial atherosclerotic calcification is present. Mild soft tissue swelling of the left posterior scalp.     Impression: Impression: Small volume right parafalcine subdural hematoma. Enlargement of the ventricles with crowding of the sulci at the vertex which can be seen in the setting of normal pressure hydrocephalus. Additional chronic changes as above. Findings discussed with Dr. ESME KEY by Dr. Elgin Cisse via telephone on 1/31/2024 7:02 PM EST. Electronically Signed: Elgin  MD Tanna  1/31/2024 7:07 PM EST  Workstation ID: [id number]         Current medications:  Scheduled Meds:flecainide, 50 mg, Oral, Q12H  multivitamin with minerals, 1 tablet, Oral, Daily  pravastatin, 20 mg, Oral, Daily  QUEtiapine, 25 mg, Oral, Nightly  senna-docusate sodium, 2 tablet, Oral, BID  sodium chloride, 10 mL, Intravenous, Q12H  tamsulosin, 0.4 mg, Oral, Daily      Continuous Infusions:sodium chloride, 75 mL/hr, Last Rate: 75 mL/hr (02/01/24 0259)      PRN Meds:.  acetaminophen    senna-docusate sodium **AND** polyethylene glycol **AND** bisacodyl **AND** bisacodyl    Calcium Replacement - Follow Nurse / BPA Driven Protocol    Magnesium Standard Dose Replacement - Follow Nurse / BPA Driven Protocol    ondansetron ODT **OR** ondansetron    Phosphorus Replacement - Follow Nurse / BPA Driven Protocol    Potassium Replacement - Follow Nurse / BPA Driven Protocol    sodium chloride    sodium chloride    Assessment & Plan   Assessment & Plan     Active Hospital Problems    Diagnosis  POA    **SDH (subdural hematoma) [S06.5XAA]  Yes    Altered mental status [R41.82]  Unknown    HTN (hypertension) [I10]  Yes    CAD (coronary artery disease) [I25.10]  Yes    Atrial fibrillation, chronic [I48.20]  Yes    COVID-19 virus detected [U07.1]  Yes    Type 2 diabetes mellitus, without long-term current use of insulin [E11.9]  Yes      Resolved Hospital Problems   No resolved problems to display.        Brief Hospital Course to date:  Apollo Haro is a 83 y.o. male admitted after an unwitnessed fall while at rehab.  He sustained a subdural hematoma.  Has medical history significant for A-fib on Eliquis and flecainide, diabetes mellitus type 2, recent COVID-19, encephalopathy thought secondary to COVID, CAD, hypertension, hyperlipidemia, chronic compression fractures, history of NPH.  Neurology has been consulted as well as neurosurgery.    Subdural hematoma  Altered mental status  History of NPH  -Neurosurgery consult  pending for subdural hematoma  -Neurology consult pending for altered mental status  -Fall precautions  -Holding Eliquis, given Kcentra in ED  -MRI with no intracranial hemorrhage, small subdural hematoma noted on the right  -Discussed with neurology will decrease Seroquel dosing given somnolence today    Diabetes mellitus type 2  -Continue sliding scale insulin  -A1c 7.00  -Hold metformin    Recent COVID-19 infection  A-fib on chronic anticoagulation with Eliquis  -Held Eliquis due to subdural hematoma, resume when okay with neurosurgery  -Continue flecainide    CAD  Hypertension  Hyperlipidemia      Expected Discharge Location and Transportation: Rehab  Expected Discharge   Expected Discharge Date: 2/5/2024; Expected Discharge Time:      DVT prophylaxis:  Mechanical DVT prophylaxis orders are present.         AM-PAC 6 Clicks Score (PT): 16 (02/01/24 7227)    CODE STATUS:   Code Status and Medical Interventions:   Ordered at: 02/01/24 0043     Level Of Support Discussed With:    Next of Kin (If No Surrogate)     Code Status (Patient has no pulse and is not breathing):    CPR (Attempt to Resuscitate)     Medical Interventions (Patient has pulse or is breathing):    Full Support       Sherine Karimi MD  02/01/24

## 2024-02-01 NOTE — H&P
[facility] Medicine Services  HISTORY AND PHYSICAL    Patient Name: Apollo Haro  : 1940  MRN: [id number]  Primary Care Physician: Provider, No Known  Date of admission: 2024      Subjective   Subjective     Chief Complaint:  Altered mental status, fall    HPI:  Apollo Haro is a 83 y.o. male with a PMH significant for atrial fibrillation on Eliquis and flecainide, diabetes mellitus type 2, recent hospitalization for COVID-19, CAD, HTN, HLD, falls with chronic compression fractures, questionable history of NPH who comes to the ED from [place] skilled nursing facility after an unwitnessed fall.  Patient does not remember the events of the day, is quite encephalopathic.  Daughter at bedside provides HPI.  She says that patient was discharged from [facility] on 2024 after being treated for COVID-19 with generalized weakness.  He was discharged to [facility] for rehab.  Around 1 week ago patient began to exhibit significant confusion with visual hallucinations around 3 days.  Over the weekend he was doing better and seemed to be back to baseline.  For the past several days he has had increased confusion.  He was found down after an unwitnessed fall at SNF and was sent to the ED.    Personal History     Past Medical History:   Diagnosis Date   • A-fib    • CAD (coronary artery disease)    • DM (diabetes mellitus)        No past surgical history on file.    Family History: family history is not on file.     Social History:  reports that he has never smoked. He has never been exposed to tobacco smoke. He has never used smokeless tobacco. He reports that he does not drink alcohol and does not use drugs.  Social History     Social History Narrative   • Not on file       Medications:  Available home medication information reviewed.  Magnesium, acetaminophen, apixaban, aspirin, flecainide, metFORMIN, multivitamin with minerals, pravastatin, and tamsulosin    Allergies   Allergen  Reactions   • Lidocaine Angioedema       Objective   Objective     Vital Signs:   Temp:  [97.8 °F (36.6 °C)] 97.8 °F (36.6 °C)  Heart Rate:  [] 101  Resp:  [18] 18  BP: (100-141)/(59-90) 112/80       Physical Exam   Constitutional: Awake, alert  Eyes: PERRLA, sclerae anicteric, no conjunctival injection  HENT: NCAT, mucous membranes moist  Neck: Supple, no thyromegaly, no lymphadenopathy, trachea midline  Respiratory: Clear to auscultation bilaterally, nonlabored respirations   Cardiovascular: RRR, no murmurs, rubs, or gallops, palpable pedal pulses bilaterally  Gastrointestinal: Positive bowel sounds, soft, nontender, nondistended  Musculoskeletal: No bilateral ankle edema, no clubbing or cyanosis to extremities  Psychiatric: Appropriate affect, cooperative  Neurologic: Oriented to person and hospital, strength symmetric in all extremities, Cranial Nerves grossly intact to confrontation, speech clear  Skin: No rashes      Result Review:  I have personally reviewed the results from the time of this admission to 2/1/2024 00:45 EST and agree with these findings:  [x]  Laboratory list / accordion  [x]  Microbiology  [x]  Radiology  [x]  EKG/Telemetry   []  Cardiology/Vascular   []  Pathology  [x]  Old records      LAB RESULTS:      Lab 01/31/24  1806   WBC 6.47   HEMOGLOBIN 14.1   HEMATOCRIT 43.5   PLATELETS 197   NEUTROS ABS 3.96   IMMATURE GRANS (ABS) 0.02   LYMPHS ABS 1.63   MONOS ABS 0.69   EOS ABS 0.15   MCV 92.6         Lab 01/31/24  1806   SODIUM 140   POTASSIUM 4.0   CHLORIDE 99   CO2 28.0   ANION GAP 13.0   BUN 16   CREATININE 1.01   EGFR 73.3   GLUCOSE 133*   CALCIUM 9.4   MAGNESIUM 1.7   TSH 1.220         Lab 01/31/24  1806   TOTAL PROTEIN 7.4   ALBUMIN 3.9   GLOBULIN 3.5   ALT (SGPT) 15   AST (SGOT) 22   BILIRUBIN 1.0   ALK PHOS 116                     UA          1/10/2024    11:50 1/31/2024    18:23   Urinalysis   Squamous Epithelial Cells, UA 0-2     Specific Gravity, UA 1.020  1.015    Ketones, UA  Trace  Trace    Blood, UA Moderate (2+)  Negative    Leukocytes, UA Negative  Negative    Nitrite, UA Negative  Negative    RBC, UA 3-5     WBC, UA 0-2     Bacteria, UA None Seen         Microbiology Results (last 10 days)       Procedure Component Value - Date/Time    COVID PRE-OP / PRE-PROCEDURE SCREENING ORDER (NO ISOLATION) - Swab, Nasopharynx [[id number]]  (Abnormal) Collected: 01/31/24 2046    Lab Status: Final result Specimen: Swab from Nasopharynx Updated: 01/31/24 2138    Narrative:      The following orders were created for panel order COVID PRE-OP / PRE-PROCEDURE SCREENING ORDER (NO ISOLATION) - Swab, Nasopharynx.  Procedure                               Abnormality         Status                     ---------                               -----------         ------                     COVID-19, FLU A/B, RSV P...[[id number]]  Abnormal            Final result                 Please view results for these tests on the individual orders.    COVID-19, FLU A/B, RSV PCR 1 HR TAT - Swab, Nasopharynx [[id number]]  (Abnormal) Collected: 01/31/24 2046    Lab Status: Final result Specimen: Swab from Nasopharynx Updated: 01/31/24 2138     COVID19 Detected     Influenza A PCR Not Detected     Influenza B PCR Not Detected     RSV, PCR Not Detected    Narrative:      Fact sheet for providers: https://www.fda.gov/media/383253/download    Fact sheet for patients: https://www.fda.gov/media/055111/download    Test performed by PCR.            CT Head Without Contrast    Result Date: 2/1/2024  CT HEAD WO CONTRAST Date of Exam: 2/1/2024 12:14 AM EST Indication: Serial exam of traumatic subdural. Comparison: 1/31/2024. Technique: Axial CT images were obtained of the head without contrast administration.  Automated exposure control and iterative construction methods were used. Findings: Tiny focal subdural hemorrhage seen along the right cerebral convexity along the falx measuring up to 5 mm, unchanged as compared to the previous  study. No additional area of hemorrhage identified.. There is no extracerebral collection. The ventricles appear prominent in size, stable as compared to the previous study..  Posterior fossa is within normal limits. Calvarium and skull base appear intact.   Visualized sinuses show no air fluid levels. Visualized orbits are unremarkable.     Impression: Impression: Stable tiny subdural hemorrhage along the right cerebral convexity along the falx. No new area of hemorrhage identified. Otherwise, stable exam. Electronically Signed: Carolina Hassan MD  2/1/2024 12:35 AM EST  Workstation ID: [id number]    XR Chest 1 View    Result Date: 1/31/2024  XR CHEST 1 VW Date of Exam: 1/31/2024 7:40 PM EST Indication: Altered mental status, infectious workup Comparison: 1/8/2024 Findings: Lungs are moderately well expanded and appear clear except for what appears to be stable linear scarring or discoid atelectasis in the left base. No edema effusion or pneumothorax is seen. Heart is in the upper range of normal size. Vasculature appears normal.     Impression: Impression: Mild left basilar discoid atelectasis. No other evidence of active chest disease. Electronically Signed: Mikel Joseph MD  1/31/2024 8:01 PM EST  Workstation ID: [id number]    CT Head Without Contrast    Result Date: 1/31/2024  CT HEAD WO CONTRAST Date of Exam: 1/31/2024 6:33 PM EST Indication: fall on thinners, occipital swelling, confused (likely baseline). Comparison: 1/8/2024 Technique: Axial CT images were obtained of the head without contrast administration.  Automated exposure control and iterative construction methods were used. Findings: Parenchyma:No acute intraparenchymal hemorrhage. No loss of gray-white differentiation to suggest large territory infarct. Moderate parenchymal volume loss. Scattered periventricular and subcortical white matter hypodensities, nonspecific, but most often  consistent with small vessel ischemic changes. No midline shift or  herniation. Ventricles and extra axial spaces:Disproportionate enlargement of the ventricles with crowding of the sulci at the vertex. Small right frontal parafalcine subdural hematoma measuring up to 5 mm in thickness. Other: Right lens replacement. Scattered paranasal sinus mucosal thickening. Small left mastoid effusion. Calvarium is intact. Intracranial atherosclerotic calcification is present. Mild soft tissue swelling of the left posterior scalp.     Impression: Impression: Small volume right parafalcine subdural hematoma. Enlargement of the ventricles with crowding of the sulci at the vertex which can be seen in the setting of normal pressure hydrocephalus. Additional chronic changes as above. Findings discussed with Dr. ESME KEY by Dr. Elgin Cisse via telephone on 1/31/2024 7:02 PM EST. Electronically Signed: Elgin Cisse MD  1/31/2024 7:07 PM EST  Workstation ID: [id number]         Assessment & Plan   Assessment & Plan       SDH (subdural hematoma)    Type 2 diabetes mellitus, without long-term current use of insulin    CAD (coronary artery disease)    HTN (hypertension)    Atrial fibrillation, chronic    COVID-19 virus detected    Altered mental status    Apollo Haro is a 83 y.o. male with a PMH significant for atrial fibrillation on Eliquis and flecainide, diabetes mellitus type 2, recent hospitalization for COVID-19, CAD, HTN, HLD, falls with chronic compression fractures, questionable history of NPH who comes to the ED from [facility] nursing facility after an unwitnessed fall.     Subdural hematoma  -Discussed with Dr. Taylro with neurosurgery, consult for a.m.  - On Eliquis, given Kcentra in ED  - Repeat CT head pending  - Neurochecks  - Fall precautions    Altered mental status  -- Ammonia, UDS within normal limits  -- VBG pending  -- Neurology consult  -- UA negative  -- Chest x-ray negative  -- Continue to rule out underlying infectious sources  -- CT head shows SDH  with concerns for NPH  -- Confusion during prior stay, attributed to COVID-19 encephalopathy  -- Questionable history of NPH in care everywhere, appreciate neurology input  -- MRI brain with and without contrast pending  - Monitor for urinary retention, constipation  - Agitated.  Check QTc.  If not prolonged, will give trial of Seroquel tonight.    Diabetes mellitus type 2  - SSI with Accu-Cheks  - Hold home metformin  - Hemoglobin A1c for a.m.    COVID-19 virus detected  - Positive on 1/8/2024  - No isolation needed    Atrial fibrillation  - Hold Eliquis, given Kcentra in ED  - Appreciate neurosurgery recommendations for restarting Eliquis  -continue home flecainide    CAD  HTN  HLD  - Continue home meds    Home med list reviewed    Total time spent: 75 minutes  Time spent includes time reviewing chart, face-to-face time, counseling patient/family/caregiver, ordering medications/tests/procedures, communicating with other health care professionals, documenting clinical information in the electronic health record, and coordination of care.      DVT prophylaxis: SCDs       CODE STATUS:    Code Status and Medical Interventions:   Ordered at: 02/01/24 0043     Level Of Support Discussed With:    Next of Kin (If No Surrogate)     Code Status (Patient has no pulse and is not breathing):    CPR (Attempt to Resuscitate)     Medical Interventions (Patient has pulse or is breathing):    Full Support       Expected Discharge   Expected discharge date/ time has not been documented.     Elizabeth Chaney, DO  02/01/24

## 2024-02-01 NOTE — ED PROVIDER NOTES
EMERGENCY DEPARTMENT ENCOUNTER    Pt Name: Apollo Haro  MRN: [id number]  Pt :   1940  Room Number:    Date of encounter:  2024  PCP: Provider, No Known  ED Provider: Rebel Villanueva MD    Historian: EMS and later daughter      HPI:  Chief Complaint: Fall, head trauma, confusion        Context: Apollo Haro is a 83-year-old man brought in by EMS from nursing facility because of an unwitnessed fall on blood thinners with possible increased confusion.  He says he does not remember the fall and does not have any pain he does have some swelling over the back of his head he says he does not know how that got there.  He denies any focal weakness or numbness.  No other complaints at this time.       PAST MEDICAL HISTORY  Past Medical History:   Diagnosis Date    A-fib     CAD (coronary artery disease)     DM (diabetes mellitus)          PAST SURGICAL HISTORY  No past surgical history on file.      FAMILY HISTORY  No family history on file.      SOCIAL HISTORY  Social History     Socioeconomic History    Marital status:    Tobacco Use    Smoking status: Never     Passive exposure: Never    Smokeless tobacco: Never   Vaping Use    Vaping Use: Never used   Substance and Sexual Activity    Alcohol use: Never    Drug use: Never    Sexual activity: Defer         ALLERGIES  Lidocaine        REVIEW OF SYSTEMS  Review of Systems       All systems reviewed and negative except for those discussed in HPI.       PHYSICAL EXAM    I have reviewed the triage vital signs and nursing notes.    ED Triage Vitals   Temp Heart Rate Resp BP SpO2   24 1739 24 1738 24 1738 24 1738 24 1738   97.8 °F (36.6 °C) 98 18 141/73 96 %      Temp src Heart Rate Source Patient Position BP Location FiO2 (%)   -- -- 24 1738 24 1738 --     Lying Right arm        Physical Exam  GENERAL:   Appears elderly and chronically ill but in no acute distress  HENT: Nares patent.  Symmetric, bruising  over the posterior occipital area in the midline  EYES: No scleral icterus.  CV: Regular rhythm, regular rate.  RESPIRATORY: Normal effort.  No audible wheezes, rales or rhonchi.  ABDOMEN: Soft, nontender  MUSCULOSKELETAL: No deformities.   NEURO: Alert but encephalopathic oriented x 1., moves all extremities, follows commands.  SKIN: Warm, dry, no rash visualized.      LAB RESULTS  Recent Results (from the past 24 hour(s))   Comprehensive Metabolic Panel    Collection Time: 01/31/24  6:06 PM    Specimen: Blood   Result Value Ref Range    Glucose 133 (H) 65 - 99 mg/dL    BUN 16 8 - 23 mg/dL    Creatinine 1.01 0.76 - 1.27 mg/dL    Sodium 140 136 - 145 mmol/L    Potassium 4.0 3.5 - 5.2 mmol/L    Chloride 99 98 - 107 mmol/L    CO2 28.0 22.0 - 29.0 mmol/L    Calcium 9.4 8.6 - 10.5 mg/dL    Total Protein 7.4 6.0 - 8.5 g/dL    Albumin 3.9 3.5 - 5.2 g/dL    ALT (SGPT) 15 1 - 41 U/L    AST (SGOT) 22 1 - 40 U/L    Alkaline Phosphatase 116 39 - 117 U/L    Total Bilirubin 1.0 0.0 - 1.2 mg/dL    Globulin 3.5 gm/dL    A/G Ratio 1.1 g/dL    BUN/Creatinine Ratio 15.8 7.0 - 25.0    Anion Gap 13.0 5.0 - 15.0 mmol/L    eGFR 73.3 >60.0 mL/min/1.73   Magnesium    Collection Time: 01/31/24  6:06 PM    Specimen: Blood   Result Value Ref Range    Magnesium 1.7 1.6 - 2.4 mg/dL   TSH    Collection Time: 01/31/24  6:06 PM    Specimen: Blood   Result Value Ref Range    TSH 1.220 0.270 - 4.200 uIU/mL   CBC Auto Differential    Collection Time: 01/31/24  6:06 PM    Specimen: Blood   Result Value Ref Range    WBC 6.47 3.40 - 10.80 10*3/mm3    RBC 4.70 4.14 - 5.80 10*6/mm3    Hemoglobin 14.1 13.0 - 17.7 g/dL    Hematocrit 43.5 37.5 - 51.0 %    MCV 92.6 79.0 - 97.0 fL    MCH 30.0 26.6 - 33.0 pg    MCHC 32.4 31.5 - 35.7 g/dL    RDW 13.2 12.3 - 15.4 %    RDW-SD 44.6 37.0 - 54.0 fl    MPV 10.6 6.0 - 12.0 fL    Platelets 197 140 - 450 10*3/mm3    Neutrophil % 61.2 42.7 - 76.0 %    Lymphocyte % 25.2 19.6 - 45.3 %    Monocyte % 10.7 5.0 - 12.0 %     Eosinophil % 2.3 0.3 - 6.2 %    Basophil % 0.3 0.0 - 1.5 %    Immature Grans % 0.3 0.0 - 0.5 %    Neutrophils, Absolute 3.96 1.70 - 7.00 10*3/mm3    Lymphocytes, Absolute 1.63 0.70 - 3.10 10*3/mm3    Monocytes, Absolute 0.69 0.10 - 0.90 10*3/mm3    Eosinophils, Absolute 0.15 0.00 - 0.40 10*3/mm3    Basophils, Absolute 0.02 0.00 - 0.20 10*3/mm3    Immature Grans, Absolute 0.02 0.00 - 0.05 10*3/mm3    nRBC 0.0 0.0 - 0.2 /100 WBC   Salicylate Level    Collection Time: 01/31/24  6:06 PM    Specimen: Blood   Result Value Ref Range    Salicylate <0.3 <=30.0 mg/dL   Acetaminophen Level    Collection Time: 01/31/24  6:06 PM    Specimen: Blood   Result Value Ref Range    Acetaminophen <5.0 0.0 - 30.0 mcg/mL   T4, Free    Collection Time: 01/31/24  6:06 PM    Specimen: Blood   Result Value Ref Range    Free T4 1.85 (H) 0.93 - 1.70 ng/dL   Urinalysis With Microscopic If Indicated (No Culture) - Straight Cath    Collection Time: 01/31/24  6:23 PM    Specimen: Straight Cath; Urine   Result Value Ref Range    Color, UA Yellow Yellow, Straw    Appearance, UA Clear Clear    pH, UA 7.0 5.0 - 8.0    Specific Gravity, UA 1.015 1.001 - 1.030    Glucose, UA Negative Negative    Ketones, UA Trace (A) Negative    Bilirubin, UA Negative Negative    Blood, UA Negative Negative    Protein, UA Negative Negative    Leuk Esterase, UA Negative Negative    Nitrite, UA Negative Negative    Urobilinogen, UA 1.0 E.U./dL 0.2 - 1.0 E.U./dL   COVID-19, FLU A/B, RSV PCR 1 HR TAT - Swab, Nasopharynx    Collection Time: 01/31/24  8:46 PM    Specimen: Nasopharynx; Swab   Result Value Ref Range    COVID19 Detected (C) Not Detected - Ref. Range    Influenza A PCR Not Detected Not Detected    Influenza B PCR Not Detected Not Detected    RSV, PCR Not Detected Not Detected   Urine Drug Screen - Urine, Clean Catch    Collection Time: 01/31/24  8:46 PM    Specimen: Urine, Clean Catch   Result Value Ref Range    THC, Screen, Urine Negative Negative     Phencyclidine (PCP), Urine Negative Negative    Cocaine Screen, Urine Negative Negative    Methamphetamine, Ur Negative Negative    Opiate Screen Negative Negative    Amphetamine Screen, Urine Negative Negative    Benzodiazepine Screen, Urine Negative Negative    Tricyclic Antidepressants Screen Negative Negative    Methadone Screen, Urine Negative Negative    Barbiturates Screen, Urine Negative Negative    Oxycodone Screen, Urine Negative Negative    Buprenorphine, Screen, Urine Negative Negative   Ethanol    Collection Time: 01/31/24  8:46 PM    Specimen: Blood   Result Value Ref Range    Ethanol <10 0 - 10 mg/dL   Ammonia    Collection Time: 01/31/24  8:46 PM    Specimen: Blood   Result Value Ref Range    Ammonia 34 16 - 60 umol/L   Fentanyl, Urine - Urine, Clean Catch    Collection Time: 01/31/24  8:46 PM    Specimen: Urine, Clean Catch   Result Value Ref Range    Fentanyl, Urine Negative Negative       If labs were ordered, I independently reviewed the results and considered them in treating the patient.        RADIOLOGY  XR Chest 1 View    Result Date: 1/31/2024  XR CHEST 1 VW Date of Exam: 1/31/2024 7:40 PM EST Indication: Altered mental status, infectious workup Comparison: 1/8/2024 Findings: Lungs are moderately well expanded and appear clear except for what appears to be stable linear scarring or discoid atelectasis in the left base. No edema effusion or pneumothorax is seen. Heart is in the upper range of normal size. Vasculature appears normal.     Impression: Mild left basilar discoid atelectasis. No other evidence of active chest disease. Electronically Signed: Mikel Joseph MD  1/31/2024 8:01 PM EST  Workstation ID: [id number]    CT Head Without Contrast    Result Date: 1/31/2024  CT HEAD WO CONTRAST Date of Exam: 1/31/2024 6:33 PM EST Indication: fall on thinners, occipital swelling, confused (likely baseline). Comparison: 1/8/2024 Technique: Axial CT images were obtained of the head without contrast  administration.  Automated exposure control and iterative construction methods were used. Findings: Parenchyma:No acute intraparenchymal hemorrhage. No loss of gray-white differentiation to suggest large territory infarct. Moderate parenchymal volume loss. Scattered periventricular and subcortical white matter hypodensities, nonspecific, but most often  consistent with small vessel ischemic changes. No midline shift or herniation. Ventricles and extra axial spaces:Disproportionate enlargement of the ventricles with crowding of the sulci at the vertex. Small right frontal parafalcine subdural hematoma measuring up to 5 mm in thickness. Other: Right lens replacement. Scattered paranasal sinus mucosal thickening. Small left mastoid effusion. Calvarium is intact. Intracranial atherosclerotic calcification is present. Mild soft tissue swelling of the left posterior scalp.     Impression: Small volume right parafalcine subdural hematoma. Enlargement of the ventricles with crowding of the sulci at the vertex which can be seen in the setting of normal pressure hydrocephalus. Additional chronic changes as above. Findings discussed with Dr. ESME KEY by Dr. Elgin Cisse via telephone on 1/31/2024 7:02 PM EST. Electronically Signed: Elgin Cisse MD  1/31/2024 7:07 PM EST  Workstation ID: [id number]     I ordered and independently reviewed the above noted radiographic studies.      I viewed images of Noncon head CT which showed small midline frontal subdural as well as impressive atrophy per my independent interpretation.  Chest x-ray which is clear.    See radiologist's dictation for official interpretation.        PROCEDURES    Procedures    No orders to display       MEDICATIONS GIVEN IN ER    Medications   prothrombin complex conc human (KCentra) IV solution 4,000 Units (4,000 Units Intravenous Given 1/31/24 1946)         MEDICAL DECISION MAKING, PROGRESS, and CONSULTS    All labs, if obtained, have been  independently reviewed by me.  All radiology studies, if obtained, have been reviewed by me and the radiologist dictating the report.  All EKG's, if obtained, have been independently viewed and interpreted by me/my attending physician.      Discussion below represents my analysis of pertinent findings related to patient's condition, differential diagnosis, treatment plan and final disposition.                         Differential diagnosis:    Intracranial hemorrhage, need for coagulation reversal, stroke, sepsis, encephalopathy, sundowning, COVID, flu, viral URI, intoxication, hyperammonemia, urinary tract infection      Additional sources:    - Discussed/ obtained information from independent historians: EMS and later daughter    - External (non-ED) record review:  Chart review of hospitalization from earlier this month shows history of paroxysmal atrial fibrillation, HTN, HLD, DM2, chronic back pain patient was also reported by EMS to be demented at baseline but nursing home was reporting that he might be slightly more confused than usual.     - Chronic or social conditions impacting care: Atrial fibrillation, hypertension, hyperlipidemia, diabetes, chronic pain    - Shared decision making: Agreeable to hospital admission      Orders placed during this visit:  Orders Placed This Encounter   Procedures    COVID PRE-OP / PRE-PROCEDURE SCREENING ORDER (NO ISOLATION) - Swab, Nasopharynx    COVID-19, FLU A/B, RSV PCR 1 HR TAT - Swab, Nasopharynx    CT Head Without Contrast    CT Head Without Contrast    XR Chest 1 View    Comprehensive Metabolic Panel    Urinalysis With Microscopic If Indicated (No Culture) - Urine, Clean Catch    Magnesium    TSH    CBC Auto Differential    Urine Drug Screen - Urine, Clean Catch    Salicylate Level    Ethanol    Acetaminophen Level    T4, Free    Ammonia    Fentanyl, Urine - Urine, Clean Catch    Initiate Observation Status    ED Bed Request    CBC & Differential         Additional  orders considered but not ordered:      ED Course:    Consultants:      ED Course as of 01/31/24 2149 Wed Jan 31, 2024   1750 Chart review of hospitalization from earlier this month shows history of paroxysmal atrial fibrillation, HTN, HLD, DM2, chronic back pain patient was also reported by EMS to be demented at baseline but nursing home was reporting that he might be slightly more confused than usual. [CC]   1751 This is an 83-year-old man brought in by EMS from nursing facility because of an unwitnessed fall on blood thinners with possible increased confusion.  He says he does not remember the fall and does not have any pain he does have some swelling over the back of his head he says he does not know how that got there.  He denies any focal weakness or numbness.  No other complaints at this time. [CC]   2001 I was contacted by the radiologist because of small parafalcine subdural, immediately contacted the on-call neurosurgeon Dr. Mckinney and have ordered prothrombin complex concentrate because of his Eliquis.  Overall Dr. Hannah Washington is reassured by the imaging and says any encephalopathy that he is having right now is not due to the subdural and that other causes of altered mental status need to be explored.  He recommends elevating head of bed, repeat 6-hour interval scan. [CC]      ED Course User Index  [CC] Rebel Villanueva MD     His daughter arrived who is helpful she says he has been more confused and encephalopathic for 1 week now predating the fall.  I have broadened out my infectious and altered mental status workup.  CBC and CMP reassuring and nonactionable.  Normal TSH and very mild hypothyroidism with T41.85 But this would not explain his mental status.  Urinalysis only shows ketones UDS and ammonia unrevealing.  Viral panel is positive for COVID-19 which could certainly be contributing to his symptoms.  Medicine team consulted for admission neurosurgery is following.    40 minutes of  critical care provided. This time excludes other billable procedures. Time does include preparation of documents, medical consultations, review of old records, and direct bedside care. Patient is at high risk for life-threatening deterioration due to traumatic subdural hematoma on blood thinners requiring.         Shared Decision Making:  After my consideration of clinical presentation and any laboratory/radiology studies obtained, I discussed the findings with the patient/patient representative who is in agreement with the treatment plan and the final disposition.   Risks and benefits of discharge and/or observation/admission were discussed.       AS OF 21:49 EST VITALS:    BP - 110/81  HR - 100  TEMP - 97.8 °F (36.6 °C)  O2 SATS - 94%                  DIAGNOSIS  Final diagnoses:   Traumatic subdural hematoma with loss of consciousness, initial encounter   Encephalopathy   Fall, initial encounter   Atrial fibrillation, chronic         DISPOSITION  Admit      Please note that portions of this document were completed with voice recognition software.        Rebel Villanueva MD  01/31/24 [id number]

## 2024-02-01 NOTE — PLAN OF CARE
Goal Outcome Evaluation:  Plan of Care Reviewed With: patient, spouse        Progress: no change (OT IE)  Outcome Evaluation: OT evaluation completed. Pt presents w/ decreased I in ADLs, related t/fs, mobility compared to PLOF limited by decreased activity tolerance, impaired balance, muscle weakness at BUEs, fatigue with more dynamic demands and more lethargy this session. Pt req'd variable A for ADLs observed including upward of dep A for d/d socks and gross min A for bed mobility and fxl transfers and min A x 1-2 for ADL related mobility with FWW. Cues for safety and AD mgmt provided. Pt is below occupational performance baseline and would benefit from IPOT POC and return to SNF at d/c when medically ready.      Anticipated Discharge Disposition (OT): skilled nursing facility

## 2024-02-01 NOTE — PLAN OF CARE
Patient drowsy, disoriented to situation, answers other orientation questions correctly.  Wife states that patient spends most of time at home in bed, states that his drowsiness is normal, patient has had urinary incontinence today, patients wife states that he does have some incontinence at home and wears brief.

## 2024-02-01 NOTE — THERAPY EVALUATION
Patient Name: Apollo Haro  : 1940    MRN: [id number]                              Today's Date: 2024       Admit Date: 2024    Visit Dx:     ICD-10-CM ICD-9-CM   1. Traumatic subdural hematoma with loss of consciousness, initial encounter  S06.5X9A 852.26   2. Encephalopathy  G93.40 348.30   3. Fall, initial encounter  W19.XXXA E888.9   4. Atrial fibrillation, chronic  I48.20 427.31     Patient Active Problem List   Diagnosis    Weakness    Type 2 diabetes mellitus, without long-term current use of insulin    Rhabdomyolysis due to COVID-19    CAD (coronary artery disease)    HTN (hypertension)    Atrial fibrillation, chronic    COVID-19 virus detected    SDH (subdural hematoma)    Altered mental status    AMS (altered mental status)     Past Medical History:   Diagnosis Date    A-fib     CAD (coronary artery disease)     DM (diabetes mellitus)      History reviewed. No pertinent surgical history.   General Information       Row Name 24 1425          OT Time and Intention    Document Type evaluation  -JY     Mode of Treatment occupational therapy  -JY       Row Name 24 1427          General Information    Patient Profile Reviewed yes  -JY     Prior Level of Function min assist:;dressing;bathing;dependent:;home management;cooking;cleaning;independent:;feeding;grooming;all household mobility;gait;transfer;bed mobility  prior to COVID dx and hospitalizations pt grossly I in simple g/h, used FWW for ADL mobility and received some A for bathing and dressing; spouse completed home mgmt, cooking, cleaning; son assisted pt in shower; has fall hx  -JY     Existing Precautions/Restrictions fall;other (see comments)  fall hx w/ compression fxs, subdural hematoma after recent fall  -JY     Barriers to Rehab medically complex;previous functional deficit;cognitive status  -JY       Row Name 24 1428          Occupational Profile    Environmental Supports and Barriers (Occupational Profile) prior  to being at [facility] for rehab pt at home w/ walk in shower w/ seat, elevated toilet height, DME: using FWW for ~ 5 years per spouse  -MONTEZ       Row Name 02/01/24 1425          Living Environment    People in Home spouse;other (see comments)  able to provide limited A  -JZOE       Row Name 02/01/24 1425          Home Main Entrance    Number of Stairs, Main Entrance two;other (see comments)  1+1  -JY     Stair Railings, Main Entrance none  -JY       Row Name 02/01/24 1425          Stairs Within Home, Primary    Number of Stairs, Within Home, Primary none  -JY       Row Name 02/01/24 1425          Cognition    Orientation Status (Cognition) oriented x 3  -JY       Row Name 02/01/24 1425          Safety Issues, Functional Mobility    Safety Issues Affecting Function (Mobility) awareness of need for assistance;insight into deficits/self-awareness;positioning of assistive device;problem-solving;safety precaution awareness;safety precautions follow-through/compliance;sequencing abilities  -MONTEZ     Impairments Affecting Function (Mobility) balance;endurance/activity tolerance;strength;visual/perceptual;cognition  -JZOE     Cognitive Impairments, Mobility Safety/Performance awareness, need for assistance;insight into deficits/self-awareness;problem-solving/reasoning;safety precaution awareness;safety precaution follow-through;sequencing abilities  -JY     Comment, Safety Issues/Impairments (Mobility) pt very lethargic initially and often maintained eyes closed, req'd cues for increased alertness for safety and participation (improved with time); pt with decreased motivation to complete progressive mobility w/ request to return to sleep  -MONTEZ               User Key  (r) = Recorded By, (t) = Taken By, (c) = Cosigned By      Initials Name Provider Type    Lanny Cagle OT Occupational Therapist                     Mobility/ADL's       Row Name 02/01/24 1432          Bed Mobility    Bed Mobility supine-sit;scooting/bridging  -MONTEZ      Scooting/Bridging [employer] (Bed Mobility) minimum assist (75% patient effort);1 person assist;verbal cues  -JY     Supine-Sit [employer] (Bed Mobility) minimum assist (75% patient effort);1 person assist;verbal cues  -JY     Assistive Device (Bed Mobility) head of bed elevated;bed rails  -JY     Comment, (Bed Mobility) skilled cues for optimal seq to advance LEs to EOB, reach across midline to grasp bed rail and hand position to support self at UEs and push through to advance hips toward EOB for improved symmetry; pt denied any dizziness or feeling LH at eOB  -SarenzaY       Row Name 02/01/24 1432          Transfers    Transfers sit-stand transfer;stand-sit transfer  -JY     Comment, (Transfers) skilled cues for optimal hand placement for controlled ascend, descend specifically to push up from seated surface and reach back prior to sitting once aligned and in close proximity to seated surface; pt neglected to push up from seated surface despite cues; req'd A for AD mgmt for optimal support and safety; safety cues to open eyes to decrease visual deficit impact  -[employer]       Row Name 02/01/24 1432          Sit-Stand Transfer    Sit-Stand [employer] (Transfers) minimum assist (75% patient effort);1 person assist;verbal cues  -JY     Assistive Device (Sit-Stand Transfers) walker, front-wheeled  -SarenzaY       Row Name 02/01/24 1432          Stand-Sit Transfer    Stand-Sit [employer] (Transfers) minimum assist (75% patient effort);1 person assist;verbal cues  -JY     Assistive Device (Stand-Sit Transfers) walker, front-wheeled  -SarenzaY       Row Name 02/01/24 1432          Functional Mobility    Functional Mobility- Ind. Level minimum assist (75% patient effort);1 person;2 person assist required;verbal cues required  -JY     Functional Mobility- Device walker, front-wheeled  -JY     Functional Mobility-Distance (Feet) --  in room ADL related mobility  -JY     Functional Mobility- Comment defer to PT for specifics however  during in room ADL related mobility pt req'd gross min A x 1- 2 for L & R supports and cues/assist for AD mgmt to maximize support and safety  -[employer]       Row Name 02/01/24 [facility]          Activities of Daily Living    BADL Assessment/Intervention upper body dressing;lower body dressing;grooming;toileting  -[employer]       Row Name 02/01/24 [facility]          Upper Body Dressing Assessment/Training    Adams Level (Upper Body Dressing) doff;don;pajama/robe;minimum assist (75% patient effort);verbal cues  -JY     Position (Upper Body Dressing) unsupported sitting  -JY     Comment, (Upper Body Dressing) min A for proximal and posterior mgmt of gown; cues for seq to progress threading and unthreading  -[employer]       Row Name 02/01/24 [facility]          Lower Body Dressing Assessment/Training    Adams Level (Lower Body Dressing) doff;don;socks;dependent (less than 25% patient effort)  -JY     Position (Lower Body Dressing) supine;supported sitting  -JY     Comment, (Lower Body Dressing) pt declined to attempt distal reach toward LEs , maintained eyes closed when supine; req'd dep A for sock d/d and mgmt  -       Row Name 02/01/24 [facility]          Grooming Assessment/Training    Adams Level (Grooming) wash face, hands;set up;standby assist;verbal cues  -JY     Position (Grooming) supported sitting  -JY     Comment, (Grooming) cues for initiation and follow through in face/hand washing , no physical A req'd  -Y       Row Name 02/01/24 [facility]          Toileting Assessment/Training    Adams Level (Toileting) adjust/manage clothing;perform perineal hygiene;change pad/brief;dependent (less than 25% patient effort);verbal cues  -JY     Position (Toileting) supine  -JY     Comment, (Toileting) pt presented with incontinence of urine at bed level, grossly dep for necessary hygiene  -JY               User Key  (r) = Recorded By, (t) = Taken By, (c) = Cosigned By      Initials Name Provider Type    Lanny Cagle, OT  Occupational Therapist                   Obj/Interventions       Row Name 02/01/24 1446          Sensory Assessment (Somatosensory)    Sensory Assessment (Somatosensory) bilateral UE;sensation intact  -JY     Bilateral UE Sensory Assessment general sensation;light touch awareness;intact  -JY     Sensory Assessment denies any numbness or tingling and able to recognize LT stimuli as intact and symmetrical at BUEs  -JY       Row Name 02/01/24 1446          Vision Assessment/Intervention    Visual Impairment/Limitations corrective lenses full-time  -JY     Vision Assessment Comment denies any acute changes to vision however pt has vision deficits at baseline w/ spouse reporting need for eyewear however pt does not wear; pt maintained eyes closed for large portion of session  -JY       Row Name 02/01/24 1446          Range of Motion Comprehensive    General Range of Motion bilateral upper extremity ROM WFL  [facility] 02/01/24 1446          Strength Comprehensive (MMT)    General Manual Muscle Testing (MMT) Assessment upper extremity strength deficits identified  -JY     Comment, General Manual Muscle Testing (MMT) Assessment BUE MMS grossly 4/5 per MMT  -[facility] Name 02/01/24 1446          Motor Skills    Motor Skills functional endurance;coordination  -JY     Coordination bilateral;upper extremity;finger to nose;other (see comments);WFL  finger thumb opposition  -JY     Functional Endurance decreased activity tolerance toward more dynamic demands  -JY       [facility] Name 02/01/24 1446          Balance    Balance Assessment sitting static balance;sitting dynamic balance;standing static balance;standing dynamic balance  -JY     Static Sitting Balance standby assist  -JY     Dynamic Sitting Balance standby assist;contact guard;verbal cues  -JY     Position, Sitting Balance unsupported;sitting edge of bed;sitting in chair  -JY     Static Standing Balance minimal assist  -JY     Dynamic Standing Balance minimal  assist;2-person assist;verbal cues  -JY     Position/Device Used, Standing Balance supported;walker, front-wheeled  -JY     Balance Interventions sitting;standing;static;dynamic;sit to stand;supported;occupation based/functional task  -JY     Comment, Balance no overt LOB during seated or standing tasks, unsteady in standing w/ cues for improved AD mgmt, seq  -JY               User Key  (r) = Recorded By, (t) = Taken By, (c) = Cosigned By      Initials Name Provider Type    Lanny Cagle, OT Occupational Therapist                   Goals/Plan       Row Name 02/01/24 1456          Transfer Goal 1 (OT)    Activity/Assistive Device (Transfer Goal 1, OT) sit-to-stand/stand-to-sit;bed-to-chair/chair-to-bed;toilet;commode;commode, bedside without drop arms;walker, rolling  -JY     Elliott Level/Cues Needed (Transfer Goal 1, OT) minimum assist (75% or more patient effort);verbal cues required  -JY     Time Frame (Transfer Goal 1, OT) long term goal (LTG);by discharge  -JY     Progress/Outcome (Transfer Goal 1, OT) new goal  -JY       Row Name 02/01/24 7654          Dressing Goal 1 (OT)    Activity/Device (Dressing Goal 1, OT) upper body dressing;lower body dressing;other (see comments)  d/d TB garments  -JY     Elliott/Cues Needed (Dressing Goal 1, OT) minimum assist (75% or more patient effort);moderate assist (50-74% patient effort);verbal cues required  -JY     Time Frame (Dressing Goal 1, OT) long term goal (LTG);by discharge  -JY     Progress/Outcome (Dressing Goal 1, OT) new goal  -JY       Row Name 02/01/24 6420          Grooming Goal 1 (OT)    Activity/Device (Grooming Goal 1, OT) hair care;wash face, hands;oral care  -JY     Elliott (Grooming Goal 1, OT) minimum assist (75% or more patient effort);verbal cues required  -JY     Time Frame (Grooming Goal 1, OT) long term goal (LTG);by discharge  -JY     Progress/Outcome (Grooming Goal 1, OT) new goal  -JY       Row Name 02/01/24 9353           Strength Goal 1 (OT)    Strength Goal 1 (OT) Pt to complete seated HEP encompassing BUEs targeting strength and endurance w/ progressive sets/reps/resistance in order to improve integration in ADLs, related t/fs and mobility  -JY     Time Frame (Strength Goal 1, OT) long term goal (LTG);by discharge  -JY     Progress/Outcome (Strength Goal 1, OT) new goal  -JY       Row Name 02/01/24 1455          Therapy Assessment/Plan (OT)    Planned Therapy Interventions (OT) activity tolerance training;BADL retraining;functional balance retraining;neuromuscular control/coordination retraining;occupation/activity based interventions;patient/caregiver education/training;ROM/therapeutic exercise;strengthening exercise;transfer/mobility retraining  -JY               User Key  (r) = Recorded By, (t) = Taken By, (c) = Cosigned By      Initials Name Provider Type    Lanny Cagle OT Occupational Therapist                   Clinical Impression       Row Name 02/01/24 1451          Pain Assessment    Pretreatment Pain Rating 0/10 - no pain  -JY     Posttreatment Pain Rating 0/10 - no pain  -JY     Pre/Posttreatment Pain Comment denies any pain and tolerated all OT interventions  -JY     Pain Intervention(s) Repositioned;Ambulation/increased activity;Rest;Nursing Notified  -JY       Row Name 02/01/24 1451          Plan of Care Review    Plan of Care Reviewed With patient;spouse  -JY     Progress no change  OT IE  -JY     Outcome Evaluation OT evaluation completed. Pt presents w/ decreased I in ADLs, related t/fs, mobility compared to PLOF limited by decreased activity tolerance, impaired balance, muscle weakness at BUEs, fatigue with more dynamic demands and more lethargy this session. Pt req'd variable A for ADLs observed including upward of dep A for d/d socks and gross min A for bed mobility and fxl transfers and min A x 1-2 for ADL related mobility with FWW. Cues for safety and AD mgmt provided. Pt is below occupational  performance baseline and would benefit from IPOT POC and return to SNF at d/c when medically ready.  -JY       Row Name 02/01/24 1451          Therapy Assessment/Plan (OT)    Patient/Family Therapy Goal Statement (OT) to maximize I in ADLs, related t/fs, mobility, return to PLOF  -JY     Rehab Potential (OT) good, to achieve stated therapy goals  -J     Criteria for Skilled Therapeutic Interventions Met (OT) yes;skilled treatment is necessary  -JY     Therapy Frequency (OT) daily  -JY       Row Name 02/01/24 1451          Therapy Plan Review/Discharge Plan (OT)    Anticipated Discharge Disposition (OT) [facility] nursing facility  -J       Row Name 02/01/24 1451          Vital Signs    Pre Systolic BP Rehab 111  -JY     Pre Treatment Diastolic BP 66  -JY     Post Systolic BP Rehab 114  -JY     Post Treatment Diastolic BP 69  -JY     Pretreatment Heart Rate (beats/min) 95  -JY     Posttreatment Heart Rate (beats/min) 91  -JY     Pre SpO2 (%) 94  -JY     O2 Delivery Pre Treatment room air  -JY     O2 Delivery Intra Treatment room air  -JY     Post SpO2 (%) 94  -JY     O2 Delivery Post Treatment room air  -JY     Pre Patient Position Supine  -JY     Intra Patient Position Standing  -JY     Post Patient Position Sitting  -JY       Row Name 02/01/24 1451          Positioning and Restraints    Pre-Treatment Position in bed  -JY     Post Treatment Position chair  -JY     In Chair notified nsg;reclined;call light within reach;encouraged to call for assist;exit alarm on;with family/caregiver;compression device;waffle cushion;on mechanical lift sling;legs elevated;with nsg  -JY               User Key  (r) = Recorded By, (t) = Taken By, (c) = Cosigned By      Initials Name Provider Type    Lanny Cagle, OT Occupational Therapist                   Outcome Measures       Row Name 02/01/24 1458          How much help from another is currently needed...    Putting on and taking off regular lower body clothing? 1  -JY      Bathing (including washing, rinsing, and drying) 2  -JY     Toileting (which includes using toilet bed pan or urinal) 1  -JY     Putting on and taking off regular upper body clothing 3  -JY     Taking care of personal grooming (such as brushing teeth) 3  -JY     Eating meals 3  -JY     AM-PAC 6 Clicks Score (OT) 13  -JY       Row Name 02/01/24 1442          How much help from another person do you currently need...    Turning from your back to your side while in flat bed without using bedrails? 3  -CM     Moving from lying on back to sitting on the side of a flat bed without bedrails? 3  -CM     Moving to and from a bed to a chair (including a wheelchair)? 3  -CM     Standing up from a chair using your arms (e.g., wheelchair, bedside chair)? 3  -CM     Climbing 3-5 steps with a railing? 2  -CM     To walk in hospital room? 3  -CM     AM-PAC 6 Clicks Score (PT) 17  -CM     Highest Level of Mobility Goal 5 --> Static standing  -CM       Row Name 02/01/24 1458 02/01/24 1442       Functional Assessment    Outcome Measure Options AM-PAC 6 Clicks Daily Activity (OT)  -JY AM-PAC 6 Clicks Basic Mobility (PT)  -CM              User Key  (r) = Recorded By, (t) = Taken By, (c) = Cosigned By      Initials Name Provider Type    Lanny Cagle OT Occupational Therapist    CM Baylee Ayala, PT Physical Therapist                    Occupational Therapy Education       Title: PT OT SLP Therapies (In Progress)       Topic: Occupational Therapy (In Progress)       Point: ADL training (In Progress)       Description:   Instruct learner(s) on proper safety adaptation and remediation techniques during self care or transfers.   Instruct in proper use of assistive devices.                  Learning Progress Summary             Patient Acceptance, E,D, NR by MONTEZ at 2/1/2024 1336   Family Acceptance, E,D, NR by MONTEZ at 2/1/2024 1336                         Point: Home exercise program (Not Started)       Description:   Instruct  learner(s) on appropriate technique for monitoring, assisting and/or progressing therapeutic exercises/activities.                  Learner Progress:  Not documented in this visit.              Point: Precautions (In Progress)       Description:   Instruct learner(s) on prescribed precautions during self-care and functional transfers.                  Learning Progress Summary             Patient Acceptance, E,D, NR by MONTEZ at 2/1/2024 1336   Family Acceptance, E,D, NR by MONTEZ at 2/1/2024 1336                         Point: Body mechanics (In Progress)       Description:   Instruct learner(s) on proper positioning and spine alignment during self-care, functional mobility activities and/or exercises.                  Learning Progress Summary             Patient Acceptance, E,D, NR by MONTEZ at 2/1/2024 1336   Family Acceptance, E,D, NR by MONTEZ at 2/1/2024 1336                                         User Key       Initials Effective Dates Name Provider Type Discipline    MONTEZ 06/16/21 -  Lanny Dockery OT Occupational Therapist OT                  OT Recommendation and Plan  Planned Therapy Interventions (OT): activity tolerance training, BADL retraining, functional balance retraining, neuromuscular control/coordination retraining, occupation/activity based interventions, patient/caregiver education/training, ROM/therapeutic exercise, strengthening exercise, transfer/mobility retraining  Therapy Frequency (OT): daily  Plan of Care Review  Plan of Care Reviewed With: patient, spouse  Progress: no change (OT IE)  Outcome Evaluation: OT evaluation completed. Pt presents w/ decreased I in ADLs, related t/fs, mobility compared to PLOF limited by decreased activity tolerance, impaired balance, muscle weakness at BUEs, fatigue with more dynamic demands and more lethargy this session. Pt req'd variable A for ADLs observed including upward of dep A for d/d socks and gross min A for bed mobility and fxl transfers and min A x 1-2 for ADL  related mobility with FWW. Cues for safety and AD mgmt provided. Pt is below occupational performance baseline and would benefit from IPOT POC and return to SNF at d/c when medically ready.     Time Calculation:   Evaluation Complexity (OT)  Review Occupational Profile/Medical/Therapy History Complexity: expanded/moderate complexity  Assessment, Occupational Performance/Identification of Deficit Complexity: 3-5 performance deficits  Clinical Decision Making Complexity (OT): detailed assessment/moderate complexity  Overall Complexity of Evaluation (OT): moderate complexity     Time Calculation- OT       Row Name 02/01/24 1459             Time Calculation- OT    OT Start Time 1336  -JY      OT Received On 02/01/24  -JY      OT Goal Re-Cert Due Date 02/11/24  -JY         Timed Charges    06297 - OT Self Care/Mgmt Minutes 15  -JY         Untimed Charges    OT Eval/Re-eval Minutes 50  -JY         Total Minutes    Timed Charges Total Minutes 15  -JY      Untimed Charges Total Minutes 50  -JY       Total Minutes 65  -JY                User Key  (r) = Recorded By, (t) = Taken By, (c) = Cosigned By      Initials Name Provider Type    Lanny Cagle OT Occupational Therapist                  Therapy Charges for Today       Code Description Service Date Service Provider Modifiers Qty    31994466136 HC OT SELF CARE/MGMT/TRAIN EA 15 MIN 2/1/2024 Lanny Dockery OT GO 1    82828290609 HC OT EVAL MOD COMPLEXITY 4 2/1/2024 Lanny Dockery OT GO 1                 Lanny Dockery OT  2/1/2024

## 2024-02-01 NOTE — PAYOR COMM NOTE
"Elizabeth Haro (83 y.o. Male)       Date of Birth   1940    Social Security Number       Address   [place] [id number]    Home Phone   [phone]    MRN   [id number]       Tenriism   None    Marital Status                               Admission Date   24    Admission Type   Emergency    Admitting Provider   Elizabeth Chaney DO    Attending Provider   Sherine Karimi MD    Department, Room/Bed   [facility] 3H, S387/1       Discharge Date       Discharge Disposition       Discharge Destination                                 Attending Provider: Sherine Karimi MD    Allergies: Lidocaine    Isolation: None   Infection: COVID (History) (01/15/24)   Code Status: CPR    Ht: 175.3 cm (69\")   Wt: 84.5 kg (186 lb 4.8 oz)    Admission Cmt: None   Principal Problem: SDH (subdural hematoma) [S06.5XAA]                   Active Insurance as of 2024       Primary Coverage       Payor Plan Insurance Group Employer/Plan Group    AETNA MEDICARE REPLACEMENT AETNA MED ADV HMO [id number]       Payor Plan Address Payor Plan Phone Number Payor Plan Fax Number Effective Dates    [address] [phone]  2024 - None Entered    [place] [address]         Subscriber Name Subscriber Birth Date Member ID       ELIZABETH HARO 1940 [id number]                     Emergency Contacts        (Rel.) Home Phone Work Phone Mobile Phone    ANTONIA HARO (Spouse) [phone] -- [phone]    Maria De Jesus Paredes (Daughter) [phone] -- [phone]                 History & Physical        Elizabeth Chaney DO at 24 2345              [facility]  HISTORY AND PHYSICAL    Patient Name: Elizabeth Haro  : 1940  MRN: [id number]  Primary Care Physician: Provider, No Known  Date of admission: 2024      Subjective  Subjective     Chief Complaint:  Altered mental status, fall    HPI:  Elizabeth Haro is a 83 y.o. male " with a PMH significant for atrial fibrillation on Eliquis and flecainide, diabetes mellitus type 2, recent hospitalization for COVID-19, CAD, HTN, HLD, falls with chronic compression fractures, questionable history of NPH who comes to the ED from [place] skilled nursing facility after an unwitnessed fall.  Patient does not remember the events of the day, is quite encephalopathic.  Daughter at bedside provides HPI.  She says that patient was discharged from [facility] on 1/18/2024 after being treated for COVID-19 with generalized weakness.  He was discharged to [facility] for rehab.  Around 1 week ago patient began to exhibit significant confusion with visual hallucinations around 3 days.  Over the weekend he was doing better and seemed to be back to baseline.  For the past several days he has had increased confusion.  He was found down after an unwitnessed fall at [facility] and was sent to the ED.    Personal History     Past Medical History:   Diagnosis Date    A-fib     CAD (coronary artery disease)     DM (diabetes mellitus)        No past surgical history on file.    Family History: family history is not on file.     Social History:  reports that he has never smoked. He has never been exposed to tobacco smoke. He has never used smokeless tobacco. He reports that he does not drink alcohol and does not use drugs.  Social History     Social History Narrative    Not on file       Medications:  Available home medication information reviewed.  Magnesium, acetaminophen, apixaban, aspirin, flecainide, metFORMIN, multivitamin with minerals, pravastatin, and tamsulosin    Allergies   Allergen Reactions    Lidocaine Angioedema       Objective  Objective     Vital Signs:   Temp:  [97.8 °F (36.6 °C)] 97.8 °F (36.6 °C)  Heart Rate:  [] 101  Resp:  [18] 18  BP: (100-141)/(59-90) 112/80       Physical Exam   Constitutional: Awake, alert  Eyes: PERRLA, sclerae anicteric, no conjunctival injection  HENT: NCAT, mucous membranes  moist  Neck: Supple, no thyromegaly, no lymphadenopathy, trachea midline  Respiratory: Clear to auscultation bilaterally, nonlabored respirations   Cardiovascular: RRR, no murmurs, rubs, or gallops, palpable pedal pulses bilaterally  Gastrointestinal: Positive bowel sounds, soft, nontender, nondistended  Musculoskeletal: No bilateral ankle edema, no clubbing or cyanosis to extremities  Psychiatric: Appropriate affect, cooperative  Neurologic: Oriented to person and hospital, strength symmetric in all extremities, Cranial Nerves grossly intact to confrontation, speech clear  Skin: No rashes      Result Review:  I have personally reviewed the results from the time of this admission to 2/1/2024 00:45 EST and agree with these findings:  [x]  Laboratory list / accordion  [x]  Microbiology  [x]  Radiology  [x]  EKG/Telemetry   []  Cardiology/Vascular   []  Pathology  [x]  Old records      LAB RESULTS:      Lab 01/31/24  1806   WBC 6.47   HEMOGLOBIN 14.1   HEMATOCRIT 43.5   PLATELETS 197   NEUTROS ABS 3.96   IMMATURE GRANS (ABS) 0.02   LYMPHS ABS 1.63   MONOS ABS 0.69   EOS ABS 0.15   MCV 92.6         Lab 01/31/24  1806   SODIUM 140   POTASSIUM 4.0   CHLORIDE 99   CO2 28.0   ANION GAP 13.0   BUN 16   CREATININE 1.01   EGFR 73.3   GLUCOSE 133*   CALCIUM 9.4   MAGNESIUM 1.7   TSH 1.220         Lab 01/31/24  1806   TOTAL PROTEIN 7.4   ALBUMIN 3.9   GLOBULIN 3.5   ALT (SGPT) 15   AST (SGOT) 22   BILIRUBIN 1.0   ALK PHOS 116                     UA          1/10/2024    11:50 1/31/2024    18:23   Urinalysis   Squamous Epithelial Cells, UA 0-2     Specific Gravity, UA 1.020  1.015    Ketones, UA Trace  Trace    Blood, UA Moderate (2+)  Negative    Leukocytes, UA Negative  Negative    Nitrite, UA Negative  Negative    RBC, UA 3-5     WBC, UA 0-2     Bacteria, UA None Seen         Microbiology Results (last 10 days)       Procedure Component Value - Date/Time    COVID PRE-OP / PRE-PROCEDURE SCREENING ORDER (NO ISOLATION) - Swab,  Nasopharynx [[id number]]  (Abnormal) Collected: 01/31/24 2046    Lab Status: Final result Specimen: Swab from Nasopharynx Updated: 01/31/24 2138    Narrative:      The following orders were created for panel order COVID PRE-OP / PRE-PROCEDURE SCREENING ORDER (NO ISOLATION) - Swab, Nasopharynx.  Procedure                               Abnormality         Status                     ---------                               -----------         ------                     COVID-19, FLU A/B, RSV P...[[id number]]  Abnormal            Final result                 Please view results for these tests on the individual orders.    COVID-19, FLU A/B, RSV PCR 1 HR TAT - Swab, Nasopharynx [[id number]]  (Abnormal) Collected: 01/31/24 2046    Lab Status: Final result Specimen: Swab from Nasopharynx Updated: 01/31/24 2138     COVID19 Detected     Influenza A PCR Not Detected     Influenza B PCR Not Detected     RSV, PCR Not Detected    Narrative:      Fact sheet for providers: https://www.fda.gov/media/355185/download    Fact sheet for patients: https://www.fda.gov/media/168790/download    Test performed by PCR.            CT Head Without Contrast    Result Date: 2/1/2024  CT HEAD WO CONTRAST Date of Exam: 2/1/2024 12:14 AM EST Indication: Serial exam of traumatic subdural. Comparison: 1/31/2024. Technique: Axial CT images were obtained of the head without contrast administration.  Automated exposure control and iterative construction methods were used. Findings: Tiny focal subdural hemorrhage seen along the right cerebral convexity along the falx measuring up to 5 mm, unchanged as compared to the previous study. No additional area of hemorrhage identified.. There is no extracerebral collection. The ventricles appear prominent in size, stable as compared to the previous study..  Posterior fossa is within normal limits. Calvarium and skull base appear intact.   Visualized sinuses show no air fluid levels. Visualized orbits are  unremarkable.     Impression: Impression: Stable tiny subdural hemorrhage along the right cerebral convexity along the falx. No new area of hemorrhage identified. Otherwise, stable exam. Electronically Signed: Carolina Hassan MD  2/1/2024 12:35 AM EST  Workstation ID: [id number]    XR Chest 1 View    Result Date: 1/31/2024  XR CHEST 1 VW Date of Exam: 1/31/2024 7:40 PM EST Indication: Altered mental status, infectious workup Comparison: 1/8/2024 Findings: Lungs are moderately well expanded and appear clear except for what appears to be stable linear scarring or discoid atelectasis in the left base. No edema effusion or pneumothorax is seen. Heart is in the upper range of normal size. Vasculature appears normal.     Impression: Impression: Mild left basilar discoid atelectasis. No other evidence of active chest disease. Electronically Signed: Mikel Joseph MD  1/31/2024 8:01 PM EST  Workstation ID: [id number]    CT Head Without Contrast    Result Date: 1/31/2024  CT HEAD WO CONTRAST Date of Exam: 1/31/2024 6:33 PM EST Indication: fall on thinners, occipital swelling, confused (likely baseline). Comparison: 1/8/2024 Technique: Axial CT images were obtained of the head without contrast administration.  Automated exposure control and iterative construction methods were used. Findings: Parenchyma:No acute intraparenchymal hemorrhage. No loss of gray-white differentiation to suggest large territory infarct. Moderate parenchymal volume loss. Scattered periventricular and subcortical white matter hypodensities, nonspecific, but most often  consistent with small vessel ischemic changes. No midline shift or herniation. Ventricles and extra axial spaces:Disproportionate enlargement of the ventricles with crowding of the sulci at the vertex. Small right frontal parafalcine subdural hematoma measuring up to 5 mm in thickness. Other: Right lens replacement. Scattered paranasal sinus mucosal thickening. Small left mastoid effusion.  Calvarium is intact. Intracranial atherosclerotic calcification is present. Mild soft tissue swelling of the left posterior scalp.     Impression: Impression: Small volume right parafalcine subdural hematoma. Enlargement of the ventricles with crowding of the sulci at the vertex which can be seen in the setting of normal pressure hydrocephalus. Additional chronic changes as above. Findings discussed with Dr. ESME KEY by Dr. Elgin Cisse via telephone on 1/31/2024 7:02 PM EST. Electronically Signed: Elgin Cisse MD  1/31/2024 7:07 PM EST  Workstation ID: [id number]         Assessment & Plan  Assessment & Plan       SDH (subdural hematoma)    Type 2 diabetes mellitus, without long-term current use of insulin    CAD (coronary artery disease)    HTN (hypertension)    Atrial fibrillation, chronic    COVID-19 virus detected    Altered mental status    Apollo Haro is a 83 y.o. male with a PMH significant for atrial fibrillation on Eliquis and flecainide, diabetes mellitus type 2, recent hospitalization for COVID-19, CAD, HTN, HLD, falls with chronic compression fractures, questionable history of NPH who comes to the ED from [facility] after an unwitnessed fall.     Subdural hematoma  -Discussed with Dr. Taylor with neurosurgery, consult for a.m.  - On Eliquis, given Kcentra in ED  - Repeat CT head pending  - Neurochecks  - Fall precautions    Altered mental status  -- Ammonia, UDS within normal limits  -- VBG pending  -- Neurology consult  -- UA negative  -- Chest x-ray negative  -- Continue to rule out underlying infectious sources  -- CT head shows SDH with concerns for NPH  -- Confusion during prior stay, attributed to COVID-19 encephalopathy  -- Questionable history of NPH in care everywhere, appreciate neurology input  -- MRI brain with and without contrast pending  - Monitor for urinary retention, constipation  - Agitated.  Check QTc.  If not prolonged, will give trial of  Seroquel tonight.    Diabetes mellitus type 2  - SSI with Accu-Cheks  - Hold home metformin  - Hemoglobin A1c for a.m.    COVID-19 virus detected  - Positive on 1/8/2024  - No isolation needed    Atrial fibrillation  - Hold Eliquis, given Kcentra in ED  - Appreciate neurosurgery recommendations for restarting Eliquis  -continue home flecainide    CAD  HTN  HLD  - Continue home meds    Home med list reviewed    Total time spent: 75 minutes  Time spent includes time reviewing chart, face-to-face time, counseling patient/family/caregiver, ordering medications/tests/procedures, communicating with other health care professionals, documenting clinical information in the electronic health record, and coordination of care.      DVT prophylaxis: SCDs       CODE STATUS:    Code Status and Medical Interventions:   Ordered at: 02/01/24 0043     Level Of Support Discussed With:    Next of Kin (If No Surrogate)     Code Status (Patient has no pulse and is not breathing):    CPR (Attempt to Resuscitate)     Medical Interventions (Patient has pulse or is breathing):    Full Support       Expected Discharge   Expected discharge date/ time has not been documented.     Elizabeth Chaney DO  02/01/24      Electronically signed by Elizabeth Chaney DO at 02/01/24 0045       Facility-Administered Medications as of 2/1/2024   Medication Dose Route Frequency Provider Last Rate Last Admin    acetaminophen (TYLENOL) tablet 650 mg  650 mg Oral Q4H PRN Elizabeth Chaney DO        sennosides-docusate (PERICOLACE) 8.6-50 MG per tablet 2 tablet  2 tablet Oral BID Elizabeth Chaney DO   2 tablet at 02/01/24 0928    And    polyethylene glycol (MIRALAX) packet 17 g  17 g Oral Daily PRN Elizabeth Chaney DO        And    bisacodyl (DULCOLAX) EC tablet 5 mg  5 mg Oral Daily PRN Elizabeth Chaney DO        And    bisacodyl (DULCOLAX) suppository 10 mg  10 mg Rectal Daily PRN Elizabeth Chaney DO        Calcium Replacement - Follow Nurse / BPA Driven Protocol    Does not apply PRN Shiv, Elizabeth G, DO        flecainide (TAMBOCOR) tablet 50 mg  50 mg Oral Q12H Shiv, Elizabeth G, DO   50 mg at 02/01/24 0259    [COMPLETED] gadobenate dimeglumine (MULTIHANCE) injection 20 mL  20 mL Intravenous Once in imaging Shiv, Elizabeth G, DO   15 mL at 02/01/24 0207    Magnesium Standard Dose Replacement - Follow Nurse / BPA Driven Protocol   Does not apply PRN Shiv, Elizabeth G, DO        multivitamin with minerals 1 tablet  1 tablet Oral Daily Shiv, Elizabteh G, DO   1 tablet at 02/01/24 0928    ondansetron ODT (ZOFRAN-ODT) disintegrating tablet 4 mg  4 mg Oral Q6H PRN Shiv, Elizabeth G, DO        Or    ondansetron (ZOFRAN) injection 4 mg  4 mg Intravenous Q6H PRN Shiv, Elizabeth G, DO        Phosphorus Replacement - Follow Nurse / BPA Driven Protocol   Does not apply PRN Shiv, Elizabeth G, DO        Potassium Replacement - Follow Nurse / BPA Driven Protocol   Does not apply PRN Shiv, Elizabeth G, DO        pravastatin (PRAVACHOL) tablet 20 mg  20 mg Oral Daily Shiv, Elizabeth G, DO   20 mg at 02/01/24 0928    [COMPLETED] prothrombin complex conc human (KCentra) IV solution 4,000 Units  4,000 Units Intravenous Once Rebel Villanueva MD   4,000 Units at 01/31/24 1946    QUEtiapine (SEROquel) tablet 12.5 mg  12.5 mg Oral Nightly Allison Madrigal, APRN        sodium chloride 0.9 % flush 10 mL  10 mL Intravenous Q12H Shiv, Elizabeth G, DO        sodium chloride 0.9 % flush 10 mL  10 mL Intravenous PRN Shiv, Elizabeth G, DO        sodium chloride 0.9 % infusion 40 mL  40 mL Intravenous PRN Shiv, Elizabeth G, DO        sodium chloride 0.9 % infusion  75 mL/hr Intravenous Continuous Shiv, Elizabeth G, DO 75 mL/hr at 02/01/24 0259 75 mL/hr at 02/01/24 0259    tamsulosin (FLOMAX) 24 hr capsule 0.4 mg  0.4 mg Oral Daily Shiv, Elizabeth G, DO   0.4 mg at 02/01/24 0928        Physician Progress Notes (all)        Sherine Karimi MD at 02/01/24 0801              [facility]  Services  PROGRESS NOTE    Patient Name: Apollo Haro  : 1940  MRN: [id number]    Date of Admission: 2024  Primary Care Physician: Provider, No Known    Subjective   Subjective     CC:  Fall with head trauma    HPI:  Patient is an 83-year-old who presented to the emergency department for evaluation after falling while at a nursing facility and hitting his head.  He was found to have a small subdural hematoma.  This morning he is very sleepy after getting Seroquel and able to answer yes and no questions knew he was in [place] but could not tell me what building.  Reportedly he has had confusion and encephalopathy since having COVID earlier this year.      Objective   Objective     Vital Signs:   Temp:  [97.7 °F (36.5 °C)-98.2 °F (36.8 °C)] 98.2 °F (36.8 °C)  Heart Rate:  [] 88  Resp:  [16-18] 16  BP: (100-148)/(59-90) 121/81     Physical Exam:  Constitutional: No acute distress, sleeping but arousable  HENT: No visible bruising anteriorly  Respiratory: Clear to auscultation bilaterally, respiratory effort normal   Cardiovascular: RRR  Gastrointestinal: Positive bowel sounds, soft, nontender, nondistended  Musculoskeletal: Generally weak, lying in bed but able to move all 4 extremities  Psychiatric: Lying with eyes closed but open spontaneously, answers questions yes and no  Neurologic: Oriented to person and city but not to time or building, generally weak, Cranial Nerves grossly intact to confrontation, speech clear  Skin: No rashes      Results Reviewed:  LAB RESULTS:      Lab 24  0255 24  1806   WBC 6.62 6.47   HEMOGLOBIN 13.3 14.1   HEMATOCRIT 40.0 43.5   PLATELETS 168 197   NEUTROS ABS 3.94 3.96   IMMATURE GRANS (ABS) 0.02 0.02   LYMPHS ABS 1.65 1.63   MONOS ABS 0.79 0.69   EOS ABS 0.19 0.15   MCV 90.9 92.6   PROTIME 14.1  --          Lab 24  0255 24  1806   SODIUM 139 140   POTASSIUM 4.1 4.0   CHLORIDE 101 99   CO2 27.0 28.0   ANION GAP 11.0 13.0   BUN 14 16    CREATININE 1.00 1.01   EGFR 74.2 73.3   GLUCOSE 163* 133*   CALCIUM 9.3 9.4   MAGNESIUM  --  1.7   HEMOGLOBIN A1C 7.00*  --    TSH  --  1.220         Lab 01/31/24  1806   TOTAL PROTEIN 7.4   ALBUMIN 3.9   GLOBULIN 3.5   ALT (SGPT) 15   AST (SGOT) 22   BILIRUBIN 1.0   ALK PHOS 116         Lab 02/01/24  0255   PROTIME 14.1   INR 1.08                 Lab 02/01/24  0257   FIO2 21   CARBOXYHEMOGLOBIN (VENOUS) 1.4     Brief Urine Lab Results  (Last result in the past 365 days)        Color   Clarity   Blood   Leuk Est   Nitrite   Protein   CREAT   Urine HCG        01/31/24 1823 Yellow   Clear   Negative   Negative   Negative   Negative                   Microbiology Results Abnormal       None            MRI Brain With & Without Contrast    Result Date: 2/1/2024  MRI BRAIN W WO CONTRAST Date of Exam: 2/1/2024 1:50 AM EST Indication: Mental status change, unknown cause.  Comparison: 2/1/2024, 1/31/2024. Technique:  Routine multiplanar/multisequence sequence images of the brain were obtained before and after the uneventful administration of 15 Multihance. Findings: There is no diffusion restriction to suggest acute infarct. There is no evidence of acute or chronic intracranial hemorrhage. Tiny subdural seen along the falx on the right appears similar as compared to the previous study (series 13 image 67). Ventricles appear enlarged, similar as compared to the previous study. There is global volume loss. Mild to moderate periventricular and subcortical FLAIR signal changes are present. No suspicious or abnormal enhancement identified. No evidence of focal lesions. No mass effect or midline shift. No abnormal extra-axial collections. The major vascular flow voids appear intact. The basal ganglia, brainstem and cerebellum appear within normal limits. Calvarial and superficial soft tissue signal is within normal limits. Orbits appear unremarkable. The paranasal sinuses and the mastoid air cells appear well aerated. Midline  structures are intact.     Impression: Impression: 1.No evidence of intracranial hemorrhage, mass effect or midline shift. No evidence of recent or acute ischemia. No suspicious or abnormal enhancement. Stable tiny subdural hematoma along the falx on the right. 2.Mild to moderate periventricular and subcortical FLAIR signal changes likely related to chronic microvascular ischemic change. 3.Global volume loss with ventriculomegaly, similar as compared to the previous study. Electronically Signed: Carolina Hassan MD  2/1/2024 2:29 AM EST  Workstation ID: [id number]    CT Head Without Contrast    Result Date: 2/1/2024  CT HEAD WO CONTRAST Date of Exam: 2/1/2024 12:14 AM EST Indication: Serial exam of traumatic subdural. Comparison: 1/31/2024. Technique: Axial CT images were obtained of the head without contrast administration.  Automated exposure control and iterative construction methods were used. Findings: Tiny focal subdural hemorrhage seen along the right cerebral convexity along the falx measuring up to 5 mm, unchanged as compared to the previous study. No additional area of hemorrhage identified.. There is no extracerebral collection. The ventricles appear prominent in size, stable as compared to the previous study..  Posterior fossa is within normal limits. Calvarium and skull base appear intact.   Visualized sinuses show no air fluid levels. Visualized orbits are unremarkable.     Impression: Impression: Stable tiny subdural hemorrhage along the right cerebral convexity along the falx. No new area of hemorrhage identified. Otherwise, stable exam. Electronically Signed: Carolina Hassan MD  2/1/2024 12:35 AM EST  Workstation ID: [id number]    XR Chest 1 View    Result Date: 1/31/2024  XR CHEST 1 VW Date of Exam: 1/31/2024 7:40 PM EST Indication: Altered mental status, infectious workup Comparison: 1/8/2024 Findings: Lungs are moderately well expanded and appear clear except for what appears to be stable linear scarring or  discoid atelectasis in the left base. No edema effusion or pneumothorax is seen. Heart is in the upper range of normal size. Vasculature appears normal.     Impression: Impression: Mild left basilar discoid atelectasis. No other evidence of active chest disease. Electronically Signed: Mikel Joseph MD  1/31/2024 8:01 PM EST  Workstation ID: [id number]    CT Head Without Contrast    Result Date: 1/31/2024  CT HEAD WO CONTRAST Date of Exam: 1/31/2024 6:33 PM EST Indication: fall on thinners, occipital swelling, confused (likely baseline). Comparison: 1/8/2024 Technique: Axial CT images were obtained of the head without contrast administration.  Automated exposure control and iterative construction methods were used. Findings: Parenchyma:No acute intraparenchymal hemorrhage. No loss of gray-white differentiation to suggest large territory infarct. Moderate parenchymal volume loss. Scattered periventricular and subcortical white matter hypodensities, nonspecific, but most often  consistent with small vessel ischemic changes. No midline shift or herniation. Ventricles and extra axial spaces:Disproportionate enlargement of the ventricles with crowding of the sulci at the vertex. Small right frontal parafalcine subdural hematoma measuring up to 5 mm in thickness. Other: Right lens replacement. Scattered paranasal sinus mucosal thickening. Small left mastoid effusion. Calvarium is intact. Intracranial atherosclerotic calcification is present. Mild soft tissue swelling of the left posterior scalp.     Impression: Impression: Small volume right parafalcine subdural hematoma. Enlargement of the ventricles with crowding of the sulci at the vertex which can be seen in the setting of normal pressure hydrocephalus. Additional chronic changes as above. Findings discussed with Dr. ESME KEY by Dr. Elgin Cisse via telephone on 1/31/2024 7:02 PM EST. Electronically Signed: Elgin Cisse MD  1/31/2024 7:07 PM EST   Workstation ID: [id number]         Current medications:  Scheduled Meds:flecainide, 50 mg, Oral, Q12H  multivitamin with minerals, 1 tablet, Oral, Daily  pravastatin, 20 mg, Oral, Daily  QUEtiapine, 25 mg, Oral, Nightly  senna-docusate sodium, 2 tablet, Oral, BID  sodium chloride, 10 mL, Intravenous, Q12H  tamsulosin, 0.4 mg, Oral, Daily      Continuous Infusions:sodium chloride, 75 mL/hr, Last Rate: 75 mL/hr (02/01/24 0259)      PRN Meds:.  acetaminophen    senna-docusate sodium **AND** polyethylene glycol **AND** bisacodyl **AND** bisacodyl    Calcium Replacement - Follow Nurse / BPA Driven Protocol    Magnesium Standard Dose Replacement - Follow Nurse / BPA Driven Protocol    ondansetron ODT **OR** ondansetron    Phosphorus Replacement - Follow Nurse / BPA Driven Protocol    Potassium Replacement - Follow Nurse / BPA Driven Protocol    sodium chloride    sodium chloride    Assessment & Plan   Assessment & Plan     Active Hospital Problems    Diagnosis  POA    **SDH (subdural hematoma) [S06.5XAA]  Yes    Altered mental status [R41.82]  Unknown    HTN (hypertension) [I10]  Yes    CAD (coronary artery disease) [I25.10]  Yes    Atrial fibrillation, chronic [I48.20]  Yes    COVID-19 virus detected [U07.1]  Yes    Type 2 diabetes mellitus, without long-term current use of insulin [E11.9]  Yes      Resolved Hospital Problems   No resolved problems to display.        Brief Hospital Course to date:  Apollo Haro is a 83 y.o. male admitted after an unwitnessed fall while at rehab.  He sustained a subdural hematoma.  Has medical history significant for A-fib on Eliquis and flecainide, diabetes mellitus type 2, recent COVID-19, encephalopathy thought secondary to COVID, CAD, hypertension, hyperlipidemia, chronic compression fractures, history of NPH.  Neurology has been consulted as well as neurosurgery.    Subdural hematoma  Altered mental status  History of NPH  -Neurosurgery consult pending for subdural hematoma  -Neurology  consult pending for altered mental status  -Fall precautions  -Holding Eliquis, given Kcentra in ED  -MRI with no intracranial hemorrhage, small subdural hematoma noted on the right  -Discussed with neurology will decrease Seroquel dosing given somnolence today    Diabetes mellitus type 2  -Continue sliding scale insulin  -A1c 7.00  -Hold metformin    Recent COVID-19 infection  A-fib on chronic anticoagulation with Eliquis  -Held Eliquis due to subdural hematoma, resume when okay with neurosurgery  -Continue flecainide    CAD  Hypertension  Hyperlipidemia      Expected Discharge Location and Transportation: Rehab  Expected Discharge   Expected Discharge Date: 2024; Expected Discharge Time:      DVT prophylaxis:  Mechanical DVT prophylaxis orders are present.         AM-PAC 6 Clicks Score (PT): 16 (24 0247)    CODE STATUS:   Code Status and Medical Interventions:   Ordered at: 24 0043     Level Of Support Discussed With:    Next of Kin (If No Surrogate)     Code Status (Patient has no pulse and is not breathing):    CPR (Attempt to Resuscitate)     Medical Interventions (Patient has pulse or is breathing):    Full Support       Sherine Karimi MD  24        Electronically signed by Sherine Karimi MD at 24 1323          Consult Notes (all)        Allison Madrigal, APRN at 24 1135        Consult Orders    1. Inpatient Neurology Consult General [[id number]] ordered by Elizabeth Chaney DO at 24 0034                 [facility] Neurology  Consult Note    Patient Name: Apollo Haro  : 1940  MRN: [id number]  Primary Care Physician:  Provider, No Known  Referring Physician: No ref. provider found  Date of admission: 2024    Subjective     Reason for Consult/ Chief Complaint: Altered mental status s/p fall    Apollo Haro is a 83 y.o. male with past medical history of A-fib on Eliquis and flecainide, T2DM, recent COVID with hospitalization, CAD, HTN, HLD, falls chronic  compression fractures and questionable NPH who presented to [facility] ED from skilled nursing facility after experiencing an unwitnessed fall.  Per chart review patient was discharged from [facility] on 1/18/2024 after being treated for COVID and generalized weakness.      Patient was found to have a subdural hematoma without midline shift or mass effect; repeat CT head was negative.  Neurosurgery recommended no surgical intervention at this time and to hold Eliquis for 10 days.    Upon assessment patient was extremely lethargic.  Per chart review he did receive Seroquel at approximately 3 AM this morning.  Was able to state his name, his birthday and his wife's name.  He was able to move all extremities and follow very basic commands.  Per chart review patient has had issues with incontinence, however difficult to confirm due to patient's lethargy.    Review Of Systems   Difficult to assess due to lethargy  Personal History     Past Medical History:   Diagnosis Date    A-fib     CAD (coronary artery disease)     DM (diabetes mellitus)        History reviewed. No pertinent surgical history.    Family History: family history is not on file. Otherwise pertinent FHx was reviewed and not pertinent to current issue.    Social History:  reports that he has never smoked. He has never been exposed to tobacco smoke. He has never used smokeless tobacco. He reports that he does not drink alcohol and does not use drugs.    Home Medications:   Magnesium, acetaminophen, apixaban, aspirin, flecainide, metFORMIN, multivitamin with minerals, pravastatin, and tamsulosin    Current Medications:     Current Facility-Administered Medications:     acetaminophen (TYLENOL) tablet 650 mg, 650 mg, Oral, Q4H PRN, Elizabeth Chaney DO    sennosides-docusate (PERICOLACE) 8.6-50 MG per tablet 2 tablet, 2 tablet, Oral, BID, 2 tablet at 02/01/24 0928 **AND** polyethylene glycol (MIRALAX) packet 17 g, 17 g, Oral, Daily PRN **AND** bisacodyl (DULCOLAX) EC tablet  5 mg, 5 mg, Oral, Daily PRN **AND** bisacodyl (DULCOLAX) suppository 10 mg, 10 mg, Rectal, Daily PRN, ShivZaida hagersie G, DO    Calcium Replacement - Follow Nurse / BPA Driven Protocol, , Does not apply, PRN, Shiv, Elizabeth G, DO    flecainide (TAMBOCOR) tablet 50 mg, 50 mg, Oral, Q12H, ShivKathy hagere G, DO, 50 mg at 02/01/24 0259    Magnesium Standard Dose Replacement - Follow Nurse / BPA Driven Protocol, , Does not apply, PRN, Shiv, Elizabeth G, DO    multivitamin with minerals 1 tablet, 1 tablet, Oral, Daily, ShivElizabeth hager G, DO, 1 tablet at 02/01/24 0928    ondansetron ODT (ZOFRAN-ODT) disintegrating tablet 4 mg, 4 mg, Oral, Q6H PRN **OR** ondansetron (ZOFRAN) injection 4 mg, 4 mg, Intravenous, Q6H PRN, Shiv Elizabeth G, DO    Phosphorus Replacement - Follow Nurse / BPA Driven Protocol, , Does not apply, PRN, Shiv, Elizabeth G, DO    Potassium Replacement - Follow Nurse / BPA Driven Protocol, , Does not apply, PRN, Shiv, Elizabeth G, DO    pravastatin (PRAVACHOL) tablet 20 mg, 20 mg, Oral, Daily, Shiv, Elizabeth G, DO, 20 mg at 02/01/24 0928    QUEtiapine (SEROquel) tablet 25 mg, 25 mg, Oral, Nightly, Shiv, Elizabeth G, DO, 25 mg at 02/01/24 0259    sodium chloride 0.9 % flush 10 mL, 10 mL, Intravenous, Q12H, Shiv, Elizabeth G, DO    sodium chloride 0.9 % flush 10 mL, 10 mL, Intravenous, PRN, Shiv, Elizabeth G, DO    sodium chloride 0.9 % infusion 40 mL, 40 mL, Intravenous, PRN, Shvi, Elizabeth G, DO    sodium chloride 0.9 % infusion, 75 mL/hr, Intravenous, Continuous, Shiv, Elizabeth G, DO, Last Rate: 75 mL/hr at 02/01/24 0259, 75 mL/hr at 02/01/24 0259    tamsulosin (FLOMAX) 24 hr capsule 0.4 mg, 0.4 mg, Oral, Daily, Elizabeth Chaney DO, 0.4 mg at 02/01/24 0928     Allergies:  Allergies   Allergen Reactions    Lidocaine Angioedema       Objective     Physical Exam  Vitals and nursing note reviewed.   Constitutional:       General: He is not in acute distress.     Appearance: He is not ill-appearing.   Eyes:      Extraocular  "Movements: Extraocular movements intact.      Pupils: Pupils are equal, round, and reactive to light.   Neurological:      Mental Status: He is lethargic.      Cranial Nerves: No facial asymmetry.      Sensory: No sensory deficit.      Deep Tendon Reflexes: Babinski sign present on the right side. Babinski sign absent on the left side.      Reflex Scores:       Bicep reflexes are 1+ on the right side and 1+ on the left side.       Patellar reflexes are 1+ on the right side and 1+ on the left side.       Achilles reflexes are 1+ on the right side and 1+ on the left side.     Comments:     Exam limited due to patient lethargy    Cranial Nerves   CN II: Pupils are equal, round, and reactive to light. Normal visual acuity and visual fields.    CN III IV VI: Extraocular movements are full without nystagmus.  CN V: Normal facial sensation and strength of muscles of mastication.  CN VII: Facial movements are symmetric. No weakness.  CN VIII:  Auditory acuity is normal.    No myoclonus noted on exam         Vitals:  Temp:  [97.7 °F (36.5 °C)-98.3 °F (36.8 °C)] 98.3 °F (36.8 °C)  Heart Rate:  [] 88  Resp:  [16-18] 16  BP: (100-148)/(59-90) 121/81    Laboratory Results:   Lab Results   Component Value Date    GLUCOSE 163 (H) 02/01/2024    CALCIUM 9.3 02/01/2024     02/01/2024    K 4.1 02/01/2024    CO2 27.0 02/01/2024     02/01/2024    BUN 14 02/01/2024    CREATININE 1.00 02/01/2024    BCR 14.0 02/01/2024    ANIONGAP 11.0 02/01/2024     Lab Results   Component Value Date    WBC 6.62 02/01/2024    HGB 13.3 02/01/2024    HCT 40.0 02/01/2024    MCV 90.9 02/01/2024     02/01/2024     No results found for: \"CHOL\"  No results found for: \"HDL\"  No results found for: \"LDL\"  No results found for: \"TRIG\"  Lab Results   Component Value Date    HGBA1C 7.00 (H) 02/01/2024     Lab Results   Component Value Date    INR 1.08 02/01/2024    INR 1.35 (H) 01/08/2024    PROTIME 14.1 02/01/2024    PROTIME 16.8 (H) " 01/08/2024     Lab Results   Component Value Date    ZLKKMBQQ84 1,269 (H) 01/10/2024     Lab Results   Component Value Date    FOLATE >20.00 01/10/2024       MRI Brain With & Without Contrast    Result Date: 2/1/2024  MRI BRAIN W WO CONTRAST Date of Exam: 2/1/2024 1:50 AM EST Indication: Mental status change, unknown cause.  Comparison: 2/1/2024, 1/31/2024. Technique:  Routine multiplanar/multisequence sequence images of the brain were obtained before and after the uneventful administration of 15 Multihance. Findings: There is no diffusion restriction to suggest acute infarct. There is no evidence of acute or chronic intracranial hemorrhage. Tiny subdural seen along the falx on the right appears similar as compared to the previous study (series 13 image 67). Ventricles appear enlarged, similar as compared to the previous study. There is global volume loss. Mild to moderate periventricular and subcortical FLAIR signal changes are present. No suspicious or abnormal enhancement identified. No evidence of focal lesions. No mass effect or midline shift. No abnormal extra-axial collections. The major vascular flow voids appear intact. The basal ganglia, brainstem and cerebellum appear within normal limits. Calvarial and superficial soft tissue signal is within normal limits. Orbits appear unremarkable. The paranasal sinuses and the mastoid air cells appear well aerated. Midline structures are intact.     Impression: Impression: 1.No evidence of intracranial hemorrhage, mass effect or midline shift. No evidence of recent or acute ischemia. No suspicious or abnormal enhancement. Stable tiny subdural hematoma along the falx on the right. 2.Mild to moderate periventricular and subcortical FLAIR signal changes likely related to chronic microvascular ischemic change. 3.Global volume loss with ventriculomegaly, similar as compared to the previous study. Electronically Signed: Carolina Hassan MD  2/1/2024 2:29 AM EST  Workstation  ID: [id number]      Assessment / Plan   Brief Patient Summary:  Apollo Haro is a 83 y.o. male with past medical history of A-fib on Eliquis and flecainide, T2DM, recent COVID with hospitalization (1/8/24), CAD, HTN, HLD, falls chronic compression fractures and questionable NPH who presented to [facility] ED from skilled nursing facility after experiencing an unwitnessed fall.     MRI showed no sign of acute abnormality.  Stable continue subdural hematoma noted global volume loss noted with ventriculomegaly, comparable to previous scan.  Mild to moderate FLAIR changes likely related to chronic microvascular ischemic changes.    Plan:   Altered Mental Status   Questionable NPH   Decrease Seroquel to 12.5 mg nightly   Difficult to assess patient's incontinence due to patient's lethargy.  Continue to assess for urinary retention, bladder scans as needed  Spoke with physical therapy about attempting to watch patient walk.  However, concerned patient will be too lethargic.  Continue fall precautions  QTc 458    Subdural Hematoma   Repeat CT head stable.  Neurosurgery recommended holding Eliquis for 10 days.  No surgical intervention at this time.    COVID-19 virus detected  Originally positive on 1/8/2024  No isolation needed per chart review      I have discussed the above with the patient, bedside RN and Dr. Karimi  Time spent with patient: 60 minutes in face-to-face evaluation and management of the patient.       SAW Keys           Electronically signed by Allison Madrigal APRN at 02/01/24 1318       Keiko Cooper PA-C at 02/01/24 1016        Consult Orders    1. Inpatient Neurosurgery Consult [[id number]] ordered by Elizabeth Chaney DO              Attestation signed by Clyde Taylor MD at 02/01/24 1040    I have reviewed this documentation and agree.                  [facility]    Inpatient Neurosurgery Consult  Consult performed by: Keiko Cooper,  BONNIE  Consult ordered by: Elizabeth Chaney DO        Name: Apollo Haro  YOB: 1940  MRN: [id number]    Referring Provider: No ref. provider found     Patient Care Team:  Provider, No Known as PCP - General    Chief Complaint: Fall      History of Present Illness: This is a 83 y.o. male who presented to [facility] from nursing facility due to unwitnessed fall.  Patient states he does not remember the fall nor does he know why he is in the hospital.  Per chart review, about 1 week ago, patient began to exhibit significant confusion with visual hallucinations per the daughter.  For the last several days he has had increasing confusion and was found down after an unwitnessed fall at SNF.  Today, patient denies any headache, numbness, tingling, vision issues, weakness.  CT of the head was obtained which showed small volume right parafalcine subdural hematoma without midline shift or mass effect.  Patient takes Eliquis for A-fib.    PMHX  Allergies:  Allergies   Allergen Reactions    Lidocaine Angioedema     Medications    Current Facility-Administered Medications:     acetaminophen (TYLENOL) tablet 650 mg, 650 mg, Oral, Q4H PRN, Elizabeth Chaney DO    sennosides-docusate (PERICOLACE) 8.6-50 MG per tablet 2 tablet, 2 tablet, Oral, BID, 2 tablet at 02/01/24 0928 **AND** polyethylene glycol (MIRALAX) packet 17 g, 17 g, Oral, Daily PRN **AND** bisacodyl (DULCOLAX) EC tablet 5 mg, 5 mg, Oral, Daily PRN **AND** bisacodyl (DULCOLAX) suppository 10 mg, 10 mg, Rectal, Daily PRN, Elizabeth Chaney, DO    Calcium Replacement - Follow Nurse / BPA Driven Protocol, , Does not apply, PRN, Elizabeth Chaney DO    flecainide (TAMBOCOR) tablet 50 mg, 50 mg, Oral, Q12H, Elizabeth Chaney DO, 50 mg at 02/01/24 0259    Magnesium Standard Dose Replacement - Follow Nurse / BPA Driven Protocol, , Does not apply, PRN, Elizabeth Chaney, DO    multivitamin with minerals 1 tablet, 1 tablet, Oral, Daily, Elizabeth Chaney DO, 1 tablet at 02/01/24  "0928    ondansetron ODT (ZOFRAN-ODT) disintegrating tablet 4 mg, 4 mg, Oral, Q6H PRN **OR** ondansetron (ZOFRAN) injection 4 mg, 4 mg, Intravenous, Q6H PRN, Shiv, Elizabeth G, DO    Phosphorus Replacement - Follow Nurse / BPA Driven Protocol, , Does not apply, PRN, Shiv, Elizabeth G, DO    Potassium Replacement - Follow Nurse / BPA Driven Protocol, , Does not apply, PRN, Shiv, Elizabeth G, DO    pravastatin (PRAVACHOL) tablet 20 mg, 20 mg, Oral, Daily, Shiv, Elizabeth G, DO, 20 mg at 02/01/24 0928    QUEtiapine (SEROquel) tablet 25 mg, 25 mg, Oral, Nightly, Shiv, Elizabeth G, DO, 25 mg at 02/01/24 0259    sodium chloride 0.9 % flush 10 mL, 10 mL, Intravenous, Q12H, Shiv, Elizabeth G, DO    sodium chloride 0.9 % flush 10 mL, 10 mL, Intravenous, PRN, Shiv, Elizabeth G, DO    sodium chloride 0.9 % infusion 40 mL, 40 mL, Intravenous, PRN, Shiv, Elizabeth G, DO    sodium chloride 0.9 % infusion, 75 mL/hr, Intravenous, Continuous, Shiv, Elizabeth G, DO, Last Rate: 75 mL/hr at 02/01/24 0259, 75 mL/hr at 02/01/24 0259    tamsulosin (FLOMAX) 24 hr capsule 0.4 mg, 0.4 mg, Oral, Daily, Shiv, Elizabeth G, DO, 0.4 mg at 02/01/24 0928  Past Medical History:  Past Medical History:   Diagnosis Date    A-fib     CAD (coronary artery disease)     DM (diabetes mellitus)      Past Surgical History:  History reviewed. No pertinent surgical history.  Social Hx:  Social History     Tobacco Use    Smoking status: Never     Passive exposure: Never    Smokeless tobacco: Never   Vaping Use    Vaping Use: Never used   Substance Use Topics    Alcohol use: Never    Drug use: Never     Family Hx:  History reviewed. No pertinent family history.  Review of Systems:        Review of Systems   Neurological:  Negative for speech difficulty, weakness, numbness and headaches.        Physical Exam:   /81 (BP Location: Right arm, Patient Position: Lying)   Pulse 88   Temp 98.2 °F (36.8 °C) (Oral)   Resp 16   Ht 175.3 cm (69\")   Wt 84.5 kg (186 lb 4.8 oz)   SpO2 " 93%   BMI 27.51 kg/m²   Patient appears comfortable, resting, sleeping but arouses easily  Oriented to self  Speech f/c  Opens eyes spontaneously  EOM intact  Face symmetric  Tongue midline  Moves all extremities to command    Intake/Output: No intake or output data in the 24 hours ending 02/01/24 1016    Current Medications:   Current Facility-Administered Medications:     acetaminophen (TYLENOL) tablet 650 mg, 650 mg, Oral, Q4H PRN, Elizabeth Chaney, DO    sennosides-docusate (PERICOLACE) 8.6-50 MG per tablet 2 tablet, 2 tablet, Oral, BID, 2 tablet at 02/01/24 0928 **AND** polyethylene glycol (MIRALAX) packet 17 g, 17 g, Oral, Daily PRN **AND** bisacodyl (DULCOLAX) EC tablet 5 mg, 5 mg, Oral, Daily PRN **AND** bisacodyl (DULCOLAX) suppository 10 mg, 10 mg, Rectal, Daily PRN, Elizabeth Chaney, DO    Calcium Replacement - Follow Nurse / BPA Driven Protocol, , Does not apply, PRN, Elizabeth Chaney G, DO    flecainide (TAMBOCOR) tablet 50 mg, 50 mg, Oral, Q12H, Elizabeth Chaney, DO, 50 mg at 02/01/24 0259    Magnesium Standard Dose Replacement - Follow Nurse / BPA Driven Protocol, , Does not apply, PRN, Elizabeth Chaney G, DO    multivitamin with minerals 1 tablet, 1 tablet, Oral, Daily, Elizabeth Chaney, DO, 1 tablet at 02/01/24 0928    ondansetron ODT (ZOFRAN-ODT) disintegrating tablet 4 mg, 4 mg, Oral, Q6H PRN **OR** ondansetron (ZOFRAN) injection 4 mg, 4 mg, Intravenous, Q6H PRN, Elizabeth Chaney, DO    Phosphorus Replacement - Follow Nurse / BPA Driven Protocol, , Does not apply, PRN, Elizabeth Chaney G, DO    Potassium Replacement - Follow Nurse / BPA Driven Protocol, , Does not apply, PRN, Elizabeth Chaney G, DO    pravastatin (PRAVACHOL) tablet 20 mg, 20 mg, Oral, Daily, Zaida Chaneysie G, DO, 20 mg at 02/01/24 0928    QUEtiapine (SEROquel) tablet 25 mg, 25 mg, Oral, Nightly, Shiv, Elizabeth G, DO, 25 mg at 02/01/24 0259    sodium chloride 0.9 % flush 10 mL, 10 mL, Intravenous, Q12H, Shiv, Elizabeth G, DO    sodium chloride 0.9 %  flush 10 mL, 10 mL, Intravenous, PRN, Shiv, Elizabeth G, DO    sodium chloride 0.9 % infusion 40 mL, 40 mL, Intravenous, PRN, Shiv, Elizabeth G, DO    sodium chloride 0.9 % infusion, 75 mL/hr, Intravenous, Continuous, Shiv, Elizabeth G, DO, Last Rate: 75 mL/hr at 02/01/24 0259, 75 mL/hr at 02/01/24 0259    tamsulosin (FLOMAX) 24 hr capsule 0.4 mg, 0.4 mg, Oral, Daily, Shiv, Elizabeth G, DO, 0.4 mg at 02/01/24 0928     Laboratory Results:      Lab 02/01/24  0255 01/31/24  1806   WBC 6.62 6.47   HEMOGLOBIN 13.3 14.1   HEMATOCRIT 40.0 43.5   PLATELETS 168 197   NEUTROS ABS 3.94 3.96   IMMATURE GRANS (ABS) 0.02 0.02   LYMPHS ABS 1.65 1.63   MONOS ABS 0.79 0.69   EOS ABS 0.19 0.15   MCV 90.9 92.6   PROTIME 14.1  --          Lab 02/01/24  0255 01/31/24  1806   SODIUM 139 140   POTASSIUM 4.1 4.0   CHLORIDE 101 99   CO2 27.0 28.0   ANION GAP 11.0 13.0   BUN 14 16   CREATININE 1.00 1.01   EGFR 74.2 73.3   GLUCOSE 163* 133*   CALCIUM 9.3 9.4   MAGNESIUM  --  1.7   HEMOGLOBIN A1C 7.00*  --    TSH  --  1.220         Lab 01/31/24  1806   TOTAL PROTEIN 7.4   ALBUMIN 3.9   GLOBULIN 3.5   ALT (SGPT) 15   AST (SGOT) 22   BILIRUBIN 1.0   ALK PHOS 116         Lab 02/01/24  0255   PROTIME 14.1   INR 1.08                 Lab 02/01/24  0257   FIO2 21   CARBOXYHEMOGLOBIN (VENOUS) 1.4     Brief Urine Lab Results  (Last result in the past 365 days)        Color   Clarity   Blood   Leuk Est   Nitrite   Protein   CREAT   Urine HCG        01/31/24 1823 Yellow   Clear   Negative   Negative   Negative   Negative                 Microbiology Results (last 10 days)       Procedure Component Value - Date/Time    COVID PRE-OP / PRE-PROCEDURE SCREENING ORDER (NO ISOLATION) - Swab, Nasopharynx [[id number]]  (Abnormal) Collected: 01/31/24 2046    Lab Status: Final result Specimen: Swab from Nasopharynx Updated: 01/31/24 2138    Narrative:      The following orders were created for panel order COVID PRE-OP / PRE-PROCEDURE SCREENING ORDER (NO ISOLATION) - Swab,  Nasopharynx.  Procedure                               Abnormality         Status                     ---------                               -----------         ------                     COVID-19, FLU A/B, RSV P...[[id number]]  Abnormal            Final result                 Please view results for these tests on the individual orders.    COVID-19, FLU A/B, RSV PCR 1 HR TAT - Swab, Nasopharynx [[id number]]  (Abnormal) Collected: 01/31/24 2046    Lab Status: Final result Specimen: Swab from Nasopharynx Updated: 01/31/24 2138     COVID19 Detected     Influenza A PCR Not Detected     Influenza B PCR Not Detected     RSV, PCR Not Detected    Narrative:      Fact sheet for providers: https://www.fda.gov/media/164591/download    Fact sheet for patients: https://www.fda.gov/media/847140/download    Test performed by PCR.               Diagnostic Imaging: The patient's diagnostic imaging was independently reviewed and interpreted by myself.    MDM    Assessment and Plan:  This is a 83 y.o. male who presents to [facility] due to unwitnessed fall. CT of the head was obtained which showed small volume right parafalcine subdural hematoma without midline shift or mass effect.  Patient is neurologically stable on exam.  Repeat CT of the head is stable.  Would recommend holding Eliquis for 10 days.  No role for surgical intervention.  Neurosurgery will sign off at this time.  Please call with questions or concerns.    Keiko Cooper PA-C  02/01/24  10:16 EST          Electronically signed by Clyde Taylor MD at 02/01/24 5400

## 2024-02-01 NOTE — CONSULTS
[facility]    Inpatient Neurosurgery Consult  Consult performed by: Keiko Cooper PA-C  Consult ordered by: Elizabeth Chaney DO        Name: Apollo Haro  YOB: 1940  MRN: [id number]    Referring Provider: No ref. provider found     Patient Care Team:  Provider, No Known as PCP - General    Chief Complaint: Fall      History of Present Illness: This is a 83 y.o. male who presented to [facility] from nursing facility due to unwitnessed fall.  Patient states he does not remember the fall nor does he know why he is in the hospital.  Per chart review, about 1 week ago, patient began to exhibit significant confusion with visual hallucinations per the daughter.  For the last several days he has had increasing confusion and was found down after an unwitnessed fall at SNF.  Today, patient denies any headache, numbness, tingling, vision issues, weakness.  CT of the head was obtained which showed small volume right parafalcine subdural hematoma without midline shift or mass effect.  Patient takes Eliquis for A-fib.    PMHX  Allergies:  Allergies   Allergen Reactions    Lidocaine Angioedema     Medications    Current Facility-Administered Medications:     acetaminophen (TYLENOL) tablet 650 mg, 650 mg, Oral, Q4H PRN, Elizabeth Chaney DO    sennosides-docusate (PERICOLACE) 8.6-50 MG per tablet 2 tablet, 2 tablet, Oral, BID, 2 tablet at 02/01/24 0928 **AND** polyethylene glycol (MIRALAX) packet 17 g, 17 g, Oral, Daily PRN **AND** bisacodyl (DULCOLAX) EC tablet 5 mg, 5 mg, Oral, Daily PRN **AND** bisacodyl (DULCOLAX) suppository 10 mg, 10 mg, Rectal, Daily PRN, Elizabeth Chaney G, DO    Calcium Replacement - Follow Nurse / BPA Driven Protocol, , Does not apply, PRN, Elizabeth Chaney, DO    flecainide (TAMBOCOR) tablet 50 mg, 50 mg, Oral, Q12H, Elizabeth Chaney, DO, 50 mg at 02/01/24 0259    Magnesium Standard Dose Replacement - Follow Nurse / BPA Driven Protocol, , Does not  apply, PRN, Shiv, Elizabeth G, DO    multivitamin with minerals 1 tablet, 1 tablet, Oral, Daily, Shiv, Elizabeth G, DO, 1 tablet at 02/01/24 0928    ondansetron ODT (ZOFRAN-ODT) disintegrating tablet 4 mg, 4 mg, Oral, Q6H PRN **OR** ondansetron (ZOFRAN) injection 4 mg, 4 mg, Intravenous, Q6H PRN, Shiv, Elizabeth G, DO    Phosphorus Replacement - Follow Nurse / BPA Driven Protocol, , Does not apply, PRN, Shiv, Elizabeth G, DO    Potassium Replacement - Follow Nurse / BPA Driven Protocol, , Does not apply, PRN, Shiv, Elizabeth G, DO    pravastatin (PRAVACHOL) tablet 20 mg, 20 mg, Oral, Daily, Shiv, Elizabeth G, DO, 20 mg at 02/01/24 0928    QUEtiapine (SEROquel) tablet 25 mg, 25 mg, Oral, Nightly, Shiv, Elizabeth G, DO, 25 mg at 02/01/24 0259    sodium chloride 0.9 % flush 10 mL, 10 mL, Intravenous, Q12H, Shiv, Elizabeth G, DO    sodium chloride 0.9 % flush 10 mL, 10 mL, Intravenous, PRN, Shiv, Elizabeth G, DO    sodium chloride 0.9 % infusion 40 mL, 40 mL, Intravenous, PRN, Shiv, Elizabeth G, DO    sodium chloride 0.9 % infusion, 75 mL/hr, Intravenous, Continuous, Shiv, Elizabeth G, DO, Last Rate: 75 mL/hr at 02/01/24 0259, 75 mL/hr at 02/01/24 0259    tamsulosin (FLOMAX) 24 hr capsule 0.4 mg, 0.4 mg, Oral, Daily, Shiv, Elizabeth G, DO, 0.4 mg at 02/01/24 0928  Past Medical History:  Past Medical History:   Diagnosis Date    A-fib     CAD (coronary artery disease)     DM (diabetes mellitus)      Past Surgical History:  History reviewed. No pertinent surgical history.  Social Hx:  Social History     Tobacco Use    Smoking status: Never     Passive exposure: Never    Smokeless tobacco: Never   Vaping Use    Vaping Use: Never used   Substance Use Topics    Alcohol use: Never    Drug use: Never     Family Hx:  History reviewed. No pertinent family history.  Review of Systems:        Review of Systems   Neurological:  Negative for speech difficulty, weakness, numbness and headaches.        Physical Exam:   /81 (BP Location: Right arm,  "Patient Position: Lying)   Pulse 88   Temp 98.2 °F (36.8 °C) (Oral)   Resp 16   Ht 175.3 cm (69\")   Wt 84.5 kg (186 lb 4.8 oz)   SpO2 93%   BMI 27.51 kg/m²   Patient appears comfortable, resting, sleeping but arouses easily  Oriented to self  Speech f/c  Opens eyes spontaneously  EOM intact  Face symmetric  Tongue midline  Moves all extremities to command    Intake/Output: No intake or output data in the 24 hours ending 02/01/24 1016    Current Medications:   Current Facility-Administered Medications:     acetaminophen (TYLENOL) tablet 650 mg, 650 mg, Oral, Q4H PRN, ShivKathy hagere G, DO    sennosides-docusate (PERICOLACE) 8.6-50 MG per tablet 2 tablet, 2 tablet, Oral, BID, 2 tablet at 02/01/24 0928 **AND** polyethylene glycol (MIRALAX) packet 17 g, 17 g, Oral, Daily PRN **AND** bisacodyl (DULCOLAX) EC tablet 5 mg, 5 mg, Oral, Daily PRN **AND** bisacodyl (DULCOLAX) suppository 10 mg, 10 mg, Rectal, Daily PRN, ShivZaida hagersie G, DO    Calcium Replacement - Follow Nurse / BPA Driven Protocol, , Does not apply, PRN, ShivZaida hagersie G, DO    flecainide (TAMBOCOR) tablet 50 mg, 50 mg, Oral, Q12H, ShivZaida hagersie G, DO, 50 mg at 02/01/24 0259    Magnesium Standard Dose Replacement - Follow Nurse / BPA Driven Protocol, , Does not apply, PRN, ShivZaida hagersie G, DO    multivitamin with minerals 1 tablet, 1 tablet, Oral, Daily, ShivKathy hagere G, DO, 1 tablet at 02/01/24 0928    ondansetron ODT (ZOFRAN-ODT) disintegrating tablet 4 mg, 4 mg, Oral, Q6H PRN **OR** ondansetron (ZOFRAN) injection 4 mg, 4 mg, Intravenous, Q6H PRN, ShivZaida hagersie G, DO    Phosphorus Replacement - Follow Nurse / BPA Driven Protocol, , Does not apply, PRN, ShivZaida hagersie G, DO    Potassium Replacement - Follow Nurse / BPA Driven Protocol, , Does not apply, PRNShiv Josie G, DO    pravastatin (PRAVACHOL) tablet 20 mg, 20 mg, Oral, Daily, Elizabeth Chaney DO, 20 mg at 02/01/24 0928    QUEtiapine (SEROquel) tablet 25 mg, 25 mg, Oral, Nightly, Elizabeth Chaney" G, DO, 25 mg at 02/01/24 0259    sodium chloride 0.9 % flush 10 mL, 10 mL, Intravenous, Q12H, Shiv, Elizabeth G, DO    sodium chloride 0.9 % flush 10 mL, 10 mL, Intravenous, PRN, Shiv, Elizabeth G, DO    sodium chloride 0.9 % infusion 40 mL, 40 mL, Intravenous, PRN, Shiv, Elizabeth G, DO    sodium chloride 0.9 % infusion, 75 mL/hr, Intravenous, Continuous, Shiv, Elizabeth G, DO, Last Rate: 75 mL/hr at 02/01/24 0259, 75 mL/hr at 02/01/24 0259    tamsulosin (FLOMAX) 24 hr capsule 0.4 mg, 0.4 mg, Oral, Daily, Shiv, Elizabeth G, DO, 0.4 mg at 02/01/24 0928     Laboratory Results:      Lab 02/01/24  0255 01/31/24  1806   WBC 6.62 6.47   HEMOGLOBIN 13.3 14.1   HEMATOCRIT 40.0 43.5   PLATELETS 168 197   NEUTROS ABS 3.94 3.96   IMMATURE GRANS (ABS) 0.02 0.02   LYMPHS ABS 1.65 1.63   MONOS ABS 0.79 0.69   EOS ABS 0.19 0.15   MCV 90.9 92.6   PROTIME 14.1  --          Lab 02/01/24  0255 01/31/24  1806   SODIUM 139 140   POTASSIUM 4.1 4.0   CHLORIDE 101 99   CO2 27.0 28.0   ANION GAP 11.0 13.0   BUN 14 16   CREATININE 1.00 1.01   EGFR 74.2 73.3   GLUCOSE 163* 133*   CALCIUM 9.3 9.4   MAGNESIUM  --  1.7   HEMOGLOBIN A1C 7.00*  --    TSH  --  1.220         Lab 01/31/24  1806   TOTAL PROTEIN 7.4   ALBUMIN 3.9   GLOBULIN 3.5   ALT (SGPT) 15   AST (SGOT) 22   BILIRUBIN 1.0   ALK PHOS 116         Lab 02/01/24 0255   PROTIME 14.1   INR 1.08                 Lab 02/01/24  0257   FIO2 21   CARBOXYHEMOGLOBIN (VENOUS) 1.4     Brief Urine Lab Results  (Last result in the past 365 days)        Color   Clarity   Blood   Leuk Est   Nitrite   Protein   CREAT   Urine HCG        01/31/24 1823 Yellow   Clear   Negative   Negative   Negative   Negative                 Microbiology Results (last 10 days)       Procedure Component Value - Date/Time    COVID PRE-OP / PRE-PROCEDURE SCREENING ORDER (NO ISOLATION) - Swab, Nasopharynx [[id number]]  (Abnormal) Collected: 01/31/24 2046    Lab Status: Final result Specimen: Swab from Nasopharynx Updated: 01/31/24 213     Narrative:      The following orders were created for panel order COVID PRE-OP / PRE-PROCEDURE SCREENING ORDER (NO ISOLATION) - Swab, Nasopharynx.  Procedure                               Abnormality         Status                     ---------                               -----------         ------                     COVID-19, FLU A/B, RSV P...[[id number]]  Abnormal            Final result                 Please view results for these tests on the individual orders.    COVID-19, FLU A/B, RSV PCR 1 HR TAT - Swab, Nasopharynx [[id number]]  (Abnormal) Collected: 01/31/24 2046    Lab Status: Final result Specimen: Swab from Nasopharynx Updated: 01/31/24 2138     COVID19 Detected     Influenza A PCR Not Detected     Influenza B PCR Not Detected     RSV, PCR Not Detected    Narrative:      Fact sheet for providers: https://www.fda.gov/media/939022/download    Fact sheet for patients: https://www.fda.gov/media/703412/download    Test performed by PCR.               Diagnostic Imaging: The patient's diagnostic imaging was independently reviewed and interpreted by myself.    [facility]    Assessment and Plan:  This is a 83 y.o. male who presents to [facility] due to unwitnessed fall. CT of the head was obtained which showed small volume right parafalcine subdural hematoma without midline shift or mass effect.  Patient is neurologically stable on exam.  Repeat CT of the head is stable.  Would recommend holding Eliquis for 10 days.  No role for surgical intervention.  Neurosurgery will sign off at this time.  Please call with questions or concerns.    Keiko Cooper PA-C  02/01/24  10:16 EST

## 2024-02-01 NOTE — THERAPY EVALUATION
Patient Name: Apollo Haro  : 1940    MRN: [id number]                              Today's Date: 2024       Admit Date: 2024    Visit Dx:     ICD-10-CM ICD-9-CM   1. Traumatic subdural hematoma with loss of consciousness, initial encounter  S06.5X9A 852.26   2. Encephalopathy  G93.40 348.30   3. Fall, initial encounter  W19.XXXA E888.9   4. Atrial fibrillation, chronic  I48.20 427.31     Patient Active Problem List   Diagnosis    Weakness    Type 2 diabetes mellitus, without long-term current use of insulin    Rhabdomyolysis due to COVID-19    CAD (coronary artery disease)    HTN (hypertension)    Atrial fibrillation, chronic    COVID-19 virus detected    SDH (subdural hematoma)    Altered mental status    AMS (altered mental status)     Past Medical History:   Diagnosis Date    A-fib     CAD (coronary artery disease)     DM (diabetes mellitus)      History reviewed. No pertinent surgical history.   General Information       Row Name 24 1428          Physical Therapy Time and Intention    Document Type evaluation  -CM     Mode of Treatment physical therapy;individual therapy  -CM       Row Name 24 1428          General Information    Patient Profile Reviewed yes  -CM     Prior Level of Function independent:;all household mobility;min assist:;ADL's  patient readmitted from [facility], prior to recent hospitalization patient was [place] in home with FWW, wife assisted with ADLs as needed  -CM     Existing Precautions/Restrictions fall  -CM     Barriers to Rehab medically complex;previous functional deficit;visual deficit  -CM       Row Name 24 1428          Living Environment    People in Home spouse;child(clifton), adult;grandchild(clifton)  -CM       Row Name 24 1428          Home Main Entrance    Number of Stairs, Main Entrance two;other (see comments)  1+1  -CM     Stair Railings, Main Entrance none  -CM       Row Name 24 1428          Stairs Within Home, Primary    Number of  Stairs, Within Home, Primary none  -CM       Row Name 02/01/24 1428          Cognition    Orientation Status (Cognition) oriented x 3  -CM       Row Name 02/01/24 1428          Safety Issues, Functional Mobility    Safety Issues Affecting Function (Mobility) awareness of need for assistance;insight into deficits/self-awareness;safety precaution awareness;safety precautions follow-through/compliance  -CM     Impairments Affecting Function (Mobility) balance;endurance/activity tolerance;strength  -CM               User Key  (r) = Recorded By, (t) = Taken By, (c) = Cosigned By      Initials Name Provider Type    Baylee Cano, PT Physical Therapist                   Mobility       Row Name 02/01/24 1433          Bed Mobility    Bed Mobility supine-sit  -CM     Supine-Sit [place] (Bed Mobility) minimum assist (75% patient effort);1 person assist;verbal cues  -CM     Assistive Device (Bed Mobility) head of bed elevated  -CM     Comment, (Bed Mobility) Josemanuel to lift trunk from elevated HOB  -CM       Row Name 02/01/24 1433          Sit-Stand Transfer    Sit-Stand [place] (Transfers) minimum assist (75% patient effort);1 person assist;verbal cues  -CM     Assistive Device (Sit-Stand Transfers) walker, front-wheeled  -CM     Comment, (Sit-Stand Transfer) cues for safe hand placement although patient pulled up on walker  -CM       Row Name 02/01/24 1433          Gait/Stairs (Locomotion)    [place] Level (Gait) minimum assist (75% patient effort);1 person assist;verbal cues  -CM     Assistive Device (Gait) walker, front-wheeled  -CM     Distance in Feet (Gait) 15  -CM     Deviations/Abnormal Patterns (Gait) bilateral deviations;base of support, wide;cindy decreased;festinating/shuffling;gait speed decreased;stride length decreased  -CM     Bilateral Gait Deviations forward flexed posture;heel strike decreased  -CM     Comment, (Gait/Stairs) Patient ambulated briefly in room with a shuffling step  through gait pattern. Decreased heel strike and then push off noted bilaterally. Discussed with neuro APRN, this may be considered magnetic-type gait pattern. Distance limited by fatigue.  -CM               User Key  (r) = Recorded By, (t) = Taken By, (c) = Cosigned By      Initials Name Provider Type    Baylee Cano PT Physical Therapist                   Obj/Interventions       Row Name 02/01/24 1439          Range of Motion Comprehensive    General Range of Motion bilateral lower extremity ROM WFL  -CM       Row Name 02/01/24 1439          Strength Comprehensive (MMT)    General Manual Muscle Testing (MMT) Assessment lower extremity strength deficits identified  -CM     Comment, General Manual Muscle Testing (MMT) Assessment BLE grossly 3+/5  -CM       Row Name 02/01/24 1439          Balance    Balance Assessment sitting static balance;standing static balance;standing dynamic balance  -CM     Static Sitting Balance standby assist  -CM     Position, Sitting Balance unsupported;sitting edge of bed  -CM     Static Standing Balance contact guard  -CM     Dynamic Standing Balance minimal assist  -CM     Position/Device Used, Standing Balance supported;walker, front-wheeled  -CM     Comment, Balance unsteady however no overt LOB  -CM       Row Name 02/01/24 1439          Sensory Assessment (Somatosensory)    Sensory Assessment (Somatosensory) LE sensation intact  -CM               User Key  (r) = Recorded By, (t) = Taken By, (c) = Cosigned By      Initials Name Provider Type    Baylee Cano PT Physical Therapist                   Goals/Plan       Row Name 02/01/24 1442          Bed Mobility Goal 1 (PT)    Activity/Assistive Device (Bed Mobility Goal 1, PT) sit to supine/supine to sit  -CM     [place] Level/Cues Needed (Bed Mobility Goal 1, PT) standby assist  -CM     Time Frame (Bed Mobility Goal 1, PT) long term goal (LTG);10 days  -CM     Progress/Outcomes (Bed Mobility Goal 1, PT) new  goal  -CM       Row Name 02/01/24 1442          Transfer Goal 1 (PT)    Activity/Assistive Device (Transfer Goal 1, PT) sit-to-stand/stand-to-sit;bed-to-chair/chair-to-bed  -CM     Martin Level/Cues Needed (Transfer Goal 1, PT) contact guard required  -CM     Time Frame (Transfer Goal 1, PT) long term goal (LTG);10 days  -CM     Progress/Outcome (Transfer Goal 1, PT) new goal  -CM       Row Name 02/01/24 1442          Gait Training Goal 1 (PT)    Activity/Assistive Device (Gait Training Goal 1, PT) gait (walking locomotion);assistive device use  -CM     Martin Level (Gait Training Goal 1, PT) contact guard required  -CM     Distance (Gait Training Goal 1, PT) 150'  -CM     Time Frame (Gait Training Goal 1, PT) long term goal (LTG);10 days  -CM     Progress/Outcome (Gait Training Goal 1, PT) new goal  -CM       Row Name 02/01/24 1442          Therapy Assessment/Plan (PT)    Planned Therapy Interventions (PT) balance training;bed mobility training;gait training;home exercise program;stretching;strengthening;ROM (range of motion);postural re-education;patient/family education;transfer training  -CM               User Key  (r) = Recorded By, (t) = Taken By, (c) = Cosigned By      Initials Name Provider Type    Baylee Cano, PT Physical Therapist                   Clinical Impression       Row Name 02/01/24 1440          Pain    Pretreatment Pain Rating 0/10 - no pain  -CM     Posttreatment Pain Rating 0/10 - no pain  -CM       Row Name 02/01/24 1440          Plan of Care Review    Plan of Care Reviewed With patient;spouse  -CM     Outcome Evaluation Patient presents with deficits in strength, endurance, and balance currently limiting his functional mobility below baseline. He was able to ambulate 15' Josemanuel with FWW, limited by fatigue. IPPT is indicated to address current deficits. Recommend D/C back to SNF when medically ready.  -CM       Row Name 02/01/24 1440          Therapy Assessment/Plan  (PT)    Rehab Potential (PT) good, to achieve stated therapy goals  -CM     Criteria for Skilled Interventions Met (PT) yes;meets criteria;skilled treatment is necessary  -CM     Therapy Frequency (PT) daily  -CM       Row Name 02/01/24 1440          Vital Signs    Pre Systolic BP Rehab 111  -CM     Pre Treatment Diastolic BP 66  -CM     Post Systolic BP Rehab 114  -CM     Post Treatment Diastolic BP 68  -CM     Pretreatment Heart Rate (beats/min) 99  -CM     Posttreatment Heart Rate (beats/min) 91  -CM     Pre SpO2 (%) 95  -CM     O2 Delivery Pre Treatment room air  -CM     O2 Delivery Intra Treatment room air  -CM     Post SpO2 (%) 94  -CM     O2 Delivery Post Treatment room air  -CM     Pre Patient Position Supine  -CM     Intra Patient Position Standing  -CM     Post Patient Position Sitting  -CM       Row Name 02/01/24 1440          Positioning and Restraints    Pre-Treatment Position in bed  -CM     Post Treatment Position chair  -CM     In Chair reclined;call light within reach;encouraged to call for assist;exit alarm on;with family/caregiver;waffle cushion;on mechanical lift sling;notified nsg  -CM               User Key  (r) = Recorded By, (t) = Taken By, (c) = Cosigned By      Initials Name Provider Type    Baylee Cano, PT Physical Therapist                   Outcome Measures       Row Name 02/01/24 1442 02/01/24 0247       How much help from another person do you currently need...    Turning from your back to your side while in flat bed without using bedrails? 3  -CM 3  -MT    Moving from lying on back to sitting on the side of a flat bed without bedrails? 3  -CM 3  -MT    Moving to and from a bed to a chair (including a wheelchair)? 3  -CM 3  -MT    Standing up from a chair using your arms (e.g., wheelchair, bedside chair)? 3  -CM 3  -MT    Climbing 3-5 steps with a railing? 2  -CM 2  -MT    To walk in hospital room? 3  -CM 2  -MT    AM-PAC 6 Clicks Score (PT) 17  -CM 16  -MT    Highest Level  of Mobility Goal 5 --> Static standing  -CM 5 --> Static standing  -MT      Row Name 02/01/24 1442          Functional Assessment    Outcome Measure Options AM-PAC 6 Clicks Basic Mobility (PT)  -CM               User Key  (r) = Recorded By, (t) = Taken By, (c) = Cosigned By      Initials Name Provider Type    Baylee Cano, PT Physical Therapist    Tonya Goddard, RN Registered Nurse                                 Physical Therapy Education       Title: PT OT SLP Therapies (In Progress)       Topic: Physical Therapy (In Progress)       Point: Mobility training (In Progress)       Learning Progress Summary             Patient Acceptance, E, NR by CM at 2/1/2024 1443   Significant Other Acceptance, E, NR by CM at 2/1/2024 1443                         Point: Home exercise program (Not Started)       Learner Progress:  Not documented in this visit.              Point: Body mechanics (In Progress)       Learning Progress Summary             Patient Acceptance, E, NR by CM at 2/1/2024 1443   Significant Other Acceptance, E, NR by CM at 2/1/2024 1443                         Point: Precautions (In Progress)       Learning Progress Summary             Patient Acceptance, E, NR by CM at 2/1/2024 1443   Significant Other Acceptance, E, NR by CM at 2/1/2024 1443                                         User Key       Initials Effective Dates Name Provider Type Discipline     09/22/22 -  Baylee Ayala PT Physical Therapist PT                  PT Recommendation and Plan  Planned Therapy Interventions (PT): balance training, bed mobility training, gait training, home exercise program, stretching, strengthening, ROM (range of motion), postural re-education, patient/family education, transfer training  Plan of Care Reviewed With: patient, spouse  Outcome Evaluation: Patient presents with deficits in strength, endurance, and balance currently limiting his functional mobility below baseline. He was able to  ambulate 15' Josemanuel with FWW, limited by fatigue. IPPT is indicated to address current deficits. Recommend D/C back to SNF when medically ready.     Time Calculation:   PT Evaluation Complexity  Examination of Body Systems (PT Eval Complexity): total of 3 or more elements  Clinical Presentation (PT Evaluation Complexity): evolving  Clinical Decision Making (PT Evaluation Complexity): moderate complexity     PT Charges       Row Name 02/01/24 1443             Time Calculation    Start Time 1345  -CM      PT Received On 02/01/24  -CM      PT Goal Re-Cert Due Date 02/11/24  -CM         Untimed Charges    PT Eval/Re-eval Minutes 53  -CM         Total Minutes    Untimed Charges Total Minutes 53  -CM       Total Minutes 53  -CM                User Key  (r) = Recorded By, (t) = Taken By, (c) = Cosigned By      Initials Name Provider Type    Baylee Cano, PT Physical Therapist                  Therapy Charges for Today       Code Description Service Date Service Provider Modifiers Qty    [id number] HC PT EVAL MOD COMPLEXITY 4 2/1/2024 Baylee Ayala, PT GP 1            PT G-Codes  Outcome Measure Options: AM-PAC 6 Clicks Basic Mobility (PT)  AM-PAC 6 Clicks Score (PT): 17  PT Discharge Summary  Anticipated Discharge Disposition (PT): skilled nursing facility    Baylee Ayala PT  2/1/2024

## 2024-02-01 NOTE — PLAN OF CARE
Goal Outcome Evaluation:  Plan of Care Reviewed With: patient, spouse           Outcome Evaluation: Patient presents with deficits in strength, endurance, and balance currently limiting his functional mobility below baseline. He was able to ambulate 15' Josemanuel with FWW, limited by fatigue. IPPT is indicated to address current deficits. Recommend D/C back to SNF when medically ready.      Anticipated Discharge Disposition (PT): skilled nursing facility

## 2024-02-02 PROCEDURE — 99233 SBSQ HOSP IP/OBS HIGH 50: CPT | Performed by: PSYCHIATRY & NEUROLOGY

## 2024-02-02 PROCEDURE — 97116 GAIT TRAINING THERAPY: CPT

## 2024-02-02 PROCEDURE — 97110 THERAPEUTIC EXERCISES: CPT

## 2024-02-02 PROCEDURE — 99232 SBSQ HOSP IP/OBS MODERATE 35: CPT | Performed by: FAMILY MEDICINE

## 2024-02-02 RX ADMIN — Medication 10 ML: at 09:17

## 2024-02-02 RX ADMIN — QUETIAPINE FUMARATE 12.5 MG: 25 TABLET ORAL at 20:46

## 2024-02-02 RX ADMIN — FLECAINIDE ACETATE 50 MG: 50 TABLET ORAL at 20:46

## 2024-02-02 RX ADMIN — FLECAINIDE ACETATE 50 MG: 50 TABLET ORAL at 09:17

## 2024-02-02 RX ADMIN — Medication 1 TABLET: at 09:17

## 2024-02-02 RX ADMIN — PRAVASTATIN SODIUM 20 MG: 20 TABLET ORAL at 09:17

## 2024-02-02 RX ADMIN — TAMSULOSIN HYDROCHLORIDE 0.4 MG: 0.4 CAPSULE ORAL at 09:17

## 2024-02-02 RX ADMIN — Medication 10 ML: at 20:46

## 2024-02-02 NOTE — PROGRESS NOTES
Neurology Note    Patient:  Apollo Haro    YOB: 1940    REFERRING PHYSICIAN:  Dr. Karimi    CHIEF COMPLAINT:    Recurrent falls    HISTORY OF PRESENT ILLNESS:   The patient is doing some better, unable to get up w/o help, wants to go home. Wife does not want him to return to the same SNF, says he was crying all the time.    Past Medical History:  Past Medical History:   Diagnosis Date    A-fib     CAD (coronary artery disease)     DM (diabetes mellitus)        Past Surgical History:  History reviewed. No pertinent surgical history.    Social History:   Social History     Socioeconomic History    Marital status:    Tobacco Use    Smoking status: Never     Passive exposure: Never    Smokeless tobacco: Never   Vaping Use    Vaping Use: Never used   Substance and Sexual Activity    Alcohol use: Never    Drug use: Never    Sexual activity: Defer        Family History:   History reviewed. No pertinent family history.    Medications Prior to Admission:    Prior to Admission medications    Medication Sig Start Date End Date Taking? Authorizing Provider   acetaminophen (TYLENOL) 325 MG tablet Take 2 tablets by mouth Every 4 (Four) Hours As Needed for Mild Pain. 1/18/24   Brittney Haro MD   apixaban (ELIQUIS) 5 MG tablet tablet Take 1 tablet by mouth 2 (Two) Times a Day.    Jeannie Batista MD   aspirin 81 MG EC tablet Take 1 tablet by mouth Daily.    Jeannie Batista MD   flecainide (TAMBOCOR) 50 MG tablet Take 1 tablet by mouth Every 12 (Twelve) Hours.    Jeannie Batista MD   Magnesium 200 MG tablet Take 1 tablet by mouth 2 (Two) Times a Day.    Jeannie Batista MD   metFORMIN (GLUCOPHAGE) 500 MG tablet Take 1 tablet by mouth 2 (Two) Times a Day With Meals.    Jeannie Batista MD   multivitamin with minerals (CENTRUM SILVER 50+MEN PO) Take 1 tablet by mouth Daily.    Jeannie Batista MD   pravastatin (PRAVACHOL) 20 MG tablet Take 1 tablet by mouth Daily.    Lester  MD Jeannie   tamsulosin (FLOMAX) 0.4 MG capsule 24 hr capsule Take 1 capsule by mouth Daily. 1/19/24   Brittney Haro MD       Allergies:  Lidocaine      Review of system  Review of Systems   Musculoskeletal:  Positive for gait problem.   All other systems reviewed and are negative.      Vitals:    02/02/24 1156   BP: 99/59   Pulse: 87   Resp: 16   Temp: 97.3 °F (36.3 °C)   SpO2: 96%       Physical exam  Physical Exam  Eyes:      Extraocular Movements: Extraocular movements intact.      Pupils: Pupils are equal, round, and reactive to light.   Cardiovascular:      Rate and Rhythm: Normal rate and regular rhythm.   Pulmonary:      Effort: Pulmonary effort is normal.   Neurological:      General: No focal deficit present.      Mental Status: He is oriented to person, place, and time.      Comments: Speech clear, VFF, no facial droop, no tremor, moves limbs against gravity.           Lab Results   Component Value Date    WBC 6.62 02/01/2024    HGB 13.3 02/01/2024    HCT 40.0 02/01/2024    MCV 90.9 02/01/2024     02/01/2024     Lab Results   Component Value Date    GLUCOSE 163 (H) 02/01/2024    BUN 14 02/01/2024    CREATININE 1.00 02/01/2024    BCR 14.0 02/01/2024    CO2 27.0 02/01/2024    CALCIUM 9.3 02/01/2024    ALBUMIN 3.9 01/31/2024    AST 22 01/31/2024    ALT 15 01/31/2024     Folate  4.78 - 24.20 ng/mL >20.00   Resulting Agency [facility]     Vitamin B-12  211 - 946 pg/mL 1,269 High        Radiological Studies:   MRI Brain With & Without Contrast    Result Date: 2/1/2024  MRI BRAIN W WO CONTRAST Date of Exam: 2/1/2024 1:50 AM EST Indication: Mental status change, unknown cause.  Comparison: 2/1/2024, 1/31/2024. Technique:  Routine multiplanar/multisequence sequence images of the brain were obtained before and after the uneventful administration of 15 Multihance. Findings: There is no diffusion restriction to suggest acute infarct. There is no evidence of acute or chronic intracranial hemorrhage. Tiny  subdural seen along the falx on the right appears similar as compared to the previous study (series 13 image 67). Ventricles appear enlarged, similar as compared to the previous study. There is global volume loss. Mild to moderate periventricular and subcortical FLAIR signal changes are present. No suspicious or abnormal enhancement identified. No evidence of focal lesions. No mass effect or midline shift. No abnormal extra-axial collections. The major vascular flow voids appear intact. The basal ganglia, brainstem and cerebellum appear within normal limits. Calvarial and superficial soft tissue signal is within normal limits. Orbits appear unremarkable. The paranasal sinuses and the mastoid air cells appear well aerated. Midline structures are intact.     Impression: 1.No evidence of intracranial hemorrhage, mass effect or midline shift. No evidence of recent or acute ischemia. No suspicious or abnormal enhancement. Stable tiny subdural hematoma along the falx on the right. 2.Mild to moderate periventricular and subcortical FLAIR signal changes likely related to chronic microvascular ischemic change. 3.Global volume loss with ventriculomegaly, similar as compared to the previous study. Electronically Signed: Carolina Hassan MD  2/1/2024 2:29 AM EST  Workstation ID: [id number]    CT Head Without Contrast    Result Date: 2/1/2024  CT HEAD WO CONTRAST Date of Exam: 2/1/2024 12:14 AM EST Indication: Serial exam of traumatic subdural. Comparison: 1/31/2024. Technique: Axial CT images were obtained of the head without contrast administration.  Automated exposure control and iterative construction methods were used. Findings: Tiny focal subdural hemorrhage seen along the right cerebral convexity along the falx measuring up to 5 mm, unchanged as compared to the previous study. No additional area of hemorrhage identified.. There is no extracerebral collection. The ventricles appear prominent in size, stable as compared to the  previous study..  Posterior fossa is within normal limits. Calvarium and skull base appear intact.   Visualized sinuses show no air fluid levels. Visualized orbits are unremarkable.     Impression: Stable tiny subdural hemorrhage along the right cerebral convexity along the falx. No new area of hemorrhage identified. Otherwise, stable exam. Electronically Signed: Carolina Hassan MD  2/1/2024 12:35 AM EST  Workstation ID: [id number]    XR Chest 1 View    Result Date: 1/31/2024  XR CHEST 1 VW Date of Exam: 1/31/2024 7:40 PM EST Indication: Altered mental status, infectious workup Comparison: 1/8/2024 Findings: Lungs are moderately well expanded and appear clear except for what appears to be stable linear scarring or discoid atelectasis in the left base. No edema effusion or pneumothorax is seen. Heart is in the upper range of normal size. Vasculature appears normal.     Impression: Mild left basilar discoid atelectasis. No other evidence of active chest disease. Electronically Signed: Mikel Joseph MD  1/31/2024 8:01 PM EST  Workstation ID: [id number]    CT Head Without Contrast    Result Date: 1/31/2024  CT HEAD WO CONTRAST Date of Exam: 1/31/2024 6:33 PM EST Indication: fall on thinners, occipital swelling, confused (likely baseline). Comparison: 1/8/2024 Technique: Axial CT images were obtained of the head without contrast administration.  Automated exposure control and iterative construction methods were used. Findings: Parenchyma:No acute intraparenchymal hemorrhage. No loss of gray-white differentiation to suggest large territory infarct. Moderate parenchymal volume loss. Scattered periventricular and subcortical white matter hypodensities, nonspecific, but most often  consistent with small vessel ischemic changes. No midline shift or herniation. Ventricles and extra axial spaces:Disproportionate enlargement of the ventricles with crowding of the sulci at the vertex. Small right frontal parafalcine subdural hematoma  measuring up to 5 mm in thickness. Other: Right lens replacement. Scattered paranasal sinus mucosal thickening. Small left mastoid effusion. Calvarium is intact. Intracranial atherosclerotic calcification is present. Mild soft tissue swelling of the left posterior scalp.     Impression: Small volume right parafalcine subdural hematoma. Enlargement of the ventricles with crowding of the sulci at the vertex which can be seen in the setting of normal pressure hydrocephalus. Additional chronic changes as above. Findings discussed with Dr. ESME KEY by Dr. Elgin Cisse via telephone on 1/31/2024 7:02 PM EST. Electronically Signed: Elgin Cisse MD  1/31/2024 7:07 PM EST  Workstation ID: [id number]    MRI Thoracic Spine Without Contrast    Result Date: 1/11/2024  MRI THORACIC SPINE WO CONTRAST Date of Exam: 1/11/2024 1:23 AM EST Indication: back pain, falls, BLE weakness.  Comparison: None available. Technique:  Routine multiplanar/multisequence sequence images of the thoracic spine were obtained without contrast administration. Findings: There is evidence of prior compression deformity and subsequent kyphoplasty at T10 with minimal persistent height loss and no significant bony retropulsion or traumatic malalignment. There is a compression deformity noted at T12, appearing chronic without significant marrow edema present with significant underlying demineralization. Height loss is approximately 70% anteriorly. There is minimal bony retropulsion, with some mild focal kyphosis present. No additional fracture is present. There is no listhesis or subluxation. The thoracic spinal cord is normal in caliber and signal throughout. The paraspinal soft tissues demonstrate no acute or suspicious findings. At the site of focal kyphosis and minimal bony retropulsion at T11-12, there is mild associated narrowing of the spinal canal. The spinal canal and neural foramina are otherwise patent throughout.     Impression:  Chronic appearing compression deformities at T10 and T12 as above, without significant associated malalignment or canal compromise. The spinal canal and neural foramina are patent throughout. No acute fracture is noted. Electronically Signed: Bayron Wagner MD  1/11/2024 11:30 AM EST  Workstation ID: [id number]    MRI Lumbar Spine Without Contrast    Result Date: 1/11/2024  MRI LUMBAR SPINE WO CONTRAST Date of Exam: 1/11/2024 12:25 AM CST Indication: back pain, falls, bilateral lower extremity weakness.  Comparison: None available. Technique:  Routine multiplanar/multisequence sequence images of the lumbar spine were obtained without contrast administration.  Findings: ALIGNMENT: Normal DISK SPACE: There is advanced intervertebral disc base narrowing at L3/4 and at L5/S1 with chronic endplate changes. VERTEBRA: No acute loss of vertebral body height. A chronic compression injury is present at T12. CORD: Distal spinal cord and conus medullaris appear unremarkable terminating above the L2 level.  SOFT TISSUES: Paraspinal musculature and visualized abdominopelvic contents are unremarkable.  No mass nor lymphadenopathy. LEVELS: T12-L1: There is a broad-based disc bulge flattening the ventral thecal sac with disc material measuring 2.5 mm beyond the vertebral body margin. AP spinal canal diameter measures 12 mm. Ligamentum flavum hypertrophy and early facet changes are present without significant neural foramina stenosis. L1-L2: Mild broad-based disc bulge flattening the ventral thecal sac. Ligamentum flavum hypertrophy and early facet changes are noted without significant central canal or neural foramina stenosis. L2-L3: Minimal disc bulge and facet changes without significant central canal or neural foramina stenosis. L3-L4: Broad-based disc bulge flattening the ventral thecal sac with AP spinal canal diameter measuring 10 mm. There is anterior spondylosis. Ligamentum flavum hypertrophy and facet changes are noted.  Vertebral body osteophytes are present with findings contributing to severe right and moderate to severe left neural foramina stenosis with contact and compromise of bilateral exiting L3 nerve roots right greater than left. L4-L5: Broad-based disc bulge effacing the ventral thecal sac with increased T2 signal within disc material centrally compatible with annular fissure. Disc material measures 3 mm beyond the vertebral body margin in the AP spinal canal diameter is narrowed to 5 mm. There is prominent ligamentum flavum hypertrophy and facet changes. There is mild to moderate right and moderate left neural foramina stenosis. L5-S1: Mild broad-based disc bulge with facet hypertrophy. There are vertebral body osteophytes contributing to mild to moderate right and moderate left neural foramina stenosis.     Impression: Multilevel degenerative changes in the lumbar spine with prominent findings at level L3/4 where there is severe right and moderate to severe left neural foramina stenosis with compromise of bilateral exiting L3 nerve roots right greater than left. Severe central canal stenosis at L4/5 with moderate neural foramina stenosis. Electronically Signed: Maricruz Bingham MD  1/11/2024 6:17 AM CST  Workstation ID: [id number]    XR Femur 2 View Bilateral    Result Date: 1/10/2024  XR FEMUR 2 VW BILATERAL Date of Exam: 1/10/2024 4:01 PM EST Indication: MRI Clearance Comparison: None available. Findings: There is an intramedullary hermann with a couple screws along the left proximal femur. No metal is identified along the visualized right lower extremity.     Impression: 1.Intramedullary hremann with a couple screws along proximal left femur. 2.No metal identified along visualized right lower extremity. Electronically Signed: Apollo Abdalla MD  1/10/2024 4:57 PM EST  Workstation ID: [id number]    XR Tibia Fibula 2 View Bilateral    Result Date: 1/10/2024  XR TIBIA FIBULA 2 VW BILATERAL Date of Exam: 1/10/2024 4:00 PM EST  Indication: MRI Clearance Comparison: None available. Findings: Right tibia and fibula: 2 views. The shafts of the tibia and fibula appear intact. There are no radiopaque foreign bodies in the soft tissues. Left tibia and fibula: 2 views. The shafts of the tibia and fibula appear intact. There are no radiopaque foreign bodies in the soft tissues.     Impression: No significant findings. Electronically Signed: Debbie Palomino MD  1/10/2024 4:40 PM EST  Workstation ID: [id number]    CT Head Without Contrast    Result Date: 1/8/2024  CT HEAD WO CONTRAST Date of Exam: 1/8/2024 4:44 PM CST Indication: gen weakness. Comparison: None available. Technique: Axial CT images were obtained of the head without contrast administration.  Automated exposure control and iterative construction methods were used. Findings: There is no evidence of acute territorial infarction. There is no acute intracranial hemorrhage. There are no extra-axial collections. Ventricles and CSF spaces are symmetrically prominent.. No mass effect nor hydrocephalus. Brain parenchyma appears normal for age.  There is near complete opacification of the left ethmoid air cells and left frontal sinus. Correlate for signs of sinusitis. Osseous structures and orbits appear intact.     Impression: 1.No acute intracranial process identified. 2.Generalized senescent changes of the brain. 3.Left ethmoid and frontal sinus mucosal disease. Correlate for signs of sinusitis. Electronically Signed: Wang Bingham MD  1/8/2024 4:55 PM CST  Workstation ID: [id number]    XR Chest 1 View    Result Date: 1/8/2024  XR CHEST 1 VW Date of Exam: 1/8/2024 4:20 PM EST Indication: gen weakness Comparison: None available. Findings: The lungs are clear. The heart and mediastinal contours appear normal. There is no pleural effusion. The pulmonary vasculature appears normal. The osseous structures appear intact.     Impression: No acute cardiopulmonary process. Electronically Signed: Apollo  MD Rm  1/8/2024 4:34 PM EST  Workstation ID: [id number]       During this visit the following were done:  Labs Reviewed [x]    Labs Ordered []    Radiology Reports Reviewed [x]    Radiology Ordered []    EKG, echo, and/or stress test reviewed []    EEG results reviewed  []    EEG reviewed and interpreted per myself   []    Discussed case with neurointerventionalist or neuroradiologist []    Referring Provider Records Reviewed []    ER Records Reviewed []    Hospital Records Reviewed []    History Obtained From Family []    Radiological images view and Interpreted per myself [x]    Case Discussed with referring provider []     Decision to obtain and request outside records  []        Assessment and Plan     Recurrent falls while on Eliquis 22 multifactorial ataxia/gait disorder, 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 A tiny presumed acute SDH.   - Strict fall precautions.   - Consider rehab options.   - Hold Eliquis for 10 days per NS. Resume on February 12.   - Outpatient neurology F/U in 2-3 months.    Call for questions, will see prn. Thanks.              Electronically signed by Pola Whittaker MD on 2/2/2024 at 12:43 EST

## 2024-02-02 NOTE — NURSING NOTE
Consult for pressure injury to sacrum.    Visited patient and assessed, there is no pressure injury to sacrum, there is mild bruising in the upper sacral/lower lumbar area likely due to falling.  Also noted abrasion on right foot in LDAs, could not find anything but a very small pinpoint scab.    Will sign off.

## 2024-02-02 NOTE — THERAPY TREATMENT NOTE
Patient Name: Apollo Haro  : 1940    MRN: [id number]                              Today's Date: 2024       Admit Date: 2024    Visit Dx:     ICD-10-CM ICD-9-CM   1. Traumatic subdural hematoma with loss of consciousness, initial encounter  S06.5X9A 852.26   2. Encephalopathy  G93.40 348.30   3. Fall, initial encounter  W19.XXXA E888.9   4. Atrial fibrillation, chronic  I48.20 427.31     Patient Active Problem List   Diagnosis    Weakness    Type 2 diabetes mellitus, without long-term current use of insulin    Rhabdomyolysis due to COVID-19    CAD (coronary artery disease)    HTN (hypertension)    Atrial fibrillation, chronic    COVID-19 virus detected    SDH (subdural hematoma)    Altered mental status    AMS (altered mental status)     Past Medical History:   Diagnosis Date    A-fib     CAD (coronary artery disease)     DM (diabetes mellitus)      History reviewed. No pertinent surgical history.   General Information       Row Name 24 1402          Physical Therapy Time and Intention    Document Type therapy note (daily note)  -SS     Mode of Treatment physical therapy  -SS       Row Name 24 1402          General Information    Patient Profile Reviewed yes  -SS     Existing Precautions/Restrictions fall  -SS     Barriers to Rehab previous functional deficit;medically complex  -SS       Row Name 24 1402          Cognition    Orientation Status (Cognition) oriented x 3  -SS       Row Name 24 1402          Safety Issues, Functional Mobility    Safety Issues Affecting Function (Mobility) at risk behavior observed;awareness of need for assistance;insight into deficits/self-awareness;judgment;positioning of assistive device;problem-solving;safety precaution awareness;safety precautions follow-through/compliance;sequencing abilities  -SS     Impairments Affecting Function (Mobility) balance;endurance/activity tolerance;strength;coordination;motor control;postural/trunk  control;sensation/sensory awareness  -               User Key  (r) = Recorded By, (t) = Taken By, (c) = Cosigned By      Initials Name Provider Type    SS Susana Murdock PT Physical Therapist                   Mobility       Row Name 02/02/24 1403          Bed Mobility    Comment, (Bed Mobility) up in chair  -       Row Name 02/02/24 1403          Sit-Stand Transfer    Sit-Stand [place] (Transfers) minimum assist (75% patient effort);verbal cues;moderate assist (50% patient effort)  varying between trials  -     Assistive Device (Sit-Stand Transfers) walker, front-wheeled  -SS     Comment, (Sit-Stand Transfer) VC for hand placement, appropriate alignment, anterior shift of COG, lowering with eccentric control; pt. demonstrates moderate posterior lean  -       Row Name 02/02/24 1403          Gait/Stairs (Locomotion)    [place] Level (Gait) minimum assist (75% patient effort);verbal cues  -     Assistive Device (Gait) walker, front-wheeled  -SS     Distance in Feet (Gait) 20  10+10  -     Deviations/Abnormal Patterns (Gait) bilateral deviations;base of support, wide;cindy decreased;festinating/shuffling;gait speed decreased;stride length decreased  -     Bilateral Gait Deviations forward flexed posture;heel strike decreased  -     Comment, (Gait/Stairs) Pt. ambulated with a shuffling gait pattern. VC for upright posture, increased step length, anterior shift of COG. Activity limited by fatigue, instability. Close chair follow required.  -               User Key  (r) = Recorded By, (t) = Taken By, (c) = Cosigned By      Initials Name Provider Type    SS Susana Murdock PT Physical Therapist                   Obj/Interventions       Row Name 02/02/24 1404          Motor Skills    Therapeutic Exercise hip;ankle;knee  -       Row Name 02/02/24 1404          Hip (Therapeutic Exercise)    Hip (Therapeutic Exercise) isometric exercises;strengthening exercise  -     Hip Isometrics  (Therapeutic Exercise) bilateral;gluteal sets;10 repetitions  -     Hip Strengthening (Therapeutic Exercise) bilateral;aBduction;aDduction;heel slides;marching while seated;10 repetitions  -       Row Name 02/02/24 1404          Knee (Therapeutic Exercise)    Knee (Therapeutic Exercise) isometric exercises;strengthening exercise  -     Knee Isometrics (Therapeutic Exercise) bilateral;quad sets;10 repetitions  -SS     Knee Strengthening (Therapeutic Exercise) bilateral;SLR (straight leg raise);LAQ (long arc quad);10 repetitions  -       Row Name 02/02/24 1404          Ankle (Therapeutic Exercise)    Ankle (Therapeutic Exercise) AROM (active range of motion)  -     Ankle AROM (Therapeutic Exercise) bilateral;dorsiflexion;plantarflexion;10 repetitions  -SS       Row Name 02/02/24 1404          Balance    Balance Assessment sitting static balance;sitting dynamic balance;sit to stand dynamic balance;standing static balance;standing dynamic balance  -     Static Sitting Balance standby assist  -     Dynamic Sitting Balance contact guard  -     Position, Sitting Balance unsupported;sitting in chair  -     Sit to Stand Dynamic Balance moderate assist  -     Static Standing Balance minimal assist  -     Dynamic Standing Balance minimal assist  -SS     Position/Device Used, Standing Balance supported;walker, front-wheeled  -     Balance Interventions sitting;standing;sit to stand;supported;static;dynamic  -               User Key  (r) = Recorded By, (t) = Taken By, (c) = Cosigned By      Initials Name Provider Type    SS Susana Murdock PT Physical Therapist                   Goals/Plan    No documentation.                  Clinical Impression       Row Name 02/02/24 1405          Pain    Pretreatment Pain Rating 0/10 - no pain  -     Posttreatment Pain Rating 0/10 - no pain  -     Pain Intervention(s) Repositioned;Ambulation/increased activity;Elevated  -     Additional Documentation Pain  Scale: Numbers Pre/Post-Treatment (Group)  -       Row Name 02/02/24 1408          Plan of Care Review    Plan of Care Reviewed With patient;spouse  -     Progress no change  -     Outcome Evaluation Pt. continues to present below baseline function w/generalized weakness, balance deficits and decreased functional endurance affecting his ability to safely participate in functional mobility. He performed transfers and ambulated 10' x 2 trials w/front wheeled walker, min to mod assist. He tolerated ther-ex well. Continue IPPT POC to progress as tolerated.  -       Row Name 02/02/24 1407          Therapy Assessment/Plan (PT)    Rehab Potential (PT) good, to achieve stated therapy goals  -     Criteria for Skilled Interventions Met (PT) yes;meets criteria;skilled treatment is necessary  -     Therapy Frequency (PT) daily  -       Row Name 02/02/24 1405          Vital Signs    Pre Systolic BP Rehab 99  -SS     Pre Treatment Diastolic BP 59  -SS     Post Systolic BP Rehab 96  -SS     Post Treatment Diastolic BP 59  -SS     Pretreatment Heart Rate (beats/min) 93  -SS     Pre SpO2 (%) 95  -SS     O2 Delivery Pre Treatment room air  -     Pre Patient Position Sitting  -     Post Patient Position Sitting  -       Row Name 02/02/24 1409          Positioning and Restraints    Pre-Treatment Position sitting in chair/recliner  -SS     Post Treatment Position chair  -SS     In Chair notified nsg;reclined;call light within reach;encouraged to call for assist;exit alarm on;with family/caregiver;waffle cushion;on mechanical lift sling;legs elevated  -               User Key  (r) = Recorded By, (t) = Taken By, (c) = Cosigned By      Initials Name Provider Type     Susana Murdock, PT Physical Therapist                   Outcome Measures       Row Name 02/02/24 1402          How much help from another person do you currently need...    Turning from your back to your side while in flat bed without using bedrails? 3   -SS     Moving from lying on back to sitting on the side of a flat bed without bedrails? 3  -SS     Moving to and from a bed to a chair (including a wheelchair)? 2  -SS     Standing up from a chair using your arms (e.g., wheelchair, bedside chair)? 2  -SS     Climbing 3-5 steps with a railing? 2  -SS     To walk in hospital room? 2  -SS     AM-PAC 6 Clicks Score (PT) 14  -SS     Highest Level of Mobility Goal 4 --> Transfer to chair/commode  -       Row Name 02/02/24 1409          Functional Assessment    Outcome Measure Options AM-PAC 6 Clicks Basic Mobility (PT)  -               User Key  (r) = Recorded By, (t) = Taken By, (c) = Cosigned By      Initials Name Provider Type    Susana Duran PT Physical Therapist                                 Physical Therapy Education       Title: PT OT SLP Therapies (In Progress)       Topic: Physical Therapy (In Progress)       Point: Mobility training (In Progress)       Learning Progress Summary             Patient CEE Michele VU, DU,NR by  at 2/2/2024 1409    Comment: Reviewed safety/technique w/transfers, ambulation, HEP, PT POC, importance of safety/recommendations of returning to rehab    Acceptance, E, NR by CM at 2/1/2024 1443   Family CEE Michele VU, DU,NR by  at 2/2/2024 1409    Comment: Reviewed safety/technique w/transfers, ambulation, HEP, PT POC, importance of safety/recommendations of returning to rehab   Significant Other Acceptance, E, NR by CM at 2/1/2024 1443                         Point: Home exercise program (Done)       Learning Progress Summary             Patient CEE Michele VU, DU,NR by  at 2/2/2024 1409    Comment: Reviewed safety/technique w/transfers, ambulation, HEP, PT POC, importance of safety/recommendations of returning to rehab   Family CEE Michele VU, DU,NR by  at 2/2/2024 1409    Comment: Reviewed safety/technique w/transfers, ambulation, HEP, PT POC, importance of safety/recommendations of returning to rehab                          Point: Body mechanics (In Progress)       Learning Progress Summary             Patient Eager, E, VU,DU,NR by  at 2/2/2024 1409    Comment: Reviewed safety/technique w/transfers, ambulation, HEP, PT POC, importance of safety/recommendations of returning to rehab    Acceptance, E, NR by CM at 2/1/2024 1443   Family Eager, E, VU,DU,NR by  at 2/2/2024 1409    Comment: Reviewed safety/technique w/transfers, ambulation, HEP, PT POC, importance of safety/recommendations of returning to rehab   Significant Other Acceptance, E, NR by CM at 2/1/2024 1443                         Point: Precautions (In Progress)       Learning Progress Summary             Patient Eager, E, VU,DU,NR by  at 2/2/2024 1409    Comment: Reviewed safety/technique w/transfers, ambulation, HEP, PT POC, importance of safety/recommendations of returning to rehab    Acceptance, E, NR by CM at 2/1/2024 1443   Family Eager, E, VU,DU,NR by  at 2/2/2024 1409    Comment: Reviewed safety/technique w/transfers, ambulation, HEP, PT POC, importance of safety/recommendations of returning to rehab   Significant Other Acceptance, E, NR by CM at 2/1/2024 1443                                         User Key       Initials Effective Dates Name Provider Type Discipline     06/01/21 -  Susana Murdock, PT Physical Therapist PT     09/22/22 -  Baylee Ayala, PT Physical Therapist PT                  PT Recommendation and Plan     Plan of Care Reviewed With: patient, spouse  Progress: no change  Outcome Evaluation: Pt. continues to present below baseline function w/generalized weakness, balance deficits and decreased functional endurance affecting his ability to safely participate in functional mobility. He performed transfers and ambulated 10' x 2 trials w/front wheeled walker, min to mod assist. He tolerated ther-ex well. Continue IPPT POC to progress as tolerated.     Time Calculation:         PT Charges       Row Name 02/02/24 1410             Time  Calculation    Start Time 1313  -SS      PT Received On 02/02/24  -SS         Timed Charges    69512 - PT Therapeutic Exercise Minutes 10  -SS      90108 - Gait Training Minutes  10  -SS      86158 - PT Therapeutic Activity Minutes 4  -SS         Total Minutes    Timed Charges Total Minutes 24  -SS       Total Minutes 24  -SS                User Key  (r) = Recorded By, (t) = Taken By, (c) = Cosigned By      Initials Name Provider Type     Susana Murdock, PT Physical Therapist                  Therapy Charges for Today       Code Description Service Date Service Provider Modifiers Qty    05361237622 HC PT THER PROC EA 15 MIN 2/2/2024 Susana Murdock, PT GP 1    21342446730 HC GAIT TRAINING EA 15 MIN 2/2/2024 Susana Murdock, PT GP 1            PT G-Codes  Outcome Measure Options: AM-PAC 6 Clicks Basic Mobility (PT)  AM-PAC 6 Clicks Score (PT): 14  AM-PAC 6 Clicks Score (OT): 13  PT Discharge Summary  Anticipated Discharge Disposition (PT): skilled nursing facility    Susana Murdock PT  2/2/2024

## 2024-02-02 NOTE — PLAN OF CARE
Goal Outcome Evaluation:  Plan of Care Reviewed With: patient, spouse        Progress: no change  Outcome Evaluation: Pt. continues to present below baseline function w/generalized weakness, balance deficits and decreased functional endurance affecting his ability to safely participate in functional mobility. He performed transfers and ambulated 10' x 2 trials w/front wheeled walker, min to mod assist. He tolerated ther-ex well. Continue IPPT POC to progress as tolerated.      Anticipated Discharge Disposition (PT): skilled nursing facility

## 2024-02-02 NOTE — PLAN OF CARE
Problem: Adult Inpatient Plan of Care  Goal: Plan of Care Review  Outcome: Ongoing, Progressing  Goal: Patient-Specific Goal (Individualized)  Outcome: Ongoing, Progressing  Goal: Absence of Hospital-Acquired Illness or Injury  Outcome: Ongoing, Progressing  Intervention: Identify and Manage Fall Risk  Recent Flowsheet Documentation  Taken 2/2/2024 0200 by Tonya Subramanian RN  Safety Promotion/Fall Prevention:   assistive device/personal items within reach   clutter free environment maintained   fall prevention program maintained   nonskid shoes/slippers when out of bed   room organization consistent   safety round/check completed  Taken 2/2/2024 0000 by Tonya Subramanian RN  Safety Promotion/Fall Prevention:   assistive device/personal items within reach   clutter free environment maintained   fall prevention program maintained   nonskid shoes/slippers when out of bed   room organization consistent   safety round/check completed  Taken 2/1/2024 2200 by Tonya Subramanian RN  Safety Promotion/Fall Prevention:   assistive device/personal items within reach   clutter free environment maintained   fall prevention program maintained   nonskid shoes/slippers when out of bed   room organization consistent   safety round/check completed  Taken 2/1/2024 2000 by Tonya Subramanian RN  Safety Promotion/Fall Prevention:   assistive device/personal items within reach   clutter free environment maintained   fall prevention program maintained   nonskid shoes/slippers when out of bed   room organization consistent   safety round/check completed  Intervention: Prevent Skin Injury  Recent Flowsheet Documentation  Taken 2/2/2024 0200 by Tonya Subramanian RN  Body Position:   neutral body alignment   neutral head position  Skin Protection:   adhesive use limited   incontinence pads utilized   tubing/devices free from skin contact  Taken 2/2/2024 0000 by Tonya Subramanian RN  Body Position:   neutral body alignment   neutral head position  Skin Protection:    adhesive use limited   incontinence pads utilized   tubing/devices free from skin contact  Taken 2/1/2024 2200 by Tonya Subramanian RN  Body Position:   neutral body alignment   neutral head position  Skin Protection:   adhesive use limited   incontinence pads utilized   tubing/devices free from skin contact  Taken 2/1/2024 2000 by Tonya Subramanian RN  Body Position:   neutral body alignment   neutral head position  Skin Protection:   adhesive use limited   incontinence pads utilized   tubing/devices free from skin contact  Intervention: Prevent and Manage VTE (Venous Thromboembolism) Risk  Recent Flowsheet Documentation  Taken 2/1/2024 2000 by Tonya Subramanian RN  VTE Prevention/Management:   bilateral   sequential compression devices on  Goal: Optimal Comfort and Wellbeing  Outcome: Ongoing, Progressing  Goal: Readiness for Transition of Care  Outcome: Ongoing, Progressing     Problem: Fall Injury Risk  Goal: Absence of Fall and Fall-Related Injury  Outcome: Ongoing, Progressing  Intervention: Promote Injury-Free Environment  Recent Flowsheet Documentation  Taken 2/2/2024 0200 by Tonya Subramanian RN  Safety Promotion/Fall Prevention:   assistive device/personal items within reach   clutter free environment maintained   fall prevention program maintained   nonskid shoes/slippers when out of bed   room organization consistent   safety round/check completed  Taken 2/2/2024 0000 by Tonya Subramanian RN  Safety Promotion/Fall Prevention:   assistive device/personal items within reach   clutter free environment maintained   fall prevention program maintained   nonskid shoes/slippers when out of bed   room organization consistent   safety round/check completed  Taken 2/1/2024 2200 by Tonya Subramanian RN  Safety Promotion/Fall Prevention:   assistive device/personal items within reach   clutter free environment maintained   fall prevention program maintained   nonskid shoes/slippers when out of bed   room organization consistent   safety  round/check completed  Taken 2/1/2024 2000 by Tonya Subramanian RN  Safety Promotion/Fall Prevention:   assistive device/personal items within reach   clutter free environment maintained   fall prevention program maintained   nonskid shoes/slippers when out of bed   room organization consistent   safety round/check completed     Problem: Skin Injury Risk Increased  Goal: Skin Health and Integrity  Outcome: Ongoing, Progressing  Intervention: Optimize Skin Protection  Recent Flowsheet Documentation  Taken 2/2/2024 0200 by Tonya Subramanian RN  Pressure Reduction Techniques:   frequent weight shift encouraged   positioned off wounds   pressure points protected  Head of Bed (HOB) Positioning: [facility] elevated  Pressure Reduction Devices:   positioning supports utilized   pressure-redistributing mattress utilized  Skin Protection:   adhesive use limited   incontinence pads utilized   tubing/devices free from skin contact  Taken 2/2/2024 0000 by Tonya Subramanian RN  Pressure Reduction Techniques:   frequent weight shift encouraged   positioned off wounds   pressure points protected  Head of Bed (HOB) Positioning: [facility] elevated  Pressure Reduction Devices:   positioning supports utilized   pressure-redistributing mattress utilized  Skin Protection:   adhesive use limited   incontinence pads utilized   tubing/devices free from skin contact  Taken 2/1/2024 2200 by Tonya Subramanian RN  Pressure Reduction Techniques:   frequent weight shift encouraged   positioned off wounds   pressure points protected  Head of Bed (HOB) Positioning: [facility] elevated  Pressure Reduction Devices:   positioning supports utilized   pressure-redistributing mattress utilized  Skin Protection:   adhesive use limited   incontinence pads utilized   tubing/devices free from skin contact  Taken 2/1/2024 2000 by Tonya Subramanian RN  Pressure Reduction Techniques:   frequent weight shift encouraged   positioned off wounds   pressure points protected  Head of Bed (HOB)  Positioning: HOB elevated  Pressure Reduction Devices:   positioning supports utilized   pressure-redistributing mattress utilized  Skin Protection:   adhesive use limited   incontinence pads utilized   tubing/devices free from skin contact     Problem: Diabetes Comorbidity  Goal: Blood Glucose Level Within Targeted Range  Outcome: Ongoing, Progressing     Problem: Hypertension Comorbidity  Goal: Blood Pressure in Desired Range  Outcome: Ongoing, Progressing   Goal Outcome Evaluation:

## 2024-02-02 NOTE — PROGRESS NOTES
[facility] Medicine Services  PROGRESS NOTE    Patient Name: Apollo Haro  : 1940  MRN: [id number]    Date of Admission: 2024  Primary Care Physician: Provider, No Known    Subjective   Subjective     CC:  Fall with head trauma    HPI:   - Patient is an 83-year-old who presented to the emergency department for evaluation after falling while at a nursing facility and hitting his head.  He was found to have a small subdural hematoma.  This morning he is very sleepy after getting Seroquel and able to answer yes and no questions knew he was in [place] but could not tell me what building.  Reportedly he has had confusion and encephalopathy since having COVID earlier this year.     -patient was more awake and alert this morning.  He was tearful sitting up in bed and stated he just wanted to be with his wife.  They have been  for 60 years.  Discussed his care with his daughter by phone and recommended being in a familiar environment if possible may help his confusion more than anything.  She will discuss with other family and see if they have the means and support to bring him home instead of going back to nursing facility.      Objective   Objective     Vital Signs:   Temp:  [96.5 °F (35.8 °C)-98.4 °F (36.9 °C)] 96.9 °F (36.1 °C)  Heart Rate:  [] 120  Resp:  [16] 16  BP: ()/(54-75) 97/75     Physical Exam:  Constitutional: No acute distress, awake and alert, conversive but tearful  HENT: No visible bruising anteriorly  Respiratory: Clear to auscultation bilaterally, respiratory effort normal   Cardiovascular: RRR  Gastrointestinal: Positive bowel sounds, soft, nontender, nondistended  Musculoskeletal: Generally weak, lying in bed but able to move all 4 extremities  Psychiatric: Tearful but consolable  neurologic: Oriented to person, place, year and month, generally weak, Cranial Nerves grossly intact to confrontation, speech clear  Skin: No  cherelle      Results Reviewed:  LAB RESULTS:      Lab 02/01/24  0255 01/31/24  1806   WBC 6.62 6.47   HEMOGLOBIN 13.3 14.1   HEMATOCRIT 40.0 43.5   PLATELETS 168 197   NEUTROS ABS 3.94 3.96   IMMATURE GRANS (ABS) 0.02 0.02   LYMPHS ABS 1.65 1.63   MONOS ABS 0.79 0.69   EOS ABS 0.19 0.15   MCV 90.9 92.6   PROTIME 14.1  --          Lab 02/01/24  0255 01/31/24  1806   SODIUM 139 140   POTASSIUM 4.1 4.0   CHLORIDE 101 99   CO2 27.0 28.0   ANION GAP 11.0 13.0   BUN 14 16   CREATININE 1.00 1.01   EGFR 74.2 73.3   GLUCOSE 163* 133*   CALCIUM 9.3 9.4   MAGNESIUM  --  1.7   HEMOGLOBIN A1C 7.00*  --    TSH  --  1.220         Lab 01/31/24  1806   TOTAL PROTEIN 7.4   ALBUMIN 3.9   GLOBULIN 3.5   ALT (SGPT) 15   AST (SGOT) 22   BILIRUBIN 1.0   ALK PHOS 116         Lab 02/01/24  0255   PROTIME 14.1   INR 1.08                 Lab 02/01/24  0257   FIO2 21   CARBOXYHEMOGLOBIN (VENOUS) 1.4     Brief Urine Lab Results  (Last result in the past 365 days)        Color   Clarity   Blood   Leuk Est   Nitrite   Protein   CREAT   Urine HCG        01/31/24 1823 Yellow   Clear   Negative   Negative   Negative   Negative                   Microbiology Results Abnormal       None            MRI Brain With & Without Contrast    Result Date: 2/1/2024  MRI BRAIN W WO CONTRAST Date of Exam: 2/1/2024 1:50 AM EST Indication: Mental status change, unknown cause.  Comparison: 2/1/2024, 1/31/2024. Technique:  Routine multiplanar/multisequence sequence images of the brain were obtained before and after the uneventful administration of 15 Multihance. Findings: There is no diffusion restriction to suggest acute infarct. There is no evidence of acute or chronic intracranial hemorrhage. Tiny subdural seen along the falx on the right appears similar as compared to the previous study (series 13 image 67). Ventricles appear enlarged, similar as compared to the previous study. There is global volume loss. Mild to moderate periventricular and subcortical FLAIR  signal changes are present. No suspicious or abnormal enhancement identified. No evidence of focal lesions. No mass effect or midline shift. No abnormal extra-axial collections. The major vascular flow voids appear intact. The basal ganglia, brainstem and cerebellum appear within normal limits. Calvarial and superficial soft tissue signal is within normal limits. Orbits appear unremarkable. The paranasal sinuses and the mastoid air cells appear well aerated. Midline structures are intact.     Impression: Impression: 1.No evidence of intracranial hemorrhage, mass effect or midline shift. No evidence of recent or acute ischemia. No suspicious or abnormal enhancement. Stable tiny subdural hematoma along the falx on the right. 2.Mild to moderate periventricular and subcortical FLAIR signal changes likely related to chronic microvascular ischemic change. 3.Global volume loss with ventriculomegaly, similar as compared to the previous study. Electronically Signed: Carolina Hassan MD  2/1/2024 2:29 AM EST  Workstation ID: [id number]    CT Head Without Contrast    Result Date: 2/1/2024  CT HEAD WO CONTRAST Date of Exam: 2/1/2024 12:14 AM EST Indication: Serial exam of traumatic subdural. Comparison: 1/31/2024. Technique: Axial CT images were obtained of the head without contrast administration.  Automated exposure control and iterative construction methods were used. Findings: Tiny focal subdural hemorrhage seen along the right cerebral convexity along the falx measuring up to 5 mm, unchanged as compared to the previous study. No additional area of hemorrhage identified.. There is no extracerebral collection. The ventricles appear prominent in size, stable as compared to the previous study..  Posterior fossa is within normal limits. Calvarium and skull base appear intact.   Visualized sinuses show no air fluid levels. Visualized orbits are unremarkable.     Impression: Impression: Stable tiny subdural hemorrhage along the right  cerebral convexity along the falx. No new area of hemorrhage identified. Otherwise, stable exam. Electronically Signed: Carolina Hassan MD  2/1/2024 12:35 AM EST  Workstation ID: [id number]    XR Chest 1 View    Result Date: 1/31/2024  XR CHEST 1 VW Date of Exam: 1/31/2024 7:40 PM EST Indication: Altered mental status, infectious workup Comparison: 1/8/2024 Findings: Lungs are moderately well expanded and appear clear except for what appears to be stable linear scarring or discoid atelectasis in the left base. No edema effusion or pneumothorax is seen. Heart is in the upper range of normal size. Vasculature appears normal.     Impression: Impression: Mild left basilar discoid atelectasis. No other evidence of active chest disease. Electronically Signed: Mikel Joseph MD  1/31/2024 8:01 PM EST  Workstation ID: [id number]    CT Head Without Contrast    Result Date: 1/31/2024  CT HEAD WO CONTRAST Date of Exam: 1/31/2024 6:33 PM EST Indication: fall on thinners, occipital swelling, confused (likely baseline). Comparison: 1/8/2024 Technique: Axial CT images were obtained of the head without contrast administration.  Automated exposure control and iterative construction methods were used. Findings: Parenchyma:No acute intraparenchymal hemorrhage. No loss of gray-white differentiation to suggest large territory infarct. Moderate parenchymal volume loss. Scattered periventricular and subcortical white matter hypodensities, nonspecific, but most often  consistent with small vessel ischemic changes. No midline shift or herniation. Ventricles and extra axial spaces:Disproportionate enlargement of the ventricles with crowding of the sulci at the vertex. Small right frontal parafalcine subdural hematoma measuring up to 5 mm in thickness. Other: Right lens replacement. Scattered paranasal sinus mucosal thickening. Small left mastoid effusion. Calvarium is intact. Intracranial atherosclerotic calcification is present. Mild soft tissue  swelling of the left posterior scalp.     Impression: Impression: Small volume right parafalcine subdural hematoma. Enlargement of the ventricles with crowding of the sulci at the vertex which can be seen in the setting of normal pressure hydrocephalus. Additional chronic changes as above. Findings discussed with Dr. ESME KEY by Dr. Elgin Cisse via telephone on 1/31/2024 7:02 PM EST. Electronically Signed: Elgin Cisse MD  1/31/2024 7:07 PM EST  Workstation ID: [id number]         Current medications:  Scheduled Meds:flecainide, 50 mg, Oral, Q12H  multivitamin with minerals, 1 tablet, Oral, Daily  pravastatin, 20 mg, Oral, Daily  QUEtiapine, 12.5 mg, Oral, Nightly  senna-docusate sodium, 2 tablet, Oral, BID  sodium chloride, 10 mL, Intravenous, Q12H  tamsulosin, 0.4 mg, Oral, Daily      Continuous Infusions:     PRN Meds:.  acetaminophen    senna-docusate sodium **AND** polyethylene glycol **AND** bisacodyl **AND** bisacodyl    Calcium Replacement - Follow Nurse / BPA Driven Protocol    Magnesium Standard Dose Replacement - Follow Nurse / BPA Driven Protocol    ondansetron ODT **OR** ondansetron    Phosphorus Replacement - Follow Nurse / BPA Driven Protocol    Potassium Replacement - Follow Nurse / BPA Driven Protocol    sodium chloride    sodium chloride    Assessment & Plan   Assessment & Plan     Active Hospital Problems    Diagnosis  POA    **SDH (subdural hematoma) [S06.5XAA]  Yes    Altered mental status [R41.82]  Unknown    AMS (altered mental status) [R41.82]  Yes    HTN (hypertension) [I10]  Yes    CAD (coronary artery disease) [I25.10]  Yes    Atrial fibrillation, chronic [I48.20]  Yes    COVID-19 virus detected [U07.1]  Yes    Type 2 diabetes mellitus, without long-term current use of insulin [E11.9]  Yes      Resolved Hospital Problems   No resolved problems to display.        Brief Hospital Course to date:  Apollo Haro is a 83 y.o. male admitted after an unwitnessed fall while at  rehab.  He sustained a subdural hematoma.  Has medical history significant for A-fib on Eliquis and flecainide, diabetes mellitus type 2, recent COVID-19, encephalopathy thought secondary to COVID, CAD, hypertension, hyperlipidemia, chronic compression fractures, history of NPH.  Neurology has been consulted as well as neurosurgery.    Subdural hematoma  Altered mental status  History of NPH  -Neurosurgery consulted for subdural hematoma-advised holding Eliquis x 10 days  -Neurology consulted for altered mental status, following  -Fall precautions  -Holding Eliquis, given Kcentra in ED  -MRI with no intracranial hemorrhage, small subdural hematoma noted on the right  -Discussed with neurology will decrease Seroquel dosing given somnolence  -Discussed with patient's daughter options for care at home    Diabetes mellitus type 2  -Continue sliding scale insulin  -A1c 7.00  -Hold metformin    Recent COVID-19 infection  A-fib on chronic anticoagulation with Eliquis  -Held Eliquis due to subdural hematoma, resume when okay with neurosurgery (hold x 10 days)  -Continue flecainide    CAD  Hypertension  Hyperlipidemia      Expected Discharge Location and Transportation: Rehab versus home with family  Expected Discharge   Expected Discharge Date: 2/5/2024; Expected Discharge Time:      DVT prophylaxis:  Mechanical DVT prophylaxis orders are present.         AM-PAC 6 Clicks Score (PT): 17 (02/01/24 2000)    CODE STATUS:   Code Status and Medical Interventions:   Ordered at: 02/01/24 0043     Level Of Support Discussed With:    Next of Kin (If No Surrogate)     Code Status (Patient has no pulse and is not breathing):    CPR (Attempt to Resuscitate)     Medical Interventions (Patient has pulse or is breathing):    Full Support       Sherine Karimi MD  02/02/24

## 2024-02-03 PROCEDURE — 99232 SBSQ HOSP IP/OBS MODERATE 35: CPT | Performed by: FAMILY MEDICINE

## 2024-02-03 RX ADMIN — QUETIAPINE FUMARATE 12.5 MG: 25 TABLET ORAL at 21:30

## 2024-02-03 RX ADMIN — FLECAINIDE ACETATE 50 MG: 50 TABLET ORAL at 21:30

## 2024-02-03 RX ADMIN — TAMSULOSIN HYDROCHLORIDE 0.4 MG: 0.4 CAPSULE ORAL at 08:35

## 2024-02-03 RX ADMIN — PRAVASTATIN SODIUM 20 MG: 20 TABLET ORAL at 08:26

## 2024-02-03 RX ADMIN — Medication 1 TABLET: at 08:26

## 2024-02-03 RX ADMIN — FLECAINIDE ACETATE 50 MG: 50 TABLET ORAL at 08:26

## 2024-02-03 RX ADMIN — Medication 10 ML: at 08:26

## 2024-02-03 RX ADMIN — SENNOSIDES AND DOCUSATE SODIUM 2 TABLET: 8.6; 5 TABLET ORAL at 21:30

## 2024-02-03 NOTE — PROGRESS NOTES
[facility] Medicine Services  PROGRESS NOTE    Patient Name: Apollo Haro  : 1940  MRN: [id number]    Date of Admission: 2024  Primary Care Physician: Provider, No Known    Subjective   Subjective     CC:  Fall with head trauma    HPI:   - Patient is an 83-year-old who presented to the emergency department for evaluation after falling while at a nursing facility and hitting his head.  He was found to have a small subdural hematoma.  This morning he is very sleepy after getting Seroquel and able to answer yes and no questions knew he was in [place] but could not tell me what building.  Reportedly he has had confusion and encephalopathy since having COVID earlier this year.     -patient was more awake and alert this morning.  He was tearful sitting up in bed and stated he just wanted to be with his wife.  They have been  for 60 years.  Discussed his care with his daughter by phone and recommended being in a familiar environment if possible may help his confusion more than anything.  She will discuss with other family and see if they have the means and support to bring him home instead of going back to nursing facility.    2/3 - family is considering taking him home instead of snf which I think may be in his overall best interest given his confusion being away from home and failur to improve while at snf recently per his report. He is tearful when discussing it. States no pain and does not feel confused today. Oriented to person, place time    Objective   Objective     Vital Signs:   Temp:  [97 °F (36.1 °C)-99 °F (37.2 °C)] 97.7 °F (36.5 °C)  Heart Rate:  [] 80  Resp:  [16] 16  BP: ()/(54-75) 121/65     Physical Exam:  Constitutional: No acute distress, awake and alert, conversive but tearful  HENT: No visible bruising anteriorly  Respiratory: Clear to auscultation bilaterally, respiratory effort normal   Cardiovascular: RRR  Gastrointestinal: Positive  bowel sounds, soft, nontender, nondistended  Musculoskeletal: Generally weak, lying in bed but able to move all 4 extremities  Psychiatric: Tearful but consolable  neurologic: Oriented to person, place, year and month, generally weak, Cranial Nerves grossly intact to confrontation, speech clear  Skin: No rashes      Results Reviewed:  LAB RESULTS:      Lab 02/01/24  0255 01/31/24  1806   WBC 6.62 6.47   HEMOGLOBIN 13.3 14.1   HEMATOCRIT 40.0 43.5   PLATELETS 168 197   NEUTROS ABS 3.94 3.96   IMMATURE GRANS (ABS) 0.02 0.02   LYMPHS ABS 1.65 1.63   MONOS ABS 0.79 0.69   EOS ABS 0.19 0.15   MCV 90.9 92.6   PROTIME 14.1  --          Lab 02/01/24  0255 01/31/24  1806   SODIUM 139 140   POTASSIUM 4.1 4.0   CHLORIDE 101 99   CO2 27.0 28.0   ANION GAP 11.0 13.0   BUN 14 16   CREATININE 1.00 1.01   EGFR 74.2 73.3   GLUCOSE 163* 133*   CALCIUM 9.3 9.4   MAGNESIUM  --  1.7   HEMOGLOBIN A1C 7.00*  --    TSH  --  1.220         Lab 01/31/24  1806   TOTAL PROTEIN 7.4   ALBUMIN 3.9   GLOBULIN 3.5   ALT (SGPT) 15   AST (SGOT) 22   BILIRUBIN 1.0   ALK PHOS 116         Lab 02/01/24  0255   PROTIME 14.1   INR 1.08                 Lab 02/01/24  0257   FIO2 21   CARBOXYHEMOGLOBIN (VENOUS) 1.4     Brief Urine Lab Results  (Last result in the past 365 days)        Color   Clarity   Blood   Leuk Est   Nitrite   Protein   CREAT   Urine HCG        01/31/24 1823 Yellow   Clear   Negative   Negative   Negative   Negative                   Microbiology Results Abnormal       None            No radiology results from the last 24 hrs        Current medications:  Scheduled Meds:flecainide, 50 mg, Oral, Q12H  multivitamin with minerals, 1 tablet, Oral, Daily  pravastatin, 20 mg, Oral, Daily  QUEtiapine, 12.5 mg, Oral, Nightly  senna-docusate sodium, 2 tablet, Oral, BID  sodium chloride, 10 mL, Intravenous, Q12H  tamsulosin, 0.4 mg, Oral, Daily      Continuous Infusions:     PRN Meds:.  acetaminophen    senna-docusate sodium **AND** polyethylene  glycol **AND** bisacodyl **AND** bisacodyl    Calcium Replacement - Follow Nurse / BPA Driven Protocol    Magnesium Standard Dose Replacement - Follow Nurse / BPA Driven Protocol    ondansetron ODT **OR** ondansetron    Phosphorus Replacement - Follow Nurse / BPA Driven Protocol    Potassium Replacement - Follow Nurse / BPA Driven Protocol    sodium chloride    sodium chloride    Assessment & Plan   Assessment & Plan     Active Hospital Problems    Diagnosis  POA    **SDH (subdural hematoma) [S06.5XAA]  Yes    Altered mental status [R41.82]  Unknown    AMS (altered mental status) [R41.82]  Yes    HTN (hypertension) [I10]  Yes    CAD (coronary artery disease) [I25.10]  Yes    Atrial fibrillation, chronic [I48.20]  Yes    COVID-19 virus detected [U07.1]  Yes    Type 2 diabetes mellitus, without long-term current use of insulin [E11.9]  Yes      Resolved Hospital Problems   No resolved problems to display.        Brief Hospital Course to date:  Apollo Haro is a 83 y.o. male admitted after an unwitnessed fall while at rehab.  He sustained a subdural hematoma.  Has medical history significant for A-fib on Eliquis and flecainide, diabetes mellitus type 2, recent COVID-19, encephalopathy thought secondary to COVID, CAD, hypertension, hyperlipidemia, chronic compression fractures, history of NPH.  Neurology has been consulted as well as neurosurgery.    Subdural hematoma  Altered mental status  History of NPH  -Neurosurgery consulted for subdural hematoma-advised holding Eliquis x 10 days  -Neurology consulted for altered mental status, following  -Fall precautions  -Holding Eliquis, given Kcentra in ED  -MRI with no intracranial hemorrhage, small subdural hematoma noted on the right  -Discussed with neurology will decrease Seroquel dosing given somnolence  -Discussed with patient's daughter options for care at home    Diabetes mellitus type 2  -Continue sliding scale insulin  -A1c 7.00  -Hold metformin    Recent COVID-19  infection  A-fib on chronic anticoagulation with Eliquis  -Held Eliquis due to subdural hematoma, resume when okay with neurosurgery (hold x 10 days)  -Continue flecainide    CAD  Hypertension  Hyperlipidemia      Expected Discharge Location and Transportation: Rehab versus home with family  Expected Discharge   Expected Discharge Date: 2/5/2024; Expected Discharge Time:      DVT prophylaxis:  Mechanical DVT prophylaxis orders are present.         AM-PAC 6 Clicks Score (PT): 14 (02/02/24 2000)    CODE STATUS:   Code Status and Medical Interventions:   Ordered at: 02/01/24 0043     Level Of Support Discussed With:    Next of Kin (If No Surrogate)     Code Status (Patient has no pulse and is not breathing):    CPR (Attempt to Resuscitate)     Medical Interventions (Patient has pulse or is breathing):    Full Support       Sherine Karimi MD  02/03/24

## 2024-02-03 NOTE — PLAN OF CARE
Problem: Adult Inpatient Plan of Care  Goal: Plan of Care Review  Outcome: Ongoing, Progressing  Goal: Patient-Specific Goal (Individualized)  Outcome: Ongoing, Progressing  Goal: Absence of Hospital-Acquired Illness or Injury  Outcome: Ongoing, Progressing  Intervention: Identify and Manage Fall Risk  Recent Flowsheet Documentation  Taken 2/3/2024 0400 by Tonya Subramanian RN  Safety Promotion/Fall Prevention:   assistive device/personal items within reach   clutter free environment maintained   fall prevention program maintained   nonskid shoes/slippers when out of bed   room organization consistent   safety round/check completed  Taken 2/3/2024 0200 by Tonya Subramanian RN  Safety Promotion/Fall Prevention:   assistive device/personal items within reach   clutter free environment maintained   fall prevention program maintained   nonskid shoes/slippers when out of bed   room organization consistent   safety round/check completed  Taken 2/3/2024 0000 by Tonya Subramanian RN  Safety Promotion/Fall Prevention:   assistive device/personal items within reach   clutter free environment maintained   fall prevention program maintained   nonskid shoes/slippers when out of bed   room organization consistent   safety round/check completed  Taken 2/2/2024 2200 by Tonya Subramanian RN  Safety Promotion/Fall Prevention:   assistive device/personal items within reach   clutter free environment maintained   fall prevention program maintained   nonskid shoes/slippers when out of bed   room organization consistent   safety round/check completed  Taken 2/2/2024 2000 by Tonya Subramanian RN  Safety Promotion/Fall Prevention:   assistive device/personal items within reach   clutter free environment maintained   fall prevention program maintained   nonskid shoes/slippers when out of bed   room organization consistent   safety round/check completed  Intervention: Prevent Skin Injury  Recent Flowsheet Documentation  Taken 2/3/2024 0400 by Tonya Subramanian  RN  Body Position:   neutral body alignment   neutral head position  Taken 2/3/2024 0200 by Tonya Subramanian RN  Body Position:   neutral body alignment   neutral head position  Skin Protection:   adhesive use limited   incontinence pads utilized   tubing/devices free from skin contact  Taken 2/3/2024 0000 by Tonya Subramanian RN  Body Position:   neutral body alignment   neutral head position  Skin Protection:   adhesive use limited   incontinence pads utilized   tubing/devices free from skin contact  Taken 2/2/2024 2200 by Tonya Subramanian RN  Body Position:   neutral body alignment   neutral head position  Skin Protection:   adhesive use limited   incontinence pads utilized   tubing/devices free from skin contact  Taken 2/2/2024 2000 by Tonya Subramanian RN  Body Position:   neutral body alignment   neutral head position  Skin Protection:   adhesive use limited   incontinence pads utilized   tubing/devices free from skin contact  Intervention: Prevent and Manage VTE (Venous Thromboembolism) Risk  Recent Flowsheet Documentation  Taken 2/2/2024 2000 by Tonya Subramanian RN  VTE Prevention/Management:   bilateral   sequential compression devices on  Goal: Optimal Comfort and Wellbeing  Outcome: Ongoing, Progressing  Goal: Readiness for Transition of Care  Outcome: Ongoing, Progressing     Problem: Fall Injury Risk  Goal: Absence of Fall and Fall-Related Injury  Outcome: Ongoing, Progressing  Intervention: Promote Injury-Free Environment  Recent Flowsheet Documentation  Taken 2/3/2024 0400 by Tonya Subramanian RN  Safety Promotion/Fall Prevention:   assistive device/personal items within reach   clutter free environment maintained   fall prevention program maintained   nonskid shoes/slippers when out of bed   room organization consistent   safety round/check completed  Taken 2/3/2024 0200 by Tonya Subramanian RN  Safety Promotion/Fall Prevention:   assistive device/personal items within reach   clutter free environment maintained   fall  prevention program maintained   nonskid shoes/slippers when out of bed   room organization consistent   safety round/check completed  Taken 2/3/2024 0000 by Tonya Subramanian RN  Safety Promotion/Fall Prevention:   assistive device/personal items within reach   clutter free environment maintained   fall prevention program maintained   nonskid shoes/slippers when out of bed   room organization consistent   safety round/check completed  Taken 2/2/2024 2200 by Tonya Subramanian RN  Safety Promotion/Fall Prevention:   assistive device/personal items within reach   clutter free environment maintained   fall prevention program maintained   nonskid shoes/slippers when out of bed   room organization consistent   safety round/check completed  Taken 2/2/2024 2000 by Tonya Subramanian RN  Safety Promotion/Fall Prevention:   assistive device/personal items within reach   clutter free environment maintained   fall prevention program maintained   nonskid shoes/slippers when out of bed   room organization consistent   safety round/check completed     Problem: Skin Injury Risk Increased  Goal: Skin Health and Integrity  Outcome: Ongoing, Progressing  Intervention: Optimize Skin Protection  Recent Flowsheet Documentation  Taken 2/3/2024 0400 by Tonya Subramanian RN  Head of Bed (HOB) Positioning: [facility] elevated  Pressure Reduction Devices:   positioning supports utilized   pressure-redistributing mattress utilized  Taken 2/3/2024 0200 by Tonya Subramanian RN  Pressure Reduction Techniques:   frequent weight shift encouraged   positioned off wounds   pressure points protected  Head of Bed (HOB) Positioning: [facility] elevated  Pressure Reduction Devices:   positioning supports utilized   pressure-redistributing mattress utilized  Skin Protection:   adhesive use limited   incontinence pads utilized   tubing/devices free from skin contact  Taken 2/3/2024 0000 by Tonya Subramanina RN  Pressure Reduction Techniques:   frequent weight shift encouraged   positioned off  wounds   pressure points protected  Head of Bed ([facility]) Positioning: [facility] elevated  Pressure Reduction Devices:   positioning supports utilized   pressure-redistributing mattress utilized  Skin Protection:   adhesive use limited   incontinence pads utilized   tubing/devices free from skin contact  Taken 2/2/2024 2200 by Tonya Subramanian RN  Pressure Reduction Techniques:   frequent weight shift encouraged   positioned off wounds   pressure points protected  Head of Bed ([facility]) Positioning: [facility] elevated  Pressure Reduction Devices:   positioning supports utilized   pressure-redistributing mattress utilized  Skin Protection:   adhesive use limited   incontinence pads utilized   tubing/devices free from skin contact  Taken 2/2/2024 2000 by Tonya Subramanian RN  Pressure Reduction Techniques:   frequent weight shift encouraged   positioned off wounds   pressure points protected  Head of Bed ([facility]) Positioning: [facility] elevated  Pressure Reduction Devices:   positioning supports utilized   pressure-redistributing mattress utilized  Skin Protection:   adhesive use limited   incontinence pads utilized   tubing/devices free from skin contact     Problem: Diabetes Comorbidity  Goal: Blood Glucose Level Within Targeted Range  Outcome: Ongoing, Progressing     Problem: Hypertension Comorbidity  Goal: Blood Pressure in Desired Range  Outcome: Ongoing, Progressing   Goal Outcome Evaluation:

## 2024-02-04 LAB
GLUCOSE BLDC GLUCOMTR-MCNC: 231 MG/DL (ref 70–130)
GLUCOSE BLDC GLUCOMTR-MCNC: 257 MG/DL (ref 70–130)
GLUCOSE BLDC GLUCOMTR-MCNC: 286 MG/DL (ref 70–130)

## 2024-02-04 PROCEDURE — 99232 SBSQ HOSP IP/OBS MODERATE 35: CPT | Performed by: FAMILY MEDICINE

## 2024-02-04 PROCEDURE — 82948 REAGENT STRIP/BLOOD GLUCOSE: CPT

## 2024-02-04 PROCEDURE — 63710000001 INSULIN LISPRO (HUMAN) PER 5 UNITS: Performed by: FAMILY MEDICINE

## 2024-02-04 RX ORDER — IBUPROFEN 600 MG/1
1 TABLET ORAL
Status: DISCONTINUED | OUTPATIENT
Start: 2024-02-04 | End: 2024-02-07 | Stop reason: HOSPADM

## 2024-02-04 RX ORDER — INSULIN LISPRO 100 [IU]/ML
2-7 INJECTION, SOLUTION INTRAVENOUS; SUBCUTANEOUS
Status: DISCONTINUED | OUTPATIENT
Start: 2024-02-04 | End: 2024-02-07 | Stop reason: HOSPADM

## 2024-02-04 RX ORDER — DEXTROSE MONOHYDRATE 25 G/50ML
25 INJECTION, SOLUTION INTRAVENOUS
Status: DISCONTINUED | OUTPATIENT
Start: 2024-02-04 | End: 2024-02-07 | Stop reason: HOSPADM

## 2024-02-04 RX ORDER — NICOTINE POLACRILEX 4 MG
15 LOZENGE BUCCAL
Status: DISCONTINUED | OUTPATIENT
Start: 2024-02-04 | End: 2024-02-07 | Stop reason: HOSPADM

## 2024-02-04 RX ADMIN — Medication 10 ML: at 21:33

## 2024-02-04 RX ADMIN — TAMSULOSIN HYDROCHLORIDE 0.4 MG: 0.4 CAPSULE ORAL at 09:33

## 2024-02-04 RX ADMIN — INSULIN LISPRO 4 UNITS: 100 INJECTION, SOLUTION INTRAVENOUS; SUBCUTANEOUS at 17:32

## 2024-02-04 RX ADMIN — FLECAINIDE ACETATE 50 MG: 50 TABLET ORAL at 09:33

## 2024-02-04 RX ADMIN — INSULIN LISPRO 4 UNITS: 100 INJECTION, SOLUTION INTRAVENOUS; SUBCUTANEOUS at 12:07

## 2024-02-04 RX ADMIN — INSULIN LISPRO 3 UNITS: 100 INJECTION, SOLUTION INTRAVENOUS; SUBCUTANEOUS at 21:35

## 2024-02-04 RX ADMIN — Medication 1 TABLET: at 09:33

## 2024-02-04 RX ADMIN — FLECAINIDE ACETATE 50 MG: 50 TABLET ORAL at 21:33

## 2024-02-04 RX ADMIN — SENNOSIDES AND DOCUSATE SODIUM 2 TABLET: 8.6; 5 TABLET ORAL at 09:33

## 2024-02-04 RX ADMIN — QUETIAPINE FUMARATE 12.5 MG: 25 TABLET ORAL at 21:33

## 2024-02-04 RX ADMIN — SENNOSIDES AND DOCUSATE SODIUM 2 TABLET: 8.6; 5 TABLET ORAL at 21:33

## 2024-02-04 RX ADMIN — Medication 10 ML: at 09:33

## 2024-02-04 RX ADMIN — PRAVASTATIN SODIUM 20 MG: 20 TABLET ORAL at 09:33

## 2024-02-04 NOTE — PLAN OF CARE
Patient alert and oriented x 4. Sat in chair for several hrs this afternoon. Multiple episodes of urinary incontinence. VSS.

## 2024-02-04 NOTE — PROGRESS NOTES
[facility] Medicine Services  PROGRESS NOTE    Patient Name: Apollo Haro  : 1940  MRN: [id number]    Date of Admission: 2024  Primary Care Physician: Provider, No Known    Subjective   Subjective     CC:  Fall with head trauma    HPI:   - Patient is an 83-year-old who presented to the emergency department for evaluation after falling while at a nursing facility and hitting his head.  He was found to have a small subdural hematoma.  This morning he is very sleepy after getting Seroquel and able to answer yes and no questions knew he was in [place] but could not tell me what building.  Reportedly he has had confusion and encephalopathy since having COVID earlier this year.     -patient was more awake and alert this morning.  He was tearful sitting up in bed and stated he just wanted to be with his wife.  They have been  for 60 years.  Discussed his care with his daughter by phone and recommended being in a familiar environment if possible may help his confusion more than anything.  She will discuss with other family and see if they have the means and support to bring him home instead of going back to nursing facility.    2/3 - family is considering taking him home instead of snf which I think may be in his overall best interest given his confusion being away from home and failur to improve while at snf recently per his report. He is tearful when discussing it. States no pain and does not feel confused today. Oriented to person, place time     -no new concerns.  Nursing reports family is looking at other facilities for rehab for him.  He is up sitting in a chair eating breakfast.  Asked me to help him open a can of soda.  Not crying today    Objective   Objective     Vital Signs:   Temp:  [97.3 °F (36.3 °C)-97.8 °F (36.6 °C)] 97.3 °F (36.3 °C)  Heart Rate:  [71-76] 76  Resp:  [16-17] 17  BP: (103-128)/(56-69) 103/56     Physical Exam:  Constitutional: No acute  distress, awake and alert, conversive  HENT: No visible bruising anteriorly  Respiratory: Clear to auscultation bilaterally, respiratory effort normal   Cardiovascular: RRR  Gastrointestinal: Positive bowel sounds, soft, nontender, nondistended  Musculoskeletal: Generally weak, lying in bed but able to move all 4 extremities  Psychiatric: Improved mood  neurologic: Oriented to person, place, year and month, generally weak, Cranial Nerves grossly intact to confrontation, speech clear  Skin: No rashes      Results Reviewed:  LAB RESULTS:      Lab 02/01/24  0255 01/31/24  1806   WBC 6.62 6.47   HEMOGLOBIN 13.3 14.1   HEMATOCRIT 40.0 43.5   PLATELETS 168 197   NEUTROS ABS 3.94 3.96   IMMATURE GRANS (ABS) 0.02 0.02   LYMPHS ABS 1.65 1.63   MONOS ABS 0.79 0.69   EOS ABS 0.19 0.15   MCV 90.9 92.6   PROTIME 14.1  --          Lab 02/01/24  0255 01/31/24  1806   SODIUM 139 140   POTASSIUM 4.1 4.0   CHLORIDE 101 99   CO2 27.0 28.0   ANION GAP 11.0 13.0   BUN 14 16   CREATININE 1.00 1.01   EGFR 74.2 73.3   GLUCOSE 163* 133*   CALCIUM 9.3 9.4   MAGNESIUM  --  1.7   HEMOGLOBIN A1C 7.00*  --    TSH  --  1.220         Lab 01/31/24  1806   TOTAL PROTEIN 7.4   ALBUMIN 3.9   GLOBULIN 3.5   ALT (SGPT) 15   AST (SGOT) 22   BILIRUBIN 1.0   ALK PHOS 116         Lab 02/01/24  0255   PROTIME 14.1   INR 1.08                 Lab 02/01/24  0257   FIO2 21   CARBOXYHEMOGLOBIN (VENOUS) 1.4     Brief Urine Lab Results  (Last result in the past 365 days)        Color   Clarity   Blood   Leuk Est   Nitrite   Protein   CREAT   Urine HCG        01/31/24 1823 Yellow   Clear   Negative   Negative   Negative   Negative                   Microbiology Results Abnormal       None            No radiology results from the last 24 hrs        Current medications:  Scheduled Meds:flecainide, 50 mg, Oral, Q12H  insulin lispro, 2-7 Units, Subcutaneous, 4x Daily AC & at Bedtime  multivitamin with minerals, 1 tablet, Oral, Daily  pravastatin, 20 mg, Oral,  Daily  QUEtiapine, 12.5 mg, Oral, Nightly  senna-docusate sodium, 2 tablet, Oral, BID  sodium chloride, 10 mL, Intravenous, Q12H  tamsulosin, 0.4 mg, Oral, Daily      Continuous Infusions:     PRN Meds:.  acetaminophen    senna-docusate sodium **AND** polyethylene glycol **AND** bisacodyl **AND** bisacodyl    Calcium Replacement - Follow Nurse / BPA Driven Protocol    dextrose    dextrose    glucagon (human recombinant)    Magnesium Standard Dose Replacement - Follow Nurse / BPA Driven Protocol    ondansetron ODT **OR** ondansetron    Phosphorus Replacement - Follow Nurse / BPA Driven Protocol    Potassium Replacement - Follow Nurse / BPA Driven Protocol    sodium chloride    sodium chloride    Assessment & Plan   Assessment & Plan     Active Hospital Problems    Diagnosis  POA    **SDH (subdural hematoma) [S06.5XAA]  Yes    Altered mental status [R41.82]  Unknown    AMS (altered mental status) [R41.82]  Yes    HTN (hypertension) [I10]  Yes    CAD (coronary artery disease) [I25.10]  Yes    Atrial fibrillation, chronic [I48.20]  Yes    COVID-19 virus detected [U07.1]  Yes    Type 2 diabetes mellitus, without long-term current use of insulin [E11.9]  Yes      Resolved Hospital Problems   No resolved problems to display.        Brief Hospital Course to date:  Apollo Haro is a 83 y.o. male admitted after an unwitnessed fall while at rehab.  He sustained a subdural hematoma.  Has medical history significant for A-fib on Eliquis and flecainide, diabetes mellitus type 2, recent COVID-19, encephalopathy thought secondary to COVID, CAD, hypertension, hyperlipidemia, chronic compression fractures, history of NPH.  Neurology has been consulted as well as neurosurgery.    Subdural hematoma  Altered mental status  History of NPH  -Neurosurgery consulted for subdural hematoma-advised holding Eliquis x 10 days (until 2/12/24)  -Neurology consulted for altered mental status, following  -Fall precautions  -Holding Eliquis, given  Kcentra in ED  -MRI with no intracranial hemorrhage, small subdural hematoma noted on the right  -Discussed with neurology will decrease Seroquel dosing given somnolence  -Discussed with patient's daughter options for care at home    Diabetes mellitus type 2  -sliding scale insulin asneeded  -A1c 7.00  -Hold metformin    Recent COVID-19 infection  A-fib on chronic anticoagulation with Eliquis  -Held Eliquis due to subdural hematoma, resume when okay with neurosurgery (hold x 10 days until 2/12/2024)  -Continue flecainide    CAD  Hypertension  Hyperlipidemia      Expected Discharge Location and Transportation: Rehab versus home with family  Expected Discharge   Expected Discharge Date: 2/5/2024; Expected Discharge Time:      DVT prophylaxis:  Mechanical DVT prophylaxis orders are present.         AM-PAC 6 Clicks Score (PT): 14 (02/03/24 2000)    CODE STATUS:   Code Status and Medical Interventions:   Ordered at: 02/01/24 0043     Level Of Support Discussed With:    Next of Kin (If No Surrogate)     Code Status (Patient has no pulse and is not breathing):    CPR (Attempt to Resuscitate)     Medical Interventions (Patient has pulse or is breathing):    Full Support       Sherine Karimi MD  02/04/24

## 2024-02-05 LAB
GLUCOSE BLDC GLUCOMTR-MCNC: 205 MG/DL (ref 70–130)
GLUCOSE BLDC GLUCOMTR-MCNC: 224 MG/DL (ref 70–130)
GLUCOSE BLDC GLUCOMTR-MCNC: 244 MG/DL (ref 70–130)
GLUCOSE BLDC GLUCOMTR-MCNC: 287 MG/DL (ref 70–130)

## 2024-02-05 PROCEDURE — 82948 REAGENT STRIP/BLOOD GLUCOSE: CPT

## 2024-02-05 PROCEDURE — 63710000001 ONDANSETRON ODT 4 MG TABLET DISPERSIBLE: Performed by: INTERNAL MEDICINE

## 2024-02-05 PROCEDURE — 63710000001 INSULIN LISPRO (HUMAN) PER 5 UNITS: Performed by: FAMILY MEDICINE

## 2024-02-05 PROCEDURE — 97116 GAIT TRAINING THERAPY: CPT

## 2024-02-05 PROCEDURE — 99232 SBSQ HOSP IP/OBS MODERATE 35: CPT | Performed by: INTERNAL MEDICINE

## 2024-02-05 PROCEDURE — 97535 SELF CARE MNGMENT TRAINING: CPT

## 2024-02-05 RX ADMIN — PRAVASTATIN SODIUM 20 MG: 20 TABLET ORAL at 09:36

## 2024-02-05 RX ADMIN — INSULIN LISPRO 3 UNITS: 100 INJECTION, SOLUTION INTRAVENOUS; SUBCUTANEOUS at 12:18

## 2024-02-05 RX ADMIN — TAMSULOSIN HYDROCHLORIDE 0.4 MG: 0.4 CAPSULE ORAL at 09:36

## 2024-02-05 RX ADMIN — ONDANSETRON 4 MG: 4 TABLET, ORALLY DISINTEGRATING ORAL at 17:29

## 2024-02-05 RX ADMIN — Medication 10 ML: at 09:39

## 2024-02-05 RX ADMIN — INSULIN LISPRO 3 UNITS: 100 INJECTION, SOLUTION INTRAVENOUS; SUBCUTANEOUS at 17:25

## 2024-02-05 RX ADMIN — INSULIN LISPRO 4 UNITS: 100 INJECTION, SOLUTION INTRAVENOUS; SUBCUTANEOUS at 20:45

## 2024-02-05 RX ADMIN — Medication 1 TABLET: at 09:36

## 2024-02-05 RX ADMIN — SENNOSIDES AND DOCUSATE SODIUM 2 TABLET: 8.6; 5 TABLET ORAL at 09:36

## 2024-02-05 RX ADMIN — ACETAMINOPHEN 650 MG: 325 TABLET ORAL at 17:28

## 2024-02-05 RX ADMIN — INSULIN LISPRO 3 UNITS: 100 INJECTION, SOLUTION INTRAVENOUS; SUBCUTANEOUS at 09:36

## 2024-02-05 RX ADMIN — QUETIAPINE FUMARATE 12.5 MG: 25 TABLET ORAL at 20:45

## 2024-02-05 RX ADMIN — FLECAINIDE ACETATE 50 MG: 50 TABLET ORAL at 09:36

## 2024-02-05 RX ADMIN — SENNOSIDES AND DOCUSATE SODIUM 2 TABLET: 8.6; 5 TABLET ORAL at 20:45

## 2024-02-05 RX ADMIN — FLECAINIDE ACETATE 50 MG: 50 TABLET ORAL at 20:45

## 2024-02-05 NOTE — PROGRESS NOTES
[facility] Medicine Services  PROGRESS NOTE    Patient Name: Apollo Haro  : 1940  MRN: [id number]    Date of Admission: 2024  Primary Care Physician: Provider, No Known    Subjective   Subjective     CC: Follow-up SDH    HPI: No acute events overnight, patient is awake, eating breakfast has no new complaints.      Objective   Objective     Vital Signs:   Temp:  [97.3 °F (36.3 °C)-98.2 °F (36.8 °C)] 97.9 °F (36.6 °C)  Heart Rate:  [76-92] 91  Resp:  [17-18] 18  BP: (103-115)/(56-64) 109/56     Physical Exam:  Constitutional: Elderly male, in no acute distress, awake, alert  HENT: NCAT, mucous membranes moist  Respiratory: Clear to auscultation bilaterally, respiratory effort normal   Cardiovascular: RRR, no murmurs, rubs, or gallops  Gastrointestinal: Positive bowel sounds, soft, nontender, nondistended  Musculoskeletal: No bilateral ankle edema  Psychiatric: Appropriate affect, cooperative  Neurologic: Nonfocal  Skin: No rashes     Results Reviewed:  LAB RESULTS:      Lab 24  0255 24  1806   WBC 6.62 6.47   HEMOGLOBIN 13.3 14.1   HEMATOCRIT 40.0 43.5   PLATELETS 168 197   NEUTROS ABS 3.94 3.96   IMMATURE GRANS (ABS) 0.02 0.02   LYMPHS ABS 1.65 1.63   MONOS ABS 0.79 0.69   EOS ABS 0.19 0.15   MCV 90.9 92.6   PROTIME 14.1  --          Lab 24  0255 24  1806   SODIUM 139 140   POTASSIUM 4.1 4.0   CHLORIDE 101 99   CO2 27.0 28.0   ANION GAP 11.0 13.0   BUN 14 16   CREATININE 1.00 1.01   EGFR 74.2 73.3   GLUCOSE 163* 133*   CALCIUM 9.3 9.4   MAGNESIUM  --  1.7   HEMOGLOBIN A1C 7.00*  --    TSH  --  1.220         Lab 24  1806   TOTAL PROTEIN 7.4   ALBUMIN 3.9   GLOBULIN 3.5   ALT (SGPT) 15   AST (SGOT) 22   BILIRUBIN 1.0   ALK PHOS 116         Lab 24  025   PROTIME 14.1   INR 1.08                 Lab 24  0257   FIO2 21   CARBOXYHEMOGLOBIN (VENOUS) 1.4     Brief Urine Lab Results  (Last result in the past 365 days)        Color   Clarity    Blood   Leuk Est   Nitrite   Protein   CREAT   Urine HCG        01/31/24 1823 Yellow   Clear   Negative   Negative   Negative   Negative                   Microbiology Results Abnormal       None            No radiology results from the last 24 hrs        Current medications:  Scheduled Meds:flecainide, 50 mg, Oral, Q12H  insulin lispro, 2-7 Units, Subcutaneous, 4x Daily AC & at Bedtime  multivitamin with minerals, 1 tablet, Oral, Daily  pravastatin, 20 mg, Oral, Daily  QUEtiapine, 12.5 mg, Oral, Nightly  senna-docusate sodium, 2 tablet, Oral, BID  sodium chloride, 10 mL, Intravenous, Q12H  tamsulosin, 0.4 mg, Oral, Daily      Continuous Infusions:   PRN Meds:.  acetaminophen    senna-docusate sodium **AND** polyethylene glycol **AND** bisacodyl **AND** bisacodyl    Calcium Replacement - Follow Nurse / BPA Driven Protocol    dextrose    dextrose    glucagon (human recombinant)    Magnesium Standard Dose Replacement - Follow Nurse / BPA Driven Protocol    ondansetron ODT **OR** ondansetron    Phosphorus Replacement - Follow Nurse / BPA Driven Protocol    Potassium Replacement - Follow Nurse / BPA Driven Protocol    sodium chloride    sodium chloride    Assessment & Plan   Assessment & Plan     Active Hospital Problems    Diagnosis  POA    **SDH (subdural hematoma) [S06.5XAA]  Yes    Altered mental status [R41.82]  Unknown    AMS (altered mental status) [R41.82]  Yes    HTN (hypertension) [I10]  Yes    CAD (coronary artery disease) [I25.10]  Yes    Atrial fibrillation, chronic [I48.20]  Yes    COVID-19 virus detected [U07.1]  Yes    Type 2 diabetes mellitus, without long-term current use of insulin [E11.9]  Yes      Resolved Hospital Problems   No resolved problems to display.        Brief Hospital Course to date:  Apollo Haro is a 83 y.o. male admitted after an unwitnessed fall while at rehab.  He sustained a subdural hematoma.  Has medical history significant for A-fib on Eliquis and flecainide, diabetes mellitus  type 2, recent COVID-19, encephalopathy thought secondary to COVID, CAD, hypertension, hyperlipidemia, chronic compression fractures, history of NPH.  Neurology has been consulted as well as neurosurgery.     This patient's problems and plans were partially entered by my partner and updated as appropriate by me 02/05/24.     Subdural hematoma  Altered mental status  History of NPH  -Neurosurgery consulted for subdural hematoma-advised holding Eliquis x 10 days (until 2/12/24)  -Neurology consulted for altered mental status, following  -Holding Eliquis, given Kcentra in ED  -MRI with no intracranial hemorrhage, small subdural hematoma noted on the right  - decrease Seroquel dosing given somnolence     Well-controlled type 2 diabetes with A1c 7%  -FSBG's reviewed and appropriate  -Continue SSI for now.     Recent COVID-19 infection  A-fib on chronic anticoagulation with Eliquis  -Held Eliquis due to subdural hematoma, resume when okay with neurosurgery (hold x 10 days until 2/12/2024)  -Continue flecainide     CAD  Hypertension  Hyperlipidemia     Expected Discharge Location and Transportation: rehab vs   Expected Discharge   Expected Discharge Date: 2/5/2024; Expected Discharge Time:      DVT prophylaxis:  Mechanical DVT prophylaxis orders are present.         AM-PAC 6 Clicks Score (PT): 14 (02/04/24 0940)    CODE STATUS:   Code Status and Medical Interventions:   Ordered at: 02/01/24 0043     Level Of Support Discussed With:    Next of Kin (If No Surrogate)     Code Status (Patient has no pulse and is not breathing):    CPR (Attempt to Resuscitate)     Medical Interventions (Patient has pulse or is breathing):    Full Support       Yamil Fajardo MD  02/05/24

## 2024-02-05 NOTE — PLAN OF CARE
Goal Outcome Evaluation:  Plan of Care Reviewed With: patient        Progress: no change     Pt is alert and oriented x4. VSS on room air. Pt has slept off and on throughout the shift. No c/o of pain. Waffle mattress in place and pt helped to reposition. Incontinent of B&B; voiding spontaneously. Anticipating d/c to rehab pending approval and insurance. Will monitor.

## 2024-02-05 NOTE — PLAN OF CARE
Goal Outcome Evaluation:  Plan of Care Reviewed With: patient        Progress: improving  Outcome Evaluation: Pt amb 30' with FWW and Min A x1+1. Pt demonstrated forward flexed posture with shuffling gait pattern. Assist for stability. Activity limited by fatigue. HEP reviewed with pt. Recommend d/c to SNF to address mobility deficits and promote decreased risk for falls.

## 2024-02-05 NOTE — PLAN OF CARE
Goal Outcome Evaluation:  Plan of Care Reviewed With: patient        Progress: improving  Outcome Evaluation: Pt alert and participatory in OT interventions this date with demonstrated gains in t/f I and activity tolerance toward more dynamic tasks including CGA x 2 for STS at recliner and min A x 1 +1 for fxl mobility in room for ADL purposes. Pt stood for said mobility and at sink for hygiene for ~ 2:12 mins before seated rest break. Pt grossly SBA for hygiene tasks yet limited by balance impairments when reaching away from KERRY to manipulate self care supplies and rinse toothbrush. Pt with need for upward of mod A for return to midline after lean L. Pt fatigued and initiated sitting, from seated position able to complete d/d gown w/ min A. PT still limited by decreased activity tolerance, balance, muscle weakness, postural deficits and fatigue with more dynamic demands. Continue to recommend SNF at d/c when medically ready.      Anticipated Discharge Disposition (OT): skilled nursing facility

## 2024-02-05 NOTE — THERAPY TREATMENT NOTE
Patient Name: Apollo Haro  : 1940    MRN: [id number]                              Today's Date: 2024       Admit Date: 2024    Visit Dx:     ICD-10-CM ICD-9-CM   1. Traumatic subdural hematoma with loss of consciousness, initial encounter  S06.5X9A 852.26   2. Encephalopathy  G93.40 348.30   3. Fall, initial encounter  W19.XXXA E888.9   4. Atrial fibrillation, chronic  I48.20 427.31   5. Weakness  R53.1 780.79     Patient Active Problem List   Diagnosis    Weakness    Type 2 diabetes mellitus, without long-term current use of insulin    Rhabdomyolysis due to COVID-19    CAD (coronary artery disease)    HTN (hypertension)    Atrial fibrillation, chronic    COVID-19 virus detected    SDH (subdural hematoma)    Altered mental status    AMS (altered mental status)     Past Medical History:   Diagnosis Date    A-fib     CAD (coronary artery disease)     DM (diabetes mellitus)      History reviewed. No pertinent surgical history.   General Information       Row Name 24 1142          OT Time and Intention    Document Type therapy note (daily note)  -JY     Mode of Treatment occupational therapy  -JY       Row Name 24 1142          General Information    Patient Profile Reviewed yes  -JY     Existing Precautions/Restrictions fall;other (see comments)  chronic compression fxs  -JY     Barriers to Rehab previous functional deficit;medically complex  -JY       Row Name 24 1142          Cognition    Orientation Status (Cognition) oriented x 3;disoriented to;time;other (see comments)  stated March vs Feb and  and  vs   -JY       Row Name 24 1142          Safety Issues, Functional Mobility    Safety Issues Affecting Function (Mobility) awareness of need for assistance;insight into deficits/self-awareness;judgment;positioning of assistive device;problem-solving;safety precaution awareness;safety precautions follow-through/compliance;sequencing abilities  -JY     Impairments  Affecting Function (Mobility) balance;endurance/activity tolerance;strength;coordination;motor control;postural/trunk control;sensation/sensory awareness  -JY     Cognitive Impairments, Mobility Safety/Performance awareness, need for assistance;insight into deficits/self-awareness;problem-solving/reasoning;safety precaution awareness;safety precaution follow-through;sequencing abilities  -JY     Comment, Safety Issues/Impairments (Mobility) pt alert and more motivated to complete fxl mobility this date including to sink side to progress in hygiene tasks  -MONTEZ               User Key  (r) = Recorded By, (t) = Taken By, (c) = Cosigned By      Initials Name Provider Type    Lanny Cagle OT Occupational Therapist                     Mobility/ADL's       Row Name 02/05/24 1202          Bed Mobility    Bed Mobility other (see comments)  received UIC  -MONTEZ     Comment, (Bed Mobility) pt received UIC at OT arrival and preferred to remain OOB after OT interventions  -JZOE       Row Name 02/05/24 1202          Transfers    Transfers sit-stand transfer;stand-sit transfer  -JZOE     Comment, (Transfers) skilled cues for optimal hand placement for controlled ascend, descend specifically to push up from seated surface and reach back prior to sitting once aligned and in close proximity to seated surface  -JZOE       Row Name 02/05/24 1202          Sit-Stand Transfer    Sit-Stand [place] (Transfers) contact guard;2 person assist  -MONTEZ     Assistive Device (Sit-Stand Transfers) walker, front-wheeled  -MONTEZ       Row Name 02/05/24 1202          Stand-Sit Transfer    Stand-Sit [place] (Transfers) contact guard;2 person assist;verbal cues  -MONTEZ     Assistive Device (Stand-Sit Transfers) walker, front-wheeled  -MONTEZ       Row Name 02/05/24 1202          Functional Mobility    Functional Mobility- Ind. Level minimum assist (75% patient effort);1 person + 1 person to manage equipment;verbal cues required;nonverbal cues required  (demo/gesture)  -JY     Functional Mobility- Device walker, front-wheeled  -JY     Functional Mobility-Distance (Feet) --  in room ADL related mobility  -JY     Functional Mobility- Comment defer to PT for specifics however during in room ADL related mobility from recliner to sink side for hygiene tasks pt demonstrated need for min A x 1 + 1 for equipment (chair follow); pt used FWW with intermittent cues for posture i.e. more upright stance w/ retracted shoulders and hip extension to improve stability  -JY       Row Name 02/05/24 1202          Activities of Daily Living    BADL Assessment/Intervention upper body dressing;grooming  -JY       Row Name 02/05/24 1202          Upper Body Dressing Assessment/Training    [place] Level (Upper Body Dressing) doff;don;pajama/robe;minimum assist (75% patient effort);verbal cues  -JY     Position (Upper Body Dressing) unsupported sitting  -JY     Comment, (Upper Body Dressing) min A for proximal and posterior mgmt of gown; cues for seq to progress threading and unthreading  -JY       Row Name 02/05/24 1202          Grooming Assessment/Training    [place] Level (Grooming) oral care regimen;wash face, hands;supervision;verbal cues  -JY     Position (Grooming) supported sitting;supported standing;sink side  -JY     Comment, (Grooming) pt did not require actual physical A for face/hand washing while seated nor for task of oral hygiene while standing at sink side; pt did present with balance deficits and exertion with standing to complete cleaning dentures while standing; stood for ~ 1:45 min at sink with elbows flexed and sitting atop sink and L and posterior lean requiring A  -JY               User Key  (r) = Recorded By, (t) = Taken By, (c) = Cosigned By      Initials Name Provider Type    Lanny Cagle OT Occupational Therapist                   Obj/Interventions       Row Name 02/05/24 1211          Motor Skills    Motor Skills functional endurance  -MONTEZ      Functional Endurance pt continues to present w/ decreased activity tolerance toward more dynamic demands however demonstrated increased standing tolerance of grossly 2:12 mins w/ support including brief in room ADL related mobility and standing at sink for oral care; pt recognized limits and initiated sitting in recliner  -JY       Row Name 02/05/24 1211          Balance    Balance Assessment sitting static balance;sitting dynamic balance;standing static balance;standing dynamic balance  -JY     Static Sitting Balance standby assist  -JY     Dynamic Sitting Balance contact guard  -JY     Position, Sitting Balance unsupported;sitting in chair  -JY     Static Standing Balance minimal assist;verbal cues  -JY     Dynamic Standing Balance moderate assist;verbal cues  -JY     Position/Device Used, Standing Balance supported;walker, front-wheeled  -JY     Balance Interventions sitting;standing;static;dynamic;sit to stand;supported;occupation based/functional task  -JY     Comment, Balance no overt LOB during seated or standing tasks however as pt completed more dynamic tasks at sink side with reach away from KERRY pt balance declined and req'd more A for correction toward midline  -JY               User Key  (r) = Recorded By, (t) = Taken By, (c) = Cosigned By      Initials Name Provider Type    Lanny Cagle OT Occupational Therapist                   Goals/Plan    No documentation.                  Clinical Impression       Row Name 02/05/24 1213          Pain Assessment    Pretreatment Pain Rating 8/10  -JY     Posttreatment Pain Rating 1/10  -JY     Pain Location - Side/Orientation Bilateral  -JY     Pain Location lower  -JY     Pain Location - back  -JY     Pre/Posttreatment Pain Comment pt reported bothersome LBP prior to OT interventions, reported essentially absence of pain after interventions and return to chair  -JY     Pain Intervention(s) Repositioned;Ambulation/increased activity;Rest;Nursing Notified  -JY        Row Name 02/05/24 1213          Plan of Care Review    Plan of Care Reviewed With patient  -JY     Progress improving  -JY     Outcome Evaluation Pt alert and participatory in OT interventions this date with demonstrated gains in t/f I and activity tolerance toward more dynamic tasks including CGA x 2 for STS at recliner and min A x 1 +1 for fxl mobility in room for ADL purposes. Pt stood for said mobility and at sink for hygiene for ~ 2:12 mins before seated rest break. Pt grossly SBA for hygiene tasks yet limited by balance impairments when reaching away from KERRY to manipulate self care supplies and rinse toothbrush. Pt with need for upward of mod A for return to midline after lean L. Pt fatigued and initiated sitting, from seated position able to complete d/d gown w/ min A. PT still limited by decreased activity tolerance, balance, muscle weakness, postural deficits and fatigue with more dynamic demands. Continue to recommend SNF at d/c when medically ready.  -JY       Row Name 02/05/24 1213          Therapy Assessment/Plan (OT)    Rehab Potential (OT) good, to achieve stated therapy goals  -JY     Criteria for Skilled Therapeutic Interventions Met (OT) yes;skilled treatment is necessary  -JY     Therapy Frequency (OT) daily  -JY       Row Name 02/05/24 1213          Vital Signs    Pre Systolic BP Rehab 118  -JY     Pre Treatment Diastolic BP 66  -JY     Pretreatment Heart Rate (beats/min) 85  -JY     Posttreatment Heart Rate (beats/min) 90  -JY     O2 Delivery Pre Treatment room air  -JY     O2 Delivery Intra Treatment room air  -JY     O2 Delivery Post Treatment room air  -JY     Pre Patient Position Sitting  -JY     Intra Patient Position Standing  -JY     Post Patient Position Sitting  -JY       Row Name 02/05/24 1213          Positioning and Restraints    Pre-Treatment Position sitting in chair/recliner  -JY     Post Treatment Position chair  -JY     In Chair notified nsg;reclined;call light within  reach;encouraged to call for assist;exit alarm on;with PT;legs elevated  -JY               User Key  (r) = Recorded By, (t) = Taken By, (c) = Cosigned By      Initials Name Provider Type    Lanny Cagle, DORIAN Occupational Therapist                   Outcome Measures       Row Name 02/05/24 1321          How much help from another is currently needed...    Putting on and taking off regular lower body clothing? 1  -JY     Bathing (including washing, rinsing, and drying) 2  -JY     Toileting (which includes using toilet bed pan or urinal) 2  -JY     Putting on and taking off regular upper body clothing 3  -JY     Taking care of personal grooming (such as brushing teeth) 3  -JY     Eating meals 3  -JY     AM-PAC 6 Clicks Score (OT) 14  -JY       Row Name 02/05/24 1256 02/05/24 0935       How much help from another person do you currently need...    Turning from your back to your side while in flat bed without using bedrails? 2  -HW 2  -SJ    Moving from lying on back to sitting on the side of a flat bed without bedrails? 2  -HW 2  -SJ    Moving to and from a bed to a chair (including a wheelchair)? 3  -HW 2  -SJ    Standing up from a chair using your arms (e.g., wheelchair, bedside chair)? 3  -HW 2  -SJ    Climbing 3-5 steps with a railing? 2  -HW 2  -SJ    To walk in hospital room? 3  -HW 2  -SJ    AM-PAC 6 Clicks Score (PT) 15  -HW 12  -SJ    Highest Level of Mobility Goal 4 --> Transfer to chair/commode  - 4 --> Transfer to chair/commode  -      Row Name 02/05/24 1321 02/05/24 1256       Functional Assessment    Outcome Measure Options AM-PAC 6 Clicks Daily Activity (OT)  -JY AM-PAC 6 Clicks Basic Mobility (PT)  -              User Key  (r) = Recorded By, (t) = Taken By, (c) = Cosigned By      Initials Name Provider Type    SJ Susana Coker RN Registered Nurse    Lanny Cagle, OT Occupational Therapist    HW Mikala Rivas, PT Physical Therapist                    Occupational Therapy Education        Title: PT OT SLP Therapies (In Progress)       Topic: Occupational Therapy (In Progress)       Point: ADL training (In Progress)       Description:   Instruct learner(s) on proper safety adaptation and remediation techniques during self care or transfers.   Instruct in proper use of assistive devices.                  Learning Progress Summary             Patient Acceptance, E,D, NR by MONTEZ at 2/5/2024 1129    Acceptance, E,D, NR by MONTEZ at 2/1/2024 1336   Family Acceptance, E,D, NR by MONTEZ at 2/1/2024 1336                         Point: Home exercise program (Not Started)       Description:   Instruct learner(s) on appropriate technique for monitoring, assisting and/or progressing therapeutic exercises/activities.                  Learner Progress:  Not documented in this visit.              Point: Precautions (In Progress)       Description:   Instruct learner(s) on prescribed precautions during self-care and functional transfers.                  Learning Progress Summary             Patient Acceptance, E,D, NR by MONTEZ at 2/5/2024 1129    Acceptance, E,D, NR by MONTEZ at 2/1/2024 1336   Family Acceptance, E,D, NR by MONTEZ at 2/1/2024 1336                         Point: Body mechanics (In Progress)       Description:   Instruct learner(s) on proper positioning and spine alignment during self-care, functional mobility activities and/or exercises.                  Learning Progress Summary             Patient Acceptance, E,D, NR by MONTEZ at 2/5/2024 1129    Acceptance, E,D, NR by MONTEZ at 2/1/2024 1336   Family Acceptance, E,D, NR by MONTEZ at 2/1/2024 1336                                         User Key       Initials Effective Dates Name Provider Type Discipline    MONTEZ 06/16/21 -  Lanny Dockery OT Occupational Therapist OT                  OT Recommendation and Plan  Planned Therapy Interventions (OT): activity tolerance training, BADL retraining, functional balance retraining, neuromuscular control/coordination retraining,  occupation/activity based interventions, patient/caregiver education/training, ROM/therapeutic exercise, strengthening exercise, transfer/mobility retraining  Therapy Frequency (OT): daily  Plan of Care Review  Plan of Care Reviewed With: patient  Progress: improving  Outcome Evaluation: Pt alert and participatory in OT interventions this date with demonstrated gains in t/f I and activity tolerance toward more dynamic tasks including CGA x 2 for STS at recliner and min A x 1 +1 for fxl mobility in room for ADL purposes. Pt stood for said mobility and at sink for hygiene for ~ 2:12 mins before seated rest break. Pt grossly SBA for hygiene tasks yet limited by balance impairments when reaching away from KERRY to manipulate self care supplies and rinse toothbrush. Pt with need for upward of mod A for return to midline after lean L. Pt fatigued and initiated sitting, from seated position able to complete d/d gown w/ min A. PT still limited by decreased activity tolerance, balance, muscle weakness, postural deficits and fatigue with more dynamic demands. Continue to recommend SNF at d/c when medically ready.     Time Calculation:   Evaluation Complexity (OT)  Review Occupational Profile/Medical/Therapy History Complexity: expanded/moderate complexity  Assessment, Occupational Performance/Identification of Deficit Complexity: 3-5 performance deficits  Clinical Decision Making Complexity (OT): detailed assessment/moderate complexity  Overall Complexity of Evaluation (OT): moderate complexity     Time Calculation- OT       Row Name 02/05/24 1323 02/05/24 1301          Time Calculation- OT    OT Start Time 1129  -JY --     OT Received On 02/05/24 -JY --     OT Goal Re-Cert Due Date 02/11/24  -JY --        Timed Charges    18748 - Gait Training Minutes  -- 7  -HW     57421 - OT Self Care/Mgmt Minutes 12  -JY --        Total Minutes    Timed Charges Total Minutes 12  -JY 7  -HW      Total Minutes 12  -JY 7  -HW                User Key  (r) = Recorded By, (t) = Taken By, (c) = Cosigned By      Initials Name Provider Type    Lanny Cagle OT Occupational Therapist     Mikala Rivas PT Physical Therapist                  Therapy Charges for Today       Code Description Service Date Service Provider Modifiers Qty    [id number]  OT SELF CARE/MGMT/TRAIN EA 15 MIN 2/5/2024 Lanny Dockery OT GO 1                 Lanny Dockery OT  2/5/2024

## 2024-02-05 NOTE — THERAPY TREATMENT NOTE
Patient Name: Apollo Haro  : 1940    MRN: [id number]                              Today's Date: 2024       Admit Date: 2024    Visit Dx:     ICD-10-CM ICD-9-CM   1. Traumatic subdural hematoma with loss of consciousness, initial encounter  S06.5X9A 852.26   2. Encephalopathy  G93.40 348.30   3. Fall, initial encounter  W19.XXXA E888.9   4. Atrial fibrillation, chronic  I48.20 427.31   5. Weakness  R53.1 780.79     Patient Active Problem List   Diagnosis    Weakness    Type 2 diabetes mellitus, without long-term current use of insulin    Rhabdomyolysis due to COVID-19    CAD (coronary artery disease)    HTN (hypertension)    Atrial fibrillation, chronic    COVID-19 virus detected    SDH (subdural hematoma)    Altered mental status    AMS (altered mental status)     Past Medical History:   Diagnosis Date    A-fib     CAD (coronary artery disease)     DM (diabetes mellitus)      History reviewed. No pertinent surgical history.   General Information       Row Name 24 1150          Physical Therapy Time and Intention    Document Type therapy note (daily note)  -     Mode of Treatment physical therapy  -       Row Name 24 1150          General Information    Patient Profile Reviewed yes  -     Existing Precautions/Restrictions fall;other (see comments)  chronic compression fxs  -       Row Name 24 1150          Cognition    Orientation Status (Cognition) oriented to;person;verbal cues/prompts needed for orientation;place;time  difficulty stating name of hospital, year, and month  -       Row Name 24 1150          Safety Issues, Functional Mobility    Impairments Affecting Function (Mobility) balance;endurance/activity tolerance;strength;coordination;motor control;postural/trunk control;sensation/sensory awareness  -               User Key  (r) = Recorded By, (t) = Taken By, (c) = Cosigned By      Initials Name Provider Type     Mikala Rivas PT Physical Therapist                    Mobility       Row Name 02/05/24 1150          Bed Mobility    Comment, (Bed Mobility) pt UIC  -       Row Name 02/05/24 1150          Transfers    Comment, (Transfers) Pt performed STS with CGAx2 and FWW. VC for hand placement to push from armrests and reach back for armrests prior to sitting. Pt attempted to stand with B hands on walker with increased difficulty; improved with hands properly placed on armrests.  -       Row Name 02/05/24 1150          Sit-Stand Transfer    Sit-Stand Sac (Transfers) contact guard;2 person assist  -     Assistive Device (Sit-Stand Transfers) walker, front-wheeled  -       Row Name 02/05/24 1150          Gait/Stairs (Locomotion)    Sac Level (Gait) minimum assist (75% patient effort);verbal cues;1 person to manage equipment;1 person assist  -     Assistive Device (Gait) walker, front-wheeled  -     Distance in Feet (Gait) 30  -     Deviations/Abnormal Patterns (Gait) bilateral deviations;base of support, wide;cindy decreased;festinating/shuffling;gait speed decreased;stride length decreased  -     Bilateral Gait Deviations forward flexed posture;heel strike decreased  -     Comment, (Gait/Stairs) Pt amb with shuffling gait pattern, forward flexed posture, slow gait speed, and decreased stride length. Unsteadiness noted requiring assistance for maintaining balance especially when turning. VC for upright posture with cueing to identify things ahead of him. Activity limited by quick fatigue and balance deficits noted.  -               User Key  (r) = Recorded By, (t) = Taken By, (c) = Cosigned By      Initials Name Provider Type     Mikala Rivas PT Physical Therapist                   Obj/Interventions       Row Name 02/05/24 1157          Motor Skills    Therapeutic Exercise hip;knee;ankle  -       Row Name 02/05/24 1157          Hip (Therapeutic Exercise)    Hip Strengthening (Therapeutic Exercise)  bilateral;aBduction;aDduction;marching while seated;10 repetitions  -       Row Name 02/05/24 1157          Knee (Therapeutic Exercise)    Knee (Therapeutic Exercise) isometric exercises;strengthening exercise  -     Knee Isometrics (Therapeutic Exercise) bilateral;gluteal sets;quad sets;10 repetitions  -     Knee Strengthening (Therapeutic Exercise) bilateral;SLR (straight leg raise);LAQ (long arc quad);10 repetitions  -       Row Name 02/05/24 1157          Ankle (Therapeutic Exercise)    Ankle (Therapeutic Exercise) AROM (active range of motion)  -     Ankle AROM (Therapeutic Exercise) bilateral;dorsiflexion;plantarflexion;10 repetitions  -[facility] Name 02/05/24 1157          Balance    Balance Assessment sitting static balance;sitting dynamic balance;sit to stand dynamic balance;standing static balance;standing dynamic balance  -     Static Sitting Balance standby assist  -     Dynamic Sitting Balance contact guard  -     Position, Sitting Balance sitting edge of bed  -     Static Standing Balance minimal assist  -     Dynamic Standing Balance moderate assist  -     Position/Device Used, Standing Balance supported;unsupported;walker, rolling  -     Balance Interventions sitting;standing;sit to stand;occupation based/functional task  -               User Key  (r) = Recorded By, (t) = Taken By, (c) = Cosigned By      Initials Name Provider Type     Mikala Rivas PT Physical Therapist                   Goals/Plan    No documentation.                  Clinical Impression       [facility] Name 02/05/24 1254          Pain    Pretreatment Pain Rating 8/10  -     Posttreatment Pain Rating 1/10  -     Pain Location lower  -     Pain Location - back  -     Pre/Posttreatment Pain Comment Pt reported LBP that improved following ambulation  -     Pain Intervention(s) Repositioned;Ambulation/increased activity  -       Row Name 02/05/24 1254          Plan of Care Review    Plan of Care  Reviewed With patient  -HW     Progress improving  -     Outcome Evaluation Pt amb 30' with FWW and Min A x1+1. Pt demonstrated forward flexed posture with shuffling gait pattern. Assist for stability. Activity limited by fatigue. HEP reviewed with pt. Recommend d/c to SNF to address mobility deficits and promote decreased risk for falls.  -       Row Name 02/05/24 1254          Vital Signs    Pre Patient Position Sitting  -     Intra Patient Position Standing  -HW     Post Patient Position Sitting  -       Row Name 02/05/24 1254          Positioning and Restraints    Pre-Treatment Position sitting in chair/recliner  -HW     Post Treatment Position chair  -HW     In Chair notified nsg;reclined;sitting;call light within reach;encouraged to call for assist;exit alarm on;legs elevated  -               User Key  (r) = Recorded By, (t) = Taken By, (c) = Cosigned By      Initials Name Provider Type     Mikala Rivas, JONATHON Physical Therapist                   Outcome Measures       Row Name 02/05/24 1256 02/05/24 0935       How much help from another person do you currently need...    Turning from your back to your side while in flat bed without using bedrails? 2  - 2  -SJ    Moving from lying on back to sitting on the side of a flat bed without bedrails? 2  - 2  -SJ    Moving to and from a bed to a chair (including a wheelchair)? 3  -HW 2  -SJ    Standing up from a chair using your arms (e.g., wheelchair, bedside chair)? 3  - 2  -SJ    Climbing 3-5 steps with a railing? 2  - 2  -SJ    To walk in hospital room? 3  -HW 2  -SJ    AM-PAC 6 Clicks Score (PT) 15  - 12  -SJ    Highest Level of Mobility Goal 4 --> Transfer to chair/commode  - 4 --> Transfer to chair/commode  -SJ      Row Name 02/05/24 1256          Functional Assessment    Outcome Measure Options AM-PAC 6 Clicks Basic Mobility (PT)  -               User Key  (r) = Recorded By, (t) = Taken By, (c) = Cosigned By      Initials Name Provider  Type    [facility] Susana Coker RN Registered Nurse     Mikala Rivas, JONATHON Physical Therapist                                 Physical Therapy Education       Title: PT OT SLP Therapies (In Progress)       Topic: Physical Therapy (In Progress)       Point: Mobility training (In Progress)       Learning Progress Summary             Patient Acceptance, E,D, VU,NR by  at 2/5/2024 1257    Eager, E, VU,DU,NR by  at 2/2/2024 1409    Comment: Reviewed safety/technique w/transfers, ambulation, HEP, PT POC, importance of safety/recommendations of returning to rehab    Acceptance, E, NR by  at 2/1/2024 1443   Family Eager, E, VU,DU,NR by  at 2/2/2024 1409    Comment: Reviewed safety/technique w/transfers, ambulation, HEP, PT POC, importance of safety/recommendations of returning to rehab   Significant Other Acceptance, E, NR by  at 2/1/2024 1443                         Point: Home exercise program (Done)       Learning Progress Summary             Patient Acceptance, E,D, VU,NR by  at 2/5/2024 1257    Eager, E, VU,DU,NR by  at 2/2/2024 1409    Comment: Reviewed safety/technique w/transfers, ambulation, HEP, PT POC, importance of safety/recommendations of returning to rehab   Family Eager, E, VU,DU,NR by  at 2/2/2024 1409    Comment: Reviewed safety/technique w/transfers, ambulation, HEP, PT POC, importance of safety/recommendations of returning to rehab                         Point: Body mechanics (In Progress)       Learning Progress Summary             Patient Acceptance, E,D, VU,NR by  at 2/5/2024 1257    Eager, E, VU,DU,NR by  at 2/2/2024 1409    Comment: Reviewed safety/technique w/transfers, ambulation, HEP, PT POC, importance of safety/recommendations of returning to rehab    Acceptance, E, NR by  at 2/1/2024 1443   Family Eager, E, VU,DU,NR by  at 2/2/2024 1409    Comment: Reviewed safety/technique w/transfers, ambulation, HEP, PT POC, importance of safety/recommendations of returning to rehab    Significant Other Acceptance, E, NR by CM at 2/1/2024 1443                         Point: Precautions (In Progress)       Learning Progress Summary             Patient Acceptance, E,D, VU,NR by  at 2/5/2024 1257    Eager, E, VU,DU,NR by  at 2/2/2024 1409    Comment: Reviewed safety/technique w/transfers, ambulation, HEP, PT POC, importance of safety/recommendations of returning to rehab    Acceptance, E, NR by CM at 2/1/2024 1443   Family Eager, E, VU,DU,NR by  at 2/2/2024 1409    Comment: Reviewed safety/technique w/transfers, ambulation, HEP, PT POC, importance of safety/recommendations of returning to rehab   Significant Other Acceptance, E, NR by CM at 2/1/2024 1443                                         User Key       Initials Effective Dates Name Provider Type Discipline     12/15/23 -  Mikala Rivas, PT Physical Therapist PT    SS 06/01/21 -  Susana Murdock, PT Physical Therapist PT     09/22/22 -  Baylee Ayala, PT Physical Therapist PT                  PT Recommendation and Plan     Plan of Care Reviewed With: patient  Progress: improving  Outcome Evaluation: Pt amb 30' with FWW and Min A x1+1. Pt demonstrated forward flexed posture with shuffling gait pattern. Assist for stability. Activity limited by fatigue. HEP reviewed with pt. Recommend d/c to SNF to address mobility deficits and promote decreased risk for falls.     Time Calculation:         PT Charges       Row Name 02/05/24 1301             Time Calculation    Start Time 1119  -      PT Received On 02/05/24  -         Timed Charges    10194 - PT Therapeutic Exercise Minutes 6  -      57438 - Gait Training Minutes  7  -         Total Minutes    Timed Charges Total Minutes 13  -       Total Minutes 13  -                User Key  (r) = Recorded By, (t) = Taken By, (c) = Cosigned By      Initials Name Provider Type     Mikala Rivas, PT Physical Therapist                  Therapy Charges for Today       Code Description  Service Date Service Provider Modifiers Qty    [id number] HC GAIT TRAINING EA 15 MIN 2/5/2024 Mikala Rivas, PT GP 1            PT G-Codes  Outcome Measure Options: AM-PAC 6 Clicks Basic Mobility (PT)  AM-PAC 6 Clicks Score (PT): 15  AM-PAC 6 Clicks Score (OT): 13  PT Discharge Summary  Anticipated Discharge Disposition (PT): skilled nursing facility    Mikala Rivas, JONATHON  2/5/2024

## 2024-02-06 LAB
GLUCOSE BLDC GLUCOMTR-MCNC: 192 MG/DL (ref 70–130)
GLUCOSE BLDC GLUCOMTR-MCNC: 271 MG/DL (ref 70–130)
GLUCOSE BLDC GLUCOMTR-MCNC: 275 MG/DL (ref 70–130)
GLUCOSE BLDC GLUCOMTR-MCNC: 303 MG/DL (ref 70–130)

## 2024-02-06 PROCEDURE — 82948 REAGENT STRIP/BLOOD GLUCOSE: CPT

## 2024-02-06 PROCEDURE — 99232 SBSQ HOSP IP/OBS MODERATE 35: CPT | Performed by: INTERNAL MEDICINE

## 2024-02-06 PROCEDURE — 63710000001 INSULIN LISPRO (HUMAN) PER 5 UNITS: Performed by: FAMILY MEDICINE

## 2024-02-06 RX ORDER — QUETIAPINE FUMARATE 25 MG/1
25 TABLET, FILM COATED ORAL NIGHTLY
Status: DISCONTINUED | OUTPATIENT
Start: 2024-02-06 | End: 2024-02-07 | Stop reason: HOSPADM

## 2024-02-06 RX ADMIN — Medication 1 TABLET: at 08:27

## 2024-02-06 RX ADMIN — PRAVASTATIN SODIUM 20 MG: 20 TABLET ORAL at 08:27

## 2024-02-06 RX ADMIN — INSULIN LISPRO 4 UNITS: 100 INJECTION, SOLUTION INTRAVENOUS; SUBCUTANEOUS at 17:48

## 2024-02-06 RX ADMIN — INSULIN LISPRO 4 UNITS: 100 INJECTION, SOLUTION INTRAVENOUS; SUBCUTANEOUS at 20:53

## 2024-02-06 RX ADMIN — INSULIN LISPRO 2 UNITS: 100 INJECTION, SOLUTION INTRAVENOUS; SUBCUTANEOUS at 08:46

## 2024-02-06 RX ADMIN — FLECAINIDE ACETATE 50 MG: 50 TABLET ORAL at 08:27

## 2024-02-06 RX ADMIN — FLECAINIDE ACETATE 50 MG: 50 TABLET ORAL at 20:53

## 2024-02-06 RX ADMIN — QUETIAPINE FUMARATE 25 MG: 25 TABLET ORAL at 20:53

## 2024-02-06 RX ADMIN — INSULIN LISPRO 5 UNITS: 100 INJECTION, SOLUTION INTRAVENOUS; SUBCUTANEOUS at 12:13

## 2024-02-06 RX ADMIN — TAMSULOSIN HYDROCHLORIDE 0.4 MG: 0.4 CAPSULE ORAL at 08:29

## 2024-02-06 NOTE — PLAN OF CARE
A&ox4, but patient did become confused and agitated around 0200. Patient was banging the call bell on the over the bed table loudly over and over. I asked the patient what was wrong and patient was insistent that we had kidnapped him and were holding him against his will and not allowing him to go to rehab. Patient was still aware he was at the hospital and waiting on insurance to possibly go to [facility]. Patient stated he was fearful and that we (the nursing staff) would say whatever. I called his daughter for him to speak with, to help ease his mind that he had not been kidnapped. Patient wanted me to call the police and have him arrested to prove we were not kidnapping him and that we'd let him leave. I did call security and have them come speak with him. After the patient spoke with his daughter and the 2 security guards, patient calmed down and became less restless and more trusting with the nursing staff. Vss. No complaints of pain. Will ctm.     Problem: Adult Inpatient Plan of Care  Goal: Plan of Care Review  Outcome: Ongoing, Progressing  Goal: Patient-Specific Goal (Individualized)  Outcome: Ongoing, Progressing  Goal: Absence of Hospital-Acquired Illness or Injury  Outcome: Ongoing, Progressing  Intervention: Identify and Manage Fall Risk  Recent Flowsheet Documentation  Taken 2/6/2024 0400 by Lanny Gill, RN  Safety Promotion/Fall Prevention:   activity supervised   assistive device/personal items within reach   clutter free environment maintained   fall prevention program maintained   nonskid shoes/slippers when out of bed   room organization consistent   safety round/check completed  Taken 2/6/2024 0030 by Lanny Gill, RN  Safety Promotion/Fall Prevention:   activity supervised   assistive device/personal items within reach   clutter free environment maintained   fall prevention program maintained   nonskid shoes/slippers when out of bed   room organization consistent   safety  round/check completed   lighting adjusted  Taken 2/5/2024 2045 by Lanny Gill RN  Safety Promotion/Fall Prevention:   activity supervised   assistive device/personal items within reach   clutter free environment maintained   fall prevention program maintained   nonskid shoes/slippers when out of bed   room organization consistent   safety round/check completed   lighting adjusted  Intervention: Prevent Skin Injury  Recent Flowsheet Documentation  Taken 2/6/2024 0400 by Lanny Gill RN  Body Position:   position changed independently   neutral head position   neutral body alignment   legs elevated  Skin Protection:   adhesive use limited   incontinence pads utilized   skin sealant/moisture barrier applied   transparent dressing maintained   tubing/devices free from skin contact  Taken 2/6/2024 0030 by Lanny Gill RN  Body Position:   neutral head position   neutral body alignment   legs elevated  Skin Protection:   adhesive use limited   incontinence pads utilized   tubing/devices free from skin contact   transparent dressing maintained   skin sealant/moisture barrier applied  Taken 2/5/2024 2045 by Lanny Gill RN  Body Position:   position changed independently   neutral body alignment   neutral head position   legs elevated  Skin Protection:   adhesive use limited   incontinence pads utilized   tubing/devices free from skin contact   transparent dressing maintained   skin sealant/moisture barrier applied   pulse oximeter probe site changed  Intervention: Prevent and Manage VTE (Venous Thromboembolism) Risk  Recent Flowsheet Documentation  Taken 2/6/2024 0400 by Lanny Gill RN  VTE Prevention/Management:   bilateral   sequential compression devices off  Taken 2/6/2024 0200 by Lanny Gill RN  Activity Management:   ambulated in room   back to bed  Taken 2/6/2024 0030 by Lanny Gill RN  Activity Management: up in chair  VTE Prevention/Management:   bilateral    sequential compression devices off   patient refused intervention  Taken 2/5/2024 2045 by Lanny Gill RN  VTE Prevention/Management:   bilateral   sequential compression devices off   patient refused intervention  Intervention: Prevent Infection  Recent Flowsheet Documentation  Taken 2/6/2024 0400 by Lanny Gill RN  Infection Prevention:   hand hygiene promoted   rest/sleep promoted   single patient room provided  Taken 2/6/2024 0030 by Lanny Gill RN  Infection Prevention:   hand hygiene promoted   single patient room provided   rest/sleep promoted  Taken 2/5/2024 2045 by Lanny Gill RN  Infection Prevention:   hand hygiene promoted   rest/sleep promoted   single patient room provided  Goal: Optimal Comfort and Wellbeing  Outcome: Ongoing, Progressing  Intervention: Provide Person-Centered Care  Recent Flowsheet Documentation  Taken 2/5/2024 2045 by Lanny Gill RN  Trust Relationship/Rapport:   care explained   choices provided   empathic listening provided   questions encouraged   reassurance provided   thoughts/feelings acknowledged  Goal: Readiness for Transition of Care  Outcome: Ongoing, Progressing   Goal Outcome Evaluation:

## 2024-02-06 NOTE — PROGRESS NOTES
[facility] Medicine Services  PROGRESS NOTE    Patient Name: Apollo Haro  : 1940  MRN: [id number]    Date of Admission: 2024  Primary Care Physician: Provider, No Known    Subjective   Subjective     CC: Follow-up SDH    HPI: Patient more confused and paranoid this morning      Objective   Objective     Vital Signs:   Temp:  [97.3 °F (36.3 °C)-99.7 °F (37.6 °C)] 99.7 °F (37.6 °C)  Heart Rate:  [] 92  Resp:  [18] 18  BP: (108-127)/(61-74) 127/72     Physical Exam:  Constitutional: Elderly male, seated in chair  HENT: NCAT, mucous membranes moist  Respiratory: Clear to auscultation bilaterally, respiratory effort normal   Cardiovascular: RRR, no murmurs, rubs, or gallops  Gastrointestinal: Positive bowel sounds, soft, nontender, nondistended  Musculoskeletal: No bilateral ankle edema  Psychiatric: Appropriate affect, cooperative  Neurologic: Confused    Results Reviewed:  LAB RESULTS:      Lab 24  0255 24  1806   WBC 6.62 6.47   HEMOGLOBIN 13.3 14.1   HEMATOCRIT 40.0 43.5   PLATELETS 168 197   NEUTROS ABS 3.94 3.96   IMMATURE GRANS (ABS) 0.02 0.02   LYMPHS ABS 1.65 1.63   MONOS ABS 0.79 0.69   EOS ABS 0.19 0.15   MCV 90.9 92.6   PROTIME 14.1  --          Lab 24  0255 24  1806   SODIUM 139 140   POTASSIUM 4.1 4.0   CHLORIDE 101 99   CO2 27.0 28.0   ANION GAP 11.0 13.0   BUN 14 16   CREATININE 1.00 1.01   EGFR 74.2 73.3   GLUCOSE 163* 133*   CALCIUM 9.3 9.4   MAGNESIUM  --  1.7   HEMOGLOBIN A1C 7.00*  --    TSH  --  1.220         Lab 24  1806   TOTAL PROTEIN 7.4   ALBUMIN 3.9   GLOBULIN 3.5   ALT (SGPT) 15   AST (SGOT) 22   BILIRUBIN 1.0   ALK PHOS 116         Lab 24  025   PROTIME 14.1   INR 1.08                 Lab 24  0257   FIO2 21   CARBOXYHEMOGLOBIN (VENOUS) 1.4     Brief Urine Lab Results  (Last result in the past 365 days)        Color   Clarity   Blood   Leuk Est   Nitrite   Protein   CREAT   Urine HCG        24 5667  Yellow   Clear   Negative   Negative   Negative   Negative                   Microbiology Results Abnormal       None            No radiology results from the last 24 hrs        Current medications:  Scheduled Meds:flecainide, 50 mg, Oral, Q12H  insulin lispro, 2-7 Units, Subcutaneous, 4x Daily AC & at Bedtime  multivitamin with minerals, 1 tablet, Oral, Daily  pravastatin, 20 mg, Oral, Daily  QUEtiapine, 12.5 mg, Oral, Nightly  senna-docusate sodium, 2 tablet, Oral, BID  sodium chloride, 10 mL, Intravenous, Q12H  tamsulosin, 0.4 mg, Oral, Daily      Continuous Infusions:   PRN Meds:.  acetaminophen    senna-docusate sodium **AND** polyethylene glycol **AND** bisacodyl **AND** bisacodyl    Calcium Replacement - Follow Nurse / BPA Driven Protocol    dextrose    dextrose    glucagon (human recombinant)    Magnesium Standard Dose Replacement - Follow Nurse / BPA Driven Protocol    ondansetron ODT **OR** ondansetron    Phosphorus Replacement - Follow Nurse / BPA Driven Protocol    Potassium Replacement - Follow Nurse / BPA Driven Protocol    sodium chloride    sodium chloride    Assessment & Plan   Assessment & Plan     Active Hospital Problems    Diagnosis  POA    **SDH (subdural hematoma) [S06.5XAA]  Yes    Altered mental status [R41.82]  Unknown    AMS (altered mental status) [R41.82]  Yes    HTN (hypertension) [I10]  Yes    CAD (coronary artery disease) [I25.10]  Yes    Atrial fibrillation, chronic [I48.20]  Yes    COVID-19 virus detected [U07.1]  Yes    Type 2 diabetes mellitus, without long-term current use of insulin [E11.9]  Yes      Resolved Hospital Problems   No resolved problems to display.        Brief Hospital Course to date:  Apollo Haro is a 83 y.o. male admitted after an unwitnessed fall while at rehab.  He sustained a subdural hematoma.  Has medical history significant for A-fib on Eliquis and flecainide, diabetes mellitus type 2, recent COVID-19, encephalopathy thought secondary to COVID, CAD,  hypertension, hyperlipidemia, chronic compression fractures, history of NPH.  Neurology has been consulted as well as neurosurgery.     Subdural hematoma  Altered mental status  History of NPH  -Neurosurgery consulted for subdural hematoma-advised holding Eliquis x 10 days (until 2/12/24)  -Neurology consulted for altered mental status, following  -Holding Eliquis, given Kcentra in ED  -MRI with no intracranial hemorrhage, small subdural hematoma noted on the right  - Patient was up all night, asleep, will increase his seroquel to 25 mg nightly and monitor for somnolence     Well-controlled type 2 diabetes with A1c 7%  -FSBG's reviewed and appropriate  -Continue SSI for now.     Recent COVID-19 infection  A-fib on chronic anticoagulation with Eliquis  -Held Eliquis due to subdural hematoma, resume when okay with neurosurgery (hold x 10 days until 2/12/2024)  -Continue flecainide     CAD  Hypertension  Hyperlipidemia    Expected Discharge Location and Transportation: rehab  Expected Discharge   Expected Discharge Date: 2/5/2024; Expected Discharge Time:      DVT prophylaxis:  Mechanical DVT prophylaxis orders are present.       AM-PAC 6 Clicks Score (PT): 17 (02/05/24 2045)    CODE STATUS:   Code Status and Medical Interventions:   Ordered at: 02/01/24 0043     Level Of Support Discussed With:    Next of Kin (If No Surrogate)     Code Status (Patient has no pulse and is not breathing):    CPR (Attempt to Resuscitate)     Medical Interventions (Patient has pulse or is breathing):    Full Support       Yamil Fajardo MD  02/06/24

## 2024-02-06 NOTE — PLAN OF CARE
Patient disoriented to situation this shift. He was paranoid and fearful at the beginning of shift, frequently reoriented, wife came this afternoon and patient remained forgetful but pleasantly confused, no longer paranoid. Up to bsc to void and have a bm. Several episodes of urinary incontinence. Bottom very red and blanchable, barrier cream applied, assisting with repositioning.

## 2024-02-07 VITALS
BODY MASS INDEX: 28.99 KG/M2 | WEIGHT: 195.7 LBS | RESPIRATION RATE: 16 BRPM | OXYGEN SATURATION: 97 % | DIASTOLIC BLOOD PRESSURE: 76 MMHG | TEMPERATURE: 98 F | HEART RATE: 92 BPM | HEIGHT: 69 IN | SYSTOLIC BLOOD PRESSURE: 128 MMHG

## 2024-02-07 LAB — GLUCOSE BLDC GLUCOMTR-MCNC: 176 MG/DL (ref 70–130)

## 2024-02-07 PROCEDURE — 82948 REAGENT STRIP/BLOOD GLUCOSE: CPT

## 2024-02-07 PROCEDURE — 99238 HOSP IP/OBS DSCHRG MGMT 30/<: CPT | Performed by: INTERNAL MEDICINE

## 2024-02-07 PROCEDURE — 63710000001 INSULIN LISPRO (HUMAN) PER 5 UNITS: Performed by: FAMILY MEDICINE

## 2024-02-07 RX ORDER — ASPIRIN 81 MG/1
81 TABLET ORAL DAILY
Start: 2024-02-12

## 2024-02-07 RX ORDER — QUETIAPINE FUMARATE 25 MG/1
25 TABLET, FILM COATED ORAL NIGHTLY
Start: 2024-02-07

## 2024-02-07 RX ADMIN — PRAVASTATIN SODIUM 20 MG: 20 TABLET ORAL at 08:06

## 2024-02-07 RX ADMIN — TAMSULOSIN HYDROCHLORIDE 0.4 MG: 0.4 CAPSULE ORAL at 08:06

## 2024-02-07 RX ADMIN — INSULIN LISPRO 2 UNITS: 100 INJECTION, SOLUTION INTRAVENOUS; SUBCUTANEOUS at 08:03

## 2024-02-07 RX ADMIN — SENNOSIDES AND DOCUSATE SODIUM 2 TABLET: 8.6; 5 TABLET ORAL at 08:07

## 2024-02-07 RX ADMIN — FLECAINIDE ACETATE 50 MG: 50 TABLET ORAL at 08:06

## 2024-02-07 RX ADMIN — Medication 1 TABLET: at 08:06

## 2024-02-07 NOTE — CASE MANAGEMENT/SOCIAL WORK
Continued Stay Note  [facility]     Patient Name: Apollo Haro  MRN: [id number]  Today's Date: 2/2/2024    Admit Date: 1/31/2024    Plan: Snf   Discharge Plan       Row Name 02/02/24 1410       Plan    Plan Comments Pts carolyn is now discussing taking pt home at discharge rather than returning to SNF. Cm spoke with pts wife and daughter and discussed what HH provides and what DME could be arranged if they chose to take him home. CM also offered to make referrals to other SNFs in the area if they would be agreeable. Pts wife and daughter are currently refusing this and report they feel pt would improve more at home. Per wife their 23 year old grandson will be available to assist her as well. Both wife and daughter report understanding that PT/OT are recommending pt returning to SNF. CM has reached out to [facility]  at wifes request and has reached out to [facility], [facility], and [facility] to see if any are in network for a hospital bed. CM to follow    After speaking with MD pts wife is now agreeable to referrals to inpt rehab. CM has made referrals to [facility], [facility], [facility], and [facility]. If accepted will need insurance precert                   Discharge Codes    No documentation.                 Expected Discharge Date and Time       Expected Discharge Date Expected Discharge Time    Feb 5, 2024               Ely Roldan RN    
Continued Stay Note  [facility]     Patient Name: Apollo Haro  MRN: [id number]  Today's Date: 2/5/2024    Admit Date: 1/31/2024    Plan: Snf   Discharge Plan       Row Name 02/05/24 0937       Plan    Plan Comments Pts wife has now agreed to referrals to rehab prior to taking pt home. Referrals have been given to [facility], [facility], [facility], and [facility] at families request. If accepted he will need insurance precert                   Discharge Codes    No documentation.                 Expected Discharge Date and Time       Expected Discharge Date Expected Discharge Time    Feb 5, 2024               Ely Roldan RN    
Continued Stay Note  [facility]     Patient Name: Apollo Haro  MRN: [id number]  Today's Date: 2/7/2024    Admit Date: 1/31/2024    Plan: Snf   Discharge Plan       Row Name 02/07/24 0912       Plan    Final Discharge Disposition Code 03 - skilled nursing facility (SNF)    Final Note Pt has been approved to transfer to [facility] with transport at noon via [facility]. Report can be called to [phone]. Family is in agreement with plan                   Discharge Codes    No documentation.                 Expected Discharge Date and Time       Expected Discharge Date Expected Discharge Time    Feb 7, 2024               Ely Roldan RN    
Continued Stay Note  [facility]     Patient Name: Apollo Haro  MRN: [id number]  Today's Date: 2/1/2024    Admit Date: 1/31/2024    Plan: Snf   Discharge Plan       Row Name 02/01/24 0950       Plan    Plan Snf    Patient/Family in Agreement with Plan yes    Plan Comments CM has left a VM with wife to confirm information from ER. Per charting pt was at [facility] for short term rehab. Prior to that he was living at home with wife and has a walker, shower chair, and wheelchair. CM has updated Romelia with [facility] of pts arrival. CM to follow for discharge needs    Final Discharge Disposition Code 30 - still a patient                   Discharge Codes    No documentation.                 Expected Discharge Date and Time       Expected Discharge Date Expected Discharge Time    Feb 5, 2024               Ely Roldan RN    
165

## 2024-02-07 NOTE — DISCHARGE SUMMARY
[facility] Medicine Services  DISCHARGE SUMMARY    Patient Name: Apollo Haro  : 1940  MRN: [id number]    Date of Admission: 2024  5:20 PM  Date of Discharge: 2024  Primary Care Physician: Provider, No Known    Consults       Date and Time Order Name Status Description    2024 12:34 AM Inpatient Neurology Consult General Completed     2024 12:33 AM Inpatient Neurosurgery Consult Completed             Hospital Course       Active Hospital Problems    Diagnosis  POA   • **SDH (subdural hematoma) [S06.5XAA]  Yes   • Altered mental status [R41.82]  Unknown   • AMS (altered mental status) [R41.82]  Yes   • HTN (hypertension) [I10]  Yes   • CAD (coronary artery disease) [I25.10]  Yes   • Atrial fibrillation, chronic [I48.20]  Yes   • COVID-19 virus detected [U07.1]  Yes   • Type 2 diabetes mellitus, without long-term current use of insulin [E11.9]  Yes      Resolved Hospital Problems   No resolved problems to display.      Hospital Course:  Apollo Haro is a 83 y.o. male admitted after an unwitnessed fall while at rehab.  He sustained a subdural hematoma.  Has medical history significant for A-fib on Eliquis and flecainide, diabetes mellitus type 2, recent COVID-19, encephalopathy thought secondary to COVID, CAD, hypertension, hyperlipidemia, chronic compression fractures, history of NPH.  Neurology has been consulted as well as neurosurgery.     Subdural hematoma  Altered mental status  History of NPH  -Neurosurgery consulted for subdural hematoma-advised holding Eliquis x 10 days (until 24), was given Kcentra in the ED  -Neurology consulted for altered mental status, continue Seroquel 25 mg nightly and monitor for daytime somnolence  -MRI with no intracranial hemorrhage, small subdural hematoma noted on the right     Well-controlled type 2 diabetes with A1c 7%  -FSBG's reviewed and appropriate  -s/p SSI for now.     Recent COVID-19 infection  A-fib on chronic  anticoagulation with Eliquis  -Held Eliquis due to subdural hematoma, resume per neurosurgery recs (hold x 10 days until 2/12/2024)  -Continue flecainide     CAD  Hypertension  Hyperlipidemia       Discharge Follow Up Recommendations for outpatient labs/diagnostics:  Follow-up with neurology in 2 to 3 months    Day of Discharge     HPI:  No acute events overnight, patient says he slept well, he is up eating breakfast     Review of Systems  Gen- No fevers, chills  CV- No chest pain, palpitations  Resp- No cough, dyspnea  GI- No N/V/D, abd pain     Vital Signs:   Temp:  [96.4 °F (35.8 °C)-98.2 °F (36.8 °C)] 98.2 °F (36.8 °C)  Heart Rate:  [90-98] 92  Resp:  [16-18] 18  BP: (102-137)/(59-79) 137/79      Physical Exam:  Constitutional: Chronically ill-appearing elderly male.  HENT: NCAT, mucous membranes moist  Respiratory: Nonlabored respiration  Cardiovascular: RRR, no murmurs, rubs, or gallops  Gastrointestinal: Positive bowel sounds, soft, nontender, nondistended  Musculoskeletal: No bilateral ankle edema  Psychiatric: Flat affect  Neurologic: Nonfocal    Pertinent  and/or Most Recent Results     LAB RESULTS:      Lab 02/01/24  0255 01/31/24  1806   WBC 6.62 6.47   HEMOGLOBIN 13.3 14.1   HEMATOCRIT 40.0 43.5   PLATELETS 168 197   NEUTROS ABS 3.94 3.96   IMMATURE GRANS (ABS) 0.02 0.02   LYMPHS ABS 1.65 1.63   MONOS ABS 0.79 0.69   EOS ABS 0.19 0.15   MCV 90.9 92.6   PROTIME 14.1  --          Lab 02/01/24  0255 01/31/24  1806   SODIUM 139 140   POTASSIUM 4.1 4.0   CHLORIDE 101 99   CO2 27.0 28.0   ANION GAP 11.0 13.0   BUN 14 16   CREATININE 1.00 1.01   EGFR 74.2 73.3   GLUCOSE 163* 133*   CALCIUM 9.3 9.4   MAGNESIUM  --  1.7   HEMOGLOBIN A1C 7.00*  --    TSH  --  1.220         Lab 01/31/24  1806   TOTAL PROTEIN 7.4   ALBUMIN 3.9   GLOBULIN 3.5   ALT (SGPT) 15   AST (SGOT) 22   BILIRUBIN 1.0   ALK PHOS 116         Lab 02/01/24  0255   PROTIME 14.1   INR 1.08                 Lab 02/01/24  0257   FIO2 21    CARBOXYHEMOGLOBIN (VENOUS) 1.4     Brief Urine Lab Results  (Last result in the past 365 days)        Color   Clarity   Blood   Leuk Est   Nitrite   Protein   CREAT   Urine HCG        01/31/24 1823 Yellow   Clear   Negative   Negative   Negative   Negative                 Microbiology Results (last 10 days)       Procedure Component Value - Date/Time    COVID PRE-OP / PRE-PROCEDURE SCREENING ORDER (NO ISOLATION) - Swab, Nasopharynx [[id number]]  (Abnormal) Collected: 01/31/24 2046    Lab Status: Final result Specimen: Swab from Nasopharynx Updated: 01/31/24 2138    Narrative:      The following orders were created for panel order COVID PRE-OP / PRE-PROCEDURE SCREENING ORDER (NO ISOLATION) - Swab, Nasopharynx.  Procedure                               Abnormality         Status                     ---------                               -----------         ------                     COVID-19, FLU A/B, RSV P...[[id number]]  Abnormal            Final result                 Please view results for these tests on the individual orders.    COVID-19, FLU A/B, RSV PCR 1 HR TAT - Swab, Nasopharynx [[id number]]  (Abnormal) Collected: 01/31/24 2046    Lab Status: Final result Specimen: Swab from Nasopharynx Updated: 01/31/24 2138     COVID19 Detected     Influenza A PCR Not Detected     Influenza B PCR Not Detected     RSV, PCR Not Detected    Narrative:      Fact sheet for providers: https://www.fda.gov/media/379327/download    Fact sheet for patients: https://www.fda.gov/media/033283/download    Test performed by PCR.            MRI Brain With & Without Contrast    Result Date: 2/1/2024  MRI BRAIN W WO CONTRAST Date of Exam: 2/1/2024 1:50 AM EST Indication: Mental status change, unknown cause.  Comparison: 2/1/2024, 1/31/2024. Technique:  Routine multiplanar/multisequence sequence images of the brain were obtained before and after the uneventful administration of 15 Multihance. Findings: There is no diffusion restriction to  suggest acute infarct. There is no evidence of acute or chronic intracranial hemorrhage. Tiny subdural seen along the falx on the right appears similar as compared to the previous study (series 13 image 67). Ventricles appear enlarged, similar as compared to the previous study. There is global volume loss. Mild to moderate periventricular and subcortical FLAIR signal changes are present. No suspicious or abnormal enhancement identified. No evidence of focal lesions. No mass effect or midline shift. No abnormal extra-axial collections. The major vascular flow voids appear intact. The basal ganglia, brainstem and cerebellum appear within normal limits. Calvarial and superficial soft tissue signal is within normal limits. Orbits appear unremarkable. The paranasal sinuses and the mastoid air cells appear well aerated. Midline structures are intact.     Impression: 1.No evidence of intracranial hemorrhage, mass effect or midline shift. No evidence of recent or acute ischemia. No suspicious or abnormal enhancement. Stable tiny subdural hematoma along the falx on the right. 2.Mild to moderate periventricular and subcortical FLAIR signal changes likely related to chronic microvascular ischemic change. 3.Global volume loss with ventriculomegaly, similar as compared to the previous study. Electronically Signed: Carolina Hassan MD  2/1/2024 2:29 AM EST  Workstation ID: [id number]    CT Head Without Contrast    Result Date: 2/1/2024  CT HEAD WO CONTRAST Date of Exam: 2/1/2024 12:14 AM EST Indication: Serial exam of traumatic subdural. Comparison: 1/31/2024. Technique: Axial CT images were obtained of the head without contrast administration.  Automated exposure control and iterative construction methods were used. Findings: Tiny focal subdural hemorrhage seen along the right cerebral convexity along the falx measuring up to 5 mm, unchanged as compared to the previous study. No additional area of hemorrhage identified.. There is no  extracerebral collection. The ventricles appear prominent in size, stable as compared to the previous study..  Posterior fossa is within normal limits. Calvarium and skull base appear intact.   Visualized sinuses show no air fluid levels. Visualized orbits are unremarkable.     Impression: Stable tiny subdural hemorrhage along the right cerebral convexity along the falx. No new area of hemorrhage identified. Otherwise, stable exam. Electronically Signed: Carolina Hassan MD  2/1/2024 12:35 AM EST  Workstation ID: [id number]    XR Chest 1 View    Result Date: 1/31/2024  XR CHEST 1 VW Date of Exam: 1/31/2024 7:40 PM EST Indication: Altered mental status, infectious workup Comparison: 1/8/2024 Findings: Lungs are moderately well expanded and appear clear except for what appears to be stable linear scarring or discoid atelectasis in the left base. No edema effusion or pneumothorax is seen. Heart is in the upper range of normal size. Vasculature appears normal.     Impression: Mild left basilar discoid atelectasis. No other evidence of active chest disease. Electronically Signed: Mikel Joseph MD  1/31/2024 8:01 PM EST  Workstation ID: [id number]    CT Head Without Contrast    Result Date: 1/31/2024  CT HEAD WO CONTRAST Date of Exam: 1/31/2024 6:33 PM EST Indication: fall on thinners, occipital swelling, confused (likely baseline). Comparison: 1/8/2024 Technique: Axial CT images were obtained of the head without contrast administration.  Automated exposure control and iterative construction methods were used. Findings: Parenchyma:No acute intraparenchymal hemorrhage. No loss of gray-white differentiation to suggest large territory infarct. Moderate parenchymal volume loss. Scattered periventricular and subcortical white matter hypodensities, nonspecific, but most often  consistent with small vessel ischemic changes. No midline shift or herniation. Ventricles and extra axial spaces:Disproportionate enlargement of the ventricles with  crowding of the sulci at the vertex. Small right frontal parafalcine subdural hematoma measuring up to 5 mm in thickness. Other: Right lens replacement. Scattered paranasal sinus mucosal thickening. Small left mastoid effusion. Calvarium is intact. Intracranial atherosclerotic calcification is present. Mild soft tissue swelling of the left posterior scalp.     Impression: Small volume right parafalcine subdural hematoma. Enlargement of the ventricles with crowding of the sulci at the vertex which can be seen in the setting of normal pressure hydrocephalus. Additional chronic changes as above. Findings discussed with Dr. ESME KEY by Dr. Elgin Cisse via telephone on 1/31/2024 7:02 PM EST. Electronically Signed: Elgin Cisse MD  1/31/2024 7:07 PM EST  Workstation ID: [id number]                 Plan for Follow-up of Pending Labs/Results:     Discharge Details        Discharge Medications        New Medications        Instructions Start Date   QUEtiapine 25 MG tablet  Commonly known as: SEROquel   25 mg, Oral, Nightly             Changes to Medications        Instructions Start Date   apixaban 5 MG tablet tablet  Commonly known as: ELIQUIS  What changed: These instructions start on February 12, 2024. If you are unsure what to do until then, ask your doctor or other care provider.   5 mg, Oral, 2 Times Daily   Start Date: February 12, 2024     aspirin 81 MG EC tablet  What changed: These instructions start on February 12, 2024. If you are unsure what to do until then, ask your doctor or other care provider.   81 mg, Oral, Daily   Start Date: February 12, 2024            Continue These Medications        Instructions Start Date   acetaminophen 325 MG tablet  Commonly known as: TYLENOL   650 mg, Oral, Every 4 Hours PRN      flecainide 50 MG tablet  Commonly known as: TAMBOCOR   50 mg, Oral, Every 12 Hours      Magnesium 200 MG tablet   1 tablet, Oral, 2 Times Daily      metFORMIN 500 MG tablet  Commonly  known as: GLUCOPHAGE   500 mg, Oral, 2 Times Daily With Meals      multivitamin with minerals tablet tablet   1 tablet, Oral, Daily      pravastatin 20 MG tablet  Commonly known as: PRAVACHOL   20 mg, Oral, Daily      tamsulosin 0.4 MG capsule 24 hr capsule  Commonly known as: FLOMAX   0.4 mg, Oral, Daily               Allergies   Allergen Reactions   • Lidocaine Angioedema         Discharge Disposition: [facility] rehab      Diet:  Hospital:  Diet Order   Procedures   • Diet: Regular/House Diet, Diabetic Diets; Consistent Carbohydrate; Texture: Soft to Chew (NDD 3); Soft to Chew: Chopped Meat; Fluid Consistency: Thin (IDDSI 0)            Activity: As tolerated      Restrictions or Other Recommendations:  None       CODE STATUS:    Code Status and Medical Interventions:   Ordered at: 02/01/24 0043     Level Of Support Discussed With:    Next of Kin (If No Surrogate)     Code Status (Patient has no pulse and is not breathing):    CPR (Attempt to Resuscitate)     Medical Interventions (Patient has pulse or is breathing):    Full Support       No future appointments.    Additional Instructions for the Follow-ups that You Need to Schedule       Ambulatory Referral to Home Health   As directed      Face to Face Visit Date: 2/2/2024   Follow-up provider for Plan of Care?: I treated the patient in an acute care facility and will not continue treatment after discharge.   Follow-up provider: LYNETTE GOSS [[id number]]   Reason/Clinical Findings: SDH   Describe mobility limitations that make leaving home difficult: impaired mobility   Nursing/Therapeutic Services Requested: Occupational Therapy Physical Therapy Skilled Nursing Medical / Social Work   Skilled nursing orders: Medication education Pain management   Social work orders: Community resources Long range planning   Frequency: 1 Week 1                  Yamil Fajardo MD  02/07/24      Time Spent on Discharge:  I spent  30  minutes on this discharge activity which  included: face-to-face encounter with the patient, reviewing the data in the system, coordination of the care with the nursing staff as well as consultants, documentation, and entering orders.

## 2024-02-07 NOTE — PLAN OF CARE
"  Assumed care at 19:00, pt awake in bed, A/O x4, but forgetful. Continuous pulse ox, SCDs, and waffle mattress in place. Pt cooperative with care. Had one behavioral episode at 01:45, pt was confused, agitated and removing clothing and SDCs. Episode resolved and back to sleep by 2am. Turned Q2.    Bladder scanned pt at 04:38 due to low urine output.  Showed 732 ml, pt straight cathed per order, 700 ml of urine obtained. Tolerated fair, but was able to go back to sleep during cath.    Left pt in bed at lowest position, with bed alarm on and call light within reach.    /77 (BP Location: Left arm, Patient Position: Lying)   Pulse 90   Temp 96.9 °F (36.1 °C) (Axillary)   Resp 18   Ht 175.3 cm (69\")   Wt 88.8 kg (195 lb 11.2 oz)   SpO2 94%   BMI 28.90 kg/m²               Problem: Adult Inpatient Plan of Care  Goal: Plan of Care Review  Outcome: Ongoing, Progressing  Flowsheets (Taken 2/7/2024 0336)  Progress: no change  Plan of Care Reviewed With: patient  Outcome Evaluation: Assumed care at 19:00, pt awake in bed, A/O x4, but forgetful.  Continuous pulse ox, SCDs, and waffle mattress in place. Pt cooperative with care.  Had one behavioral episode at 01:45. Resolved and back to sleep by 2am.  Turned Q2  Goal: Patient-Specific Goal (Individualized)  Outcome: Ongoing, Progressing  Goal: Absence of Hospital-Acquired Illness or Injury  Outcome: Ongoing, Progressing  Intervention: Identify and Manage Fall Risk  Recent Flowsheet Documentation  Taken 2/7/2024 0400 by Ana Torre, RN  Safety Promotion/Fall Prevention:   activity supervised   safety round/check completed  Taken 2/7/2024 0200 by Ana Torre RN  Safety Promotion/Fall Prevention:   activity supervised   safety round/check completed  Taken 2/7/2024 0000 by Ana Torre, RN  Safety Promotion/Fall Prevention:   activity supervised   safety round/check completed  Taken 2/6/2024 2203 by Ana Torre RN  Safety Promotion/Fall " Prevention:   activity supervised   safety round/check completed  Taken 2/6/2024 2000 by Ana Torre RN  Safety Promotion/Fall Prevention:   activity supervised   clutter free environment maintained   lighting adjusted   safety round/check completed  Taken 2/6/2024 1927 by Ana Torre RN  Safety Promotion/Fall Prevention:   activity supervised   clutter free environment maintained   lighting adjusted   safety round/check completed  Intervention: Prevent Skin Injury  Recent Flowsheet Documentation  Taken 2/7/2024 0400 by Ana Torre RN  Body Position: right  Skin Protection:   adhesive use limited   incontinence pads utilized  Taken 2/7/2024 0200 by Ana Torre RN  Body Position: left  Skin Protection: incontinence pads utilized  Taken 2/7/2024 0148 by Ana Torre RN  Body Position:   turned   left  Taken 2/7/2024 0000 by Ana Torre RN  Body Position: left  Skin Protection:   incontinence pads utilized   adhesive use limited  Taken 2/6/2024 2245 by Ana Torre RN  Body Position:   turned   left  Skin Protection:   incontinence pads utilized   adhesive use limited  Taken 2/6/2024 2203 by Ana Torre RN  Skin Protection:   incontinence pads utilized   adhesive use limited  Taken 2/6/2024 2000 by Ana Torre RN  Body Position:   turned   tilted   right  Skin Protection:   incontinence pads utilized   adhesive use limited  Intervention: Prevent and Manage VTE (Venous Thromboembolism) Risk  Recent Flowsheet Documentation  Taken 2/7/2024 0400 by Ana Torre RN  Activity Management: activity encouraged  VTE Prevention/Management: sequential compression devices on  Taken 2/7/2024 0200 by Ana Torre RN  Activity Management: activity encouraged  Taken 2/7/2024 0000 by Ana Torre RN  Activity Management: activity encouraged  VTE Prevention/Management: sequential compression devices on  Taken 2/6/2024 2203 by Ana Torre RN  Activity  Management: activity encouraged  Taken 2/6/2024 2000 by Ana Torre RN  Activity Management: activity encouraged  VTE Prevention/Management: sequential compression devices on  Range of Motion:   active ROM (range of motion) encouraged   ROM (range of motion) performed  Taken 2/6/2024 1927 by Ana Torre RN  Activity Management: activity encouraged  Intervention: Prevent Infection  Recent Flowsheet Documentation  Taken 2/7/2024 0400 by Ana Torre RN  Infection Prevention:   environmental surveillance performed   rest/sleep promoted  Taken 2/7/2024 0200 by Ana Torre RN  Infection Prevention:   environmental surveillance performed   rest/sleep promoted  Taken 2/7/2024 0000 by Ana Torre RN  Infection Prevention:   environmental surveillance performed   rest/sleep promoted  Taken 2/6/2024 2203 by Ana Torre RN  Infection Prevention:   rest/sleep promoted   environmental surveillance performed  Taken 2/6/2024 2000 by Ana Torre RN  Infection Prevention:   environmental surveillance performed   rest/sleep promoted  Taken 2/6/2024 1927 by Ana Torre RN  Infection Prevention: environmental surveillance performed  Goal: Optimal Comfort and Wellbeing  Outcome: Ongoing, Progressing  Intervention: Provide Person-Centered Care  Recent Flowsheet Documentation  Taken 2/7/2024 0200 by Ana Torre RN  Trust Relationship/Rapport:   care explained   choices provided   thoughts/feelings acknowledged  Taken 2/7/2024 0000 by Ana Torre RN  Trust Relationship/Rapport:   care explained   choices provided  Taken 2/6/2024 2203 by Ana Torre RN  Trust Relationship/Rapport:   care explained   choices provided  Taken 2/6/2024 2000 by Ana Torre RN  Trust Relationship/Rapport:   care explained   choices provided   questions answered   thoughts/feelings acknowledged  Taken 2/6/2024 1927 by Ana Torre RN  Trust Relationship/Rapport:   care  explained   choices provided   empathic listening provided   thoughts/feelings acknowledged  Goal: Readiness for Transition of Care  Outcome: Ongoing, Progressing     Problem: Fall Injury Risk  Goal: Absence of Fall and Fall-Related Injury  Outcome: Ongoing, Progressing  Intervention: Identify and Manage Contributors  Recent Flowsheet Documentation  Taken 2/7/2024 0200 by Ana Torre RN  Self-Care Promotion:   independence encouraged   BADL personal objects within reach  Taken 2/7/2024 0000 by Ana Torre RN  Self-Care Promotion:   independence encouraged   BADL personal objects within reach  Taken 2/6/2024 2000 by Ana Torre RN  Self-Care Promotion:   independence encouraged   BADL personal objects within reach  Taken 2/6/2024 1927 by Ana Torre RN  Self-Care Promotion:   independence encouraged   BADL personal objects within reach  Intervention: Promote Injury-Free Environment  Recent Flowsheet Documentation  Taken 2/7/2024 0400 by Ana Torre RN  Safety Promotion/Fall Prevention:   activity supervised   safety round/check completed  2/7/2024 0336 by Ana Torre RN  Flowsheets  Taken 2/7/2024 0336  Safety Promotion/Fall Prevention: --  Taken 2/7/2024 0200  Safety Promotion/Fall Prevention:   activity supervised   safety round/check completed  Taken 2/7/2024 0000  Safety Promotion/Fall Prevention:   activity supervised   safety round/check completed  Taken 2/6/2024 2203  Safety Promotion/Fall Prevention:   activity supervised   safety round/check completed  Taken 2/6/2024 2000  Safety Promotion/Fall Prevention:   activity supervised   clutter free environment maintained   lighting adjusted   safety round/check completed  Taken 2/6/2024 1927  Safety Promotion/Fall Prevention:   activity supervised   clutter free environment maintained   lighting adjusted   safety round/check completed     Problem: Skin Injury Risk Increased  Goal: Skin Health and Integrity  Outcome:  Ongoing, Progressing  Intervention: Optimize Skin Protection  Recent Flowsheet Documentation  Taken 2/7/2024 0400 by Ana Torre RN  Pressure Reduction Techniques:   heels elevated off bed   weight shift assistance provided  Head of Bed (HOB) Positioning: HOB at 20-30 degrees  Pressure Reduction Devices:   pressure-redistributing mattress utilized   heel offloading device utilized   positioning supports utilized  Skin Protection:   adhesive use limited   incontinence pads utilized  Taken 2/7/2024 0200 by Ana Torre RN  Pressure Reduction Techniques:   heels elevated off bed   weight shift assistance provided  Head of Bed (HOB) Positioning: HOB at 20-30 degrees  Pressure Reduction Devices:   heel offloading device utilized   positioning supports utilized   pressure-redistributing mattress utilized  Skin Protection: incontinence pads utilized  Taken 2/7/2024 0148 by Ana Torre RN  Head of Bed (HOB) Positioning: HOB at 20-30 degrees  Taken 2/7/2024 0000 by Ana Torre RN  Pressure Reduction Techniques:   heels elevated off bed   weight shift assistance provided  Head of Bed (HOB) Positioning: HOB at 20-30 degrees  Pressure Reduction Devices:   heel offloading device utilized   positioning supports utilized   pressure-redistributing mattress utilized  Skin Protection:   incontinence pads utilized   adhesive use limited  Taken 2/6/2024 2245 by Ana Torre RN  Pressure Reduction Techniques:   heels elevated off bed   weight shift assistance provided  Head of Bed (HOB) Positioning: HOB at 20-30 degrees  Pressure Reduction Devices:   heel offloading device utilized   positioning supports utilized   pressure-redistributing mattress utilized  Skin Protection:   incontinence pads utilized   adhesive use limited  Taken 2/6/2024 2203 by Ana Torre RN  Pressure Reduction Techniques: weight shift assistance provided  Pressure Reduction Devices:   positioning supports utilized    pressure-redistributing mattress utilized  Skin Protection:   incontinence pads utilized   adhesive use limited  Taken 2/6/2024 2000 by Ana Torre, RN  Pressure Reduction Techniques: weight shift assistance provided  Pressure Reduction Devices:   positioning supports utilized   pressure-redistributing mattress utilized  Skin Protection:   incontinence pads utilized   adhesive use limited     Problem: Diabetes Comorbidity  Goal: Blood Glucose Level Within Targeted Range  Outcome: Ongoing, Progressing     Problem: Hypertension Comorbidity  Goal: Blood Pressure in Desired Range  Outcome: Ongoing, Progressing   Goal Outcome Evaluation:  Plan of Care Reviewed With: patient        Progress: no change  Outcome Evaluation: Assumed care at 19:00, pt awake in bed, A/O x4, but forgetful.  Continuous pulse ox, SCDs, and waffle mattress in place. Pt cooperative with care.  Had one behavioral episode at 01:45. Resolved and back to sleep by 2am.  Turned Q2

## 2024-02-07 NOTE — PROGRESS NOTES
[facility] Medicine Services  PROGRESS NOTE    Patient Name: Apollo Haro  : 1940  MRN: [id number]    Date of Admission: 2024  Primary Care Physician: Provider, No Known    Subjective   Subjective     CC: Follow-up SDH    HPI: No acute events overnight, patient says he slept well, he is up eating breakfast      Objective   Objective     Vital Signs:   Temp:  [96.4 °F (35.8 °C)-98.2 °F (36.8 °C)] 96.9 °F (36.1 °C)  Heart Rate:  [] 90  Resp:  [16-18] 18  BP: (102-124)/(59-78) 113/72     Physical Exam:  Constitutional: Chronically ill-appearing elderly male.  HENT: NCAT, mucous membranes moist  Respiratory: Nonlabored respiration  Cardiovascular: RRR, no murmurs, rubs, or gallops  Gastrointestinal: Positive bowel sounds, soft, nontender, nondistended  Musculoskeletal: No bilateral ankle edema  Psychiatric: Flat affect  Neurologic: Nonfocal    Results Reviewed:  LAB RESULTS:      Lab 24  0255 24  1806   WBC 6.62 6.47   HEMOGLOBIN 13.3 14.1   HEMATOCRIT 40.0 43.5   PLATELETS 168 197   NEUTROS ABS 3.94 3.96   IMMATURE GRANS (ABS) 0.02 0.02   LYMPHS ABS 1.65 1.63   MONOS ABS 0.79 0.69   EOS ABS 0.19 0.15   MCV 90.9 92.6   PROTIME 14.1  --          Lab 24  0255 24  1806   SODIUM 139 140   POTASSIUM 4.1 4.0   CHLORIDE 101 99   CO2 27.0 28.0   ANION GAP 11.0 13.0   BUN 14 16   CREATININE 1.00 1.01   EGFR 74.2 73.3   GLUCOSE 163* 133*   CALCIUM 9.3 9.4   MAGNESIUM  --  1.7   HEMOGLOBIN A1C 7.00*  --    TSH  --  1.220         Lab 24  1806   TOTAL PROTEIN 7.4   ALBUMIN 3.9   GLOBULIN 3.5   ALT (SGPT) 15   AST (SGOT) 22   BILIRUBIN 1.0   ALK PHOS 116         Lab 24  025   PROTIME 14.1   INR 1.08                 Lab 24  0257   FIO2 21   CARBOXYHEMOGLOBIN (VENOUS) 1.4     Brief Urine Lab Results  (Last result in the past 365 days)        Color   Clarity   Blood   Leuk Est   Nitrite   Protein   CREAT   Urine HCG        24 1823 Yellow    Clear   Negative   Negative   Negative   Negative                   Microbiology Results Abnormal       None            No radiology results from the last 24 hrs        Current medications:  Scheduled Meds:flecainide, 50 mg, Oral, Q12H  insulin lispro, 2-7 Units, Subcutaneous, 4x Daily AC & at Bedtime  multivitamin with minerals, 1 tablet, Oral, Daily  pravastatin, 20 mg, Oral, Daily  QUEtiapine, 25 mg, Oral, Nightly  senna-docusate sodium, 2 tablet, Oral, BID  tamsulosin, 0.4 mg, Oral, Daily      Continuous Infusions:   PRN Meds:.  acetaminophen    senna-docusate sodium **AND** polyethylene glycol **AND** bisacodyl **AND** bisacodyl    Calcium Replacement - Follow Nurse / BPA Driven Protocol    dextrose    dextrose    glucagon (human recombinant)    Magnesium Standard Dose Replacement - Follow Nurse / BPA Driven Protocol    ondansetron ODT **OR** ondansetron    Phosphorus Replacement - Follow Nurse / BPA Driven Protocol    Potassium Replacement - Follow Nurse / BPA Driven Protocol    sodium chloride    sodium chloride    Assessment & Plan   Assessment & Plan     Active Hospital Problems    Diagnosis  POA    **SDH (subdural hematoma) [S06.5XAA]  Yes    Altered mental status [R41.82]  Unknown    AMS (altered mental status) [R41.82]  Yes    HTN (hypertension) [I10]  Yes    CAD (coronary artery disease) [I25.10]  Yes    Atrial fibrillation, chronic [I48.20]  Yes    COVID-19 virus detected [U07.1]  Yes    Type 2 diabetes mellitus, without long-term current use of insulin [E11.9]  Yes      Resolved Hospital Problems   No resolved problems to display.        Brief Hospital Course to date:  Apollo Haro is a 83 y.o. male admitted after an unwitnessed fall while at rehab.  He sustained a subdural hematoma.  Has medical history significant for A-fib on Eliquis and flecainide, diabetes mellitus type 2, recent COVID-19, encephalopathy thought secondary to COVID, CAD, hypertension, hyperlipidemia, chronic compression fractures,  history of NPH.  Neurology has been consulted as well as neurosurgery.     Subdural hematoma  Altered mental status  History of NPH  -Neurosurgery consulted for subdural hematoma-advised holding Eliquis x 10 days (until 2/12/24), was given Kcentra in the ED  -Neurology consulted for altered mental status, following  -MRI with no intracranial hemorrhage, small subdural hematoma noted on the right  - Patient had a better night, continue Seroquel to 25 mg nightly and monitor for daytime somnolence     Well-controlled type 2 diabetes with A1c 7%  -FSBG's reviewed and appropriate  -Continue SSI for now.     Recent COVID-19 infection  A-fib on chronic anticoagulation with Eliquis  -Held Eliquis due to subdural hematoma, resume when okay with neurosurgery (hold x 10 days until 2/12/2024)  -Continue flecainide     CAD  Hypertension  Hyperlipidemia    Expected Discharge Location and Transportation: Rehab  Expected Discharge   Expected Discharge Date: 2/9/2024; Expected Discharge Time:      DVT prophylaxis:  Mechanical DVT prophylaxis orders are present.         AM-PAC 6 Clicks Score (PT): 16 (02/06/24 2000)    CODE STATUS:   Code Status and Medical Interventions:   Ordered at: 02/01/24 0043     Level Of Support Discussed With:    Next of Kin (If No Surrogate)     Code Status (Patient has no pulse and is not breathing):    CPR (Attempt to Resuscitate)     Medical Interventions (Patient has pulse or is breathing):    Full Support       Yamil Fajardo MD  02/07/24

## 2024-02-27 ENCOUNTER — HOSPITAL ENCOUNTER (EMERGENCY)
Facility: HOSPITAL | Age: 84
Discharge: HOME | End: 2024-02-27
Attending: EMERGENCY MEDICINE | Admitting: EMERGENCY MEDICINE
Payer: MEDICARE

## 2024-02-27 ENCOUNTER — APPOINTMENT (OUTPATIENT)
Dept: GENERAL RADIOLOGY | Facility: HOSPITAL | Age: 84
End: 2024-02-27
Payer: MEDICARE

## 2024-02-27 ENCOUNTER — APPOINTMENT (OUTPATIENT)
Dept: CT IMAGING | Facility: HOSPITAL | Age: 84
End: 2024-02-27
Payer: MEDICARE

## 2024-02-27 VITALS
OXYGEN SATURATION: 97 % | DIASTOLIC BLOOD PRESSURE: 59 MMHG | HEART RATE: 100 BPM | TEMPERATURE: 98.5 F | BODY MASS INDEX: 26.22 KG/M2 | RESPIRATION RATE: 21 BRPM | SYSTOLIC BLOOD PRESSURE: 108 MMHG | WEIGHT: 177 LBS | HEIGHT: 69 IN
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
## 2024-02-27 DIAGNOSIS — Z79.01 ANTICOAGULATED: ICD-10-CM

## 2024-02-27 DIAGNOSIS — S22.31XA CLOSED FRACTURE OF ONE RIB OF RIGHT SIDE, INITIAL ENCOUNTER: Primary | ICD-10-CM

## 2024-02-27 LAB
ALBUMIN SERPL-MCNC: 3.4 G/DL (ref 3.5–5.2)
ALBUMIN/GLOB SERPL: 1 G/DL
ALP SERPL-CCNC: 88 U/L (ref 39–117)
ALT SERPL W P-5'-P-CCNC: 12 U/L (ref 1–41)
ANION GAP SERPL CALCULATED.3IONS-SCNC: 8 MMOL/L (ref 5–15)
AST SERPL-CCNC: 20 U/L (ref 1–40)
BACTERIA UR QL AUTO: ABNORMAL /HPF
BASOPHILS # BLD AUTO: 0.02 10*3/MM3 (ref 0–0.2)
BASOPHILS NFR BLD AUTO: 0.3 % (ref 0–1.5)
BILIRUB SERPL-MCNC: 0.5 MG/DL (ref 0–1.2)
BILIRUB UR QL STRIP: NEGATIVE
BUN SERPL-MCNC: 23 MG/DL (ref 8–23)
BUN/CREAT SERPL: 23.2 (ref 7–25)
CALCIUM SPEC-SCNC: 9.1 MG/DL (ref 8.6–10.5)
CHLORIDE SERPL-SCNC: 105 MMOL/L (ref 98–107)
CLARITY UR: CLEAR
CO2 SERPL-SCNC: 28 MMOL/L (ref 22–29)
COLOR UR: YELLOW
CREAT SERPL-MCNC: 0.99 MG/DL (ref 0.76–1.27)
DEPRECATED RDW RBC AUTO: 43.9 FL (ref 37–54)
EGFRCR SERPLBLD CKD-EPI 2021: 75.1 ML/MIN/1.73
EOSINOPHIL # BLD AUTO: 0.18 10*3/MM3 (ref 0–0.4)
EOSINOPHIL NFR BLD AUTO: 2.7 % (ref 0.3–6.2)
ERYTHROCYTE [DISTWIDTH] IN BLOOD BY AUTOMATED COUNT: 12.7 % (ref 12.3–15.4)
GLOBULIN UR ELPH-MCNC: 3.5 GM/DL
GLUCOSE SERPL-MCNC: 133 MG/DL (ref 65–99)
GLUCOSE UR STRIP-MCNC: NEGATIVE MG/DL
HCT VFR BLD AUTO: 39 % (ref 37.5–51)
HGB BLD-MCNC: 12.6 G/DL (ref 13–17.7)
HGB UR QL STRIP.AUTO: ABNORMAL
HYALINE CASTS UR QL AUTO: ABNORMAL /LPF
IMM GRANULOCYTES # BLD AUTO: 0.02 10*3/MM3 (ref 0–0.05)
IMM GRANULOCYTES NFR BLD AUTO: 0.3 % (ref 0–0.5)
KETONES UR QL STRIP: NEGATIVE
LEUKOCYTE ESTERASE UR QL STRIP.AUTO: NEGATIVE
LYMPHOCYTES # BLD AUTO: 1.48 10*3/MM3 (ref 0.7–3.1)
LYMPHOCYTES NFR BLD AUTO: 22.4 % (ref 19.6–45.3)
MCH RBC QN AUTO: 30.7 PG (ref 26.6–33)
MCHC RBC AUTO-ENTMCNC: 32.3 G/DL (ref 31.5–35.7)
MCV RBC AUTO: 95.1 FL (ref 79–97)
MONOCYTES # BLD AUTO: 0.61 10*3/MM3 (ref 0.1–0.9)
MONOCYTES NFR BLD AUTO: 9.2 % (ref 5–12)
NEUTROPHILS NFR BLD AUTO: 4.3 10*3/MM3 (ref 1.7–7)
NEUTROPHILS NFR BLD AUTO: 65.1 % (ref 42.7–76)
NITRITE UR QL STRIP: NEGATIVE
NRBC BLD AUTO-RTO: 0 /100 WBC (ref 0–0.2)
PH UR STRIP.AUTO: 7 [PH] (ref 5–8)
PLATELET # BLD AUTO: 176 10*3/MM3 (ref 140–450)
PMV BLD AUTO: 9.6 FL (ref 6–12)
POTASSIUM SERPL-SCNC: 4.5 MMOL/L (ref 3.5–5.2)
PROT SERPL-MCNC: 6.9 G/DL (ref 6–8.5)
PROT UR QL STRIP: NEGATIVE
RBC # BLD AUTO: 4.1 10*6/MM3 (ref 4.14–5.8)
RBC # UR STRIP: ABNORMAL /HPF
REF LAB TEST METHOD: ABNORMAL
SODIUM SERPL-SCNC: 141 MMOL/L (ref 136–145)
SP GR UR STRIP: 1.02 (ref 1–1.03)
SQUAMOUS #/AREA URNS HPF: ABNORMAL /HPF
UROBILINOGEN UR QL STRIP: ABNORMAL
WBC # UR STRIP: ABNORMAL /HPF
WBC NRBC COR # BLD AUTO: 6.61 10*3/MM3 (ref 3.4–10.8)

## 2024-02-27 PROCEDURE — 99284 EMERGENCY DEPT VISIT MOD MDM: CPT

## 2024-02-27 PROCEDURE — 81001 URINALYSIS AUTO W/SCOPE: CPT | Performed by: EMERGENCY MEDICINE

## 2024-02-27 PROCEDURE — 36415 COLL VENOUS BLD VENIPUNCTURE: CPT

## 2024-02-27 PROCEDURE — 85025 COMPLETE CBC W/AUTO DIFF WBC: CPT | Performed by: EMERGENCY MEDICINE

## 2024-02-27 PROCEDURE — P9612 CATHETERIZE FOR URINE SPEC: HCPCS

## 2024-02-27 PROCEDURE — 71045 X-RAY EXAM CHEST 1 VIEW: CPT

## 2024-02-27 PROCEDURE — 80053 COMPREHEN METABOLIC PANEL: CPT | Performed by: EMERGENCY MEDICINE

## 2024-02-27 PROCEDURE — 93005 ELECTROCARDIOGRAM TRACING: CPT | Performed by: EMERGENCY MEDICINE

## 2024-02-27 PROCEDURE — 70450 CT HEAD/BRAIN W/O DYE: CPT

## 2024-02-27 RX ORDER — ACETAMINOPHEN 500 MG
1000 TABLET ORAL ONCE
Status: COMPLETED | OUTPATIENT
Start: 2024-02-27 | End: 2024-02-27

## 2024-02-27 RX ADMIN — ACETAMINOPHEN 1000 MG: 500 TABLET ORAL at 22:05

## 2024-02-27 NOTE — ED PROVIDER NOTES
EMERGENCY DEPARTMENT ENCOUNTER    Pt Name: Apollo Haro  MRN: [id number]  Pt :   1940  Room Number:    Date of encounter:  2024  PCP: Provider, No Known  ED Provider: Carroll Gonzalez MD    Historian: Patient, paramedics, patient's wife      HPI:  Chief Complaint: Unwitnessed fall with right rib pain        Context: Apollo Haro is a 83 y.o. male who presents to the ED c/o unwitnessed fall while patient was in bathroom.  He normally requires assistance getting to the bathroom but has poor memory and told his wife that he did not need to use the bathroom.  She left the room and he got up very soon after and fell into the bathroom.  She is unaware of him striking his head or having loss of consciousness.  He was alert when she got to him.  He is on a blood thinner and she was concerned about an intracranial hemorrhage.  The patient initially stated he had no pain in any location but as time passed now complains of right posterior chest wall pain.      PAST MEDICAL HISTORY  Past Medical History:   Diagnosis Date    A-fib     CAD (coronary artery disease)     DM (diabetes mellitus)          PAST SURGICAL HISTORY  History reviewed. No pertinent surgical history.      FAMILY HISTORY  History reviewed. No pertinent family history.      SOCIAL HISTORY  Social History     Socioeconomic History    Marital status:    Tobacco Use    Smoking status: Never     Passive exposure: Never    Smokeless tobacco: Never   Vaping Use    Vaping Use: Never used   Substance and Sexual Activity    Alcohol use: Never    Drug use: Never    Sexual activity: Defer         ALLERGIES  Lidocaine        REVIEW OF SYSTEMS  Review of Systems       All systems reviewed and negative except for those discussed in HPI.       PHYSICAL EXAM    I have reviewed the triage vital signs and nursing notes.    ED Triage Vitals [24 1636]   Temp Heart Rate Resp BP SpO2   98.5 °F (36.9 °C) 71 18 123/64 97 %      Temp src Heart Rate Source  Patient Position BP Location FiO2 (%)   Oral Monitor Sitting Right arm --       Physical Exam  GENERAL:   Appears in no acute distress.  Pleasant, nontoxic  HENT: Nares patent.  No signs of craniofacial trauma  EYES: No scleral icterus.  CV: Regular rhythm, regular rate.  No murmurs gallops rubs clear to auscultation  RESPIRATORY: Normal effort.  No audible wheezes, rales or rhonchi.  ABDOMEN: Soft, nontender to deep palpation  MUSCULOSKELETAL: No deformities.  No CT LS spine tenderness.  Right posterior lateral chest wall tenderness to palpation  NEURO: Alert, moves all extremities, follows commands.  SKIN: Warm, dry, no rash visualized.  Dermal contusion right flank      LAB RESULTS  Recent Results (from the past 24 hour(s))   ECG 12 Lead Syncope    Collection Time: 02/27/24  5:05 PM   Result Value Ref Range    QT Interval 386 ms    QTC Interval 459 ms   Comprehensive Metabolic Panel    Collection Time: 02/27/24  5:20 PM    Specimen: Blood   Result Value Ref Range    Glucose 133 (H) 65 - 99 mg/dL    BUN 23 8 - 23 mg/dL    Creatinine 0.99 0.76 - 1.27 mg/dL    Sodium 141 136 - 145 mmol/L    Potassium 4.5 3.5 - 5.2 mmol/L    Chloride 105 98 - 107 mmol/L    CO2 28.0 22.0 - 29.0 mmol/L    Calcium 9.1 8.6 - 10.5 mg/dL    Total Protein 6.9 6.0 - 8.5 g/dL    Albumin 3.4 (L) 3.5 - 5.2 g/dL    ALT (SGPT) 12 1 - 41 U/L    AST (SGOT) 20 1 - 40 U/L    Alkaline Phosphatase 88 39 - 117 U/L    Total Bilirubin 0.5 0.0 - 1.2 mg/dL    Globulin 3.5 gm/dL    A/G Ratio 1.0 g/dL    BUN/Creatinine Ratio 23.2 7.0 - 25.0    Anion Gap 8.0 5.0 - 15.0 mmol/L    eGFR 75.1 >60.0 mL/min/1.73   CBC Auto Differential    Collection Time: 02/27/24  5:20 PM    Specimen: Blood   Result Value Ref Range    WBC 6.61 3.40 - 10.80 10*3/mm3    RBC 4.10 (L) 4.14 - 5.80 10*6/mm3    Hemoglobin 12.6 (L) 13.0 - 17.7 g/dL    Hematocrit 39.0 37.5 - 51.0 %    MCV 95.1 79.0 - 97.0 fL    MCH 30.7 26.6 - 33.0 pg    MCHC 32.3 31.5 - 35.7 g/dL    RDW 12.7 12.3 - 15.4 %     RDW-SD 43.9 37.0 - 54.0 fl    MPV 9.6 6.0 - 12.0 fL    Platelets 176 140 - 450 10*3/mm3    Neutrophil % 65.1 42.7 - 76.0 %    Lymphocyte % 22.4 19.6 - 45.3 %    Monocyte % 9.2 5.0 - 12.0 %    Eosinophil % 2.7 0.3 - 6.2 %    Basophil % 0.3 0.0 - 1.5 %    Immature Grans % 0.3 0.0 - 0.5 %    Neutrophils, Absolute 4.30 1.70 - 7.00 10*3/mm3    Lymphocytes, Absolute 1.48 0.70 - 3.10 10*3/mm3    Monocytes, Absolute 0.61 0.10 - 0.90 10*3/mm3    Eosinophils, Absolute 0.18 0.00 - 0.40 10*3/mm3    Basophils, Absolute 0.02 0.00 - 0.20 10*3/mm3    Immature Grans, Absolute 0.02 0.00 - 0.05 10*3/mm3    nRBC 0.0 0.0 - 0.2 /100 WBC   Urinalysis With Microscopic If Indicated (No Culture) - Straight Cath    Collection Time: 02/27/24  6:38 PM    Specimen: Straight Cath; Urine   Result Value Ref Range    Color, UA Yellow Yellow, Straw    Appearance, UA Clear Clear    pH, UA 7.0 5.0 - 8.0    Specific Gravity, UA 1.022 1.001 - 1.030    Glucose, UA Negative Negative    Ketones, UA Negative Negative    Bilirubin, UA Negative Negative    Blood, UA Small (1+) (A) Negative    Protein, UA Negative Negative    Leuk Esterase, UA Negative Negative    Nitrite, UA Negative Negative    Urobilinogen, UA 1.0 E.U./dL 0.2 - 1.0 E.U./dL   Urinalysis, Microscopic Only - Straight Cath    Collection Time: 02/27/24  6:38 PM    Specimen: Straight Cath; Urine   Result Value Ref Range    RBC, UA 21-50 (A) None Seen, 0-2 /HPF    WBC, UA 0-2 None Seen, 0-2 /HPF    Bacteria, UA None Seen None Seen, Trace /HPF    Squamous Epithelial Cells, UA 0-2 None Seen, 0-2 /HPF    Hyaline Casts, UA None Seen 0 - 6 /LPF    Methodology Automated Microscopy        If labs were ordered, I independently reviewed the results and considered them in treating the patient.        RADIOLOGY  XR Chest 1 View    Result Date: 2/27/2024  XR CHEST 1 VW Date of Exam: 2/27/2024 8:56 PM EST Indication: right sided rib pain s/p fall Comparison: 1/31/2024 Findings: Heart shadow is borderline to  mildly enlarged. Lungs remain only moderately well expanded, but appear clear except for stable linear scarring in the left lung base. No edema effusion or pneumothorax is seen. Bony detail is somewhat limited. There is a focal, very subtle deformity of the posterolateral right fifth rib, potentially acute or old rib fracture. Is difficult to determine if this is present on the 1/31/2024 exam. No definite fracture is appreciated elsewhere. Lower thoracic kyphoplasty is again noted.     Impression: Mildly irregular posterior lateral right fifth rib fracture, but of uncertain age. Please correlate with patient's symptoms. No clearly acute bony abnormality or other active chest disease elsewhere. 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 Comparison: 2/1/2024 Technique: Axial CT images were obtained of the head without contrast administration.  Automated exposure control and iterative construction methods were used. Findings: There is no evidence of acute territorial infarction. There is no acute intracranial hemorrhage. Interval resolution of subdural hemorrhage along the right anterior falx. There are no extra-axial collections. Ventricles and CSF spaces are symmetrically prominent. No mass effect nor hydrocephalus. Brain parenchyma appears unchanged.  There is persistent partial opacification of the left ethmoid air cells.  Osseous structures and orbits appear intact.     Impression: No acute intracranial process is identified. Electronically Signed: Wang Bingham MD  2/27/2024 6:10 PM EST  Workstation ID: [id number]     I ordered and independently reviewed the above noted radiographic studies.      I viewed images of CT head which showed no evidence of acute bleed or acute ischemia per my independent interpretation.    See  radiologist's dictation for official interpretation.        PROCEDURES    Procedures    ECG 12 Lead Syncope   Preliminary Result   Test Reason : Syncope   Blood Pressure :   */*   mmHG   Vent. Rate :  85 BPM     Atrial Rate : 340 BPM      P-R Int :   * ms          QRS Dur : 110 ms       QT Int : 386 ms       P-R-T Axes :   * -12  21 degrees      QTc Int : 459 ms      ** Poor data quality, interpretation may be adversely affected   Atrial fibrillation   Abnormal ECG   When compared with ECG of 01-FEB-2024 05:51,   Atrial fibrillation has replaced Sinus rhythm      Referred By:            Confirmed By:           MEDICATIONS GIVEN IN ER    Medications   acetaminophen (TYLENOL) tablet 1,000 mg (1,000 mg Oral Given 2/27/24 2205)         MEDICAL DECISION MAKING, PROGRESS, and CONSULTS    All labs, if obtained, have been independently reviewed by me.  All radiology studies, if obtained, have been reviewed by me and the radiologist dictating the report.  All EKG's, if obtained, have been independently viewed and interpreted by me/my attending physician.      Discussion below represents my analysis of pertinent findings related to patient's condition, differential diagnosis, treatment plan and final disposition.                         Differential diagnosis:    Occult intracranial hemorrhage versus rib fracture versus renal injury, etc.      Additional sources:    - Discussed/ obtained information from independent historians: Patient's wife is a very good historian came to the emergency department after he was transported.  Paramedics also gave report at bedside.    - External (non-ED) record review: Reviewed multiple records on this patient to include his discharge summary dated 2/7/2024 when he was admitted for a subdural hematoma.    - Chronic or social conditions impacting care: Dementia    - Shared decision making: Patient and wife in full agreement with current plans for evaluation and treatment.      Orders placed  during this visit:  Orders Placed This Encounter   Procedures    CT Head Without Contrast    XR Chest 1 View    Comprehensive Metabolic Panel    CBC Auto Differential    Urinalysis With Microscopic If Indicated (No Culture) - Straight Cath    Urinalysis, Microscopic Only - Urine, Clean Catch    ECG 12 Lead Syncope    CBC & Differential         Additional orders considered but not ordered:  CT chest    ED Course:    Consultants:                  Shared Decision Making:  After my consideration of clinical presentation and any laboratory/radiology studies obtained, I discussed the findings with the patient/patient representative who is in agreement with the treatment plan and the final disposition.   Risks and benefits of discharge and/or observation/admission were discussed.       AS OF 01:45 EST VITALS:    BP - 108/59  HR - 100  TEMP - 98.5 °F (36.9 °C) (Oral)  O2 SATS - 97%                  DIAGNOSIS  Final diagnoses:   Closed fracture of one rib of right side, initial encounter   Anticoagulated   Fall, initial encounter         DISPOSITION  DISCHARGE    Patient discharged in stable condition.    Reviewed implications of results, diagnosis, meds, responsibility to follow up, warning signs and symptoms of possible worsening, potential complications and reasons to return to ER.    Patient/Family voiced understanding of above instructions.    Discussed plan for discharge, as there is no emergent indication for admission.  Pt/family is agreeable and understands need for follow up and possible repeat testing.  Pt/family is aware that discharge does not mean that nothing is wrong but that it indicates no emergency is currently present that requires admission and they must continue care with follow-up as given below or with a physician of their choice.     FOLLOW-UP  [facility] EMERGENCY DEPARTMENT  [address] [facility] [address] [phone]    IF YOU HAVE ANY CONCERN OF WORSENING  CONDITION         Medication List      No changes were made to your prescriptions during this visit.             Please note that portions of this document were completed with voice recognition software.        Carroll Gonzalez MD  02/28/24 0145

## 2024-03-01 LAB
QT INTERVAL: 386 MS
QTC INTERVAL: 459 MS

## 2024-09-16 ENCOUNTER — APPOINTMENT (OUTPATIENT)
Dept: CT IMAGING | Facility: HOSPITAL | Age: 84
End: 2024-09-16
Payer: MEDICARE

## 2024-09-16 ENCOUNTER — HOSPITAL ENCOUNTER (EMERGENCY)
Facility: HOSPITAL | Age: 84
Discharge: HOME | End: 2024-09-16
Attending: EMERGENCY MEDICINE | Admitting: EMERGENCY MEDICINE
Payer: MEDICARE

## 2024-09-16 ENCOUNTER — APPOINTMENT (OUTPATIENT)
Dept: GENERAL RADIOLOGY | Facility: HOSPITAL | Age: 84
End: 2024-09-16
Payer: MEDICARE

## 2024-09-16 VITALS
WEIGHT: 195 LBS | HEIGHT: 69 IN | BODY MASS INDEX: 28.88 KG/M2 | TEMPERATURE: 98.9 F | DIASTOLIC BLOOD PRESSURE: 62 MMHG | HEART RATE: 63 BPM | SYSTOLIC BLOOD PRESSURE: 117 MMHG | OXYGEN SATURATION: 97 % | RESPIRATION RATE: 16 BRPM

## 2024-09-16 DIAGNOSIS — S01.01XA LACERATION OF SCALP, INITIAL ENCOUNTER: Primary | ICD-10-CM

## 2024-09-16 DIAGNOSIS — S09.90XA CLOSED HEAD INJURY, INITIAL ENCOUNTER: ICD-10-CM

## 2024-09-16 DIAGNOSIS — I48.20 ATRIAL FIBRILLATION, CHRONIC: ICD-10-CM

## 2024-09-16 PROCEDURE — 72125 CT NECK SPINE W/O DYE: CPT

## 2024-09-16 PROCEDURE — 99284 EMERGENCY DEPT VISIT MOD MDM: CPT

## 2024-09-16 PROCEDURE — 70450 CT HEAD/BRAIN W/O DYE: CPT

## 2024-09-16 PROCEDURE — 71045 X-RAY EXAM CHEST 1 VIEW: CPT

## 2024-09-16 PROCEDURE — 72170 X-RAY EXAM OF PELVIS: CPT

## 2024-09-23 ENCOUNTER — HOSPITAL ENCOUNTER (OUTPATIENT)
Facility: HOSPITAL | Age: 84
Setting detail: OBSERVATION
Discharge: NURSING, REHAB OR LONG-TERM CARE | End: 2024-10-07
Attending: EMERGENCY MEDICINE | Admitting: FAMILY MEDICINE
Payer: MEDICARE

## 2024-09-23 ENCOUNTER — APPOINTMENT (OUTPATIENT)
Dept: CT IMAGING | Facility: HOSPITAL | Age: 84
End: 2024-09-23
Payer: MEDICARE

## 2024-09-23 DIAGNOSIS — S32.020A COMPRESSION FRACTURE OF L2 VERTEBRA, INITIAL ENCOUNTER: Primary | ICD-10-CM

## 2024-09-23 DIAGNOSIS — Z91.81 HISTORY OF FALL: ICD-10-CM

## 2024-09-23 DIAGNOSIS — S32.020D CLOSED COMPRESSION FRACTURE OF L2 LUMBAR VERTEBRA WITH ROUTINE HEALING, SUBSEQUENT ENCOUNTER: ICD-10-CM

## 2024-09-23 DIAGNOSIS — R53.81 DECLINING FUNCTIONAL STATUS: ICD-10-CM

## 2024-09-23 DIAGNOSIS — Z86.79 HISTORY OF ATRIAL FIBRILLATION: ICD-10-CM

## 2024-09-23 DIAGNOSIS — M54.50 ACUTE MIDLINE LOW BACK PAIN WITHOUT SCIATICA: ICD-10-CM

## 2024-09-23 DIAGNOSIS — R26.2 UNABLE TO AMBULATE: ICD-10-CM

## 2024-09-23 DIAGNOSIS — Z79.01 CHRONIC ANTICOAGULATION: ICD-10-CM

## 2024-09-23 PROBLEM — N39.0 UTI (URINARY TRACT INFECTION): Status: ACTIVE | Noted: 2024-09-23

## 2024-09-23 PROBLEM — S32.030A CLOSED COMPRESSION FRACTURE OF L3 VERTEBRA: Status: ACTIVE | Noted: 2024-09-23

## 2024-09-23 LAB
ALBUMIN SERPL-MCNC: 3.9 G/DL (ref 3.5–5.2)
ALBUMIN/GLOB SERPL: 1.5 G/DL
ALP SERPL-CCNC: 92 U/L (ref 39–117)
ALT SERPL W P-5'-P-CCNC: 11 U/L (ref 1–41)
ANION GAP SERPL CALCULATED.3IONS-SCNC: 14 MMOL/L (ref 5–15)
AST SERPL-CCNC: 21 U/L (ref 1–40)
BACTERIA UR QL AUTO: ABNORMAL /HPF
BASOPHILS # BLD AUTO: 0.02 10*3/MM3 (ref 0–0.2)
BASOPHILS NFR BLD AUTO: 0.2 % (ref 0–1.5)
BILIRUB SERPL-MCNC: 1.3 MG/DL (ref 0–1.2)
BILIRUB UR QL STRIP: NEGATIVE
BUN SERPL-MCNC: 26 MG/DL (ref 8–23)
BUN/CREAT SERPL: 23.4 (ref 7–25)
CALCIUM SPEC-SCNC: 9.4 MG/DL (ref 8.6–10.5)
CHLORIDE SERPL-SCNC: 101 MMOL/L (ref 98–107)
CLARITY UR: ABNORMAL
CO2 SERPL-SCNC: 28 MMOL/L (ref 22–29)
COLOR UR: YELLOW
CREAT SERPL-MCNC: 1.11 MG/DL (ref 0.76–1.27)
D-LACTATE SERPL-SCNC: 1.7 MMOL/L (ref 0.5–2)
D-LACTATE SERPL-SCNC: 2.1 MMOL/L (ref 0.5–2)
D-LACTATE SERPL-SCNC: 2.2 MMOL/L (ref 0.5–2)
DEPRECATED RDW RBC AUTO: 41.2 FL (ref 37–54)
EGFRCR SERPLBLD CKD-EPI 2021: 65.1 ML/MIN/1.73
EOSINOPHIL # BLD AUTO: 0.09 10*3/MM3 (ref 0–0.4)
EOSINOPHIL NFR BLD AUTO: 1.1 % (ref 0.3–6.2)
ERYTHROCYTE [DISTWIDTH] IN BLOOD BY AUTOMATED COUNT: 12.2 % (ref 12.3–15.4)
GLOBULIN UR ELPH-MCNC: 2.6 GM/DL
GLUCOSE BLDC GLUCOMTR-MCNC: 133 MG/DL (ref 70–130)
GLUCOSE BLDC GLUCOMTR-MCNC: 178 MG/DL (ref 70–130)
GLUCOSE SERPL-MCNC: 145 MG/DL (ref 65–99)
GLUCOSE UR STRIP-MCNC: NEGATIVE MG/DL
HBA1C MFR BLD: 5.8 % (ref 4.8–5.6)
HCT VFR BLD AUTO: 40.7 % (ref 37.5–51)
HGB BLD-MCNC: 13.6 G/DL (ref 13–17.7)
HGB UR QL STRIP.AUTO: ABNORMAL
HOLD SPECIMEN: NORMAL
HYALINE CASTS UR QL AUTO: ABNORMAL /LPF
IMM GRANULOCYTES # BLD AUTO: 0.03 10*3/MM3 (ref 0–0.05)
IMM GRANULOCYTES NFR BLD AUTO: 0.4 % (ref 0–0.5)
KETONES UR QL STRIP: ABNORMAL
LEUKOCYTE ESTERASE UR QL STRIP.AUTO: ABNORMAL
LYMPHOCYTES # BLD AUTO: 1.35 10*3/MM3 (ref 0.7–3.1)
LYMPHOCYTES NFR BLD AUTO: 15.8 % (ref 19.6–45.3)
MCH RBC QN AUTO: 30.9 PG (ref 26.6–33)
MCHC RBC AUTO-ENTMCNC: 33.4 G/DL (ref 31.5–35.7)
MCV RBC AUTO: 92.5 FL (ref 79–97)
MONOCYTES # BLD AUTO: 0.79 10*3/MM3 (ref 0.1–0.9)
MONOCYTES NFR BLD AUTO: 9.2 % (ref 5–12)
NEUTROPHILS NFR BLD AUTO: 6.29 10*3/MM3 (ref 1.7–7)
NEUTROPHILS NFR BLD AUTO: 73.3 % (ref 42.7–76)
NITRITE UR QL STRIP: POSITIVE
NRBC BLD AUTO-RTO: 0 /100 WBC (ref 0–0.2)
PH UR STRIP.AUTO: 6.5 [PH] (ref 5–8)
PLATELET # BLD AUTO: 176 10*3/MM3 (ref 140–450)
PMV BLD AUTO: 9.5 FL (ref 6–12)
POTASSIUM SERPL-SCNC: 4.5 MMOL/L (ref 3.5–5.2)
PROT SERPL-MCNC: 6.5 G/DL (ref 6–8.5)
PROT UR QL STRIP: ABNORMAL
QT INTERVAL: 402 MS
QTC INTERVAL: 491 MS
RBC # BLD AUTO: 4.4 10*6/MM3 (ref 4.14–5.8)
RBC # UR STRIP: ABNORMAL /HPF
REF LAB TEST METHOD: ABNORMAL
SODIUM SERPL-SCNC: 143 MMOL/L (ref 136–145)
SP GR UR STRIP: 1.02 (ref 1–1.03)
SQUAMOUS #/AREA URNS HPF: ABNORMAL /HPF
UROBILINOGEN UR QL STRIP: ABNORMAL
WBC # UR STRIP: ABNORMAL /HPF
WBC NRBC COR # BLD AUTO: 8.57 10*3/MM3 (ref 3.4–10.8)
WHOLE BLOOD HOLD COAG: NORMAL
WHOLE BLOOD HOLD SPECIMEN: NORMAL

## 2024-09-23 PROCEDURE — 87086 URINE CULTURE/COLONY COUNT: CPT | Performed by: FAMILY MEDICINE

## 2024-09-23 PROCEDURE — 80053 COMPREHEN METABOLIC PANEL: CPT | Performed by: EMERGENCY MEDICINE

## 2024-09-23 PROCEDURE — 87040 BLOOD CULTURE FOR BACTERIA: CPT | Performed by: FAMILY MEDICINE

## 2024-09-23 PROCEDURE — 63710000001 INSULIN LISPRO (HUMAN) PER 5 UNITS: Performed by: FAMILY MEDICINE

## 2024-09-23 PROCEDURE — 83605 ASSAY OF LACTIC ACID: CPT | Performed by: FAMILY MEDICINE

## 2024-09-23 PROCEDURE — 99285 EMERGENCY DEPT VISIT HI MDM: CPT

## 2024-09-23 PROCEDURE — G0378 HOSPITAL OBSERVATION PER HR: HCPCS

## 2024-09-23 PROCEDURE — 96375 TX/PRO/DX INJ NEW DRUG ADDON: CPT

## 2024-09-23 PROCEDURE — 85025 COMPLETE CBC W/AUTO DIFF WBC: CPT | Performed by: EMERGENCY MEDICINE

## 2024-09-23 PROCEDURE — 25010000002 KETOROLAC TROMETHAMINE PER 15 MG: Performed by: EMERGENCY MEDICINE

## 2024-09-23 PROCEDURE — 82948 REAGENT STRIP/BLOOD GLUCOSE: CPT

## 2024-09-23 PROCEDURE — 81001 URINALYSIS AUTO W/SCOPE: CPT | Performed by: EMERGENCY MEDICINE

## 2024-09-23 PROCEDURE — 99222 1ST HOSP IP/OBS MODERATE 55: CPT | Performed by: FAMILY MEDICINE

## 2024-09-23 PROCEDURE — 93005 ELECTROCARDIOGRAM TRACING: CPT | Performed by: EMERGENCY MEDICINE

## 2024-09-23 PROCEDURE — 25010000002 CEFTRIAXONE PER 250 MG: Performed by: FAMILY MEDICINE

## 2024-09-23 PROCEDURE — 99284 EMERGENCY DEPT VISIT MOD MDM: CPT | Performed by: PHYSICIAN ASSISTANT

## 2024-09-23 PROCEDURE — 70450 CT HEAD/BRAIN W/O DYE: CPT

## 2024-09-23 PROCEDURE — P9612 CATHETERIZE FOR URINE SPEC: HCPCS

## 2024-09-23 PROCEDURE — 83036 HEMOGLOBIN GLYCOSYLATED A1C: CPT | Performed by: FAMILY MEDICINE

## 2024-09-23 PROCEDURE — 87147 CULTURE TYPE IMMUNOLOGIC: CPT | Performed by: FAMILY MEDICINE

## 2024-09-23 PROCEDURE — 72131 CT LUMBAR SPINE W/O DYE: CPT

## 2024-09-23 PROCEDURE — 87186 SC STD MICRODIL/AGAR DIL: CPT | Performed by: FAMILY MEDICINE

## 2024-09-23 RX ORDER — KETOROLAC TROMETHAMINE 15 MG/ML
7.5 INJECTION, SOLUTION INTRAMUSCULAR; INTRAVENOUS ONCE
Status: COMPLETED | OUTPATIENT
Start: 2024-09-23 | End: 2024-09-23

## 2024-09-23 RX ORDER — HYDROCODONE BITARTRATE AND ACETAMINOPHEN 5; 325 MG/1; MG/1
1 TABLET ORAL EVERY 6 HOURS PRN
Status: DISCONTINUED | OUTPATIENT
Start: 2024-09-23 | End: 2024-10-07 | Stop reason: HOSPADM

## 2024-09-23 RX ORDER — NITROGLYCERIN 0.4 MG/1
0.4 TABLET SUBLINGUAL
Status: DISCONTINUED | OUTPATIENT
Start: 2024-09-23 | End: 2024-10-07 | Stop reason: HOSPADM

## 2024-09-23 RX ORDER — POLYETHYLENE GLYCOL 3350 17 G/17G
17 POWDER, FOR SOLUTION ORAL DAILY
Status: DISCONTINUED | OUTPATIENT
Start: 2024-09-23 | End: 2024-10-07 | Stop reason: HOSPADM

## 2024-09-23 RX ORDER — INSULIN LISPRO 100 [IU]/ML
2-7 INJECTION, SOLUTION INTRAVENOUS; SUBCUTANEOUS
Status: DISCONTINUED | OUTPATIENT
Start: 2024-09-23 | End: 2024-10-07 | Stop reason: HOSPADM

## 2024-09-23 RX ORDER — SODIUM CHLORIDE 0.9 % (FLUSH) 0.9 %
10 SYRINGE (ML) INJECTION EVERY 12 HOURS SCHEDULED
Status: DISCONTINUED | OUTPATIENT
Start: 2024-09-23 | End: 2024-10-07 | Stop reason: HOSPADM

## 2024-09-23 RX ORDER — IBUPROFEN 600 MG/1
1 TABLET ORAL
Status: DISCONTINUED | OUTPATIENT
Start: 2024-09-23 | End: 2024-10-07 | Stop reason: HOSPADM

## 2024-09-23 RX ORDER — FLECAINIDE ACETATE 50 MG/1
50 TABLET ORAL 2 TIMES DAILY
Status: DISCONTINUED | OUTPATIENT
Start: 2024-09-23 | End: 2024-09-24

## 2024-09-23 RX ORDER — AMOXICILLIN 250 MG
2 CAPSULE ORAL 2 TIMES DAILY
Status: DISCONTINUED | OUTPATIENT
Start: 2024-09-23 | End: 2024-10-07 | Stop reason: HOSPADM

## 2024-09-23 RX ORDER — DEXTROSE MONOHYDRATE 25 G/50ML
25 INJECTION, SOLUTION INTRAVENOUS
Status: DISCONTINUED | OUTPATIENT
Start: 2024-09-23 | End: 2024-10-07 | Stop reason: HOSPADM

## 2024-09-23 RX ORDER — BISACODYL 5 MG/1
5 TABLET, DELAYED RELEASE ORAL DAILY PRN
Status: DISCONTINUED | OUTPATIENT
Start: 2024-09-23 | End: 2024-10-07 | Stop reason: HOSPADM

## 2024-09-23 RX ORDER — QUETIAPINE FUMARATE 25 MG/1
25 TABLET, FILM COATED ORAL NIGHTLY
Status: DISCONTINUED | OUTPATIENT
Start: 2024-09-23 | End: 2024-10-07 | Stop reason: HOSPADM

## 2024-09-23 RX ORDER — ACETAMINOPHEN 160 MG/5ML
650 SOLUTION ORAL EVERY 4 HOURS PRN
Status: DISCONTINUED | OUTPATIENT
Start: 2024-09-23 | End: 2024-10-07 | Stop reason: HOSPADM

## 2024-09-23 RX ORDER — SODIUM CHLORIDE 9 MG/ML
40 INJECTION, SOLUTION INTRAVENOUS AS NEEDED
Status: DISCONTINUED | OUTPATIENT
Start: 2024-09-23 | End: 2024-10-07 | Stop reason: HOSPADM

## 2024-09-23 RX ORDER — ONDANSETRON 2 MG/ML
4 INJECTION INTRAMUSCULAR; INTRAVENOUS EVERY 6 HOURS PRN
Status: DISCONTINUED | OUTPATIENT
Start: 2024-09-23 | End: 2024-10-07 | Stop reason: HOSPADM

## 2024-09-23 RX ORDER — ONDANSETRON 4 MG/1
4 TABLET, ORALLY DISINTEGRATING ORAL EVERY 6 HOURS PRN
Status: DISCONTINUED | OUTPATIENT
Start: 2024-09-23 | End: 2024-10-07 | Stop reason: HOSPADM

## 2024-09-23 RX ORDER — SODIUM CHLORIDE 0.9 % (FLUSH) 0.9 %
10 SYRINGE (ML) INJECTION AS NEEDED
Status: DISCONTINUED | OUTPATIENT
Start: 2024-09-23 | End: 2024-10-07 | Stop reason: HOSPADM

## 2024-09-23 RX ORDER — ACETAMINOPHEN 500 MG
1000 TABLET ORAL ONCE
Status: COMPLETED | OUTPATIENT
Start: 2024-09-23 | End: 2024-09-23

## 2024-09-23 RX ORDER — NICOTINE POLACRILEX 4 MG
15 LOZENGE BUCCAL
Status: DISCONTINUED | OUTPATIENT
Start: 2024-09-23 | End: 2024-10-07 | Stop reason: HOSPADM

## 2024-09-23 RX ORDER — ACETAMINOPHEN 650 MG/1
650 SUPPOSITORY RECTAL EVERY 4 HOURS PRN
Status: DISCONTINUED | OUTPATIENT
Start: 2024-09-23 | End: 2024-10-07 | Stop reason: HOSPADM

## 2024-09-23 RX ORDER — PRAVASTATIN SODIUM 20 MG
20 TABLET ORAL NIGHTLY
Status: DISCONTINUED | OUTPATIENT
Start: 2024-09-23 | End: 2024-10-07 | Stop reason: HOSPADM

## 2024-09-23 RX ORDER — BISACODYL 10 MG
10 SUPPOSITORY, RECTAL RECTAL DAILY PRN
Status: DISCONTINUED | OUTPATIENT
Start: 2024-09-23 | End: 2024-10-07 | Stop reason: HOSPADM

## 2024-09-23 RX ORDER — ACETAMINOPHEN 325 MG/1
650 TABLET ORAL EVERY 4 HOURS PRN
Status: DISCONTINUED | OUTPATIENT
Start: 2024-09-23 | End: 2024-10-07 | Stop reason: HOSPADM

## 2024-09-23 RX ORDER — ASPIRIN 81 MG/1
81 TABLET ORAL DAILY
Status: DISCONTINUED | OUTPATIENT
Start: 2024-09-24 | End: 2024-09-24

## 2024-09-23 RX ADMIN — SODIUM CHLORIDE 2000 MG: 900 INJECTION INTRAVENOUS at 17:36

## 2024-09-23 RX ADMIN — KETOROLAC TROMETHAMINE 7.5 MG: 15 INJECTION, SOLUTION INTRAMUSCULAR; INTRAVENOUS at 13:21

## 2024-09-23 RX ADMIN — Medication 10 ML: at 22:37

## 2024-09-23 RX ADMIN — INSULIN LISPRO 2 UNITS: 100 INJECTION, SOLUTION INTRAVENOUS; SUBCUTANEOUS at 22:36

## 2024-09-23 RX ADMIN — QUETIAPINE FUMARATE 25 MG: 25 TABLET ORAL at 22:33

## 2024-09-23 RX ADMIN — ACETAMINOPHEN 1000 MG: 500 TABLET ORAL at 13:21

## 2024-09-23 RX ADMIN — SENNOSIDES AND DOCUSATE SODIUM 2 TABLET: 50; 8.6 TABLET ORAL at 22:32

## 2024-09-23 RX ADMIN — PRAVASTATIN SODIUM 20 MG: 20 TABLET ORAL at 22:33

## 2024-09-23 RX ADMIN — APIXABAN 5 MG: 5 TABLET, FILM COATED ORAL at 22:33

## 2024-09-23 RX ADMIN — FLECAINIDE ACETATE 50 MG: 50 TABLET ORAL at 22:34

## 2024-09-23 RX ADMIN — Medication 10 ML: at 16:18

## 2024-09-23 NOTE — PLAN OF CARE
Problem: Adult Inpatient Plan of Care  Goal: Plan of Care Review  Outcome: Ongoing, Progressing  Goal: Patient-Specific Goal (Individualized)  Outcome: Ongoing, Progressing  Goal: Absence of Hospital-Acquired Illness or Injury  Outcome: Ongoing, Progressing  Intervention: Identify and Manage Fall Risk  Recent Flowsheet Documentation  Taken 9/23/2024 1600 by Meseret Germain RN  Safety Promotion/Fall Prevention:   assistive device/personal items within reach   activity supervised   clutter free environment maintained   fall prevention program maintained   nonskid shoes/slippers when out of bed   room organization consistent   safety round/check completed   toileting scheduled  Intervention: Prevent Skin Injury  Recent Flowsheet Documentation  Taken 9/23/2024 1600 by Meseret Germain RN  Body Position: position changed independently  Skin Protection:   adhesive use limited   incontinence pads utilized   tubing/devices free from skin contact   transparent dressing maintained  Intervention: Prevent and Manage VTE (Venous Thromboembolism) Risk  Recent Flowsheet Documentation  Taken 9/23/2024 1600 by Meseret Germain RN  Activity Management: activity encouraged  VTE Prevention/Management: sequential compression devices off  Range of Motion: active ROM (range of motion) encouraged  Intervention: Prevent Infection  Recent Flowsheet Documentation  Taken 9/23/2024 1600 by Meseret Germain RN  Infection Prevention:   cohorting utilized   environmental surveillance performed   rest/sleep promoted   single patient room provided  Goal: Optimal Comfort and Wellbeing  Outcome: Ongoing, Progressing  Intervention: Provide Person-Centered Care  Recent Flowsheet Documentation  Taken 9/23/2024 1600 by Meseret Germain RN  Trust Relationship/Rapport:   care explained   choices provided   questions answered   questions encouraged  Goal: Readiness for Transition of Care  Outcome: Ongoing, Progressing     Problem: Skin Injury Risk Increased  Goal:  Skin Health and Integrity  Outcome: Ongoing, Progressing  Intervention: Optimize Skin Protection  Recent Flowsheet Documentation  Taken 9/23/2024 1600 by Meseret Germain RN  Pressure Reduction Techniques:   frequent weight shift encouraged   weight shift assistance provided   pressure points protected   heels elevated off bed  Head of Bed (HOB) Positioning: HOB elevated  Pressure Reduction Devices: pressure-redistributing mattress utilized  Skin Protection:   adhesive use limited   incontinence pads utilized   tubing/devices free from skin contact   transparent dressing maintained   Goal Outcome Evaluation:

## 2024-09-23 NOTE — ED PROVIDER NOTES
Subjective   History of Present Illness  Patient is an 84-year-old male presenting to emergency department secondary to low back pain associate with a fall 2 weeks ago.  Patient also suffered a laceration to his scalp that time as well.  EMS noted on their evaluation that the patient has significant weakness and difficulty with walking as well.  The symptoms are not unilateral, but generally weak within the lower extremities.  No fever or chills.  No other acute symptoms or complaints.    History provided by:  Patient and EMS personnel      Review of Systems    Past Medical History:   Diagnosis Date    A-fib     CAD (coronary artery disease)     DM (diabetes mellitus)        Allergies   Allergen Reactions    Lidocaine Angioedema       No past surgical history on file.    No family history on file.    Social History     Socioeconomic History    Marital status:    Tobacco Use    Smoking status: Never     Passive exposure: Never    Smokeless tobacco: Never   Vaping Use    Vaping status: Never Used   Substance and Sexual Activity    Alcohol use: Never    Drug use: Never    Sexual activity: Defer           Objective   Physical Exam  Vitals and nursing note reviewed.   Constitutional:       General: He is not in acute distress.     Appearance: Normal appearance. He is ill-appearing. He is not toxic-appearing.   Cardiovascular:      Rate and Rhythm: Normal rate and regular rhythm.      Pulses: Normal pulses.   Pulmonary:      Effort: Pulmonary effort is normal. No respiratory distress.      Breath sounds: Normal breath sounds.   Abdominal:      Tenderness: There is no abdominal tenderness.   Musculoskeletal:         General: Tenderness present.      Cervical back: No tenderness.      Comments: Tenderness in the lumbar region.   Neurological:      Mental Status: He is alert and oriented to person, place, and time.      Comments: Generally weak especially in the bilateral lower extremities but no focal unilateral  deficit elicited   Psychiatric:         Mood and Affect: Mood normal.         Behavior: Behavior normal.         Procedures           ED Course  ED Course as of 09/23/24 2021   Mon Sep 23, 2024   1323 CT Head Without Contrast  Personally reviewed the CT scan of the head.  On my interpretation there is no hemorrhage or mass effect visualized. [RS]   1342 I talked with the patient and his wife about the findings.  The wife reports he has not been able to walk at all for the last several days.  He has had progressive weakness.  I talked with the patient and his wife about the consideration of of rehab admission for physical therapy.  They are agreeable.  At this point, I do not believe the patient is safe to go home and would benefit from admission to the hospital for placement.  Hospitalist messaged for admission. [RS]   1341 Case discussed with JEREMIAS TA who will review.    [RS]      ED Course User Index  [RS] Alek Linares MD                                             Medical Decision Making  Problems Addressed:  Acute midline low back pain without sciatica: complicated acute illness or injury  Chronic anticoagulation: complicated acute illness or injury  Compression fracture of L2 vertebra, initial encounter: complicated acute illness or injury  Declining functional status: complicated acute illness or injury  History of atrial fibrillation: complicated acute illness or injury  History of fall: complicated acute illness or injury  Unable to ambulate: complicated acute illness or injury    Amount and/or Complexity of Data Reviewed  Independent Historian: spouse and EMS  External Data Reviewed: radiology.  Labs: ordered.  Radiology: ordered. Decision-making details documented in ED Course.  ECG/medicine tests: ordered.  Discussion of management or test interpretation with external provider(s): Hospitalist and Neurosurgery    Risk  OTC drugs.  Prescription drug management.  Decision regarding  hospitalization.        Final diagnoses:   Declining functional status   Unable to ambulate   History of fall   Acute midline low back pain without sciatica   Chronic anticoagulation   History of atrial fibrillation   Compression fracture of L2 vertebra, initial encounter       ED Disposition  ED Disposition       ED Disposition   Decision to Admit    Condition   --    Comment   Level of Care: Telemetry [5]   Diagnosis: Closed compression fracture of L3 vertebra [2008959]   Admitting Physician: CASEY BUSH [[id number]]   Attending Physician: CASEY BUSH [[id number]]   Is patient appropriate for Observation Unit?: Yes [1]                 No follow-up provider specified.       Medication List      No changes were made to your prescriptions during this visit.            Alek Linares MD  09/23/24 2021

## 2024-09-23 NOTE — H&P
[facility] Medicine Services  HISTORY AND PHYSICAL    Patient Name: Apollo Haro  : 1940  MRN: [id number]  Primary Care Physician: Provider, No Known  Date of admission: 2024      Subjective   Subjective     Chief Complaint:  Back pain, difficulty walking x 2 weeks     HPI:  Apollo Haro is a 84 y.o. male that presented to [facility] emergency room for evaluation of lower back pain and difficulty with walking since a fall 2 weeks ago.  Patient was found to have a compression fracture of the L2 vertebral body. Discussed case with ED and plans are for admission, evaluation by Nsx for likely back brace recommendations and then PT/OT consultation for possible rehab placement. Patient has been nearly bed bound secondary to the pain for the past few weeks. Additional supportive medications have been ordered, wife and family updated at bedside.       Personal History     Past Medical History:   Diagnosis Date    A-fib     CAD (coronary artery disease)     DM (diabetes mellitus)            No past surgical history on file.    Family History: family history is not on file.     Social History:  reports that he has never smoked. He has never been exposed to tobacco smoke. He has never used smokeless tobacco. He reports that he does not drink alcohol and does not use drugs.  Social History     Social History Narrative    Not on file       Medications:  Available home medication information reviewed.  Magnesium, QUEtiapine, acetaminophen, apixaban, aspirin, flecainide, metFORMIN, multivitamin with minerals, and pravastatin    Allergies   Allergen Reactions    Lidocaine Angioedema       Objective   Objective     Vital Signs:   Temp:  [99 °F (37.2 °C)] 99 °F (37.2 °C)  Heart Rate:  [75] 75  Resp:  [18] 18  BP: (135)/(82) 135/82       Physical Exam   Constitutional: No acute distress, awake, alert, frail and elderly appearing, resting in bed, currently on RA   HENT: NCAT, nares patent,  mucous membranes moist  Respiratory: Decreased BS bilaterally, no rhonchi or wheezing, respiratory effort normal   Cardiovascular: irregular but rate controlled, no murmurs, rubs, or gallops  Gastrointestinal: Positive bowel sounds, soft, nontender, nondistended  Musculoskeletal: No bilateral ankle edema, no clubbing or cyanosis   Psychiatric: Appropriate affect, cooperative  Neurologic: Oriented x 3, strength symmetric in all extremities, Cranial Nerves grossly intact to confrontation, speech clear  Skin: No rashes      Result Review:  I have personally reviewed the results from the time of this admission to 9/23/2024 14:20 EDT and agree with these findings:  [x]  Laboratory list / accordion  []  Microbiology  [x]  Radiology  [x]  EKG/Telemetry   []  Cardiology/Vascular   []  Pathology  [x]  Old records  []  Other:  Most notable findings include:   UA Large Leukocytes, Nitrite positive   Anterior column fracture of L2       LAB RESULTS:      Lab 09/23/24  1315   WBC 8.57   HEMOGLOBIN 13.6   HEMATOCRIT 40.7   PLATELETS 176   NEUTROS ABS 6.29   IMMATURE GRANS (ABS) 0.03   LYMPHS ABS 1.35   MONOS ABS 0.79   EOS ABS 0.09   MCV 92.5         Lab 09/23/24  1315   SODIUM 143   POTASSIUM 4.5   CHLORIDE 101   CO2 28.0   ANION GAP 14.0   BUN 26*   CREATININE 1.11   EGFR 65.1   GLUCOSE 145*   CALCIUM 9.4         Lab 09/23/24  1315   TOTAL PROTEIN 6.5   ALBUMIN 3.9   GLOBULIN 2.6   ALT (SGPT) 11   AST (SGOT) 21   BILIRUBIN 1.3*   ALK PHOS 92                     UA          1/31/2024    18:23 2/27/2024    18:38 9/23/2024    13:37   Urinalysis   Squamous Epithelial Cells, UA  0-2  None Seen    Specific [place], UA 1.015  1.022  1.022    Ketones, UA Trace  Negative  Trace    Blood, UA Negative  Small (1+)  Large (3+)    Leukocytes, UA Negative  Negative  Large (3+)    Nitrite, UA Negative  Negative  Positive    RBC, UA  21-50  Too Numerous to Count    WBC, UA  0-2  Too Numerous to Count    Bacteria, UA  None Seen  None Seen         Microbiology Results (last 10 days)       ** No results found for the last 240 hours. **            CT Lumbar Spine Without Contrast    Result Date: 9/23/2024  CT LUMBAR SPINE WO CONTRAST Date of Exam: 9/23/2024 12:46 PM EDT Indication: Fall 1 week ago with head injury and generalized weakness with low back pain. Comparison: 1/11/2024 MR lumbar spine Technique: Axial CT images were obtained of the lumbar spine without contrast administration.  Reconstructed coronal and sagittal images were also obtained. Automated exposure control and iterative construction methods were used. Findings: Alignment: No spondylolisthesis. Minimal levocurvature of the lumbar spine. Bones: Diffuse bone demineralization. Vertebral augmentation material seen within the T10 vertebral body. Similar old T12 compression fracture. Fracture of the anterior two thirds of the L2 vertebral body. No substantial retropulsion. No additional acute  fracture of the vertebral bodies or posterior elements seen. Partially visualized old right rib fracture. Possible L3 pars defects, chronic appearing. Degenerative changes: Disc degenerative disease most advanced at L3-4 and L5-S1. Multilevel facet arthropathy most advanced at L5-S1 and L3-4. Moderate and L3-4. To high-grade canal stenosis at L2-3 and L3-4. Mild to high-grade foraminal stenosis at L4-5  and L5-S1 bilaterally. Soft tissue: Aortic vascular calcification. There is paraspinal soft tissue stranding around the L2 vertebral body.     Impression: Impression: Anterior column fracture of the L3 vertebral body. No substantial bony retropulsion. Degenerative changes with mild to severe canal stenosis seen at L2-3. Possible chronic L3 pars defects. Electronically Signed: Elgin Cisse MD  9/23/2024 1:11 PM EDT  Workstation ID: [id number]    CT Head Without Contrast    Result Date: 9/23/2024  CT HEAD WO CONTRAST Date of Exam: 9/23/2024 12:46 PM EDT Indication: Fall 1 week ago with head injury and  generalized weakness with low back pain. Comparison: CT brain dated 9/16/2024 Technique: Axial CT images were obtained of the head without contrast administration.  Automated exposure control and iterative construction methods were used. Findings: There is no evidence of hemorrhage. There is no mass effect or midline shift. There is no extracerebral collection. The ventricles are midline showing stable ex-vacuo dilatation. Periventricular hypodense areas compatible with chronic small ischemia Posterior fossa is within normal limits. Calvarium and skull base appear intact. There is partial opacification of the left frontal sinus and left ethmoids. The rest of the paranasal sinuses and mastoids are well-aerated. Visualized orbits are unremarkable.     Impression: Impression: Brain atrophy with chronic microvascular ischemic changes No evidence of acute intracranial abnormality Electronically Signed: Jesus Boucher MD  9/23/2024 1:01 PM EDT  Workstation ID: [id number]         Assessment & Plan   Assessment & Plan       Closed compression fracture of L2 vertebra    Weakness    Type 2 diabetes mellitus, without long-term current use of insulin    CAD (coronary artery disease)    HTN (hypertension)    Atrial fibrillation, chronic        Apollo Haro is an 85 yo M that presented to [facility] for evaluation of back pain. Patient with a fall earlier this month in which he hit his head, was evaluated in the ED and later discharged home. He reports since that time he has had issues with increased back pain inhibiting his ability to walk and has been stuck mostly in the bed. He was found to have L2 anterior column compression fracture today on imaging and is pending evaluation by neurosx for likely back brace recommendations. Also PT/OT as he likely will need some form of rehab. Patient found on UA to have possible UTI, will be started on IV rocephin and follow urine culture at this time as well.     Compression fracture  L2  Back Pain   Weakness   - Consult to Neurosx pending at this time, plans for likely evaluation and suspect likely back brace   - Consult to PT/OT, pain control, suspect patient will likely need some form of rehab, patient has been nearly bed bound per report secondary to pain with movement   - Consult to CM for evaluation     UTI   - IV Rocephin  - Follow urine culture     T2DM  - A1C ordered   - FSBS c SSI coverage   - Home Metformin held for now     HTN  Afib   CAD   - Continue regular home Asa, Eliquis, Flecainide, and Pravastatin       VTE Prophylaxis:  Pharmacologic & mechanical VTE prophylaxis orders are present.          CODE STATUS:    Code Status and Medical Interventions: CPR (Attempt to Resuscitate); Full Support   Ordered at: 09/23/24 1416     Level Of Support Discussed With:    Patient    Next of Kin (If No Surrogate)     Code Status (Patient has no pulse and is not breathing):    CPR (Attempt to Resuscitate)     Medical Interventions (Patient has pulse or is breathing):    Full Support       Expected Discharge   Expected discharge date/ time has not been documented.     KAELA Berman, DO  09/23/24

## 2024-09-23 NOTE — CONSULTS
Inpatient Neurosurgery Consult  Consult performed by: Lewis Berger PA-C  Consult ordered by: Alek Linares MD        Referring Provider: Cullen DIXON    Patient Care Team:  Provider, No Known as PCP - General    Chief Complaint: Immobility    Subjective .     History of present illness:       Patient is 84-year-old gentleman who had a recent fall and laceration to his scalp and has been relatively immobile since that time.  Patient was readmitted and scanned and worked up for low back pain.  Upon my examination he was not complaining of much back pain but apparently when discussing it with his wife Dr. Berman had located that has been very difficult to get him up and out of bed secondary to his pain.    Patient had a lumbar CT today that shows a ventral body fracture of the L2 bone.     Patient is not much help as far as history goes secondary to mentation his wife was not present at the time of evaluation    Review of Systems  Difficult to obtain secondary to patient's mentation  History  Past Medical History:   Diagnosis Date    A-fib     CAD (coronary artery disease)     DM (diabetes mellitus)    , No past surgical history on file., No family history on file.,   Social History     Socioeconomic History    Marital status:    Tobacco Use    Smoking status: Never     Passive exposure: Never    Smokeless tobacco: Never   Vaping Use    Vaping status: Never Used   Substance and Sexual Activity    Alcohol use: Never    Drug use: Never    Sexual activity: Defer     E-cigarette/Vaping    E-cigarette/Vaping Use Never User     Passive Exposure No     Counseling Given No      E-cigarette/Vaping Substances    Nicotine No     THC No     CBD No     Flavoring No      E-cigarette/Vaping Devices    Disposable No     Pre-filled or Refillable Cartridge No     Refillable Tank No     Pre-filled Pod No          ,   Medications Prior to Admission   Medication Sig Dispense Refill Last Dose    acetaminophen (TYLENOL) 325 MG  tablet Take 2 tablets by mouth Every 4 (Four) Hours As Needed for Mild Pain. (Patient taking differently: Take 2 tablets by mouth Every 4 (Four) Hours As Needed for Mild Pain, Fever or Headache. OTC)   Past Week    apixaban (ELIQUIS) 5 MG tablet tablet Take 1 tablet by mouth 2 (Two) Times a Day.   9/23/2024    aspirin 81 MG EC tablet Take 1 tablet by mouth Daily. (Patient taking differently: Take 1 tablet by mouth Daily. OTC)   9/23/2024    flecainide (TAMBOCOR) 50 MG tablet Take 1 tablet by mouth 2 (Two) Times a Day.   9/23/2024    Magnesium 200 MG tablet Take 1 tablet by mouth 2 (Two) Times a Day. OTC   9/23/2024    metFORMIN (GLUCOPHAGE) 500 MG tablet Take 1 tablet by mouth 2 (Two) Times a Day With Meals.   9/23/2024    multivitamin with minerals (CENTRUM SILVER 50+MEN PO) Take 1 tablet by mouth Daily. OTC   9/23/2024    pravastatin (PRAVACHOL) 20 MG tablet Take 1 tablet by mouth Every Night.   9/22/2024    QUEtiapine (SEROquel) 25 MG tablet Take 1 tablet by mouth Every Night.   9/22/2024   , Scheduled Meds:  apixaban, 5 mg, Oral, BID  [START ON 9/24/2024] aspirin, 81 mg, Oral, Daily  cefTRIAXone, 2,000 mg, Intravenous, Q24H  flecainide, 50 mg, Oral, BID  insulin lispro, 2-7 Units, Subcutaneous, 4x Daily AC & at Bedtime  senna-docusate sodium, 2 tablet, Oral, BID   And  polyethylene glycol, 17 g, Oral, Daily  pravastatin, 20 mg, Oral, Nightly  QUEtiapine, 25 mg, Oral, Nightly  sodium chloride, 10 mL, Intravenous, Q12H   , Continuous Infusions:   , PRN Meds:    acetaminophen **OR** acetaminophen **OR** acetaminophen    senna-docusate sodium **AND** polyethylene glycol **AND** bisacodyl **AND** bisacodyl    Calcium Replacement - Follow Nurse / BPA Driven Protocol    dextrose    dextrose    glucagon (human recombinant)    HYDROcodone-acetaminophen    Magnesium Standard Dose Replacement - Follow Nurse / BPA Driven Protocol    nitroglycerin    ondansetron ODT **OR** ondansetron    Phosphorus Replacement - Follow Nurse  / BPA Driven Protocol    Potassium Replacement - Follow Nurse / BPA Driven Protocol    [COMPLETED] Insert Peripheral IV **AND** sodium chloride    sodium chloride    sodium chloride, and Allergies:  Lidocaine   SMOKING STATUS: Non-smoker  Objective     Vital Signs   Temp:  [99 °F (37.2 °C)] 99 °F (37.2 °C)  Heart Rate:  [] 100  Resp:  [18] 18  BP: ()/(55-89) 98/55  Body mass index is 28.8 kg/m².    Physical Exam:     Body mass index is 28.8 kg/m².    Patient is able to move all extremities symmetrically with good ankle strength.  Sensation is equal bilaterally.    Patient does not show any signs of myelopathy.    Dorsiflexion plantarflexion both 5 out of 5 proximal hip flexion is 5 out of 5 bilaterally  strength and bicep flexion tricep extension all 5 out of 5.    I rolled the patient to his side to evaluate his back I do not see any weird rashes and patient rolled to his side without issue but he is very stiff.  Percussive tenderness throughout the lumbar area does not elicit significant pain  Results Review:   I reviewed the patient's new imaging results and agree with the interpretation.  Discussed with Dr. Galvan.    Lumbar CT reveals a ventral fracture of the L2 vertebral body    Assessment & Plan     Diagnosis:  L2 fracture of indeterminate age  Mobility issues    PLAN:    I think that the patient will need rehabilitation regardless of what his MRI shows but he does need an MRI to determine acuity if the pain is really truly was keeping him in bed.    Consideration for a TLSO if this is an acute fracture is not unreasonable if he is truly having pain going from seated to standing position.    Chuy will likely require aggressive rehabilitation regardless of MRI however.    I would not want to do a kyphoplasty on the patient secondary to the potential decline of his already sketchy mental acuity    I discussed the patients findings and my recommendations with patient and consulting  provider    Lewis Berger PA-C  09/23/24  17:19 EDT    Time: 60 minutes

## 2024-09-23 NOTE — ED NOTES
" Apollo Haro    Nursing Report ED to Floor:  Mental status: A&O X4  Ambulatory status: bed bound  Oxygen Therapy:  RA  Cardiac Rhythm: A-flutter  Admitted from: ED  Safety Concerns:  none  Social Issues: none  ED Room #:  12    ED Nurse Phone Extension - [phone] or may call [phone].      HPI:   Chief Complaint   Patient presents with    Weakness - Generalized    Back Pain       Past Medical History:  Past Medical History:   Diagnosis Date    A-fib     CAD (coronary artery disease)     DM (diabetes mellitus)         Past Surgical History:  No past surgical history on file.     Admitting Doctor:   CASEY Berman DO    Consulting Provider(s):  Consults       Date and Time Order Name Status Description    9/23/2024  1:49 PM Inpatient Neurosurgery Consult               Admitting Diagnosis:   The primary encounter diagnosis was Compression fracture of L2 vertebra, initial encounter. Diagnoses of Declining functional status, Unable to ambulate, History of fall, Acute midline low back pain without sciatica, Chronic anticoagulation, and History of atrial fibrillation were also pertinent to this visit.    Most Recent Vitals:   Vitals:    09/23/24 1230   BP: 135/82   BP Location: Right arm   Patient Position: Sitting   Pulse: 75   Resp: 18   Temp: 99 °F (37.2 °C)   TempSrc: Oral   SpO2: 96%   Weight: 88.5 kg (195 lb)   Height: 175.3 cm (69\")       Active LDAs/IV Access:   Lines, Drains & Airways       Active LDAs       Name Placement date Placement time Site Days    Peripheral IV 09/23/24 1315 Left Antecubital 09/23/24  1315  Antecubital  less than 1                    Labs (abnormal labs have a star):   Labs Reviewed   COMPREHENSIVE METABOLIC PANEL - Abnormal; Notable for the following components:       Result Value    Glucose 145 (*)     BUN 26 (*)     Total Bilirubin 1.3 (*)     All other components within normal limits    Narrative:     GFR Normal >60  Chronic Kidney Disease <60  Kidney Failure <15    The GFR formula is only " valid for adults with stable renal function between ages 18 and 70.   URINALYSIS W/ MICROSCOPIC IF INDICATED (NO CULTURE) - Abnormal; Notable for the following components:    Appearance, UA Cloudy (*)     Ketones, UA Trace (*)     Blood, UA Large (3+) (*)     Protein, UA 30 mg/dL (1+) (*)     Leuk Esterase, UA Large (3+) (*)     Nitrite, UA Positive (*)     All other components within normal limits   CBC WITH AUTO DIFFERENTIAL - Abnormal; Notable for the following components:    RDW 12.2 (*)     Lymphocyte % 15.8 (*)     All other components within normal limits   URINALYSIS, MICROSCOPIC ONLY - Abnormal; Notable for the following components:    RBC, UA Too Numerous to Count (*)     WBC, UA Too Numerous to Count (*)     All other components within normal limits   RAINBOW DRAW    Narrative:     The following orders were created for panel order [place] Draw.  Procedure                               Abnormality         Status                     ---------                               -----------         ------                     Green Top (Gel)[[id number]]                                  Final result               Lavender Top[[id number]]                                     Final result               Gold Top - SST[[id number]]                                   Final result               Flores Top[[id number]]                                         Final result               Light Blue Top[[id number]]                                   Final result                 Please view results for these tests on the individual orders.   CBC AND DIFFERENTIAL    Narrative:     The following orders were created for panel order CBC & Differential.  Procedure                               Abnormality         Status                     ---------                               -----------         ------                     CBC Auto Differential[[id number]]        Abnormal            Final result                 Please view results for these  tests on the individual orders.   GREEN TOP   LAVENDER TOP   GOLD TOP - SST   GRAY TOP   LIGHT BLUE TOP       Meds Given in ED:   Medications   sodium chloride 0.9 % flush 10 mL (has no administration in time range)   acetaminophen (TYLENOL) tablet 1,000 mg (1,000 mg Oral Given 9/23/24 1321)   ketorolac (TORADOL) injection 7.5 mg (7.5 mg Intravenous Given 9/23/24 1321)           Last NIH score:                                                          Dysphagia screening results:  Patient Factors Component (Dysphagia:Stroke or Rule-out)  Best Eye Response: 4-->(E4) spontaneous (09/23/24 1234)  Best Motor Response: 6-->(M6) obeys commands (09/23/24 1234)  Best Verbal Response: 5-->(V5) oriented (09/23/24 1234)  Otf Coma Scale Score: 15 (09/23/24 1234)     Otf Coma Scale:  No data recorded     CIWA:        Restraint Type:            Isolation Status:  No active isolations

## 2024-09-23 NOTE — LETTER
EMS Transport Request  For use at [facility], [place], [place], [place], and [place] only   Patient Name: Apollo Haro : 1940   Weight:88.5 kg (195 lb) Pick-up Location: [facility] BLS/ALS: BLS/ALS: BLS   Insurance: [employer] MEDICARE REPLACEMENT Auth End Date:    Pre-Cert #: D/C Summary complete:    Destination: Other [facility]   Contact Precautions: Other Contact MRSA   Equipment (O2, Fluids, etc.): None   Arrive By Date/Time: Monday, 10/7/24, afternoon, as patient needs to move out of bed. Stretcher/WC: Stretcher   CM Requesting: Luz Rosales RN Ext: [phone]   Notes/Medical Necessity:      ______________________________________________________________________    *Only 2 patient bags OR 1 carry-on size bag are permitted.  Wheelchairs and walkers CANNOT transported with the patient. Acknowledge: Yes

## 2024-09-23 NOTE — Clinical Note
Level of Care: Telemetry [5]   Diagnosis: Closed compression fracture of L3 vertebra [[id number]]   Admitting Physician: CASEY BUSH [[id number]]   Attending Physician: CASEY BUSH [[id number]]

## 2024-09-24 ENCOUNTER — APPOINTMENT (OUTPATIENT)
Dept: MRI IMAGING | Facility: HOSPITAL | Age: 84
End: 2024-09-24
Payer: MEDICARE

## 2024-09-24 LAB
ALBUMIN SERPL-MCNC: 3.5 G/DL (ref 3.5–5.2)
ALBUMIN/GLOB SERPL: 1.5 G/DL
ALP SERPL-CCNC: 88 U/L (ref 39–117)
ALT SERPL W P-5'-P-CCNC: 9 U/L (ref 1–41)
ANION GAP SERPL CALCULATED.3IONS-SCNC: 12 MMOL/L (ref 5–15)
AST SERPL-CCNC: 16 U/L (ref 1–40)
BASOPHILS # BLD AUTO: 0.04 10*3/MM3 (ref 0–0.2)
BASOPHILS NFR BLD AUTO: 0.5 % (ref 0–1.5)
BILIRUB SERPL-MCNC: 0.8 MG/DL (ref 0–1.2)
BUN SERPL-MCNC: 34 MG/DL (ref 8–23)
BUN/CREAT SERPL: 31.2 (ref 7–25)
CALCIUM SPEC-SCNC: 9.1 MG/DL (ref 8.6–10.5)
CHLORIDE SERPL-SCNC: 101 MMOL/L (ref 98–107)
CO2 SERPL-SCNC: 25 MMOL/L (ref 22–29)
CREAT SERPL-MCNC: 1.09 MG/DL (ref 0.76–1.27)
DEPRECATED RDW RBC AUTO: 39.7 FL (ref 37–54)
EGFRCR SERPLBLD CKD-EPI 2021: 66.5 ML/MIN/1.73
EOSINOPHIL # BLD AUTO: 0.15 10*3/MM3 (ref 0–0.4)
EOSINOPHIL NFR BLD AUTO: 1.9 % (ref 0.3–6.2)
ERYTHROCYTE [DISTWIDTH] IN BLOOD BY AUTOMATED COUNT: 12.1 % (ref 12.3–15.4)
GLOBULIN UR ELPH-MCNC: 2.4 GM/DL
GLUCOSE BLDC GLUCOMTR-MCNC: 132 MG/DL (ref 70–130)
GLUCOSE BLDC GLUCOMTR-MCNC: 145 MG/DL (ref 70–130)
GLUCOSE BLDC GLUCOMTR-MCNC: 163 MG/DL (ref 70–130)
GLUCOSE BLDC GLUCOMTR-MCNC: 166 MG/DL (ref 70–130)
GLUCOSE BLDC GLUCOMTR-MCNC: 170 MG/DL (ref 70–130)
GLUCOSE SERPL-MCNC: 170 MG/DL (ref 65–99)
HCT VFR BLD AUTO: 35.9 % (ref 37.5–51)
HGB BLD-MCNC: 12.1 G/DL (ref 13–17.7)
IMM GRANULOCYTES # BLD AUTO: 0.02 10*3/MM3 (ref 0–0.05)
IMM GRANULOCYTES NFR BLD AUTO: 0.2 % (ref 0–0.5)
LYMPHOCYTES # BLD AUTO: 2.3 10*3/MM3 (ref 0.7–3.1)
LYMPHOCYTES NFR BLD AUTO: 28.7 % (ref 19.6–45.3)
MAGNESIUM SERPL-MCNC: 1.7 MG/DL (ref 1.6–2.4)
MCH RBC QN AUTO: 30.4 PG (ref 26.6–33)
MCHC RBC AUTO-ENTMCNC: 33.7 G/DL (ref 31.5–35.7)
MCV RBC AUTO: 90.2 FL (ref 79–97)
MONOCYTES # BLD AUTO: 0.68 10*3/MM3 (ref 0.1–0.9)
MONOCYTES NFR BLD AUTO: 8.5 % (ref 5–12)
NEUTROPHILS NFR BLD AUTO: 4.83 10*3/MM3 (ref 1.7–7)
NEUTROPHILS NFR BLD AUTO: 60.2 % (ref 42.7–76)
NRBC BLD AUTO-RTO: 0 /100 WBC (ref 0–0.2)
PLATELET # BLD AUTO: 158 10*3/MM3 (ref 140–450)
PMV BLD AUTO: 9.4 FL (ref 6–12)
POTASSIUM SERPL-SCNC: 4.1 MMOL/L (ref 3.5–5.2)
PROCALCITONIN SERPL-MCNC: 0.07 NG/ML (ref 0–0.25)
PROT SERPL-MCNC: 5.9 G/DL (ref 6–8.5)
RBC # BLD AUTO: 3.98 10*6/MM3 (ref 4.14–5.8)
SODIUM SERPL-SCNC: 138 MMOL/L (ref 136–145)
WBC NRBC COR # BLD AUTO: 8.02 10*3/MM3 (ref 3.4–10.8)

## 2024-09-24 PROCEDURE — 97530 THERAPEUTIC ACTIVITIES: CPT

## 2024-09-24 PROCEDURE — 63710000001 INSULIN LISPRO (HUMAN) PER 5 UNITS: Performed by: FAMILY MEDICINE

## 2024-09-24 PROCEDURE — 99232 SBSQ HOSP IP/OBS MODERATE 35: CPT | Performed by: INTERNAL MEDICINE

## 2024-09-24 PROCEDURE — 99204 OFFICE O/P NEW MOD 45 MIN: CPT | Performed by: INTERNAL MEDICINE

## 2024-09-24 PROCEDURE — 97162 PT EVAL MOD COMPLEX 30 MIN: CPT

## 2024-09-24 PROCEDURE — 25010000002 VANCOMYCIN HCL IN NACL 1.75-0.9 GM/500ML-% SOLUTION

## 2024-09-24 PROCEDURE — 96365 THER/PROPH/DIAG IV INF INIT: CPT

## 2024-09-24 PROCEDURE — 85025 COMPLETE CBC W/AUTO DIFF WBC: CPT | Performed by: FAMILY MEDICINE

## 2024-09-24 PROCEDURE — 83735 ASSAY OF MAGNESIUM: CPT | Performed by: FAMILY MEDICINE

## 2024-09-24 PROCEDURE — 93010 ELECTROCARDIOGRAM REPORT: CPT | Performed by: INTERNAL MEDICINE

## 2024-09-24 PROCEDURE — 97166 OT EVAL MOD COMPLEX 45 MIN: CPT

## 2024-09-24 PROCEDURE — G0378 HOSPITAL OBSERVATION PER HR: HCPCS

## 2024-09-24 PROCEDURE — 82948 REAGENT STRIP/BLOOD GLUCOSE: CPT

## 2024-09-24 PROCEDURE — 84145 PROCALCITONIN (PCT): CPT | Performed by: INTERNAL MEDICINE

## 2024-09-24 PROCEDURE — 80053 COMPREHEN METABOLIC PANEL: CPT | Performed by: FAMILY MEDICINE

## 2024-09-24 PROCEDURE — 93005 ELECTROCARDIOGRAM TRACING: CPT | Performed by: INTERNAL MEDICINE

## 2024-09-24 PROCEDURE — 72148 MRI LUMBAR SPINE W/O DYE: CPT

## 2024-09-24 RX ORDER — METOPROLOL TARTRATE 25 MG/1
25 TABLET, FILM COATED ORAL EVERY 12 HOURS SCHEDULED
Status: DISCONTINUED | OUTPATIENT
Start: 2024-09-24 | End: 2024-09-26

## 2024-09-24 RX ORDER — VANCOMYCIN 1.75 GRAM/500 ML IN 0.9 % SODIUM CHLORIDE INTRAVENOUS
20 ONCE
Status: COMPLETED | OUTPATIENT
Start: 2024-09-24 | End: 2024-09-24

## 2024-09-24 RX ADMIN — METOPROLOL TARTRATE 25 MG: 25 TABLET, FILM COATED ORAL at 13:54

## 2024-09-24 RX ADMIN — APIXABAN 5 MG: 5 TABLET, FILM COATED ORAL at 21:07

## 2024-09-24 RX ADMIN — QUETIAPINE FUMARATE 25 MG: 25 TABLET ORAL at 18:48

## 2024-09-24 RX ADMIN — HYDROCODONE BITARTRATE AND ACETAMINOPHEN 1 TABLET: 5; 325 TABLET ORAL at 13:22

## 2024-09-24 RX ADMIN — SENNOSIDES AND DOCUSATE SODIUM 2 TABLET: 50; 8.6 TABLET ORAL at 21:07

## 2024-09-24 RX ADMIN — APIXABAN 5 MG: 5 TABLET, FILM COATED ORAL at 09:47

## 2024-09-24 RX ADMIN — ASPIRIN 81 MG: 81 TABLET, COATED ORAL at 09:48

## 2024-09-24 RX ADMIN — PRAVASTATIN SODIUM 20 MG: 20 TABLET ORAL at 21:07

## 2024-09-24 RX ADMIN — Medication 10 ML: at 21:08

## 2024-09-24 RX ADMIN — Medication 1750 MG: at 18:07

## 2024-09-24 RX ADMIN — FLECAINIDE ACETATE 50 MG: 50 TABLET ORAL at 09:46

## 2024-09-24 RX ADMIN — INSULIN LISPRO 2 UNITS: 100 INJECTION, SOLUTION INTRAVENOUS; SUBCUTANEOUS at 18:07

## 2024-09-24 RX ADMIN — POLYETHYLENE GLYCOL 3350 17 G: 17 POWDER, FOR SOLUTION ORAL at 09:46

## 2024-09-24 RX ADMIN — INSULIN LISPRO 2 UNITS: 100 INJECTION, SOLUTION INTRAVENOUS; SUBCUTANEOUS at 12:35

## 2024-09-24 RX ADMIN — SENNOSIDES AND DOCUSATE SODIUM 2 TABLET: 50; 8.6 TABLET ORAL at 09:46

## 2024-09-24 RX ADMIN — ACETAMINOPHEN 650 MG: 325 TABLET ORAL at 15:36

## 2024-09-24 NOTE — THERAPY EVALUATION
Patient Name: Apollo Haro  : 1940    MRN: [id number]                              Today's Date: 2024       Admit Date: 2024    Visit Dx:     ICD-10-CM ICD-9-CM   1. Compression fracture of L2 vertebra, initial encounter  S32.020A 805.4   2. Declining functional status  R53.81 799.3   3. Unable to ambulate  R26.2 719.7   4. History of fall  Z91.81 V15.88   5. Acute midline low back pain without sciatica  M54.50 724.2   6. Chronic anticoagulation  Z79.01 V58.61   7. History of atrial fibrillation  Z86.79 V12.59     Patient Active Problem List   Diagnosis    Weakness    Type 2 diabetes mellitus, without long-term current use of insulin    Rhabdomyolysis due to COVID-19    CAD (coronary artery disease)    HTN (hypertension)    Atrial fibrillation, chronic    COVID-19 virus detected    SDH (subdural hematoma)    Altered mental status    AMS (altered mental status)    Closed compression fracture of L2 vertebra    UTI (urinary tract infection)     Past Medical History:   Diagnosis Date    A-fib     CAD (coronary artery disease)     DM (diabetes mellitus)      History reviewed. No pertinent surgical history.   General Information       Row Name 24 1309          OT Time and Intention    Document Type evaluation  -AC     Mode of Treatment occupational therapy  -       Row Name 24 1302          General Information    Patient Profile Reviewed yes  -AC     Prior Level of Function independent:;feeding;grooming;all household mobility;transfer;mod assist:;dressing;bathing  pt requiring increased assist past 3 weeks, uses RW, and w/c at times  -AC     Existing Precautions/Restrictions fall;spinal  L3 compression fx, confusion  -     Barriers to Rehab medically complex;previous functional deficit;cognitive status  -       Row Name 24 130          Occupational Profile    Environmental Supports and Barriers (Occupational Profile) walk in shower with shower seat  -       Row Name 24  1304          Living Environment    People in Home spouse;grandchild(clifton)  wife and 22 yo grand son  -       Row Name 09/24/24 1304          Home Main Entrance    Number of Stairs, Main Entrance two  -AC     Stair Railings, Main Entrance railing on right side (ascending)  -       Row Name 09/24/24 1304          Stairs Within Home, Primary    Stairs, Within Home, Primary 1 story  -       Row Name 09/24/24 1304          Cognition    Orientation Status (Cognition) oriented to;person;disoriented to;place;other (see comments)  pt knew the year, but not the month, initially stated he was at [facility], and later stated he was at home.  -       Row Name 09/24/24 1304          Safety Issues, Functional Mobility    Safety Issues Affecting Function (Mobility) awareness of need for assistance;insight into deficits/self-awareness;judgment;problem-solving;safety precaution awareness;safety precautions follow-through/compliance;sequencing abilities  -     Impairments Affecting Function (Mobility) balance;cognition;endurance/activity tolerance;postural/trunk control;strength  -     Cognitive Impairments, Mobility Safety/Performance awareness, need for assistance;insight into deficits/self-awareness;judgment;problem-solving/reasoning;safety precaution awareness;safety precaution follow-through;sequencing abilities  -AC               User Key  (r) = Recorded By, (t) = Taken By, (c) = Cosigned By      Initials Name Provider Type    AC Mikayla Cardoso, OT Occupational Therapist                     Mobility/ADL's       Row Name 09/24/24 1340          Bed Mobility    Bed Mobility supine-sit  -AC     Supine-Sit [place] (Bed Mobility) verbal cues;moderate assist (50% patient effort);2 person assist  -     Assistive Device (Bed Mobility) bed rails;head of bed elevated  -AC     Comment, (Bed Mobility) Verbal/tactile cues to sequence log roll  -       Row Name 09/24/24 1340          Transfers    Transfers sit-stand  transfer;bed-chair transfer  -AC       Row Name 09/24/24 1340          Bed-Chair Transfer    Bed-Chair [place] (Transfers) verbal cues;moderate assist (50% patient effort);2 person assist  -AC     Assistive Device (Bed-Chair Transfers) walker, front-wheeled  -AC     Comment, (Bed-Chair Transfer) increased time and effort, short, shuffling steps to chair, assist to guide walker  -AC       Row Name 09/24/24 1340          Sit-Stand Transfer    Sit-Stand [place] (Transfers) verbal cues;moderate assist (50% patient effort);2 person assist  -AC     Assistive Device (Sit-Stand Transfers) walker, front-wheeled  -AC       Row Name 09/24/24 1340          Activities of Daily Living    BADL Assessment/Intervention lower body dressing  -       Row Name 09/24/24 1340          Lower Body Dressing Assessment/Training    [place] Level (Lower Body Dressing) don;socks;dependent (less than 25% patient effort)  -     Position (Lower Body Dressing) supine  -               User Key  (r) = Recorded By, (t) = Taken By, (c) = Cosigned By      Initials Name Provider Type    AC Mikayla Cardoso, OT Occupational Therapist                   Obj/Interventions       Row Name 09/24/24 1342          Sensory Assessment (Somatosensory)    Sensory Assessment (Somatosensory) UE sensation intact  -[facility] Name 09/24/24 1342          Range of Motion Comprehensive    General Range of Motion bilateral upper extremity ROM WNL  -[facility] Name 09/24/24 1342          Strength Comprehensive (MMT)    Comment, General Manual Muscle Testing (MMT) Assessment BUE grossly 4/5  -       Row Name 09/24/24 1342          Balance    Balance Assessment sitting static balance;standing static balance;standing dynamic balance  -     Static Sitting Balance contact guard  -AC     Position, Sitting Balance sitting edge of bed  -     Static Standing Balance verbal cues;moderate assist;2-person assist  -AC     Dynamic Standing Balance verbal  cues;moderate assist;2-person assist  -AC     Position/Device Used, Standing Balance supported;walker, rolling  -AC               User Key  (r) = Recorded By, (t) = Taken By, (c) = Cosigned By      Initials Name Provider Type    Mikayla Major OT Occupational Therapist                   Goals/Plan       Row Name 09/24/24 7945          Bed Mobility Goal 1 (OT)    Activity/Assistive Device (Bed Mobility Goal 1, OT) sit to supine;supine to sit  -AC     [place] Level/Cues Needed (Bed Mobility Goal 1, OT) verbal cues required;minimum assist (75% or more patient effort)  -AC     Time Frame (Bed Mobility Goal 1, OT) long term goal (LTG);10 days  -AC     Progress/Outcomes (Bed Mobility Goal 1, OT) new goal;goal ongoing  -AC       Row Name 09/24/24 6675          Transfer Goal 1 (OT)    Activity/Assistive Device (Transfer Goal 1, OT) bed-to-chair/chair-to-bed;toilet;walker, rolling  -AC     [place] Level/Cues Needed (Transfer Goal 1, OT) verbal cues required;minimum assist (75% or more patient effort)  -AC     Time Frame (Transfer Goal 1, OT) long term goal (LTG);10 days  -AC     Progress/Outcome (Transfer Goal 1, OT) new goal;goal ongoing  -AC       Row Name 09/24/24 9779          Toileting Goal 1 (OT)    Activity/Device (Toileting Goal 1, OT) perform perineal hygiene;adjust/manage clothing;commode, bedside without drop arms  -AC     [place] Level/Cues Needed (Toileting Goal 1, OT) minimum assist (75% or more patient effort)  -AC     Time Frame (Toileting Goal 1, OT) short term goal (STG);5 days  -AC     Progress/Outcome (Toileting Goal 1, OT) new goal;goal ongoing  -AC       Row Name 09/24/24 6986          Grooming Goal 1 (OT)    Activity/Device (Grooming Goal 1, OT) oral care  -AC     [place] (Grooming Goal 1, OT) supervision required  -AC     Time Frame (Grooming Goal 1, OT) short term goal (STG);5 days  -AC     Strategies/Barriers (Grooming Goal 1, OT) sitting EOB  -AC     Progress/Outcome  (Grooming Goal 1, OT) new goal;goal ongoing  -       Row Name 09/24/24 1355          Therapy Assessment/Plan (OT)    Planned Therapy Interventions (OT) activity tolerance training;BADL retraining;adaptive equipment training;functional balance retraining;occupation/activity based interventions;patient/caregiver education/training;strengthening exercise;transfer/mobility retraining  -               User Key  (r) = Recorded By, (t) = Taken By, (c) = Cosigned By      Initials Name Provider Type     Mikayla Cardoso, OT Occupational Therapist                   Clinical Impression       Row Name 09/24/24 1350          Pain Assessment    Additional Documentation Pain Scale: FACES Pre/Post-Treatment (Group)  -       Row Name 09/24/24 1350          Pain Scale: FACES Pre/Post-Treatment    Pain: FACES Scale, Pretreatment 0-->no hurt  -     Posttreatment Pain Rating 4-->hurts little more  -     Pain Location - back  -     Pre/Posttreatment Pain Comment pt with increased pain with mobility  -       Row Name 09/24/24 1350          Plan of Care Review    Plan of Care Reviewed With patient;spouse  -     Outcome Evaluation Pt presents below baseline with self care/mobility d/t back pain, weakness, decr balance and activity tolerance.  Pt dep LBD - spouse assists at baseline,  mod A x 2 bed to chair with RW.  OT will follow to marly pt toward PLOF.  Recommend SNF upon d/c.  -       Row Name 09/24/24 1350          Therapy Assessment/Plan (OT)    Patient/Family Therapy Goal Statement (OT) decrease pain with mobiity/ self care  -     Rehab Potential (OT) fair, will monitor progress closely  -     Criteria for Skilled Therapeutic Interventions Met (OT) yes;skilled treatment is necessary  -     Therapy Frequency (OT) daily  -     Predicted Duration of Therapy Intervention (OT) 10 days  -       Row Name 09/24/24 1350          Therapy Plan Review/Discharge Plan (OT)    Anticipated Discharge Disposition (OT)  skilled nursing facility  -       Row Name 09/24/24 1350          Vital Signs    Pre Systolic BP Rehab 120  -AC     Pre Treatment Diastolic BP 78  -AC     Pretreatment Heart Rate (beats/min) 99  -AC     Posttreatment Heart Rate (beats/min) 85  -AC     Pre SpO2 (%) 96  -AC     O2 Delivery Pre Treatment room air  -AC     O2 Delivery Intra Treatment room air  -AC     Post SpO2 (%) 93  -AC     O2 Delivery Post Treatment room air  -AC     Pre Patient Position Supine  -AC     Post Patient Position Sitting  -AC       Row Name 09/24/24 1350          Positioning and Restraints    Pre-Treatment Position in bed  -AC     Post Treatment Position chair  -AC     In Chair notified nsg;reclined;call light within reach;encouraged to call for assist;exit alarm on;waffle cushion;with family/caregiver;heels elevated  -AC               User Key  (r) = Recorded By, (t) = Taken By, (c) = Cosigned By      Initials Name Provider Type    AC Mikayla Cardoso, OT Occupational Therapist                   Outcome Measures       Row Name 09/24/24 1357          How much help from another is currently needed...    Putting on and taking off regular lower body clothing? 1  -AC     Bathing (including washing, rinsing, and drying) 2  -AC     Toileting (which includes using toilet bed pan or urinal) 1  -AC     Putting on and taking off regular upper body clothing 3  -AC     Taking care of personal grooming (such as brushing teeth) 3  -AC     Eating meals 4  -AC     AM-PAC 6 Clicks Score (OT) 14  -AC       Row Name 09/24/24 0800          How much help from another person do you currently need...    Turning from your back to your side while in flat bed without using bedrails? 4  -KM     Moving from lying on back to sitting on the side of a flat bed without bedrails? 3  -KM     Moving to and from a bed to a chair (including a wheelchair)? 3  -KM     Standing up from a chair using your arms (e.g., wheelchair, bedside chair)? 3  -KM     Climbing 3-5 steps  with a railing? 2  -KM     To walk in hospital room? 2  -KM     AM-PAC 6 Clicks Score (PT) 17  -KM     Highest Level of Mobility Goal 5 --> Static standing  -KM       Row Name 09/24/24 1357          Functional Assessment    Outcome Measure Options AM-PAC 6 Clicks Daily Activity (OT)  -               User Key  (r) = Recorded By, (t) = Taken By, (c) = Cosigned By      Initials Name Provider Type     Mikayla Cardoso, OT Occupational Therapist     Yesica Ibanez, RN Registered Nurse                    Occupational Therapy Education       Title: PT OT SLP Therapies (In Progress)       Topic: Occupational Therapy (In Progress)       Point: ADL training (In Progress)       Description:   Instruct learner(s) on proper safety adaptation and remediation techniques during self care or transfers.   Instruct in proper use of assistive devices.                  Learning Progress Summary             Patient Acceptance, E, NR by  at 9/24/2024 1357                         Point: Home exercise program (Not Started)       Description:   Instruct learner(s) on appropriate technique for monitoring, assisting and/or progressing therapeutic exercises/activities.                  Learner Progress:  Not documented in this visit.              Point: Precautions (Not Started)       Description:   Instruct learner(s) on prescribed precautions during self-care and functional transfers.                  Learner Progress:  Not documented in this visit.              Point: Body mechanics (Not Started)       Description:   Instruct learner(s) on proper positioning and spine alignment during self-care, functional mobility activities and/or exercises.                  Learner Progress:  Not documented in this visit.                              User Key       Initials Effective Dates Name Provider Type Discipline     02/03/23 -  Mikayla Cadroso, OT Occupational Therapist OT                  OT Recommendation and Plan  Planned Therapy  Interventions (OT): activity tolerance training, BADL retraining, adaptive equipment training, functional balance retraining, occupation/activity based interventions, patient/caregiver education/training, strengthening exercise, transfer/mobility retraining  Therapy Frequency (OT): daily  Plan of Care Review  Plan of Care Reviewed With: patient, spouse  Outcome Evaluation: Pt presents below baseline with self care/mobility d/t back pain, weakness, decr balance and activity tolerance.  Pt dep LBD - spouse assists at baseline,  mod A x 2 bed to chair with RW.  OT will follow to marly pt toward PLOF.  Recommend SNF upon d/c.     Time Calculation:   Evaluation Complexity (OT)  Review Occupational Profile/Medical/Therapy History Complexity: expanded/moderate complexity  Assessment, Occupational Performance/Identification of Deficit Complexity: 3-5 performance deficits  Overall Complexity of Evaluation (OT): moderate complexity     Time Calculation- OT       Row Name 09/24/24 1304             Time Calculation- OT    OT Start Time 1305  -AC      OT Received On 09/24/24  -AC      OT Goal Re-Cert Due Date 10/04/24  -AC         Untimed Charges    OT Eval/Re-eval Minutes 48  -AC         Total Minutes    Untimed Charges Total Minutes 48  -AC       Total Minutes 48  -AC                User Key  (r) = Recorded By, (t) = Taken By, (c) = Cosigned By      Initials Name Provider Type    AC Mikayla Cardoso, OT Occupational Therapist                  Therapy Charges for Today       Code Description Service Date Service Provider Modifiers Qty    [id number]  OT EVAL MOD COMPLEXITY 4 9/24/2024 Mikayla Cardoso OT GO 1                 Mikayla Cardoso OT  9/24/2024

## 2024-09-24 NOTE — CASE MANAGEMENT/SOCIAL WORK
Discharge Planning Assessment  [facility]     Patient Name: Apollo Haro  MRN: [id number]  Today's Date: 9/24/2024    Admit Date: 9/23/2024    Plan: HH vs SNF   Discharge Needs Assessment       Row Name 09/24/24 1458       Living Environment    People in Home spouse;child(clifton), adult;grandchild(clifton)    Current Living Arrangements home    Living Arrangement Comments Lives in a one level home with his spouse, grandson and son (son not there on weekends). Has had to have help with his ADLs since a fall two weeks ago. Has not been very mobile due to pain. Prior to fall he performed with ADLs.       Discharge Needs Assessment    Equipment Currently Used at Home walker, rolling;wheelchair    Equipment Needed After Discharge none    Discharge Coordination/Progress Has had [facility]  in the past. Has been to [facility] and [facility] in the past. Had a bad experience at [facility] and will not consider this facility.                   Discharge Plan       Row Name 09/24/24 6939       Plan    Plan HH vs SNF    Patient/Family in Agreement with Plan yes    Plan Comments The pt was confused this am. I spoke with his spouse in the room. He will most likely need SNF rehab at [place]. I will f/u with the pts spouse. Will need insurance approval.     Final Discharge Disposition Code 03 - skilled nursing facility (SNF)                  Continued Care and Services - Admitted Since 9/23/2024    No active coordination exists for this encounter.       Expected Discharge Date and Time       Expected Discharge Date Expected Discharge Time    Sep 26, 2024            Demographic Summary    No documentation.                  Functional Status       Row Name 09/24/24 1458       Functional Status    Usual Activity Tolerance fair       Functional Status, IADL    Medications independent    Meal Preparation assistive person    Housekeeping assistive person    Laundry assistive person    Shopping assistive person                   Psychosocial    No  documentation.                  Abuse/Neglect    No documentation.                  Legal    No documentation.                  Substance Abuse    No documentation.                  Patient Forms    No documentation.                     Alissa Murdock RN

## 2024-09-24 NOTE — CONSULTS
[facility]   Consult Note    Patient Name: Apollo Haro  : 1940  MRN: [id number]  Primary Care Physician:  Provider, No Known  Referring Physician: No ref. provider found  Date of admission: 2024    Inpatient Cardiology Consult  Consult performed by: Brain Perry MD  Consult ordered by: Jeanna Barr DO        Subjective   Subjective   Problem list  -current in atrial flutter, hx of atrial fibrillation  -DM  -HTN  -admitted with L2 fracture        Mr. Haro is a admitted following a fall.  Being treated conservatively for L2 fracture.  He denies CV issue.  Has not noticed his atrial flutter.  He has not recollection of his atrial flutter diagnosis or his afib.  Compliance with anticoagulation unknown.  Currently in atrial flutter with uncontrolled rate.  He is on anticoagulation.  ROS negative except for the above.      Review of Systems   Musculoskeletal:  Positive for back pain.        Personal History     Past Medical History:   Diagnosis Date    A-fib     CAD (coronary artery disease)     DM (diabetes mellitus)        History reviewed. No pertinent surgical history.    Family History: family history is not on file. Otherwise pertinent FHx was reviewed and not pertinent to current issue.    Social History:  reports that he has never smoked. He has never been exposed to tobacco smoke. He has never used smokeless tobacco. He reports that he does not drink alcohol and does not use drugs.    Home Medications:   Magnesium, QUEtiapine, acetaminophen, apixaban, aspirin, flecainide, metFORMIN, multivitamin with minerals, and pravastatin    Allergies:  Allergies   Allergen Reactions    Lidocaine Angioedema       Objective    Objective     Vitals:  Temp:  [97.8 °F (36.6 °C)-98.5 °F (36.9 °C)] 98.2 °F (36.8 °C)  Heart Rate:  [] 106  Resp:  [18] 18  BP: ()/(55-89) 120/78    Physical Exam    Result Review    Result Review:  I have personally reviewed the results from the time of this  admission to 9/24/2024 13:08 EDT and agree with these findings:  []  Laboratory list / accordion  []  Microbiology  []  Radiology  []  EKG/Telemetry   []  Cardiology/Vascular   []  Pathology  []  Old records  []  Other:        Assessment & Plan   Assessment / Plan     Assessment  -current in atrial flutter, hx of atrial fibrillation  -DM  -HTN  -admitted with L2 fracture      Plan:   -will be difficult to control rates while in atrial flutter.  Would plan for JANAK/cardioversion Thursday (first available).    -cont. Eliquis.  Not best long term anticoagulation candidate.    -on fleicainide.  Will stop.  Starting metoprolol 25 mg.  After JANAK/cardioversion will start amiodarone.    -on aspirin, statin.  Aspirin likely not necessary.  Will stop.        Brain Perry MD

## 2024-09-24 NOTE — PLAN OF CARE
HOD# : 0    No events last night  No real percussive tenderness    Closed compression fracture of L2 vertebra    Weakness    Type 2 diabetes mellitus, without long-term current use of insulin    CAD (coronary artery disease)    HTN (hypertension)    Atrial fibrillation, chronic    UTI (urinary tract infection)      Temp:  [97.8 °F (36.6 °C)-98.5 °F (36.9 °C)] 98.2 °F (36.8 °C)  Heart Rate:  [] 106  Resp:  [18] 18  BP: ()/(55-89) 120/78  No intake/output data recorded.  No intake/output data recorded.  Vital signs were reviewed and documented in the chart      EXAM pleasantly confused  Patient appeared in good neurologic function with normal comprehension   He is awake alert follows commands moderate dysarthria  Moves all extremities to command    Assessment plan    1.  Suspected [place] type L3 fracture    Able to mobilize quite freely in bed without significant pain    MRI pending    Plan from neurosurgical standpoint    I think he can be managed conservatively    Will follow him up in 2 to 4 weeks in neurosurgical clinic    If unable to ambulate secondary to pain can reconsider kyphoplasty but doubtful it will be necessary he reports that he has been nonambulatory for some time

## 2024-09-24 NOTE — PLAN OF CARE
Goal Outcome Evaluation:  Plan of Care Reviewed With: patient        Progress: no change       No acute events overnight. Denies pain every time he is asked. Safety precautions in place.

## 2024-09-24 NOTE — THERAPY EVALUATION
Patient Name: Apollo Haro  : 1940    MRN: [id number]                              Today's Date: 2024       Admit Date: 2024    Visit Dx:     ICD-10-CM ICD-9-CM   1. Compression fracture of L2 vertebra, initial encounter  S32.020A 805.4   2. Declining functional status  R53.81 799.3   3. Unable to ambulate  R26.2 719.7   4. History of fall  Z91.81 V15.88   5. Acute midline low back pain without sciatica  M54.50 724.2   6. Chronic anticoagulation  Z79.01 V58.61   7. History of atrial fibrillation  Z86.79 V12.59     Patient Active Problem List   Diagnosis    Weakness    Type 2 diabetes mellitus, without long-term current use of insulin    Rhabdomyolysis due to COVID-19    CAD (coronary artery disease)    HTN (hypertension)    Atrial fibrillation, chronic    COVID-19 virus detected    SDH (subdural hematoma)    Altered mental status    AMS (altered mental status)    Closed compression fracture of L2 vertebra    UTI (urinary tract infection)     Past Medical History:   Diagnosis Date    A-fib     CAD (coronary artery disease)     DM (diabetes mellitus)      History reviewed. No pertinent surgical history.   General Information       Row Name 24 1301          Physical Therapy Time and Intention    Document Type evaluation  -LR     Mode of Treatment physical therapy  -LR       Row Name 24 1301          General Information    Patient Profile Reviewed yes  -LR     Prior Level of Function independent:;all household mobility;gait;transfer;bed mobility  patient has required assist since fall, using RW vs W/C at times  -LR     Existing Precautions/Restrictions fall;spinal;other (see comments)  L3 compression fx, confusion,  -LR     Barriers to Rehab medically complex;previous functional deficit;cognitive status  -LR       Row Name 24 1301          Living Environment    People in Home spouse;grandchild(clifton)  -LR       Row Name 24 1301          Home Main Entrance    Number of Stairs, Main  Entrance two  -LR     Stair Railings, Main Entrance railing on right side (ascending)  -LR       Row Name 09/24/24 1301          Stairs Within Home, Primary    Number of Stairs, Within Home, Primary none  -LR       Row Name 09/24/24 1301          Cognition    Orientation Status (Cognition) oriented to;person;disoriented to;place;other (see comments)  knew current year, not current month; stated he was initially at [email or web address], later stated he was at home; wife reports confusion is baseline  -LR       Row Name 09/24/24 1301          Safety Issues, Functional Mobility    Safety Issues Affecting Function (Mobility) ability to follow commands;insight into deficits/self-awareness;safety precautions follow-through/compliance;positioning of assistive device;sequencing abilities;safety precaution awareness;impulsivity;judgment;awareness of need for assistance  -LR     Impairments Affecting Function (Mobility) balance;cognition;endurance/activity tolerance;pain;strength;postural/trunk control;range of motion (ROM)  -LR               User Key  (r) = Recorded By, (t) = Taken By, (c) = Cosigned By      Initials Name Provider Type    LR Meseret Dunham, PT Physical Therapist                   Mobility       Row Name 09/24/24 1301          Bed Mobility    Bed Mobility supine-sit  -LR     Supine-Sit Hunter (Bed Mobility) verbal cues;moderate assist (50% patient effort);2 person assist  -LR     Assistive Device (Bed Mobility) head of bed elevated;bed rails  -LR     Comment, (Bed Mobility) Verbal cues to flex knees and to roll hips and shoulders at same time onto R side. Cues and assist to bring LEs off EOB and to push up from bed to raise trunk into sitting. Mild dizziness upon sitting up. Patient c/o significant pain with transition to EOB. Patient denied decreased pain once sitting despite his breathing becoming less labored once sitting.  -LR       Row Name 09/24/24 1301          Transfers    Comment, (Transfers)  Verbal cues to push up from bed to stand and to reach back for chair to lower into sitting. Patient initially with posterior lean. Slight improvement with cues to shift hips forward and shoulders back. Verbal cues for weight shifting and sequencing of feet towards chair. Patient unable to progress to forward ambulation but able to take shuffling steps from bed to chair.  -LR       Row Name 09/24/24 1301          Bed-Chair Transfer    Bed-Chair Sawyer (Transfers) verbal cues;moderate assist (50% patient effort);2 person assist  -LR     Assistive Device (Bed-Chair Transfers) walker, front-wheeled  -LR       Row Name 09/24/24 1301          Sit-Stand Transfer    Sit-Stand Sawyer (Transfers) verbal cues;moderate assist (50% patient effort);2 person assist  -LR     Assistive Device (Sit-Stand Transfers) walker, front-wheeled  -LR       Row Name 09/24/24 1301          Gait/Stairs (Locomotion)    Sawyer Level (Gait) unable to assess  -LR     Patient was able to Ambulate no, other medical factors prevent ambulation  -LR     Reason Patient was unable to Ambulate Other (Comment);Uncontrolled Pain;Excessive Weakness  posterior lean  -LR     Sawyer Level (Stairs) not tested  -LR     Comment, (Gait/Stairs) See above.  -LR               User Key  (r) = Recorded By, (t) = Taken By, (c) = Cosigned By      Initials Name Provider Type    LR Meseret Dunham, PT Physical Therapist                   Obj/Interventions       Row Name 09/24/24 1301          Range of Motion Comprehensive    General Range of Motion bilateral lower extremity ROM WFL  -LR       Row Name 09/24/24 1301          Strength Comprehensive (MMT)    General Manual Muscle Testing (MMT) Assessment lower extremity strength deficits identified  -LR       Row Name 09/24/24 1301          Balance    Balance Assessment sitting static balance;sitting dynamic balance;standing dynamic balance;standing static balance  -LR     Static Sitting Balance  contact guard  -LR     Dynamic Sitting Balance minimal assist  -LR     Position, Sitting Balance supported;sitting edge of bed  -LR     Static Standing Balance verbal cues;minimal assist;2-person assist  -LR     Dynamic Standing Balance verbal cues;moderate assist;2-person assist  -LR     Position/Device Used, Standing Balance supported;walker, rolling  -LR       Row Name 09/24/24 1301          Sensory Assessment (Somatosensory)    Sensory Assessment (Somatosensory) LE sensation intact;other (see comments)  reports numbness in dorsal aspect of feet, reports absent light touch on dorsum of feet; reports sensation present throughout rest of feet and LEs  -LR       Row Name 09/24/24 1301          Lower Extremity (Manual Muscle Testing)    Lower Extremity: Manual Muscle Testing (MMT) other (see comments)  -LR     Comment, MMT: Lower Extremity B hips and knees functionally 4-/5, B ankle DFs: 5/5  -LR               User Key  (r) = Recorded By, (t) = Taken By, (c) = Cosigned By      Initials Name Provider Type    LR Meseret Dunham, PT Physical Therapist                   Goals/Plan       Row Name 09/24/24 1301          Bed Mobility Goal 1 (PT)    Activity/Assistive Device (Bed Mobility Goal 1, PT) sit to supine/supine to sit  -LR     McKean Level/Cues Needed (Bed Mobility Goal 1, PT) minimum assist (75% or more patient effort);other (see comments)  x2  -LR     Time Frame (Bed Mobility Goal 1, PT) short term goal (STG);3 days  -LR     Progress/Outcomes (Bed Mobility Goal 1, PT) goal ongoing  -LR       Row Name 09/24/24 1301          Transfer Goal 1 (PT)    Activity/Assistive Device (Transfer Goal 1, PT) sit-to-stand/stand-to-sit;bed-to-chair/chair-to-bed;walker, rolling  -LR     [place]Kean Level/Cues Needed (Transfer Goal 1, PT) minimum assist (75% or more patient effort);other (see comments)  x2  -LR     Time Frame (Transfer Goal 1, PT) long term goal (LTG);5 days  -LR     Progress/Outcome (Transfer Goal 1,  PT) goal ongoing  -LR       Row Name 09/24/24 1301          Gait Training Goal 1 (PT)    Activity/Assistive Device (Gait Training Goal 1, PT) gait (walking locomotion);walker, rolling  -LR     Jay Level (Gait Training Goal 1, PT) moderate assist (50-74% patient effort);other (see comments)  x2  -LR     Distance (Gait Training Goal 1, PT) 25 feet  -LR     Time Frame (Gait Training Goal 1, PT) long term goal (LTG);5 days  -LR     Progress/Outcome (Gait Training Goal 1, PT) goal ongoing  -LR       Row Name 09/24/24 1301          Therapy Assessment/Plan (PT)    Planned Therapy Interventions (PT) balance training;bed mobility training;gait training;home exercise program;patient/family education;ROM (range of motion);strengthening;transfer training  -LR               User Key  (r) = Recorded By, (t) = Taken By, (c) = Cosigned By      Initials Name Provider Type    LR Meseret Dunham, PT Physical Therapist                   Clinical Impression       Row Name 09/24/24 1301          Pain    Pain Intervention(s) Ambulation/increased activity;Repositioned  -LR       Row Name 09/24/24 1301          Pain Scale: FACES Pre/Post-Treatment    Pain: FACES Scale, Pretreatment 0-->no hurt  -LR     Posttreatment Pain Rating 4-->hurts little more  -LR     Pain Location - back  -LR       Row Name 09/24/24 1301          Plan of Care Review    Plan of Care Reviewed With patient;spouse;grandchild(clifton)  -LR     Progress no change  -LR     Outcome Evaluation Patient took shuffling steps from bed to chair with mod assist x2 with RW, limited by pain and weakness. Significant pain with transition from sidelying to sitting. Patient currently below baseline functioning, demonstrating decreased functional mobility status, impaired balance, decreased endurance, and decreased strength. Will address these deficits to promote return to PLOF. Recommend SNF at d/c.  -LR       Row Name 09/24/24 1301          Therapy Assessment/Plan (PT)     Patient/Family Therapy Goals Statement (PT) decrease pain  -LR     Rehab Potential (PT) fair, will monitor progress closely  -LR     Criteria for Skilled Interventions Met (PT) yes;meets criteria;skilled treatment is necessary  -LR     Therapy Frequency (PT) daily  -LR     Predicted Duration of Therapy Intervention (PT) 5 days  -LR       Row Name 09/24/24 1301          Vital Signs    Pre Systolic BP Rehab 120  -LR     Pre Treatment Diastolic BP 78  -LR     Pretreatment Heart Rate (beats/min) 104  -LR     Pre SpO2 (%) 90  -LR     O2 Delivery Pre Treatment room air  -LR     Pre Patient Position Supine  -LR       Row Name 09/24/24 1301          Positioning and Restraints    Pre-Treatment Position in bed  -LR     Post Treatment Position chair  -LR     In Chair notified nsg;reclined;sitting;call light within reach;encouraged to call for assist;exit alarm on;with family/caregiver;legs elevated;waffle cushion;on mechanical lift sling  -LR               User Key  (r) = Recorded By, (t) = Taken By, (c) = Cosigned By      Initials Name Provider Type    LR Meseret Dunham, PT Physical Therapist                   Outcome Measures       Row Name 09/24/24 1301 09/24/24 0800       How much help from another person do you currently need...    Turning from your back to your side while in flat bed without using bedrails? 2  -LR 4  -KM    Moving from lying on back to sitting on the side of a flat bed without bedrails? 2  -LR 3  -KM    Moving to and from a bed to a chair (including a wheelchair)? 2  -LR 3  -KM    Standing up from a chair using your arms (e.g., wheelchair, bedside chair)? 2  -LR 3  -KM    Climbing 3-5 steps with a railing? 1  -LR 2  -KM    To walk in hospital room? 2  -LR 2  -KM    AM-PAC 6 Clicks Score (PT) 11  -LR 17  -KM    Highest Level of Mobility Goal 4 --> Transfer to chair/commode  -LR 5 --> Static standing  -KM      Row Name 09/24/24 1357 09/24/24 1301       Functional Assessment    Outcome Measure  Options AM-PAC 6 Clicks Daily Activity (OT)  -AC AM-PAC 6 Clicks Basic Mobility (PT)  -LR              User Key  (r) = Recorded By, (t) = Taken By, (c) = Cosigned By      Initials Name Provider Type    AC Mikayla Cardoso, OT Occupational Therapist    LR Meseret Dunham, PT Physical Therapist    Yesica Hodges, RN Registered Nurse                                 Physical Therapy Education       Title: PT OT SLP Therapies (In Progress)       Topic: Physical Therapy (Done)       Point: Mobility training (Done)       Learning Progress Summary             Patient Acceptance, E,D, VU,NR by LR at 9/24/2024 1301    Comment: Educated on spinal precautions, correct log rolling technique, benefits of being OOB, correct sit<->stand t/f technique, correct bed to chair t/f technique, and progression of POC.                         Point: Home exercise program (Done)       Learning Progress Summary             Patient Acceptance, E,D, VU,NR by LR at 9/24/2024 1301    Comment: Educated on spinal precautions, correct log rolling technique, benefits of being OOB, correct sit<->stand t/f technique, correct bed to chair t/f technique, and progression of POC.                         Point: Body mechanics (Done)       Learning Progress Summary             Patient Acceptance, E,D, VU,NR by LR at 9/24/2024 1301    Comment: Educated on spinal precautions, correct log rolling technique, benefits of being OOB, correct sit<->stand t/f technique, correct bed to chair t/f technique, and progression of POC.                         Point: Precautions (Done)       Learning Progress Summary             Patient Acceptance, E,D, VU,NR by LR at 9/24/2024 1301    Comment: Educated on spinal precautions, correct log rolling technique, benefits of being OOB, correct sit<->stand t/f technique, correct bed to chair t/f technique, and progression of POC.                                         User Key       Initials Effective Dates Name Provider  Type Discipline    LR 02/03/23 -  Meseret Dunham, PT Physical Therapist PT                  PT Recommendation and Plan  Planned Therapy Interventions (PT): balance training, bed mobility training, gait training, home exercise program, patient/family education, ROM (range of motion), strengthening, transfer training  Plan of Care Reviewed With: patient, spouse, grandchild(clifton)  Progress: no change  Outcome Evaluation: Patient took shuffling steps from bed to chair with mod assist x2 with RW, limited by pain and weakness. Significant pain with transition from sidelying to sitting. Patient currently below baseline functioning, demonstrating decreased functional mobility status, impaired balance, decreased endurance, and decreased strength. Will address these deficits to promote return to PLOF. Recommend SNF at d/c.     Time Calculation:   PT Evaluation Complexity  History, PT Evaluation Complexity: 3 or more personal factors and/or comorbidities  Examination of Body Systems (PT Eval Complexity): total of 3 or more elements  Clinical Presentation (PT Evaluation Complexity): evolving  Clinical Decision Making (PT Evaluation Complexity): moderate complexity  Overall Complexity (PT Evaluation Complexity): moderate complexity     PT Charges       Row Name 09/24/24 1301             Time Calculation    Start Time 1301  -LR      PT Received On 09/24/24  -LR      PT Goal Re-Cert Due Date 10/04/24  -LR         Timed Charges    03687 - PT Therapeutic Exercise Minutes --  -LR      16171 - PT Therapeutic Activity Minutes 10  -LR         Untimed Charges    PT Eval/Re-eval Minutes 34  -LR         Total Minutes    Timed Charges Total Minutes 10  -LR      Untimed Charges Total Minutes 34  -LR       Total Minutes 44  -LR                User Key  (r) = Recorded By, (t) = Taken By, (c) = Cosigned By      Initials Name Provider Type    LR Meseret Dunham, PT Physical Therapist                  Therapy Charges for Today        Code Description Service Date Service Provider Modifiers Qty    [id number]  PT THERAPEUTIC ACT EA 15 MIN 9/24/2024 Meseret Dunham, PT GP 1    [id number]  PT EVAL MOD COMPLEXITY 3 9/24/2024 Meseret Dunham, PT GP 1            PT G-Codes  Outcome Measure Options: AM-PAC 6 Clicks Daily Activity (OT)  AM-PAC 6 Clicks Score (PT): 11  AM-PAC 6 Clicks Score (OT): 14  PT Discharge Summary  Anticipated Discharge Disposition (PT): skilled nursing facility    Meseret Dunham, PT  9/24/2024

## 2024-09-24 NOTE — PLAN OF CARE
Goal Outcome Evaluation:  Plan of Care Reviewed With: patient, spouse           Outcome Evaluation: Pt presents below baseline with self care/mobility d/t back pain, weakness, decr balance and activity tolerance.  Pt dep LBD - spouse assists at baseline,  mod A x 2 bed to chair with RW.  OT will follow to advacne pt toward PLOF.  Recommend SNF upon d/c.      Anticipated Discharge Disposition (OT): skilled nursing facility

## 2024-09-24 NOTE — PLAN OF CARE
Problem: Adult Inpatient Plan of Care  Goal: Plan of Care Review  Outcome: Progressing  Goal: Patient-Specific Goal (Individualized)  Outcome: Progressing  Goal: Absence of Hospital-Acquired Illness or Injury  Outcome: Progressing  Intervention: Identify and Manage Fall Risk  Recent Flowsheet Documentation  Taken 9/24/2024 1800 by Yesica Ibanez RN  Safety Promotion/Fall Prevention:   activity supervised   assistive device/personal items within reach   clutter free environment maintained   lighting adjusted   nonskid shoes/slippers when out of bed  Taken 9/24/2024 1600 by Yesica bIanez RN  Safety Promotion/Fall Prevention:   activity supervised   assistive device/personal items within reach   clutter free environment maintained   lighting adjusted   nonskid shoes/slippers when out of bed  Taken 9/24/2024 1400 by Yesica Ibanez RN  Safety Promotion/Fall Prevention:   activity supervised   assistive device/personal items within reach   clutter free environment maintained   lighting adjusted   nonskid shoes/slippers when out of bed  Taken 9/24/2024 1200 by Yesica Ibanez RN  Safety Promotion/Fall Prevention:   activity supervised   assistive device/personal items within reach   clutter free environment maintained   lighting adjusted   nonskid shoes/slippers when out of bed  Taken 9/24/2024 1000 by Yesica Ibanez RN  Safety Promotion/Fall Prevention: safety round/check completed  Taken 9/24/2024 0800 by Yesica Ibanez RN  Safety Promotion/Fall Prevention: safety round/check completed  Intervention: Prevent Skin Injury  Recent Flowsheet Documentation  Taken 9/24/2024 1800 by Yesica Ibanez RN  Body Position: position changed independently  Skin Protection:   adhesive use limited   incontinence pads utilized   transparent dressing maintained   tubing/devices free from skin contact  Taken 9/24/2024 1600 by Yesica Ibanez RN  Body Position: position changed independently  Skin  Protection:   adhesive use limited   incontinence pads utilized   transparent dressing maintained   tubing/devices free from skin contact  Taken 9/24/2024 1400 by Yesica Ibanez RN  Body Position: position changed independently  Skin Protection:   adhesive use limited   incontinence pads utilized   transparent dressing maintained   tubing/devices free from skin contact  Taken 9/24/2024 1200 by Yesica Ibanez RN  Body Position: position changed independently  Skin Protection:   adhesive use limited   incontinence pads utilized   transparent dressing maintained   tubing/devices free from skin contact  Taken 9/24/2024 1000 by Yesica Ibanez RN  Body Position: position changed independently  Skin Protection:   adhesive use limited   incontinence pads utilized   transparent dressing maintained   tubing/devices free from skin contact  Taken 9/24/2024 0800 by Yesica Ibanez RN  Body Position: position changed independently  Skin Protection:   adhesive use limited   incontinence pads utilized   transparent dressing maintained   tubing/devices free from skin contact  Intervention: Prevent and Manage VTE (Venous Thromboembolism) Risk  Recent Flowsheet Documentation  Taken 9/24/2024 1800 by Yesica Ibanez RN  Activity Management: activity encouraged  Taken 9/24/2024 1600 by Yesica Ibanez RN  Activity Management: activity encouraged  Taken 9/24/2024 1400 by Yesica Ibanez RN  Activity Management: activity encouraged  Taken 9/24/2024 1200 by Yesica Ibanez RN  Activity Management: activity encouraged  Taken 9/24/2024 1000 by Yesica Ibanez RN  Activity Management: activity encouraged  Taken 9/24/2024 0800 by Yesica Ibanez RN  Activity Management: activity encouraged  VTE Prevention/Management: sequential compression devices off  Range of Motion: active ROM (range of motion) encouraged  Intervention: Prevent Infection  Recent Flowsheet Documentation  Taken 9/24/2024 1800 by Marcelle  Yesica GOODMAN RN  Infection Prevention:   environmental surveillance performed   single patient room provided  Taken 9/24/2024 1600 by Yesica Ibanez RN  Infection Prevention:   environmental surveillance performed   single patient room provided  Taken 9/24/2024 1400 by Yesica Ibanez RN  Infection Prevention:   environmental surveillance performed   single patient room provided  Taken 9/24/2024 1200 by Yesica Ibanez RN  Infection Prevention:   environmental surveillance performed   single patient room provided  Taken 9/24/2024 1000 by Yesica Ibanez RN  Infection Prevention: single patient room provided  Taken 9/24/2024 0800 by Yesica Ibanez RN  Infection Prevention: single patient room provided  Goal: Optimal Comfort and Wellbeing  Outcome: Progressing  Intervention: Monitor Pain and Promote Comfort  Recent Flowsheet Documentation  Taken 9/24/2024 1400 by Yesica Ibanez RN  Pain Management Interventions: see MAR  Goal: Readiness for Transition of Care  Outcome: Progressing     Problem: Skin Injury Risk Increased  Goal: Skin Health and Integrity  Outcome: Progressing  Intervention: Optimize Skin Protection  Recent Flowsheet Documentation  Taken 9/24/2024 1800 by Yesica Ibanez RN  Pressure Reduction Techniques:   frequent weight shift encouraged   weight shift assistance provided  Head of Bed (HOB) Positioning: HOB at 30-45 degrees  Pressure Reduction Devices: pressure-redistributing mattress utilized  Skin Protection:   adhesive use limited   incontinence pads utilized   transparent dressing maintained   tubing/devices free from skin contact  Taken 9/24/2024 1600 by Yesica Ibanez RN  Pressure Reduction Techniques:   frequent weight shift encouraged   weight shift assistance provided  Head of Bed (HOB) Positioning: HOB at 30-45 degrees  Pressure Reduction Devices: pressure-redistributing mattress utilized  Skin Protection:   adhesive use limited   incontinence pads utilized    transparent dressing maintained   tubing/devices free from skin contact  Taken 9/24/2024 1400 by Yesica Ibaenz RN  Pressure Reduction Techniques:   frequent weight shift encouraged   weight shift assistance provided  Head of Bed (HOB) Positioning: [facility] at 30-45 degrees  Pressure Reduction Devices: pressure-redistributing mattress utilized  Skin Protection:   adhesive use limited   incontinence pads utilized   transparent dressing maintained   tubing/devices free from skin contact  Taken 9/24/2024 1200 by Yesica Ibanez RN  Pressure Reduction Techniques:   frequent weight shift encouraged   weight shift assistance provided  Head of Bed (HOB) Positioning: HOB at 30-45 degrees  Pressure Reduction Devices: pressure-redistributing mattress utilized  Skin Protection:   adhesive use limited   incontinence pads utilized   transparent dressing maintained   tubing/devices free from skin contact  Taken 9/24/2024 1000 by Yesica Ibanez RN  Pressure Reduction Techniques:   frequent weight shift encouraged   weight shift assistance provided  Head of Bed (HOB) Positioning: [facility] elevated  Pressure Reduction Devices: pressure-redistributing mattress utilized  Skin Protection:   adhesive use limited   incontinence pads utilized   transparent dressing maintained   tubing/devices free from skin contact  Taken 9/24/2024 0800 by Yesica Ibanez RN  Pressure Reduction Techniques:   frequent weight shift encouraged   weight shift assistance provided  Head of Bed (HOB) Positioning: [facility] at 30-45 degrees  Pressure Reduction Devices: pressure-redistributing mattress utilized  Skin Protection:   adhesive use limited   incontinence pads utilized   transparent dressing maintained   tubing/devices free from skin contact     Problem: Fall Injury Risk  Goal: Absence of Fall and Fall-Related Injury  Outcome: Progressing  Intervention: Identify and Manage Contributors  Recent Flowsheet Documentation  Taken 9/24/2024 1800 by Marcelle  Yesica GOODMAN RN  Medication Review/Management: medications reviewed  Taken 9/24/2024 1600 by Yesica Ibanez RN  Medication Review/Management: medications reviewed  Taken 9/24/2024 1400 by Yesica Ibanez RN  Medication Review/Management: medications reviewed  Taken 9/24/2024 1200 by Yesica Ibanez RN  Medication Review/Management: medications reviewed  Taken 9/24/2024 1000 by Yesica Ibanez RN  Medication Review/Management: medications reviewed  Taken 9/24/2024 0800 by Yesica Ibanez RN  Medication Review/Management: medications reviewed  Intervention: Promote Injury-Free Environment  Recent Flowsheet Documentation  Taken 9/24/2024 1800 by Yesica Ibanez RN  Safety Promotion/Fall Prevention:   activity supervised   assistive device/personal items within reach   clutter free environment maintained   lighting adjusted   nonskid shoes/slippers when out of bed  Taken 9/24/2024 1600 by Yesica Ibanez RN  Safety Promotion/Fall Prevention:   activity supervised   assistive device/personal items within reach   clutter free environment maintained   lighting adjusted   nonskid shoes/slippers when out of bed  Taken 9/24/2024 1400 by Yesica Ibanez RN  Safety Promotion/Fall Prevention:   activity supervised   assistive device/personal items within reach   clutter free environment maintained   lighting adjusted   nonskid shoes/slippers when out of bed  Taken 9/24/2024 1200 by Yesica Ibanez RN  Safety Promotion/Fall Prevention:   activity supervised   assistive device/personal items within reach   clutter free environment maintained   lighting adjusted   nonskid shoes/slippers when out of bed  Taken 9/24/2024 1000 by Yesica Ibanez RN  Safety Promotion/Fall Prevention: safety round/check completed  Taken 9/24/2024 0800 by Yesica Ibanez RN  Safety Promotion/Fall Prevention: safety round/check completed     Problem: Hypertension Comorbidity  Goal: Blood Pressure in Desired Range  Outcome:  Progressing  Intervention: Maintain Blood Pressure Management  Recent Flowsheet Documentation  Taken 9/24/2024 1800 by Yesica Ibanez RN  Medication Review/Management: medications reviewed  Taken 9/24/2024 1600 by Yesica Ibanez RN  Medication Review/Management: medications reviewed  Taken 9/24/2024 1400 by Yesica Ibanez RN  Medication Review/Management: medications reviewed  Taken 9/24/2024 1200 by Yesica Ibanez RN  Medication Review/Management: medications reviewed  Taken 9/24/2024 1000 by Yesica Ibanez RN  Medication Review/Management: medications reviewed  Taken 9/24/2024 0800 by Yesica Ibanez RN  Medication Review/Management: medications reviewed   Goal Outcome Evaluation:   Pt agitated this AM. Pt removed IV. Reoriented patient to time and situation. IV abx given. New IV placed. Up to chair with PT. Utilized lift to get patient back to bed. Cards consulted for aflutter. Possible JANAK/cardioversion on Thursday. Norco and Tylenol given for pain. Seroquel given early for agitation. MD aponte. Vik ball within reach. VSS. RA.

## 2024-09-24 NOTE — PROGRESS NOTES
"Pharmacy Consult - Vancomycin Dosing and Monitoring    Apollo Haro is a 84 y.o. male receiving vancomycin therapy.     Indication:   Consulting Provider:   ID Consult:     Goal AUC: 400-600 mg/L*hr    Current Antimicrobial Therapy  Anti-Infectives (From admission, onward)      Ordered     Dose/Rate Route Frequency Start Stop    09/24/24 1555  vancomycin IVPB 1750 mg in 0.9% Sodium Chloride (premix) 500 mL        Ordering Provider: Na Keller RPH    20 mg/kg × 88.5 kg  285.7 mL/hr over 105 Minutes Intravenous Once 09/24/24 1700      09/24/24 1520  Pharmacy to dose vancomycin        Ordering Provider: Jeanna Barr,      Does not apply Continuous PRN 09/24/24 1520 09/29/24 1519          Allergies  Allergies as of 09/23/2024 - Reviewed 09/23/2024   Allergen Reaction Noted    Lidocaine Angioedema 01/31/2024     Labs  Results from last 7 days   Lab Units 09/24/24  0528 09/23/24  1315   BUN mg/dL 34* 26*   CREATININE mg/dL 1.09 1.11     Results from last 7 days   Lab Units 09/24/24  0528 09/23/24  1315   WBC 10*3/mm3 8.02 8.57     Evaluation of Dosing  Last Dose Received in the ED/Outside Facility: no  Is Patient on Dialysis or Renal Replacement: No    Height - 175.3 cm (69\")  Weight - 88.5 kg (195 lb)    Estimated Creatinine Clearance: 54.5 mL/min (by C-G formula based on SCr of 1.09 mg/dL).    No intake/output data recorded.    Microbiology and Radiology  Microbiology Results (last 10 days)       Procedure Component Value - Date/Time    Urine Culture - Urine, Straight Cath [[id number]]  (Abnormal) Collected: 09/23/24 1337    Lab Status: Preliminary result Specimen: Urine from Straight Cath Updated: 09/24/24 1212     Urine Culture >100,000 CFU/mL Staphylococcus aureus, MRSA     Comment:   Methicillin resistant Staph aureus, patient may be an isolation risk.  Except in cases with genitourinary instrumentation, Staphylococcus aureus bacteriuria is associated with S. aureus bacteremia. Blood cultures are recommended. "       Narrative:      Colonization of the urinary tract without infection is common. Treatment is discouraged unless the patient is symptomatic, pregnant, or undergoing an invasive urologic procedure.          Reported Vancomycin Levels              [facility] AUC Calculation:    Current AUC: --- mg/L*hr    Predicted Steady State AUC on Current Dose: --- mg/L*hr  _________________________________    Predicted Steady State AUC on New Dose: 459 mg/L*hr    Assessment/Plan:   Pharmacy consulted to dose vancomycin for UTI, goal -600 mg/L*hr.  Patient loaded with vancomycin 1750 mg ( ~ 20 mg/kg)  Ucx MRSA +, BC x 2 pending.  Patient remains afebrile, serum creatinine is 1.09, BUN is 34, and WBC is 8.02.  Based on evaluation, initiate a maintenance regimen of vancomycin 1250 mg ( ~ 14 mg/kg) every 24 hours.  A vancomycin trough will be assessed on 9/26 @ 0600 (prior to the 3rd dose).  Will continue to follow and adjust vancomycin dose as needed based on renal function, cultures, and patient clinical status.    Thank you,    Na Keller RPH  9/24/2024  15:55 EDT

## 2024-09-25 LAB
ANION GAP SERPL CALCULATED.3IONS-SCNC: 11 MMOL/L (ref 5–15)
BACTERIA SPEC AEROBE CULT: ABNORMAL
BUN SERPL-MCNC: 20 MG/DL (ref 8–23)
BUN/CREAT SERPL: 21.1 (ref 7–25)
CALCIUM SPEC-SCNC: 9.2 MG/DL (ref 8.6–10.5)
CHLORIDE SERPL-SCNC: 99 MMOL/L (ref 98–107)
CO2 SERPL-SCNC: 27 MMOL/L (ref 22–29)
CREAT SERPL-MCNC: 0.95 MG/DL (ref 0.76–1.27)
DEPRECATED RDW RBC AUTO: 41.4 FL (ref 37–54)
EGFRCR SERPLBLD CKD-EPI 2021: 78.4 ML/MIN/1.73
ERYTHROCYTE [DISTWIDTH] IN BLOOD BY AUTOMATED COUNT: 12.2 % (ref 12.3–15.4)
GLUCOSE BLDC GLUCOMTR-MCNC: 108 MG/DL (ref 70–130)
GLUCOSE BLDC GLUCOMTR-MCNC: 163 MG/DL (ref 70–130)
GLUCOSE BLDC GLUCOMTR-MCNC: 173 MG/DL (ref 70–130)
GLUCOSE BLDC GLUCOMTR-MCNC: 207 MG/DL (ref 70–130)
GLUCOSE SERPL-MCNC: 155 MG/DL (ref 65–99)
HCT VFR BLD AUTO: 40.1 % (ref 37.5–51)
HGB BLD-MCNC: 13.3 G/DL (ref 13–17.7)
MCH RBC QN AUTO: 30.7 PG (ref 26.6–33)
MCHC RBC AUTO-ENTMCNC: 33.2 G/DL (ref 31.5–35.7)
MCV RBC AUTO: 92.6 FL (ref 79–97)
PLATELET # BLD AUTO: 184 10*3/MM3 (ref 140–450)
PMV BLD AUTO: 9.7 FL (ref 6–12)
POTASSIUM SERPL-SCNC: 4.5 MMOL/L (ref 3.5–5.2)
RBC # BLD AUTO: 4.33 10*6/MM3 (ref 4.14–5.8)
SODIUM SERPL-SCNC: 137 MMOL/L (ref 136–145)
WBC NRBC COR # BLD AUTO: 8.23 10*3/MM3 (ref 3.4–10.8)

## 2024-09-25 PROCEDURE — 80048 BASIC METABOLIC PNL TOTAL CA: CPT

## 2024-09-25 PROCEDURE — 63710000001 INSULIN LISPRO (HUMAN) PER 5 UNITS: Performed by: FAMILY MEDICINE

## 2024-09-25 PROCEDURE — 97530 THERAPEUTIC ACTIVITIES: CPT

## 2024-09-25 PROCEDURE — G0378 HOSPITAL OBSERVATION PER HR: HCPCS

## 2024-09-25 PROCEDURE — 99232 SBSQ HOSP IP/OBS MODERATE 35: CPT | Performed by: INTERNAL MEDICINE

## 2024-09-25 PROCEDURE — 85027 COMPLETE CBC AUTOMATED: CPT | Performed by: INTERNAL MEDICINE

## 2024-09-25 PROCEDURE — 25010000002 VANCOMYCIN HCL 1.25 G RECONSTITUTED SOLUTION 1 EACH VIAL

## 2024-09-25 PROCEDURE — 25810000003 SODIUM CHLORIDE 0.9 % SOLUTION 250 ML FLEX CONT

## 2024-09-25 PROCEDURE — 82948 REAGENT STRIP/BLOOD GLUCOSE: CPT

## 2024-09-25 RX ADMIN — SENNOSIDES AND DOCUSATE SODIUM 2 TABLET: 50; 8.6 TABLET ORAL at 09:20

## 2024-09-25 RX ADMIN — APIXABAN 5 MG: 5 TABLET, FILM COATED ORAL at 09:20

## 2024-09-25 RX ADMIN — INSULIN LISPRO 2 UNITS: 100 INJECTION, SOLUTION INTRAVENOUS; SUBCUTANEOUS at 11:26

## 2024-09-25 RX ADMIN — APIXABAN 5 MG: 5 TABLET, FILM COATED ORAL at 22:04

## 2024-09-25 RX ADMIN — INSULIN LISPRO 3 UNITS: 100 INJECTION, SOLUTION INTRAVENOUS; SUBCUTANEOUS at 22:04

## 2024-09-25 RX ADMIN — SENNOSIDES AND DOCUSATE SODIUM 2 TABLET: 50; 8.6 TABLET ORAL at 22:04

## 2024-09-25 RX ADMIN — METOPROLOL TARTRATE 25 MG: 25 TABLET, FILM COATED ORAL at 22:04

## 2024-09-25 RX ADMIN — HYDROCODONE BITARTRATE AND ACETAMINOPHEN 1 TABLET: 5; 325 TABLET ORAL at 06:16

## 2024-09-25 RX ADMIN — INSULIN LISPRO 2 UNITS: 100 INJECTION, SOLUTION INTRAVENOUS; SUBCUTANEOUS at 17:05

## 2024-09-25 RX ADMIN — PRAVASTATIN SODIUM 20 MG: 20 TABLET ORAL at 22:04

## 2024-09-25 RX ADMIN — ACETAMINOPHEN 650 MG: 325 TABLET ORAL at 22:04

## 2024-09-25 RX ADMIN — POLYETHYLENE GLYCOL 3350 17 G: 17 POWDER, FOR SOLUTION ORAL at 09:21

## 2024-09-25 RX ADMIN — QUETIAPINE FUMARATE 25 MG: 25 TABLET ORAL at 22:04

## 2024-09-25 RX ADMIN — ACETAMINOPHEN 650 MG: 325 TABLET ORAL at 09:20

## 2024-09-25 RX ADMIN — VANCOMYCIN HYDROCHLORIDE 1250 MG: 1.25 INJECTION, POWDER, LYOPHILIZED, FOR SOLUTION INTRAVENOUS at 17:05

## 2024-09-25 RX ADMIN — Medication 10 ML: at 09:28

## 2024-09-25 NOTE — PLAN OF CARE
Problem: Adult Inpatient Plan of Care  Goal: Plan of Care Review  Outcome: Progressing  Goal: Patient-Specific Goal (Individualized)  Outcome: Progressing  Goal: Absence of Hospital-Acquired Illness or Injury  Outcome: Progressing  Intervention: Identify and Manage Fall Risk  Recent Flowsheet Documentation  Taken 9/25/2024 1800 by Yesica Ibanez RN  Safety Promotion/Fall Prevention:   activity supervised   assistive device/personal items within reach   clutter free environment maintained   lighting adjusted   nonskid shoes/slippers when out of bed  Taken 9/25/2024 1600 by Yesica Ibanez RN  Safety Promotion/Fall Prevention:   activity supervised   assistive device/personal items within reach   clutter free environment maintained   lighting adjusted   nonskid shoes/slippers when out of bed  Taken 9/25/2024 1400 by Yesica Ibanez RN  Safety Promotion/Fall Prevention:   activity supervised   assistive device/personal items within reach   clutter free environment maintained   lighting adjusted   nonskid shoes/slippers when out of bed  Taken 9/25/2024 1200 by Yesica Ibanez RN  Safety Promotion/Fall Prevention:   activity supervised   assistive device/personal items within reach   clutter free environment maintained   lighting adjusted   nonskid shoes/slippers when out of bed  Taken 9/25/2024 1000 by Yesica Ibanez RN  Safety Promotion/Fall Prevention:   activity supervised   assistive device/personal items within reach   clutter free environment maintained   lighting adjusted   nonskid shoes/slippers when out of bed  Taken 9/25/2024 0800 by Yesica Ibanez RN  Safety Promotion/Fall Prevention:   activity supervised   assistive device/personal items within reach   clutter free environment maintained   lighting adjusted   nonskid shoes/slippers when out of bed  Intervention: Prevent Skin Injury  Recent Flowsheet Documentation  Taken 9/25/2024 1800 by Yesica Ibanez RN  Body Position: position  changed independently  Skin Protection:   adhesive use limited   incontinence pads utilized   transparent dressing maintained   tubing/devices free from skin contact  Taken 9/25/2024 1600 by Yesica Ibanez RN  Body Position: position changed independently  Skin Protection:   adhesive use limited   incontinence pads utilized   transparent dressing maintained   tubing/devices free from skin contact  Taken 9/25/2024 1400 by Yesica Ibanez RN  Body Position: position changed independently  Skin Protection:   adhesive use limited   incontinence pads utilized   transparent dressing maintained   tubing/devices free from skin contact  Taken 9/25/2024 1200 by Yesica Ibanez RN  Body Position: position changed independently  Skin Protection:   adhesive use limited   incontinence pads utilized   tubing/devices free from skin contact   transparent dressing maintained  Taken 9/25/2024 1000 by Yesica Ibanez RN  Body Position: position changed independently  Skin Protection:   adhesive use limited   incontinence pads utilized   transparent dressing maintained   tubing/devices free from skin contact  Taken 9/25/2024 0800 by Yesica Ibanez RN  Body Position: position changed independently  Skin Protection:   adhesive use limited   incontinence pads utilized   transparent dressing maintained   tubing/devices free from skin contact  Intervention: Prevent and Manage VTE (Venous Thromboembolism) Risk  Recent Flowsheet Documentation  Taken 9/25/2024 1800 by Yesica Ibanez RN  Activity Management: activity encouraged  Taken 9/25/2024 1600 by Yesica Ibanez RN  Activity Management: activity encouraged  Taken 9/25/2024 1400 by Yesica Ibanez RN  Activity Management: activity encouraged  Taken 9/25/2024 1200 by Yesica Ibanez RN  Activity Management: activity encouraged  Taken 9/25/2024 1000 by Yesica Ibanez RN  Activity Management: activity encouraged  Taken 9/25/2024 0800 by Yesica Ibanez  RN  Activity Management: activity encouraged  VTE Prevention/Management: (see mar) other (see comments)  Range of Motion: active ROM (range of motion) encouraged  Intervention: Prevent Infection  Recent Flowsheet Documentation  Taken 9/25/2024 1800 by Yesica Ibanez RN  Infection Prevention:   environmental surveillance performed   single patient room provided  Taken 9/25/2024 1600 by Yesica Ibanez RN  Infection Prevention:   environmental surveillance performed   single patient room provided  Taken 9/25/2024 1400 by Yesica Ibanez RN  Infection Prevention:   environmental surveillance performed   single patient room provided  Taken 9/25/2024 1200 by Yesica Ibanez RN  Infection Prevention:   environmental surveillance performed   single patient room provided  Taken 9/25/2024 1000 by Yesica Ibanez RN  Infection Prevention:   environmental surveillance performed   single patient room provided  Taken 9/25/2024 0800 by Yesica Ibanez RN  Infection Prevention:   environmental surveillance performed   single patient room provided  Goal: Optimal Comfort and Wellbeing  Outcome: Progressing  Goal: Readiness for Transition of Care  Outcome: Progressing     Problem: Skin Injury Risk Increased  Goal: Skin Health and Integrity  Outcome: Progressing  Intervention: Optimize Skin Protection  Recent Flowsheet Documentation  Taken 9/25/2024 1800 by Yesica Ibanez RN  Pressure Reduction Techniques:   frequent weight shift encouraged   weight shift assistance provided  Head of Bed (HOB) Positioning: HOB at 30-45 degrees  Pressure Reduction Devices: pressure-redistributing mattress utilized  Skin Protection:   adhesive use limited   incontinence pads utilized   transparent dressing maintained   tubing/devices free from skin contact  Taken 9/25/2024 1600 by Yesica Ibanez RN  Pressure Reduction Techniques:   frequent weight shift encouraged   weight shift assistance provided  Head of Bed (HOB)  Positioning: [facility] at 30-45 degrees  Pressure Reduction Devices: pressure-redistributing mattress utilized  Skin Protection:   adhesive use limited   incontinence pads utilized   transparent dressing maintained   tubing/devices free from skin contact  Taken 9/25/2024 1400 by Yesica Ibanez RN  Pressure Reduction Techniques:   frequent weight shift encouraged   weight shift assistance provided  Head of Bed ([facility]) Positioning: [facility] at 30-45 degrees  Pressure Reduction Devices: pressure-redistributing mattress utilized  Skin Protection:   adhesive use limited   incontinence pads utilized   transparent dressing maintained   tubing/devices free from skin contact  Taken 9/25/2024 1200 by Yesica Ibanez RN  Pressure Reduction Techniques:   frequent weight shift encouraged   weight shift assistance provided  Head of Bed ([facility]) Positioning: [facility] at 30-45 degrees  Pressure Reduction Devices: pressure-redistributing mattress utilized  Skin Protection:   adhesive use limited   incontinence pads utilized   tubing/devices free from skin contact   transparent dressing maintained  Taken 9/25/2024 1000 by Yesica Ibanez RN  Pressure Reduction Techniques:   frequent weight shift encouraged   weight shift assistance provided  Head of Bed ([facility]) Positioning: [facility] at 30-45 degrees  Pressure Reduction Devices: pressure-redistributing mattress utilized  Skin Protection:   adhesive use limited   incontinence pads utilized   transparent dressing maintained   tubing/devices free from skin contact  Taken 9/25/2024 0800 by Yesica Ibanez RN  Pressure Reduction Techniques:   frequent weight shift encouraged   weight shift assistance provided  Head of Bed ([facility]) Positioning: [facility] at 30-45 degrees  Pressure Reduction Devices: pressure-redistributing mattress utilized  Skin Protection:   adhesive use limited   incontinence pads utilized   transparent dressing maintained   tubing/devices free from skin contact     Problem: Fall Injury  Risk  Goal: Absence of Fall and Fall-Related Injury  Outcome: Progressing  Intervention: Identify and Manage Contributors  Recent Flowsheet Documentation  Taken 9/25/2024 1800 by Yesica Ibanez RN  Medication Review/Management: medications reviewed  Taken 9/25/2024 1600 by Yesica Ibanez RN  Medication Review/Management: medications reviewed  Taken 9/25/2024 1400 by Yesica Ibanez RN  Medication Review/Management: medications reviewed  Taken 9/25/2024 1200 by Yesica Ibanez RN  Medication Review/Management: medications reviewed  Taken 9/25/2024 1000 by Yesica Ibanez RN  Medication Review/Management: medications reviewed  Taken 9/25/2024 0800 by Yesica Ibanez RN  Medication Review/Management: medications reviewed  Intervention: Promote Injury-Free Environment  Recent Flowsheet Documentation  Taken 9/25/2024 1800 by Yesica Ibanez RN  Safety Promotion/Fall Prevention:   activity supervised   assistive device/personal items within reach   clutter free environment maintained   lighting adjusted   nonskid shoes/slippers when out of bed  Taken 9/25/2024 1600 by Yesica Ibanez RN  Safety Promotion/Fall Prevention:   activity supervised   assistive device/personal items within reach   clutter free environment maintained   lighting adjusted   nonskid shoes/slippers when out of bed  Taken 9/25/2024 1400 by Yesica Ibanez RN  Safety Promotion/Fall Prevention:   activity supervised   assistive device/personal items within reach   clutter free environment maintained   lighting adjusted   nonskid shoes/slippers when out of bed  Taken 9/25/2024 1200 by Yesica Ibanez RN  Safety Promotion/Fall Prevention:   activity supervised   assistive device/personal items within reach   clutter free environment maintained   lighting adjusted   nonskid shoes/slippers when out of bed  Taken 9/25/2024 1000 by Yesica Ibanez RN  Safety Promotion/Fall Prevention:   activity supervised   assistive  device/personal items within reach   clutter free environment maintained   lighting adjusted   nonskid shoes/slippers when out of bed  Taken 9/25/2024 0800 by Yesica Ibanez, RN  Safety Promotion/Fall Prevention:   activity supervised   assistive device/personal items within reach   clutter free environment maintained   lighting adjusted   nonskid shoes/slippers when out of bed     Problem: Hypertension Comorbidity  Goal: Blood Pressure in Desired Range  Outcome: Progressing  Intervention: Maintain Blood Pressure Management  Recent Flowsheet Documentation  Taken 9/25/2024 1800 by Yesica Ibanez RN  Medication Review/Management: medications reviewed  Taken 9/25/2024 1600 by Yesica Ibanez, RN  Medication Review/Management: medications reviewed  Taken 9/25/2024 1400 by Yesica Ibanez, RN  Medication Review/Management: medications reviewed  Taken 9/25/2024 1200 by Yesica Ibanez RN  Medication Review/Management: medications reviewed  Taken 9/25/2024 1000 by Yesica Ibanez RN  Medication Review/Management: medications reviewed  Taken 9/25/2024 0800 by Yesica Ibanez RN  Medication Review/Management: medications reviewed   Goal Outcome Evaluation:   Pt slept majority of shift between care. Pt ate % of lunch and dinner. IV abx given. Aflutter. VSS. No complaints at this time. Call bell within reach.

## 2024-09-25 NOTE — THERAPY TREATMENT NOTE
Patient Name: Apollo Haro  : 1940    MRN: [id number]                              Today's Date: 2024       Admit Date: 2024    Visit Dx:     ICD-10-CM ICD-9-CM   1. Compression fracture of L2 vertebra, initial encounter  S32.020A 805.4   2. Declining functional status  R53.81 799.3   3. Unable to ambulate  R26.2 719.7   4. History of fall  Z91.81 V15.88   5. Acute midline low back pain without sciatica  M54.50 724.2   6. Chronic anticoagulation  Z79.01 V58.61   7. History of atrial fibrillation  Z86.79 V12.59     Patient Active Problem List   Diagnosis    Weakness    Type 2 diabetes mellitus, without long-term current use of insulin    Rhabdomyolysis due to COVID-19    CAD (coronary artery disease)    HTN (hypertension)    Atrial fibrillation, chronic    COVID-19 virus detected    SDH (subdural hematoma)    Altered mental status    AMS (altered mental status)    Closed compression fracture of L2 vertebra    UTI (urinary tract infection)     Past Medical History:   Diagnosis Date    A-fib     CAD (coronary artery disease)     DM (diabetes mellitus)      History reviewed. No pertinent surgical history.   General Information       Row Name 24 1310          Physical Therapy Time and Intention    Document Type therapy note (daily note)  -LR     Mode of Treatment physical therapy;individual therapy  -LR       Row Name 24 1310          General Information    Patient Profile Reviewed yes  -LR     Existing Precautions/Restrictions fall;spinal;other (see comments)  acute L3 compression fx, confusion, [place]  -LR     Barriers to Rehab medically complex;previous functional deficit;cognitive status;hearing deficit  -LR       Row Name 24 1310          Cognition    Orientation Status (Cognition) oriented to;person;place;time;other (see comments)  required increased time but eventually stated current year was   -LR       Row Name 24 1310          Safety Issues, Functional Mobility     Safety Issues Affecting Function (Mobility) awareness of need for assistance;insight into deficits/self-awareness;judgment;safety precautions follow-through/compliance;safety precaution awareness;positioning of assistive device  -LR     Impairments Affecting Function (Mobility) balance;cognition;endurance/activity tolerance;postural/trunk control;strength  -LR               User Key  (r) = Recorded By, (t) = Taken By, (c) = Cosigned By      Initials Name Provider Type    LR Meseret Dunham, PT Physical Therapist                   Mobility       Row Name 09/25/24 1310          Bed Mobility    Bed Mobility supine-sit;sit-supine;rolling right  -LR     Rolling Right [place] (Bed Mobility) verbal cues;minimum assist (75% patient effort)  -LR     Supine-Sit [place] (Bed Mobility) verbal cues;moderate assist (50% patient effort);2 person assist  -LR     Sit-Supine [place] (Bed Mobility) verbal cues;moderate assist (50% patient effort);2 person assist  -LR     Assistive Device (Bed Mobility) head of bed elevated;bed rails  -LR     Comment, (Bed Mobility) Verbal cues to flex L knee and to roll hips and shoulders at same time onto R side. Cues and assist to then bring LEs off EOB and to push up from bed to raise trunk into sitting. Most pain occurs during transition from sidelying to sitting. Cues to let go of bedrail as he sits up to avoid twisting. Denied dizziness upon sitting up. Verbal cues to lay back down on R side while lifting LEs back up into bed. Cues to roll hips and shoulders at same time into supine.  -LR       Row Name 09/25/24 1310          Transfers    Comment, (Transfers) Patient declined t/f to chair d/t not feeling well and pain with mobility. Verbal cues to push up from bed to stand and to reach back for bed to lower into sitting. Continues to have posterior lean upon standing, improved compared to yesterday. Verbal cues for weight shifting and sequencing of steps to side step to R,  cues for RW management.  -LR       Row Name 09/25/24 1310          Sit-Stand Transfer    Sit-Stand [place] (Transfers) verbal cues;moderate assist (50% patient effort);2 person assist  -LR     Assistive Device (Sit-Stand Transfers) walker, front-wheeled  -LR       Row Name 09/25/24 1310          Gait/Stairs (Locomotion)    Patient was able to Ambulate no, other medical factors prevent ambulation  -LR     Reason Patient was unable to Ambulate Uncontrolled Pain;Excessive Weakness  Unable to progress to forward ambulation d/t pain and weakness  -LR     [place] Level (Stairs) not tested  -LR     Comment, (Gait/Stairs) See above.  -LR               User Key  (r) = Recorded By, (t) = Taken By, (c) = Cosigned By      Initials Name Provider Type    LR Meseret Dunham, PT Physical Therapist                   Obj/Interventions       Row Name 09/25/24 1310          Motor Skills    Therapeutic Exercise ankle;knee;hip;other (see comments)  cues for technique; CGA for heel slides, ab sets x10 reps each  -LR       Row Name 09/25/24 1310          Hip (Therapeutic Exercise)    Hip (Therapeutic Exercise) AROM (active range of motion);isometric exercises  -LR     Hip AROM (Therapeutic Exercise) bilateral;external rotation;internal rotation;supine;10 repetitions  -LR     Hip Isometrics (Therapeutic Exercise) bilateral;gluteal sets;10 repetitions;supine  -LR       Row Name 09/25/24 1310          Knee (Therapeutic Exercise)    Knee (Therapeutic Exercise) strengthening exercise;isometric exercises  -LR     Knee Isometrics (Therapeutic Exercise) bilateral;quad sets;10 repetitions;supine  -LR     Knee Strengthening (Therapeutic Exercise) bilateral;heel slides;supine;10 repetitions  -LR       Row Name 09/25/24 1310          Ankle (Therapeutic Exercise)    Ankle (Therapeutic Exercise) AROM (active range of motion)  -LR     Ankle AROM (Therapeutic Exercise) bilateral;dorsiflexion;plantarflexion;supine;10 repetitions  -LR        Row Name 09/25/24 1310          Balance    Balance Assessment sitting static balance;sitting dynamic balance;standing static balance;standing dynamic balance  -LR     Static Sitting Balance contact guard  -LR     Dynamic Sitting Balance minimal assist  -LR     Position, Sitting Balance supported;sitting edge of bed  -LR     Static Standing Balance verbal cues;minimal assist;2-person assist  -LR     Dynamic Standing Balance verbal cues;moderate assist;2-person assist  -LR     Position/Device Used, Standing Balance supported;walker, rolling  -LR               User Key  (r) = Recorded By, (t) = Taken By, (c) = Cosigned By      Initials Name Provider Type    LR Meseret Dunham, PT Physical Therapist                   Goals/Plan       Row Name 09/25/24 1310          Bed Mobility Goal 1 (PT)    Activity/Assistive Device (Bed Mobility Goal 1, PT) sit to supine/supine to sit  -LR     [place] Level/Cues Needed (Bed Mobility Goal 1, PT) minimum assist (75% or more patient effort);other (see comments)  x2  -LR     Time Frame (Bed Mobility Goal 1, PT) short term goal (STG);3 days  -LR     Progress/Outcomes (Bed Mobility Goal 1, PT) continuing progress toward goal;goal ongoing  -LR       Row Name 09/25/24 1310          Transfer Goal 1 (PT)    Activity/Assistive Device (Transfer Goal 1, PT) sit-to-stand/stand-to-sit;bed-to-chair/chair-to-bed;walker, rolling  -LR     [place] Level/Cues Needed (Transfer Goal 1, PT) minimum assist (75% or more patient effort);other (see comments)  x2  -LR     Time Frame (Transfer Goal 1, PT) long term goal (LTG);5 days  -LR     Progress/Outcome (Transfer Goal 1, PT) continuing progress toward goal;goal ongoing  -LR       Row Name 09/25/24 1310          Gait Training Goal 1 (PT)    Activity/Assistive Device (Gait Training Goal 1, PT) gait (walking locomotion);walker, rolling  -LR     [place] Level (Gait Training Goal 1, PT) moderate assist (50-74% patient effort);other (see  comments)  x2  -LR     Distance (Gait Training Goal 1, PT) 25 feet  -LR     Time Frame (Gait Training Goal 1, PT) long term goal (LTG);5 days  -LR     Progress/Outcome (Gait Training Goal 1, PT) continuing progress toward goal;goal ongoing  -LR               User Key  (r) = Recorded By, (t) = Taken By, (c) = Cosigned By      Initials Name Provider Type    LR Meseret Dunham, PT Physical Therapist                   Clinical Impression       Row Name 09/25/24 1310          Pain    Pretreatment Pain Rating 0/10 - no pain  -LR     Posttreatment Pain Rating 0/10 - no pain  -LR     Pre/Posttreatment Pain Comment denies pain at rest, significant pain with mobility, particularly with transition from sidelying to sitting  -LR     Pain Intervention(s) Ambulation/increased activity;Repositioned  -LR       Row Name 09/25/24 1310          Plan of Care Review    Plan of Care Reviewed With patient  -LR     Progress improving  -LR     Outcome Evaluation All mobility continues to be limited by significant pain, although patient tolerated transition to EOB and sitting EOB better than yesterday. Patient was able to weight shift and take side steps at EOB with RW and mod assist x2. Good effort with LE ther ex. Patient currently below baseline functioning, demonstrating decreased functional mobility status, impaired balance, decreased endurance, and decreased strength. Will address these deficits to promote return to PLOF. Recommend SNF at d/c.  -LR       Row Name 09/25/24 1310          Therapy Assessment/Plan (PT)    Rehab Potential (PT) fair, will monitor progress closely  -LR     Criteria for Skilled Interventions Met (PT) yes;meets criteria;skilled treatment is necessary  -LR     Therapy Frequency (PT) daily  -LR       Row Name 09/25/24 1310          Vital Signs    Pre SpO2 (%) 100  -LR     O2 Delivery Pre Treatment supplemental O2  -LR     Pre Patient Position Supine  -LR       Row Name 09/25/24 1310          Positioning and  Restraints    Pre-Treatment Position in bed  -LR     Post Treatment Position bed  -LR     In Bed notified nsg;supine;call light within reach;encouraged to call for assist;exit alarm on;side rails up x2;heels elevated  -LR               User Key  (r) = Recorded By, (t) = Taken By, (c) = Cosigned By      Initials Name Provider Type    Meseret Shaw, PT Physical Therapist                   Outcome Measures       Row Name 09/25/24 1310 09/25/24 0800       How much help from another person do you currently need...    Turning from your back to your side while in flat bed without using bedrails? 3  -LR 3  -KM    Moving from lying on back to sitting on the side of a flat bed without bedrails? 2  -LR 3  -KM    Moving to and from a bed to a chair (including a wheelchair)? 2  -LR 2  -KM    Standing up from a chair using your arms (e.g., wheelchair, bedside chair)? 2  -LR 2  -KM    Climbing 3-5 steps with a railing? 1  -LR 1  -KM    To walk in hospital room? 2  -LR 1  -KM    AM-PAC 6 Clicks Score (PT) 12  -LR 12  -KM    Highest Level of Mobility Goal 4 --> Transfer to chair/commode  -LR 4 --> Transfer to chair/commode  -KM      Row Name 09/25/24 1310          Functional Assessment    Outcome Measure Options AM-PAC 6 Clicks Basic Mobility (PT)  -LR               User Key  (r) = Recorded By, (t) = Taken By, (c) = Cosigned By      Initials Name Provider Type    Meseret Shaw, PT Physical Therapist     Yesica Ibanez, RN Registered Nurse                                 Physical Therapy Education       Title: PT OT SLP Therapies (In Progress)       Topic: Physical Therapy (Done)       Point: Mobility training (Done)       Learning Progress Summary             Patient Acceptance, E,D, VU,NR by LR at 9/25/2024 1310    Comment: Educated on spinal precautions for comfort, correct log rolling technique, correct sit<->stand t/f technique, correct technique for side steps at EOB, HEP, and progression of POC.     Acceptance, E,D, VU,NR by LR at 9/24/2024 1301    Comment: Educated on spinal precautions, correct log rolling technique, benefits of being OOB, correct sit<->stand t/f technique, correct bed to chair t/f technique, and progression of POC.                         Point: Home exercise program (Done)       Learning Progress Summary             Patient Acceptance, E,D, VU,NR by LR at 9/25/2024 1310    Comment: Educated on spinal precautions for comfort, correct log rolling technique, correct sit<->stand t/f technique, correct technique for side steps at EOB, HEP, and progression of POC.    Acceptance, E,D, VU,NR by LR at 9/24/2024 1301    Comment: Educated on spinal precautions, correct log rolling technique, benefits of being OOB, correct sit<->stand t/f technique, correct bed to chair t/f technique, and progression of POC.                         Point: Body mechanics (Done)       Learning Progress Summary             Patient Acceptance, E,D, VU,NR by LR at 9/25/2024 1310    Comment: Educated on spinal precautions for comfort, correct log rolling technique, correct sit<->stand t/f technique, correct technique for side steps at EOB, HEP, and progression of POC.    Acceptance, E,D, VU,NR by LR at 9/24/2024 1301    Comment: Educated on spinal precautions, correct log rolling technique, benefits of being OOB, correct sit<->stand t/f technique, correct bed to chair t/f technique, and progression of POC.                         Point: Precautions (Done)       Learning Progress Summary             Patient Acceptance, E,D, VU,NR by LR at 9/25/2024 1310    Comment: Educated on spinal precautions for comfort, correct log rolling technique, correct sit<->stand t/f technique, correct technique for side steps at EOB, HEP, and progression of POC.    Acceptance, E,D, VU,NR by LR at 9/24/2024 1301    Comment: Educated on spinal precautions, correct log rolling technique, benefits of being OOB, correct sit<->stand t/f technique,  correct bed to chair t/f technique, and progression of POC.                                         User Key       Initials Effective Dates Name Provider Type Discipline    LR 02/03/23 -  Meseret Dunham, PT Physical Therapist PT                  PT Recommendation and Plan  Planned Therapy Interventions (PT): balance training, bed mobility training, gait training, home exercise program, patient/family education, ROM (range of motion), strengthening, transfer training  Plan of Care Reviewed With: patient  Progress: improving  Outcome Evaluation: All mobility continues to be limited by significant pain, although patient tolerated transition to EOB and sitting EOB better than yesterday. Patient was able to weight shift and take side steps at EOB with RW and mod assist x2. Good effort with LE ther ex. Patient currently below baseline functioning, demonstrating decreased functional mobility status, impaired balance, decreased endurance, and decreased strength. Will address these deficits to promote return to PLOF. Recommend SNF at d/c.     Time Calculation:   PT Evaluation Complexity  History, PT Evaluation Complexity: 3 or more personal factors and/or comorbidities  Examination of Body Systems (PT Eval Complexity): total of 3 or more elements  Clinical Presentation (PT Evaluation Complexity): evolving  Clinical Decision Making (PT Evaluation Complexity): moderate complexity  Overall Complexity (PT Evaluation Complexity): moderate complexity     PT Charges       Row Name 09/25/24 1310             Time Calculation    Start Time 1310  -LR      PT Received On 09/25/24  -LR      PT Goal Re-Cert Due Date 10/04/24  -LR         Timed Charges    01146 - PT Therapeutic Exercise Minutes 5  -LR      79024 - PT Therapeutic Activity Minutes 10  -LR         Total Minutes    Timed Charges Total Minutes 15  -LR       Total Minutes 15  -LR                User Key  (r) = Recorded By, (t) = Taken By, (c) = Cosigned By      Initials  Name Provider Type    LR Meseret Dunham, PT Physical Therapist                  Therapy Charges for Today       Code Description Service Date Service Provider Modifiers Qty    34254790906  PT THERAPEUTIC ACT EA 15 MIN 9/24/2024 Meseret Dunham, PT GP 1    12309831479  PT EVAL MOD COMPLEXITY 3 9/24/2024 Meseret Dunham, PT GP 1    32998557449 HC PT THERAPEUTIC ACT EA 15 MIN 9/25/2024 Meseret Dunham, PT GP 1    [id number]  PT THER SUPP EA 15 MIN 9/25/2024 Meseret Dunham, PT GP 2            PT G-Codes  Outcome Measure Options: AM-PAC 6 Clicks Basic Mobility (PT)  AM-PAC 6 Clicks Score (PT): 12  AM-PAC 6 Clicks Score (OT): 14  PT Discharge Summary  Anticipated Discharge Disposition (PT): skilled nursing facility    Meseret Dunham, PT  9/25/2024

## 2024-09-25 NOTE — PROGRESS NOTES
Nutrition Services    Patient Name:  Apollo Haro  YOB: 1940  MRN: [id number]  Admit Date:  9/23/2024    Patient screened for possible pressure injury. Chart reviewed. No pressure injury stage 2 or greater noted per documentation at this time. Spoke with RN, patient eating 100% of meals today. No significant wt loss noted per EMR.  No nutrition risks identified currently. RDN following per protocol. Available via consult.    Electronically signed by:  PABLO Souza  09/25/24 15:52 EDT

## 2024-09-25 NOTE — PROGRESS NOTES
Subjective:     Encounter Date:09/23/2024      Patient ID: Apollo Haro is a 84 y.o. male.    Chief Complaint:  Weakness - Generalized    Back Pain    Problem list  -current in atrial flutter, hx of atrial fibrillation  -DM  -HTN  -admitted with L2 fracture           Mr. Haro is a admitted following a fall.  Being treated conservatively for L2 fracture.  He denies CV issue.  Has not noticed his atrial flutter.  He has not recollection of his atrial flutter diagnosis or his afib.  Compliance with anticoagulation unknown.  Currently in atrial flutter with uncontrolled rate.  He is on anticoagulation.  ROS negative except for the above.      Update 9/25/24  Atrial flutter very well controlled.  Has underlying cognitive impairment and having UTI.        Review of Systems   Musculoskeletal:  Positive for back pain.       Procedures       Objective:     Constitutional:       Appearance: Healthy appearance. Not in distress.   Neck:      Vascular: No JVR. JVD normal.   Pulmonary:      Effort: Pulmonary effort is normal.      Breath sounds: Normal breath sounds. No wheezing. No rhonchi. No rales.   Chest:      Chest wall: Not tender to palpatation.   Cardiovascular:      PMI at left midclavicular line. Normal rate. Irregular rhythm. Normal S1. Normal S2.       Murmurs: There is no murmur.      No gallop.  No click. No rub.   Pulses:     Intact distal pulses.   Edema:     Peripheral edema absent.   Abdominal:      General: Bowel sounds are normal.      Palpations: Abdomen is soft.      Tenderness: There is no abdominal tenderness.   Musculoskeletal: Normal range of motion.         General: No tenderness. Skin:     General: Skin is warm and dry.   Neurological:      General: No focal deficit present.      Mental Status: Alert and oriented to person, place and time.         Lab Review:       Assessment:       -current in atrial flutter, hx of atrial fibrillation  -DM  -HTN  -admitted with L2 fracture         Plan:        -well rate controlled on metoprolol cari  -cont. Eliquis.  Not best long term anticoagulation candidate.  Can discuss as outpatient  -off fleicanide  -off aspirin  -at this point will be conservative in care.  His rates are better and unclear how much more benefit he would get with JANAK cardioversin.  Will see in clinic in a month.  Hold on JANAK/cardioversion.  Will sign off.      Juan David Perry MD

## 2024-09-25 NOTE — PROGRESS NOTES
[facility] Medicine Services  PROGRESS NOTE    Patient Name: Apollo Haro  : 1940  MRN: [id number]    Date of Admission: 2024  Primary Care Physician: Provider, No Known    Subjective   Subjective     CC:  Back pain     HPI:  No acute events. Sleepy today. Comfortable.       Objective   Objective     Vital Signs:   Temp:  [96.7 °F (35.9 °C)-98.3 °F (36.8 °C)] 98.3 °F (36.8 °C)  Heart Rate:  [] 71  Resp:  [16-20] 20  BP: ()/(51-78) 89/51  Flow (L/min):  [2] 2     Physical Exam:  Constitutional: No acute distress, awake, alert; frail  Respiratory: Clear to auscultation bilaterally, respiratory effort normal   Cardiovascular: irreg; rate controlled   Gastrointestinal: Positive bowel sounds, soft, nontender, nondistended  Musculoskeletal: No bilateral ankle edema  Neurologic:  moving all ext, Cranial Nerves grossly intact to confrontation,  Skin: No rashes      Results Reviewed:  LAB RESULTS:      Lab 24  1010 24  0524  1656 24  1315   WBC 8.23 8.02  --   --  8.57   HEMOGLOBIN 13.3 12.1*  --   --  13.6   HEMATOCRIT 40.1 35.9*  --   --  40.7   PLATELETS 184 158  --   --  176   NEUTROS ABS  --  4.83  --   --  6.29   IMMATURE GRANS (ABS)  --  0.02  --   --  0.03   LYMPHS ABS  --  2.30  --   --  1.35   MONOS ABS  --  0.68  --   --  0.79   EOS ABS  --  0.15  --   --  0.09   MCV 92.6 90.2  --   --  92.5   PROCALCITONIN  --  0.07  --   --   --    LACTATE  --   --  1.7 2.1* 2.2*         Lab 24  1010 24  0528 24  1315   SODIUM 137 138 143   POTASSIUM 4.5 4.1 4.5   CHLORIDE 99 101 101   CO2 27.0 25.0 28.0   ANION GAP 11.0 12.0 14.0   BUN 20 34* 26*   CREATININE 0.95 1.09 1.11   EGFR 78.4 66.5 65.1   GLUCOSE 155* 170* 145*   CALCIUM 9.2 9.1 9.4   MAGNESIUM  --  1.7  --    HEMOGLOBIN A1C  --   --  5.80*         Lab 24  0528 24  1315   TOTAL PROTEIN 5.9* 6.5   ALBUMIN 3.5 3.9   GLOBULIN 2.4 2.6   ALT (SGPT) 9 11    AST (SGOT) 16 21   BILIRUBIN 0.8 1.3*   ALK PHOS 88 92                     Brief Urine Lab Results  (Last result in the past 365 days)        Color   Clarity   Blood   Leuk Est   Nitrite   Protein   CREAT   Urine HCG        09/23/24 1337 Yellow   Cloudy   Large (3+)   Large (3+)   Positive   30 mg/dL (1+)                   Microbiology Results Abnormal       Procedure Component Value - Date/Time    Blood Culture - Blood, Hand, Left [[id number]]  (Normal) Collected: 09/23/24 1656    Lab Status: Preliminary result Specimen: Blood from Hand, Left Updated: 09/24/24 1716     Blood Culture No growth at 24 hours    Blood Culture - Blood, Hand, Right [[id number]]  (Normal) Collected: 09/23/24 1656    Lab Status: Preliminary result Specimen: Blood from Hand, Right Updated: 09/24/24 1716     Blood Culture No growth at 24 hours            MRI Lumbar Spine Without Contrast    Result Date: 9/24/2024  MRI LUMBAR SPINE WO CONTRAST Date of Exam: 9/24/2024 5:13 PM EDT Indication: L2 compression fracture.  Comparison: CT lumbar spine 9/23/2024 Technique:  Routine multiplanar/multisequence sequence images of the lumbar spine were obtained without contrast administration.  Findings: Multiple sequences are moderately degraded by patient motion artifact. Again seen is a mild compression fracture involving superior endplate of the L2 vertebral body with approximately 20% loss of height. There is associated bone marrow edema within the vertebral body and bilateral pedicles. No retropulsed fracture fragments. There is an old moderate T12 compression fracture with greater than 50% loss of height. No associated bone marrow edema. There are edematous degenerative endplate changes at the L3/4 level with severe disc space narrowing. There is disc desiccation throughout the lumbar spine. Severe disc base narrowing is also noted at the L5/S1 level. Spinal cord terminates at the L1 level with normal signal seen within the conus. Visualized  paraspinal soft tissues are unremarkable. Axial images demonstrate: L1/2: No significant disc bulge. There are mild degenerative facet changes bilaterally. No spinal canal stenosis. No significant neural foraminal narrowing. L 2/3: Minimal circumferential disc bulge and mild degenerative facet change. No spinal canal stenosis. No significant neural foraminal narrowing. L3/4: Mild circumferential disc bulge with bulky right lateral disc osteophyte complex. There are moderate degenerative facet changes bilaterally. No spinal canal stenosis. There is moderate left and severe right neural foraminal narrowing. L 4/5: Mild circumferential disc bulge and severe degenerative facet change combining to cause moderate spinal canal stenosis. There is moderate left and mild right neural foraminal narrowing. L5/S1: No significant disc bulge. There are moderate degenerative facet changes bilaterally. No spinal canal stenosis. There is severe left and moderate right neural foraminal narrowing.     Impression: Impression: 1. Acute L2 compression fracture with approximate 20% loss of height. 2. Multilevel degenerative disc disease and degenerative facet change resulting in multilevel canal stenosis and neural foraminal narrowing as detailed above. Electronically Signed: Neo Mock MD  9/24/2024 6:05 PM EDT  Workstation ID: [id number]         Current medications:  Scheduled Meds:apixaban, 5 mg, Oral, BID  insulin lispro, 2-7 Units, Subcutaneous, 4x Daily AC & at Bedtime  metoprolol tartrate, 25 mg, Oral, Q12H  senna-docusate sodium, 2 tablet, Oral, BID   And  polyethylene glycol, 17 g, Oral, Daily  pravastatin, 20 mg, Oral, Nightly  QUEtiapine, 25 mg, Oral, Nightly  sodium chloride, 10 mL, Intravenous, Q12H  vancomycin, 1,250 mg, Intravenous, Q24H      Continuous Infusions:Pharmacy to dose vancomycin,       PRN Meds:.  acetaminophen **OR** acetaminophen **OR** acetaminophen    senna-docusate sodium **AND** polyethylene glycol  **AND** bisacodyl **AND** bisacodyl    Calcium Replacement - Follow Nurse / BPA Driven Protocol    dextrose    dextrose    glucagon (human recombinant)    HYDROcodone-acetaminophen    Magnesium Standard Dose Replacement - Follow Nurse / BPA Driven Protocol    nitroglycerin    ondansetron ODT **OR** ondansetron    Pharmacy to dose vancomycin    Phosphorus Replacement - Follow Nurse / BPA Driven Protocol    Potassium Replacement - Follow Nurse / BPA Driven Protocol    [COMPLETED] Insert Peripheral IV **AND** sodium chloride    sodium chloride    sodium chloride    Assessment & Plan   Assessment & Plan     Active Hospital Problems    Diagnosis  POA    **Closed compression fracture of L2 vertebra [S32.020A]  Yes    UTI (urinary tract infection) [N39.0]  Yes    Atrial fibrillation, chronic [I48.20]  Yes    CAD (coronary artery disease) [I25.10]  Yes    HTN (hypertension) [I10]  Yes    Type 2 diabetes mellitus, without long-term current use of insulin [E11.9]  Yes    Weakness [R53.1]  Yes      Resolved Hospital Problems   No resolved problems to display.        Brief Hospital Course to date:  Apollo Haro is an 85 yo M that presented to [facility] for evaluation of back pain. Patient with a fall earlier this month in which he hit his head, was evaluated in the ED and later discharged home. He reports since that time he has had issues with increased back pain inhibiting his ability to walk and has been stuck mostly in the bed. He was found to have L2 anterior column compression fracture.      Compression fracture L2  Back Pain   Weakness   - MRI with acute L2 compression fracture with 20% loss of height   - Neurosurgery consulted - likely conservative management. Close follow up in 2-4 weeks  - PRN pain control  - PT/OT with rehab recs      MRSA UTI POA   - Ucx with MRSA; Bcx NGTD  - DC rocephin and start vanc (9/24)     T2DM  - A1C  5.8  - FSBS c SSI coverage   - Home Metformin held for now      HTN  Afib   CAD   - Cards  consulted due to persistent aflutter while on flecainide   - DC flecainide -- start metoprolol 25 mg; no current plans for cardioversion   - Continue eliquis      Cognitive impairment   - seroquel     Expected Discharge Location and Transportation:   Expected Discharge   Expected Discharge Date: 9/26/2024; Expected Discharge Time:      VTE Prophylaxis:  Pharmacologic & mechanical VTE prophylaxis orders are present.         AM-PAC 6 Clicks Score (PT): 12 (09/25/24 1310)    CODE STATUS:   Code Status and Medical Interventions: CPR (Attempt to Resuscitate); Full Support   Ordered at: 09/23/24 1416     Level Of Support Discussed With:    Patient    Next of Kin (If No Surrogate)     Code Status (Patient has no pulse and is not breathing):    CPR (Attempt to Resuscitate)     Medical Interventions (Patient has pulse or is breathing):    Full Support       Jeanna Barr DO  09/25/24

## 2024-09-25 NOTE — PLAN OF CARE
Goal Outcome Evaluation:  Plan of Care Reviewed With: patient        Progress: improving  Outcome Evaluation: All mobility continues to be limited by significant pain, although patient tolerated transition to EOB and sitting EOB better than yesterday. Patient was able to weight shift and take side steps at EOB with RW and mod assist x2. Good effort with LE ther ex. Patient currently below baseline functioning, demonstrating decreased functional mobility status, impaired balance, decreased endurance, and decreased strength. Will address these deficits to promote return to PLOF. Recommend SNF at d/c.      Anticipated Discharge Disposition (PT): skilled nursing facility

## 2024-09-25 NOTE — PLAN OF CARE
Goal Outcome Evaluation:  Plan of Care Reviewed With: patient        Progress: no change     No acute changes overnight. Remains confused. Denied pain while laying down. Rested through night.                              Problem: Adult Inpatient Plan of Care  Goal: Plan of Care Review  Outcome: Progressing  Flowsheets (Taken 9/25/2024 0544)  Progress: no change  Plan of Care Reviewed With: patient  Goal: Patient-Specific Goal (Individualized)  Outcome: Progressing  Goal: Absence of Hospital-Acquired Illness or Injury  Outcome: Progressing  Intervention: Identify and Manage Fall Risk  Recent Flowsheet Documentation  Taken 9/25/2024 0400 by Elmo Lagunas RN  Safety Promotion/Fall Prevention:   activity supervised   clutter free environment maintained   fall prevention program maintained   nonskid shoes/slippers when out of bed   safety round/check completed  Taken 9/25/2024 0200 by Elmo Lagunas RN  Safety Promotion/Fall Prevention:   activity supervised   clutter free environment maintained   fall prevention program maintained   nonskid shoes/slippers when out of bed   safety round/check completed  Taken 9/25/2024 0000 by Elmo Lagunas RN  Safety Promotion/Fall Prevention:   activity supervised   clutter free environment maintained   fall prevention program maintained   nonskid shoes/slippers when out of bed   safety round/check completed  Taken 9/24/2024 2230 by Elmo Lagunas RN  Safety Promotion/Fall Prevention:   activity supervised   clutter free environment maintained   fall prevention program maintained   nonskid shoes/slippers when out of bed   safety round/check completed  Taken 9/24/2024 2030 by Elmo Lagunas RN  Safety Promotion/Fall Prevention:   activity supervised   clutter free environment maintained   fall prevention program maintained   nonskid shoes/slippers when out of bed   safety round/check completed  Intervention: Prevent Skin Injury  Recent Flowsheet Documentation  Taken 9/25/2024 0400 by  Elmo Lagunas RN  Body Position: position changed independently  Skin Protection:   adhesive use limited   incontinence pads utilized  Taken 9/25/2024 0200 by Elmo Lagunas RN  Body Position: position changed independently  Skin Protection:   adhesive use limited   incontinence pads utilized  Taken 9/25/2024 0000 by Elmo Lagunas RN  Body Position: position changed independently  Skin Protection:   adhesive use limited   incontinence pads utilized  Taken 9/24/2024 2230 by Elmo Lagunas RN  Body Position: position changed independently  Skin Protection:   adhesive use limited   incontinence pads utilized  Taken 9/24/2024 2030 by Elmo Lagunas RN  Body Position: position changed independently  Skin Protection:   adhesive use limited   incontinence pads utilized  Intervention: Prevent and Manage VTE (Venous Thromboembolism) Risk  Recent Flowsheet Documentation  Taken 9/25/2024 0400 by Elmo Lagunas RN  Activity Management: activity encouraged  Taken 9/25/2024 0200 by Elmo Lagunas RN  Activity Management: activity encouraged  Taken 9/25/2024 0000 by Elmo Lagunas RN  Activity Management: activity encouraged  Taken 9/24/2024 2230 by Elmo Lagunas RN  Activity Management: activity encouraged  Taken 9/24/2024 2030 by Elmo Lagunas RN  Activity Management: activity encouraged  Intervention: Prevent Infection  Recent Flowsheet Documentation  Taken 9/25/2024 0400 by Elmo Lagunas RN  Infection Prevention:   hand hygiene promoted   rest/sleep promoted  Taken 9/25/2024 0200 by Elmo Lagunas RN  Infection Prevention:   hand hygiene promoted   rest/sleep promoted  Taken 9/25/2024 0000 by Elmo Lagunas RN  Infection Prevention:   hand hygiene promoted   rest/sleep promoted  Taken 9/24/2024 2230 by Elmo Lagunas RN  Infection Prevention:   hand hygiene promoted   rest/sleep promoted  Taken 9/24/2024 2030 by Elmo Lagunas RN  Infection Prevention:   hand hygiene promoted   rest/sleep promoted  Goal: Optimal Comfort  and Wellbeing  Outcome: Progressing  Goal: Readiness for Transition of Care  Outcome: Progressing

## 2024-09-26 LAB
ANION GAP SERPL CALCULATED.3IONS-SCNC: 10 MMOL/L (ref 5–15)
BUN SERPL-MCNC: 26 MG/DL (ref 8–23)
BUN/CREAT SERPL: 25.7 (ref 7–25)
CALCIUM SPEC-SCNC: 9 MG/DL (ref 8.6–10.5)
CHLORIDE SERPL-SCNC: 105 MMOL/L (ref 98–107)
CO2 SERPL-SCNC: 28 MMOL/L (ref 22–29)
CREAT SERPL-MCNC: 1.01 MG/DL (ref 0.76–1.27)
EGFRCR SERPLBLD CKD-EPI 2021: 72.9 ML/MIN/1.73
GLUCOSE BLDC GLUCOMTR-MCNC: 147 MG/DL (ref 70–130)
GLUCOSE BLDC GLUCOMTR-MCNC: 159 MG/DL (ref 70–130)
GLUCOSE BLDC GLUCOMTR-MCNC: 175 MG/DL (ref 70–130)
GLUCOSE BLDC GLUCOMTR-MCNC: 297 MG/DL (ref 70–130)
GLUCOSE SERPL-MCNC: 127 MG/DL (ref 65–99)
POTASSIUM SERPL-SCNC: 4.4 MMOL/L (ref 3.5–5.2)
QT INTERVAL: 370 MS
QTC INTERVAL: 484 MS
SODIUM SERPL-SCNC: 143 MMOL/L (ref 136–145)
VANCOMYCIN SERPL-MCNC: 13.8 MCG/ML (ref 5–40)

## 2024-09-26 PROCEDURE — 25010000002 VANCOMYCIN HCL IN NACL 1.5-0.9 GM/500ML-% SOLUTION

## 2024-09-26 PROCEDURE — 80048 BASIC METABOLIC PNL TOTAL CA: CPT

## 2024-09-26 PROCEDURE — 63710000001 INSULIN LISPRO (HUMAN) PER 5 UNITS: Performed by: FAMILY MEDICINE

## 2024-09-26 PROCEDURE — G0378 HOSPITAL OBSERVATION PER HR: HCPCS

## 2024-09-26 PROCEDURE — 80202 ASSAY OF VANCOMYCIN: CPT

## 2024-09-26 PROCEDURE — 99232 SBSQ HOSP IP/OBS MODERATE 35: CPT | Performed by: INTERNAL MEDICINE

## 2024-09-26 PROCEDURE — 97530 THERAPEUTIC ACTIVITIES: CPT

## 2024-09-26 PROCEDURE — 82948 REAGENT STRIP/BLOOD GLUCOSE: CPT

## 2024-09-26 RX ORDER — VANCOMYCIN/0.9 % SOD CHLORIDE 1.5G/250ML
1500 PLASTIC BAG, INJECTION (ML) INTRAVENOUS EVERY 24 HOURS
Status: COMPLETED | OUTPATIENT
Start: 2024-09-26 | End: 2024-09-29

## 2024-09-26 RX ADMIN — SENNOSIDES AND DOCUSATE SODIUM 2 TABLET: 50; 8.6 TABLET ORAL at 22:18

## 2024-09-26 RX ADMIN — BISACODYL 5 MG: 5 TABLET, COATED ORAL at 16:07

## 2024-09-26 RX ADMIN — INSULIN LISPRO 2 UNITS: 100 INJECTION, SOLUTION INTRAVENOUS; SUBCUTANEOUS at 22:19

## 2024-09-26 RX ADMIN — INSULIN LISPRO 2 UNITS: 100 INJECTION, SOLUTION INTRAVENOUS; SUBCUTANEOUS at 17:42

## 2024-09-26 RX ADMIN — SENNOSIDES AND DOCUSATE SODIUM 2 TABLET: 50; 8.6 TABLET ORAL at 08:59

## 2024-09-26 RX ADMIN — PRAVASTATIN SODIUM 20 MG: 20 TABLET ORAL at 22:18

## 2024-09-26 RX ADMIN — QUETIAPINE FUMARATE 25 MG: 25 TABLET ORAL at 22:18

## 2024-09-26 RX ADMIN — APIXABAN 5 MG: 5 TABLET, FILM COATED ORAL at 08:59

## 2024-09-26 RX ADMIN — Medication 1500 MG: at 16:07

## 2024-09-26 RX ADMIN — ACETAMINOPHEN 650 MG: 325 TABLET ORAL at 22:18

## 2024-09-26 RX ADMIN — HYDROCODONE BITARTRATE AND ACETAMINOPHEN 1 TABLET: 5; 325 TABLET ORAL at 08:59

## 2024-09-26 RX ADMIN — POLYETHYLENE GLYCOL 3350 17 G: 17 POWDER, FOR SOLUTION ORAL at 08:59

## 2024-09-26 RX ADMIN — INSULIN LISPRO 4 UNITS: 100 INJECTION, SOLUTION INTRAVENOUS; SUBCUTANEOUS at 12:03

## 2024-09-26 RX ADMIN — Medication 12.5 MG: at 08:59

## 2024-09-26 RX ADMIN — Medication 10 ML: at 22:20

## 2024-09-26 RX ADMIN — APIXABAN 5 MG: 5 TABLET, FILM COATED ORAL at 22:19

## 2024-09-26 NOTE — PROGRESS NOTES
[facility] Medicine Services  PROGRESS NOTE    Patient Name: Apollo Haro  : 1940  MRN: [id number]    Date of Admission: 2024  Primary Care Physician: Provider, No Known    Subjective   Subjective     CC:  Back pain     HPI:  No acute events. States that his back hurts. More alert today. Called and updated wife. Answered all questions to the best of my ability. n      Objective   Objective     Vital Signs:   Temp:  [97.5 °F (36.4 °C)-98.3 °F (36.8 °C)] 98.2 °F (36.8 °C)  Heart Rate:  [68-92] 92  Resp:  [18] 18  BP: ()/(49-75) 92/49  Flow (L/min):  [2] 2     Physical Exam:  Constitutional: No acute distress, elderly   Respiratory: Clear to auscultation bilaterally, respiratory effort normal   Cardiovascular: irregular; no murmur   Gastrointestinal: Positive bowel sounds, soft, nontender, nondistended  Musculoskeletal: No bilateral ankle edema  Psychiatric: currently calm  Neurologic: strength symmetric in all extremities, Cranial Nerves grossly intact to confrontation, speech clear        Results Reviewed:  LAB RESULTS:      Lab 24  1010 24  0528 24  1656 24  1315   WBC 8.23 8.02  --   --  8.57   HEMOGLOBIN 13.3 12.1*  --   --  13.6   HEMATOCRIT 40.1 35.9*  --   --  40.7   PLATELETS 184 158  --   --  176   NEUTROS ABS  --  4.83  --   --  6.29   IMMATURE GRANS (ABS)  --  0.02  --   --  0.03   LYMPHS ABS  --  2.30  --   --  1.35   MONOS ABS  --  0.68  --   --  0.79   EOS ABS  --  0.15  --   --  0.09   MCV 92.6 90.2  --   --  92.5   PROCALCITONIN  --  0.07  --   --   --    LACTATE  --   --  1.7 2.1* 2.2*         Lab 24  0411 24  1010 24  0528 24  1315   SODIUM 143 137 138 143   POTASSIUM 4.4 4.5 4.1 4.5   CHLORIDE 105 99 101 101   CO2 28.0 27.0 25.0 28.0   ANION GAP 10.0 11.0 12.0 14.0   BUN 26* 20 34* 26*   CREATININE 1.01 0.95 1.09 1.11   EGFR 72.9 78.4 66.5 65.1   GLUCOSE 127* 155* 170* 145*   CALCIUM 9.0 9.2  9.1 9.4   MAGNESIUM  --   --  1.7  --    HEMOGLOBIN A1C  --   --   --  5.80*         Lab 09/24/24  0528 09/23/24  1315   TOTAL PROTEIN 5.9* 6.5   ALBUMIN 3.5 3.9   GLOBULIN 2.4 2.6   ALT (SGPT) 9 11   AST (SGOT) 16 21   BILIRUBIN 0.8 1.3*   ALK PHOS 88 92                     Brief Urine Lab Results  (Last result in the past 365 days)        Color   Clarity   Blood   Leuk Est   Nitrite   Protein   CREAT   Urine HCG        09/23/24 1337 Yellow   Cloudy   Large (3+)   Large (3+)   Positive   30 mg/dL (1+)                   Microbiology Results Abnormal       Procedure Component Value - Date/Time    Blood Culture - Blood, Hand, Left [[id number]]  (Normal) Collected: 09/23/24 1656    Lab Status: Preliminary result Specimen: Blood from Hand, Left Updated: 09/25/24 1716     Blood Culture No growth at 2 days    Blood Culture - Blood, Hand, Right [[id number]]  (Normal) Collected: 09/23/24 1656    Lab Status: Preliminary result Specimen: Blood from Hand, Right Updated: 09/25/24 1716     Blood Culture No growth at 2 days            MRI Lumbar Spine Without Contrast    Result Date: 9/24/2024  MRI LUMBAR SPINE WO CONTRAST Date of Exam: 9/24/2024 5:13 PM EDT Indication: L2 compression fracture.  Comparison: CT lumbar spine 9/23/2024 Technique:  Routine multiplanar/multisequence sequence images of the lumbar spine were obtained without contrast administration.  Findings: Multiple sequences are moderately degraded by patient motion artifact. Again seen is a mild compression fracture involving superior endplate of the L2 vertebral body with approximately 20% loss of height. There is associated bone marrow edema within the vertebral body and bilateral pedicles. No retropulsed fracture fragments. There is an old moderate T12 compression fracture with greater than 50% loss of height. No associated bone marrow edema. There are edematous degenerative endplate changes at the L3/4 level with severe disc space narrowing. There is disc  desiccation throughout the lumbar spine. Severe disc base narrowing is also noted at the L5/S1 level. Spinal cord terminates at the L1 level with normal signal seen within the conus. Visualized paraspinal soft tissues are unremarkable. Axial images demonstrate: L1/2: No significant disc bulge. There are mild degenerative facet changes bilaterally. No spinal canal stenosis. No significant neural foraminal narrowing. L 2/3: Minimal circumferential disc bulge and mild degenerative facet change. No spinal canal stenosis. No significant neural foraminal narrowing. L3/4: Mild circumferential disc bulge with bulky right lateral disc osteophyte complex. There are moderate degenerative facet changes bilaterally. No spinal canal stenosis. There is moderate left and severe right neural foraminal narrowing. L 4/5: Mild circumferential disc bulge and severe degenerative facet change combining to cause moderate spinal canal stenosis. There is moderate left and mild right neural foraminal narrowing. L5/S1: No significant disc bulge. There are moderate degenerative facet changes bilaterally. No spinal canal stenosis. There is severe left and moderate right neural foraminal narrowing.     Impression: Impression: 1. Acute L2 compression fracture with approximate 20% loss of height. 2. Multilevel degenerative disc disease and degenerative facet change resulting in multilevel canal stenosis and neural foraminal narrowing as detailed above. Electronically Signed: Neo Mock MD  9/24/2024 6:05 PM EDT  Workstation ID: [id number]         Current medications:  Scheduled Meds:apixaban, 5 mg, Oral, BID  insulin lispro, 2-7 Units, Subcutaneous, 4x Daily AC & at Bedtime  metoprolol tartrate, 12.5 mg, Oral, Q12H  senna-docusate sodium, 2 tablet, Oral, BID   And  polyethylene glycol, 17 g, Oral, Daily  pravastatin, 20 mg, Oral, Nightly  QUEtiapine, 25 mg, Oral, Nightly  sodium chloride, 10 mL, Intravenous, Q12H  vancomycin, 1,500 mg,  Intravenous, Q24H      Continuous Infusions:Pharmacy to dose vancomycin,       PRN Meds:.  acetaminophen **OR** acetaminophen **OR** acetaminophen    senna-docusate sodium **AND** polyethylene glycol **AND** bisacodyl **AND** bisacodyl    Calcium Replacement - Follow Nurse / BPA Driven Protocol    dextrose    dextrose    glucagon (human recombinant)    HYDROcodone-acetaminophen    Magnesium Standard Dose Replacement - Follow Nurse / BPA Driven Protocol    nitroglycerin    ondansetron ODT **OR** ondansetron    Pharmacy to dose vancomycin    Phosphorus Replacement - Follow Nurse / BPA Driven Protocol    Potassium Replacement - Follow Nurse / BPA Driven Protocol    [COMPLETED] Insert Peripheral IV **AND** sodium chloride    sodium chloride    sodium chloride    Assessment & Plan   Assessment & Plan     Active Hospital Problems    Diagnosis  POA    **Closed compression fracture of L2 vertebra [S32.020A]  Yes    UTI (urinary tract infection) [N39.0]  Yes    Atrial fibrillation, chronic [I48.20]  Yes    CAD (coronary artery disease) [I25.10]  Yes    HTN (hypertension) [I10]  Yes    Type 2 diabetes mellitus, without long-term current use of insulin [E11.9]  Yes    Weakness [R53.1]  Yes      Resolved Hospital Problems   No resolved problems to display.        Brief Hospital Course to date:  Apollo Haro is an 83 yo M that presented to [facility] for evaluation of back pain. Patient with a fall earlier this month in which he hit his head, was evaluated in the ED and later discharged home. He reports since that time he has had issues with increased back pain inhibiting his ability to walk and has been stuck mostly in the bed. He was found to have L2 anterior column compression fracture.      Compression fracture L2  Back Pain   Weakness   - MRI with acute L2 compression fracture with 20% loss of height   - Neurosurgery consulted - likely conservative management. Close follow up in 2-4 weeks  - PRN pain control  - PT/OT with  rehab recs. CM following      MRSA UTI POA   - Ucx with MRSA; Bcx NGTD  - DC rocephin and start vanc (9/24)     T2DM  - A1C  5.8  - FSBS SSI coverage   - Home Metformin held for now      HTN  Afib   CAD   - Cards consulted due to persistent aflutter while on flecainide   - patient was evaluated by cards. Flecainide was discontinued. Decrease metoprolol to 12.5 mg BID; holding on any plans for cardioversion   - Continue eliquis      Cognitive impairment   - seroquel     Expected Discharge Location and Transportation: SNF   Expected Discharge   Expected Discharge Date: 9/27/2024; Expected Discharge Time:      VTE Prophylaxis:  Pharmacologic & mechanical VTE prophylaxis orders are present.         AM-PAC 6 Clicks Score (PT): 13 (09/26/24 1403)    CODE STATUS:   Code Status and Medical Interventions: CPR (Attempt to Resuscitate); Full Support   Ordered at: 09/23/24 1416     Level Of Support Discussed With:    Patient    Next of Kin (If No Surrogate)     Code Status (Patient has no pulse and is not breathing):    CPR (Attempt to Resuscitate)     Medical Interventions (Patient has pulse or is breathing):    Full Support       Jeanna Barr,   09/26/24

## 2024-09-26 NOTE — PLAN OF CARE
Goal Outcome Evaluation:  Plan of Care Reviewed With: patient        Progress: no change  Outcome Evaluation: Palliative consult for GOC/ACP; no ACP document on file, spouse Bee Haro is NOK. Pt seen by Dr. KAELA Del Rosario at 1101, by Palliative RN at 1235. Pt reported that he only has pain in his back, but denied pain at time of Palliative RN encounter. Pt did not wish to discuss GOC/POC at times of Palliative encounters; requested unit RN Dax to notify Palliative office if/when she arrives. Palliative following for support to pt and family in GOC/POC.    0930 Palliative IDT meeting:  MD, APRN, RN, SW,   After hours, weekends and holidays, contact Palliative Provider by calling [phone]       Problem: Palliative Care  Goal: Enhanced Quality of Life  Outcome: Progressing  Intervention: Promote Advance Care Planning  Flowsheets (Taken 9/26/2024 1343)  Life Transition/Adjustment:   palliative care discussed   palliative care initiated  Intervention: Maximize Comfort  Flowsheets (Taken 9/26/2024 1343)  Pain Management Interventions: pain management plan reviewed with patient/caregiver  Intervention: Optimize Function  Flowsheets (Taken 9/26/2024 1343)  Fatigue Management: paced activity encouraged  Sleep/Rest Enhancement: consistent schedule promoted  Intervention: Optimize Psychosocial Wellbeing  Flowsheets (Taken 9/26/2024 1343)  Supportive Measures:   positive reinforcement provided   verbalization of feelings encouraged  Family/Support System Care: (Palliative information and meal discount card provided at bedside) other (see comments)

## 2024-09-26 NOTE — PLAN OF CARE
Problem: Adult Inpatient Plan of Care  Goal: Plan of Care Review  Outcome: Progressing  Goal: Patient-Specific Goal (Individualized)  Outcome: Progressing  Goal: Absence of Hospital-Acquired Illness or Injury  Outcome: Progressing  Intervention: Identify and Manage Fall Risk  Recent Flowsheet Documentation  Taken 9/26/2024 0600 by Elena Isabel RN  Safety Promotion/Fall Prevention:   nonskid shoes/slippers when out of bed   safety round/check completed  Taken 9/26/2024 0400 by Elena Isabel RN  Safety Promotion/Fall Prevention:   nonskid shoes/slippers when out of bed   safety round/check completed  Taken 9/26/2024 0200 by Elena Isabel RN  Safety Promotion/Fall Prevention:   nonskid shoes/slippers when out of bed   safety round/check completed  Taken 9/26/2024 0000 by Elena Isabel RN  Safety Promotion/Fall Prevention:   nonskid shoes/slippers when out of bed   safety round/check completed  Taken 9/25/2024 2204 by Elena Isabel RN  Safety Promotion/Fall Prevention:   assistive device/personal items within reach   clutter free environment maintained   fall prevention program maintained   lighting adjusted   nonskid shoes/slippers when out of bed   safety round/check completed   room organization consistent  Taken 9/25/2024 1946 by Elena Isabel RN  Safety Promotion/Fall Prevention:   nonskid shoes/slippers when out of bed   safety round/check completed  Intervention: Prevent Skin Injury  Recent Flowsheet Documentation  Taken 9/26/2024 0600 by Elena Isabel RN  Body Position: position changed independently  Skin Protection:   adhesive use limited   incontinence pads utilized  Taken 9/26/2024 0400 by Elena Isabel RN  Body Position: position changed independently  Skin Protection:   adhesive use limited   incontinence pads utilized  Taken 9/26/2024 0200 by Elena Isabel RN  Body Position: position changed independently  Skin Protection:   adhesive use limited   incontinence pads utilized  Taken 9/26/2024 0000 by  Elena Isabel RN  Body Position: position changed independently  Skin Protection:   adhesive use limited   incontinence pads utilized  Taken 9/25/2024 2204 by Elena Isabel RN  Body Position: position changed independently  Skin Protection:   adhesive use limited   incontinence pads utilized  Taken 9/25/2024 1946 by Elena Isabel RN  Body Position: position changed independently  Skin Protection:   adhesive use limited   incontinence pads utilized  Intervention: Prevent and Manage VTE (Venous Thromboembolism) Risk  Recent Flowsheet Documentation  Taken 9/26/2024 0600 by Elena Isabel RN  Activity Management: activity minimized  Taken 9/26/2024 0400 by Elena Isabel RN  Activity Management: activity minimized  Taken 9/26/2024 0200 by Elena Isabel RN  Activity Management: activity minimized  Taken 9/26/2024 0000 by Elena Isabel RN  Activity Management: activity minimized  Taken 9/25/2024 2204 by Elena Isabel RN  Activity Management: activity minimized  Taken 9/25/2024 1946 by Elena Isabel RN  Activity Management: activity minimized  Goal: Optimal Comfort and Wellbeing  Outcome: Progressing  Intervention: Monitor Pain and Promote Comfort  Recent Flowsheet Documentation  Taken 9/25/2024 2204 by Elena Isabel RN  Pain Management Interventions: see MAR  Intervention: Provide Person-Centered Care  Recent Flowsheet Documentation  Taken 9/25/2024 2204 by Elena Isabel RN  Trust Relationship/Rapport:   care explained   choices provided   questions answered   questions encouraged  Goal: Readiness for Transition of Care  Outcome: Progressing     Problem: Skin Injury Risk Increased  Goal: Skin Health and Integrity  Outcome: Progressing  Intervention: Optimize Skin Protection  Recent Flowsheet Documentation  Taken 9/26/2024 0600 by Elena Isabel RN  Pressure Reduction Techniques: frequent weight shift encouraged  Head of Bed (HOB) Positioning: HOB elevated  Pressure Reduction Devices:   pressure-redistributing mattress  utilized   positioning supports utilized  Skin Protection:   adhesive use limited   incontinence pads utilized  Taken 9/26/2024 0400 by Elena Isabel RN  Pressure Reduction Techniques: frequent weight shift encouraged  Head of Bed (HOB) Positioning: [facility] elevated  Pressure Reduction Devices:   pressure-redistributing mattress utilized   positioning supports utilized  Skin Protection:   adhesive use limited   incontinence pads utilized  Taken 9/26/2024 0200 by Elena Isabel RN  Pressure Reduction Techniques: frequent weight shift encouraged  Head of Bed (HOB) Positioning: [facility] elevated  Pressure Reduction Devices:   pressure-redistributing mattress utilized   positioning supports utilized  Skin Protection:   adhesive use limited   incontinence pads utilized  Taken 9/26/2024 0000 by Elena Isabel RN  Pressure Reduction Techniques: frequent weight shift encouraged  Head of Bed (HOB) Positioning: [facility] elevated  Pressure Reduction Devices:   pressure-redistributing mattress utilized   positioning supports utilized  Skin Protection:   adhesive use limited   incontinence pads utilized  Taken 9/25/2024 2204 by Elena Isabel RN  Pressure Reduction Techniques: frequent weight shift encouraged  Head of Bed (HOB) Positioning: [facility] elevated  Pressure Reduction Devices:   positioning supports utilized   pressure-redistributing mattress utilized  Skin Protection:   adhesive use limited   incontinence pads utilized  Taken 9/25/2024 1946 by Elena Isabel RN  Pressure Reduction Techniques: frequent weight shift encouraged  Head of Bed (HOB) Positioning: [facility] elevated  Pressure Reduction Devices:   pressure-redistributing mattress utilized   positioning supports utilized  Skin Protection:   adhesive use limited   incontinence pads utilized     Problem: Fall Injury Risk  Goal: Absence of Fall and Fall-Related Injury  Outcome: Progressing  Intervention: Promote Injury-Free Environment  Recent Flowsheet Documentation  Taken 9/26/2024 0600 by  Elena Isabel, RN  Safety Promotion/Fall Prevention:   nonskid shoes/slippers when out of bed   safety round/check completed  Taken 9/26/2024 0400 by Elena Isabel RN  Safety Promotion/Fall Prevention:   nonskid shoes/slippers when out of bed   safety round/check completed  Taken 9/26/2024 0200 by Elena Isabel RN  Safety Promotion/Fall Prevention:   nonskid shoes/slippers when out of bed   safety round/check completed  Taken 9/26/2024 0000 by Elena Isabel RN  Safety Promotion/Fall Prevention:   nonskid shoes/slippers when out of bed   safety round/check completed  Taken 9/25/2024 2204 by Elean Isabel RN  Safety Promotion/Fall Prevention:   assistive device/personal items within reach   clutter free environment maintained   fall prevention program maintained   lighting adjusted   nonskid shoes/slippers when out of bed   safety round/check completed   room organization consistent  Taken 9/25/2024 1946 by Elena Isabel RN  Safety Promotion/Fall Prevention:   nonskid shoes/slippers when out of bed   safety round/check completed     Problem: Hypertension Comorbidity  Goal: Blood Pressure in Desired Range  Outcome: Progressing   Goal Outcome Evaluation:

## 2024-09-26 NOTE — DISCHARGE PLACEMENT REQUEST
"To SNF Rehab  From Alissa Murdock [phone]    Elizabeth Haro (84 y.o. 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)   Code Status: CPR    Ht: 175.3 cm (69\")   Wt: 88.5 kg (195 lb)    Admission Cmt: None   Principal Problem: Closed compression fracture of L2 vertebra [S32.020A]                   Active Insurance as of 2024       Primary Coverage       Payor Plan Insurance Group Employer/Plan Group    AETNA MEDICARE REPLACEMENT AETNA MED ADV HMO [id number]       Payor Plan Address Payor Plan Phone Number Payor Plan Fax Number Effective Dates    [address] [phone]  3/1/2024 - None Entered    [facility] [place] [address]         Subscriber Name Subscriber Birth Date Member ID       ELIZABETH HARO 1940 [id number]                     Emergency Contacts        (Rel.) Home Phone Work Phone Mobile Phone    ANTONIA HARO (Spouse) [phone] -- [phone]    LenaMaria De Jesus (Daughter) [phone] -- [phone]                 History & Physical        CASEY Berman DO at 24 1419              [facility] Medicine Services  HISTORY AND PHYSICAL    Patient Name: Elizabeth Haro  : 1940  MRN: [id number]  Primary Care Physician: Provider, No Known  Date of admission: 2024      Subjective  Subjective     Chief Complaint:  Back pain, difficulty " walking x 2 weeks     HPI:  Apollo Haro is a 84 y.o. male that presented to [facility] emergency room for evaluation of lower back pain and difficulty with walking since a fall 2 weeks ago.  Patient was found to have a compression fracture of the L2 vertebral body. Discussed case with ED and plans are for admission, evaluation by Nsx for likely back brace recommendations and then PT/OT consultation for possible rehab placement. Patient has been nearly bed bound secondary to the pain for the past few weeks. Additional supportive medications have been ordered, wife and family updated at bedside.       Personal History     Past Medical History:   Diagnosis Date    A-fib     CAD (coronary artery disease)     DM (diabetes mellitus)            No past surgical history on file.    Family History: family history is not on file.     Social History:  reports that he has never smoked. He has never been exposed to tobacco smoke. He has never used smokeless tobacco. He reports that he does not drink alcohol and does not use drugs.  Social History     Social History Narrative    Not on file       Medications:  Available home medication information reviewed.  Magnesium, QUEtiapine, acetaminophen, apixaban, aspirin, flecainide, metFORMIN, multivitamin with minerals, and pravastatin    Allergies   Allergen Reactions    Lidocaine Angioedema       Objective  Objective     Vital Signs:   Temp:  [99 °F (37.2 °C)] 99 °F (37.2 °C)  Heart Rate:  [75] 75  Resp:  [18] 18  BP: (135)/(82) 135/82       Physical Exam   Constitutional: No acute distress, awake, alert, frail and elderly appearing, resting in bed, currently on RA   HENT: NCAT, nares patent, mucous membranes moist  Respiratory: Decreased BS bilaterally, no rhonchi or wheezing, respiratory effort normal   Cardiovascular: irregular but rate controlled, no murmurs, rubs, or gallops  Gastrointestinal: Positive bowel sounds, soft, nontender, nondistended  Musculoskeletal: No  bilateral ankle edema, no clubbing or cyanosis   Psychiatric: Appropriate affect, cooperative  Neurologic: Oriented x 3, strength symmetric in all extremities, Cranial Nerves grossly intact to confrontation, speech clear  Skin: No rashes      Result Review:  I have personally reviewed the results from the time of this admission to 9/23/2024 14:20 EDT and agree with these findings:  [x]  Laboratory list / accordion  []  Microbiology  [x]  Radiology  [x]  EKG/Telemetry   []  Cardiology/Vascular   []  Pathology  [x]  Old records  []  Other:  Most notable findings include:   UA Large Leukocytes, Nitrite positive   Anterior column fracture of L2       LAB RESULTS:      Lab 09/23/24  1315   WBC 8.57   HEMOGLOBIN 13.6   HEMATOCRIT 40.7   PLATELETS 176   NEUTROS ABS 6.29   IMMATURE GRANS (ABS) 0.03   LYMPHS ABS 1.35   MONOS ABS 0.79   EOS ABS 0.09   MCV 92.5         Lab 09/23/24  1315   SODIUM 143   POTASSIUM 4.5   CHLORIDE 101   CO2 28.0   ANION GAP 14.0   BUN 26*   CREATININE 1.11   EGFR 65.1   GLUCOSE 145*   CALCIUM 9.4         Lab 09/23/24  1315   TOTAL PROTEIN 6.5   ALBUMIN 3.9   GLOBULIN 2.6   ALT (SGPT) 11   AST (SGOT) 21   BILIRUBIN 1.3*   ALK PHOS 92                     UA          1/31/2024    18:23 2/27/2024    18:38 9/23/2024    13:37   Urinalysis   Squamous Epithelial Cells, UA  0-2  None Seen    Specific [place], UA 1.015  1.022  1.022    Ketones, UA Trace  Negative  Trace    Blood, UA Negative  Small (1+)  Large (3+)    Leukocytes, UA Negative  Negative  Large (3+)    Nitrite, UA Negative  Negative  Positive    RBC, UA  21-50  Too Numerous to Count    WBC, UA  0-2  Too Numerous to Count    Bacteria, UA  None Seen  None Seen        Microbiology Results (last 10 days)       ** No results found for the last 240 hours. **            CT Lumbar Spine Without Contrast    Result Date: 9/23/2024  CT LUMBAR SPINE WO CONTRAST Date of Exam: 9/23/2024 12:46 PM EDT Indication: Fall 1 week ago with head injury and  generalized weakness with low back pain. Comparison: 1/11/2024 MR lumbar spine Technique: Axial CT images were obtained of the lumbar spine without contrast administration.  Reconstructed coronal and sagittal images were also obtained. Automated exposure control and iterative construction methods were used. Findings: Alignment: No spondylolisthesis. Minimal levocurvature of the lumbar spine. Bones: Diffuse bone demineralization. Vertebral augmentation material seen within the T10 vertebral body. Similar old T12 compression fracture. Fracture of the anterior two thirds of the L2 vertebral body. No substantial retropulsion. No additional acute  fracture of the vertebral bodies or posterior elements seen. Partially visualized old right rib fracture. Possible L3 pars defects, chronic appearing. Degenerative changes: Disc degenerative disease most advanced at L3-4 and L5-S1. Multilevel facet arthropathy most advanced at L5-S1 and L3-4. Moderate and L3-4. To high-grade canal stenosis at L2-3 and L3-4. Mild to high-grade foraminal stenosis at L4-5  and L5-S1 bilaterally. Soft tissue: Aortic vascular calcification. There is paraspinal soft tissue stranding around the L2 vertebral body.     Impression: Impression: Anterior column fracture of the L3 vertebral body. No substantial bony retropulsion. Degenerative changes with mild to severe canal stenosis seen at L2-3. Possible chronic L3 pars defects. Electronically Signed: Elgin Cisse MD  9/23/2024 1:11 PM EDT  Workstation ID: [id number]    CT Head Without Contrast    Result Date: 9/23/2024  CT HEAD WO CONTRAST Date of Exam: 9/23/2024 12:46 PM EDT Indication: Fall 1 week ago with head injury and generalized weakness with low back pain. Comparison: CT brain dated 9/16/2024 Technique: Axial CT images were obtained of the head without contrast administration.  Automated exposure control and iterative construction methods were used. Findings: There is no evidence of  hemorrhage. There is no mass effect or midline shift. There is no extracerebral collection. The ventricles are midline showing stable ex-vacuo dilatation. Periventricular hypodense areas compatible with chronic small ischemia Posterior fossa is within normal limits. Calvarium and skull base appear intact. There is partial opacification of the left frontal sinus and left ethmoids. The rest of the paranasal sinuses and mastoids are well-aerated. Visualized orbits are unremarkable.     Impression: Impression: Brain atrophy with chronic microvascular ischemic changes No evidence of acute intracranial abnormality Electronically Signed: Jesus Boucher MD  9/23/2024 1:01 PM EDT  Workstation ID: [id number]         Assessment & Plan  Assessment & Plan       Closed compression fracture of L2 vertebra    Weakness    Type 2 diabetes mellitus, without long-term current use of insulin    CAD (coronary artery disease)    HTN (hypertension)    Atrial fibrillation, chronic        Apollo Haro is an 85 yo M that presented to [facility] for evaluation of back pain. Patient with a fall earlier this month in which he hit his head, was evaluated in the ED and later discharged home. He reports since that time he has had issues with increased back pain inhibiting his ability to walk and has been stuck mostly in the bed. He was found to have L2 anterior column compression fracture today on imaging and is pending evaluation by neurosx for likely back brace recommendations. Also PT/OT as he likely will need some form of rehab. Patient found on UA to have possible UTI, will be started on IV rocephin and follow urine culture at this time as well.     Compression fracture L2  Back Pain   Weakness   - Consult to Neurosx pending at this time, plans for likely evaluation and suspect likely back brace   - Consult to PT/OT, pain control, suspect patient will likely need some form of rehab, patient has been nearly bed bound per report secondary to  pain with movement   - Consult to CM for evaluation     UTI   - IV Rocephin  - Follow urine culture     T2DM  - A1C ordered   - FSBS c SSI coverage   - Home Metformin held for now     HTN  Afib   CAD   - Continue regular home Asa, Eliquis, Flecainide, and Pravastatin       VTE Prophylaxis:  Pharmacologic & mechanical VTE prophylaxis orders are present.          CODE STATUS:    Code Status and Medical Interventions: CPR (Attempt to Resuscitate); Full Support   Ordered at: 09/23/24 1416     Level Of Support Discussed With:    Patient    Next of Kin (If No Surrogate)     Code Status (Patient has no pulse and is not breathing):    CPR (Attempt to Resuscitate)     Medical Interventions (Patient has pulse or is breathing):    Full Support       Expected Discharge   Expected discharge date/ time has not been documented.     KAELA Berman DO  09/23/24      Electronically signed by CASEY Berman DO at 09/23/24 1450       Current Facility-Administered Medications   Medication Dose Route Frequency Provider Last Rate Last Admin    acetaminophen (TYLENOL) tablet 650 mg  650 mg Oral Q4H PRN CASEY Berman DO   650 mg at 09/25/24 2204    Or    acetaminophen (TYLENOL) 160 MG/5ML oral solution 650 mg  650 mg Oral Q4H PRN CASEY Berman DO        Or    acetaminophen (TYLENOL) suppository 650 mg  650 mg Rectal Q4H PRN CASEY Berman DO        apixaban (ELIQUIS) tablet 5 mg  5 mg Oral BID CASEY Berman, DO   5 mg at 09/26/24 0859    sennosides-docusate (PERICOLACE) 8.6-50 MG per tablet 2 tablet  2 tablet Oral BID CASEY Berman DO   2 tablet at 09/26/24 0859    And    polyethylene glycol (MIRALAX) packet 17 g  17 g Oral Daily CASEY Berman DO   17 g at 09/26/24 0859    And    bisacodyl (DULCOLAX) EC tablet 5 mg  5 mg Oral Daily PRN CASEY Berman DO        And    bisacodyl (DULCOLAX) suppository 10 mg  10 mg Rectal Daily PRN CASEY Berman DO        Calcium Replacement - Follow Nurse / BPA Driven Protocol   Does  not apply PRN CASEY Berman DO        dextrose (D50W) (25 g/50 mL) IV injection 25 g  25 g Intravenous Q15 Min PRN CASEY Berman DO        dextrose (GLUTOSE) oral gel 15 g  15 g Oral Q15 Min PRN CASEY Berman DO        glucagon (GLUCAGEN) injection 1 mg  1 mg Intramuscular Q15 Min PRN CASEY Berman DO        HYDROcodone-acetaminophen (NORCO) 5-325 MG per tablet 1 tablet  1 tablet Oral Q6H PRN CASEY Berman, DO   1 tablet at 09/26/24 0859    Insulin Lispro (humaLOG) injection 2-7 Units  2-7 Units Subcutaneous 4x Daily AC & at Bedtime CASEY Berman DO   3 Units at 09/25/24 2204    Magnesium Standard Dose Replacement - Follow Nurse / BPA Driven Protocol   Does not apply PRN CASEY Berman DO        metoprolol tartrate (LOPRESSOR) half tablet 12.5 mg  12.5 mg Oral Q12H Jeanna Barr,    12.5 mg at 09/26/24 0859    nitroglycerin (NITROSTAT) SL tablet 0.4 mg  0.4 mg Sublingual Q5 Min PRN CASEY Berman DO        ondansetron ODT (ZOFRAN-ODT) disintegrating tablet 4 mg  4 mg Oral Q6H PRN CASEY Berman DO        Or    ondansetron (ZOFRAN) injection 4 mg  4 mg Intravenous Q6H PRN CASEY Berman, DO        Pharmacy to dose vancomycin   Does not apply Continuous PRN Jeanna Barr DO        Phosphorus Replacement - Follow Nurse / BPA Driven Protocol   Does not apply PRN CASEY Berman DO        Potassium Replacement - Follow Nurse / BPA Driven Protocol   Does not apply PRN CASEY Berman DO        pravastatin (PRAVACHOL) tablet 20 mg  20 mg Oral Nightly CASEY Berman, DO   20 mg at 09/25/24 2204    QUEtiapine (SEROquel) tablet 25 mg  25 mg Oral Nightly CASEY Berman, DO   25 mg at 09/25/24 2204    sodium chloride 0.9 % flush 10 mL  10 mL Intravenous PRN Alek Linares MD        sodium chloride 0.9 % flush 10 mL  10 mL Intravenous Q12H CASEY Berman, DO   10 mL at 09/25/24 0928    sodium chloride 0.9 % flush 10 mL  10 mL Intravenous PRN CASEY Berman, DO        sodium chloride  0.9 % infusion 40 mL  40 mL Intravenous PRN CASEY Berman DO        vancomycin IVPB 1500 mg in 0.9% NaCl (Premix) 500 mL  1,500 mg Intravenous Q24H Rosemarie Griffin [facility]            Physician Progress Notes (most recent note)        Jeanna Barr DO at 24 1356              [facility] Medicine Services  PROGRESS NOTE    Patient Name: Apollo Haro  : 1940  MRN: [id number]    Date of Admission: 2024  Primary Care Physician: Provider, No Known    Subjective   Subjective     CC:  Back pain     HPI:  No acute events. Sleepy today. Comfortable.       Objective   Objective     Vital Signs:   Temp:  [96.7 °F (35.9 °C)-98.3 °F (36.8 °C)] 98.3 °F (36.8 °C)  Heart Rate:  [] 71  Resp:  [16-20] 20  BP: ()/(51-78) 89/51  Flow (L/min):  [2] 2     Physical Exam:  Constitutional: No acute distress, awake, alert; frail  Respiratory: Clear to auscultation bilaterally, respiratory effort normal   Cardiovascular: irreg; rate controlled   Gastrointestinal: Positive bowel sounds, soft, nontender, nondistended  Musculoskeletal: No bilateral ankle edema  Neurologic:  moving all ext, Cranial Nerves grossly intact to confrontation,  Skin: No rashes      Results Reviewed:  LAB RESULTS:      Lab 24  1010 24  0528 24  1656 24  1315   WBC 8.23 8.02  --   --  8.57   HEMOGLOBIN 13.3 12.1*  --   --  13.6   HEMATOCRIT 40.1 35.9*  --   --  40.7   PLATELETS 184 158  --   --  176   NEUTROS ABS  --  4.83  --   --  6.29   IMMATURE GRANS (ABS)  --  0.02  --   --  0.03   LYMPHS ABS  --  2.30  --   --  1.35   MONOS ABS  --  0.68  --   --  0.79   EOS ABS  --  0.15  --   --  0.09   MCV 92.6 90.2  --   --  92.5   PROCALCITONIN  --  0.07  --   --   --    LACTATE  --   --  1.7 2.1* 2.2*         Lab 24  1010 24  0528 24  1315   SODIUM 137 138 143   POTASSIUM 4.5 4.1 4.5   CHLORIDE 99 101 101   CO2 27.0 25.0 28.0   ANION GAP 11.0 12.0 14.0   BUN 20  34* 26*   CREATININE 0.95 1.09 1.11   EGFR 78.4 66.5 65.1   GLUCOSE 155* 170* 145*   CALCIUM 9.2 9.1 9.4   MAGNESIUM  --  1.7  --    HEMOGLOBIN A1C  --   --  5.80*         Lab 09/24/24  0528 09/23/24  1315   TOTAL PROTEIN 5.9* 6.5   ALBUMIN 3.5 3.9   GLOBULIN 2.4 2.6   ALT (SGPT) 9 11   AST (SGOT) 16 21   BILIRUBIN 0.8 1.3*   ALK PHOS 88 92                     Brief Urine Lab Results  (Last result in the past 365 days)        Color   Clarity   Blood   Leuk Est   Nitrite   Protein   CREAT   Urine HCG        09/23/24 1337 Yellow   Cloudy   Large (3+)   Large (3+)   Positive   30 mg/dL (1+)                   Microbiology Results Abnormal       Procedure Component Value - Date/Time    Blood Culture - Blood, Hand, Left [[id number]]  (Normal) Collected: 09/23/24 1656    Lab Status: Preliminary result Specimen: Blood from Hand, Left Updated: 09/24/24 1716     Blood Culture No growth at 24 hours    Blood Culture - Blood, Hand, Right [[id number]]  (Normal) Collected: 09/23/24 1656    Lab Status: Preliminary result Specimen: Blood from Hand, Right Updated: 09/24/24 1716     Blood Culture No growth at 24 hours            MRI Lumbar Spine Without Contrast    Result Date: 9/24/2024  MRI LUMBAR SPINE WO CONTRAST Date of Exam: 9/24/2024 5:13 PM EDT Indication: L2 compression fracture.  Comparison: CT lumbar spine 9/23/2024 Technique:  Routine multiplanar/multisequence sequence images of the lumbar spine were obtained without contrast administration.  Findings: Multiple sequences are moderately degraded by patient motion artifact. Again seen is a mild compression fracture involving superior endplate of the L2 vertebral body with approximately 20% loss of height. There is associated bone marrow edema within the vertebral body and bilateral pedicles. No retropulsed fracture fragments. There is an old moderate T12 compression fracture with greater than 50% loss of height. No associated bone marrow edema. There are edematous degenerative  endplate changes at the L3/4 level with severe disc space narrowing. There is disc desiccation throughout the lumbar spine. Severe disc base narrowing is also noted at the L5/S1 level. Spinal cord terminates at the L1 level with normal signal seen within the conus. Visualized paraspinal soft tissues are unremarkable. Axial images demonstrate: L1/2: No significant disc bulge. There are mild degenerative facet changes bilaterally. No spinal canal stenosis. No significant neural foraminal narrowing. L 2/3: Minimal circumferential disc bulge and mild degenerative facet change. No spinal canal stenosis. No significant neural foraminal narrowing. L3/4: Mild circumferential disc bulge with bulky right lateral disc osteophyte complex. There are moderate degenerative facet changes bilaterally. No spinal canal stenosis. There is moderate left and severe right neural foraminal narrowing. L 4/5: Mild circumferential disc bulge and severe degenerative facet change combining to cause moderate spinal canal stenosis. There is moderate left and mild right neural foraminal narrowing. L5/S1: No significant disc bulge. There are moderate degenerative facet changes bilaterally. No spinal canal stenosis. There is severe left and moderate right neural foraminal narrowing.     Impression: Impression: 1. Acute L2 compression fracture with approximate 20% loss of height. 2. Multilevel degenerative disc disease and degenerative facet change resulting in multilevel canal stenosis and neural foraminal narrowing as detailed above. Electronically Signed: Neo Mock MD  9/24/2024 6:05 PM EDT  Workstation ID: [id number]         Current medications:  Scheduled Meds:apixaban, 5 mg, Oral, BID  insulin lispro, 2-7 Units, Subcutaneous, 4x Daily AC & at Bedtime  metoprolol tartrate, 25 mg, Oral, Q12H  senna-docusate sodium, 2 tablet, Oral, BID   And  polyethylene glycol, 17 g, Oral, Daily  pravastatin, 20 mg, Oral, Nightly  QUEtiapine, 25 mg, Oral,  Nightly  sodium chloride, 10 mL, Intravenous, Q12H  vancomycin, 1,250 mg, Intravenous, Q24H      Continuous Infusions:Pharmacy to dose vancomycin,       PRN Meds:.  acetaminophen **OR** acetaminophen **OR** acetaminophen    senna-docusate sodium **AND** polyethylene glycol **AND** bisacodyl **AND** bisacodyl    Calcium Replacement - Follow Nurse / BPA Driven Protocol    dextrose    dextrose    glucagon (human recombinant)    HYDROcodone-acetaminophen    Magnesium Standard Dose Replacement - Follow Nurse / BPA Driven Protocol    nitroglycerin    ondansetron ODT **OR** ondansetron    Pharmacy to dose vancomycin    Phosphorus Replacement - Follow Nurse / BPA Driven Protocol    Potassium Replacement - Follow Nurse / BPA Driven Protocol    [COMPLETED] Insert Peripheral IV **AND** sodium chloride    sodium chloride    sodium chloride    Assessment & Plan   Assessment & Plan     Active Hospital Problems    Diagnosis  POA    **Closed compression fracture of L2 vertebra [S32.020A]  Yes    UTI (urinary tract infection) [N39.0]  Yes    Atrial fibrillation, chronic [I48.20]  Yes    CAD (coronary artery disease) [I25.10]  Yes    HTN (hypertension) [I10]  Yes    Type 2 diabetes mellitus, without long-term current use of insulin [E11.9]  Yes    Weakness [R53.1]  Yes      Resolved Hospital Problems   No resolved problems to display.        Brief Hospital Course to date:  Apollo Haro is an 85 yo M that presented to [facility] for evaluation of back pain. Patient with a fall earlier this month in which he hit his head, was evaluated in the ED and later discharged home. He reports since that time he has had issues with increased back pain inhibiting his ability to walk and has been stuck mostly in the bed. He was found to have L2 anterior column compression fracture.      Compression fracture L2  Back Pain   Weakness   - MRI with acute L2 compression fracture with 20% loss of height   - Neurosurgery consulted - likely conservative  management. Close follow up in 2-4 weeks  - PRN pain control  - PT/OT with rehab recs      MRSA UTI POA   - Ucx with MRSA; Bcx NGTD  - DC rocephin and start vanc ()     T2DM  - A1C  5.8  - FSBS c SSI coverage   - Home Metformin held for now      HTN  Afib   CAD   - Cards consulted due to persistent aflutter while on flecainide   - DC flecainide -- start metoprolol 25 mg; no current plans for cardioversion   - Continue eliquis      Cognitive impairment   - seroquel     Expected Discharge Location and Transportation: SNF  Expected Discharge   Expected Discharge Date: 2024; Expected Discharge Time:      VTE Prophylaxis:  Pharmacologic & mechanical VTE prophylaxis orders are present.         AM-PAC 6 Clicks Score (PT): 12 (24 1310)    CODE STATUS:   Code Status and Medical Interventions: CPR (Attempt to Resuscitate); Full Support   Ordered at: 24 1416     Level Of Support Discussed With:    Patient    Next of Kin (If No Surrogate)     Code Status (Patient has no pulse and is not breathing):    CPR (Attempt to Resuscitate)     Medical Interventions (Patient has pulse or is breathing):    Full Support       Jeanna Barr DO  24        Electronically signed by Jeanna Barr DO at 24 1417          Physical Therapy Notes (most recent note)        Meseret Dunham, PT at 24 1310  Version 1 of 1         Patient Name: Apollo Haro  : 1940    MRN: [id number]                              Today's Date: 2024       Admit Date: 2024    Visit Dx:     ICD-10-CM ICD-9-CM   1. Compression fracture of L2 vertebra, initial encounter  S32.020A 805.4   2. Declining functional status  R53.81 799.3   3. Unable to ambulate  R26.2 719.7   4. History of fall  Z91.81 V15.88   5. Acute midline low back pain without sciatica  M54.50 724.2   6. Chronic anticoagulation  Z79.01 V58.61   7. History of atrial fibrillation  Z86.79 V12.59     Patient Active Problem List   Diagnosis     Weakness    Type 2 diabetes mellitus, without long-term current use of insulin    Rhabdomyolysis due to COVID-19    CAD (coronary artery disease)    HTN (hypertension)    Atrial fibrillation, chronic    COVID-19 virus detected    SDH (subdural hematoma)    Altered mental status    AMS (altered mental status)    Closed compression fracture of L2 vertebra    UTI (urinary tract infection)     Past Medical History:   Diagnosis Date    A-fib     CAD (coronary artery disease)     DM (diabetes mellitus)      History reviewed. No pertinent surgical history.   General Information       Row Name 09/25/24 1310          Physical Therapy Time and Intention    Document Type therapy note (daily note)  -LR     Mode of Treatment physical therapy;individual therapy  -LR       Row Name 09/25/24 1310          General Information    Patient Profile Reviewed yes  -LR     Existing Precautions/Restrictions fall;spinal;other (see comments)  acute L3 compression fx, confusion, [place]  -LR     Barriers to Rehab medically complex;previous functional deficit;cognitive status;hearing deficit  -LR       Row Name 09/25/24 1310          Cognition    Orientation Status (Cognition) oriented to;person;place;time;other (see comments)  required increased time but eventually stated current year was 2024  -LR       Row Name 09/25/24 1310          Safety Issues, Functional Mobility    Safety Issues Affecting Function (Mobility) awareness of need for assistance;insight into deficits/self-awareness;judgment;safety precautions follow-through/compliance;safety precaution awareness;positioning of assistive device  -LR     Impairments Affecting Function (Mobility) balance;cognition;endurance/activity tolerance;postural/trunk control;strength  -LR               User Key  (r) = Recorded By, (t) = Taken By, (c) = Cosigned By      Initials Name Provider Type    LR Meseret Dunham, PT Physical Therapist                   Mobility       Row Name 09/25/24 1310           Bed Mobility    Bed Mobility supine-sit;sit-supine;rolling right  -LR     Rolling Right [place] (Bed Mobility) verbal cues;minimum assist (75% patient effort)  -LR     Supine-Sit [place] (Bed Mobility) verbal cues;moderate assist (50% patient effort);2 person assist  -LR     Sit-Supine [place] (Bed Mobility) verbal cues;moderate assist (50% patient effort);2 person assist  -LR     Assistive Device (Bed Mobility) head of bed elevated;bed rails  -LR     Comment, (Bed Mobility) Verbal cues to flex L knee and to roll hips and shoulders at same time onto R side. Cues and assist to then bring LEs off EOB and to push up from bed to raise trunk into sitting. Most pain occurs during transition from sidelying to sitting. Cues to let go of bedrail as he sits up to avoid twisting. Denied dizziness upon sitting up. Verbal cues to lay back down on R side while lifting LEs back up into bed. Cues to roll hips and shoulders at same time into supine.  -LR       Row Name 09/25/24 1310          Transfers    Comment, (Transfers) Patient declined t/f to chair d/t not feeling well and pain with mobility. Verbal cues to push up from bed to stand and to reach back for bed to lower into sitting. Continues to have posterior lean upon standing, improved compared to yesterday. Verbal cues for weight shifting and sequencing of steps to side step to R, cues for RW management.  -LR       Row Name 09/25/24 1310          Sit-Stand Transfer    Sit-Stand [place] (Transfers) verbal cues;moderate assist (50% patient effort);2 person assist  -LR     Assistive Device (Sit-Stand Transfers) walker, front-wheeled  -LR       Row Name 09/25/24 1310          Gait/Stairs (Locomotion)    Patient was able to Ambulate no, other medical factors prevent ambulation  -LR     Reason Patient was unable to Ambulate Uncontrolled Pain;Excessive Weakness  Unable to progress to forward ambulation d/t pain and weakness  -LR     [place] Level  (Stairs) not tested  -LR     Comment, (Gait/Stairs) See above.  -LR               User Key  (r) = Recorded By, (t) = Taken By, (c) = Cosigned By      Initials Name Provider Type    LR Meseret Dunham, PT Physical Therapist                   Obj/Interventions       Row Name 09/25/24 1310          Motor Skills    Therapeutic Exercise ankle;knee;hip;other (see comments)  cues for technique; CGA for heel slides, ab sets x10 reps each  -LR       Row Name 09/25/24 1310          Hip (Therapeutic Exercise)    Hip (Therapeutic Exercise) AROM (active range of motion);isometric exercises  -LR     Hip AROM (Therapeutic Exercise) bilateral;external rotation;internal rotation;supine;10 repetitions  -LR     Hip Isometrics (Therapeutic Exercise) bilateral;gluteal sets;10 repetitions;supine  -LR       Row Name 09/25/24 1310          Knee (Therapeutic Exercise)    Knee (Therapeutic Exercise) strengthening exercise;isometric exercises  -LR     Knee Isometrics (Therapeutic Exercise) bilateral;quad sets;10 repetitions;supine  -LR     Knee Strengthening (Therapeutic Exercise) bilateral;heel slides;supine;10 repetitions  -LR       Row Name 09/25/24 1310          Ankle (Therapeutic Exercise)    Ankle (Therapeutic Exercise) AROM (active range of motion)  -LR     Ankle AROM (Therapeutic Exercise) bilateral;dorsiflexion;plantarflexion;supine;10 repetitions  -LR       Row Name 09/25/24 1310          Balance    Balance Assessment sitting static balance;sitting dynamic balance;standing static balance;standing dynamic balance  -LR     Static Sitting Balance contact guard  -LR     Dynamic Sitting Balance minimal assist  -LR     Position, Sitting Balance supported;sitting edge of bed  -LR     Static Standing Balance verbal cues;minimal assist;2-person assist  -LR     Dynamic Standing Balance verbal cues;moderate assist;2-person assist  -LR     Position/Device Used, Standing Balance supported;walker, rolling  -LR               User Key  (r) =  Recorded By, (t) = Taken By, (c) = Cosigned By      Initials Name Provider Type    Meseret Shaw, PT Physical Therapist                   Goals/Plan       Row Name 09/25/24 1310          Bed Mobility Goal 1 (PT)    Activity/Assistive Device (Bed Mobility Goal 1, PT) sit to supine/supine to sit  -LR     [place] Level/Cues Needed (Bed Mobility Goal 1, PT) minimum assist (75% or more patient effort);other (see comments)  x2  -LR     Time Frame (Bed Mobility Goal 1, PT) short term goal (STG);3 days  -LR     Progress/Outcomes (Bed Mobility Goal 1, PT) continuing progress toward goal;goal ongoing  -LR       Row Name 09/25/24 1310          Transfer Goal 1 (PT)    Activity/Assistive Device (Transfer Goal 1, PT) sit-to-stand/stand-to-sit;bed-to-chair/chair-to-bed;walker, rolling  -LR     [place] Level/Cues Needed (Transfer Goal 1, PT) minimum assist (75% or more patient effort);other (see comments)  x2  -LR     Time Frame (Transfer Goal 1, PT) long term goal (LTG);5 days  -LR     Progress/Outcome (Transfer Goal 1, PT) continuing progress toward goal;goal ongoing  -LR       Row Name 09/25/24 1310          Gait Training Goal 1 (PT)    Activity/Assistive Device (Gait Training Goal 1, PT) gait (walking locomotion);walker, rolling  -LR     [place] Level (Gait Training Goal 1, PT) moderate assist (50-74% patient effort);other (see comments)  x2  -LR     Distance (Gait Training Goal 1, PT) 25 feet  -LR     Time Frame (Gait Training Goal 1, PT) long term goal (LTG);5 days  -LR     Progress/Outcome (Gait Training Goal 1, PT) continuing progress toward goal;goal ongoing  -LR               User Key  (r) = Recorded By, (t) = Taken By, (c) = Cosigned By      Initials Name Provider Type    Meseret Shaw, PT Physical Therapist                   Clinical Impression       Row Name 09/25/24 1310          Pain    Pretreatment Pain Rating 0/10 - no pain  -LR     Posttreatment Pain Rating 0/10 - no pain   -LR     Pre/Posttreatment Pain Comment denies pain at rest, significant pain with mobility, particularly with transition from sidelying to sitting  -LR     Pain Intervention(s) Ambulation/increased activity;Repositioned  -LR       Row Name 09/25/24 1310          Plan of Care Review    Plan of Care Reviewed With patient  -LR     Progress improving  -LR     Outcome Evaluation All mobility continues to be limited by significant pain, although patient tolerated transition to EOB and sitting EOB better than yesterday. Patient was able to weight shift and take side steps at EOB with RW and mod assist x2. Good effort with LE ther ex. Patient currently below baseline functioning, demonstrating decreased functional mobility status, impaired balance, decreased endurance, and decreased strength. Will address these deficits to promote return to PLOF. Recommend SNF at d/c.  -LR       Row Name 09/25/24 1310          Therapy Assessment/Plan (PT)    Rehab Potential (PT) fair, will monitor progress closely  -LR     Criteria for Skilled Interventions Met (PT) yes;meets criteria;skilled treatment is necessary  -LR     Therapy Frequency (PT) daily  -LR       Row Name 09/25/24 1310          Vital Signs    Pre SpO2 (%) 100  -LR     O2 Delivery Pre Treatment supplemental O2  -LR     Pre Patient Position Supine  -LR       Row Name 09/25/24 1310          Positioning and Restraints    Pre-Treatment Position in bed  -LR     Post Treatment Position bed  -LR     In Bed notified nsg;supine;call light within reach;encouraged to call for assist;exit alarm on;side rails up x2;heels elevated  -LR               User Key  (r) = Recorded By, (t) = Taken By, (c) = Cosigned By      Initials Name Provider Type    LR Meseret Dunham, PT Physical Therapist                   Outcome Measures       Row Name 09/25/24 1310 09/25/24 0800       How much help from another person do you currently need...    Turning from your back to your side while in flat  bed without using bedrails? 3  -LR 3  -KM    Moving from lying on back to sitting on the side of a flat bed without bedrails? 2  -LR 3  -KM    Moving to and from a bed to a chair (including a wheelchair)? 2  -LR 2  -KM    Standing up from a chair using your arms (e.g., wheelchair, bedside chair)? 2  -LR 2  -KM    Climbing 3-5 steps with a railing? 1  -LR 1  -KM    To walk in hospital room? 2  -LR 1  -KM    AM-PAC 6 Clicks Score (PT) 12  -LR 12  -KM    Highest Level of Mobility Goal 4 --> Transfer to chair/commode  -LR 4 --> Transfer to chair/commode  -KM      Row Name 09/25/24 1310          Functional Assessment    Outcome Measure Options AM-PAC 6 Clicks Basic Mobility (PT)  -LR               User Key  (r) = Recorded By, (t) = Taken By, (c) = Cosigned By      Initials Name Provider Type    LR Meseret Dunham, PT Physical Therapist     Yesica Ibanez, RN Registered Nurse                                 Physical Therapy Education       Title: PT OT SLP Therapies (In Progress)       Topic: Physical Therapy (Done)       Point: Mobility training (Done)       Learning Progress Summary             Patient Acceptance, E,D, VU,NR by LR at 9/25/2024 1310    Comment: Educated on spinal precautions for comfort, correct log rolling technique, correct sit<->stand t/f technique, correct technique for side steps at EOB, HEP, and progression of POC.    Acceptance, E,D, VU,NR by LR at 9/24/2024 1301    Comment: Educated on spinal precautions, correct log rolling technique, benefits of being OOB, correct sit<->stand t/f technique, correct bed to chair t/f technique, and progression of POC.                         Point: Home exercise program (Done)       Learning Progress Summary             Patient Acceptance, E,D, VU,NR by LR at 9/25/2024 1310    Comment: Educated on spinal precautions for comfort, correct log rolling technique, correct sit<->stand t/f technique, correct technique for side steps at EOB, HEP, and  progression of POC.    Acceptance, E,D, VU,NR by LR at 9/24/2024 1301    Comment: Educated on spinal precautions, correct log rolling technique, benefits of being OOB, correct sit<->stand t/f technique, correct bed to chair t/f technique, and progression of POC.                         Point: Body mechanics (Done)       Learning Progress Summary             Patient Acceptance, E,D, VU,NR by LR at 9/25/2024 1310    Comment: Educated on spinal precautions for comfort, correct log rolling technique, correct sit<->stand t/f technique, correct technique for side steps at EOB, HEP, and progression of POC.    Acceptance, E,D, VU,NR by LR at 9/24/2024 1301    Comment: Educated on spinal precautions, correct log rolling technique, benefits of being OOB, correct sit<->stand t/f technique, correct bed to chair t/f technique, and progression of POC.                         Point: Precautions (Done)       Learning Progress Summary             Patient Acceptance, E,D, VU,NR by LR at 9/25/2024 1310    Comment: Educated on spinal precautions for comfort, correct log rolling technique, correct sit<->stand t/f technique, correct technique for side steps at EOB, HEP, and progression of POC.    Acceptance, E,D, VU,NR by LR at 9/24/2024 1301    Comment: Educated on spinal precautions, correct log rolling technique, benefits of being OOB, correct sit<->stand t/f technique, correct bed to chair t/f technique, and progression of POC.                                         User Key       Initials Effective Dates Name Provider Type Discipline     02/03/23 -  Meseret Dunham, PT Physical Therapist PT                  PT Recommendation and Plan  Planned Therapy Interventions (PT): balance training, bed mobility training, gait training, home exercise program, patient/family education, ROM (range of motion), strengthening, transfer training  Plan of Care Reviewed With: patient  Progress: improving  Outcome Evaluation: All mobility  continues to be limited by significant pain, although patient tolerated transition to EOB and sitting EOB better than yesterday. Patient was able to weight shift and take side steps at EOB with RW and mod assist x2. Good effort with LE ther ex. Patient currently below baseline functioning, demonstrating decreased functional mobility status, impaired balance, decreased endurance, and decreased strength. Will address these deficits to promote return to PLOF. Recommend SNF at d/c.     Time Calculation:   PT Evaluation Complexity  History, PT Evaluation Complexity: 3 or more personal factors and/or comorbidities  Examination of Body Systems (PT Eval Complexity): total of 3 or more elements  Clinical Presentation (PT Evaluation Complexity): evolving  Clinical Decision Making (PT Evaluation Complexity): moderate complexity  Overall Complexity (PT Evaluation Complexity): moderate complexity     PT Charges       Row Name 09/25/24 1310             Time Calculation    Start Time 1310  -LR      PT Received On 09/25/24  -LR      PT Goal Re-Cert Due Date 10/04/24  -LR         Timed Charges    40076 - PT Therapeutic Exercise Minutes 5  -LR      15220 - PT Therapeutic Activity Minutes 10  -LR         Total Minutes    Timed Charges Total Minutes 15  -LR       Total Minutes 15  -LR                User Key  (r) = Recorded By, (t) = Taken By, (c) = Cosigned By      Initials Name Provider Type    LR Meseret Dunham, PT Physical Therapist                  Therapy Charges for Today       Code Description Service Date Service Provider Modifiers Qty    80458913671 HC PT THERAPEUTIC ACT EA 15 MIN 9/24/2024 Meseret Dunham, PT GP 1    63997003019 HC PT EVAL MOD COMPLEXITY 3 9/24/2024 Meseret Dunham, PT GP 1    46667653071 HC PT THERAPEUTIC ACT EA 15 MIN 9/25/2024 Meseret Dunham, PT GP 1    62828497404 HC PT THER SUPP EA 15 MIN 9/25/2024 Meseret Dunham, PT GP 2            PT G-Codes  Outcome Measure  Options: AM-PAC 6 Clicks Basic Mobility (PT)  AM-PAC 6 Clicks Score (PT): 12  AM-PAC 6 Clicks Score (OT): 14  PT Discharge Summary  Anticipated Discharge Disposition (PT): skilled nursing facility    Meseret Dunham, PT  2024      Electronically signed by Meseret Dunham, PT at 24 1349          Occupational Therapy Notes (most recent note)        Mikayla Cardoso, OT at 24 1304          Patient Name: Apollo aHro  : 1940    MRN: [id number]                              Today's Date: 2024       Admit Date: 2024    Visit Dx:     ICD-10-CM ICD-9-CM   1. Compression fracture of L2 vertebra, initial encounter  S32.020A 805.4   2. Declining functional status  R53.81 799.3   3. Unable to ambulate  R26.2 719.7   4. History of fall  Z91.81 V15.88   5. Acute midline low back pain without sciatica  M54.50 724.2   6. Chronic anticoagulation  Z79.01 V58.61   7. History of atrial fibrillation  Z86.79 V12.59     Patient Active Problem List   Diagnosis    Weakness    Type 2 diabetes mellitus, without long-term current use of insulin    Rhabdomyolysis due to COVID-19    CAD (coronary artery disease)    HTN (hypertension)    Atrial fibrillation, chronic    COVID-19 virus detected    SDH (subdural hematoma)    Altered mental status    AMS (altered mental status)    Closed compression fracture of L2 vertebra    UTI (urinary tract infection)     Past Medical History:   Diagnosis Date    A-fib     CAD (coronary artery disease)     DM (diabetes mellitus)      History reviewed. No pertinent surgical history.   General Information       Row Name 24 1303          OT Time and Intention    Document Type evaluation  -AC     Mode of Treatment occupational therapy  -AC       Row Name 24 1300          General Information    Patient Profile Reviewed yes  -AC     Prior Level of Function independent:;feeding;grooming;all household mobility;transfer;mod assist:;dressing;bathing  pt requiring  increased assist past 3 weeks, uses RW, and w/c at times  -     Existing Precautions/Restrictions fall;spinal  L3 compression fx, confusion  -     Barriers to Rehab medically complex;previous functional deficit;cognitive status  -       Row Name 09/24/24 1304          Occupational Profile    Environmental Supports and Barriers (Occupational Profile) walk in shower with shower seat  -       Row Name 09/24/24 1304          Living Environment    People in Home spouse;grandchild(clifton)  wife and 24 yo grand son  -       Row Name 09/24/24 1304          Home Main Entrance    Number of Stairs, Main Entrance two  -AC     Stair Railings, Main Entrance railing on right side (ascending)  -       Row Name 09/24/24 1304          Stairs Within Home, Primary    Stairs, Within Home, Primary 1 story  -       Row Name 09/24/24 1304          Cognition    Orientation Status (Cognition) oriented to;person;disoriented to;place;other (see comments)  pt knew the year, but not the month, initially stated he was at the [facility], and later stated he was at home.  -       Row Name 09/24/24 1304          Safety Issues, Functional Mobility    Safety Issues Affecting Function (Mobility) awareness of need for assistance;insight into deficits/self-awareness;judgment;problem-solving;safety precaution awareness;safety precautions follow-through/compliance;sequencing abilities  -     Impairments Affecting Function (Mobility) balance;cognition;endurance/activity tolerance;postural/trunk control;strength  -     Cognitive Impairments, Mobility Safety/Performance awareness, need for assistance;insight into deficits/self-awareness;judgment;problem-solving/reasoning;safety precaution awareness;safety precaution follow-through;sequencing abilities  -               User Key  (r) = Recorded By, (t) = Taken By, (c) = Cosigned By      Initials Name Provider Type    Mikayla Major, OT Occupational Therapist                      Mobility/ADL's       Row Name 09/24/24 1340          Bed Mobility    Bed Mobility supine-sit  -AC     Supine-Sit [place] (Bed Mobility) verbal cues;moderate assist (50% patient effort);2 person assist  -AC     Assistive Device (Bed Mobility) bed rails;head of bed elevated  -     Comment, (Bed Mobility) Verbal/tactile cues to sequence log roll  -       Row Name 09/24/24 1340          Transfers    Transfers sit-stand transfer;bed-chair transfer  -       Row Name 09/24/24 1340          Bed-Chair Transfer    Bed-Chair [place] (Transfers) verbal cues;moderate assist (50% patient effort);2 person assist  -AC     Assistive Device (Bed-Chair Transfers) walker, front-wheeled  -     Comment, (Bed-Chair Transfer) increased time and effort, short, shuffling steps to chair, assist to guide walker  -       Row Name 09/24/24 1340          Sit-Stand Transfer    Sit-Stand [place] (Transfers) verbal cues;moderate assist (50% patient effort);2 person assist  -AC     Assistive Device (Sit-Stand Transfers) walker, front-wheeled  -       Row Name 09/24/24 1340          Activities of Daily Living    BADL Assessment/Intervention lower body dressing  -       Row Name 09/24/24 1340          Lower Body Dressing Assessment/Training    [place] Level (Lower Body Dressing) don;socks;dependent (less than 25% patient effort)  -     Position (Lower Body Dressing) supine  -               User Key  (r) = Recorded By, (t) = Taken By, (c) = Cosigned By      Initials Name Provider Type    AC Mikayla Cardoso OT Occupational Therapist                   Obj/Interventions       Row Name 09/24/24 1342          Sensory Assessment (Somatosensory)    Sensory Assessment (Somatosensory) UE sensation intact  -       Row Name 09/24/24 1342          Range of Motion Comprehensive    General Range of Motion bilateral upper extremity ROM WNL  -       Row Name 09/24/24 1342          Strength Comprehensive (MMT)    Comment,  General Manual Muscle Testing (MMT) Assessment BUCEE grossly 4/5  -AC       Row Name 09/24/24 9372          Balance    Balance Assessment sitting static balance;standing static balance;standing dynamic balance  -AC     Static Sitting Balance contact guard  -AC     Position, Sitting Balance sitting edge of bed  -AC     Static Standing Balance verbal cues;moderate assist;2-person assist  -AC     Dynamic Standing Balance verbal cues;moderate assist;2-person assist  -AC     Position/Device Used, Standing Balance supported;walker, rolling  -AC               User Key  (r) = Recorded By, (t) = Taken By, (c) = Cosigned By      Initials Name Provider Type    Mikayla Major OT Occupational Therapist                   Goals/Plan       Row Name 09/24/24 0230          Bed Mobility Goal 1 (OT)    Activity/Assistive Device (Bed Mobility Goal 1, OT) sit to supine;supine to sit  -AC     [place] Level/Cues Needed (Bed Mobility Goal 1, OT) verbal cues required;minimum assist (75% or more patient effort)  -AC     Time Frame (Bed Mobility Goal 1, OT) long term goal (LTG);10 days  -AC     Progress/Outcomes (Bed Mobility Goal 1, OT) new goal;goal ongoing  -       Row Name 09/24/24 7745          Transfer Goal 1 (OT)    Activity/Assistive Device (Transfer Goal 1, OT) bed-to-chair/chair-to-bed;toilet;walker, rolling  -AC     [place] Level/Cues Needed (Transfer Goal 1, OT) verbal cues required;minimum assist (75% or more patient effort)  -AC     Time Frame (Transfer Goal 1, OT) long term goal (LTG);10 days  -AC     Progress/Outcome (Transfer Goal 1, OT) new goal;goal ongoing  -       Row Name 09/24/24 5022          Toileting Goal 1 (OT)    Activity/Device (Toileting Goal 1, OT) perform perineal hygiene;adjust/manage clothing;commode, bedside without drop arms  -AC     [place] Level/Cues Needed (Toileting Goal 1, OT) minimum assist (75% or more patient effort)  -AC     Time Frame (Toileting Goal 1, OT) short term goal  (STG);5 days  -AC     Progress/Outcome (Toileting Goal 1, OT) new goal;goal ongoing  -AC       Row Name 09/24/24 1357          Grooming Goal 1 (OT)    Activity/Device (Grooming Goal 1, OT) oral care  -AC     [place] (Grooming Goal 1, OT) supervision required  -AC     Time Frame (Grooming Goal 1, OT) short term goal (STG);5 days  -AC     Strategies/Barriers (Grooming Goal 1, OT) sitting EOB  -AC     Progress/Outcome (Grooming Goal 1, OT) new goal;goal ongoing  -AC       Row Name 09/24/24 1358          Therapy Assessment/Plan (OT)    Planned Therapy Interventions (OT) activity tolerance training;BADL retraining;adaptive equipment training;functional balance retraining;occupation/activity based interventions;patient/caregiver education/training;strengthening exercise;transfer/mobility retraining  -AC               User Key  (r) = Recorded By, (t) = Taken By, (c) = Cosigned By      Initials Name Provider Type    AC Mikayla Cardoso, OT Occupational Therapist                   Clinical Impression       Row Name 09/24/24 1160          Pain Assessment    Additional Documentation Pain Scale: FACES Pre/Post-Treatment (Group)  -AC       Row Name 09/24/24 6340          Pain Scale: FACES Pre/Post-Treatment    Pain: FACES Scale, Pretreatment 0-->no hurt  -AC     Posttreatment Pain Rating 4-->hurts little more  -AC     Pain Location - back  -AC     Pre/Posttreatment Pain Comment pt with increased pain with mobility  -AC       Row Name 09/24/24 1350          Plan of Care Review    Plan of Care Reviewed With patient;spouse  -AC     Outcome Evaluation Pt presents below baseline with self care/mobility d/t back pain, weakness, decr balance and activity tolerance.  Pt dep LBD - spouse assists at baseline,  mod A x 2 bed to chair with RW.  OT will follow to advacne pt toward PLOF.  Recommend SNF upon d/c.  -AC       Row Name 09/24/24 4600          Therapy Assessment/Plan (OT)    Patient/Family Therapy Goal Statement (OT) decrease  pain with mobiity/ self care  -AC     Rehab Potential (OT) fair, will monitor progress closely  -AC     Criteria for Skilled Therapeutic Interventions Met (OT) yes;skilled treatment is necessary  -AC     Therapy Frequency (OT) daily  -AC     Predicted Duration of Therapy Intervention (OT) 10 days  -AC       Row Name 09/24/24 1350          Therapy Plan Review/Discharge Plan (OT)    Anticipated Discharge Disposition (OT) skilled nursing facility  -       Row Name 09/24/24 1350          Vital Signs    Pre Systolic BP Rehab 120  -AC     Pre Treatment Diastolic BP 78  -AC     Pretreatment Heart Rate (beats/min) 99  -AC     Posttreatment Heart Rate (beats/min) 85  -AC     Pre SpO2 (%) 96  -AC     O2 Delivery Pre Treatment room air  -AC     O2 Delivery Intra Treatment room air  -AC     Post SpO2 (%) 93  -AC     O2 Delivery Post Treatment room air  -AC     Pre Patient Position Supine  -AC     Post Patient Position Sitting  -AC       Row Name 09/24/24 1350          Positioning and Restraints    Pre-Treatment Position in bed  -AC     Post Treatment Position chair  -AC     In Chair notified nsg;reclined;call light within reach;encouraged to call for assist;exit alarm on;waffle cushion;with family/caregiver;heels elevated  -AC               User Key  (r) = Recorded By, (t) = Taken By, (c) = Cosigned By      Initials Name Provider Type    AC Mikayla Cardoso, OT Occupational Therapist                   Outcome Measures       Row Name 09/24/24 1356          How much help from another is currently needed...    Putting on and taking off regular lower body clothing? 1  -AC     Bathing (including washing, rinsing, and drying) 2  -AC     Toileting (which includes using toilet bed pan or urinal) 1  -AC     Putting on and taking off regular upper body clothing 3  -AC     Taking care of personal grooming (such as brushing teeth) 3  -AC     Eating meals 4  -AC     AM-PAC 6 Clicks Score (OT) 14  -AC       Row Name 09/24/24 0800           How much help from another person do you currently need...    Turning from your back to your side while in flat bed without using bedrails? 4  -KM     Moving from lying on back to sitting on the side of a flat bed without bedrails? 3  -KM     Moving to and from a bed to a chair (including a wheelchair)? 3  -KM     Standing up from a chair using your arms (e.g., wheelchair, bedside chair)? 3  -KM     Climbing 3-5 steps with a railing? 2  -KM     To walk in hospital room? 2  -KM     AM-PAC 6 Clicks Score (PT) 17  -KM     Highest Level of Mobility Goal 5 --> Static standing  -KM       Row Name 09/24/24 1357          Functional Assessment    Outcome Measure Options AM-PAC 6 Clicks Daily Activity (OT)  -AC               User Key  (r) = Recorded By, (t) = Taken By, (c) = Cosigned By      Initials Name Provider Type    Mikayla Major, OT Occupational Therapist     Yesica Ibanez, RN Registered Nurse                    Occupational Therapy Education       Title: PT OT SLP Therapies (In Progress)       Topic: Occupational Therapy (In Progress)       Point: ADL training (In Progress)       Description:   Instruct learner(s) on proper safety adaptation and remediation techniques during self care or transfers.   Instruct in proper use of assistive devices.                  Learning Progress Summary             Patient Acceptance, E, NR by  at 9/24/2024 8246                         Point: Home exercise program (Not Started)       Description:   Instruct learner(s) on appropriate technique for monitoring, assisting and/or progressing therapeutic exercises/activities.                  Learner Progress:  Not documented in this visit.              Point: Precautions (Not Started)       Description:   Instruct learner(s) on prescribed precautions during self-care and functional transfers.                  Learner Progress:  Not documented in this visit.              Point: Body mechanics (Not Started)       Description:    Instruct learner(s) on proper positioning and spine alignment during self-care, functional mobility activities and/or exercises.                  Learner Progress:  Not documented in this visit.                              User Key       Initials Effective Dates Name Provider Type Discipline     02/03/23 -  Mikayla Cardoso, OT Occupational Therapist OT                  OT Recommendation and Plan  Planned Therapy Interventions (OT): activity tolerance training, BADL retraining, adaptive equipment training, functional balance retraining, occupation/activity based interventions, patient/caregiver education/training, strengthening exercise, transfer/mobility retraining  Therapy Frequency (OT): daily  Plan of Care Review  Plan of Care Reviewed With: patient, spouse  Outcome Evaluation: Pt presents below baseline with self care/mobility d/t back pain, weakness, decr balance and activity tolerance.  Pt dep LBD - spouse assists at baseline,  mod A x 2 bed to chair with RW.  OT will follow to marly pt toward PLOF.  Recommend SNF upon d/c.     Time Calculation:   Evaluation Complexity (OT)  Review Occupational Profile/Medical/Therapy History Complexity: expanded/moderate complexity  Assessment, Occupational Performance/Identification of Deficit Complexity: 3-5 performance deficits  Overall Complexity of Evaluation (OT): moderate complexity     Time Calculation- OT       Row Name 09/24/24 1304             Time Calculation- OT    OT Start Time 1305  -AC      OT Received On 09/24/24  -AC      OT Goal Re-Cert Due Date 10/04/24  -AC         Untimed Charges    OT Eval/Re-eval Minutes 48  -AC         Total Minutes    Untimed Charges Total Minutes 48  -AC       Total Minutes 48  -AC                User Key  (r) = Recorded By, (t) = Taken By, (c) = Cosigned By      Initials Name Provider Type     Mikayla Cardoso OT Occupational Therapist                  Therapy Charges for Today       Code Description Service Date Service  Provider Modifiers Qty    [id number] HC OT EVAL MOD COMPLEXITY 4 9/24/2024 Mikayla Cardoso OT GO 1                 Mikayla MORILLO. DORIAN Cardoso  9/24/2024    Electronically signed by Mikayla Cardoso OT at 09/24/24 1356       Speech Language Pathology Notes (most recent note)    No notes exist for this encounter.

## 2024-09-26 NOTE — CASE MANAGEMENT/SOCIAL WORK
Continued Stay Note   [facility]     Patient Name: Apollo Haro  MRN: [id number]  Today's Date: 9/26/2024    Admit Date: 9/23/2024    Plan: SNF [facility] [place]   Discharge Plan       Row Name 09/26/24 1130       Plan    Plan SNF at [place]    Plan Comments I could not reach the pts spouse. No answer and unable to leave VM as box not set up. [facility] is unable to accept the pt. I have called the referral to [facility] for [facility] and [facility]. I have faxed to [facility] and [facility]. I will extend th search as needed.    Final Discharge Disposition Code 03 - skilled nursing facility (SNF)                   Discharge Codes    No documentation.                 Expected Discharge Date and Time       Expected Discharge Date Expected Discharge Time    Sep 26, 2024               Alissa Murdock RN

## 2024-09-26 NOTE — THERAPY TREATMENT NOTE
Patient Name: Apollo Haro  : 1940    MRN: [id number]                              Today's Date: 2024       Admit Date: 2024    Visit Dx:     ICD-10-CM ICD-9-CM   1. Compression fracture of L2 vertebra, initial encounter  S32.020A 805.4   2. Declining functional status  R53.81 799.3   3. Unable to ambulate  R26.2 719.7   4. History of fall  Z91.81 V15.88   5. Acute midline low back pain without sciatica  M54.50 724.2   6. Chronic anticoagulation  Z79.01 V58.61   7. History of atrial fibrillation  Z86.79 V12.59     Patient Active Problem List   Diagnosis    Weakness    Type 2 diabetes mellitus, without long-term current use of insulin    Rhabdomyolysis due to COVID-19    CAD (coronary artery disease)    HTN (hypertension)    Atrial fibrillation, chronic    COVID-19 virus detected    SDH (subdural hematoma)    Altered mental status    AMS (altered mental status)    Closed compression fracture of L2 vertebra    UTI (urinary tract infection)     Past Medical History:   Diagnosis Date    A-fib     CAD (coronary artery disease)     DM (diabetes mellitus)      History reviewed. No pertinent surgical history.   General Information       Row Name 24 1403          Physical Therapy Time and Intention    Document Type therapy note (daily note)  -LR     Mode of Treatment physical therapy;individual therapy  -LR       Row Name 24 1403          General Information    Patient Profile Reviewed yes  -LR     Existing Precautions/Restrictions fall;spinal;other (see comments)  acute L3 compression fx, confusion, [place]  -LR     Barriers to Rehab medically complex;previous functional deficit;cognitive status;hearing deficit  -LR       Row Name 24 1403          Cognition    Orientation Status (Cognition) oriented to;person;place;disoriented to;time;verbal cues/prompts needed for orientation;other (see comments)  stated it was September but could not state current year  -LR       Row Name 24 1404           Safety Issues, Functional Mobility    Safety Issues Affecting Function (Mobility) ability to follow commands;awareness of need for assistance;insight into deficits/self-awareness;judgment;safety precautions follow-through/compliance;positioning of assistive device;sequencing abilities;safety precaution awareness  -LR     Impairments Affecting Function (Mobility) balance;cognition;endurance/activity tolerance;postural/trunk control;strength;pain  -LR               User Key  (r) = Recorded By, (t) = Taken By, (c) = Cosigned By      Initials Name Provider Type    LR Meseret Dunham, PT Physical Therapist                   Mobility       Row Name 09/26/24 1400          Bed Mobility    Bed Mobility supine-sit;sit-supine  -LR     Rolling Right Prentice (Bed Mobility) verbal cues;minimum assist (75% patient effort)  -LR     Supine-Sit Prentice (Bed Mobility) verbal cues;moderate assist (50% patient effort);2 person assist  -LR     Sit-Supine [place]ice (Bed Mobility) verbal cues;moderate assist (50% patient effort);2 person assist  -LR     Assistive Device (Bed Mobility) head of bed elevated;bed rails;draw sheet  -LR     Comment, (Bed Mobility) Verbal cues for correct log rolling technique and to keep hips and shoulders at same time onto R side. Cues to bring LEs off EOB and to push up from bed to raise trunk into sitting. Continues to have significant pain with transition to sitting EOB. Denied dizziness upon sitting up. Cues and assist to maintain correct log rolling technique when returning to bed.  -LR       Row Name 09/26/24 140          Transfers    Comment, (Transfers) Verbal cues for correct hand placement with t/f and to limit trunk flexion during t/f. Patient pulled up on RW to stand despite these cues. Posterior lean upon standing much improved today. Patient declined t/f to chair d/t pain with sitting [facility].  -LR       Row Name 09/26/24 9282          Bed-Chair Transfer    Bed-Chair  [place] (Transfers) not tested  -LR       Row Name 09/26/24 1403          Sit-Stand Transfer    Sit-Stand [place] (Transfers) verbal cues;minimum assist (75% patient effort);2 person assist  -LR     Assistive Device (Sit-Stand Transfers) walker, front-wheeled  -LR       Row Name 09/26/24 1403          Gait/Stairs (Locomotion)    [place] Level (Gait) verbal cues;minimum assist (75% patient effort);1 person to manage equipment  -LR     Assistive Device (Gait) walker, front-wheeled  -LR     Patient was able to Ambulate yes  -LR     Distance in Feet (Gait) 4  -LR     Deviations/Abnormal Patterns (Gait) bilateral deviations;cindy decreased;gait speed decreased;stride length decreased  -LR     Bilateral Gait Deviations forward flexed posture;heel strike decreased  -LR     [place] Level (Stairs) not tested  -LR     Comment, (Gait/Stairs) Patient ambulated with step to gait pattern at slow pace with short steps. Cues for correct sequencing of steps, increased LE weight bearing, decreased UE weight bearing. Patient ambulated 4 steps forward and 4 steps back to bed. Mildly unsteady with taking steps backwards. Gait limited by pain and weakness.  -LR               User Key  (r) = Recorded By, (t) = Taken By, (c) = Cosigned By      Initials Name Provider Type    LR Meseret Dunham, PT Physical Therapist                   Obj/Interventions       Row Name 09/26/24 1403          Motor Skills    Therapeutic Exercise ankle;knee;hip;other (see comments)  cues for technique; CGA for heel slides  -LR       Row Name 09/26/24 1403          Hip (Therapeutic Exercise)    Hip (Therapeutic Exercise) AROM (active range of motion);isometric exercises  -LR     Hip AROM (Therapeutic Exercise) bilateral;external rotation;internal rotation;supine;10 repetitions  -LR     Hip Isometrics (Therapeutic Exercise) bilateral;gluteal sets;supine;10 repetitions  -LR       Row Name 09/26/24 1403          Knee (Therapeutic  Exercise)    Knee (Therapeutic Exercise) strengthening exercise;isometric exercises  -LR     Knee Isometrics (Therapeutic Exercise) bilateral;quad sets;supine;10 repetitions  -LR     Knee Strengthening (Therapeutic Exercise) bilateral;heel slides;supine;10 repetitions  -LR       Row Name 09/26/24 1403          Ankle (Therapeutic Exercise)    Ankle (Therapeutic Exercise) AROM (active range of motion)  -LR     Ankle AROM (Therapeutic Exercise) bilateral;dorsiflexion;plantarflexion;supine;10 repetitions  -LR       Row Name 09/26/24 1403          Balance    Balance Assessment sitting static balance;sitting dynamic balance;standing static balance;standing dynamic balance  -LR     Static Sitting Balance contact guard  -LR     Dynamic Sitting Balance contact guard  -LR     Position, Sitting Balance supported;sitting edge of bed  -LR     Static Standing Balance verbal cues;contact guard;1 person to manage equipment  -LR     Dynamic Standing Balance verbal cues;minimal assist;1 person to manage equipment  -LR     Position/Device Used, Standing Balance supported;walker, rolling  -LR               User Key  (r) = Recorded By, (t) = Taken By, (c) = Cosigned By      Initials Name Provider Type    LR Meseret Dunham, PT Physical Therapist                   Goals/Plan       Row Name 09/26/24 1403          Bed Mobility Goal 1 (PT)    Activity/Assistive Device (Bed Mobility Goal 1, PT) sit to supine/supine to sit  -LR     [place] Level/Cues Needed (Bed Mobility Goal 1, PT) minimum assist (75% or more patient effort);other (see comments)  x2  -LR     Time Frame (Bed Mobility Goal 1, PT) short term goal (STG);3 days  -LR     Progress/Outcomes (Bed Mobility Goal 1, PT) continuing progress toward goal;goal ongoing  -LR       Row Name 09/26/24 1403          Transfer Goal 1 (PT)    Activity/Assistive Device (Transfer Goal 1, PT) sit-to-stand/stand-to-sit;bed-to-chair/chair-to-bed;walker, rolling  -LR     [place]  Level/Cues Needed (Transfer Goal 1, PT) contact guard required  -LR     Time Frame (Transfer Goal 1, PT) long term goal (LTG);5 days  -LR     Progress/Outcome (Transfer Goal 1, PT) good progress toward goal;goal met;goal revised this date;goal ongoing  -LR       Row Name 09/26/24 1403          Gait Training Goal 1 (PT)    Activity/Assistive Device (Gait Training Goal 1, PT) gait (walking locomotion);walker, rolling  -LR     [place] Level (Gait Training Goal 1, PT) minimum assist (75% or more patient effort)  -LR     Distance (Gait Training Goal 1, PT) 25 feet  -LR     Time Frame (Gait Training Goal 1, PT) long term goal (LTG);5 days  -LR     Progress/Outcome (Gait Training Goal 1, PT) continuing progress toward goal;goal partially met;goal revised this date;goal ongoing  -LR               User Key  (r) = Recorded By, (t) = Taken By, (c) = Cosigned By      Initials Name Provider Type    LR Meseret Dunham, PT Physical Therapist                   Clinical Impression       Row Name 09/26/24 1403          Pain    Pretreatment Pain Rating 0/10 - no pain  -LR     Posttreatment Pain Rating 5/10  -LR     Pain Location - Side/Orientation Bilateral  -LR     Pain Location lower  -LR     Pain Location - back  -LR     Pre/Posttreatment Pain Comment notified RN, patient requesting pain meds  -LR     Pain Intervention(s) Ambulation/increased activity;Repositioned  -LR       Row Name 09/26/24 1403          Plan of Care Review    Plan of Care Reviewed With patient  -LR     Progress improving  -LR     Outcome Evaluation Patient ambulated 4 feet forward and 4 feet back to bed with RW and min assist, limited by pain and weakness. Less posterior lean with standing today and requiring less assist with all OOB mobility. Continues to have significant pain with transition to EOB. Patient currently below baseline functioning, demonstrating decreased functional mobility status, impaired balance, decreased endurance, and decreased  strength. Will address these deficits to promote return to PLOF. Recommend SNF at d/c.  -LR       Row Name 09/26/24 1403          Therapy Assessment/Plan (PT)    Rehab Potential (PT) fair, will monitor progress closely  -LR     Criteria for Skilled Interventions Met (PT) yes;meets criteria;skilled treatment is necessary  -LR     Therapy Frequency (PT) daily  -LR       Row Name 09/26/24 1403          Vital Signs    Pre Systolic BP Rehab 105  -LR     Pre Treatment Diastolic BP 67  -LR     Post Systolic BP Rehab 110  -LR     Post Treatment Diastolic BP 60  -LR     Pre SpO2 (%) 97  -LR     O2 Delivery Pre Treatment room air  -LR     Pre Patient Position Supine  -LR     Post Patient Position Supine  -LR       Row Name 09/26/24 1403          Positioning and Restraints    Pre-Treatment Position in bed  -LR     Post Treatment Position bed  -LR     In Bed notified nsg;supine;call light within reach;encouraged to call for assist;exit alarm on;side rails up x2;legs elevated  -LR               User Key  (r) = Recorded By, (t) = Taken By, (c) = Cosigned By      Initials Name Provider Type    LR Meseret Dunham, PT Physical Therapist                   Outcome Measures       Row Name 09/26/24 1403 09/26/24 1000       How much help from another person do you currently need...    Turning from your back to your side while in flat bed without using bedrails? 3  -LR 3  -CJ    Moving from lying on back to sitting on the side of a flat bed without bedrails? 2  -LR 2  -CJ    Moving to and from a bed to a chair (including a wheelchair)? 2  -LR 2  -CJ    Standing up from a chair using your arms (e.g., wheelchair, bedside chair)? 2  -LR 2  -CJ    Climbing 3-5 steps with a railing? 1  -LR 1  -CJ    To walk in hospital room? 3  -LR 2  -CJ    AM-PAC 6 Clicks Score (PT) 13  -LR 12  -CJ    Highest Level of Mobility Goal 4 --> Transfer to chair/commode  -LR 4 --> Transfer to chair/commode  -CJ      Row Name 09/26/24 1403          Functional  Assessment    Outcome Measure Options AM-PAC 6 Clicks Basic Mobility (PT)  -LR               User Key  (r) = Recorded By, (t) = Taken By, (c) = Cosigned By      Initials Name Provider Type    LR Meseret Dunham, PT Physical Therapist    Dax Patten RN Registered Nurse                                 Physical Therapy Education       Title: PT OT SLP Therapies (In Progress)       Topic: Physical Therapy (Done)       Point: Mobility training (Done)       Learning Progress Summary             Patient Acceptance, E,D, VU,NR by LR at 9/26/2024 1403    Comment: Educated on spinal precautions for comfort, correct log rolling technique, correct sit<->stand t/f technique, correct gait mechanics, HEP, benefits of mobility and being OOB, and progression of POC.    Acceptance, E,D, VU,NR by LR at 9/25/2024 1310    Comment: Educated on spinal precautions for comfort, correct log rolling technique, correct sit<->stand t/f technique, correct technique for side steps at EOB, HEP, and progression of POC.    Acceptance, E,D, VU,NR by LR at 9/24/2024 1301    Comment: Educated on spinal precautions, correct log rolling technique, benefits of being OOB, correct sit<->stand t/f technique, correct bed to chair t/f technique, and progression of POC.                         Point: Home exercise program (Done)       Learning Progress Summary             Patient Acceptance, E,D, VU,NR by LR at 9/26/2024 1403    Comment: Educated on spinal precautions for comfort, correct log rolling technique, correct sit<->stand t/f technique, correct gait mechanics, HEP, benefits of mobility and being OOB, and progression of POC.    Acceptance, E,D, VU,NR by LR at 9/25/2024 1310    Comment: Educated on spinal precautions for comfort, correct log rolling technique, correct sit<->stand t/f technique, correct technique for side steps at EOB, HEP, and progression of POC.    Acceptance, E,D, VU,NR by LR at 9/24/2024 1301    Comment: Educated on spinal  precautions, correct log rolling technique, benefits of being OOB, correct sit<->stand t/f technique, correct bed to chair t/f technique, and progression of POC.                         Point: Body mechanics (Done)       Learning Progress Summary             Patient Acceptance, E,D, VU,NR by LR at 9/26/2024 1403    Comment: Educated on spinal precautions for comfort, correct log rolling technique, correct sit<->stand t/f technique, correct gait mechanics, HEP, benefits of mobility and being OOB, and progression of POC.    Acceptance, E,D, VU,NR by LR at 9/25/2024 1310    Comment: Educated on spinal precautions for comfort, correct log rolling technique, correct sit<->stand t/f technique, correct technique for side steps at EOB, HEP, and progression of POC.    Acceptance, E,D, VU,NR by LR at 9/24/2024 1301    Comment: Educated on spinal precautions, correct log rolling technique, benefits of being OOB, correct sit<->stand t/f technique, correct bed to chair t/f technique, and progression of POC.                         Point: Precautions (Done)       Learning Progress Summary             Patient Acceptance, E,D, VU,NR by LR at 9/26/2024 1403    Comment: Educated on spinal precautions for comfort, correct log rolling technique, correct sit<->stand t/f technique, correct gait mechanics, HEP, benefits of mobility and being OOB, and progression of POC.    Acceptance, E,D, VU,NR by LR at 9/25/2024 1310    Comment: Educated on spinal precautions for comfort, correct log rolling technique, correct sit<->stand t/f technique, correct technique for side steps at EOB, HEP, and progression of POC.    Acceptance, E,D, VU,NR by LR at 9/24/2024 1301    Comment: Educated on spinal precautions, correct log rolling technique, benefits of being OOB, correct sit<->stand t/f technique, correct bed to chair t/f technique, and progression of POC.                                         User Key       Initials Effective Dates Name Provider  Type Discipline    LR 02/03/23 -  Meseret Dunham, PT Physical Therapist PT                  PT Recommendation and Plan  Planned Therapy Interventions (PT): balance training, bed mobility training, gait training, home exercise program, patient/family education, ROM (range of motion), strengthening, transfer training  Plan of Care Reviewed With: patient  Progress: improving  Outcome Evaluation: Patient ambulated 4 feet forward and 4 feet back to bed with RW and min assist, limited by pain and weakness. Less posterior lean with standing today and requiring less assist with all OOB mobility. Continues to have significant pain with transition to EOB. Patient currently below baseline functioning, demonstrating decreased functional mobility status, impaired balance, decreased endurance, and decreased strength. Will address these deficits to promote return to PLOF. Recommend SNF at d/c.     Time Calculation:   PT Evaluation Complexity  History, PT Evaluation Complexity: 3 or more personal factors and/or comorbidities  Examination of Body Systems (PT Eval Complexity): total of 3 or more elements  Clinical Presentation (PT Evaluation Complexity): evolving  Clinical Decision Making (PT Evaluation Complexity): moderate complexity  Overall Complexity (PT Evaluation Complexity): moderate complexity     PT Charges       Row Name 09/26/24 1403             Time Calculation    Start Time 1403  -LR      PT Received On 09/26/24  -LR      PT Goal Re-Cert Due Date 10/04/24  -LR         Timed Charges    48582 - PT Therapeutic Exercise Minutes 7  -LR      84465 - PT Therapeutic Activity Minutes 10  -LR         Total Minutes    Timed Charges Total Minutes 17  -LR       Total Minutes 17  -LR                User Key  (r) = Recorded By, (t) = Taken By, (c) = Cosigned By      Initials Name Provider Type    LR Meseret Dunham, PT Physical Therapist                  Therapy Charges for Today       Code Description Service Date  Service Provider Modifiers Qty    [id number]  PT THERAPEUTIC ACT EA 15 MIN 9/25/2024 Meseret Dunham, PT GP 1    [id number]  PT THER SUPP EA 15 MIN 9/25/2024 Meseret Dunham, PT GP 2    [id number]  PT THERAPEUTIC ACT EA 15 MIN 9/26/2024 Meseret Dunham, PT GP 1    [id number]  PT THER SUPP EA 15 MIN 9/26/2024 Meseret Dunham, PT GP 2            PT G-Codes  Outcome Measure Options: AM-PAC 6 Clicks Basic Mobility (PT)  AM-PAC 6 Clicks Score (PT): 13  AM-PAC 6 Clicks Score (OT): 14  PT Discharge Summary  Anticipated Discharge Disposition (PT): skilled nursing facility    Meseret Dunham, PT  9/26/2024

## 2024-09-26 NOTE — PROGRESS NOTES
"Pharmacy Consult - Vancomycin Dosing and Monitoring    Apollo Haro is a 84 y.o. male receiving vancomycin therapy.     Indication: UTI  Consulting Provider: Hospitalist   ID Consult: No    Goal AUC: 400-600 mg/L*hr    Current Antimicrobial Therapy  Anti-Infectives (From admission, onward)      Ordered     Dose/Rate Route Frequency Start Stop    09/26/24 0659  vancomycin IVPB 1500 mg in 0.9% NaCl (Premix) 500 mL        Ordering Provider: Rosemarie Griffin RPH    1,500 mg  333.3 mL/hr over 90 Minutes Intravenous Every 24 Hours 09/26/24 1600 09/29/24 1559    09/24/24 1555  vancomycin IVPB 1750 mg in 0.9% Sodium Chloride (premix) 500 mL        Ordering Provider: Na Keller RPH    20 mg/kg × 88.5 kg  285.7 mL/hr over 105 Minutes Intravenous Once 09/24/24 1700 09/24/24 2059    09/24/24 1520  Pharmacy to dose vancomycin        Ordering Provider: Jeanna Barr DO     Does not apply Continuous PRN 09/24/24 1520 09/29/24 1519          Allergies  Allergies as of 09/23/2024 - Reviewed 09/23/2024   Allergen Reaction Noted    Lidocaine Angioedema 01/31/2024     Labs  Results from last 7 days   Lab Units 09/25/24  1010 09/24/24  0528 09/23/24  1315   BUN mg/dL 20 34* 26*   CREATININE mg/dL 0.95 1.09 1.11     Results from last 7 days   Lab Units 09/25/24  1010 09/24/24  0528 09/23/24  1315   WBC 10*3/mm3 8.23 8.02 8.57     Evaluation of Dosing  Last Dose Received in the ED/Outside Facility: no  Is Patient on Dialysis or Renal Replacement: No    Height - 175.3 cm (69\")  Weight - 88.5 kg (195 lb)    Estimated Creatinine Clearance: 62.6 mL/min (by C-G formula based on SCr of 0.95 mg/dL).    I/O last 3 completed shifts:  In: 472 [P.O.:472]  Out: 400 [Urine:400]    Microbiology and Radiology  Microbiology Results (last 10 days)       Procedure Component Value - Date/Time    Blood Culture - Blood, Hand, Left [[id number]]  (Normal) Collected: 09/23/24 9948    Lab Status: Preliminary result Specimen: Blood from Hand, Left Updated: " 09/25/24 1716     Blood Culture No growth at 2 days    Blood Culture - Blood, Hand, Right [[id number]]  (Normal) Collected: 09/23/24 1656    Lab Status: Preliminary result Specimen: Blood from Hand, Right Updated: 09/25/24 1716     Blood Culture No growth at 2 days    Urine Culture - Urine, Straight Cath [[id number]]  (Abnormal)  (Susceptibility) Collected: 09/23/24 1337    Lab Status: Final result Specimen: Urine from Straight Cath Updated: 09/25/24 1041     Urine Culture >100,000 CFU/mL Staphylococcus aureus, MRSA     Comment:   Methicillin resistant Staph aureus, patient may be an isolation risk.  Except in cases with genitourinary instrumentation, Staphylococcus aureus bacteriuria is associated with S. aureus bacteremia. Blood cultures are recommended.       Narrative:      Colonization of the urinary tract without infection is common. Treatment is discouraged unless the patient is symptomatic, pregnant, or undergoing an invasive urologic procedure.    Susceptibility        Staphylococcus aureus, MRSA      MELISSA      Nitrofurantoin 32 ug/ml Susceptible      Oxacillin >=4 ug/ml Resistant      Trimethoprim + Sulfamethoxazole >=320 ug/ml Resistant      Vancomycin <=0.5 ug/ml Susceptible                                 Reported Vancomycin Levels  Results from last 7 days   Lab Units 09/26/24  0411   VANCOMYCIN RM mcg/mL 13.80             [employer] AUC Calculation:    Current AUC: 333 mg/L*hr    Predicted Steady State AUC on Current Dose: 404 mg/L*hr  _________________________________    Predicted Steady State AUC on New Dose: 481 mg/L*hr    Assessment/Plan:   Pharmacy consulted to dose vancomycin for UTI. Goal -600 mg/L*hr.  Vancomycin level today ~11h after last dose resulted in 13.8 mcg/mL which correlates with a predicted steady-state AUC of 404 mg/L*hr. Renal function remains stable today. Will slightly increase dose to vancomycin 1500 mg IV q24h.   No repeat level currently ordered. Will repeat vancomycin  level in 3-5 days if therapy continues or sooner if renal function declines.  Will continue to follow and adjust vancomycin dose as needed based on renal function, cultures, and patient clinical status.    Rosemarie Griffin RPH  9/26/2024  07:02 EDT

## 2024-09-27 LAB
ANION GAP SERPL CALCULATED.3IONS-SCNC: 8 MMOL/L (ref 5–15)
BUN SERPL-MCNC: 20 MG/DL (ref 8–23)
BUN/CREAT SERPL: 24.4 (ref 7–25)
CALCIUM SPEC-SCNC: 9 MG/DL (ref 8.6–10.5)
CHLORIDE SERPL-SCNC: 105 MMOL/L (ref 98–107)
CO2 SERPL-SCNC: 28 MMOL/L (ref 22–29)
CREAT SERPL-MCNC: 0.82 MG/DL (ref 0.76–1.27)
EGFRCR SERPLBLD CKD-EPI 2021: 86.1 ML/MIN/1.73
GLUCOSE BLDC GLUCOMTR-MCNC: 135 MG/DL (ref 70–130)
GLUCOSE BLDC GLUCOMTR-MCNC: 162 MG/DL (ref 70–130)
GLUCOSE BLDC GLUCOMTR-MCNC: 179 MG/DL (ref 70–130)
GLUCOSE BLDC GLUCOMTR-MCNC: 230 MG/DL (ref 70–130)
GLUCOSE SERPL-MCNC: 163 MG/DL (ref 65–99)
POTASSIUM SERPL-SCNC: 4.3 MMOL/L (ref 3.5–5.2)
SODIUM SERPL-SCNC: 141 MMOL/L (ref 136–145)

## 2024-09-27 PROCEDURE — 63710000001 INSULIN LISPRO (HUMAN) PER 5 UNITS: Performed by: FAMILY MEDICINE

## 2024-09-27 PROCEDURE — 99232 SBSQ HOSP IP/OBS MODERATE 35: CPT | Performed by: INTERNAL MEDICINE

## 2024-09-27 PROCEDURE — G0378 HOSPITAL OBSERVATION PER HR: HCPCS

## 2024-09-27 PROCEDURE — 97535 SELF CARE MNGMENT TRAINING: CPT

## 2024-09-27 PROCEDURE — 25010000002 VANCOMYCIN HCL IN NACL 1.5-0.9 GM/500ML-% SOLUTION

## 2024-09-27 PROCEDURE — 82948 REAGENT STRIP/BLOOD GLUCOSE: CPT

## 2024-09-27 PROCEDURE — 97530 THERAPEUTIC ACTIVITIES: CPT

## 2024-09-27 PROCEDURE — 80048 BASIC METABOLIC PNL TOTAL CA: CPT

## 2024-09-27 RX ADMIN — SENNOSIDES AND DOCUSATE SODIUM 2 TABLET: 50; 8.6 TABLET ORAL at 21:30

## 2024-09-27 RX ADMIN — Medication 12.5 MG: at 21:30

## 2024-09-27 RX ADMIN — Medication 1500 MG: at 16:59

## 2024-09-27 RX ADMIN — INSULIN LISPRO 3 UNITS: 100 INJECTION, SOLUTION INTRAVENOUS; SUBCUTANEOUS at 16:59

## 2024-09-27 RX ADMIN — APIXABAN 5 MG: 5 TABLET, FILM COATED ORAL at 09:20

## 2024-09-27 RX ADMIN — POLYETHYLENE GLYCOL 3350 17 G: 17 POWDER, FOR SOLUTION ORAL at 09:20

## 2024-09-27 RX ADMIN — QUETIAPINE FUMARATE 25 MG: 25 TABLET ORAL at 21:30

## 2024-09-27 RX ADMIN — SENNOSIDES AND DOCUSATE SODIUM 2 TABLET: 50; 8.6 TABLET ORAL at 09:20

## 2024-09-27 RX ADMIN — Medication 10 ML: at 09:20

## 2024-09-27 RX ADMIN — PRAVASTATIN SODIUM 20 MG: 20 TABLET ORAL at 21:30

## 2024-09-27 RX ADMIN — Medication 12.5 MG: at 09:20

## 2024-09-27 RX ADMIN — ACETAMINOPHEN 650 MG: 325 TABLET ORAL at 09:20

## 2024-09-27 RX ADMIN — ACETAMINOPHEN 650 MG: 325 TABLET ORAL at 13:57

## 2024-09-27 RX ADMIN — INSULIN LISPRO 2 UNITS: 100 INJECTION, SOLUTION INTRAVENOUS; SUBCUTANEOUS at 12:14

## 2024-09-27 RX ADMIN — APIXABAN 5 MG: 5 TABLET, FILM COATED ORAL at 21:30

## 2024-09-27 RX ADMIN — Medication 10 ML: at 21:34

## 2024-09-27 RX ADMIN — INSULIN LISPRO 2 UNITS: 100 INJECTION, SOLUTION INTRAVENOUS; SUBCUTANEOUS at 21:30

## 2024-09-27 NOTE — PROGRESS NOTES
[facility]  PROGRESS NOTE    Patient Name: Apollo Haro  : 1940  MRN: [id number]    Date of Admission: 2024  Primary Care Physician: Provider, No Known    Subjective   Subjective     CC:  Back pain    HPI:  No acute events. States he is ok but does have pain. Reviewed that we are working on rehab.       Objective   Objective     Vital Signs:   Temp:  [97.7 °F (36.5 °C)-98.2 °F (36.8 °C)] 98.2 °F (36.8 °C)  Heart Rate:  [] 73  Resp:  [16-18] 18  BP: ()/(55-66) 121/58     Physical Exam:  Constitutional: No acute distress, awake, alert  Respiratory: Clear to auscultation bilaterally, respiratory effort normal   Cardiovascular:  irreg; no murmur   Gastrointestinal: Positive bowel sounds, soft, nontender, nondistended  Musculoskeletal: No bilateral ankle edema  Psychiatric: Appropriate affect, cooperative  Neurologic: strength symmetric in all extremities, Cranial Nerves grossly intact to confrontation, speech clear      Results Reviewed:  LAB RESULTS:      Lab 24  1010 24  0528 24  1656 24  1315   WBC 8.23 8.02  --   --  8.57   HEMOGLOBIN 13.3 12.1*  --   --  13.6   HEMATOCRIT 40.1 35.9*  --   --  40.7   PLATELETS 184 158  --   --  176   NEUTROS ABS  --  4.83  --   --  6.29   IMMATURE GRANS (ABS)  --  0.02  --   --  0.03   LYMPHS ABS  --  2.30  --   --  1.35   MONOS ABS  --  0.68  --   --  0.79   EOS ABS  --  0.15  --   --  0.09   MCV 92.6 90.2  --   --  92.5   PROCALCITONIN  --  0.07  --   --   --    LACTATE  --   --  1.7 2.1* 2.2*         Lab 24  1002 24  0411 24  1010 24  0528 24  1315   SODIUM 141 143 137 138 143   POTASSIUM 4.3 4.4 4.5 4.1 4.5   CHLORIDE 105 105 99 101 101   CO2 28.0 28.0 27.0 25.0 28.0   ANION GAP 8.0 10.0 11.0 12.0 14.0   BUN 20 26* 20 34* 26*   CREATININE 0.82 1.01 0.95 1.09 1.11   EGFR 86.1 72.9 78.4 66.5 65.1   GLUCOSE 163* 127* 155* 170* 145*   CALCIUM 9.0  9.0 9.2 9.1 9.4   MAGNESIUM  --   --   --  1.7  --    HEMOGLOBIN A1C  --   --   --   --  5.80*         Lab 09/24/24  0528 09/23/24  1315   TOTAL PROTEIN 5.9* 6.5   ALBUMIN 3.5 3.9   GLOBULIN 2.4 2.6   ALT (SGPT) 9 11   AST (SGOT) 16 21   BILIRUBIN 0.8 1.3*   ALK PHOS 88 92                     Brief Urine Lab Results  (Last result in the past 365 days)        Color   Clarity   Blood   Leuk Est   Nitrite   Protein   CREAT   Urine HCG        09/23/24 1337 Yellow   Cloudy   Large (3+)   Large (3+)   Positive   30 mg/dL (1+)                   Microbiology Results Abnormal       Procedure Component Value - Date/Time    Blood Culture - Blood, Hand, Right [[id number]]  (Normal) Collected: 09/23/24 1656    Lab Status: Preliminary result Specimen: Blood from Hand, Right Updated: 09/26/24 1716     Blood Culture No growth at 3 days    Blood Culture - Blood, Hand, Left [[id number]]  (Normal) Collected: 09/23/24 1656    Lab Status: Preliminary result Specimen: Blood from Hand, Left Updated: 09/26/24 1716     Blood Culture No growth at 3 days            No radiology results from the last 24 hrs        Current medications:  Scheduled Meds:apixaban, 5 mg, Oral, BID  insulin lispro, 2-7 Units, Subcutaneous, 4x Daily AC & at Bedtime  metoprolol tartrate, 12.5 mg, Oral, Q12H  senna-docusate sodium, 2 tablet, Oral, BID   And  polyethylene glycol, 17 g, Oral, Daily  pravastatin, 20 mg, Oral, Nightly  QUEtiapine, 25 mg, Oral, Nightly  sodium chloride, 10 mL, Intravenous, Q12H  vancomycin, 1,500 mg, Intravenous, Q24H      Continuous Infusions:Pharmacy to dose vancomycin,       PRN Meds:.  acetaminophen **OR** acetaminophen **OR** acetaminophen    senna-docusate sodium **AND** polyethylene glycol **AND** bisacodyl **AND** bisacodyl    Calcium Replacement - Follow Nurse / BPA Driven Protocol    dextrose    dextrose    glucagon (human recombinant)    HYDROcodone-acetaminophen    Magnesium Standard Dose Replacement - Follow Nurse / BPA Driven  Protocol    nitroglycerin    ondansetron ODT **OR** ondansetron    Pharmacy to dose vancomycin    Phosphorus Replacement - Follow Nurse / BPA Driven Protocol    Potassium Replacement - Follow Nurse / BPA Driven Protocol    [COMPLETED] Insert Peripheral IV **AND** sodium chloride    sodium chloride    sodium chloride    Assessment & Plan   Assessment & Plan     Active Hospital Problems    Diagnosis  POA    **Closed compression fracture of L2 vertebra [S32.020A]  Yes    UTI (urinary tract infection) [N39.0]  Yes    Atrial fibrillation, chronic [I48.20]  Yes    CAD (coronary artery disease) [I25.10]  Yes    HTN (hypertension) [I10]  Yes    Type 2 diabetes mellitus, without long-term current use of insulin [E11.9]  Yes    Weakness [R53.1]  Yes      Resolved Hospital Problems   No resolved problems to display.        Brief Hospital Course to date:  Apollo Haro is an 83 yo M that presented to [facility] for evaluation of back pain. Patient with a fall earlier this month in which he hit his head, was evaluated in the ED and later discharged home. He reports since that time he has had issues with increased back pain inhibiting his ability to walk and has been stuck mostly in the bed. He was found to have L2 anterior column compression fracture.      Compression fracture L2  Back Pain   Weakness   - MRI with acute L2 compression fracture with 20% loss of height   - Neurosurgery consulted - likely conservative management. Close follow up in 2-4 weeks  - PRN pain control  - PT/OT with rehab recs. CM following for rehab   -palliative consulted      MRSA UTI POA   - Ucx with MRSA; Bcx NGTD  - DC rocephin and start vanc (9/24) x 5 days      T2DM  - A1C  5.8  - FSBS SSI coverage   - Home Metformin held for now      HTN  Afib   CAD   - Cards consulted due to persistent aflutter while on flecainide   - Patient was evaluated by cards. Flecainide was discontinued. Decrease metoprolol to 12.5 mg BID; holding on any plans for  cardioversion   - Continue eliquis      Cognitive impairment   - seroquel      Expected Discharge Location and Transportation: [facility]  Expected Discharge   Expected Discharge Date: 9/27/2024; Expected Discharge Time:      VTE Prophylaxis:  Pharmacologic & mechanical VTE prophylaxis orders are present.         AM-PAC 6 Clicks Score (PT): 15 (09/27/24 1022)    CODE STATUS:   Code Status and Medical Interventions: No CPR (Do Not Attempt to Resuscitate); Limited Support; No intubation (DNI), No cardioversion; Spouse present and in full support of decision as NOK   Ordered at: 09/27/24 1407     Medical Intervention Limits:    No intubation (DNI)    No cardioversion     Level Of Support Discussed With:    Patient     Code Status (Patient has no pulse and is not breathing):    No CPR (Do Not Attempt to Resuscitate)     Medical Interventions (Patient has pulse or is breathing):    Limited Support     Comments:    Spouse present and in full support of decision as [employer]       Jeanna Barr,   09/27/24

## 2024-09-27 NOTE — CASE MANAGEMENT/SOCIAL WORK
Continued Stay Note  [facility]     Patient Name: Apollo Haro  MRN: [id number]  Today's Date: 9/27/2024    Admit Date: 9/23/2024    Plan: SNF [place]   Discharge Plan       Row Name 09/27/24 1408       Plan    Plan SNF at [place]    Patient/Family in Agreement with Plan yes    Plan Comments I spoke with Mirta. She is reviewing for [facility] and [facility], I have referred to [facility], [facility], [facility] and [facility]. The pt refuses referrals to [facility]. I updated the pt and his spouse in the room.    Final Discharge Disposition Code 03 - skilled nursing facility (SNF)                   Discharge Codes    No documentation.                 Expected Discharge Date and Time       Expected Discharge Date Expected Discharge Time    Sep 27, 2024               Alissa Murdock RN

## 2024-09-27 NOTE — PLAN OF CARE
Goal Outcome Evaluation:  Plan of Care Reviewed With: patient        Progress: improving  Outcome Evaluation: Pt continues to present below baseline for ADL's, limited by generalized weakness, pain, and standing tolerance. Pt MinAx2 for bed mobility, assist of 2 to don brief while standing at walker, ModA for grooming while seated. Continue to progress per OT POC. Recommend SNF at discharge.      Anticipated Discharge Disposition (OT): skilled nursing facility

## 2024-09-27 NOTE — PLAN OF CARE
Problem: Adult Inpatient Plan of Care  Goal: Plan of Care Review  Outcome: Progressing  Goal: Patient-Specific Goal (Individualized)  Outcome: Progressing  Goal: Absence of Hospital-Acquired Illness or Injury  Outcome: Progressing  Intervention: Identify and Manage Fall Risk  Recent Flowsheet Documentation  Taken 9/27/2024 0200 by Elena Isabel RN  Safety Promotion/Fall Prevention:   nonskid shoes/slippers when out of bed   safety round/check completed  Taken 9/27/2024 0000 by Elena Isabel RN  Safety Promotion/Fall Prevention:   nonskid shoes/slippers when out of bed   safety round/check completed  Taken 9/26/2024 2110 by Elena Isabel RN  Safety Promotion/Fall Prevention:   nonskid shoes/slippers when out of bed   safety round/check completed   assistive device/personal items within reach   clutter free environment maintained   fall prevention program maintained   lighting adjusted   room organization consistent  Intervention: Prevent Skin Injury  Recent Flowsheet Documentation  Taken 9/27/2024 0200 by Elena Isabel RN  Body Position: position changed independently  Skin Protection:   adhesive use limited   incontinence pads utilized  Taken 9/27/2024 0000 by Elena Isabel RN  Body Position: position changed independently  Skin Protection:   adhesive use limited   incontinence pads utilized  Taken 9/26/2024 2110 by Elena Isabel RN  Body Position: position changed independently  Skin Protection:   adhesive use limited   incontinence pads utilized  Intervention: Prevent and Manage VTE (Venous Thromboembolism) Risk  Recent Flowsheet Documentation  Taken 9/27/2024 0200 by Elena Isabel RN  Activity Management: activity minimized  Taken 9/27/2024 0000 by Elena Isabel RN  Activity Management: activity minimized  Taken 9/26/2024 2110 by Elena Isabel RN  Activity Management: activity minimized  Goal: Optimal Comfort and Wellbeing  Outcome: Progressing  Goal: Readiness for Transition of Care  Outcome: Progressing      Problem: Skin Injury Risk Increased  Goal: Skin Health and Integrity  Outcome: Progressing  Intervention: Optimize Skin Protection  Recent Flowsheet Documentation  Taken 9/27/2024 0200 by Elena Isabel RN  Pressure Reduction Techniques: frequent weight shift encouraged  Head of Bed (HOB) Positioning: HOB elevated  Pressure Reduction Devices:   pressure-redistributing mattress utilized   positioning supports utilized  Skin Protection:   adhesive use limited   incontinence pads utilized  Taken 9/27/2024 0000 by Elena Isabel RN  Pressure Reduction Techniques: frequent weight shift encouraged  Head of Bed (HOB) Positioning: HOB elevated  Pressure Reduction Devices:   pressure-redistributing mattress utilized   positioning supports utilized  Skin Protection:   adhesive use limited   incontinence pads utilized  Taken 9/26/2024 2110 by Elena Isabel RN  Pressure Reduction Techniques: frequent weight shift encouraged  Head of Bed (HOB) Positioning: HOB elevated  Pressure Reduction Devices:   pressure-redistributing mattress utilized   positioning supports utilized  Skin Protection:   adhesive use limited   incontinence pads utilized     Problem: Fall Injury Risk  Goal: Absence of Fall and Fall-Related Injury  Outcome: Progressing  Intervention: Promote Injury-Free Environment  Recent Flowsheet Documentation  Taken 9/27/2024 0200 by Elena Isabel RN  Safety Promotion/Fall Prevention:   nonskid shoes/slippers when out of bed   safety round/check completed  Taken 9/27/2024 0000 by Elena Isabel RN  Safety Promotion/Fall Prevention:   nonskid shoes/slippers when out of bed   safety round/check completed  Taken 9/26/2024 2110 by Elena Isabel RN  Safety Promotion/Fall Prevention:   nonskid shoes/slippers when out of bed   safety round/check completed   assistive device/personal items within reach   clutter free environment maintained   fall prevention program maintained   lighting adjusted   room organization consistent      Problem: Hypertension Comorbidity  Goal: Blood Pressure in Desired Range  Outcome: Progressing     Problem: Palliative Care  Goal: Enhanced Quality of Life  Outcome: Progressing   Goal Outcome Evaluation:

## 2024-09-27 NOTE — PROGRESS NOTES
"Palliative Care Daily Progress Note     Referring: Jeanna Barr    C/C: back pain    S: Medical record reviewed.  Events noted. Continues to work with therapy.  Cannot remember when asks for pain meds.  None since 0900 yesterday when seen and requested for pt.  Spouse present.      ROS:   + back pain(bad) but unable to rate  No SOA or N/V    O: Code Status:   Code Status and Medical Interventions: No CPR (Do Not Attempt to Resuscitate); Limited Support; No intubation (DNI), No cardioversion; Spouse present and in full support of decision as NOK   Ordered at: 09/27/24 1407     Medical Intervention Limits:    No intubation (DNI)    No cardioversion     Level Of Support Discussed With:    Patient     Code Status (Patient has no pulse and is not breathing):    No CPR (Do Not Attempt to Resuscitate)     Medical Interventions (Patient has pulse or is breathing):    Limited Support     Comments:    Spouse present and in full support of decision as NOK      Advanced Directives: Advance Directive Status: Patient does not have advance directive   Goals of Care: Ongoing.   Palliative Performance Scale Score: 40%     /58 (BP Location: Left arm, Patient Position: Lying)   Pulse 73   Temp 98.2 °F (36.8 °C) (Oral)   Resp 18   Ht 175.3 cm (69\")   Wt 88.5 kg (195 lb)   SpO2 97%   BMI 28.80 kg/m²     Intake/Output Summary (Last 24 hours) at 9/27/2024 1547  Last data filed at 9/26/2024 1646  Gross per 24 hour   Intake --   Output 400 ml   Net -400 ml       Physical Exam:    General Appearance:    Alert, cooperative, NAD   HEENT:    NC/AT, EOMI, anicteric, MMM, face relaxed   Neck:   supple, trachea midline, no JVD   Lungs:     CTA bilat, diminished in bases; respirations regular, even     and unlabored    Heart:    RRR, normal S1 and S2, no M/R/G   Abdomen:     Normal bowel sounds, soft, nontender, nondistended   G/U:   Deferred   MSK/Extremities:   No clubbing , cyanosis or edema, No wasting   Pulses:   Pulses palpable " and equal bilaterally   Skin:   Warm, dry, no mottling   Neurologic:   A/Ox3, cooperative, moves extremities x 4, no tremor, nl     tone   Psych:   Calm, appropriate       Current Medications:      Current Facility-Administered Medications:     acetaminophen (TYLENOL) tablet 650 mg, 650 mg, Oral, Q4H PRN, 650 mg at 09/27/24 1357 **OR** acetaminophen (TYLENOL) 160 MG/5ML oral solution 650 mg, 650 mg, Oral, Q4H PRN **OR** acetaminophen (TYLENOL) suppository 650 mg, 650 mg, Rectal, Q4H PRN, CASEY Berman,     apixaban (ELIQUIS) tablet 5 mg, 5 mg, Oral, BID, CASEY Berman DO, 5 mg at 09/27/24 0920    sennosides-docusate (PERICOLACE) 8.6-50 MG per tablet 2 tablet, 2 tablet, Oral, BID, 2 tablet at 09/27/24 0920 **AND** polyethylene glycol (MIRALAX) packet 17 g, 17 g, Oral, Daily, 17 g at 09/27/24 0920 **AND** bisacodyl (DULCOLAX) EC tablet 5 mg, 5 mg, Oral, Daily PRN, 5 mg at 09/26/24 1607 **AND** bisacodyl (DULCOLAX) suppository 10 mg, 10 mg, Rectal, Daily PRN, CASEY Berman DO    Calcium Replacement - Follow Nurse / BPA Driven Protocol, , Does not apply, PRN, CASEY Berman DO    dextrose (D50W) (25 g/50 mL) IV injection 25 g, 25 g, Intravenous, Q15 Min PRN, CASEY Berman DO    dextrose (GLUTOSE) oral gel 15 g, 15 g, Oral, Q15 Min PRN, CASEY Berman DO    glucagon (GLUCAGEN) injection 1 mg, 1 mg, Intramuscular, Q15 Min PRN, CASEY Berman DO    HYDROcodone-acetaminophen (NORCO) 5-325 MG per tablet 1 tablet, 1 tablet, Oral, Q6H PRN, Jeanna Barr, DO, 1 tablet at 09/26/24 0859    Insulin Lispro (humaLOG) injection 2-7 Units, 2-7 Units, Subcutaneous, 4x Daily AC & at Bedtime, CASEY Berman, , 2 Units at 09/27/24 1214    Magnesium Standard Dose Replacement - Follow Nurse / BPA Driven Protocol, , Does not apply, PRN, CASEY Berman, DO    metoprolol tartrate (LOPRESSOR) half tablet 12.5 mg, 12.5 mg, Oral, Q12H, Jeanna Barr, , 12.5 mg at 09/27/24 0920    nitroglycerin (NITROSTAT) SL tablet  0.4 mg, 0.4 mg, Sublingual, Q5 Min PRN, CASEY Berman DO    ondansetron ODT (ZOFRAN-ODT) disintegrating tablet 4 mg, 4 mg, Oral, Q6H PRN **OR** ondansetron (ZOFRAN) injection 4 mg, 4 mg, Intravenous, Q6H PRN, CASEY Berman DO    Pharmacy to dose vancomycin, , Does not apply, Continuous PRN, Jeanna Barr DO    Phosphorus Replacement - Follow Nurse / BPA Driven Protocol, , Does not apply, PRN, CASEY Berman DO    Potassium Replacement - Follow Nurse / BPA Driven Protocol, , Does not apply, PRN, CASEY Berman DO    pravastatin (PRAVACHOL) tablet 20 mg, 20 mg, Oral, Nightly, CASEY Breman DO, 20 mg at 09/26/24 2218    QUEtiapine (SEROquel) tablet 25 mg, 25 mg, Oral, Nightly, CASEY Berman DO, 25 mg at 09/26/24 2218    [COMPLETED] Insert Peripheral IV, , , Once **AND** sodium chloride 0.9 % flush 10 mL, 10 mL, Intravenous, PRN, Alek Linares MD    sodium chloride 0.9 % flush 10 mL, 10 mL, Intravenous, Q12H, CASEY Berman DO, 10 mL at 09/27/24 0920    sodium chloride 0.9 % flush 10 mL, 10 mL, Intravenous, PRN, CASEY Berman DO    sodium chloride 0.9 % infusion 40 mL, 40 mL, Intravenous, PRN, CASEY Berman DO    vancomycin IVPB 1500 mg in 0.9% NaCl (Premix) 500 mL, 1,500 mg, Intravenous, Q24H, Rosemarie Griffin [facility], Last Rate: 333.3 mL/hr at 09/26/24 1607, 1,500 mg at 09/26/24 1607     Labs:   Results from last 7 days   Lab Units 09/25/24  1010   WBC 10*3/mm3 8.23   HEMOGLOBIN g/dL 13.3   HEMATOCRIT % 40.1   PLATELETS 10*3/mm3 184     Results from last 7 days   Lab Units 09/27/24  1002 09/25/24  1010 09/24/24  0528   SODIUM mmol/L 141   < > 138   POTASSIUM mmol/L 4.3   < > 4.1   CHLORIDE mmol/L 105   < > 101   CO2 mmol/L 28.0   < > 25.0   BUN mg/dL 20   < > 34*   CREATININE mg/dL 0.82   < > 1.09   CALCIUM mg/dL 9.0   < > 9.1   BILIRUBIN mg/dL  --   --  0.8   ALK PHOS U/L  --   --  88   ALT (SGPT) U/L  --   --  9   AST (SGOT) U/L  --   --  16   GLUCOSE mg/dL 163*   < > 170*    < > =  values in this interval not displayed.     Imaging Results (Last 72 Hours)       Procedure Component Value Units Date/Time    MRI Lumbar Spine Without Contrast [[id number]] Collected: 09/24/24 1759     Updated: 09/24/24 1808    Narrative:        MRI LUMBAR SPINE WO CONTRAST    Date of Exam: 9/24/2024 5:13 PM EDT    Indication: L2 compression fracture.     Comparison: CT lumbar spine 9/23/2024    Technique:  Routine multiplanar/multisequence sequence images of the lumbar spine were obtained without contrast administration.        Findings:    Multiple sequences are moderately degraded by patient motion artifact.  Again seen is a mild compression fracture involving superior endplate of the L2 vertebral body with approximately 20% loss of height.    There is associated bone marrow edema within the vertebral body and bilateral pedicles. No retropulsed fracture fragments.    There is an old moderate T12 compression fracture with greater than 50% loss of height. No associated bone marrow edema. There are edematous degenerative endplate changes at the L3/4 level with severe disc space narrowing. There is disc desiccation   throughout the lumbar spine. Severe disc base narrowing is also noted at the L5/S1 level.    Spinal cord terminates at the L1 level with normal signal seen within the conus.    Visualized paraspinal soft tissues are unremarkable.    Axial images demonstrate:    L1/2: No significant disc bulge. There are mild degenerative facet changes bilaterally. No spinal canal stenosis. No significant neural foraminal narrowing.    L 2/3: Minimal circumferential disc bulge and mild degenerative facet change. No spinal canal stenosis. No significant neural foraminal narrowing.    L3/4: Mild circumferential disc bulge with bulky right lateral disc osteophyte complex. There are moderate degenerative facet changes bilaterally. No spinal canal stenosis. There is moderate left and severe right neural foraminal  narrowing.    L 4/5: Mild circumferential disc bulge and severe degenerative facet change combining to cause moderate spinal canal stenosis. There is moderate left and mild right neural foraminal narrowing.    L5/S1: No significant disc bulge. There are moderate degenerative facet changes bilaterally. No spinal canal stenosis. There is severe left and moderate right neural foraminal narrowing.      Impression:      Impression:      1. Acute L2 compression fracture with approximate 20% loss of height.  2. Multilevel degenerative disc disease and degenerative facet change resulting in multilevel canal stenosis and neural foraminal narrowing as detailed above.        Electronically Signed: Neo Mock MD    9/24/2024 6:05 PM EDT    Workstation ID: [id number]              Lab 09/23/24  1315   HEMOGLOBIN A1C 5.80*         Diagnostics: Reviewed    A:     Closed compression fracture of L2 vertebra    Weakness    Type 2 diabetes mellitus, without long-term current use of insulin    CAD (coronary artery disease)    HTN (hypertension)    Atrial fibrillation, chronic    UTI (urinary tract infection)       Impression:  L2 compression fracture  DM 2 - A1c 5.8  CAD  Afib/flutter  HTN  UTI - MRSA       Symptoms:   Back pain  Debility    P:   Met with spouse and pt with decision for no CPR, DNI and no cardioversion.  Wife supported and in agreement.  Pt tearful at times.  Hospitalist updated and order entered. Will continue to monitor for needs. Palliative Care Team will continue to follow patient.     Fabby Del Rosario MD, 9/27/2024, 15:47 EDT

## 2024-09-27 NOTE — THERAPY TREATMENT NOTE
Patient Name: Apollo Haro  : 1940    MRN: [id number]                              Today's Date: 2024       Admit Date: 2024    Visit Dx:     ICD-10-CM ICD-9-CM   1. Compression fracture of L2 vertebra, initial encounter  S32.020A 805.4   2. Declining functional status  R53.81 799.3   3. Unable to ambulate  R26.2 719.7   4. History of fall  Z91.81 V15.88   5. Acute midline low back pain without sciatica  M54.50 724.2   6. Chronic anticoagulation  Z79.01 V58.61   7. History of atrial fibrillation  Z86.79 V12.59     Patient Active Problem List   Diagnosis    Weakness    Type 2 diabetes mellitus, without long-term current use of insulin    Rhabdomyolysis due to COVID-19    CAD (coronary artery disease)    HTN (hypertension)    Atrial fibrillation, chronic    COVID-19 virus detected    SDH (subdural hematoma)    Altered mental status    AMS (altered mental status)    Closed compression fracture of L2 vertebra    UTI (urinary tract infection)     Past Medical History:   Diagnosis Date    A-fib     CAD (coronary artery disease)     DM (diabetes mellitus)      History reviewed. No pertinent surgical history.   General Information       Row Name 24 1014          Physical Therapy Time and Intention    Document Type therapy note (daily note)  -LO     Mode of Treatment physical therapy  -       Row Name 24 1014          General Information    Patient Profile Reviewed yes  -LO     Existing Precautions/Restrictions fall;spinal;other (see comments)  Acute L3 compression fx, [place], cognitive impairment  -       Row Name 24 1014          Cognition    Orientation Status (Cognition) oriented to;person;place;verbal cues/prompts needed for orientation;time;other (see comments)  cues for month, pt unable to recall the year  -       Row Name 24 1014          Safety Issues, Functional Mobility    Impairments Affecting Function (Mobility) balance;cognition;endurance/activity tolerance;motor  planning;pain;postural/trunk control;strength  -LO     Cognitive Impairments, Mobility Safety/Performance awareness, need for assistance;insight into deficits/self-awareness;judgment;problem-solving/reasoning;safety precaution awareness;safety precaution follow-through;sequencing abilities  -LO               User Key  (r) = Recorded By, (t) = Taken By, (c) = Cosigned By      Initials Name Provider Type    Yasmin Field, JONATHON Physical Therapist                   Mobility       Row Name 09/27/24 1015          Bed Mobility    Bed Mobility sidelying-sit  -LO     Sidelying-Sit [place] (Bed Mobility) minimum assist (75% patient effort);2 person assist;verbal cues;nonverbal cues (demo/gesture)  -LO     Assistive Device (Bed Mobility) head of bed elevated  -LO     Comment, (Bed Mobility) vc for sequencing, HP. Assist at trunk and legs  -LO       Row Name 09/27/24 1015          Transfers    Comment, (Transfers) EOB>FWW>recliner; vc for AD placement and HP  -LO       Row Name 09/27/24 1015          Bed-Chair Transfer    Bed-Chair [place] (Transfers) minimum assist (75% patient effort);2 person assist  -LO     Assistive Device (Bed-Chair Transfers) walker, front-wheeled  -LO       Row Name 09/27/24 1015          Sit-Stand Transfer    Sit-Stand [place] (Transfers) minimum assist (75% patient effort);2 person assist;verbal cues;nonverbal cues (demo/gesture)  -LO     Assistive Device (Sit-Stand Transfers) walker, front-wheeled  -LO       Row Name 09/27/24 1015          Gait/Stairs (Locomotion)    [place] Level (Gait) verbal cues;minimum assist (75% patient effort);1 person to manage equipment  -LO     Assistive Device (Gait) walker, front-wheeled  -LO     Distance in Feet (Gait) 7  -LO     Deviations/Abnormal Patterns (Gait) bilateral deviations;cindy decreased;gait speed decreased;stride length decreased  -LO     Bilateral Gait Deviations forward flexed posture;heel strike decreased  -LO     Comment,  (Gait/Stairs) Able to increase ambulation to 7' with FWW Josemanuel with chair follow. Ambulates with reduced step length, height, speed. Step through gait pattern noted.  -LO               User Key  (r) = Recorded By, (t) = Taken By, (c) = Cosigned By      Initials Name Provider Type    Yasmin Field PT Physical Therapist                   Obj/Interventions       Row Name 09/27/24 1018          Balance    Balance Assessment sitting static balance;sitting dynamic balance;standing static balance;standing dynamic balance  -LO     Static Sitting Balance supervision  -LO     Dynamic Sitting Balance contact guard  -LO     Position, Sitting Balance unsupported;sitting edge of bed  -LO     Static Standing Balance minimal assist;verbal cues  -LO     Dynamic Standing Balance moderate assist;1 person to manage equipment;verbal cues  -LO     Position/Device Used, Standing Balance supported;walker, front-wheeled  -LO     Comment, Balance FWW Josemanuel x2 for safety in standing  -LO               User Key  (r) = Recorded By, (t) = Taken By, (c) = Cosigned By      Initials Name Provider Type    Yasmin Field PT Physical Therapist                   Goals/Plan    No documentation.                  Clinical Impression       Row Name 09/27/24 1019          Pain Scale: FACES Pre/Post-Treatment    Pain: FACES Scale, Pretreatment 4-->hurts little more  -LO     Posttreatment Pain Rating 2-->hurts little bit  -LO     Pain Location lower  -LO     Pain Location - ankle  -LO       Row Name 09/27/24 1019          Plan of Care Review    Plan of Care Reviewed With patient  -LO     Progress improving  -LO     Outcome Evaluation Patient demonstrating improvements in functional mobility this date as evidenced by ability to progress ambulation distance as well as is requiring less physical assistance for bed mobility, transfers, and ambulation. Functional mobility continues to be limited by weakness, fatigue, and pain and patient will benefit from  skilled IP PT services to address impairments for return to PLOF. Cont IP PT POC.  -LO       Row Name 09/27/24 1019          Therapy Assessment/Plan (PT)    Rehab Potential (PT) good, to achieve stated therapy goals  -LO     Therapy Frequency (PT) daily  -LO       Row Name 09/27/24 1019          Vital Signs    Pre Systolic BP Rehab 117  -LO     Pre Treatment Diastolic BP 75  -LO     Pretreatment Heart Rate (beats/min) 95  -LO     O2 Delivery Pre Treatment room air  -LO     O2 Delivery Intra Treatment room air  -LO     O2 Delivery Post Treatment room air  -LO     Pre Patient Position Side Lying  -LO     Intra Patient Position Standing  -LO     Post Patient Position Sitting  -LO       Row Name 09/27/24 1019          Positioning and Restraints    Pre-Treatment Position in bed  -LO     Post Treatment Position chair  -LO     In Chair notified nsg;reclined;call light within reach;encouraged to call for assist;exit alarm on;waffle cushion  -LO               User Key  (r) = Recorded By, (t) = Taken By, (c) = Cosigned By      Initials Name Provider Type    Yasmin Field, PT Physical Therapist                   Outcome Measures       Row Name 09/27/24 1022          How much help from another person do you currently need...    Turning from your back to your side while in flat bed without using bedrails? 3  -LO     Moving from lying on back to sitting on the side of a flat bed without bedrails? 2  -LO     Moving to and from a bed to a chair (including a wheelchair)? 3  -LO     Standing up from a chair using your arms (e.g., wheelchair, bedside chair)? 3  -LO     Climbing 3-5 steps with a railing? 1  -LO     To walk in hospital room? 3  -LO     AM-PAC 6 Clicks Score (PT) 15  -LO     Highest Level of Mobility Goal 4 --> Transfer to chair/commode  -LO       Row Name 09/27/24 1022 09/27/24 0955       Functional Assessment    Outcome Measure Options AM-PAC 6 Clicks Basic Mobility (PT)  -LO AM-PAC 6 Clicks Daily Activity (OT)  -ZUNILDA               User Key  (r) = Recorded By, (t) = Taken By, (c) = Cosigned By      Initials Name Provider Type    Lanny Yadav, OT Occupational Therapist    Yasmin Field, PT Physical Therapist                                 Physical Therapy Education       Title: PT OT SLP Therapies (In Progress)       Topic: Physical Therapy (Done)       Point: Mobility training (Done)       Learning Progress Summary             Patient Acceptance, E, VU,NR by LO at 9/27/2024 0828    Comment: PT POC    Acceptance, E,D, VU,NR by LR at 9/26/2024 1403    Comment: Educated on spinal precautions for comfort, correct log rolling technique, correct sit<->stand t/f technique, correct gait mechanics, HEP, benefits of mobility and being OOB, and progression of POC.    Acceptance, E,D, VU,NR by LR at 9/25/2024 1310    Comment: Educated on spinal precautions for comfort, correct log rolling technique, correct sit<->stand t/f technique, correct technique for side steps at EOB, HEP, and progression of POC.    Acceptance, E,D, VU,NR by LR at 9/24/2024 1301    Comment: Educated on spinal precautions, correct log rolling technique, benefits of being OOB, correct sit<->stand t/f technique, correct bed to chair t/f technique, and progression of POC.                         Point: Home exercise program (Done)       Learning Progress Summary             Patient Acceptance, E, VU,NR by LO at 9/27/2024 0828    Comment: PT POC    Acceptance, E,D, VU,NR by LR at 9/26/2024 1403    Comment: Educated on spinal precautions for comfort, correct log rolling technique, correct sit<->stand t/f technique, correct gait mechanics, HEP, benefits of mobility and being OOB, and progression of POC.    Acceptance, E,D, VU,NR by LR at 9/25/2024 1310    Comment: Educated on spinal precautions for comfort, correct log rolling technique, correct sit<->stand t/f technique, correct technique for side steps at EOB, HEP, and progression of POC.    Acceptance, E,D, VU,NR by  LR at 9/24/2024 1301    Comment: Educated on spinal precautions, correct log rolling technique, benefits of being OOB, correct sit<->stand t/f technique, correct bed to chair t/f technique, and progression of POC.                         Point: Body mechanics (Done)       Learning Progress Summary             Patient Acceptance, E, VU,NR by LO at 9/27/2024 0828    Comment: PT POC    Acceptance, E,D, VU,NR by LR at 9/26/2024 1403    Comment: Educated on spinal precautions for comfort, correct log rolling technique, correct sit<->stand t/f technique, correct gait mechanics, HEP, benefits of mobility and being OOB, and progression of POC.    Acceptance, E,D, VU,NR by LR at 9/25/2024 1310    Comment: Educated on spinal precautions for comfort, correct log rolling technique, correct sit<->stand t/f technique, correct technique for side steps at EOB, HEP, and progression of POC.    Acceptance, E,D, VU,NR by LR at 9/24/2024 1301    Comment: Educated on spinal precautions, correct log rolling technique, benefits of being OOB, correct sit<->stand t/f technique, correct bed to chair t/f technique, and progression of POC.                         Point: Precautions (Done)       Learning Progress Summary             Patient Acceptance, E, VU,NR by LO at 9/27/2024 0828    Comment: PT POC    Acceptance, E,D, VU,NR by LR at 9/26/2024 1403    Comment: Educated on spinal precautions for comfort, correct log rolling technique, correct sit<->stand t/f technique, correct gait mechanics, HEP, benefits of mobility and being OOB, and progression of POC.    Acceptance, E,D, VU,NR by LR at 9/25/2024 1310    Comment: Educated on spinal precautions for comfort, correct log rolling technique, correct sit<->stand t/f technique, correct technique for side steps at EOB, HEP, and progression of POC.    Acceptance, E,D, VU,NR by LR at 9/24/2024 1301    Comment: Educated on spinal precautions, correct log rolling technique, benefits of being OOB,  correct sit<->stand t/f technique, correct bed to chair t/f technique, and progression of POC.                                         User Key       Initials Effective Dates Name Provider Type Discipline    LR 02/03/23 -  Meseret Dunham, PT Physical Therapist PT    LO 06/16/21 -  Yasmin Perdomo PT Physical Therapist PT                  PT Recommendation and Plan     Plan of Care Reviewed With: patient  Progress: improving  Outcome Evaluation: Patient demonstrating improvements in functional mobility this date as evidenced by ability to progress ambulation distance as well as is requiring less physical assistance for bed mobility, transfers, and ambulation. Functional mobility continues to be limited by weakness, fatigue, and pain and patient will benefit from skilled IP PT services to address impairments for return to PLOF. Cont IP PT POC.     Time Calculation:         PT Charges       Row Name 09/27/24 0828             Time Calculation    Start Time 0828  -LO      PT Received On 09/27/24  -LO      PT Goal Re-Cert Due Date 10/04/24  -LO         Timed Charges    80654 - Gait Training Minutes  8  -LO      91910 - PT Therapeutic Activity Minutes 10  -LO         Total Minutes    Timed Charges Total Minutes 18  -LO       Total Minutes 18  -LO                User Key  (r) = Recorded By, (t) = Taken By, (c) = Cosigned By      Initials Name Provider Type    LO Yasmin Perdomo, PT Physical Therapist                  Therapy Charges for Today       Code Description Service Date Service Provider Modifiers Qty    91657301744 HC PT THERAPEUTIC ACT EA 15 MIN 9/27/2024 Yasmin Perdomo, PT GP 1            PT G-Codes  Outcome Measure Options: AM-PAC 6 Clicks Basic Mobility (PT)  AM-PAC 6 Clicks Score (PT): 15  AM-PAC 6 Clicks Score (OT): 13  PT Discharge Summary  Anticipated Discharge Disposition (PT): skilled nursing facility    Yasmin Perdomo, JONATHON  9/27/2024

## 2024-09-27 NOTE — CASE MANAGEMENT/SOCIAL WORK
Continued Stay Note   [facility]     Patient Name: Apollo Haro  MRN: [id number]  Today's Date: 9/27/2024    Admit Date: 9/23/2024    [place]   Discharge Plan       Row Name 09/27/24 1550       Plan    [place] SNF [place]    Plan Comments [facility]. [facility] is interested in the pt. I spoke with the pt. He feels this is far for his wife to visit. He is aware there are no other beds. He wants me to call his spouse. I left her a VM. CM will f/u with [facility] on Monday. Will ask [place] to see on the weekend.    Final Discharge Disposition Code 03 - skilled nursing facility (SNF)      Row Name 09/27/24 1407       Plan    Plan SNF [place]    Patient/Family in Agreement with Plan yes    Plan Comments I spoke with Mirta. She is reviewing for [facility] and [facility], I have referred to [facility], [facility] and [facility]. The pt refuses referrals to [facility] and [facility]. I updated the pt and his spouse in the room.    Final Discharge Disposition Code 03 - skilled nursing facility (SNF)                   Discharge Codes    No documentation.                 Expected Discharge Date and Time       Expected Discharge Date Expected Discharge Time    Sep 27, 2024               Alissa Murdock RN

## 2024-09-27 NOTE — PLAN OF CARE
Goal Outcome Evaluation:  Plan of Care Reviewed With: patient        Progress: improving  Outcome Evaluation: Patient demonstrating improvements in functional mobility this date as evidenced by ability to progress ambulation distance as well as is requiring less physical assistance for bed mobility, transfers, and ambulation. Functional mobility continues to be limited by weakness, fatigue, and pain and patient will benefit from skilled IP PT services to address impairments for return to PLOF. Cont IP PT POC.      Anticipated Discharge Disposition (PT): skilled nursing facility

## 2024-09-27 NOTE — CONSULTS
Patient accepted visit from , but did not engage in meaningful conversation with me.  attempted supportive conversation with patient.

## 2024-09-28 LAB
BACTERIA SPEC AEROBE CULT: NORMAL
BACTERIA SPEC AEROBE CULT: NORMAL
GLUCOSE BLDC GLUCOMTR-MCNC: 136 MG/DL (ref 70–130)
GLUCOSE BLDC GLUCOMTR-MCNC: 152 MG/DL (ref 70–130)
GLUCOSE BLDC GLUCOMTR-MCNC: 181 MG/DL (ref 70–130)
GLUCOSE BLDC GLUCOMTR-MCNC: 253 MG/DL (ref 70–130)

## 2024-09-28 PROCEDURE — 97110 THERAPEUTIC EXERCISES: CPT

## 2024-09-28 PROCEDURE — G0378 HOSPITAL OBSERVATION PER HR: HCPCS

## 2024-09-28 PROCEDURE — 82948 REAGENT STRIP/BLOOD GLUCOSE: CPT

## 2024-09-28 PROCEDURE — 99232 SBSQ HOSP IP/OBS MODERATE 35: CPT | Performed by: INTERNAL MEDICINE

## 2024-09-28 PROCEDURE — 25010000002 VANCOMYCIN HCL IN NACL 1.5-0.9 GM/500ML-% SOLUTION

## 2024-09-28 PROCEDURE — 63710000001 INSULIN LISPRO (HUMAN) PER 5 UNITS: Performed by: FAMILY MEDICINE

## 2024-09-28 PROCEDURE — 97530 THERAPEUTIC ACTIVITIES: CPT

## 2024-09-28 RX ADMIN — APIXABAN 5 MG: 5 TABLET, FILM COATED ORAL at 22:58

## 2024-09-28 RX ADMIN — INSULIN LISPRO 2 UNITS: 100 INJECTION, SOLUTION INTRAVENOUS; SUBCUTANEOUS at 08:16

## 2024-09-28 RX ADMIN — INSULIN LISPRO 2 UNITS: 100 INJECTION, SOLUTION INTRAVENOUS; SUBCUTANEOUS at 17:13

## 2024-09-28 RX ADMIN — SENNOSIDES AND DOCUSATE SODIUM 2 TABLET: 50; 8.6 TABLET ORAL at 08:17

## 2024-09-28 RX ADMIN — APIXABAN 5 MG: 5 TABLET, FILM COATED ORAL at 08:17

## 2024-09-28 RX ADMIN — POLYETHYLENE GLYCOL 3350 17 G: 17 POWDER, FOR SOLUTION ORAL at 08:21

## 2024-09-28 RX ADMIN — INSULIN LISPRO 4 UNITS: 100 INJECTION, SOLUTION INTRAVENOUS; SUBCUTANEOUS at 14:56

## 2024-09-28 RX ADMIN — Medication 1500 MG: at 16:14

## 2024-09-28 RX ADMIN — PRAVASTATIN SODIUM 20 MG: 20 TABLET ORAL at 22:58

## 2024-09-28 RX ADMIN — QUETIAPINE FUMARATE 25 MG: 25 TABLET ORAL at 22:58

## 2024-09-28 RX ADMIN — Medication 10 ML: at 08:21

## 2024-09-28 RX ADMIN — ACETAMINOPHEN 650 MG: 325 TABLET ORAL at 17:09

## 2024-09-28 NOTE — PROGRESS NOTES
[facility] Medicine Services  PROGRESS NOTE    Patient Name: Apollo Haro  : 1940  MRN: [id number]    Date of Admission: 2024  Primary Care Physician: Provider, No Known    Subjective   Subjective     CC:  Back pain    HPI:denies pain at rest or when moving in bed.  He has not taken any pain meds       Objective   Objective     Vital Signs:   Temp:  [97.6 °F (36.4 °C)-98.2 °F (36.8 °C)] 97.6 °F (36.4 °C)  Heart Rate:  [71-73] 72  Resp:  [16-18] 18  BP: (103-118)/(51-76) 118/57  Flow (L/min):  [2] 2     Physical Exam:  Constitutional: No acute distress, in bed alert   Respiratory: Clear to auscultation bilaterally, respiratory effort normal   Cardiovascular:  irreg; no murmur   Gastrointestinal: Positive bowel sounds, soft, nontender, nondistended  Musculoskeletal: No bilateral ankle edema  Psychiatric: Appropriate affect, cooperative  Neurologic: strength symmetric in all extremities, Cranial Nerves grossly intact to confrontation, speech clear      Results Reviewed:  LAB RESULTS:      Lab 24  1010 24  0528 24  1656 24  1315   WBC 8.23 8.02  --   --  8.57   HEMOGLOBIN 13.3 12.1*  --   --  13.6   HEMATOCRIT 40.1 35.9*  --   --  40.7   PLATELETS 184 158  --   --  176   NEUTROS ABS  --  4.83  --   --  6.29   IMMATURE GRANS (ABS)  --  0.02  --   --  0.03   LYMPHS ABS  --  2.30  --   --  1.35   MONOS ABS  --  0.68  --   --  0.79   EOS ABS  --  0.15  --   --  0.09   MCV 92.6 90.2  --   --  92.5   PROCALCITONIN  --  0.07  --   --   --    LACTATE  --   --  1.7 2.1* 2.2*         Lab 24  1002 24  0411 24  1010 24  0528 24  1315   SODIUM 141 143 137 138 143   POTASSIUM 4.3 4.4 4.5 4.1 4.5   CHLORIDE 105 105 99 101 101   CO2 28.0 28.0 27.0 25.0 28.0   ANION GAP 8.0 10.0 11.0 12.0 14.0   BUN 20 26* 20 34* 26*   CREATININE 0.82 1.01 0.95 1.09 1.11   EGFR 86.1 72.9 78.4 66.5 65.1   GLUCOSE 163* 127* 155* 170* 145*   CALCIUM  9.0 9.0 9.2 9.1 9.4   MAGNESIUM  --   --   --  1.7  --    HEMOGLOBIN A1C  --   --   --   --  5.80*         Lab 09/24/24  0528 09/23/24  1315   TOTAL PROTEIN 5.9* 6.5   ALBUMIN 3.5 3.9   GLOBULIN 2.4 2.6   ALT (SGPT) 9 11   AST (SGOT) 16 21   BILIRUBIN 0.8 1.3*   ALK PHOS 88 92                     Brief Urine Lab Results  (Last result in the past 365 days)        Color   Clarity   Blood   Leuk Est   Nitrite   Protein   CREAT   Urine HCG        09/23/24 1337 Yellow   Cloudy   Large (3+)   Large (3+)   Positive   30 mg/dL (1+)                   Microbiology Results Abnormal       Procedure Component Value - Date/Time    Blood Culture - Blood, Hand, Left [[id number]]  (Normal) Collected: 09/23/24 1656    Lab Status: Preliminary result Specimen: Blood from Hand, Left Updated: 09/27/24 1715     Blood Culture No growth at 4 days    Blood Culture - Blood, Hand, Right [[id number]]  (Normal) Collected: 09/23/24 1656    Lab Status: Preliminary result Specimen: Blood from Hand, Right Updated: 09/27/24 1715     Blood Culture No growth at 4 days            No radiology results from the last 24 hrs        Current medications:  Scheduled Meds:apixaban, 5 mg, Oral, BID  insulin lispro, 2-7 Units, Subcutaneous, 4x Daily AC & at Bedtime  metoprolol tartrate, 12.5 mg, Oral, Q12H  senna-docusate sodium, 2 tablet, Oral, BID   And  polyethylene glycol, 17 g, Oral, Daily  pravastatin, 20 mg, Oral, Nightly  QUEtiapine, 25 mg, Oral, Nightly  sodium chloride, 10 mL, Intravenous, Q12H  vancomycin, 1,500 mg, Intravenous, Q24H      Continuous Infusions:Pharmacy to dose vancomycin,       PRN Meds:.  acetaminophen **OR** acetaminophen **OR** acetaminophen    senna-docusate sodium **AND** polyethylene glycol **AND** bisacodyl **AND** bisacodyl    Calcium Replacement - Follow Nurse / BPA Driven Protocol    dextrose    dextrose    glucagon (human recombinant)    HYDROcodone-acetaminophen    Magnesium Standard Dose Replacement - Follow Nurse / BPA  Driven Protocol    nitroglycerin    ondansetron ODT **OR** ondansetron    Pharmacy to dose vancomycin    Phosphorus Replacement - Follow Nurse / BPA Driven Protocol    Potassium Replacement - Follow Nurse / BPA Driven Protocol    [COMPLETED] Insert Peripheral IV **AND** sodium chloride    sodium chloride    sodium chloride    Assessment & Plan   Assessment & Plan     Active Hospital Problems    Diagnosis  POA    **Closed compression fracture of L2 vertebra [S32.020A]  Yes    UTI (urinary tract infection) [N39.0]  Yes    Atrial fibrillation, chronic [I48.20]  Yes    CAD (coronary artery disease) [I25.10]  Yes    HTN (hypertension) [I10]  Yes    Type 2 diabetes mellitus, without long-term current use of insulin [E11.9]  Yes    Weakness [R53.1]  Yes      Resolved Hospital Problems   No resolved problems to display.        Brief Hospital Course to date:  Apollo Haro is an 85 yo M that presented to [facility] for evaluation of back pain. Patient with a fall earlier this month in which he hit his head, was evaluated in the ED and later discharged home. He reports since that time he has had issues with increased back pain inhibiting his ability to walk and has been stuck mostly in the bed. He was found to have L2 anterior column compression fracture.      Compression fracture L2  Back Pain   Weakness   - MRI with acute L2 compression fracture with 20% loss of height   - Neurosurgery consulted - likely conservative management. Close follow up in 2-4 weeks  - PRN pain control  - PT/OT with rehab recs. CM following for rehab   -palliative consulted      MRSA UTI POA   - Ucx with MRSA; Bcx NGTD  - DC rocephin and start vanc (9/24) x 5 days      T2DM  - A1C  5.8  - FSBS SSI coverage   - Home Metformin held for now      HTN  Afib   CAD   - Cards consulted due to persistent aflutter while on flecainide   - Patient was evaluated by cards. Flecainide was discontinued. Decrease metoprolol to 12.5 mg BID; holding on any plans for  cardioversion   - Continue eliquis      Cognitive impairment   - seroquel      Expected Discharge Location and Transportation: [facility]  Expected Discharge   Expected Discharge Date: 10/1/2024; Expected Discharge Time:      VTE Prophylaxis:  Pharmacologic & mechanical VTE prophylaxis orders are present.         AM-PAC 6 Clicks Score (PT): 13 (09/28/24 0800)    CODE STATUS:   Code Status and Medical Interventions: No CPR (Do Not Attempt to Resuscitate); Limited Support; No intubation (DNI), No cardioversion; Spouse present and in full support of decision as NOK   Ordered at: 09/27/24 1407     Medical Intervention Limits:    No intubation (DNI)    No cardioversion     Level Of Support Discussed With:    Patient     Code Status (Patient has no pulse and is not breathing):    No CPR (Do Not Attempt to Resuscitate)     Medical Interventions (Patient has pulse or is breathing):    Limited Support     Comments:    Spouse present and in full support of decision as [employer]       Samara Whyte MD  09/28/24

## 2024-09-28 NOTE — THERAPY TREATMENT NOTE
Patient Name: Apollo Haro  : 1940    MRN: [id number]                              Today's Date: 2024       Admit Date: 2024    Visit Dx:     ICD-10-CM ICD-9-CM   1. Compression fracture of L2 vertebra, initial encounter  S32.020A 805.4   2. Declining functional status  R53.81 799.3   3. Unable to ambulate  R26.2 719.7   4. History of fall  Z91.81 V15.88   5. Acute midline low back pain without sciatica  M54.50 724.2   6. Chronic anticoagulation  Z79.01 V58.61   7. History of atrial fibrillation  Z86.79 V12.59     Patient Active Problem List   Diagnosis    Weakness    Type 2 diabetes mellitus, without long-term current use of insulin    Rhabdomyolysis due to COVID-19    CAD (coronary artery disease)    HTN (hypertension)    Atrial fibrillation, chronic    COVID-19 virus detected    SDH (subdural hematoma)    Altered mental status    AMS (altered mental status)    Closed compression fracture of L2 vertebra    UTI (urinary tract infection)     Past Medical History:   Diagnosis Date    A-fib     CAD (coronary artery disease)     DM (diabetes mellitus)      History reviewed. No pertinent surgical history.   General Information       Row Name 24          Physical Therapy Time and Intention    Document Type therapy note (daily note)  -DM     Mode of Treatment physical therapy  -DM       Row Name 24          General Information    Existing Precautions/Restrictions fall;spinal;other (see comments)  ac. 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  -DM               User Key  (r) = Recorded By, (t) = Taken By, (c) = Cosigned By      Initials Name Provider Type    DM Donna Montanez, PT Physical Therapist                   Mobility       Row Name 24          Bed Mobility    Bed Mobility rolling left;rolling right  -DM     Rolling Left [place] (Bed Mobility) verbal cues;nonverbal cues (demo/gesture);minimum assist (75% patient effort)  -DM      "Rolling Right [place] (Bed Mobility) verbal cues;nonverbal cues (demo/gesture);minimum assist (75% patient effort)  -DM     Supine-Sit [place] (Bed Mobility) unable to assess  -DM     Assistive Device (Bed Mobility) bed rails;draw sheet  -DM     Comment, (Bed Mobility) scuds doffed for ther ex; pt instructed in PLB exer & respirex (500-1000 cc), then logrolling L/R, but decl. logrolling transf to sitting EOB or transf/gt; wants to rest  -DM       Row Name 09/28/24 1812          Transfers    Comment, (Transfers) decl.  -DM       Row Name 09/28/24 1812          Bed-Chair Transfer    Bed-Chair [place] (Transfers) unable to assess  -DM       Row Name 09/28/24 1812          Sit-Stand Transfer    Sit-Stand [place] (Transfers) unable to assess  -DM       Row Name 09/28/24 1812          Gait/Stairs (Locomotion)    [place] Level (Gait) unable to assess  -DM               User Key  (r) = Recorded By, (t) = Taken By, (c) = Cosigned By      Initials Name Provider Type    DM Donna Montanez, PT Physical Therapist                   Obj/Interventions       Row Name 09/28/24 1812          Motor Skills    Therapeutic Exercise shoulder;hip;knee;ankle  issued copy of back exer for comp.fx and instructed  -DM       Row Name 09/28/24 1812          Shoulder (Therapeutic Exercise)    Shoulder (Therapeutic Exercise) AROM (active range of motion)  -DM     Shoulder AROM (Therapeutic Exercise) bilateral;flexion;extension;supine;10 repetitions  \"arms overhead\" in hook-lying while maintaining abdom set (deep inspirations)  -DM       Row Name 09/28/24 1812          Hip (Therapeutic Exercise)    Hip AROM (Therapeutic Exercise) bilateral;flexion;extension;external rotation;internal rotation;supine;10 repetitions;other (see comments)  hip F/E during h.slides; unilat. BKFO while maintaining Abdom set  -DM     Hip Isometrics (Therapeutic Exercise) bilateral;gluteal sets;supine;10 repetitions;other (see comments)  abdom sets  " -DM       Row Name 09/28/24 1812          Knee (Therapeutic Exercise)    Knee (Therapeutic Exercise) AROM (active range of motion)  -DM     Knee AROM (Therapeutic Exercise) bilateral;flexion;extension;heel slides;supine;10 repetitions  -DM     Knee Isometrics (Therapeutic Exercise) bilateral;quad sets;supine;10 repetitions  -DM       Row Name 09/28/24 1812          Ankle (Therapeutic Exercise)    Ankle (Therapeutic Exercise) AROM (active range of motion)  -DM     Ankle AROM (Therapeutic Exercise) bilateral;dorsiflexion;plantarflexion;supine;10 repetitions;2 sets;other (see comments)  AP X 20; AC X 10  -DM               User Key  (r) = Recorded By, (t) = Taken By, (c) = Cosigned By      Initials Name Provider Type    Donna Starkey, PT Physical Therapist                   Goals/Plan    No documentation.                  Clinical Impression       Row Name 09/28/24 1812          Pain    Pain Intervention(s) Repositioned;Elevated;Rest  -DM     Additional Documentation Pain Scale: FACES Pre/Post-Treatment (Group)  -DM       Row Name 09/28/24 1812          Pain Scale: FACES Pre/Post-Treatment    Pain: FACES Scale, Pretreatment 4-->hurts little more  -DM     Posttreatment Pain Rating 4-->hurts little more  -DM     Pain Location - Side/Orientation Bilateral  -DM     Pain Location lower  -DM     Pain Location - back  -DM       Row Name 09/28/24 1812          Plan of Care Review    Plan of Care Reviewed With patient  -DM     Progress no change  -DM     Outcome Evaluation Logrolled L/R w/ min A using draw sheet & grasping bedrail, and performed ther exer all extrem in sup per issued HEP for L. comp. fx.,but decl. logrolling transf to sitting EOB or transf/gt d/t LBP & wanting to rest. Noted HR 83, & desat 94% (now on RA). Req. mult cues for spinal precaut. & exer seq. d/t baseline cognit impairment.  -DM       Row Name 09/28/24 1812          Vital Signs    Pre Systolic BP Rehab 103  -DM     Pre Treatment Diastolic BP 51   -DM     Post Systolic BP Rehab 103  -DM     Post Treatment Diastolic BP 65  -DM     Pretreatment Heart Rate (beats/min) 72  -DM     Intratreatment Heart Rate (beats/min) 83  -DM     Posttreatment Heart Rate (beats/min) 81  -DM     Pre SpO2 (%) 98  -DM     O2 Delivery Pre Treatment room air  -DM     Intra SpO2 (%) 94  -DM     O2 Delivery Intra Treatment room air  -DM     Post SpO2 (%) 98  -DM     O2 Delivery Post Treatment room air  -DM     Pre Patient Position Supine  -DM     Intra Patient Position Side Lying  -DM     Post Patient Position Supine  -DM       Row Name 09/28/24 1812          Positioning and Restraints    Pre-Treatment Position in bed  -DM     Post Treatment Position bed  -DM     In Bed notified nsg;supine;call light within reach;encouraged to call for assist;exit alarm on;SCD pump applied;heels elevated  -DM               User Key  (r) = Recorded By, (t) = Taken By, (c) = Cosigned By      Initials Name Provider Type    DM Donna Montanez, PT Physical Therapist                   Outcome Measures       Row Name 09/28/24 1812 09/28/24 0800       How much help from another person do you currently need...    Turning from your back to your side while in flat bed without using bedrails? 3  -DM 3  -DMA    Moving from lying on back to sitting on the side of a flat bed without bedrails? 3  -DM 3  -DMA    Moving to and from a bed to a chair (including a wheelchair)? 2  -DM 2  -DMA    Standing up from a chair using your arms (e.g., wheelchair, bedside chair)? 2  -DM 2  -DMA    Climbing 3-5 steps with a railing? 1  -DM 1  -DMA    To walk in hospital room? 2  -DM 2  -DMA    AM-PAC 6 Clicks Score (PT) 13  -DM 13  -DMA    Highest Level of Mobility Goal 4 --> Transfer to chair/commode  -DM 4 --> Transfer to chair/commode  -DMA      Row Name 09/28/24 1812          Functional Assessment    Outcome Measure Options AM-PAC 6 Clicks Basic Mobility (PT)  -DM               User Key  (r) = Recorded By, (t) = Taken By, (c) =  Cosigned By      Initials Name Provider Type    Donna Starkey, PT Physical Therapist    Hien Cullen, RN Registered Nurse                                 Physical Therapy Education       Title: PT OT SLP Therapies (In Progress)       Topic: Physical Therapy (In Progress)       Point: Mobility training (In Progress)       Learning Progress Summary             Patient Acceptance, E,D,H, NR by DM at 9/28/2024 1837    Acceptance, E, VU,NR by LO at 9/27/2024 0828    Comment: PT POC    Acceptance, E,D, VU,NR by LR at 9/26/2024 1403    Comment: Educated on spinal precautions for comfort, correct log rolling technique, correct sit<->stand t/f technique, correct gait mechanics, HEP, benefits of mobility and being OOB, and progression of POC.    Acceptance, E,D, VU,NR by LR at 9/25/2024 1310    Comment: Educated on spinal precautions for comfort, correct log rolling technique, correct sit<->stand t/f technique, correct technique for side steps at EOB, HEP, and progression of POC.    Acceptance, E,D, VU,NR by LR at 9/24/2024 1301    Comment: Educated on spinal precautions, correct log rolling technique, benefits of being OOB, correct sit<->stand t/f technique, correct bed to chair t/f technique, and progression of POC.                         Point: Home exercise program (In Progress)       Learning Progress Summary             Patient Acceptance, E,D,H, NR by DM at 9/28/2024 1837    Acceptance, E, VU,NR by LO at 9/27/2024 0828    Comment: PT POC    Acceptance, E,D, VU,NR by LR at 9/26/2024 1403    Comment: Educated on spinal precautions for comfort, correct log rolling technique, correct sit<->stand t/f technique, correct gait mechanics, HEP, benefits of mobility and being OOB, and progression of POC.    Acceptance, E,D, VU,NR by LR at 9/25/2024 1310    Comment: Educated on spinal precautions for comfort, correct log rolling technique, correct sit<->stand t/f technique, correct technique for side steps at EOB,  HEP, and progression of POC.    Acceptance, E,D, VU,NR by LR at 9/24/2024 1301    Comment: Educated on spinal precautions, correct log rolling technique, benefits of being OOB, correct sit<->stand t/f technique, correct bed to chair t/f technique, and progression of POC.                         Point: Body mechanics (In Progress)       Learning Progress Summary             Patient Acceptance, E,D,H, NR by DM at 9/28/2024 1837    Acceptance, E, VU,NR by LO at 9/27/2024 0828    Comment: PT POC    Acceptance, E,D, VU,NR by LR at 9/26/2024 1403    Comment: Educated on spinal precautions for comfort, correct log rolling technique, correct sit<->stand t/f technique, correct gait mechanics, HEP, benefits of mobility and being OOB, and progression of POC.    Acceptance, E,D, VU,NR by LR at 9/25/2024 1310    Comment: Educated on spinal precautions for comfort, correct log rolling technique, correct sit<->stand t/f technique, correct technique for side steps at EOB, HEP, and progression of POC.    Acceptance, E,D, VU,NR by LR at 9/24/2024 1301    Comment: Educated on spinal precautions, correct log rolling technique, benefits of being OOB, correct sit<->stand t/f technique, correct bed to chair t/f technique, and progression of POC.                         Point: Precautions (In Progress)       Learning Progress Summary             Patient Acceptance, E,D,H, NR by DM at 9/28/2024 1837    Acceptance, E, VU,NR by LO at 9/27/2024 0828    Comment: PT POC    Acceptance, E,D, VU,NR by LR at 9/26/2024 1403    Comment: Educated on spinal precautions for comfort, correct log rolling technique, correct sit<->stand t/f technique, correct gait mechanics, HEP, benefits of mobility and being OOB, and progression of POC.    Acceptance, E,D, VU,NR by LR at 9/25/2024 1310    Comment: Educated on spinal precautions for comfort, correct log rolling technique, correct sit<->stand t/f technique, correct technique for side steps at EOB, HEP, and  progression of POC.    Acceptance, E,D, VU,NR by LR at 9/24/2024 1301    Comment: Educated on spinal precautions, correct log rolling technique, benefits of being OOB, correct sit<->stand t/f technique, correct bed to chair t/f technique, and progression of POC.                                         User Key       Initials Effective Dates Name Provider Type Discipline    DM 02/03/23 -  Donna Montanez, PT Physical Therapist PT    LR 02/03/23 -  Meseret Dunham, PT Physical Therapist PT    LO 06/16/21 -  Yasmin Perdomo, PT Physical Therapist PT                  PT Recommendation and Plan     Plan of Care Reviewed With: patient  Progress: no change  Outcome Evaluation: Logrolled L/R w/ min A using draw sheet & grasping bedrail, and performed ther exer all extrem in sup per issued HEP for L. comp. fx.,but decl. logrolling transf to sitting EOB or transf/gt d/t LBP & wanting to rest. Noted HR 83, & desat 94% (now on RA). Req. mult cues for spinal precaut. & exer seq. d/t baseline cognit impairment.     Time Calculation:         PT Charges       Row Name 09/28/24 1837             Time Calculation    Start Time 1812  -DM      PT Received On 09/28/24  -DM      PT Goal Re-Cert Due Date 10/04/24  -DM         Time Calculation- PT    Total Timed Code Minutes- PT 23 minute(s)  -DM         Timed Charges    58419 - PT Therapeutic Exercise Minutes 14  -DM      03524 - PT Therapeutic Activity Minutes 9  -DM         Total Minutes    Timed Charges Total Minutes 23  -DM       Total Minutes 23  -DM                User Key  (r) = Recorded By, (t) = Taken By, (c) = Cosigned By      Initials Name Provider Type    DM Donna Montanez, PT Physical Therapist                  Therapy Charges for Today       Code Description Service Date Service Provider Modifiers Qty    82627548774 HC PT THER PROC EA 15 MIN 9/28/2024 Donna Montanez, PT GP 1    [id number] HC PT THERAPEUTIC ACT EA 15 MIN 9/28/2024 Donna Montanez, PT GP 1             PT G-Codes  Outcome Measure Options: AM-PAC 6 Clicks Basic Mobility (PT)  AM-PAC 6 Clicks Score (PT): 13  AM-PAC 6 Clicks Score (OT): 13       Donna Montanez, PT  9/28/2024

## 2024-09-29 LAB
GLUCOSE BLDC GLUCOMTR-MCNC: 110 MG/DL (ref 70–130)
GLUCOSE BLDC GLUCOMTR-MCNC: 158 MG/DL (ref 70–130)
GLUCOSE BLDC GLUCOMTR-MCNC: 167 MG/DL (ref 70–130)
GLUCOSE BLDC GLUCOMTR-MCNC: 194 MG/DL (ref 70–130)

## 2024-09-29 PROCEDURE — 99232 SBSQ HOSP IP/OBS MODERATE 35: CPT | Performed by: INTERNAL MEDICINE

## 2024-09-29 PROCEDURE — G0378 HOSPITAL OBSERVATION PER HR: HCPCS

## 2024-09-29 PROCEDURE — 97110 THERAPEUTIC EXERCISES: CPT

## 2024-09-29 PROCEDURE — 97116 GAIT TRAINING THERAPY: CPT

## 2024-09-29 PROCEDURE — 82948 REAGENT STRIP/BLOOD GLUCOSE: CPT

## 2024-09-29 PROCEDURE — 63710000001 INSULIN LISPRO (HUMAN) PER 5 UNITS: Performed by: FAMILY MEDICINE

## 2024-09-29 RX ORDER — HYDROCODONE BITARTRATE AND ACETAMINOPHEN 5; 325 MG/1; MG/1
1 TABLET ORAL ONCE
Status: DISCONTINUED | OUTPATIENT
Start: 2024-09-29 | End: 2024-09-30

## 2024-09-29 RX ADMIN — Medication 10 ML: at 20:02

## 2024-09-29 RX ADMIN — SENNOSIDES AND DOCUSATE SODIUM 2 TABLET: 50; 8.6 TABLET ORAL at 08:38

## 2024-09-29 RX ADMIN — PRAVASTATIN SODIUM 20 MG: 20 TABLET ORAL at 20:01

## 2024-09-29 RX ADMIN — QUETIAPINE FUMARATE 25 MG: 25 TABLET ORAL at 20:01

## 2024-09-29 RX ADMIN — Medication 12.5 MG: at 08:38

## 2024-09-29 RX ADMIN — Medication 12.5 MG: at 20:01

## 2024-09-29 RX ADMIN — APIXABAN 5 MG: 5 TABLET, FILM COATED ORAL at 20:01

## 2024-09-29 RX ADMIN — INSULIN LISPRO 2 UNITS: 100 INJECTION, SOLUTION INTRAVENOUS; SUBCUTANEOUS at 11:44

## 2024-09-29 RX ADMIN — HYDROCODONE BITARTRATE AND ACETAMINOPHEN 1 TABLET: 5; 325 TABLET ORAL at 10:08

## 2024-09-29 RX ADMIN — INSULIN LISPRO 2 UNITS: 100 INJECTION, SOLUTION INTRAVENOUS; SUBCUTANEOUS at 20:00

## 2024-09-29 RX ADMIN — SENNOSIDES AND DOCUSATE SODIUM 2 TABLET: 50; 8.6 TABLET ORAL at 20:01

## 2024-09-29 RX ADMIN — APIXABAN 5 MG: 5 TABLET, FILM COATED ORAL at 08:38

## 2024-09-29 NOTE — PLAN OF CARE
Goal Outcome Evaluation:  Plan of Care Reviewed With: patient        Progress: improving  Outcome Evaluation: Pt. with encouragement did agree to OOB activity.  Pt needed cues for safety, posture and assurance he would not fall.    Pt. needed min to mod of 2 with most all movement.  Patient declined any BADL tasks today, but with good participation with UE TE.  Continue OT POC.      Anticipated Discharge Disposition (OT): skilled nursing facility

## 2024-09-29 NOTE — PLAN OF CARE
Goal Outcome Evaluation:      Patient received as transfer from [address] [place] [address]. Patient's pain controlled with PRN Norco. Patient denies needs on most rounding checks.       Problem: Adult Inpatient Plan of Care  Goal: Plan of Care Review  Outcome: Progressing  Goal: Patient-Specific Goal (Individualized)  Outcome: Progressing  Goal: Absence of Hospital-Acquired Illness or Injury  Outcome: Progressing  Intervention: Identify and Manage Fall Risk  Recent Flowsheet Documentation  Taken 9/29/2024 1642 by Marii Harris RN  Safety Promotion/Fall Prevention:   assistive device/personal items within reach   activity supervised   clutter free environment maintained   elopement precautions   fall prevention program maintained   gait belt   lighting adjusted   mobility aid in reach   nonskid shoes/slippers when out of bed   muscle strengthening facilitated   room organization consistent   safety round/check completed   toileting scheduled  Taken 9/29/2024 1435 by Marii Harris RN  Safety Promotion/Fall Prevention:   activity supervised   assistive device/personal items within reach   clutter free environment maintained   elopement precautions   fall prevention program maintained   gait belt   mobility aid in reach   lighting adjusted   muscle strengthening facilitated   nonskid shoes/slippers when out of bed   room organization consistent   safety round/check completed   toileting scheduled  Taken 9/29/2024 1205 by Marii Harris, RN  Safety Promotion/Fall Prevention:   activity supervised   clutter free environment maintained   assistive device/personal items within reach   elopement precautions   fall prevention program maintained   gait belt   lighting adjusted   mobility aid in reach   muscle strengthening facilitated   nonskid shoes/slippers when out of bed   room organization consistent   safety round/check completed   toileting scheduled  Taken 9/29/2024 1045 by Marii Harris RN  Safety Promotion/Fall Prevention:    activity supervised   assistive device/personal items within reach   clutter free environment maintained   elopement precautions   fall prevention program maintained   gait belt   lighting adjusted   muscle strengthening facilitated   mobility aid in reach   nonskid shoes/slippers when out of bed   room organization consistent   safety round/check completed   toileting scheduled  Intervention: Prevent Skin Injury  Recent Flowsheet Documentation  Taken 9/29/2024 1642 by aMrii Harris RN  Body Position:   turned   right  Skin Protection:   adhesive use limited   incontinence pads utilized   transparent dressing maintained   tubing/devices free from skin contact  Taken 9/29/2024 1435 by Marii Harris RN  Body Position: 30 degrees  Skin Protection:   adhesive use limited   incontinence pads utilized   transparent dressing maintained   tubing/devices free from skin contact  Taken 9/29/2024 1205 by Marii Harris RN  Body Position:   right   side-lying  Skin Protection:   adhesive use limited   incontinence pads utilized   transparent dressing maintained   tubing/devices free from skin contact  Taken 9/29/2024 1045 by Marii Harris RN  Body Position:   right   side-lying  Skin Protection:   adhesive use limited   incontinence pads utilized   transparent dressing maintained   tubing/devices free from skin contact  Intervention: Prevent and Manage VTE (Venous Thromboembolism) Risk  Recent Flowsheet Documentation  Taken 9/29/2024 1642 by Marii Harris RN  VTE Prevention/Management:   bilateral   sequential compression devices on  Taken 9/29/2024 1435 by Marii Harris RN  VTE Prevention/Management:   bilateral   sequential compression devices on  Taken 9/29/2024 1205 by Marii Harris RN  VTE Prevention/Management:   bilateral   sequential compression devices on  Taken 9/29/2024 1045 by Marii Harris RN  VTE Prevention/Management:   bilateral   sequential compression devices on  Range of Motion: active ROM  (range of motion) encouraged  Intervention: Prevent Infection  Recent Flowsheet Documentation  Taken 9/29/2024 1642 by Marii Harris RN  Infection Prevention:   environmental surveillance performed   equipment surfaces disinfected   personal protective equipment utilized   hand hygiene promoted   rest/sleep promoted   single patient room provided  Taken 9/29/2024 1435 by Marii Harris RN  Infection Prevention:   environmental surveillance performed   equipment surfaces disinfected   hand hygiene promoted   personal protective equipment utilized   single patient room provided   rest/sleep promoted  Taken 9/29/2024 1205 by Marii Harris RN  Infection Prevention:   environmental surveillance performed   equipment surfaces disinfected   personal protective equipment utilized   hand hygiene promoted   rest/sleep promoted   single patient room provided  Taken 9/29/2024 1045 by Marii Harris RN  Infection Prevention:   environmental surveillance performed   equipment surfaces disinfected   hand hygiene promoted   personal protective equipment utilized   rest/sleep promoted   single patient room provided  Goal: Optimal Comfort and Wellbeing  Outcome: Progressing  Intervention: Provide Person-Centered Care  Recent Flowsheet Documentation  Taken 9/29/2024 1045 by Marii Harris RN  Trust Relationship/Rapport:   care explained   choices provided   emotional support provided   empathic listening provided   questions answered   questions encouraged   thoughts/feelings acknowledged   reassurance provided  Goal: Readiness for Transition of Care  Outcome: Progressing     Problem: Skin Injury Risk Increased  Goal: Skin Health and Integrity  Outcome: Progressing  Intervention: Optimize Skin Protection  Recent Flowsheet Documentation  Taken 9/29/2024 1642 by Marii Harris RN  Pressure Reduction Techniques: frequent weight shift encouraged  Head of Bed (HOB) Positioning: HOB elevated  Pressure Reduction Devices:  pressure-redistributing mattress utilized  Skin Protection:   adhesive use limited   incontinence pads utilized   transparent dressing maintained   tubing/devices free from skin contact  Taken 9/29/2024 1435 by Marii Harris RN  Pressure Reduction Techniques: frequent weight shift encouraged  Head of Bed (HOB) Positioning: HOB elevated  Pressure Reduction Devices: pressure-redistributing mattress utilized  Skin Protection:   adhesive use limited   incontinence pads utilized   transparent dressing maintained   tubing/devices free from skin contact  Taken 9/29/2024 1205 by Marii Harris RN  Pressure Reduction Techniques: frequent weight shift encouraged  Head of Bed (HOB) Positioning: HOB at 15 degrees  Pressure Reduction Devices: pressure-redistributing mattress utilized  Skin Protection:   adhesive use limited   incontinence pads utilized   transparent dressing maintained   tubing/devices free from skin contact  Taken 9/29/2024 1045 by Marii Harris RN  Pressure Reduction Techniques: frequent weight shift encouraged  Head of Bed (HOB) Positioning: HOB at 15 degrees  Pressure Reduction Devices: pressure-redistributing mattress utilized  Skin Protection:   adhesive use limited   incontinence pads utilized   transparent dressing maintained   tubing/devices free from skin contact     Problem: Fall Injury Risk  Goal: Absence of Fall and Fall-Related Injury  Outcome: Progressing  Intervention: Identify and Manage Contributors  Recent Flowsheet Documentation  Taken 9/29/2024 1642 by Marii Harris RN  Medication Review/Management: medications reviewed  Taken 9/29/2024 1435 by Marii Harris RN  Medication Review/Management: medications reviewed  Taken 9/29/2024 1205 by Marii Harris RN  Medication Review/Management: medications reviewed  Taken 9/29/2024 1045 by Marii Harris RN  Medication Review/Management: medications reviewed  Intervention: Promote Injury-Free Environment  Recent Flowsheet  Documentation  Taken 9/29/2024 1642 by Marii Harris RN  Safety Promotion/Fall Prevention:   assistive device/personal items within reach   activity supervised   clutter free environment maintained   elopement precautions   fall prevention program maintained   gait belt   lighting adjusted   mobility aid in reach   nonskid shoes/slippers when out of bed   muscle strengthening facilitated   room organization consistent   safety round/check completed   toileting scheduled  Taken 9/29/2024 1435 by Marii Harris RN  Safety Promotion/Fall Prevention:   activity supervised   assistive device/personal items within reach   clutter free environment maintained   elopement precautions   fall prevention program maintained   gait belt   mobility aid in reach   lighting adjusted   muscle strengthening facilitated   nonskid shoes/slippers when out of bed   room organization consistent   safety round/check completed   toileting scheduled  Taken 9/29/2024 1205 by Marii Harris RN  Safety Promotion/Fall Prevention:   activity supervised   clutter free environment maintained   assistive device/personal items within reach   elopement precautions   fall prevention program maintained   gait belt   lighting adjusted   mobility aid in reach   muscle strengthening facilitated   nonskid shoes/slippers when out of bed   room organization consistent   safety round/check completed   toileting scheduled  Taken 9/29/2024 1045 by Marii Harris, RN  Safety Promotion/Fall Prevention:   activity supervised   assistive device/personal items within reach   clutter free environment maintained   elopement precautions   fall prevention program maintained   gait belt   lighting adjusted   muscle strengthening facilitated   mobility aid in reach   nonskid shoes/slippers when out of bed   room organization consistent   safety round/check completed   toileting scheduled     Problem: Hypertension Comorbidity  Goal: Blood Pressure in Desired  Range  Outcome: Progressing  Intervention: Maintain Blood Pressure Management  Recent Flowsheet Documentation  Taken 9/29/2024 1642 by Marii Harris, RN  Medication Review/Management: medications reviewed  Taken 9/29/2024 1435 by Marii Harris RN  Medication Review/Management: medications reviewed  Taken 9/29/2024 1205 by Marii Harris, RN  Medication Review/Management: medications reviewed  Taken 9/29/2024 1045 by Marii Harris RN  Medication Review/Management: medications reviewed     Problem: Palliative Care  Goal: Enhanced Quality of Life  Outcome: Progressing

## 2024-09-29 NOTE — PLAN OF CARE
Goal Outcome Evaluation:  Plan of Care Reviewed With: patient           Outcome Evaluation: Pt cont to required assistance for mobility. He is able to walk a limited distance w/a RW and chair follow. He is fearful of falling and unsteady. Pt would benefit from cont PT services. Cont to recommend SNF at discharge.

## 2024-09-29 NOTE — PROGRESS NOTES
[facility] Medicine Services  PROGRESS NOTE    Patient Name: Apollo Haro  : 1940  MRN: [id number]    Date of Admission: 2024  Primary Care Physician: Provider, No Known    Subjective   Subjective     CC:  Back pain    HPI:  Helped up to chair by PT.  Complains of bilat hip pain in this position.     Low BPs overnight       Objective   Objective     Vital Signs:   Temp:  [97.6 °F (36.4 °C)-98.2 °F (36.8 °C)] 97.6 °F (36.4 °C)  Heart Rate:  [] 89  Resp:  [16-18] 16  BP: ()/(56-73) 110/63     Physical Exam:  Constitutional: No acute distress, sitting up in chair   Respiratory: Clear to auscultation bilaterally, respiratory effort normal   Cardiovascular:  irreg; no murmur   Gastrointestinal: Positive bowel sounds, soft, nontender, nondistended  Musculoskeletal: No bilateral ankle edema  Psychiatric: Appropriate affect, cooperative  Neurologic: alert, speech clear       Results Reviewed:  LAB RESULTS:      Lab 24  1010 24  0528 24  1656 24  1315   WBC 8.23 8.02  --   --  8.57   HEMOGLOBIN 13.3 12.1*  --   --  13.6   HEMATOCRIT 40.1 35.9*  --   --  40.7   PLATELETS 184 158  --   --  176   NEUTROS ABS  --  4.83  --   --  6.29   IMMATURE GRANS (ABS)  --  0.02  --   --  0.03   LYMPHS ABS  --  2.30  --   --  1.35   MONOS ABS  --  0.68  --   --  0.79   EOS ABS  --  0.15  --   --  0.09   MCV 92.6 90.2  --   --  92.5   PROCALCITONIN  --  0.07  --   --   --    LACTATE  --   --  1.7 2.1* 2.2*         Lab 24  1002 24  0411 24  1010 24  0528 24  1315   SODIUM 141 143 137 138 143   POTASSIUM 4.3 4.4 4.5 4.1 4.5   CHLORIDE 105 105 99 101 101   CO2 28.0 28.0 27.0 25.0 28.0   ANION GAP 8.0 10.0 11.0 12.0 14.0   BUN 20 26* 20 34* 26*   CREATININE 0.82 1.01 0.95 1.09 1.11   EGFR 86.1 72.9 78.4 66.5 65.1   GLUCOSE 163* 127* 155* 170* 145*   CALCIUM 9.0 9.0 9.2 9.1 9.4   MAGNESIUM  --   --   --  1.7  --    HEMOGLOBIN A1C   --   --   --   --  5.80*         Lab 09/24/24  0528 09/23/24  1315   TOTAL PROTEIN 5.9* 6.5   ALBUMIN 3.5 3.9   GLOBULIN 2.4 2.6   ALT (SGPT) 9 11   AST (SGOT) 16 21   BILIRUBIN 0.8 1.3*   ALK PHOS 88 92                     Brief Urine Lab Results  (Last result in the past 365 days)        Color   Clarity   Blood   Leuk Est   Nitrite   Protein   CREAT   Urine HCG        09/23/24 1337 Yellow   Cloudy   Large (3+)   Large (3+)   Positive   30 mg/dL (1+)                   Microbiology Results Abnormal       Procedure Component Value - Date/Time    Blood Culture - Blood, Hand, Left [[id number]]  (Normal) Collected: 09/23/24 1656    Lab Status: Final result Specimen: Blood from Hand, Left Updated: 09/28/24 1715     Blood Culture No growth at 5 days    Blood Culture - Blood, Hand, Right [[id number]]  (Normal) Collected: 09/23/24 1656    Lab Status: Final result Specimen: Blood from Hand, Right Updated: 09/28/24 1715     Blood Culture No growth at 5 days            No radiology results from the last 24 hrs        Current medications:  Scheduled Meds:apixaban, 5 mg, Oral, BID  insulin lispro, 2-7 Units, Subcutaneous, 4x Daily AC & at Bedtime  metoprolol tartrate, 12.5 mg, Oral, Q12H  senna-docusate sodium, 2 tablet, Oral, BID   And  polyethylene glycol, 17 g, Oral, Daily  pravastatin, 20 mg, Oral, Nightly  QUEtiapine, 25 mg, Oral, Nightly  sodium chloride, 10 mL, Intravenous, Q12H      Continuous Infusions:     PRN Meds:.  acetaminophen **OR** acetaminophen **OR** acetaminophen    senna-docusate sodium **AND** polyethylene glycol **AND** bisacodyl **AND** bisacodyl    Calcium Replacement - Follow Nurse / BPA Driven Protocol    dextrose    dextrose    glucagon (human recombinant)    HYDROcodone-acetaminophen    Magnesium Standard Dose Replacement - Follow Nurse / BPA Driven Protocol    nitroglycerin    ondansetron ODT **OR** ondansetron    Phosphorus Replacement - Follow Nurse / BPA Driven Protocol    Potassium Replacement -  Follow Nurse / BPA Driven Protocol    [COMPLETED] Insert Peripheral IV **AND** sodium chloride    sodium chloride    sodium chloride    Assessment & Plan   Assessment & Plan     Active Hospital Problems    Diagnosis  POA    **Closed compression fracture of L2 vertebra [S32.020A]  Yes    UTI (urinary tract infection) [N39.0]  Yes    Atrial fibrillation, chronic [I48.20]  Yes    CAD (coronary artery disease) [I25.10]  Yes    HTN (hypertension) [I10]  Yes    Type 2 diabetes mellitus, without long-term current use of insulin [E11.9]  Yes    Weakness [R53.1]  Yes      Resolved Hospital Problems   No resolved problems to display.        Brief Hospital Course to date:  Apollo Haro is an 85 yo M that presented to [facility] for evaluation of back pain. Patient with a fall earlier this month in which he hit his head, was evaluated in the ED and later discharged home. He reports since that time he has had issues with increased back pain inhibiting his ability to walk and has been stuck mostly in the bed. He was found to have L2 anterior column compression fracture.      Compression fracture L2  Back Pain   Weakness   - MRI with acute L2 compression fracture with 20% loss of height   - Neurosurgery consulted - likely conservative management. Close follow up in 2-4 weeks  - PRN pain control - however he never asks for it.  Consider scheduling it prior to PT.   - PT/OT with rehab recs. CM following for rehab   -palliative consulted      MRSA UTI POA   - Ucx with MRSA; Bcx NGTD  -  vanc (9/24) x 5 days - complete      T2DM  - A1C  5.8  - FSBS SSI coverage   - Home Metformin held for now      HTN - now hypotension at times   Afib   CAD   - Cards consulted due to persistent aflutter while on flecainide   - Patient was evaluated by cards. Flecainide was discontinued. Decrease metoprolol to 12.5 mg BID; holding on any plans for cardioversion   - Continue eliquis   - watch bp      Cognitive impairment   - seroquel      Expected  Discharge Location and Transportation: [facility]  Expected Discharge   Expected Discharge Date: 10/1/2024; Expected Discharge Time:      VTE Prophylaxis:  Pharmacologic & mechanical VTE prophylaxis orders are present.         AM-PAC 6 Clicks Score (PT): 13 (09/29/24 0800)    CODE STATUS:   Code Status and Medical Interventions: No CPR (Do Not Attempt to Resuscitate); Limited Support; No intubation (DNI), No cardioversion; Spouse present and in full support of decision as [employer]   Ordered at: 09/27/24 1407     Medical Intervention Limits:    No intubation (DNI)    No cardioversion     Level Of Support Discussed With:    Patient     Code Status (Patient has no pulse and is not breathing):    No CPR (Do Not Attempt to Resuscitate)     Medical Interventions (Patient has pulse or is breathing):    Limited Support     Comments:    Spouse present and in full support of decision as JACI Whyte MD  09/29/24

## 2024-09-29 NOTE — PLAN OF CARE
Goal Outcome Evaluation:  Pt progressing well. Calm, Cooperative and accepting of plan of care. Intermittent confusion through the night. Reoriented patient to call light use. Pt resting well, call light within reach. Bed alarm on.

## 2024-09-29 NOTE — THERAPY TREATMENT NOTE
Patient Name: Apollo Haro  : 1940    MRN: [id number]                              Today's Date: 2024       Admit Date: 2024    Visit Dx:     ICD-10-CM ICD-9-CM   1. Compression fracture of L2 vertebra, initial encounter  S32.020A 805.4   2. Declining functional status  R53.81 799.3   3. Unable to ambulate  R26.2 719.7   4. History of fall  Z91.81 V15.88   5. Acute midline low back pain without sciatica  M54.50 724.2   6. Chronic anticoagulation  Z79.01 V58.61   7. History of atrial fibrillation  Z86.79 V12.59     Patient Active Problem List   Diagnosis    Weakness    Type 2 diabetes mellitus, without long-term current use of insulin    Rhabdomyolysis due to COVID-19    CAD (coronary artery disease)    HTN (hypertension)    Atrial fibrillation, chronic    COVID-19 virus detected    SDH (subdural hematoma)    Altered mental status    AMS (altered mental status)    Closed compression fracture of L2 vertebra    UTI (urinary tract infection)     Past Medical History:   Diagnosis Date    A-fib     CAD (coronary artery disease)     DM (diabetes mellitus)      History reviewed. No pertinent surgical history.   General Information       Row Name 2448          OT Time and Intention    Document Type therapy note (daily note)  -ZUNILDA     Mode of Treatment occupational therapy  -       Row Name 24          General Information    Patient Profile Reviewed yes  -ZUNILDA     Existing Precautions/Restrictions fall;spinal;other (see comments)  ac. 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  -ZUNILDA     Barriers to Rehab medically complex;previous functional deficit;cognitive status;hearing deficit;ineffective coping  -ZUNILDA       Row Name 24          Cognition    Orientation Status (Cognition) oriented x 3;verbal cues/prompts needed for orientation  -ZUNILDA       Row Name 24          Safety Issues, Functional Mobility    Safety Issues Affecting Function  (Mobility) insight into deficits/self-awareness;safety precaution awareness;safety precautions follow-through/compliance;sequencing abilities  -ZUNILDA     Impairments Affecting Function (Mobility) balance;cognition;endurance/activity tolerance;motor planning;pain;postural/trunk control;strength  -ZUNILDA     Cognitive Impairments, Mobility Safety/Performance insight into deficits/self-awareness;safety precaution awareness;safety precaution follow-through;sequencing abilities  -               User Key  (r) = Recorded By, (t) = Taken By, (c) = Cosigned By      Initials Name Provider Type    ZUNILDA Lashon Hobbs, OT Occupational Therapist                     Mobility/ADL's       Row Name 09/29/24 0949          Bed Mobility    Rolling Right [place] (Bed Mobility) verbal cues;nonverbal cues (demo/gesture);minimum assist (75% patient effort);1 person assist  -     Sidelying-Sit [place] (Bed Mobility) moderate assist (50% patient effort);2 person assist;verbal cues;nonverbal cues (demo/gesture)  -     Bed Mobility, Safety Issues impaired trunk control for bed mobility  -     Assistive Device (Bed Mobility) bed rails;draw sheet  -     Comment, (Bed Mobility) pt. able to assist some with LE's, assist to get to side, most assist for side to sit  -       Row Name 09/29/24 0949          Transfers    Transfers sit-stand transfer;stand-sit transfer  -       Row Name 09/29/24 0949          Sit-Stand Transfer    Sit-Stand [place] (Transfers) minimum assist (75% patient effort);2 person assist;nonverbal cues (demo/gesture);verbal cues  -     Assistive Device (Sit-Stand Transfers) walker, front-wheeled  -       Row Name 09/29/24 0949          Stand-Sit Transfer    Stand-Sit [place] (Transfers) moderate assist (50% patient effort);1 person assist;verbal cues;nonverbal cues (demo/gesture)  -     Assistive Device (Stand-Sit Transfers) walker, front-wheeled  -       Row Name 09/29/24 0949           Functional Mobility    Functional Mobility- Ind. Level minimum assist (75% patient effort);2 person assist required;nonverbal cues required (demo/gesture);verbal cues required  chair follow needed  -     Functional Mobility- Device walker, front-wheeled  -     Functional Mobility- Safety Issues step length decreased  -     Functional Mobility- Comment pt. with flexed posture, cues to stay close to walker and stand tall, pt with limited motor control and needed cues to stand upright, many starts and stops with movement, some assist for walker contol needed, encouragement needed to continue, pt. with fear of falling  -       Row Name 09/29/24 0949          Activities of Daily Living    BADL Assessment/Intervention lower body dressing  -       Row Name 09/29/24 0949          Lower Body Dressing Assessment/Training    [place] Level (Lower Body Dressing) don;socks;dependent (less than 25% patient effort)  -     Position (Lower Body Dressing) supine  -       Row Name 09/29/24 0949          Grooming Assessment/Training    Comment, (Grooming) pt. declined any grooming task  -       Row Name 09/29/24 0949          Toileting Assessment/Training    Comment, (Toileting) denied need  -               User Key  (r) = Recorded By, (t) = Taken By, (c) = Cosigned By      Initials Name Provider Type     Lashon Hobbs, OT Occupational Therapist                   Obj/Interventions       Row Name 09/29/24 0953          Shoulder (Therapeutic Exercise)    Shoulder AROM (Therapeutic Exercise) bilateral;flexion;extension;aBduction;aDduction;horizontal aBduction/aDduction;sitting;10 repetitions  -       Row Name 09/29/24 0953          Elbow/Forearm (Therapeutic Exercise)    Elbow/Forearm (Therapeutic Exercise) strengthening exercise  -     Elbow/Forearm Strengthening (Therapeutic Exercise) bilateral;extension;10 repetitions;sitting  mlld resistance given  -       Row Name 09/29/24 0953          Motor Skills     Therapeutic Exercise shoulder;elbow/forearm  -ZUNILDA       Row Name 09/29/24 0953          Balance    Static Sitting Balance standby assist  -ZUNILDA     Dynamic Sitting Balance standby assist  -ZUNILDA     Position, Sitting Balance unsupported;sitting edge of bed  -ZUNILDA     Static Standing Balance minimal assist;1-person assist  -ZUNILDA     Dynamic Standing Balance moderate assist;2-person assist;verbal cues  -ZUNILDA     Position/Device Used, Standing Balance supported;walker, front-wheeled  -ZUNILDA     Balance Interventions sit to stand  -ZUNILDA               User Key  (r) = Recorded By, (t) = Taken By, (c) = Cosigned By      Initials Name Provider Type    Lashon Beckman OT Occupational Therapist                   Goals/Plan       Row Name 09/29/24 0959          Bed Mobility Goal 1 (OT)    Progress/Outcomes (Bed Mobility Goal 1, OT) goal ongoing  -ZNUILDA       Row Name 09/29/24 0959          Transfer Goal 1 (OT)    Progress/Outcome (Transfer Goal 1, OT) goal ongoing  -ZUNILDA       Row Name 09/29/24 0959          Toileting Goal 1 (OT)    Progress/Outcome (Toileting Goal 1, OT) goal ongoing  -ZUNILDA       Row Name 09/29/24 0959          Grooming Goal 1 (OT)    Progress/Outcome (Grooming Goal 1, OT) goal ongoing  -ZUNILDA               User Key  (r) = Recorded By, (t) = Taken By, (c) = Cosigned By      Initials Name Provider Type    Lashon Beckman OT Occupational Therapist                   Clinical Impression       Row Name 09/29/24 0992          Pain Assessment    Pretreatment Pain Rating 5/10  -ZUNILDA     Posttreatment Pain Rating 5/10  -ZUNILDA     Pain Location - Side/Orientation Left  -ZUNILDA     Pain Location - hip  -ZUNILDA     Pain Intervention(s) Nursing Notified;Ambulation/increased activity;Repositioned  -ZUNILDA       Row Name 09/29/24 0931          Plan of Care Review    Plan of Care Reviewed With patient  -ZUNILDA     Progress improving  -ZUNILDA     Outcome Evaluation Pt. with encouragement did agree to OOB activity.  Pt needed cues for safety, posture and assurance he  would not fall.    Pt. needed min to mod of 2 with most all movement.  Patient declined any BADL tasks today, but with good participation with UE TE.  Continue OT POC.  -ZUNILDA       Row Name 09/29/24 0955          Therapy Assessment/Plan (OT)    Therapy Frequency (OT) daily  -ZUNILDA       Row Name 09/29/24 0955          Therapy Plan Review/Discharge Plan (OT)    Anticipated Discharge Disposition (OT) skilled nursing facility  -       Row Name 09/29/24 0955          Vital Signs    Pre Systolic BP Rehab 110  -ZUNILDA     Pre Treatment Diastolic BP 63  -ZUNILDA     Pretreatment Heart Rate (beats/min) 83  -ZUNILDA     Posttreatment Heart Rate (beats/min) 77  -ZUNILDA     Pre SpO2 (%) 96  -ZUNILDA     O2 Delivery Pre Treatment room air  -ZUNILDA     O2 Delivery Intra Treatment room air  -ZUNILDA     Post SpO2 (%) 97  -ZUNILDA     O2 Delivery Post Treatment room air  -ZUNILDA     Pre Patient Position Supine  -ZUNILDA     Intra Patient Position Standing  -ZUNILDA     Post Patient Position Sitting  -ZUNILDA       Row Name 09/29/24 0955          Positioning and Restraints    Pre-Treatment Position in bed  -ZUNILDA     Post Treatment Position chair  -ZUNILDA     In Chair notified nsg;reclined;call light within reach;encouraged to call for assist;exit alarm on;waffle cushion;legs elevated;heels elevated  -ZUNILDA               User Key  (r) = Recorded By, (t) = Taken By, (c) = Cosigned By      Initials Name Provider Type    Lashon Beckman, OT Occupational Therapist                   Outcome Measures       Row Name 09/29/24 1000          How much help from another is currently needed...    Putting on and taking off regular lower body clothing? 1  -ZUNILDA     Bathing (including washing, rinsing, and drying) 2  -ZUNILDA     Toileting (which includes using toilet bed pan or urinal) 1  -ZUNILDA     Putting on and taking off regular upper body clothing 3  -ZUNILDA     Taking care of personal grooming (such as brushing teeth) 3  -ZUNILDA     Eating meals 3  -ZUNILDA     AM-PAC 6 Clicks Score (OT) 13  -ZUNILDA       Row Name 09/29/24 0800           How much help from another person do you currently need...    Turning from your back to your side while in flat bed without using bedrails? 3  -CW     Moving from lying on back to sitting on the side of a flat bed without bedrails? 3  -CW     Moving to and from a bed to a chair (including a wheelchair)? 2  -CW     Standing up from a chair using your arms (e.g., wheelchair, bedside chair)? 2  -CW     Climbing 3-5 steps with a railing? 1  -CW     To walk in hospital room? 2  -CW     AM-PAC 6 Clicks Score (PT) 13  -CW     Highest Level of Mobility Goal 4 --> Transfer to chair/commode  -CW       Row Name 09/29/24 1000          Functional Assessment    Outcome Measure Options AM-PAC 6 Clicks Daily Activity (OT)  -ZUNILDA               User Key  (r) = Recorded By, (t) = Taken By, (c) = Cosigned By      Initials Name Provider Type    Lashon Beckman OT Occupational Therapist    Baylee Badillo, RN Registered Nurse                    Occupational Therapy Education       Title: PT OT SLP Therapies (In Progress)       Topic: Occupational Therapy (In Progress)       Point: ADL training (In Progress)       Description:   Instruct learner(s) on proper safety adaptation and remediation techniques during self care or transfers.   Instruct in proper use of assistive devices.                  Learning Progress Summary             Patient Acceptance, E, NL,NR by ZUNILDA at 9/27/2024 0955    Comment: OT POC; non-pharmacological pain management strategies; benefits of activity    Acceptance, E, NR by AC at 9/24/2024 1357                         Point: Home exercise program (In Progress)       Description:   Instruct learner(s) on appropriate technique for monitoring, assisting and/or progressing therapeutic exercises/activities.                  Learning Progress Summary             Patient Acceptance, E,D, NR by [facility] at 9/29/2024 1000    Comment: UE TE,  bed mobility, sitting posture and standing posture, weight shifting, transfer  safety                         Point: Precautions (In Progress)       Description:   Instruct learner(s) on prescribed precautions during self-care and functional transfers.                  Learning Progress Summary             Patient Acceptance, E,D, NR by [facility] at 9/29/2024 1000    Comment: UE TE,  bed mobility, sitting posture and standing posture, weight shifting, transfer safety    Acceptance, E, NL,NR by  at 9/27/2024 0955    Comment: OT POC; non-pharmacological pain management strategies; benefits of activity                         Point: Body mechanics (In Progress)       Description:   Instruct learner(s) on proper positioning and spine alignment during self-care, functional mobility activities and/or exercises.                  Learning Progress Summary             Patient Acceptance, E,D, NR by [facility] at 9/29/2024 1000    Comment: UE TE,  bed mobility, sitting posture and standing posture, weight shifting, transfer safety                                         User Key       Initials Effective Dates Name Provider Type Discipline    [facility] 07/11/23 -  Lashon Hobbs, OT Occupational Therapist OT     02/03/23 -  Mikayla Cardoso, OT Occupational Therapist OT     06/16/21 -  Lanny Arias, OT Occupational Therapist OT                  OT Recommendation and Plan  Therapy Frequency (OT): daily  Plan of Care Review  Plan of Care Reviewed With: patient  Progress: improving  Outcome Evaluation: Pt. with encouragement did agree to OOB activity.  Pt needed cues for safety, posture and assurance he would not fall.    Pt. needed min to mod of 2 with most all movement.  Patient declined any BADL tasks today, but with good participation with UE TE.  Continue OT POC.     Time Calculation:         Time Calculation- OT       Row Name 09/29/24 1002             Time Calculation- OT    OT Start Time 0921  -      OT Received On 09/29/24  -      OT Goal Re-Cert Due Date 10/04/24  -         Timed Charges    69134 - OT  Therapeutic Exercise Minutes 5  -ZUNILDA      38416 - OT Self Care/Mgmt Minutes 5  -ZUNILDA         Total Minutes    Timed Charges Total Minutes 10  -ZUNILDA       Total Minutes 10  -ZUNILDA                User Key  (r) = Recorded By, (t) = Taken By, (c) = Cosigned By      Initials Name Provider Type    Lashno Beckman OT Occupational Therapist                  Therapy Charges for Today       Code Description Service Date Service Provider Modifiers Qty    [id number] HC OT THER PROC EA 15 MIN 9/29/2024 Lashon Hobbs OT GO 1                 Lashon Hobbs OT  9/29/2024

## 2024-09-29 NOTE — THERAPY TREATMENT NOTE
Patient Name: Apollo Haro  : 1940    MRN: [id number]                              Today's Date: 2024       Admit Date: 2024    Visit Dx:     ICD-10-CM ICD-9-CM   1. Compression fracture of L2 vertebra, initial encounter  S32.020A 805.4   2. Declining functional status  R53.81 799.3   3. Unable to ambulate  R26.2 719.7   4. History of fall  Z91.81 V15.88   5. Acute midline low back pain without sciatica  M54.50 724.2   6. Chronic anticoagulation  Z79.01 V58.61   7. History of atrial fibrillation  Z86.79 V12.59     Patient Active Problem List   Diagnosis    Weakness    Type 2 diabetes mellitus, without long-term current use of insulin    Rhabdomyolysis due to COVID-19    CAD (coronary artery disease)    HTN (hypertension)    Atrial fibrillation, chronic    COVID-19 virus detected    SDH (subdural hematoma)    Altered mental status    AMS (altered mental status)    Closed compression fracture of L2 vertebra    UTI (urinary tract infection)     Past Medical History:   Diagnosis Date    A-fib     CAD (coronary artery disease)     DM (diabetes mellitus)      History reviewed. No pertinent surgical history.   General Information       Row Name 24          Physical Therapy Time and Intention    Document Type therapy note (daily note)  -     Mode of Treatment physical therapy  -       Row Name 24          General Information    Patient Profile Reviewed yes  -     Existing Precautions/Restrictions fall;spinal  ac. 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  -       Row Name 24          Cognition    Orientation Status (Cognition) oriented x 3  -               User Key  (r) = Recorded By, (t) = Taken By, (c) = Cosigned By      Initials Name Provider Type    LS Radha Campo, PT Physical Therapist                   Mobility       Row Name 24          Bed Mobility    Supine-Sit [place] (Bed Mobility) verbal  cues;moderate assist (50% patient effort);2 person assist  -LS     Assistive Device (Bed Mobility) bed rails;head of bed elevated  -LS     Comment, (Bed Mobility) vcs for sequencing. transitioned through log roll.  -       Row Name 09/29/24 0922          Sit-Stand Transfer    Sit-Stand [place] (Transfers) verbal cues;minimum assist (75% patient effort);2 person assist  -LS     Assistive Device (Sit-Stand Transfers) walker, front-wheeled  -LS       Row Name 09/29/24 0922          Gait/Stairs (Locomotion)    [place] Level (Gait) verbal cues;minimum assist (75% patient effort);2 person assist  -LS     Assistive Device (Gait) walker, front-wheeled  -LS     Patient was able to Ambulate yes  -LS     Distance in Feet (Gait) 8  -LS     Deviations/Abnormal Patterns (Gait) gait speed decreased;bilateral deviations;stride length decreased;base of support, narrow  -LS     Bilateral Gait Deviations forward flexed posture  -LS     Comment, (Gait/Stairs) vcs for upright posture and increased base of support. vcs for sequending of gait with RW. very slow pace. pt fearful of falling; chair follow.  -               User Key  (r) = Recorded By, (t) = Taken By, (c) = Cosigned By      Initials Name Provider Type    LS Radha Campo, PT Physical Therapist                   Obj/Interventions       Row Name 09/29/24 0922          Motor Skills    Therapeutic Exercise hip;knee;ankle  -       Row Name 09/29/24 0922          Hip (Therapeutic Exercise)    Hip (Therapeutic Exercise) isometric exercises  -     Hip AROM (Therapeutic Exercise) right;left;aBduction;10 repetitions  -     Hip Isometrics (Therapeutic Exercise) bilateral;gluteal sets;10 repetitions  -       Row Name 09/29/24 0922          Knee (Therapeutic Exercise)    Knee AROM (Therapeutic Exercise) right;left;SAQ (short arc quad);SLR (straight leg raise);10 repetitions  -     Knee Isometrics (Therapeutic Exercise) bilateral;quad sets;10 repetitions  -        Row Name 09/29/24 0922          Ankle (Therapeutic Exercise)    Ankle (Therapeutic Exercise) AROM (active range of motion)  -     Ankle AROM (Therapeutic Exercise) bilateral;dorsiflexion;plantarflexion;10 repetitions  -       Row Name 09/29/24 0922          Balance    Static Standing Balance minimal assist  -LS     Dynamic Standing Balance moderate assist;2-person assist  -LS     Position/Device Used, Standing Balance walker, front-wheeled  -LS     Balance Interventions standing;sit to stand;supported;weight shifting activity  -               User Key  (r) = Recorded By, (t) = Taken By, (c) = Cosigned By      Initials Name Provider Type    Radha Tavares, PT Physical Therapist                   Goals/Plan    No documentation.                  Clinical Impression       Row Name 09/29/24 0922          Pain    Pretreatment Pain Rating 5/10  -LS     Posttreatment Pain Rating 5/10  -LS     Pain Location - Side/Orientation Left  -LS     Pain Location - hip  -LS     Pain Intervention(s) Repositioned;Nursing Notified  -[facility] Name 09/29/24 0922          Plan of Care Review    Plan of Care Reviewed With patient  -LS     Progress --  -LS     Outcome Evaluation Pt cont to required assistance for mobility. He is able to walk a limited distance w/a RW and chair follow. He is fearful of falling and unsteady. Pt would benefit from cont PT services. Cont to recommend SNF at discharge.  -       Row Name 09/29/24 0922          Positioning and Restraints    Pre-Treatment Position in bed  -LS     Post Treatment Position chair  -LS     In Chair notified nsg;sitting;call light within reach;encouraged to call for assist;exit alarm on;waffle cushion;legs elevated;heels elevated  -               User Key  (r) = Recorded By, (t) = Taken By, (c) = Cosigned By      Initials Name Provider Type    Radha Tavares, PT Physical Therapist                   Outcome Measures       Row Name 09/29/24 0922 09/29/24  0800       How much help from another person do you currently need...    Turning from your back to your side while in flat bed without using bedrails? 3  -LS 3  -CW    Moving from lying on back to sitting on the side of a flat bed without bedrails? 2  -LS 3  -CW    Moving to and from a bed to a chair (including a wheelchair)? 2  -LS 2  -CW    Standing up from a chair using your arms (e.g., wheelchair, bedside chair)? 2  -LS 2  -CW    Climbing 3-5 steps with a railing? 1  -LS 1  -CW    To walk in hospital room? 2  -LS 2  -CW    AM-PAC 6 Clicks Score (PT) 12  -LS 13  -CW    Highest Level of Mobility Goal 4 --> Transfer to chair/commode  -LS 4 --> Transfer to chair/commode  -CW      Row Name 09/29/24 1000 09/29/24 0922       Functional Assessment    Outcome Measure Options AM-PAC 6 Clicks Daily Activity (OT)  -ZUNILDA AM-PAC 6 Clicks Basic Mobility (PT)  -LS              User Key  (r) = Recorded By, (t) = Taken By, (c) = Cosigned By      Initials Name Provider Type    Lashon Beckman, OT Occupational Therapist    Radha Tavares, PT Physical Therapist    CW Baylee Rivas, RN Registered Nurse                                 Physical Therapy Education       Title: PT OT SLP Therapies (In Progress)       Topic: Physical Therapy (In Progress)       Point: Mobility training (Done)       Learning Progress Summary             Patient Acceptance, E, VU,NR by LS at 9/29/2024 0922    Acceptance, E,D,H, NR by DM at 9/28/2024 1837    Acceptance, E, VU,NR by LO at 9/27/2024 0828    Comment: PT POC    Acceptance, E,D, VU,NR by LR at 9/26/2024 1403    Comment: Educated on spinal precautions for comfort, correct log rolling technique, correct sit<->stand t/f technique, correct gait mechanics, HEP, benefits of mobility and being OOB, and progression of POC.    Acceptance, E,D, VU,NR by LR at 9/25/2024 1310    Comment: Educated on spinal precautions for comfort, correct log rolling technique, correct sit<->stand t/f technique,  correct technique for side steps at EOB, HEP, and progression of POC.    Acceptance, E,D, VU,NR by LR at 9/24/2024 1301    Comment: Educated on spinal precautions, correct log rolling technique, benefits of being OOB, correct sit<->stand t/f technique, correct bed to chair t/f technique, and progression of POC.                         Point: Home exercise program (In Progress)       Learning Progress Summary             Patient Acceptance, E,D,H, NR by DM at 9/28/2024 1837    Acceptance, E, VU,NR by LO at 9/27/2024 0828    Comment: PT POC    Acceptance, E,D, VU,NR by LR at 9/26/2024 1403    Comment: Educated on spinal precautions for comfort, correct log rolling technique, correct sit<->stand t/f technique, correct gait mechanics, HEP, benefits of mobility and being OOB, and progression of POC.    Acceptance, E,D, VU,NR by LR at 9/25/2024 1310    Comment: Educated on spinal precautions for comfort, correct log rolling technique, correct sit<->stand t/f technique, correct technique for side steps at EOB, HEP, and progression of POC.    Acceptance, E,D, VU,NR by LR at 9/24/2024 1301    Comment: Educated on spinal precautions, correct log rolling technique, benefits of being OOB, correct sit<->stand t/f technique, correct bed to chair t/f technique, and progression of POC.                         Point: Body mechanics (In Progress)       Learning Progress Summary             Patient Acceptance, E,D,H, NR by DM at 9/28/2024 1837    Acceptance, E, VU,NR by LO at 9/27/2024 0828    Comment: PT POC    Acceptance, E,D, VU,NR by LR at 9/26/2024 1403    Comment: Educated on spinal precautions for comfort, correct log rolling technique, correct sit<->stand t/f technique, correct gait mechanics, HEP, benefits of mobility and being OOB, and progression of POC.    Acceptance, E,D, VU,NR by LR at 9/25/2024 1310    Comment: Educated on spinal precautions for comfort, correct log rolling technique, correct sit<->stand t/f technique,  correct technique for side steps at EOB, HEP, and progression of POC.    Acceptance, E,D, VU,NR by LR at 9/24/2024 1301    Comment: Educated on spinal precautions, correct log rolling technique, benefits of being OOB, correct sit<->stand t/f technique, correct bed to chair t/f technique, and progression of POC.                         Point: Precautions (In Progress)       Learning Progress Summary             Patient Acceptance, E,D,H, NR by DM at 9/28/2024 1837    Acceptance, E, VU,NR by LO at 9/27/2024 0828    Comment: PT POC    Acceptance, E,D, VU,NR by LR at 9/26/2024 1403    Comment: Educated on spinal precautions for comfort, correct log rolling technique, correct sit<->stand t/f technique, correct gait mechanics, HEP, benefits of mobility and being OOB, and progression of POC.    Acceptance, E,D, VU,NR by LR at 9/25/2024 1310    Comment: Educated on spinal precautions for comfort, correct log rolling technique, correct sit<->stand t/f technique, correct technique for side steps at EOB, HEP, and progression of POC.    Acceptance, E,D, VU,NR by LR at 9/24/2024 1301    Comment: Educated on spinal precautions, correct log rolling technique, benefits of being OOB, correct sit<->stand t/f technique, correct bed to chair t/f technique, and progression of POC.                                         User Key       Initials Effective Dates Name Provider Type Discipline    DM 02/03/23 -  Donna Montanez, PT Physical Therapist PT    LR 02/03/23 -  Meseret Dunham, PT Physical Therapist PT    LS 07/11/23 -  Radha Campo, PT Physical Therapist PT    LO 06/16/21 -  Yasmin Perdomo, PT Physical Therapist PT                  PT Recommendation and Plan     Plan of Care Reviewed With: patient  Outcome Evaluation: Pt cont to required assistance for mobility. He is able to walk a limited distance w/a RW and chair follow. He is fearful of falling and unsteady. Pt would benefit from cont PT services. Cont to  recommend SNF at discharge.     Time Calculation:         PT Charges       Row Name 09/29/24 0922             Time Calculation    Start Time 0922  -LS      PT Received On 09/29/24  -      PT Goal Re-Cert Due Date 10/04/24  -         Time Calculation- PT    Total Timed Code Minutes- PT 10 minute(s)  -LS         Timed Charges    93795 - PT Therapeutic Exercise Minutes 7  -LS      04964 - Gait Training Minutes  8  -LS         Total Minutes    Timed Charges Total Minutes 15  -LS       Total Minutes 15  -LS                User Key  (r) = Recorded By, (t) = Taken By, (c) = Cosigned By      Initials Name Provider Type    Radha Tavares, PT Physical Therapist                  Therapy Charges for Today       Code Description Service Date Service Provider Modifiers Qty    [id number] HC GAIT TRAINING EA 15 MIN 9/29/2024 Radha Campo, PT GP 1            PT G-Codes  Outcome Measure Options: AM-PAC 6 Clicks Daily Activity (OT)  AM-PAC 6 Clicks Score (PT): 12  AM-PAC 6 Clicks Score (OT): 13       Radha Campo PT  9/29/2024

## 2024-09-30 LAB
GLUCOSE BLDC GLUCOMTR-MCNC: 125 MG/DL (ref 70–130)
GLUCOSE BLDC GLUCOMTR-MCNC: 142 MG/DL (ref 70–130)
GLUCOSE BLDC GLUCOMTR-MCNC: 216 MG/DL (ref 70–130)
GLUCOSE BLDC GLUCOMTR-MCNC: 231 MG/DL (ref 70–130)

## 2024-09-30 PROCEDURE — G0378 HOSPITAL OBSERVATION PER HR: HCPCS

## 2024-09-30 PROCEDURE — 63710000001 INSULIN LISPRO (HUMAN) PER 5 UNITS: Performed by: FAMILY MEDICINE

## 2024-09-30 PROCEDURE — 97116 GAIT TRAINING THERAPY: CPT

## 2024-09-30 PROCEDURE — 97110 THERAPEUTIC EXERCISES: CPT

## 2024-09-30 PROCEDURE — 99232 SBSQ HOSP IP/OBS MODERATE 35: CPT | Performed by: STUDENT IN AN ORGANIZED HEALTH CARE EDUCATION/TRAINING PROGRAM

## 2024-09-30 PROCEDURE — 82948 REAGENT STRIP/BLOOD GLUCOSE: CPT

## 2024-09-30 RX ADMIN — Medication 12.5 MG: at 19:21

## 2024-09-30 RX ADMIN — Medication 12.5 MG: at 08:56

## 2024-09-30 RX ADMIN — HYDROCODONE BITARTRATE AND ACETAMINOPHEN 1 TABLET: 5; 325 TABLET ORAL at 08:56

## 2024-09-30 RX ADMIN — APIXABAN 5 MG: 5 TABLET, FILM COATED ORAL at 19:22

## 2024-09-30 RX ADMIN — POLYETHYLENE GLYCOL 3350 17 G: 17 POWDER, FOR SOLUTION ORAL at 08:56

## 2024-09-30 RX ADMIN — INSULIN LISPRO 3 UNITS: 100 INJECTION, SOLUTION INTRAVENOUS; SUBCUTANEOUS at 11:39

## 2024-09-30 RX ADMIN — APIXABAN 5 MG: 5 TABLET, FILM COATED ORAL at 08:56

## 2024-09-30 RX ADMIN — QUETIAPINE FUMARATE 25 MG: 25 TABLET ORAL at 19:22

## 2024-09-30 RX ADMIN — SENNOSIDES AND DOCUSATE SODIUM 2 TABLET: 50; 8.6 TABLET ORAL at 08:56

## 2024-09-30 RX ADMIN — Medication 10 ML: at 09:00

## 2024-09-30 RX ADMIN — INSULIN LISPRO 3 UNITS: 100 INJECTION, SOLUTION INTRAVENOUS; SUBCUTANEOUS at 16:32

## 2024-09-30 RX ADMIN — PRAVASTATIN SODIUM 20 MG: 20 TABLET ORAL at 19:21

## 2024-09-30 RX ADMIN — SENNOSIDES AND DOCUSATE SODIUM 2 TABLET: 50; 8.6 TABLET ORAL at 19:21

## 2024-09-30 NOTE — THERAPY TREATMENT NOTE
Patient Name: Apollo Haro  : 1940    MRN: [id number]                              Today's Date: 2024       Admit Date: 2024    Visit Dx:     ICD-10-CM ICD-9-CM   1. Compression fracture of L2 vertebra, initial encounter  S32.020A 805.4   2. Declining functional status  R53.81 799.3   3. Unable to ambulate  R26.2 719.7   4. History of fall  Z91.81 V15.88   5. Acute midline low back pain without sciatica  M54.50 724.2   6. Chronic anticoagulation  Z79.01 V58.61   7. History of atrial fibrillation  Z86.79 V12.59     Patient Active Problem List   Diagnosis    Weakness    Type 2 diabetes mellitus, without long-term current use of insulin    Rhabdomyolysis due to COVID-19    CAD (coronary artery disease)    HTN (hypertension)    Atrial fibrillation, chronic    COVID-19 virus detected    SDH (subdural hematoma)    Altered mental status    AMS (altered mental status)    Closed compression fracture of L2 vertebra    UTI (urinary tract infection)     Past Medical History:   Diagnosis Date    A-fib     CAD (coronary artery disease)     DM (diabetes mellitus)      History reviewed. No pertinent surgical history.   General Information       Row Name 24 1043          Physical Therapy Time and Intention    Document Type therapy note (daily note)  -     Mode of Treatment physical therapy  -       Row Name 24 1043          General Information    Patient Profile Reviewed yes  -     Existing Precautions/Restrictions fall;spinal;other (see comments)  ac. 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  -       Row Name 24 1043          Cognition    Orientation Status (Cognition) oriented x 3;verbal cues/prompts needed for orientation  -       Row Name 24 1043          Safety Issues, Functional Mobility    Impairments Affecting Function (Mobility) balance;cognition;endurance/activity tolerance;motor planning;pain;postural/trunk control;strength  -                User Key  (r) = Recorded By, (t) = Taken By, (c) = Cosigned By      Initials Name Provider Type     Mikala Rivas, PT Physical Therapist                   Mobility       Row Name 09/30/24 1047          Bed Mobility    Bed Mobility supine-sit;scooting/bridging  -     Scooting/Bridging [place] (Bed Mobility) moderate assist (50% patient effort);2 person assist;verbal cues;nonverbal cues (demo/gesture)  -     Supine-Sit [place] (Bed Mobility) verbal cues;moderate assist (50% patient effort);2 person assist  -     Assistive Device (Bed Mobility) bed rails;draw sheet;head of bed elevated  -     Comment, (Bed Mobility) VC for sequencing with log rolling. Assistance required for trunk management. Difficulty coming to sit EOB secondary to elevated pain. VC for hand placement and encouraging uprighting trunk. Draw sheet utilized for trunk support and to scoot hips towards EOB once in sitting.  -       Row Name 09/30/24 1047          Transfers    Comment, (Transfers) Pt performed STS from EOB with Min A x2 and FWW. Posterior lean noted with fear of falling reported. Pt able to readjust feet for better KERRY and VC for anterior weight shifting with improved steadiness.  -       Row Name 09/30/24 1047          Sit-Stand Transfer    Sit-Stand [place] (Transfers) minimum assist (75% patient effort);2 person assist;nonverbal cues (demo/gesture);verbal cues  -     Assistive Device (Sit-Stand Transfers) walker, front-wheeled  -       Row Name 09/30/24 1047          Gait/Stairs (Locomotion)    [place] Level (Gait) verbal cues;minimum assist (75% patient effort);2 person assist  -     Assistive Device (Gait) walker, front-wheeled  -     Distance in Feet (Gait) 25  -     Deviations/Abnormal Patterns (Gait) gait speed decreased;bilateral deviations;stride length decreased;base of support, narrow;festinating/shuffling  -     Bilateral Gait Deviations forward flexed posture  -     Comment,  (Gait/Stairs) Pt amb in room with step-through gait pattern. Short, shuffling steps, significant forward flexed posture, decreased stride length, and slow gait speed observed. VC for standing up taller and taking bigger steps with some improvement noted. Gait quality regressed as pt fatigued. Activity limited by fatigue. Pain remained stable throughout ambulation. Chair follow required. No knee buckling noted. Intermittent posterior lean observed with assistance required for steadiness.  -               User Key  (r) = Recorded By, (t) = Taken By, (c) = Cosigned By      Initials Name Provider Type     Mikala Rivas PT Physical Therapist                   Obj/Interventions       Row Name 09/30/24 1051          Motor Skills    Therapeutic Exercise hip;knee;ankle  -       Row Name 09/30/24 1051          Hip (Therapeutic Exercise)    Hip (Therapeutic Exercise) isometric exercises  -     Hip Isometrics (Therapeutic Exercise) bilateral;gluteal sets;10 repetitions  -       Row Name 09/30/24 1051          Knee (Therapeutic Exercise)    Knee (Therapeutic Exercise) isometric exercises  -     Knee Isometrics (Therapeutic Exercise) bilateral;quad sets;10 repetitions  -       Row Name 09/30/24 1051          Ankle (Therapeutic Exercise)    Ankle (Therapeutic Exercise) AROM (active range of motion)  -     Ankle AROM (Therapeutic Exercise) bilateral;dorsiflexion;plantarflexion;10 repetitions  -       Row Name 09/30/24 1051          Balance    Balance Assessment sitting static balance;sitting dynamic balance;sit to stand dynamic balance;standing static balance;standing dynamic balance  -     Static Sitting Balance standby assist  -     Dynamic Sitting Balance contact guard  -     Position, Sitting Balance sitting edge of bed  -     Static Standing Balance minimal assist  -     Position/Device Used, Standing Balance supported;walker, rolling  -     Balance Interventions sitting;standing;sit to  stand;occupation based/functional task  -HW               User Key  (r) = Recorded By, (t) = Taken By, (c) = Cosigned By      Initials Name Provider Type    HW Mikala Rivas, PT Physical Therapist                   Goals/Plan       Row Name 09/30/24 1054          Bed Mobility Goal 1 (PT)    Activity/Assistive Device (Bed Mobility Goal 1, PT) sit to supine/supine to sit  -HW     [place] Level/Cues Needed (Bed Mobility Goal 1, PT) minimum assist (75% or more patient effort);other (see comments)  x2  -HW     Time Frame (Bed Mobility Goal 1, PT) short term goal (STG);3 days  -HW     Progress/Outcomes (Bed Mobility Goal 1, PT) continuing progress toward goal  -HW       Row Name 09/30/24 1054          Transfer Goal 1 (PT)    Activity/Assistive Device (Transfer Goal 1, PT) sit-to-stand/stand-to-sit;bed-to-chair/chair-to-bed;walker, rolling  -HW     [place] Level/Cues Needed (Transfer Goal 1, PT) contact guard required  -HW     Time Frame (Transfer Goal 1, PT) long term goal (LTG);5 days  -HW     Progress/Outcome (Transfer Goal 1, PT) continuing progress toward goal  -HW       Row Name 09/30/24 1054          Gait Training Goal 1 (PT)    Activity/Assistive Device (Gait Training Goal 1, PT) gait (walking locomotion);walker, rolling  -HW     [place] Level (Gait Training Goal 1, PT) minimum assist (75% or more patient effort)  -HW     Distance (Gait Training Goal 1, PT) 50  -HW     Time Frame (Gait Training Goal 1, PT) long term goal (LTG);5 days  -HW     Progress/Outcome (Gait Training Goal 1, PT) goal met;goal revised this date;goal ongoing  -HW               User Key  (r) = Recorded By, (t) = Taken By, (c) = Cosigned By      Initials Name Provider Type    HW Mikala Rivas PT Physical Therapist                   Clinical Impression       Row Name 09/30/24 1052          Pain    Pretreatment Pain Rating 5/10  -HW     Posttreatment Pain Rating 5/10  -HW     Pain Location lower  -HW     Pain Location - back  -HW      Pre/Posttreatment Pain Comment unchanged with ambulation  -     Pain Intervention(s) Repositioned;Ambulation/increased activity;Rest;Nursing Notified  pt declined ice application  -       Row Name 09/30/24 1052          Plan of Care Review    Plan of Care Reviewed With patient  -     Progress improving  -     Outcome Evaluation Pt amb 25' with FWW and Min A x2. Significant forward flexed posture and short, shuffling steps noted. Fear of falling reported. Chair follow for safety. Activity limited by fatigue. Pain remained constant throughout ambulation. Continue to recommend d/c to SNF to promote return to PLOF and decrease risk for falls.  -       Row Name 09/30/24 1052          Positioning and Restraints    Pre-Treatment Position in bed  -     Post Treatment Position chair  -     In Chair notified nsg;reclined;sitting;call light within reach;encouraged to call for assist;exit alarm on;with family/caregiver;legs elevated;waffle cushion  -               User Key  (r) = Recorded By, (t) = Taken By, (c) = Cosigned By      Initials Name Provider Type     Mikala Rivas, JONATHON Physical Therapist                   Outcome Measures       Row Name 09/30/24 1055 09/30/24 1027       How much help from another person do you currently need...    Turning from your back to your side while in flat bed without using bedrails? 3  - 3  -MH    Moving from lying on back to sitting on the side of a flat bed without bedrails? 2  - 2  -MH    Moving to and from a bed to a chair (including a wheelchair)? 3  - 2  -MH    Standing up from a chair using your arms (e.g., wheelchair, bedside chair)? 3  - 2  -MH    Climbing 3-5 steps with a railing? 2  -HW 1  -MH    To walk in hospital room? 2  - 2  -MH    AM-PAC 6 Clicks Score (PT) 15  - 12  -MH    Highest Level of Mobility Goal 4 --> Transfer to chair/commode  - 4 --> Transfer to chair/commode  -      Row Name 09/30/24 1055          Functional Assessment    Outcome  Measure Options AM-PAC 6 Clicks Basic Mobility (PT)  -               User Key  (r) = Recorded By, (t) = Taken By, (c) = Cosigned By      Initials Name Provider Type     Mikala Rivas PT Physical Therapist    Marii Pritchard, RN Registered Nurse                                 Physical Therapy Education       Title: PT OT SLP Therapies (Done)       Topic: Physical Therapy (Done)       Point: Mobility training (Done)       Learning Progress Summary             Patient Acceptance, E,D, VU,NR by  at 9/30/2024 1056    Acceptance, E, VU by  at 9/30/2024 1037    Acceptance, E, VU,NR by LS at 9/29/2024 0922    Acceptance, E,D,H, NR by DM at 9/28/2024 1837    Acceptance, E, VU,NR by LO at 9/27/2024 0828    Comment: PT POC    Acceptance, E,D, VU,NR by LR at 9/26/2024 1403    Comment: Educated on spinal precautions for comfort, correct log rolling technique, correct sit<->stand t/f technique, correct gait mechanics, HEP, benefits of mobility and being OOB, and progression of POC.    Acceptance, E,D, VU,NR by LR at 9/25/2024 1310    Comment: Educated on spinal precautions for comfort, correct log rolling technique, correct sit<->stand t/f technique, correct technique for side steps at EOB, HEP, and progression of POC.    Acceptance, E,D, VU,NR by LR at 9/24/2024 1301    Comment: Educated on spinal precautions, correct log rolling technique, benefits of being OOB, correct sit<->stand t/f technique, correct bed to chair t/f technique, and progression of POC.                         Point: Home exercise program (Done)       Learning Progress Summary             Patient Acceptance, E,D, VU,NR by  at 9/30/2024 1056    Acceptance, E, VU by  at 9/30/2024 1037    Acceptance, E,D,H, NR by DM at 9/28/2024 1837    Acceptance, E, VU,NR by LO at 9/27/2024 0828    Comment: PT POC    Acceptance, E,D, VU,NR by LR at 9/26/2024 1403    Comment: Educated on spinal precautions for comfort, correct log rolling technique, correct  sit<->stand t/f technique, correct gait mechanics, HEP, benefits of mobility and being OOB, and progression of POC.    Acceptance, E,D, VU,NR by LR at 9/25/2024 1310    Comment: Educated on spinal precautions for comfort, correct log rolling technique, correct sit<->stand t/f technique, correct technique for side steps at EOB, HEP, and progression of POC.    Acceptance, E,D, VU,NR by LR at 9/24/2024 1301    Comment: Educated on spinal precautions, correct log rolling technique, benefits of being OOB, correct sit<->stand t/f technique, correct bed to chair t/f technique, and progression of POC.                         Point: Body mechanics (Done)       Learning Progress Summary             Patient Acceptance, E,D, VU,NR by  at 9/30/2024 1056    Acceptance, E, VU by  at 9/30/2024 1037    Acceptance, E,D,H, NR by DM at 9/28/2024 1837    Acceptance, E, VU,NR by LO at 9/27/2024 0828    Comment: PT POC    Acceptance, E,D, VU,NR by LR at 9/26/2024 1403    Comment: Educated on spinal precautions for comfort, correct log rolling technique, correct sit<->stand t/f technique, correct gait mechanics, HEP, benefits of mobility and being OOB, and progression of POC.    Acceptance, E,D, VU,NR by LR at 9/25/2024 1310    Comment: Educated on spinal precautions for comfort, correct log rolling technique, correct sit<->stand t/f technique, correct technique for side steps at EOB, HEP, and progression of POC.    Acceptance, E,D, VU,NR by LR at 9/24/2024 1301    Comment: Educated on spinal precautions, correct log rolling technique, benefits of being OOB, correct sit<->stand t/f technique, correct bed to chair t/f technique, and progression of POC.                         Point: Precautions (Done)       Learning Progress Summary             Patient Acceptance, E,D, VU,NR by  at 9/30/2024 1056    Acceptance, E, VU by  at 9/30/2024 1037    Acceptance, E,D,H, NR by DM at 9/28/2024 1837    Acceptance, E, VU,NR by LO at 9/27/2024  0828    Comment: PT POC    Acceptance, E,D, VU,NR by LR at 9/26/2024 1403    Comment: Educated on spinal precautions for comfort, correct log rolling technique, correct sit<->stand t/f technique, correct gait mechanics, HEP, benefits of mobility and being OOB, and progression of POC.    Acceptance, E,D, VU,NR by LR at 9/25/2024 1310    Comment: Educated on spinal precautions for comfort, correct log rolling technique, correct sit<->stand t/f technique, correct technique for side steps at EOB, HEP, and progression of POC.    Acceptance, E,D, VU,NR by LR at 9/24/2024 1301    Comment: Educated on spinal precautions, correct log rolling technique, benefits of being OOB, correct sit<->stand t/f technique, correct bed to chair t/f technique, and progression of POC.                                         User Key       Initials Effective Dates Name Provider Type Discipline    DM 02/03/23 -  Donna Montanez, PT Physical Therapist PT    LR 02/03/23 -  Meseret Dunham, PT Physical Therapist PT    LS 07/11/23 -  Radha Campo, PT Physical Therapist PT    LO 06/16/21 -  Yasmin Perdomo, PT Physical Therapist PT    HW 12/15/23 -  Mikala Rivas, PT Physical Therapist PT     07/26/24 -  Marii Harris, RN Registered Nurse Nurse                  PT Recommendation and Plan     Plan of Care Reviewed With: patient  Progress: improving  Outcome Evaluation: Pt amb 25' with FWW and Min A x2. Significant forward flexed posture and short, shuffling steps noted. Fear of falling reported. Chair follow for safety. Activity limited by fatigue. Pain remained constant throughout ambulation. Continue to recommend d/c to SNF to promote return to PLOF and decrease risk for falls.     Time Calculation:         PT Charges       Row Name 09/30/24 1056             Time Calculation    Start Time 1015  -HW      PT Received On 09/30/24  -         Timed Charges    03816 - PT Therapeutic Exercise Minutes 8  -HW      14683 - Gait Training  Minutes  12  -HW      31481 - PT Therapeutic Activity Minutes 4  -HW         Total Minutes    Timed Charges Total Minutes 24  -HW       Total Minutes 24  -HW                User Key  (r) = Recorded By, (t) = Taken By, (c) = Cosigned By      Initials Name Provider Type     Mikala Rivas PT Physical Therapist                  Therapy Charges for Today       Code Description Service Date Service Provider Modifiers Qty    13818083703  PT THER PROC EA 15 MIN 9/30/2024 Mikala Rivas, PT GP 1    [id number]  GAIT TRAINING EA 15 MIN 9/30/2024 Mikala Rivas, PT GP 1    [id number]  PT THER SUPP EA 15 MIN 9/30/2024 Mikala Rivas, PT GP 2            PT G-Codes  Outcome Measure Options: AM-PAC 6 Clicks Basic Mobility (PT)  AM-PAC 6 Clicks Score (PT): 15  AM-PAC 6 Clicks Score (OT): 13  PT Discharge Summary  Anticipated Discharge Disposition (PT): skilled nursing facility    Mikala Rivas PT  9/30/2024

## 2024-09-30 NOTE — PLAN OF CARE
Problem: Adult Inpatient Plan of Care  Goal: Plan of Care Review  Outcome: Progressing  Goal: Patient-Specific Goal (Individualized)  Outcome: Progressing  Goal: Absence of Hospital-Acquired Illness or Injury  Outcome: Progressing  Intervention: Identify and Manage Fall Risk  Recent Flowsheet Documentation  Taken 9/30/2024 0349 by Moris Louis RN  Safety Promotion/Fall Prevention:   activity supervised   assistive device/personal items within reach   clutter free environment maintained   fall prevention program maintained   gait belt   mobility aid in reach   nonskid shoes/slippers when out of bed   room organization consistent   safety round/check completed  Taken 9/30/2024 0156 by Moris Louis RN  Safety Promotion/Fall Prevention:   activity supervised   assistive device/personal items within reach   clutter free environment maintained   fall prevention program maintained   gait belt   mobility aid in reach   nonskid shoes/slippers when out of bed   room organization consistent   safety round/check completed  Taken 9/30/2024 0005 by Moris Louis RN  Safety Promotion/Fall Prevention:   activity supervised   assistive device/personal items within reach   clutter free environment maintained   fall prevention program maintained   gait belt   mobility aid in reach   nonskid shoes/slippers when out of bed   room organization consistent   safety round/check completed  Taken 9/29/2024 2200 by Moris Louis RN  Safety Promotion/Fall Prevention:   activity supervised   assistive device/personal items within reach   clutter free environment maintained   fall prevention program maintained   gait belt   mobility aid in reach   nonskid shoes/slippers when out of bed   room organization consistent   safety round/check completed  Taken 9/29/2024 1945 by Moris Louis RN  Safety Promotion/Fall Prevention:   activity supervised   assistive device/personal items within reach   clutter free environment maintained    fall prevention program maintained   gait belt   mobility aid in reach   nonskid shoes/slippers when out of bed   room organization consistent   safety round/check completed  Intervention: Prevent Skin Injury  Recent Flowsheet Documentation  Taken 9/30/2024 0349 by Moris Louis RN  Body Position: position changed independently  Taken 9/30/2024 0156 by Moris Louis RN  Body Position: position changed independently  Taken 9/30/2024 0005 by Moris Louis RN  Body Position: position changed independently  Skin Protection:   adhesive use limited   incontinence pads utilized   tubing/devices free from skin contact  Taken 9/29/2024 2200 by Moris Louis RN  Body Position: position changed independently  Taken 9/29/2024 1945 by Moris Louis RN  Body Position: position changed independently  Skin Protection:   adhesive use limited   tubing/devices free from skin contact   incontinence pads utilized  Intervention: Prevent and Manage VTE (Venous Thromboembolism) Risk  Recent Flowsheet Documentation  Taken 9/30/2024 0349 by Moris Louis RN  Activity Management: activity minimized  VTE Prevention/Management: (eliquis) other (see comments)  Taken 9/30/2024 0156 by Moris Louis RN  Activity Management: activity encouraged  VTE Prevention/Management: (eliquis) other (see comments)  Taken 9/30/2024 0005 by Moris Louis RN  Activity Management: activity minimized  VTE Prevention/Management: (eliquis) other (see comments)  Taken 9/29/2024 2200 by Moris Louis RN  Activity Management: activity encouraged  VTE Prevention/Management: (eliquis) other (see comments)  Taken 9/29/2024 1945 by Moris Louis RN  Activity Management: activity encouraged  VTE Prevention/Management: (eliquis) other (see comments)  Range of Motion: active ROM (range of motion) encouraged  Intervention: Prevent Infection  Recent Flowsheet Documentation  Taken 9/30/2024 0349 by Moris Louis RN  Infection Prevention:   rest/sleep  promoted   single patient room provided  Taken 9/30/2024 0156 by Moris Louis RN  Infection Prevention:   rest/sleep promoted   single patient room provided   environmental surveillance performed  Taken 9/30/2024 0005 by Moris Louis RN  Infection Prevention:   rest/sleep promoted   single patient room provided  Taken 9/29/2024 2200 by Moris Louis RN  Infection Prevention:   environmental surveillance performed   rest/sleep promoted   single patient room provided  Taken 9/29/2024 1945 by Moris Louis RN  Infection Prevention:   environmental surveillance performed   rest/sleep promoted   single patient room provided  Goal: Optimal Comfort and Wellbeing  Outcome: Progressing  Intervention: Monitor Pain and Promote Comfort  Recent Flowsheet Documentation  Taken 9/30/2024 0349 by Moris Louis RN  Pain Management Interventions:   no interventions per patient request   quiet environment facilitated  Taken 9/30/2024 0156 by Moris Louis RN  Pain Management Interventions: quiet environment facilitated  Taken 9/30/2024 0005 by Moris Louis RN  Pain Management Interventions:   no interventions per patient request   quiet environment facilitated  Taken 9/29/2024 2200 by Moris Louis RN  Pain Management Interventions:   no interventions per patient request   quiet environment facilitated  Taken 9/29/2024 1945 by Moris Louis RN  Pain Management Interventions:   medication offered but refused   position adjusted   no interventions per patient request  Intervention: Provide Person-Centered Care  Recent Flowsheet Documentation  Taken 9/30/2024 0349 by Moris Louis RN  Trust Relationship/Rapport: care explained  Taken 9/29/2024 1945 by Moris Louis RN  Trust Relationship/Rapport:   care explained   questions answered   questions encouraged   reassurance provided  Goal: Readiness for Transition of Care  Outcome: Progressing     Problem: Skin Injury Risk Increased  Goal: Skin Health and  Integrity  Outcome: Progressing  Intervention: Optimize Skin Protection  Recent Flowsheet Documentation  Taken 9/30/2024 0349 by Moris Louis RN  Head of Bed ([facility]) Positioning: HOB elevated  Taken 9/30/2024 0156 by Moris Louis RN  Head of Bed ([facility]) Positioning: HOB elevated  Taken 9/30/2024 0005 by Moris Louis RN  Pressure Reduction Techniques: frequent weight shift encouraged  Head of Bed (HOB) Positioning: HOB elevated  Pressure Reduction Devices:   pressure-redistributing mattress utilized   positioning supports utilized  Skin Protection:   adhesive use limited   incontinence pads utilized   tubing/devices free from skin contact  Taken 9/29/2024 2200 by Moris Louis RN  Head of Bed ([facility]) Positioning: HOB elevated  Taken 9/29/2024 1945 by Moris Louis RN  Pressure Reduction Techniques:   frequent weight shift encouraged   weight shift assistance provided  Head of Bed ([facility]) Positioning: [facility] elevated  Pressure Reduction Devices: pressure-redistributing mattress utilized  Skin Protection:   adhesive use limited   tubing/devices free from skin contact   incontinence pads utilized     Problem: Fall Injury Risk  Goal: Absence of Fall and Fall-Related Injury  Outcome: Progressing  Intervention: Identify and Manage Contributors  Recent Flowsheet Documentation  Taken 9/29/2024 1945 by Moris Louis RN  Medication Review/Management: medications reviewed  Intervention: Promote Injury-Free Environment  Recent Flowsheet Documentation  Taken 9/30/2024 0349 by Moris Louis RN  Safety Promotion/Fall Prevention:   activity supervised   assistive device/personal items within reach   clutter free environment maintained   fall prevention program maintained   gait belt   mobility aid in reach   nonskid shoes/slippers when out of bed   room organization consistent   safety round/check completed  Taken 9/30/2024 0156 by Moris Louis RN  Safety Promotion/Fall Prevention:   activity supervised   assistive  device/personal items within reach   clutter free environment maintained   fall prevention program maintained   gait belt   mobility aid in reach   nonskid shoes/slippers when out of bed   room organization consistent   safety round/check completed  Taken 9/30/2024 0005 by Moris Louis RN  Safety Promotion/Fall Prevention:   activity supervised   assistive device/personal items within reach   clutter free environment maintained   fall prevention program maintained   gait belt   mobility aid in reach   nonskid shoes/slippers when out of bed   room organization consistent   safety round/check completed  Taken 9/29/2024 2200 by Moris Louis RN  Safety Promotion/Fall Prevention:   activity supervised   assistive device/personal items within reach   clutter free environment maintained   fall prevention program maintained   gait belt   mobility aid in reach   nonskid shoes/slippers when out of bed   room organization consistent   safety round/check completed  Taken 9/29/2024 1945 by Moris Louis RN  Safety Promotion/Fall Prevention:   activity supervised   assistive device/personal items within reach   clutter free environment maintained   fall prevention program maintained   gait belt   mobility aid in reach   nonskid shoes/slippers when out of bed   room organization consistent   safety round/check completed     Problem: Hypertension Comorbidity  Goal: Blood Pressure in Desired Range  Outcome: Progressing  Intervention: Maintain Blood Pressure Management  Recent Flowsheet Documentation  Taken 9/29/2024 1945 by Moris Louis RN  Medication Review/Management: medications reviewed     Problem: Palliative Care  Goal: Enhanced Quality of Life  Outcome: Progressing  Intervention: Maximize Comfort  Recent Flowsheet Documentation  Taken 9/30/2024 0349 by Moris Louis RN  Pain Management Interventions:   no interventions per patient request   quiet environment facilitated  Taken 9/30/2024 0156 by Moris Louis  RN  Pain Management Interventions: quiet environment facilitated  Taken 9/30/2024 0005 by Moris Louis RN  Pain Management Interventions:   no interventions per patient request   quiet environment facilitated  Taken 9/29/2024 2200 by Moris Louis RN  Pain Management Interventions:   no interventions per patient request   quiet environment facilitated  Taken 9/29/2024 1945 by Moris Louis RN  Pain Management Interventions:   medication offered but refused   position adjusted   no interventions per patient request  Intervention: Optimize Psychosocial Wellbeing  Recent Flowsheet Documentation  Taken 9/29/2024 1945 by Moris Louis RN  Family/Support System Care:   self-care encouraged   support provided   Goal Outcome Evaluation:     Pt A@OX4. VSS. Patient offered PRN pain medication several times this shift, but pt denied need. Nurse and charge nurse attempted new IV without success.

## 2024-09-30 NOTE — PLAN OF CARE
Goal Outcome Evaluation:  Plan of Care Reviewed With: patient        Progress: improving  Outcome Evaluation: Pt seen at 1315, in recliner, denied pain when asked, but stated that he feels just generally tired and poorly, unable to specify. Per documentation, progressing with therapy and bed offerend at [facility] with precert initiated. Palliative following for continued support in [facility]/POC.    0930 Palliative IDT meeting:  MD, SAW, RN,   After hours, weekends and holidays, contact Palliative Provider by calling [phone]       Problem: Palliative Care  Goal: Enhanced Quality of Life  Outcome: Progressing  Intervention: Maximize Comfort  Flowsheets (Taken 9/30/2024 1619)  Pain Management Interventions: pain management plan reviewed with patient/caregiver  Intervention: Optimize Function  Flowsheets (Taken 9/30/2024 1619)  Fatigue Management: paced activity encouraged  Sleep/Rest Enhancement: consistent schedule promoted  Intervention: Optimize Psychosocial Wellbeing  Flowsheets (Taken 9/30/2024 1619)  Supportive Measures:   positive reinforcement provided   self-responsibility promoted   verbalization of feelings encouraged

## 2024-09-30 NOTE — PLAN OF CARE
Goal Outcome Evaluation:  Plan of Care Reviewed With: patient        Progress: improving  Outcome Evaluation: Pt amb 25' with FWW and Min A x2. Significant forward flexed posture and short, shuffling steps noted. Fear of falling reported. Chair follow for safety. Activity limited by fatigue. Pain remained constant throughout ambulation. Continue to recommend d/c to SNF to promote return to PLOF and decrease risk for falls.      Anticipated Discharge Disposition (PT): skilled nursing facility

## 2024-09-30 NOTE — PLAN OF CARE
Goal Outcome Evaluation:      Patient overall did well today, worked with PT/OT and has been up in chair for most of the day. Patient has had some pain but when given PRN norco this morning pain has been controlled since. Patient denies any needs upon rounding. Will continue to monitor.       Problem: Adult Inpatient Plan of Care  Goal: Plan of Care Review  Outcome: Progressing  Goal: Patient-Specific Goal (Individualized)  Outcome: Progressing  Goal: Absence of Hospital-Acquired Illness or Injury  Outcome: Progressing  Intervention: Identify and Manage Fall Risk  Recent Flowsheet Documentation  Taken 9/30/2024 1609 by Marii Harris, RN  Safety Promotion/Fall Prevention:   activity supervised   assistive device/personal items within reach   clutter free environment maintained   elopement precautions   fall prevention program maintained   gait belt   lighting adjusted   mobility aid in reach   muscle strengthening facilitated   nonskid shoes/slippers when out of bed   room organization consistent   safety round/check completed   toileting scheduled  Taken 9/30/2024 1445 by Marii Harris, RN  Safety Promotion/Fall Prevention:   activity supervised   assistive device/personal items within reach   clutter free environment maintained   elopement precautions   fall prevention program maintained   gait belt   lighting adjusted   mobility aid in reach   muscle strengthening facilitated   nonskid shoes/slippers when out of bed   room organization consistent   safety round/check completed   toileting scheduled  Taken 9/30/2024 1200 by Marii Harris, RN  Safety Promotion/Fall Prevention:   activity supervised   assistive device/personal items within reach   elopement precautions   clutter free environment maintained   gait belt   fall prevention program maintained   lighting adjusted   mobility aid in reach   muscle strengthening facilitated   nonskid shoes/slippers when out of bed   room organization consistent   safety  round/check completed   toileting scheduled  Taken 9/30/2024 1027 by Marii Harris RN  Safety Promotion/Fall Prevention:   activity supervised   assistive device/personal items within reach   clutter free environment maintained   elopement precautions   fall prevention program maintained   gait belt   lighting adjusted   muscle strengthening facilitated   mobility aid in reach   nonskid shoes/slippers when out of bed   room organization consistent   safety round/check completed   toileting scheduled  Intervention: Prevent Skin Injury  Recent Flowsheet Documentation  Taken 9/30/2024 1609 by Marii Harris RN  Body Position: legs elevated  Skin Protection:   adhesive use limited   incontinence pads utilized   transparent dressing maintained   tubing/devices free from skin contact  Taken 9/30/2024 1445 by Marii Harris RN  Body Position: legs elevated  Skin Protection:   adhesive use limited   incontinence pads utilized   transparent dressing maintained   tubing/devices free from skin contact  Taken 9/30/2024 1200 by Marii Hraris RN  Body Position: legs elevated  Skin Protection:   adhesive use limited   incontinence pads utilized   transparent dressing maintained   tubing/devices free from skin contact  Taken 9/30/2024 1027 by Marii Harris RN  Body Position: position changed independently  Skin Protection:   adhesive use limited   incontinence pads utilized   transparent dressing maintained   tubing/devices free from skin contact  Taken 9/30/2024 0857 by Marii Harris RN  Skin Protection:   adhesive use limited   incontinence pads utilized   transparent dressing maintained   tubing/devices free from skin contact  Intervention: Prevent and Manage VTE (Venous Thromboembolism) Risk  Recent Flowsheet Documentation  Taken 9/30/2024 1445 by Marii Harris RN  VTE Prevention/Management:   bilateral   sequential compression devices on  Taken 9/30/2024 1200 by Marii Harris RN  VTE Prevention/Management:    sequential compression devices off   bilateral  Taken 9/30/2024 1027 by Marii Harris RN  Activity Management: ambulated outside room  VTE Prevention/Management:   bilateral   sequential compression devices on  Taken 9/30/2024 0857 by Marii Harris RN  VTE Prevention/Management: (eliquis)   bilateral   sequential compression devices on  Range of Motion: active ROM (range of motion) encouraged  Intervention: Prevent Infection  Recent Flowsheet Documentation  Taken 9/30/2024 1609 by Marii Harris RN  Infection Prevention:   environmental surveillance performed   equipment surfaces disinfected   hand hygiene promoted   personal protective equipment utilized   rest/sleep promoted   single patient room provided  Taken 9/30/2024 1445 by Marii Harris RN  Infection Prevention:   environmental surveillance performed   equipment surfaces disinfected   hand hygiene promoted   personal protective equipment utilized   rest/sleep promoted   single patient room provided  Taken 9/30/2024 1200 by Marii Harris RN  Infection Prevention:   environmental surveillance performed   equipment surfaces disinfected   hand hygiene promoted   personal protective equipment utilized   rest/sleep promoted   single patient room provided  Taken 9/30/2024 1027 by Marii Harris RN  Infection Prevention:   environmental surveillance performed   equipment surfaces disinfected   hand hygiene promoted   personal protective equipment utilized   rest/sleep promoted   single patient room provided  Goal: Optimal Comfort and Wellbeing  Outcome: Progressing  Intervention: Provide Person-Centered Care  Recent Flowsheet Documentation  Taken 9/30/2024 0857 by Marii Harris RN  Trust Relationship/Rapport:   care explained   choices provided   emotional support provided   empathic listening provided   questions answered   questions encouraged   reassurance provided   thoughts/feelings acknowledged  Goal: Readiness for Transition of Care  Outcome:  Progressing     Problem: Skin Injury Risk Increased  Goal: Skin Health and Integrity  Outcome: Progressing  Intervention: Optimize Skin Protection  Recent Flowsheet Documentation  Taken 9/30/2024 1609 by Marii Harris RN  Pressure Reduction Techniques: frequent weight shift encouraged  Head of Bed (HOB) Positioning: [facility] elevated  Pressure Reduction Devices: pressure-redistributing mattress utilized  Skin Protection:   adhesive use limited   incontinence pads utilized   transparent dressing maintained   tubing/devices free from skin contact  Taken 9/30/2024 1445 by Marii Harris RN  Pressure Reduction Techniques: frequent weight shift encouraged  Head of Bed (HOB) Positioning: [facility] elevated  Pressure Reduction Devices: pressure-redistributing mattress utilized  Skin Protection:   adhesive use limited   incontinence pads utilized   transparent dressing maintained   tubing/devices free from skin contact  Taken 9/30/2024 1200 by Marii Harris RN  Pressure Reduction Techniques: frequent weight shift encouraged  Head of Bed (HOB) Positioning: [facility] elevated  Pressure Reduction Devices: pressure-redistributing mattress utilized  Skin Protection:   adhesive use limited   incontinence pads utilized   transparent dressing maintained   tubing/devices free from skin contact  Taken 9/30/2024 1027 by Marii Harris RN  Pressure Reduction Techniques: frequent weight shift encouraged  Head of Bed (HOB) Positioning: [facility] elevated  Pressure Reduction Devices: pressure-redistributing mattress utilized  Skin Protection:   adhesive use limited   incontinence pads utilized   transparent dressing maintained   tubing/devices free from skin contact  Taken 9/30/2024 0857 by Marii Harris RN  Pressure Reduction Techniques: frequent weight shift encouraged  Pressure Reduction Devices: pressure-redistributing mattress utilized  Skin Protection:   adhesive use limited   incontinence pads utilized   transparent dressing maintained    tubing/devices free from skin contact     Problem: Fall Injury Risk  Goal: Absence of Fall and Fall-Related Injury  Outcome: Progressing  Intervention: Identify and Manage Contributors  Recent Flowsheet Documentation  Taken 9/30/2024 1609 by Marii Harris RN  Medication Review/Management: medications reviewed  Taken 9/30/2024 1445 by Marii Harris RN  Medication Review/Management: medications reviewed  Taken 9/30/2024 1200 by Marii Harris RN  Medication Review/Management: medications reviewed  Intervention: Promote Injury-Free Environment  Recent Flowsheet Documentation  Taken 9/30/2024 1609 by Marii Harris RN  Safety Promotion/Fall Prevention:   activity supervised   assistive device/personal items within reach   clutter free environment maintained   elopement precautions   fall prevention program maintained   gait belt   lighting adjusted   mobility aid in reach   muscle strengthening facilitated   nonskid shoes/slippers when out of bed   room organization consistent   safety round/check completed   toileting scheduled  Taken 9/30/2024 1445 by Marii Harris RN  Safety Promotion/Fall Prevention:   activity supervised   assistive device/personal items within reach   clutter free environment maintained   elopement precautions   fall prevention program maintained   gait belt   lighting adjusted   mobility aid in reach   muscle strengthening facilitated   nonskid shoes/slippers when out of bed   room organization consistent   safety round/check completed   toileting scheduled  Taken 9/30/2024 1200 by Marii Harris RN  Safety Promotion/Fall Prevention:   activity supervised   assistive device/personal items within reach   elopement precautions   clutter free environment maintained   gait belt   fall prevention program maintained   lighting adjusted   mobility aid in reach   muscle strengthening facilitated   nonskid shoes/slippers when out of bed   room organization consistent   safety round/check  completed   toileting scheduled  Taken 9/30/2024 1027 by Marii Harris, RN  Safety Promotion/Fall Prevention:   activity supervised   assistive device/personal items within reach   clutter free environment maintained   elopement precautions   fall prevention program maintained   gait belt   lighting adjusted   muscle strengthening facilitated   mobility aid in reach   nonskid shoes/slippers when out of bed   room organization consistent   safety round/check completed   toileting scheduled     Problem: Hypertension Comorbidity  Goal: Blood Pressure in Desired Range  Outcome: Progressing  Intervention: Maintain Blood Pressure Management  Recent Flowsheet Documentation  Taken 9/30/2024 1609 by Marii Harris, RN  Medication Review/Management: medications reviewed  Taken 9/30/2024 1445 by Marii Harris, RN  Medication Review/Management: medications reviewed  Taken 9/30/2024 1200 by Marii Harris RN  Medication Review/Management: medications reviewed     Problem: Palliative Care  Goal: Enhanced Quality of Life  Outcome: Progressing  Intervention: Maximize Comfort  Recent Flowsheet Documentation  Taken 9/30/2024 0758 by Marii Harris, RN  Oral Care: mouth wash rinse

## 2024-09-30 NOTE — PROGRESS NOTES
[facility] Medicine Services  PROGRESS NOTE    Patient Name: Apollo Haro  : 1940  MRN: [id number]    Date of Admission: 2024  Primary Care Physician: Provider, No Known    Subjective   Subjective     CC:  Back pain    HPI:    Patient reporting right-sided back pain this morning, actively eating breakfast during my exam. Reports pain is 5/10 today.      Objective   Objective     Vital Signs:   Temp:  [97.6 °F (36.4 °C)-98.7 °F (37.1 °C)] 97.8 °F (36.6 °C)  Heart Rate:  [61-76] 64  Resp:  [16-18] 18  BP: ()/(46-84) 137/70     Physical Exam:  Constitutional: Awake, alert, resting comfortably  HENT: NCAT, mucous membranes moist  Respiratory: Clear to auscultation bilaterally, respiratory effort normal   Cardiovascular: RRR, no murmurs, rubs, or gallops  Gastrointestinal: soft, nontender, nondistended  : External catheter in place  Musculoskeletal: No bilateral ankle edema  Psychiatric: Appropriate affect, cooperative  Neurologic: Alert, oriented x 3, no focal deficits, speech clear  Skin: No rashes      Results Reviewed:  LAB RESULTS:      Lab 24  1010 24  0528 24  1656 24  1315   WBC 8.23 8.02  --   --  8.57   HEMOGLOBIN 13.3 12.1*  --   --  13.6   HEMATOCRIT 40.1 35.9*  --   --  40.7   PLATELETS 184 158  --   --  176   NEUTROS ABS  --  4.83  --   --  6.29   IMMATURE GRANS (ABS)  --  0.02  --   --  0.03   LYMPHS ABS  --  2.30  --   --  1.35   MONOS ABS  --  0.68  --   --  0.79   EOS ABS  --  0.15  --   --  0.09   MCV 92.6 90.2  --   --  92.5   PROCALCITONIN  --  0.07  --   --   --    LACTATE  --   --  1.7 2.1* 2.2*         Lab 24  1002 24  0411 24  1010 24  0528 24  1315   SODIUM 141 143 137 138 143   POTASSIUM 4.3 4.4 4.5 4.1 4.5   CHLORIDE 105 105 99 101 101   CO2 28.0 28.0 27.0 25.0 28.0   ANION GAP 8.0 10.0 11.0 12.0 14.0   BUN 20 26* 20 34* 26*   CREATININE 0.82 1.01 0.95 1.09 1.11   EGFR 86.1 72.9  78.4 66.5 65.1   GLUCOSE 163* 127* 155* 170* 145*   CALCIUM 9.0 9.0 9.2 9.1 9.4   MAGNESIUM  --   --   --  1.7  --    HEMOGLOBIN A1C  --   --   --   --  5.80*         Lab 09/24/24  0528 09/23/24  1315   TOTAL PROTEIN 5.9* 6.5   ALBUMIN 3.5 3.9   GLOBULIN 2.4 2.6   ALT (SGPT) 9 11   AST (SGOT) 16 21   BILIRUBIN 0.8 1.3*   ALK PHOS 88 92                     Brief Urine Lab Results  (Last result in the past 365 days)        Color   Clarity   Blood   Leuk Est   Nitrite   Protein   CREAT   Urine HCG        09/23/24 1337 Yellow   Cloudy   Large (3+)   Large (3+)   Positive   30 mg/dL (1+)                   Microbiology Results Abnormal       Procedure Component Value - Date/Time    Blood Culture - Blood, Hand, Left [[id number]]  (Normal) Collected: 09/23/24 1656    Lab Status: Final result Specimen: Blood from Hand, Left Updated: 09/28/24 1715     Blood Culture No growth at 5 days    Blood Culture - Blood, Hand, Right [[id number]]  (Normal) Collected: 09/23/24 1656    Lab Status: Final result Specimen: Blood from Hand, Right Updated: 09/28/24 1715     Blood Culture No growth at 5 days            No radiology results from the last 24 hrs        Current medications:  Scheduled Meds:apixaban, 5 mg, Oral, BID  HYDROcodone-acetaminophen, 1 tablet, Oral, Once  insulin lispro, 2-7 Units, Subcutaneous, 4x Daily AC & at Bedtime  metoprolol tartrate, 12.5 mg, Oral, Q12H  senna-docusate sodium, 2 tablet, Oral, BID   And  polyethylene glycol, 17 g, Oral, Daily  pravastatin, 20 mg, Oral, Nightly  QUEtiapine, 25 mg, Oral, Nightly  sodium chloride, 10 mL, Intravenous, Q12H      Continuous Infusions:     PRN Meds:.  acetaminophen **OR** acetaminophen **OR** acetaminophen    senna-docusate sodium **AND** polyethylene glycol **AND** bisacodyl **AND** bisacodyl    Calcium Replacement - Follow Nurse / BPA Driven Protocol    dextrose    dextrose    glucagon (human recombinant)    HYDROcodone-acetaminophen    Magnesium Standard Dose Replacement  - Follow Nurse / BPA Driven Protocol    nitroglycerin    ondansetron ODT **OR** ondansetron    Phosphorus Replacement - Follow Nurse / BPA Driven Protocol    Potassium Replacement - Follow Nurse / BPA Driven Protocol    [COMPLETED] Insert Peripheral IV **AND** sodium chloride    sodium chloride    sodium chloride    Assessment & Plan   Assessment & Plan     Active Hospital Problems    Diagnosis  POA    **Closed compression fracture of L2 vertebra [S32.020A]  Yes    UTI (urinary tract infection) [N39.0]  Yes    Atrial fibrillation, chronic [I48.20]  Yes    CAD (coronary artery disease) [I25.10]  Yes    HTN (hypertension) [I10]  Yes    Type 2 diabetes mellitus, without long-term current use of insulin [E11.9]  Yes    Weakness [R53.1]  Yes      Resolved Hospital Problems   No resolved problems to display.        Brief Hospital Course to date:  Apollo Haro is a 84 yoM who presented to [facility] for evaluation of back pain. Patient with a fall earlier this month during which he hit his head, was evaluated in the ED and later discharged home. He reports since that time he has had issues with increased back pain inhibiting his ability to walk and has been mostly in the bed. Found to have L2 anterior column compression fracture.     This patient's problems and plans were partially entered by my partner and updated as appropriate by me 09/30/24.     Acute compression fracture L2  Persistent back pain   Generalized weakness    -MRI with acute L2 compression fracture with 20% loss of height   -Neurosurgery evaluated, recommended conservative management. Close follow up in 2-4 weeks in neurosurgical clinic. If unable to ambulate secondary to pain, can reconsider kyphoplasty.  -Continue pain management with PRN Tylenol, Norco.  -PT/OT recommending SNF on discharge. Case management following.  -Palliative following for goals of care.     MRSA UTI   -UCx with MRSA; BCx with no growth at 5 days.  -s/p course of IV Vancomycin x5 days.       History of atrial fibrillation  Atrial flutter (this admission)  -Cardiology consulted due to persistent aflutter while on flecainide   -Cardiology evaluated, Flecainide was discontinued. Decreased metoprolol to 12.5 mg BID; no plan for cardioversion   -Continue Eliquis.     Diabetes mellitus type 2- A1c 5.8% this admission, continue low-dose SSI, monitor glucose and adjust insulin as needed.   HTN with hypotension during admission- continue low-dose metoprolol.  CAD- continue pravastatin, cardiology stopped ASA.  Cognitive impairment- continue seroquel nightly.      Expected Discharge Location and Transportation: [facility]  Expected Discharge   Expected Discharge Date: 10/1/2024; Expected Discharge Time:      VTE Prophylaxis:  Pharmacologic & mechanical VTE prophylaxis orders are present.         AM-PAC 6 Clicks Score (PT): 15 (09/30/24 9031)    CODE STATUS:   Code Status and Medical Interventions: No CPR (Do Not Attempt to Resuscitate); Limited Support; No intubation (DNI), No cardioversion; Spouse present and in full support of decision as JACI   Ordered at: 09/27/24 1407     Medical Intervention Limits:    No intubation (DNI)    No cardioversion     Level Of Support Discussed With:    Patient     Code Status (Patient has no pulse and is not breathing):    No CPR (Do Not Attempt to Resuscitate)     Medical Interventions (Patient has pulse or is breathing):    Limited Support     Comments:    Spouse present and in full support of decision as JACI Castillo, DO  09/30/24

## 2024-09-30 NOTE — CASE MANAGEMENT/SOCIAL WORK
Discharge Planning Assessment  [facility]     Patient Name: Apollo Haro  MRN: [id number]  Today's Date: 9/30/2024    Admit Date: 9/23/2024    Plan: [facility] SNF          Discharge Plan       Row Name 09/30/24 1546       Plan    Plan [facility]    Plan Comments Followed up with Mr. Haro's wife, Bee, at the bedside, for discharge planning.    Mr. Haro has been offered a rehab bed at [facility], by April, at [facility].  Bee is agreeable to [facility].  April, at [facility] will initiate the prior auth with the patient's [employer], for the rehab admission.     will follow up.    Final Discharge Disposition Code 03 - skilled nursing facility (SNF)                  Continued Care and Services - Admitted Since 9/23/2024       Destination       Service Provider Request Status Selected Services Address Phone Fax Patient Preferred    [facility] Pending - No Request Sent N/A [address] [facility] [address] [phone] --                  Expected Discharge Date and Time       Expected Discharge Date Expected Discharge Time    Oct 2, 2024           Luz Rosales RN

## 2024-10-01 LAB
GLUCOSE BLDC GLUCOMTR-MCNC: 171 MG/DL (ref 70–130)
GLUCOSE BLDC GLUCOMTR-MCNC: 188 MG/DL (ref 70–130)
GLUCOSE BLDC GLUCOMTR-MCNC: 191 MG/DL (ref 70–130)
GLUCOSE BLDC GLUCOMTR-MCNC: 250 MG/DL (ref 70–130)

## 2024-10-01 PROCEDURE — G0378 HOSPITAL OBSERVATION PER HR: HCPCS

## 2024-10-01 PROCEDURE — 63710000001 INSULIN LISPRO (HUMAN) PER 5 UNITS: Performed by: FAMILY MEDICINE

## 2024-10-01 PROCEDURE — 82948 REAGENT STRIP/BLOOD GLUCOSE: CPT

## 2024-10-01 PROCEDURE — 99232 SBSQ HOSP IP/OBS MODERATE 35: CPT | Performed by: STUDENT IN AN ORGANIZED HEALTH CARE EDUCATION/TRAINING PROGRAM

## 2024-10-01 PROCEDURE — 97116 GAIT TRAINING THERAPY: CPT

## 2024-10-01 PROCEDURE — 97110 THERAPEUTIC EXERCISES: CPT

## 2024-10-01 RX ADMIN — INSULIN LISPRO 2 UNITS: 100 INJECTION, SOLUTION INTRAVENOUS; SUBCUTANEOUS at 09:00

## 2024-10-01 RX ADMIN — INSULIN LISPRO 2 UNITS: 100 INJECTION, SOLUTION INTRAVENOUS; SUBCUTANEOUS at 20:07

## 2024-10-01 RX ADMIN — POLYETHYLENE GLYCOL 3350 17 G: 17 POWDER, FOR SOLUTION ORAL at 09:00

## 2024-10-01 RX ADMIN — APIXABAN 5 MG: 5 TABLET, FILM COATED ORAL at 19:54

## 2024-10-01 RX ADMIN — INSULIN LISPRO 4 UNITS: 100 INJECTION, SOLUTION INTRAVENOUS; SUBCUTANEOUS at 12:02

## 2024-10-01 RX ADMIN — QUETIAPINE FUMARATE 25 MG: 25 TABLET ORAL at 19:54

## 2024-10-01 RX ADMIN — SENNOSIDES AND DOCUSATE SODIUM 2 TABLET: 50; 8.6 TABLET ORAL at 19:54

## 2024-10-01 RX ADMIN — HYDROCODONE BITARTRATE AND ACETAMINOPHEN 1 TABLET: 5; 325 TABLET ORAL at 05:04

## 2024-10-01 RX ADMIN — SENNOSIDES AND DOCUSATE SODIUM 2 TABLET: 50; 8.6 TABLET ORAL at 09:04

## 2024-10-01 RX ADMIN — Medication 12.5 MG: at 09:00

## 2024-10-01 RX ADMIN — HYDROCODONE BITARTRATE AND ACETAMINOPHEN 1 TABLET: 5; 325 TABLET ORAL at 15:55

## 2024-10-01 RX ADMIN — PRAVASTATIN SODIUM 20 MG: 20 TABLET ORAL at 19:53

## 2024-10-01 RX ADMIN — INSULIN LISPRO 2 UNITS: 100 INJECTION, SOLUTION INTRAVENOUS; SUBCUTANEOUS at 17:23

## 2024-10-01 RX ADMIN — APIXABAN 5 MG: 5 TABLET, FILM COATED ORAL at 09:01

## 2024-10-01 NOTE — PROGRESS NOTES
[facility] Medicine Services  PROGRESS NOTE    Patient Name: Apollo Haro  : 1940  MRN: [id number]    Date of Admission: 2024  Primary Care Physician: Provider, No Known    Subjective   Subjective     CC:  Back pain    HPI:    Patient with continued right sided back pain today. No other complaints.      Objective   Objective     Vital Signs:   Temp:  [97.7 °F (36.5 °C)-98.6 °F (37 °C)] 98.3 °F (36.8 °C)  Heart Rate:  [73-94] 79  Resp:  [16-18] 18  BP: (102-131)/(64-73) 131/70     Physical Exam:  Constitutional: Awake, alert, resting comfortably  HENT: NCAT, mucous membranes moist  Respiratory: Clear to auscultation bilaterally, respiratory effort normal   Cardiovascular: RRR, no murmurs, rubs, or gallops  Gastrointestinal: soft, nontender, nondistended  : External catheter in place  Musculoskeletal: No bilateral ankle edema  Psychiatric: Appropriate affect, cooperative  Neurologic: Alert, oriented x 3, no focal deficits, speech clear  Skin: No rashes    Exam unchanged from 2024.    Results Reviewed:  LAB RESULTS:      Lab 24  1010   WBC 8.23   HEMOGLOBIN 13.3   HEMATOCRIT 40.1   PLATELETS 184   MCV 92.6         Lab 24  1002 24  0411 24  1010   SODIUM 141 143 137   POTASSIUM 4.3 4.4 4.5   CHLORIDE 105 105 99   CO2 28.0 28.0 27.0   ANION GAP 8.0 10.0 11.0   BUN 20 26* 20   CREATININE 0.82 1.01 0.95   EGFR 86.1 72.9 78.4   GLUCOSE 163* 127* 155*   CALCIUM 9.0 9.0 9.2                 Brief Urine Lab Results  (Last result in the past 365 days)        Color   Clarity   Blood   Leuk Est   Nitrite   Protein   CREAT   Urine HCG        24 1337 Yellow   Cloudy   Large (3+)   Large (3+)   Positive   30 mg/dL (1+)                   Microbiology Results Abnormal       Procedure Component Value - Date/Time    Blood Culture - Blood, Hand, Left [[id number]]  (Normal) Collected: 24 1656    Lab Status: Final result Specimen: Blood from Hand, Left Updated:  09/28/24 1715     Blood Culture No growth at 5 days    Blood Culture - Blood, Hand, Right [[id number]]  (Normal) Collected: 09/23/24 1656    Lab Status: Final result Specimen: Blood from Hand, Right Updated: 09/28/24 1715     Blood Culture No growth at 5 days            No radiology results from the last 24 hrs        Current medications:  Scheduled Meds:apixaban, 5 mg, Oral, BID  insulin lispro, 2-7 Units, Subcutaneous, 4x Daily AC & at Bedtime  metoprolol tartrate, 12.5 mg, Oral, Q12H  senna-docusate sodium, 2 tablet, Oral, BID   And  polyethylene glycol, 17 g, Oral, Daily  pravastatin, 20 mg, Oral, Nightly  QUEtiapine, 25 mg, Oral, Nightly  sodium chloride, 10 mL, Intravenous, Q12H      Continuous Infusions:     PRN Meds:.  acetaminophen **OR** acetaminophen **OR** acetaminophen    senna-docusate sodium **AND** polyethylene glycol **AND** bisacodyl **AND** bisacodyl    Calcium Replacement - Follow Nurse / BPA Driven Protocol    dextrose    dextrose    glucagon (human recombinant)    HYDROcodone-acetaminophen    Magnesium Standard Dose Replacement - Follow Nurse / BPA Driven Protocol    nitroglycerin    ondansetron ODT **OR** ondansetron    Phosphorus Replacement - Follow Nurse / BPA Driven Protocol    Potassium Replacement - Follow Nurse / BPA Driven Protocol    [COMPLETED] Insert Peripheral IV **AND** sodium chloride    sodium chloride    sodium chloride    Assessment & Plan   Assessment & Plan     Active Hospital Problems    Diagnosis  POA    **Closed compression fracture of L2 vertebra [S32.020A]  Yes    UTI (urinary tract infection) [N39.0]  Yes    Atrial fibrillation, chronic [I48.20]  Yes    CAD (coronary artery disease) [I25.10]  Yes    HTN (hypertension) [I10]  Yes    Type 2 diabetes mellitus, without long-term current use of insulin [E11.9]  Yes    Weakness [R53.1]  Yes      Resolved Hospital Problems   No resolved problems to display.        Brief Hospital Course to date:  Apollo Haro is a 84 yoM who  presented to [facility] for evaluation of back pain. Patient with a fall earlier this month during which he hit his head, was evaluated in the ED and later discharged home. He reports since that time he has had issues with increased back pain inhibiting his ability to walk and has been mostly in the bed. Found to have L2 anterior column compression fracture.     This patient's problems and plans were partially entered by my partner and updated as appropriate by me 10/01/24.     Acute compression fracture L2  Persistent back pain   Generalized weakness    -MRI with acute L2 compression fracture with 20% loss of height   -Neurosurgery evaluated, recommended conservative management. Close follow up in 2-4 weeks in neurosurgical clinic. If unable to ambulate secondary to pain, can reconsider kyphoplasty.  -Continue pain management with PRN Tylenol, Norco.  -PT/OT recommending SNF on discharge. Case management following. Prior Auth for placement pending.  -Palliative following for goals of care and pain management.     MRSA UTI   -UCx with MRSA; BCx with no growth at 5 days.  -s/p course of IV Vancomycin x5 days.      History of atrial fibrillation  Atrial flutter (this admission)  -Cardiology consulted due to persistent aflutter while on flecainide   -Cardiology evaluated, Flecainide was discontinued. Decreased metoprolol to 12.5 mg BID; no plan for cardioversion. Continue Eliquis.     Diabetes mellitus type 2- A1c 5.8% this admission, continue low-dose SSI, monitor glucose and adjust insulin as needed.   HTN with hypotension during admission- continue low-dose metoprolol.  CAD- continue pravastatin, cardiology stopped ASA.  Cognitive impairment- continue seroquel nightly.      Expected Discharge Location and Transportation: SNF  Expected Discharge   Expected Discharge Date: 10/2/2024; Expected Discharge Time:      VTE Prophylaxis:  Pharmacologic & mechanical VTE prophylaxis orders are present.         AM-PAC 6 Clicks Score  (PT): 12 (10/01/24 9445)    CODE STATUS:   Code Status and Medical Interventions: No CPR (Do Not Attempt to Resuscitate); Limited Support; No intubation (DNI), No cardioversion; Spouse present and in full support of decision as NOK   Ordered at: 09/27/24 1407     Medical Intervention Limits:    No intubation (DNI)    No cardioversion     Level Of Support Discussed With:    Patient     Code Status (Patient has no pulse and is not breathing):    No CPR (Do Not Attempt to Resuscitate)     Medical Interventions (Patient has pulse or is breathing):    Limited Support     Comments:    Spouse present and in full support of decision as JACI Castillo, DO  10/01/24

## 2024-10-01 NOTE — PLAN OF CARE
Goal Outcome Evaluation:  Plan of Care Reviewed With: patient        Progress: improving  Outcome Evaluation: Pt seen at 1225; in bed, sitting up, awake, smiling and reported feels better overall today than yesterday. No family present. Pt stated he is anxious to discharge for STR so he can get stronger. Pt denied pain, nausea, dyspnea, any discomfort at time of Palliative RN encounter. Pending STR placement; Palliative following for continued support in GOC/POC.    0930 Palliative IDT meeting:  MD, APRN, RN,   After hours, weekends and holidays, contact Palliative Provider by calling [phone]       Problem: Palliative Care  Goal: Enhanced Quality of Life  Outcome: Progressing  Intervention: Promote Advance Care Planning  Flowsheets (Taken 10/1/2024 1430)  Life Transition/Adjustment: (pending placement for STR) other (see comments)  Intervention: Maximize Comfort  Flowsheets (Taken 10/1/2024 1430)  Pain Management Interventions: pain management plan reviewed with patient/caregiver  Intervention: Optimize Function  Flowsheets (Taken 10/1/2024 1430)  Fatigue Management: paced activity encouraged  Sleep/Rest Enhancement:   consistent schedule promoted   natural light exposure provided  Intervention: Optimize Psychosocial Wellbeing  Flowsheets (Taken 10/1/2024 1430)  Supportive Measures:   active listening utilized   positive reinforcement provided   self-responsibility promoted   verbalization of feelings encouraged

## 2024-10-01 NOTE — PLAN OF CARE
Oriented x 4 with forgetfulness. Speech clear / spontaneous. Lung sound widely diminished. VSS on RA. SR with first degree AV block on cardiac mx. Patient rests in bed with no sx of acute distress at this time. Call light in reach.

## 2024-10-01 NOTE — PLAN OF CARE
Goal Outcome Evaluation:  Plan of Care Reviewed With: patient        Progress: no change  Outcome Evaluation: Pt amb 25' with FWW and Mod A x2. Significant posterior lean and reports of fear of falling. Activity limited by fatigue. HEP reviewed with pt. Continue to recommend d/c to SNF to promote increased independence with functional mobility.      Anticipated Discharge Disposition (PT): skilled nursing facility

## 2024-10-01 NOTE — THERAPY TREATMENT NOTE
Patient Name: Apollo Haro  : 1940    MRN: [id number]                              Today's Date: 10/1/2024       Admit Date: 2024    Visit Dx:     ICD-10-CM ICD-9-CM   1. Compression fracture of L2 vertebra, initial encounter  S32.020A 805.4   2. Declining functional status  R53.81 799.3   3. Unable to ambulate  R26.2 719.7   4. History of fall  Z91.81 V15.88   5. Acute midline low back pain without sciatica  M54.50 724.2   6. Chronic anticoagulation  Z79.01 V58.61   7. History of atrial fibrillation  Z86.79 V12.59     Patient Active Problem List   Diagnosis    Weakness    Type 2 diabetes mellitus, without long-term current use of insulin    Rhabdomyolysis due to COVID-19    CAD (coronary artery disease)    HTN (hypertension)    Atrial fibrillation, chronic    COVID-19 virus detected    SDH (subdural hematoma)    Altered mental status    AMS (altered mental status)    Closed compression fracture of L2 vertebra    UTI (urinary tract infection)     Past Medical History:   Diagnosis Date    A-fib     CAD (coronary artery disease)     DM (diabetes mellitus)      History reviewed. No pertinent surgical history.   General Information       Row Name 10/01/24 1604          Physical Therapy Time and Intention    Document Type therapy note (daily note)  -     Mode of Treatment physical therapy  -       Row Name 10/01/24 1604          General Information    Patient Profile Reviewed yes  -     Existing Precautions/Restrictions fall;spinal;other (see comments)  ac. 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  -       Row Name 10/01/24 1604          Cognition    Orientation Status (Cognition) oriented x 3;verbal cues/prompts needed for orientation  -       Row Name 10/01/24 1604          Safety Issues, Functional Mobility    Impairments Affecting Function (Mobility) balance;cognition;endurance/activity tolerance;motor planning;pain;postural/trunk control;strength  -                User Key  (r) = Recorded By, (t) = Taken By, (c) = Cosigned By      Initials Name Provider Type     Mikala Rivas, JONATHON Physical Therapist                   Mobility       Row Name 10/01/24 1604          Bed Mobility    Bed Mobility supine-sit;scooting/bridging  -     Supine-Sit [place] (Bed Mobility) maximum assist (25% patient effort);nonverbal cues (demo/gesture);verbal cues  -     Comment, (Bed Mobility) VC for logrolling with use of draw sheet to assist with rolling onto his side and to upright trunk at EOB. Pt demonstrated posterior lean secondary to fear of falling. VC for scooting out to place feet on floor and anterior weight shifting over KERRY for improved steadiness. Fair correction noted.  -       Row Name 10/01/24 1604          Transfers    Comment, (Transfers) Pt performed STS from EOB with Mod Ax2 and FWW. VC for hand placement on FWW. Significant posterior lean with weight shifting into heels noted and fear of falling reported. Pt able to correct moderately with VC and encouraged anterior weight shifting into toes.  -       Row Name 10/01/24 1604          Sit-Stand Transfer    Sit-Stand [place] (Transfers) moderate assist (50% patient effort);2 person assist;verbal cues;nonverbal cues (demo/gesture)  -     Assistive Device (Sit-Stand Transfers) walker, front-wheeled  -       Row Name 10/01/24 1604          Gait/Stairs (Locomotion)    [place] Level (Gait) moderate assist (50% patient effort);2 person assist;verbal cues;nonverbal cues (demo/gesture)  -     Assistive Device (Gait) walker, front-wheeled  -     Distance in Feet (Gait) 25  -     Deviations/Abnormal Patterns (Gait) gait speed decreased;bilateral deviations;stride length decreased;base of support, narrow;festinating/shuffling  -     Bilateral Gait Deviations forward flexed posture  -     Comment, (Gait/Stairs) Pt amb in room with short, shuffling steps, wide KERRY, forward flexed posture, posterior lean,  and moderate unsteadiness. Significant fear of falling reported. VC for increased stride length, anterior weight shifting, and increased gait speed. Difficulty with gait corrections noted due to fear of fallings. Assistance provided for weight shifting and body weight support. Activity limited by fatigue and fear of falling.  -               User Key  (r) = Recorded By, (t) = Taken By, (c) = Cosigned By      Initials Name Provider Type     Mikala Rivas PT Physical Therapist                   Obj/Interventions       Row Name 10/01/24 [facility]          Motor Skills    Therapeutic Exercise hip;knee;ankle;shoulder  -       Row Name 10/01/24 [facility]          Hip (Therapeutic Exercise)    Hip (Therapeutic Exercise) strengthening exercise  -     Hip Strengthening (Therapeutic Exercise) bilateral;external rotation;internal rotation;10 repetitions  -       Row Name 10/01/24 [facility]          Knee (Therapeutic Exercise)    Knee (Therapeutic Exercise) isometric exercises  -     Knee Isometrics (Therapeutic Exercise) bilateral;gluteal sets;quad sets;10 repetitions  -     Knee Strengthening (Therapeutic Exercise) bilateral;LAQ (long arc quad);10 repetitions  -       Row Name 10/01/24 [facility]          Ankle (Therapeutic Exercise)    Ankle (Therapeutic Exercise) AROM (active range of motion)  -     Ankle AROM (Therapeutic Exercise) bilateral;dorsiflexion;plantarflexion;10 repetitions  -       Row Name 10/01/24 [facility]          Balance    Balance Assessment sitting static balance;sitting dynamic balance;sit to stand dynamic balance;standing static balance;standing dynamic balance  -     Static Sitting Balance contact guard  -     Dynamic Sitting Balance minimal assist  -     Position, Sitting Balance sitting edge of bed  -     Static Standing Balance moderate assist;non-verbal cues (demo/gesture);verbal cues;2-person assist  -     Dynamic Standing Balance moderate assist;2-person assist;non-verbal cues  (demo/gesture);verbal cues  -     Position/Device Used, Standing Balance supported;walker, rolling  -     Balance Interventions sitting;standing;sit to stand;occupation based/functional task  -               User Key  (r) = Recorded By, (t) = Taken By, (c) = Cosigned By      Initials Name Provider Type     Mikala Rivas, JONATHON Physical Therapist                   Goals/Plan    No documentation.                  Clinical Impression       Row Name 10/01/24 1613          Pain    Pretreatment Pain Rating 0/10 - no pain  -     Posttreatment Pain Rating 0/10 - no pain  -       Row Name 10/01/24 1613          Plan of Care Review    Plan of Care Reviewed With patient  -     Progress no change  -     Outcome Evaluation Pt amb 25' with FWW and Mod A x2. Significant posterior lean and reports of fear of falling. Activity limited by fatigue. HEP reviewed with pt. Continue to recommend d/c to SNF to promote increased independence with functional mobility.  -       Row Name 10/01/24 1613          Positioning and Restraints    Pre-Treatment Position in bed  -     Post Treatment Position chair  -     In Chair notified nsg;reclined;sitting;call light within reach;encouraged to call for assist;exit alarm on;legs elevated  -               User Key  (r) = Recorded By, (t) = Taken By, (c) = Cosigned By      Initials Name Provider Type     Mikala Rivas PT Physical Therapist                   Outcome Measures       Row Name 10/01/24 1615          How much help from another person do you currently need...    Turning from your back to your side while in flat bed without using bedrails? 2  -HW     Moving from lying on back to sitting on the side of a flat bed without bedrails? 2  -HW     Moving to and from a bed to a chair (including a wheelchair)? 2  -HW     Standing up from a chair using your arms (e.g., wheelchair, bedside chair)? 2  -HW     Climbing 3-5 steps with a railing? 2  -HW     To walk in hospital room? 2   -     AM-PAC 6 Clicks Score (PT) 12  -     Highest Level of Mobility Goal 4 --> Transfer to chair/commode  -       Row Name 10/01/24 1615          Functional Assessment    Outcome Measure Options AM-PAC 6 Clicks Basic Mobility (PT)  -               User Key  (r) = Recorded By, (t) = Taken By, (c) = Cosigned By      Initials Name Provider Type     Mikala Rivas PT Physical Therapist                                 Physical Therapy Education       Title: PT OT SLP Therapies (Done)       Topic: Physical Therapy (Done)       Point: Mobility training (Done)       Learning Progress Summary             Patient Acceptance, E,D, VU by  at 10/1/2024 1617    Acceptance, E,D, VU,NR by  at 9/30/2024 1056    Acceptance, E, VU by  at 9/30/2024 1037    Acceptance, E, VU,NR by  at 9/29/2024 0922    Acceptance, E,D,H, NR by DM at 9/28/2024 1837    Acceptance, E, VU,NR by  at 9/27/2024 0828    Comment: PT POC    Acceptance, E,D, VU,NR by LR at 9/26/2024 1403    Comment: Educated on spinal precautions for comfort, correct log rolling technique, correct sit<->stand t/f technique, correct gait mechanics, HEP, benefits of mobility and being OOB, and progression of POC.    Acceptance, E,D, VU,NR by LR at 9/25/2024 1310    Comment: Educated on spinal precautions for comfort, correct log rolling technique, correct sit<->stand t/f technique, correct technique for side steps at EOB, HEP, and progression of POC.    Acceptance, E,D, VU,NR by LR at 9/24/2024 1301    Comment: Educated on spinal precautions, correct log rolling technique, benefits of being OOB, correct sit<->stand t/f technique, correct bed to chair t/f technique, and progression of POC.                         Point: Home exercise program (Done)       Learning Progress Summary             Patient Acceptance, E,D, VU by  at 10/1/2024 1617    Acceptance, E,D, VU,NR by  at 9/30/2024 1056    Acceptance, E, VU by  at 9/30/2024 1037    Acceptance, E,D,H, NR by  DM at 9/28/2024 1837    Acceptance, E, VU,NR by LO at 9/27/2024 0828    Comment: PT POC    Acceptance, E,D, VU,NR by LR at 9/26/2024 1403    Comment: Educated on spinal precautions for comfort, correct log rolling technique, correct sit<->stand t/f technique, correct gait mechanics, HEP, benefits of mobility and being OOB, and progression of POC.    Acceptance, E,D, VU,NR by LR at 9/25/2024 1310    Comment: Educated on spinal precautions for comfort, correct log rolling technique, correct sit<->stand t/f technique, correct technique for side steps at EOB, HEP, and progression of POC.    Acceptance, E,D, VU,NR by LR at 9/24/2024 1301    Comment: Educated on spinal precautions, correct log rolling technique, benefits of being OOB, correct sit<->stand t/f technique, correct bed to chair t/f technique, and progression of POC.                         Point: Body mechanics (Done)       Learning Progress Summary             Patient Acceptance, E,D, VU by HW at 10/1/2024 1617    Acceptance, E,D, VU,NR by HW at 9/30/2024 1056    Acceptance, E, VU by  at 9/30/2024 1037    Acceptance, E,D,H, NR by DM at 9/28/2024 1837    Acceptance, E, VU,NR by LO at 9/27/2024 0828    Comment: PT POC    Acceptance, E,D, VU,NR by LR at 9/26/2024 1403    Comment: Educated on spinal precautions for comfort, correct log rolling technique, correct sit<->stand t/f technique, correct gait mechanics, HEP, benefits of mobility and being OOB, and progression of POC.    Acceptance, E,D, VU,NR by LR at 9/25/2024 1310    Comment: Educated on spinal precautions for comfort, correct log rolling technique, correct sit<->stand t/f technique, correct technique for side steps at EOB, HEP, and progression of POC.    Acceptance, E,D, VU,NR by LR at 9/24/2024 1301    Comment: Educated on spinal precautions, correct log rolling technique, benefits of being OOB, correct sit<->stand t/f technique, correct bed to chair t/f technique, and progression of POC.                          Point: Precautions (Done)       Learning Progress Summary             Patient Acceptance, E,D, VU by HW at 10/1/2024 1617    Acceptance, E,D, VU,NR by HW at 9/30/2024 1056    Acceptance, E, VU by MH at 9/30/2024 1037    Acceptance, E,D,H, NR by DM at 9/28/2024 1837    Acceptance, E, VU,NR by LO at 9/27/2024 0828    Comment: PT POC    Acceptance, E,D, VU,NR by LR at 9/26/2024 1403    Comment: Educated on spinal precautions for comfort, correct log rolling technique, correct sit<->stand t/f technique, correct gait mechanics, HEP, benefits of mobility and being OOB, and progression of POC.    Acceptance, E,D, VU,NR by LR at 9/25/2024 1310    Comment: Educated on spinal precautions for comfort, correct log rolling technique, correct sit<->stand t/f technique, correct technique for side steps at EOB, HEP, and progression of POC.    Acceptance, E,D, VU,NR by LR at 9/24/2024 1301    Comment: Educated on spinal precautions, correct log rolling technique, benefits of being OOB, correct sit<->stand t/f technique, correct bed to chair t/f technique, and progression of POC.                                         User Key       Initials Effective Dates Name Provider Type Discipline    DM 02/03/23 -  Donna Montanez, PT Physical Therapist PT    LR 02/03/23 -  Meseret Dunham, PT Physical Therapist PT    LS 07/11/23 -  Radha Campo, PT Physical Therapist PT    LO 06/16/21 -  Yasmin Perdomo, PT Physical Therapist PT    HW 12/15/23 -  Mikala Rivas, PT Physical Therapist PT     07/26/24 -  Marii Harris, RN Registered Nurse Nurse                  PT Recommendation and Plan     Plan of Care Reviewed With: patient  Progress: no change  Outcome Evaluation: Pt amb 25' with FWW and Mod A x2. Significant posterior lean and reports of fear of falling. Activity limited by fatigue. HEP reviewed with pt. Continue to recommend d/c to SNF to promote increased independence with functional mobility.     Time  Calculation:         PT Charges       Row Name 10/01/24 1618             Time Calculation    Start Time 1520  -HW      PT Received On 10/01/24  -         Timed Charges    29012 - PT Therapeutic Exercise Minutes 6  -HW      43026 - Gait Training Minutes  12  -      55337 - PT Therapeutic Activity Minutes 5  -HW         Total Minutes    Timed Charges Total Minutes 23  -HW       Total Minutes 23  -HW                User Key  (r) = Recorded By, (t) = Taken By, (c) = Cosigned By      Initials Name Provider Type     Mikala Rivas, JONATHON Physical Therapist                  Therapy Charges for Today       Code Description Service Date Service Provider Modifiers Qty    16786799407 HC PT THER PROC EA 15 MIN 9/30/2024 Mikala Rivas, PT GP 1    79549007095 HC GAIT TRAINING EA 15 MIN 9/30/2024 Mikala Rivas, PT GP 1    70303454619 HC PT THER SUPP EA 15 MIN 9/30/2024 Mikala Rivas, PT GP 2    00923702318 HC PT THER PROC EA 15 MIN 10/1/2024 Mikala Rivas, PT GP 1    06250066163 HC GAIT TRAINING EA 15 MIN 10/1/2024 Mikala Rivas, PT GP 1    23818488213 HC PT THER SUPP EA 15 MIN 10/1/2024 Mikala Rivas, PT GP 2            PT G-Codes  Outcome Measure Options: AM-PAC 6 Clicks Basic Mobility (PT)  AM-PAC 6 Clicks Score (PT): 12  AM-PAC 6 Clicks Score (OT): 13  PT Discharge Summary  Anticipated Discharge Disposition (PT): skilled nursing facility    Mikala Rivas PT  10/1/2024

## 2024-10-02 LAB
GLUCOSE BLDC GLUCOMTR-MCNC: 170 MG/DL (ref 70–130)
GLUCOSE BLDC GLUCOMTR-MCNC: 171 MG/DL (ref 70–130)
GLUCOSE BLDC GLUCOMTR-MCNC: 190 MG/DL (ref 70–130)
GLUCOSE BLDC GLUCOMTR-MCNC: 227 MG/DL (ref 70–130)

## 2024-10-02 PROCEDURE — 97535 SELF CARE MNGMENT TRAINING: CPT

## 2024-10-02 PROCEDURE — 99232 SBSQ HOSP IP/OBS MODERATE 35: CPT | Performed by: NURSE PRACTITIONER

## 2024-10-02 PROCEDURE — 97110 THERAPEUTIC EXERCISES: CPT

## 2024-10-02 PROCEDURE — G0378 HOSPITAL OBSERVATION PER HR: HCPCS

## 2024-10-02 PROCEDURE — 63710000001 INSULIN LISPRO (HUMAN) PER 5 UNITS: Performed by: FAMILY MEDICINE

## 2024-10-02 PROCEDURE — 82948 REAGENT STRIP/BLOOD GLUCOSE: CPT

## 2024-10-02 RX ADMIN — HYDROCODONE BITARTRATE AND ACETAMINOPHEN 1 TABLET: 5; 325 TABLET ORAL at 06:04

## 2024-10-02 RX ADMIN — SENNOSIDES AND DOCUSATE SODIUM 2 TABLET: 50; 8.6 TABLET ORAL at 08:05

## 2024-10-02 RX ADMIN — INSULIN LISPRO 2 UNITS: 100 INJECTION, SOLUTION INTRAVENOUS; SUBCUTANEOUS at 19:58

## 2024-10-02 RX ADMIN — INSULIN LISPRO 2 UNITS: 100 INJECTION, SOLUTION INTRAVENOUS; SUBCUTANEOUS at 08:05

## 2024-10-02 RX ADMIN — PRAVASTATIN SODIUM 20 MG: 20 TABLET ORAL at 19:12

## 2024-10-02 RX ADMIN — QUETIAPINE FUMARATE 25 MG: 25 TABLET ORAL at 19:13

## 2024-10-02 RX ADMIN — SENNOSIDES AND DOCUSATE SODIUM 2 TABLET: 50; 8.6 TABLET ORAL at 19:13

## 2024-10-02 RX ADMIN — HYDROCODONE BITARTRATE AND ACETAMINOPHEN 1 TABLET: 5; 325 TABLET ORAL at 12:42

## 2024-10-02 RX ADMIN — INSULIN LISPRO 2 UNITS: 100 INJECTION, SOLUTION INTRAVENOUS; SUBCUTANEOUS at 16:58

## 2024-10-02 RX ADMIN — INSULIN LISPRO 3 UNITS: 100 INJECTION, SOLUTION INTRAVENOUS; SUBCUTANEOUS at 12:10

## 2024-10-02 RX ADMIN — APIXABAN 5 MG: 5 TABLET, FILM COATED ORAL at 19:13

## 2024-10-02 RX ADMIN — Medication 12.5 MG: at 19:12

## 2024-10-02 RX ADMIN — APIXABAN 5 MG: 5 TABLET, FILM COATED ORAL at 08:04

## 2024-10-02 RX ADMIN — POLYETHYLENE GLYCOL 3350 17 G: 17 POWDER, FOR SOLUTION ORAL at 08:05

## 2024-10-02 NOTE — PLAN OF CARE
Scheduled Beta blocker omitted per holding parameter. ( DBP < 60 ) Patient rests in bed with no sx of acute distress at this time. On RA. SR with first degree AV block in 70s on cardiac mx. Call light in reach.

## 2024-10-02 NOTE — PLAN OF CARE
Problem: Adult Inpatient Plan of Care  Goal: Plan of Care Review  Recent Flowsheet Documentation  Taken 10/2/2024 1440 by Eladia Anderson OT  Progress: improving  Plan of Care Reviewed With: patient  Outcome Evaluation: Pt is A/Ox3 with cues and participates in therapy with encouragement and time. Remains below his baseline limited by strength, balance, and functional endurance deficits. Education reviewed for spinal precautions. Pt able to transition to EOB via logroll with Mod Ax1. Mod A UB dressing. Mod Ax2 STS from EOB. Up in room with RW support and Min Ax2, requires assist for RW sequencing and safety all mobility. BUE ther ex completed to support self-care. Pt completes oral care with Min A and cues. OT will continue to follow IP. Recommend SNF when pt is medically ready.

## 2024-10-02 NOTE — THERAPY TREATMENT NOTE
Patient Name: Apollo Haro  : 1940    MRN: [id number]                              Today's Date: 10/2/2024       Admit Date: 2024    Visit Dx:     ICD-10-CM ICD-9-CM   1. Compression fracture of L2 vertebra, initial encounter  S32.020A 805.4   2. Declining functional status  R53.81 799.3   3. Unable to ambulate  R26.2 719.7   4. History of fall  Z91.81 V15.88   5. Acute midline low back pain without sciatica  M54.50 724.2   6. Chronic anticoagulation  Z79.01 V58.61   7. History of atrial fibrillation  Z86.79 V12.59     Patient Active Problem List   Diagnosis    Weakness    Type 2 diabetes mellitus, without long-term current use of insulin    Rhabdomyolysis due to COVID-19    CAD (coronary artery disease)    HTN (hypertension)    Atrial fibrillation, chronic    COVID-19 virus detected    SDH (subdural hematoma)    Altered mental status    AMS (altered mental status)    Closed compression fracture of L2 vertebra    UTI (urinary tract infection)     Past Medical History:   Diagnosis Date    A-fib     CAD (coronary artery disease)     DM (diabetes mellitus)      History reviewed. No pertinent surgical history.   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  -       Row Name 10/02/24 1453          Cognition    Orientation Status (Cognition) oriented x 3;verbal cues/prompts needed for orientation  Pt able to select year from field of 2 choices  -       Row Name 10/02/24 1451          Safety Issues, Functional Mobility    Impairments Affecting Function (Mobility) cognition;balance;endurance/activity tolerance;motor  planning;pain;postural/trunk control;strength  -               User Key  (r) = Recorded By, (t) = Taken By, (c) = Cosigned By      Initials Name Provider Type     Mikala Rivas, JONATHON Physical Therapist                   Mobility       Row Name 10/02/24 1455          Bed Mobility    Bed Mobility rolling right;sidelying-sit;scooting/bridging  -     Rolling Left [place] (Bed Mobility) verbal cues;nonverbal cues (demo/gesture);minimum assist (75% patient effort)  -HW     Rolling Right [place] (Bed Mobility) --  -HW     Scooting/Bridging [place] (Bed Mobility) --  -HW     Supine-Sit [place] (Bed Mobility) moderate assist (50% patient effort);contact guard;nonverbal cues (demo/gesture)  -     Assistive Device (Bed Mobility) bed rails  -     Comment, (Bed Mobility) VC for logrolling technique. Pt demonstrated good initiation with reaching for bedrail and rolling onto side. Assistance to upright trunk  -       Row Name 10/02/24 1453          Transfers    Comment, (Transfers) Pt performed STS with Mod A x2. Improved balance with minimal posterior lean noted.  -       Row Name 10/02/24 1454          Sit-Stand Transfer    Sit-Stand [place] (Transfers) moderate assist (50% patient effort);2 person assist;verbal cues  -     Assistive Device (Sit-Stand Transfers) walker, front-wheeled  -       Row Name 10/02/24 1452          Gait/Stairs (Locomotion)    [place] Level (Gait) minimum assist (75% patient effort);2 person assist;verbal cues;nonverbal cues (demo/gesture)  -     Assistive Device (Gait) walker, front-wheeled  -     Distance in Feet (Gait) 45  -     Deviations/Abnormal Patterns (Gait) gait speed decreased;bilateral deviations;stride length decreased;base of support, narrow;festinating/shuffling  -     Bilateral Gait Deviations forward flexed posture  -     Comment, (Gait/Stairs) Pt amb in room with improved steadiness. Step-through gait pattern noted. Short, shuffling  steps. VC for increased stride length, staying closer to AD, and upright posture. Fair ability to correct. Activity limited by fatigue. Fear of falling reported though improved this session.  -               User Key  (r) = Recorded By, (t) = Taken By, (c) = Cosigned By      Initials Name Provider Type     Mikala Rivas PT Physical Therapist                   Obj/Interventions       Row Name 10/02/24 1517          Motor Skills    Therapeutic Exercise shoulder;hip;knee;ankle  -       Row Name 10/02/24 [facility]7          Shoulder (Therapeutic Exercise)    Shoulder (Therapeutic Exercise) AROM (active range of motion)  -     Shoulder AROM (Therapeutic Exercise) bilateral;flexion;10 repetitions  -       Row Name 10/02/24 [facility]7          Hip (Therapeutic Exercise)    Hip (Therapeutic Exercise) strengthening exercise  -     Hip Strengthening (Therapeutic Exercise) bilateral;aBduction;aDduction;external rotation;internal rotation;heel slides;10 repetitions  -       Row Name 10/02/24 1517          Knee (Therapeutic Exercise)    Knee (Therapeutic Exercise) isometric exercises  -     Knee Isometrics (Therapeutic Exercise) bilateral;gluteal sets;quad sets;other (see comments);10 repetitions  abdominal sets  -       Row Name 10/02/24 1517          Ankle (Therapeutic Exercise)    Ankle (Therapeutic Exercise) AROM (active range of motion)  -     Ankle AROM (Therapeutic Exercise) bilateral;dorsiflexion;plantarflexion;10 repetitions  -       Row Name 10/02/24 1517          Balance    Balance Assessment sitting static balance;sitting dynamic balance;sit to stand dynamic balance;standing static balance;standing dynamic balance  -     Static Sitting Balance standby assist  -     Dynamic Sitting Balance standby assist  -     Position, Sitting Balance sitting edge of bed  -     Static Standing Balance minimal assist  -     Dynamic Standing Balance minimal assist;2-person assist;non-verbal cues  (demo/gesture);verbal cues  -HW     Position/Device Used, Standing Balance supported;walker, rolling  -HW     Balance Interventions sitting;standing;sit to stand;occupation based/functional task  -HW               User Key  (r) = Recorded By, (t) = Taken By, (c) = Cosigned By      Initials Name Provider Type    HW Mikala Rivas PT Physical Therapist                   Goals/Plan       Row Name 10/02/24 1520          Bed Mobility Goal 1 (PT)    Activity/Assistive Device (Bed Mobility Goal 1, PT) sit to supine/supine to sit  -HW     [place] Level/Cues Needed (Bed Mobility Goal 1, PT) minimum assist (75% or more patient effort)  -HW     Time Frame (Bed Mobility Goal 1, PT) short term goal (STG);3 days  -HW     Progress/Outcomes (Bed Mobility Goal 1, PT) good progress toward goal;goal revised this date  -       Row Name 10/02/24 1520          Transfer Goal 1 (PT)    Activity/Assistive Device (Transfer Goal 1, PT) sit-to-stand/stand-to-sit;bed-to-chair/chair-to-bed;walker, rolling  -HW     [place] Level/Cues Needed (Transfer Goal 1, PT) contact guard required  -HW     Time Frame (Transfer Goal 1, PT) long term goal (LTG);5 days  -HW     Progress/Outcome (Transfer Goal 1, PT) good progress toward goal  -       Row Name 10/02/24 1520          Gait Training Goal 1 (PT)    Activity/Assistive Device (Gait Training Goal 1, PT) gait (walking locomotion);walker, rolling  -HW     [place] Level (Gait Training Goal 1, PT) minimum assist (75% or more patient effort)  -HW     Distance (Gait Training Goal 1, PT) 50  -HW     Time Frame (Gait Training Goal 1, PT) long term goal (LTG);5 days  -HW     Progress/Outcome (Gait Training Goal 1, PT) good progress toward goal  -HW               User Key  (r) = Recorded By, (t) = Taken By, (c) = Cosigned By      Initials Name Provider Type    HW Mikala Rivas PT Physical Therapist                   Clinical Impression       Row Name 10/02/24 1518          Pain    Pretreatment  Pain Rating 0/10 - no pain  -     Posttreatment Pain Rating 0/10 - no pain  -       Row Name 10/02/24 1518          Plan of Care Review    Plan of Care Reviewed With patient  -     Progress improving  -     Outcome Evaluation Pt amb 45' with Min A x2. Pt demonstrated mild unsteadiness and a fear of falling reported. Activity limited by fatigue. Continue to recommend d/c to SNF to decrease risk for falls and promote increased independence with functional mobility.  -       Row Name 10/02/24 1518          Positioning and Restraints    Pre-Treatment Position in bed  -     Post Treatment Position chair  -HW     In Chair notified nsg;reclined;sitting;call light within reach;encouraged to call for assist;exit alarm on;legs elevated;waffle cushion  -               User Key  (r) = Recorded By, (t) = Taken By, (c) = Cosigned By      Initials Name Provider Type     Mikala Rivas, JONATHON Physical Therapist                   Outcome Measures       Row Name 10/02/24 1521 10/02/24 0804       How much help from another person do you currently need...    Turning from your back to your side while in flat bed without using bedrails? 2  - 2  -GS    Moving from lying on back to sitting on the side of a flat bed without bedrails? 2  - 2  -GS    Moving to and from a bed to a chair (including a wheelchair)? 3  - 2  -GS    Standing up from a chair using your arms (e.g., wheelchair, bedside chair)? 2  - 2  -GS    Climbing 3-5 steps with a railing? 2  - 1  -GS    To walk in hospital room? 3  - 2  -GS    AM-PAC 6 Clicks Score (PT) 14  - 11  -GS    Highest Level of Mobility Goal 4 --> Transfer to chair/commode  - 4 --> Transfer to chair/commode  -GS      Row Name 10/02/24 1521          Functional Assessment    Outcome Measure Options AM-PAC 6 Clicks Basic Mobility (PT)  -               User Key  (r) = Recorded By, (t) = Taken By, (c) = Cosigned By      Initials Name Provider Type     Nichole Germain RN Registered  Nurse     Mikala Rivas, PT Physical Therapist                                 Physical Therapy Education       Title: PT OT SLP Therapies (In Progress)       Topic: Physical Therapy (Done)       Point: Mobility training (Done)       Learning Progress Summary             Patient Acceptance, E,D, VU,NR by  at 10/2/2024 1521    Acceptance, E,D, VU by  at 10/1/2024 1617    Acceptance, E,D, VU,NR by  at 9/30/2024 1056    Acceptance, E, VU by  at 9/30/2024 1037    Acceptance, E, VU,NR by  at 9/29/2024 0922    Acceptance, E,D,H, NR by DM at 9/28/2024 1837    Acceptance, E, VU,NR by LO at 9/27/2024 0828    Comment: PT POC    Acceptance, E,D, VU,NR by LR at 9/26/2024 1403    Comment: Educated on spinal precautions for comfort, correct log rolling technique, correct sit<->stand t/f technique, correct gait mechanics, HEP, benefits of mobility and being OOB, and progression of POC.    Acceptance, E,D, VU,NR by LR at 9/25/2024 1310    Comment: Educated on spinal precautions for comfort, correct log rolling technique, correct sit<->stand t/f technique, correct technique for side steps at EOB, HEP, and progression of POC.    Acceptance, E,D, VU,NR by LR at 9/24/2024 1301    Comment: Educated on spinal precautions, correct log rolling technique, benefits of being OOB, correct sit<->stand t/f technique, correct bed to chair t/f technique, and progression of POC.                         Point: Home exercise program (Done)       Learning Progress Summary             Patient Acceptance, E,D, VU,NR by  at 10/2/2024 1521    Acceptance, E,D, VU by  at 10/1/2024 1617    Acceptance, E,D, VU,NR by  at 9/30/2024 1056    Acceptance, E, VU by  at 9/30/2024 1037    Acceptance, E,D,H, NR by DM at 9/28/2024 1837    Acceptance, E, VU,NR by LO at 9/27/2024 0828    Comment: PT POC    Acceptance, E,D, VU,NR by LR at 9/26/2024 1403    Comment: Educated on spinal precautions for comfort, correct log rolling technique, correct  sit<->stand t/f technique, correct gait mechanics, HEP, benefits of mobility and being OOB, and progression of POC.    Acceptance, E,D, VU,NR by LR at 9/25/2024 1310    Comment: Educated on spinal precautions for comfort, correct log rolling technique, correct sit<->stand t/f technique, correct technique for side steps at EOB, HEP, and progression of POC.    Acceptance, E,D, VU,NR by LR at 9/24/2024 1301    Comment: Educated on spinal precautions, correct log rolling technique, benefits of being OOB, correct sit<->stand t/f technique, correct bed to chair t/f technique, and progression of POC.                         Point: Body mechanics (Done)       Learning Progress Summary             Patient Acceptance, E,D, VU,NR by  at 10/2/2024 1521    Acceptance, E,D, VU by  at 10/1/2024 1617    Acceptance, E,D, VU,NR by HW at 9/30/2024 1056    Acceptance, E, VU by  at 9/30/2024 1037    Acceptance, E,D,H, NR by DM at 9/28/2024 1837    Acceptance, E, VU,NR by LO at 9/27/2024 0828    Comment: PT POC    Acceptance, E,D, VU,NR by LR at 9/26/2024 1403    Comment: Educated on spinal precautions for comfort, correct log rolling technique, correct sit<->stand t/f technique, correct gait mechanics, HEP, benefits of mobility and being OOB, and progression of POC.    Acceptance, E,D, VU,NR by LR at 9/25/2024 1310    Comment: Educated on spinal precautions for comfort, correct log rolling technique, correct sit<->stand t/f technique, correct technique for side steps at EOB, HEP, and progression of POC.    Acceptance, E,D, VU,NR by LR at 9/24/2024 1301    Comment: Educated on spinal precautions, correct log rolling technique, benefits of being OOB, correct sit<->stand t/f technique, correct bed to chair t/f technique, and progression of POC.                         Point: Precautions (Done)       Learning Progress Summary             Patient Acceptance, E,D, VU,NR by  at 10/2/2024 1521    Acceptance, E,D, VU by  at 10/1/2024  1617    Acceptance, E,D, VU,NR by  at 9/30/2024 1056    Acceptance, E, VU by MH at 9/30/2024 1037    Acceptance, E,D,H, NR by DM at 9/28/2024 1837    Acceptance, E, VU,NR by  at 9/27/2024 0828    Comment: PT POC    Acceptance, E,D, VU,NR by LR at 9/26/2024 1403    Comment: Educated on spinal precautions for comfort, correct log rolling technique, correct sit<->stand t/f technique, correct gait mechanics, HEP, benefits of mobility and being OOB, and progression of POC.    Acceptance, E,D, VU,NR by LR at 9/25/2024 1310    Comment: Educated on spinal precautions for comfort, correct log rolling technique, correct sit<->stand t/f technique, correct technique for side steps at EOB, HEP, and progression of POC.    Acceptance, E,D, VU,NR by LR at 9/24/2024 1301    Comment: Educated on spinal precautions, correct log rolling technique, benefits of being OOB, correct sit<->stand t/f technique, correct bed to chair t/f technique, and progression of POC.                                         User Key       Initials Effective Dates Name Provider Type Discipline    DM 02/03/23 -  Donna Montanez, PT Physical Therapist PT    LR 02/03/23 -  Meseret Dunham, PT Physical Therapist PT    LS 07/11/23 -  Radha Campo, PT Physical Therapist PT    LO 06/16/21 -  Yasmin Perdomo, PT Physical Therapist PT    HW 12/15/23 -  Mikala Rivas, PT Physical Therapist PT     07/26/24 -  Marii Harris, RN Registered Nurse Nurse                  PT Recommendation and Plan     Plan of Care Reviewed With: patient  Progress: improving  Outcome Evaluation: Pt amb 45' with Min A x2. Pt demonstrated mild unsteadiness and a fear of falling reported. Activity limited by fatigue. Continue to recommend d/c to SNF to decrease risk for falls and promote increased independence with functional mobility.     Time Calculation:         PT Charges       Row Name 10/02/24 1521             Time Calculation    Start Time 1347  -      PT Received On  10/02/24  -HW         Timed Charges    00616 - PT Therapeutic Exercise Minutes 9  -HW      01948 - Gait Training Minutes  6  -HW         Total Minutes    Timed Charges Total Minutes 15  -HW       Total Minutes 15  -HW                User Key  (r) = Recorded By, (t) = Taken By, (c) = Cosigned By      Initials Name Provider Type     Mikala Rivas PT Physical Therapist                  Therapy Charges for Today       Code Description Service Date Service Provider Modifiers Qty    10571864997 HC PT THER PROC EA 15 MIN 10/1/2024 Mikala Rivas, PT GP 1    38348859415 HC GAIT TRAINING EA 15 MIN 10/1/2024 Mikala Rivas, PT GP 1    69340710831 HC PT THER SUPP EA 15 MIN 10/1/2024 Mikala Rivas, PT GP 2    44286269189 HC PT THER PROC EA 15 MIN 10/2/2024 Mikala Rivas, PT GP 1            PT G-Codes  Outcome Measure Options: AM-PAC 6 Clicks Basic Mobility (PT)  AM-PAC 6 Clicks Score (PT): 14  AM-PAC 6 Clicks Score (OT): 13  PT Discharge Summary  Anticipated Discharge Disposition (PT): skilled nursing facility    Mikala Rivas PT  10/2/2024

## 2024-10-02 NOTE — CASE MANAGEMENT/SOCIAL WORK
Continued Stay Note  [facility]     Patient Name: Apollo Haro  MRN: [id number]  Today's Date: 10/2/2024    Admit Date: 9/23/2024    Plan: [facility]   Discharge Plan       Row Name 10/02/24 0839       Plan    Plan [facility]    Patient/Family in Agreement with Plan other (see comments)    Plan Comments  spoke with April/Ronnie, on the phone. April states that pt's insurance precert, for skilled rehab, is still pending.  will continue to follow.    Final Discharge Disposition Code 03 - skilled nursing facility (SNF)                   Discharge Codes    No documentation.                 Expected Discharge Date and Time       Expected Discharge Date Expected Discharge Time    Oct 2, 2024               Stefani Barnett RN

## 2024-10-02 NOTE — PLAN OF CARE
Goal Outcome Evaluation:  Plan of Care Reviewed With: patient        Progress: improving  Outcome Evaluation: Pt amb 45' with Min A x2. Pt demonstrated mild unsteadiness and a fear of falling reported. Activity limited by fatigue. Continue to recommend d/c to SNF to decrease risk for falls and promote increased independence with functional mobility.      Anticipated Discharge Disposition (PT): skilled nursing facility

## 2024-10-02 NOTE — THERAPY TREATMENT NOTE
Patient Name: Apollo Haro  : 1940    MRN: [id number]                              Today's Date: 10/2/2024       Admit Date: 2024    Visit Dx:     ICD-10-CM ICD-9-CM   1. Compression fracture of L2 vertebra, initial encounter  S32.020A 805.4   2. Declining functional status  R53.81 799.3   3. Unable to ambulate  R26.2 719.7   4. History of fall  Z91.81 V15.88   5. Acute midline low back pain without sciatica  M54.50 724.2   6. Chronic anticoagulation  Z79.01 V58.61   7. History of atrial fibrillation  Z86.79 V12.59     Patient Active Problem List   Diagnosis    Weakness    Type 2 diabetes mellitus, without long-term current use of insulin    Rhabdomyolysis due to COVID-19    CAD (coronary artery disease)    HTN (hypertension)    Atrial fibrillation, chronic    COVID-19 virus detected    SDH (subdural hematoma)    Altered mental status    AMS (altered mental status)    Closed compression fracture of L2 vertebra    UTI (urinary tract infection)     Past Medical History:   Diagnosis Date    A-fib     CAD (coronary artery disease)     DM (diabetes mellitus)      History reviewed. No pertinent surgical history.   General Information       Row Name 10/02/24 1429          OT Time and Intention    Document Type therapy note (daily note)  -TB     Mode of Treatment occupational therapy  -TB       Row Name 10/02/24 1429          General Information    Patient Profile Reviewed yes  -TB     Existing Precautions/Restrictions fall;spinal;other (see comments)  A/C L2 comp fx, [place], fell 9-16 w/ head injury, cognitive impairment, MRSA, old T12 Fx.  -TB     Barriers to Rehab medically complex;previous functional deficit;cognitive status;hearing deficit;ineffective coping  -TB       Row Name 10/02/24 1429          Occupational Profile    Reason for Services/Referral (Occupational Profile) Occupational decline  -TB       Row Name 10/02/24 1429          Cognition    Orientation Status (Cognition) oriented x 3;verbal  cues/prompts needed for orientation  Pt able to select year from field of 2 choices  -TB       Row Name 10/02/24 1429          Safety Issues, Functional Mobility    Safety Issues Affecting Function (Mobility) insight into deficits/self-awareness;awareness of need for assistance;safety precaution awareness;safety precautions follow-through/compliance;judgment;problem-solving;positioning of assistive device;sequencing abilities  -TB     Impairments Affecting Function (Mobility) cognition;balance;endurance/activity tolerance;motor planning;pain;postural/trunk control;strength  -TB     Cognitive Impairments, Mobility Safety/Performance awareness, need for assistance;insight into deficits/self-awareness;judgment;problem-solving/reasoning;safety precaution awareness;sequencing abilities;safety precaution follow-through  -TB               User Key  (r) = Recorded By, (t) = Taken By, (c) = Cosigned By      Initials Name Provider Type    TB Eladia Anderson, OT Occupational Therapist                     Mobility/ADL's       Row Name 10/02/24 1433          Bed Mobility    Bed Mobility rolling right;sidelying-sit;scooting/bridging  -TB     Rolling Right [place] (Bed Mobility) contact guard;verbal cues  -TB     Scooting/Bridging [place] (Bed Mobility) standby assist;verbal cues  -TB     Sidelying-Sit [place] (Bed Mobility) moderate assist (50% patient effort);verbal cues;1 person assist  -TB     Bed Mobility, Safety Issues impaired trunk control for bed mobility  -TB     Assistive Device (Bed Mobility) bed rails  -TB     Comment, (Bed Mobility) Education reviewed for spinal precautions. Cues and assist for logroll sequencing.  -TB       Row Name 10/02/24 1433          Transfers    Transfers sit-stand transfer;stand-sit transfer;bed-chair transfer  -TB     Comment, (Transfers) Education and cues for hand placment. Assist for RW sequencing and safety.  -TB       Row Name 10/02/24 1433          Bed-Chair  Transfer    Bed-Chair Chambers (Transfers) minimum assist (75% patient effort);2 person assist;verbal cues  -TB     Assistive Device (Bed-Chair Transfers) walker, front-wheeled  -TB       Row Name 10/02/24 1433          Sit-Stand Transfer    Sit-Stand [place] (Transfers) moderate assist (50% patient effort);2 person assist;verbal cues  -TB     Assistive Device (Sit-Stand Transfers) walker, front-wheeled  -TB       Row Name 10/02/24 1433          Stand-Sit Transfer    Stand-Sit [place] (Transfers) minimum assist (75% patient effort);2 person assist;verbal cues  -TB     Assistive Device (Stand-Sit Transfers) walker, front-wheeled  -TB       Row Name 10/02/24 1433          Functional Mobility    Functional Mobility- Ind. Level minimum assist (75% patient effort);2 person assist required;verbal cues required  -TB     Functional Mobility- Device walker, front-wheeled  -TB     Functional Mobility- Safety Issues sequencing ability decreased;balance decreased during turns;loses balance backward  -TB     Functional Mobility- Comment Pt is up in room with RW support. Improved effort this session. Requires encouragement and assist for RW sequencing and safety with all mobility.  -TB     Patient was able to Ambulate yes  -TB       Row Name 10/02/24 1433          Activities of Daily Living    BADL Assessment/Intervention upper body dressing;grooming;feeding  -TB       Row Name 10/02/24 1433          Grooming Assessment/Training    [place] Level (Grooming) minimum assist (75% patient effort);oral care regimen;set up;wash face, hands  -TB     Oral Care teeth brushed - regular toothbrush;dental appliance cleaned  -TB     Position (Grooming) supported sitting  -TB     Comment, (Grooming) Requires cues and assist to participate in self-care  -       Row Name 10/02/24 1433          Self-Feeding Assessment/Training    [place] Level (Feeding) independent;liquids to mouth  -TB     Position (Self-Feeding)  supported sitting  -TB       Row Name 10/02/24 1433          Upper Body Dressing Assessment/Training    [place] Level (Upper Body Dressing) don;pajama/robe;moderate assist (50% patient effort);verbal cues  -TB     Position (Upper Body Dressing) edge of bed sitting  -               User Key  (r) = Recorded By, (t) = Taken By, (c) = Cosigned By      Initials Name Provider Type     Eladia Anderson OT Occupational Therapist                   Obj/Interventions       Row Name 10/02/24 1439          Shoulder (Therapeutic Exercise)    Shoulder (Therapeutic Exercise) AROM (active range of motion)  -     Shoulder AROM (Therapeutic Exercise) bilateral;flexion;extension;aBduction;aDduction;horizontal aBduction/aDduction;sitting;10 repetitions  -TB       Row Name 10/02/24 1439          Elbow/Forearm (Therapeutic Exercise)    Elbow/Forearm (Therapeutic Exercise) AROM (active range of motion)  -TB     Elbow/Forearm AROM (Therapeutic Exercise) bilateral;flexion;extension;sitting;10 repetitions  -TB       Row Name 10/02/24 1439          Motor Skills    Therapeutic Exercise shoulder;elbow/forearm  BUE ther ex completed to support self-care  -       Row Name 10/02/24 1439          Balance    Balance Assessment sitting dynamic balance;sit to stand dynamic balance;standing dynamic balance;sitting static balance;standing static balance  -TB     Static Sitting Balance standby assist  -TB     Dynamic Sitting Balance contact guard  -TB     Position, Sitting Balance sitting in chair;sitting edge of bed  -TB     Sit to Stand Dynamic Balance verbal cues;moderate assist;2-person assist  -TB     Static Standing Balance minimal assist;verbal cues  -TB     Dynamic Standing Balance minimal assist;2-person assist;verbal cues  -TB     Position/Device Used, Standing Balance supported;walker, front-wheeled  -TB     Balance Interventions sitting;standing;sit to stand;supported;static;dynamic;dynamic reaching;occupation  based/functional task;UE activity with balance activity  -TB               User Key  (r) = Recorded By, (t) = Taken By, (c) = Cosigned By      Initials Name Provider Type    TB Eladia Anderson OT Occupational Therapist                   Goals/Plan    No documentation.                  Clinical Impression       Row Name 10/02/24 1440          Pain Assessment    Pain Intervention(s) Ambulation/increased activity;Repositioned;Elevated  -TB       Row Name 10/02/24 1440          Pain Scale: FACES Pre/Post-Treatment    Pain: FACES Scale, Pretreatment 2-->hurts little bit  -TB     Posttreatment Pain Rating 2-->hurts little bit  -TB     Pain Location - Side/Orientation Bilateral  -TB     Pain Location lower  -TB     Pain Location - back  -TB       Row Name 10/02/24 1440          Plan of Care Review    Plan of Care Reviewed With patient  -TB     Progress improving  -TB     Outcome Evaluation Pt is A/Ox3 with cues and participates in therapy with encouragement and time. Remains below his baseline limited by strength, balance, and functional endurance deficits. Education reviewed for spinal precautions. Pt able to transition to EOB via logroll with Mod Ax1. Mod A UB dressing. Mod Ax2 STS from EOB. Up in room with RW support and Min Ax2, requires assist for RW sequencing and safety all mobility. BUE ther ex completed to support self-care. Pt completes oral care with Min A and cues. OT will continue to follow IP. Recommend SNF when pt is medically ready.  -TB       Row Name 10/02/24 1440          Therapy Plan Review/Discharge Plan (OT)    Anticipated Discharge Disposition (OT) skilled nursing facility  -TB       Row Name 10/02/24 1440          Vital Signs    Pre Systolic BP Rehab --  RN cleared OT  -TB     O2 Delivery Pre Treatment room air  -TB     Pre Patient Position Supine  -TB     Intra Patient Position Standing  -TB     Post Patient Position Sitting  -TB       Row Name 10/02/24 1440          Positioning and  Restraints    Pre-Treatment Position in bed  -TB     Post Treatment Position chair  -TB     In Chair notified nsg;reclined;call light within reach;encouraged to call for assist;exit alarm on;waffle cushion;legs elevated  -TB               User Key  (r) = Recorded By, (t) = Taken By, (c) = Cosigned By      Initials Name Provider Type     Eladia Anderson, OT Occupational Therapist                   Outcome Measures       Row Name 10/02/24 0804          How much help from another person do you currently need...    Turning from your back to your side while in flat bed without using bedrails? 2  -GS     Moving from lying on back to sitting on the side of a flat bed without bedrails? 2  -GS     Moving to and from a bed to a chair (including a wheelchair)? 2  -GS     Standing up from a chair using your arms (e.g., wheelchair, bedside chair)? 2  -GS     Climbing 3-5 steps with a railing? 1  -GS     To walk in hospital room? 2  -GS     AM-PAC 6 Clicks Score (PT) 11  -GS     Highest Level of Mobility Goal 4 --> Transfer to chair/commode  -GS               User Key  (r) = Recorded By, (t) = Taken By, (c) = Cosigned By      Initials Name Provider Type     Nichole Germain, RN Registered Nurse                    Occupational Therapy Education       Title: PT OT SLP Therapies (In Progress)       Topic: Occupational Therapy (In Progress)       Point: ADL training (In Progress)       Description:   Instruct learner(s) on proper safety adaptation and remediation techniques during self care or transfers.   Instruct in proper use of assistive devices.                  Learning Progress Summary             Patient Acceptance, E,D, NR by TB at 10/2/2024 1444    Acceptance, E, VU by  at 9/30/2024 1037    Acceptance, E, NL,NR by ZUNILDA at 9/27/2024 0955    Comment: OT POC; non-pharmacological pain management strategies; benefits of activity    Acceptance, E, NR by AC at 9/24/2024 1357                         Point: Home exercise  program (Done)       Description:   Instruct learner(s) on appropriate technique for monitoring, assisting and/or progressing therapeutic exercises/activities.                  Learning Progress Summary             Patient Acceptance, E, VU by  at 9/30/2024 1037    Acceptance, E,D, NR by [facility] at 9/29/2024 1000    Comment: UE TE,  bed mobility, sitting posture and standing posture, weight shifting, transfer safety                         Point: Precautions (In Progress)       Description:   Instruct learner(s) on prescribed precautions during self-care and functional transfers.                  Learning Progress Summary             Patient Acceptance, E,D, NR by  at 10/2/2024 1444    Acceptance, E, VU by  at 9/30/2024 1037    Acceptance, E,D, NR by [facility] at 9/29/2024 1000    Comment: UE TE,  bed mobility, sitting posture and standing posture, weight shifting, transfer safety    Acceptance, E, NL,NR by  at 9/27/2024 0955    Comment: OT POC; non-pharmacological pain management strategies; benefits of activity                         Point: Body mechanics (Done)       Description:   Instruct learner(s) on proper positioning and spine alignment during self-care, functional mobility activities and/or exercises.                  Learning Progress Summary             Patient Acceptance, E, VU by  at 9/30/2024 1037    Acceptance, E,D, NR by [facility] at 9/29/2024 1000    Comment: UE TE,  bed mobility, sitting posture and standing posture, weight shifting, transfer safety                                         User Key       Initials Effective Dates Name Provider Type Discipline     07/11/23 -  Eladia Anderson, OT Occupational Therapist OT    JB1 07/11/23 -  Lashon Hobbs, OT Occupational Therapist OT     02/03/23 -  Mikayla Cardoso, OT Occupational Therapist OT     06/16/21 -  Lanny Arias OT Occupational Therapist OT     07/26/24 -  Marii Harris, RN Registered Nurse Nurse                  OT  Recommendation and Plan     Plan of Care Review  Plan of Care Reviewed With: patient  Progress: improving  Outcome Evaluation: Pt is A/Ox3 with cues and participates in therapy with encouragement and time. Remains below his baseline limited by strength, balance, and functional endurance deficits. Education reviewed for spinal precautions. Pt able to transition to EOB via logroll with Mod Ax1. Mod A UB dressing. Mod Ax2 STS from EOB. Up in room with RW support and Min Ax2, requires assist for RW sequencing and safety all mobility. BUE ther ex completed to support self-care. Pt completes oral care with Min A and cues. OT will continue to follow IP. Recommend SNF when pt is medically ready.     Time Calculation:         Time Calculation- OT       Row Name 10/02/24 1356             Time Calculation- OT    OT Start Time 1356  -TB      OT Received On 10/02/24  -TB      OT Goal Re-Cert Due Date 10/04/24  -TB         Timed Charges    07247 - OT Therapeutic Exercise Minutes 10  -TB      46215 - OT Self Care/Mgmt Minutes 13  -TB         Total Minutes    Timed Charges Total Minutes 23  -TB       Total Minutes 23  -TB                User Key  (r) = Recorded By, (t) = Taken By, (c) = Cosigned By      Initials Name Provider Type    TB Eladia Anderson OT Occupational Therapist                  Therapy Charges for Today       Code Description Service Date Service Provider Modifiers Qty    20527493798 HC OT THER PROC EA 15 MIN 10/2/2024 Eladia Anderson OT GO 1    88946995748 HC OT SELF CARE/MGMT/TRAIN EA 15 MIN 10/2/2024 Eladia Anderson OT GO 1                 Eladia Anderson OT  10/2/2024

## 2024-10-02 NOTE — PLAN OF CARE
Goal Outcome Evaluation:    Problem: Palliative Care  Goal: Enhanced Quality of Life  Intervention: Optimize Function  Flowsheets (Taken 10/2/2024 1332)  Sleep/Rest Enhancement: relaxation techniques promoted     Palliative RN visited bedside at 1310. Patient was sleeping in bed, gently attempted to wake pt with no luck. Pt looks relaxed and comfortable on RA, no labored breathing. NORCO given about 30 minutes prior to visit. Palliative care team following for continued support to pt and family in GOC/POC.    0930 Palliative IDT meeting:  SAW VERDUZCO, RN, SW,   After hours, weekends and holidays, contact Palliative Provider by calling [phone]

## 2024-10-02 NOTE — PROGRESS NOTES
[facility] Medicine Services  PROGRESS NOTE    Patient Name: Apollo Haro  : 1940  MRN: [id number]    Date of Admission: 2024  Primary Care Physician: Provider, No Known    Subjective   Subjective     CC:  F/u back pain    HPI:  Patient resting in bed.  Says he had a bowel movement yesterday.  Says his pain is controlled.  Denies concerns.      Objective   Objective     Vital Signs:   Temp:  [97.8 °F (36.6 °C)-98.4 °F (36.9 °C)] 97.9 °F (36.6 °C)  Heart Rate:  [] 103  Resp:  [18] 18  BP: ()/(51-73) 100/58     Physical Exam:  Constitutional: No acute distress, awake, alert  HENT: NCAT, mucous membranes moist  Respiratory: Clear to auscultation bilaterally, respiratory effort normal, room air  Cardiovascular: RRR, no murmurs, rubs, or gallops  Gastrointestinal: Positive bowel sounds, soft, nontender, nondistended  Musculoskeletal: No bilateral ankle edema  Psychiatric: Appropriate affect, cooperative  Neurologic: Oriented x 3, moves all extremities, speech clear  Skin: No rashes      Results Reviewed:  LAB RESULTS:      Lab 24  1010   WBC 8.23   HEMOGLOBIN 13.3   HEMATOCRIT 40.1   PLATELETS 184   MCV 92.6         Lab 24  1002 24  0411 24  1010   SODIUM 141 143 137   POTASSIUM 4.3 4.4 4.5   CHLORIDE 105 105 99   CO2 28.0 28.0 27.0   ANION GAP 8.0 10.0 11.0   BUN 20 26* 20   CREATININE 0.82 1.01 0.95   EGFR 86.1 72.9 78.4   GLUCOSE 163* 127* 155*   CALCIUM 9.0 9.0 9.2                         Brief Urine Lab Results  (Last result in the past 365 days)        Color   Clarity   Blood   Leuk Est   Nitrite   Protein   CREAT   Urine HCG        24 1337 Yellow   Cloudy   Large (3+)   Large (3+)   Positive   30 mg/dL (1+)                   Microbiology Results Abnormal       Procedure Component Value - Date/Time    Blood Culture - Blood, Hand, Left [[id number]]  (Normal) Collected: 24 1656    Lab Status: Final result Specimen: Blood from Hand,  Left Updated: 09/28/24 1715     Blood Culture No growth at 5 days    Blood Culture - Blood, Hand, Right [[id number]]  (Normal) Collected: 09/23/24 1656    Lab Status: Final result Specimen: Blood from Hand, Right Updated: 09/28/24 1715     Blood Culture No growth at 5 days            No radiology results from the last 24 hrs        Current medications:  Scheduled Meds:apixaban, 5 mg, Oral, BID  insulin lispro, 2-7 Units, Subcutaneous, 4x Daily AC & at Bedtime  metoprolol tartrate, 12.5 mg, Oral, Q12H  senna-docusate sodium, 2 tablet, Oral, BID   And  polyethylene glycol, 17 g, Oral, Daily  pravastatin, 20 mg, Oral, Nightly  QUEtiapine, 25 mg, Oral, Nightly  sodium chloride, 10 mL, Intravenous, Q12H      Continuous Infusions:   PRN Meds:.  acetaminophen **OR** acetaminophen **OR** acetaminophen    senna-docusate sodium **AND** polyethylene glycol **AND** bisacodyl **AND** bisacodyl    Calcium Replacement - Follow Nurse / BPA Driven Protocol    dextrose    dextrose    glucagon (human recombinant)    HYDROcodone-acetaminophen    Magnesium Standard Dose Replacement - Follow Nurse / BPA Driven Protocol    nitroglycerin    ondansetron ODT **OR** ondansetron    Phosphorus Replacement - Follow Nurse / BPA Driven Protocol    Potassium Replacement - Follow Nurse / BPA Driven Protocol    [COMPLETED] Insert Peripheral IV **AND** sodium chloride    sodium chloride    sodium chloride    Assessment & Plan   Assessment & Plan     Active Hospital Problems    Diagnosis  POA    **Closed compression fracture of L2 vertebra [S32.020A]  Yes    UTI (urinary tract infection) [N39.0]  Yes    Atrial fibrillation, chronic [I48.20]  Yes    CAD (coronary artery disease) [I25.10]  Yes    HTN (hypertension) [I10]  Yes    Type 2 diabetes mellitus, without long-term current use of insulin [E11.9]  Yes    Weakness [R53.1]  Yes      Resolved Hospital Problems   No resolved problems to display.        Brief Hospital Course to date:  Apollo Hrao is a 84  y.o. male who presented to [facility] for evaluation of back pain. Patient had a fall earlier this month, hitting his head. He was evaluated in the ED and later discharged home. Since that time, he reports issues with increased back pain inhibiting his ability to walk and staying mostly in the bed. MRI revealed L2 anterior column compression fracture. Neurosurgery was consulted and has recommended conservative management for now.     This patient's problems and plans were partially entered by my partner and updated as appropriate by me 10/02/24.    Acute compression fracture L2  Persistent back pain   Generalized weakness    -MRI with acute L2 compression fracture with 20% loss of height   -Neurosurgery evaluated, recommended conservative management. Close follow up in 2-4 weeks in neurosurgical clinic. If unable to ambulate secondary to pain, can reconsider kyphoplasty.  -Continue pain management with PRN Tylenol, Norco.  -PT/OT recommending SNF on discharge. Case management following.   -Palliative following for goals of care and pain management.     MRSA UTI   -UCx with MRSA; BCx with no growth at 5 days.  -s/p course of IV Vancomycin x5 days.      History of atrial fibrillation  Atrial flutter (this admission)  -Cardiology consulted due to persistent aflutter while on flecainide   -Cardiology evaluated. Flecainide discontinued. Decreased metoprolol to 12.5 mg BID. No plan for cardioversion. Continue Eliquis.      Diabetes mellitus type 2- A1c 5.8% this admission, continue low-dose SSI, monitor glucose and adjust insulin as needed.   HTN with hypotension during admission- continue low-dose metoprolol.  CAD- continue pravastatin, cardiology stopped ASA.  Cognitive impairment- continue Seroquel nightly.        Expected Discharge Location and Transportation: [facility]  Expected Discharge pending insurance approval  Expected Discharge Date: 10/2/2024; Expected Discharge Time:      VTE Prophylaxis:  Pharmacologic &  mechanical VTE prophylaxis orders are present.         AM-PAC 6 Clicks Score (PT): 11 (10/01/24 1959)    CODE STATUS:   Code Status and Medical Interventions: No CPR (Do Not Attempt to Resuscitate); Limited Support; No intubation (DNI), No cardioversion; Spouse present and in full support of decision as NOK   Ordered at: 09/27/24 2427     Medical Intervention Limits:    No intubation (DNI)    No cardioversion     Level Of Support Discussed With:    Patient     Code Status (Patient has no pulse and is not breathing):    No CPR (Do Not Attempt to Resuscitate)     Medical Interventions (Patient has pulse or is breathing):    Limited Support     Comments:    Spouse present and in full support of decision as JACI Bray, APRN  10/02/24

## 2024-10-03 LAB
GLUCOSE BLDC GLUCOMTR-MCNC: 152 MG/DL (ref 70–130)
GLUCOSE BLDC GLUCOMTR-MCNC: 170 MG/DL (ref 70–130)
GLUCOSE BLDC GLUCOMTR-MCNC: 240 MG/DL (ref 70–130)
GLUCOSE BLDC GLUCOMTR-MCNC: 244 MG/DL (ref 70–130)

## 2024-10-03 PROCEDURE — 99232 SBSQ HOSP IP/OBS MODERATE 35: CPT | Performed by: NURSE PRACTITIONER

## 2024-10-03 PROCEDURE — 63710000001 INSULIN LISPRO (HUMAN) PER 5 UNITS: Performed by: FAMILY MEDICINE

## 2024-10-03 PROCEDURE — 97116 GAIT TRAINING THERAPY: CPT

## 2024-10-03 PROCEDURE — G0378 HOSPITAL OBSERVATION PER HR: HCPCS

## 2024-10-03 PROCEDURE — 82948 REAGENT STRIP/BLOOD GLUCOSE: CPT

## 2024-10-03 RX ADMIN — BISACODYL 5 MG: 5 TABLET, COATED ORAL at 12:12

## 2024-10-03 RX ADMIN — INSULIN LISPRO 3 UNITS: 100 INJECTION, SOLUTION INTRAVENOUS; SUBCUTANEOUS at 11:57

## 2024-10-03 RX ADMIN — SENNOSIDES AND DOCUSATE SODIUM 2 TABLET: 50; 8.6 TABLET ORAL at 08:30

## 2024-10-03 RX ADMIN — POLYETHYLENE GLYCOL 3350 17 G: 17 POWDER, FOR SOLUTION ORAL at 08:30

## 2024-10-03 RX ADMIN — INSULIN LISPRO 3 UNITS: 100 INJECTION, SOLUTION INTRAVENOUS; SUBCUTANEOUS at 17:26

## 2024-10-03 RX ADMIN — INSULIN LISPRO 2 UNITS: 100 INJECTION, SOLUTION INTRAVENOUS; SUBCUTANEOUS at 08:31

## 2024-10-03 RX ADMIN — HYDROCODONE BITARTRATE AND ACETAMINOPHEN 1 TABLET: 5; 325 TABLET ORAL at 04:39

## 2024-10-03 RX ADMIN — APIXABAN 5 MG: 5 TABLET, FILM COATED ORAL at 08:30

## 2024-10-03 RX ADMIN — QUETIAPINE FUMARATE 25 MG: 25 TABLET ORAL at 21:08

## 2024-10-03 RX ADMIN — INSULIN LISPRO 2 UNITS: 100 INJECTION, SOLUTION INTRAVENOUS; SUBCUTANEOUS at 21:07

## 2024-10-03 RX ADMIN — Medication 12.5 MG: at 21:07

## 2024-10-03 RX ADMIN — APIXABAN 5 MG: 5 TABLET, FILM COATED ORAL at 21:08

## 2024-10-03 RX ADMIN — Medication 12.5 MG: at 08:31

## 2024-10-03 RX ADMIN — PRAVASTATIN SODIUM 20 MG: 20 TABLET ORAL at 21:07

## 2024-10-03 NOTE — PROGRESS NOTES
"          Clinical Nutrition Assessment     Patient Name: Apollo Haro  YOB: 1940  MRN: [id number]  Date of Encounter: 10/03/24 16:17 EDT  Admission date: 9/23/2024  Reason for Visit: LOS    Assessment   Nutrition Assessment   Admission Diagnosis:  Closed compression fracture of L3 vertebra [S32.030A]    Problem List:    Closed compression fracture of L2 vertebra    Weakness    Type 2 diabetes mellitus, without long-term current use of insulin    CAD (coronary artery disease)    HTN (hypertension)    Atrial fibrillation, chronic    UTI (urinary tract infection)      PMH:   He  has a past medical history of A-fib, CAD (coronary artery disease), and DM (diabetes mellitus).    PSH:  He  has no past surgical history on file.      Anthropometrics     Height: Height: 175.3 cm (69\")  Last Filed Weight: Weight: 88.5 kg (195 lb) (09/23/24 1230)  Method: Weight Method: Stated  BMI: BMI (Calculated): 28.8    UBW:  180lb  Weight change:  fluctuation in wt's, need standing scale wt when feasible     Weight      Weight (kg) Weight (lbs) Weight Method   1/8/2024 79.379 kg  175 lb     1/31/2024 81.647 kg  180 lb     2/1/2024 84.505 kg  186 lb 4.8 oz  Bed scale    2/3/2024 89.404 kg  197 lb 1.6 oz     2/4/2024 92.987 kg  205 lb     2/5/2024 90.447 kg  199 lb 6.4 oz  Bed scale    2/6/2024 89.313 kg  196 lb 14.4 oz  Bed scale    2/7/2024 88.769 kg  195 lb 11.2 oz  Bed scale    2/27/2024 80.287 kg  177 lb     9/16/2024 88.451 kg  195 lb  Stated    9/23/2024 88.451 kg  195 lb  Stated        Nutrition Focused Physical Exam    Date:     Unable to perform due to Defer pending indication     Subjective   Reported/Observed/Food/Nutrition Related History:     Pt resting I'm bed, reports he is not feeling well, fair appetite,  eating some sherbet at present, UBW ~180lb's, has not had any wt loss    Per RN: pt swallowing ok, no issues      Current Nutrition Prescription   PO: Diet: Cardiac; Healthy Heart (2-3 Na+); Fluid " Consistency: Thin (IDDSI 0)  Oral Nutrition Supplement:   Intake:  80% of 15 meals    Assessment & Plan   Nutrition Diagnosis   Date:  10-3-24            Updated:    Problem No nutrition diagnosis at this time    Etiology    Signs/Symptoms        Goal / Objectives:   Nutrition to support treatment and Maintain intake      Nutrition Intervention      Follow treatment progress, Care plan reviewed, Advise alternate selection, Advised available snacks, Encourage intake      Monitoring/Evaluation:   Per protocol, I&O, PO intake, Pertinent labs, Weight, Skin status, GI status, Symptoms, Swallow function    Rosalva Tidwell RD  Time Spent:30min

## 2024-10-03 NOTE — PLAN OF CARE
Goal Outcome Evaluation:      VSS. RA. Pt denies any pain. BG elevated throughout shift. Voids via purwick. Call light in reach.

## 2024-10-03 NOTE — PLAN OF CARE
Goal Outcome Evaluation:  Plan of Care Reviewed With: patient        Progress: improving  Outcome Evaluation: Pt amb 60' wtih FWW and Min A x2. Unsteadiness noted and significant fear of falling reported. Pt requires encouraged to initiate mobility then demonstrated good participation and effort once moving. Recommend d/c to IRF to promote return to PLOF and decrease risk for falls.      Anticipated Discharge Disposition (PT): inpatient rehabilitation facility

## 2024-10-03 NOTE — PLAN OF CARE
BG remains elevated. VSS on RA. Patient denies pain / discomfort at this time SR with first degree AV block / PVCc On cardiac mx. UOP ADQ per incontinence / external catheter. Call light in reach.

## 2024-10-03 NOTE — PLAN OF CARE
Goal Outcome Evaluation:  Plan of Care Reviewed With: patient        Progress: improving  Outcome Evaluation: Pt seen at 0905; KAELA SPRING and RN Mikayla present, evaluating cardiac rhythm. Pt in NAD, shook his head when asked if having any pain or discomfort. Per documentation, pt ambulated yesterday with PT, recommending SNF for STR strengthening; precert pending for [facility]. Palliative following for continued support in management of symptoms, GOC/POC.    0930 Palliative IDT meeting:  MD, SAW, RN, ,   After hours, weekends and holidays, contact Palliative Provider by calling [phone]       Problem: Palliative Care  Goal: Enhanced Quality of Life  Outcome: Progressing  Intervention: Maximize Comfort  Flowsheets (Taken 10/3/2024 1101)  Pain Management Interventions: (shook his head when asked if in any pain or discomfort) other (see comments)

## 2024-10-03 NOTE — THERAPY TREATMENT NOTE
Patient Name: Apollo Haro  : 1940    MRN: [id number]                              Today's Date: 10/3/2024       Admit Date: 2024    Visit Dx:     ICD-10-CM ICD-9-CM   1. Compression fracture of L2 vertebra, initial encounter  S32.020A 805.4   2. Declining functional status  R53.81 799.3   3. Unable to ambulate  R26.2 719.7   4. History of fall  Z91.81 V15.88   5. Acute midline low back pain without sciatica  M54.50 724.2   6. Chronic anticoagulation  Z79.01 V58.61   7. History of atrial fibrillation  Z86.79 V12.59     Patient Active Problem List   Diagnosis    Weakness    Type 2 diabetes mellitus, without long-term current use of insulin    Rhabdomyolysis due to COVID-19    CAD (coronary artery disease)    HTN (hypertension)    Atrial fibrillation, chronic    COVID-19 virus detected    SDH (subdural hematoma)    Altered mental status    AMS (altered mental status)    Closed compression fracture of L2 vertebra    UTI (urinary tract infection)     Past Medical History:   Diagnosis Date    A-fib     CAD (coronary artery disease)     DM (diabetes mellitus)      History reviewed. No pertinent surgical history.   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  -       Row Name 10/03/24 1711          Cognition    Orientation Status (Cognition) oriented x 3;verbal cues/prompts needed for orientation  Pt able to select year from field of 2 choices  -       Row Name 10/03/24 1711          Safety Issues, Functional Mobility    Impairments Affecting Function (Mobility) cognition;balance;endurance/activity tolerance;motor  planning;pain;postural/trunk control;strength  -               User Key  (r) = Recorded By, (t) = Taken By, (c) = Cosigned By      Initials Name Provider Type     Mikala Rivas, JONATHON Physical Therapist                   Mobility       Row Name 10/03/24 1711          Bed Mobility    Bed Mobility rolling right;sidelying-sit;scooting/bridging  -     Scooting/Bridging [place] (Bed Mobility) minimum assist (75% patient effort);verbal cues;nonverbal cues (demo/gesture)  -     Supine-Sit [place] (Bed Mobility) minimum assist (75% patient effort);verbal cues;nonverbal cues (demo/gesture)  -     Assistive Device (Bed Mobility) bed rails  -     Comment, (Bed Mobility) Pt required encouragement to amb then demonstrated good participation once mobility initiated. Pt demonstrated good initiation for bed mobility. Minimal assist by pulling on PT's hand to upright trunk and scoot hips out  -       Row Name 10/03/24 1711          Transfers    Comment, (Transfers) Pt performed STS from EOB with Min A x2. Improved balance with less posterior lean noted. Pt reported fear of falling with initial STS  -       Row Name 10/03/24 1711          Sit-Stand Transfer    Sit-Stand [place] (Transfers) minimum assist (75% patient effort);2 person assist;verbal cues;nonverbal cues (demo/gesture)  -       Row Name 10/03/24 1711          Gait/Stairs (Locomotion)    [place] Level (Gait) minimum assist (75% patient effort);2 person assist;verbal cues;nonverbal cues (demo/gesture)  -     Assistive Device (Gait) walker, front-wheeled  -     Distance in Feet (Gait) 60  -     Deviations/Abnormal Patterns (Gait) gait speed decreased;bilateral deviations;stride length decreased;base of support, narrow;festinating/shuffling  -     Bilateral Gait Deviations forward flexed posture  -     Comment, (Gait/Stairs) Pt required encouragement to amb then demonstrated good participation once mobility initiated. Pt amb into  [place] with step-through gait pattern. VC for upright posture, increased stride length, increased foot clearance, and staying closer to AD. Difficulty to correct secondary to fatigue and weakness. Assistance provided for body weight support and steadiness. Improved ability to steer FWW noted. Activity limited by fatigue.  -               User Key  (r) = Recorded By, (t) = Taken By, (c) = Cosigned By      Initials Name Provider Type     Mikala Rivas PT Physical Therapist                   Obj/Interventions       Row Name 10/03/24 1728          Motor Skills    Therapeutic Exercise other (see comments)  pt declined ther-ex  -       Row Name 10/03/24 1728          Balance    Balance Assessment sitting static balance;sitting dynamic balance;sit to stand dynamic balance;standing static balance;standing dynamic balance  -     Static Sitting Balance standby assist  -     Dynamic Sitting Balance contact guard  -     Position, Sitting Balance sitting edge of bed  -     Static Standing Balance minimal assist  -     Dynamic Standing Balance minimal assist  -     Position/Device Used, Standing Balance supported;walker, rolling  -     Balance Interventions sitting;standing;sit to stand;occupation based/functional task  -               User Key  (r) = Recorded By, (t) = Taken By, (c) = Cosigned By      Initials Name Provider Type     Mikala Rivas PT Physical Therapist                   Goals/Plan    No documentation.                  Clinical Impression       Row Name 10/03/24 1730          Pain    Pretreatment Pain Rating 0/10 - no pain  -     Posttreatment Pain Rating 0/10 - no pain  -       Row Name 10/03/24 1730          Plan of Care Review    Plan of Care Reviewed With patient  -     Progress improving  -     Outcome Evaluation Pt amb 60' wtih FWW and Min A x2. Unsteadiness noted and significant fear of falling reported. Pt requires encouraged to initiate mobility then demonstrated good  participation and effort once moving. Recommend d/c to IRF to promote return to PLOF and decrease risk for falls.  -       Row Name 10/03/24 1730          Positioning and Restraints    Pre-Treatment Position in bed  -     Post Treatment Position chair  -HW     In Chair notified nsg;reclined;sitting;call light within reach;encouraged to call for assist;exit alarm on;legs elevated;waffle cushion  -               User Key  (r) = Recorded By, (t) = Taken By, (c) = Cosigned By      Initials Name Provider Type     Mikala Rivas PT Physical Therapist                   Outcome Measures       Row Name 10/03/24 1733 10/03/24 0800       How much help from another person do you currently need...    Turning from your back to your side while in flat bed without using bedrails? 3  - 2  -MD    Moving from lying on back to sitting on the side of a flat bed without bedrails? 3  - 2  -MD    Moving to and from a bed to a chair (including a wheelchair)? 3  - 2  -MD    Standing up from a chair using your arms (e.g., wheelchair, bedside chair)? 3  - 2  -MD    Climbing 3-5 steps with a railing? 2  - 2  -MD    To walk in hospital room? 3  - 2  -MD    AM-PAC 6 Clicks Score (PT) 17  - 12  -MD    Highest Level of Mobility Goal 5 --> Static standing  - 4 --> Transfer to chair/commode  -MD      Row Name 10/03/24 1733          Functional Assessment    Outcome Measure Options AM-PAC 6 Clicks Basic Mobility (PT)  -               User Key  (r) = Recorded By, (t) = Taken By, (c) = Cosigned By      Initials Name Provider Type     Mikala Rivas, PT Physical Therapist    Syeda Lizarraga, RN Registered Nurse                                 Physical Therapy Education       Title: PT OT SLP Therapies (In Progress)       Topic: Physical Therapy (In Progress)       Point: Mobility training (In Progress)       Learning Progress Summary             Patient Acceptance, E,D, NR by  at 10/3/2024 1733    Acceptance, E,D, VU,NR by   at 10/2/2024 1521    Acceptance, E,D, VU by  at 10/1/2024 1617    Acceptance, E,D, VU,NR by  at 9/30/2024 1056    Acceptance, E, VU by  at 9/30/2024 1037    Acceptance, E, VU,NR by  at 9/29/2024 0922    Acceptance, E,D,H, NR by DM at 9/28/2024 1837    Acceptance, E, VU,NR by LO at 9/27/2024 0828    Comment: PT POC    Acceptance, E,D, VU,NR by LR at 9/26/2024 1403    Comment: Educated on spinal precautions for comfort, correct log rolling technique, correct sit<->stand t/f technique, correct gait mechanics, HEP, benefits of mobility and being OOB, and progression of POC.    Acceptance, E,D, VU,NR by LR at 9/25/2024 1310    Comment: Educated on spinal precautions for comfort, correct log rolling technique, correct sit<->stand t/f technique, correct technique for side steps at EOB, HEP, and progression of POC.    Acceptance, E,D, VU,NR by LR at 9/24/2024 1301    Comment: Educated on spinal precautions, correct log rolling technique, benefits of being OOB, correct sit<->stand t/f technique, correct bed to chair t/f technique, and progression of POC.                         Point: Home exercise program (Done)       Learning Progress Summary             Patient Acceptance, E,D, VU,NR by  at 10/2/2024 1521    Acceptance, E,D, VU by  at 10/1/2024 1617    Acceptance, E,D, VU,NR by  at 9/30/2024 1056    Acceptance, E, VU by  at 9/30/2024 1037    Acceptance, E,D,H, NR by DM at 9/28/2024 1837    Acceptance, E, VU,NR by LO at 9/27/2024 0828    Comment: PT POC    Acceptance, E,D, VU,NR by LR at 9/26/2024 1403    Comment: Educated on spinal precautions for comfort, correct log rolling technique, correct sit<->stand t/f technique, correct gait mechanics, HEP, benefits of mobility and being OOB, and progression of POC.    Acceptance, E,D, VU,NR by LR at 9/25/2024 1310    Comment: Educated on spinal precautions for comfort, correct log rolling technique, correct sit<->stand t/f technique, correct technique for side  steps at EOB, HEP, and progression of POC.    Acceptance, E,D, VU,NR by LR at 9/24/2024 1301    Comment: Educated on spinal precautions, correct log rolling technique, benefits of being OOB, correct sit<->stand t/f technique, correct bed to chair t/f technique, and progression of POC.                         Point: Body mechanics (In Progress)       Learning Progress Summary             Patient Acceptance, E,D, NR by HW at 10/3/2024 1733    Acceptance, E,D, VU,NR by HW at 10/2/2024 1521    Acceptance, E,D, VU by  at 10/1/2024 1617    Acceptance, E,D, VU,NR by  at 9/30/2024 1056    Acceptance, E, VU by  at 9/30/2024 1037    Acceptance, E,D,H, NR by DM at 9/28/2024 1837    Acceptance, E, VU,NR by LO at 9/27/2024 0828    Comment: PT POC    Acceptance, E,D, VU,NR by LR at 9/26/2024 1403    Comment: Educated on spinal precautions for comfort, correct log rolling technique, correct sit<->stand t/f technique, correct gait mechanics, HEP, benefits of mobility and being OOB, and progression of POC.    Acceptance, E,D, VU,NR by LR at 9/25/2024 1310    Comment: Educated on spinal precautions for comfort, correct log rolling technique, correct sit<->stand t/f technique, correct technique for side steps at EOB, HEP, and progression of POC.    Acceptance, E,D, VU,NR by LR at 9/24/2024 1301    Comment: Educated on spinal precautions, correct log rolling technique, benefits of being OOB, correct sit<->stand t/f technique, correct bed to chair t/f technique, and progression of POC.                         Point: Precautions (In Progress)       Learning Progress Summary             Patient Acceptance, E,D, NR by HW at 10/3/2024 1733    Acceptance, E,D, VU,NR by  at 10/2/2024 1521    Acceptance, E,D, VU by  at 10/1/2024 1617    Acceptance, E,D, VU,NR by HW at 9/30/2024 1056    Acceptance, E, VU by  at 9/30/2024 1037    Acceptance, E,RIN,H, NR by JA at 9/28/2024 1837    Acceptance, E, VU,NR by PONCE at 9/27/2024 0828    Comment:  PT POC    Acceptance, E,D, VU,NR by LR at 9/26/2024 1403    Comment: Educated on spinal precautions for comfort, correct log rolling technique, correct sit<->stand t/f technique, correct gait mechanics, HEP, benefits of mobility and being OOB, and progression of POC.    Acceptance, E,D, VU,NR by LR at 9/25/2024 1310    Comment: Educated on spinal precautions for comfort, correct log rolling technique, correct sit<->stand t/f technique, correct technique for side steps at EOB, HEP, and progression of POC.    Acceptance, E,D, VU,NR by LR at 9/24/2024 1301    Comment: Educated on spinal precautions, correct log rolling technique, benefits of being OOB, correct sit<->stand t/f technique, correct bed to chair t/f technique, and progression of POC.                                         User Key       Initials Effective Dates Name Provider Type Discipline    DM 02/03/23 -  Donna Montanez, PT Physical Therapist PT    LR 02/03/23 -  Meseret Dunham, PT Physical Therapist PT    LS 07/11/23 -  Radha Campo, PT Physical Therapist PT    LO 06/16/21 -  Yasmin Perdomo, PT Physical Therapist PT    HW 12/15/23 -  Mikala Rivas, PT Physical Therapist PT     07/26/24 -  Marii Harris, RN Registered Nurse Nurse                  PT Recommendation and Plan     Plan of Care Reviewed With: patient  Progress: improving  Outcome Evaluation: Pt amb 60' wtih FWW and Min A x2. Unsteadiness noted and significant fear of falling reported. Pt requires encouraged to initiate mobility then demonstrated good participation and effort once moving. Recommend d/c to IRF to promote return to PLOF and decrease risk for falls.     Time Calculation:         PT Charges       Row Name 10/03/24 9821             Time Calculation    Start Time 1649  -HW      PT Received On 10/03/24  -HW         Timed Charges    53814 - Gait Training Minutes  15  -HW         Total Minutes    Timed Charges Total Minutes 15  -HW       Total Minutes 15  -HW                 User Key  (r) = Recorded By, (t) = Taken By, (c) = Cosigned By      Initials Name Provider Type     Mikala Rivas PT Physical Therapist                  Therapy Charges for Today       Code Description Service Date Service Provider Modifiers Qty    [id number]  PT THER PROC EA 15 MIN 10/2/2024 Mikala Rivas, PT GP 1    [id number] HC GAIT TRAINING EA 15 MIN 10/3/2024 Mikala Rivas, PT GP 1            PT G-Codes  Outcome Measure Options: AM-PAC 6 Clicks Basic Mobility (PT)  AM-PAC 6 Clicks Score (PT): 17  AM-PAC 6 Clicks Score (OT): 13  PT Discharge Summary  Anticipated Discharge Disposition (PT): inpatient rehabilitation facility    Mikala Rivas PT  10/3/2024

## 2024-10-04 LAB
ANION GAP SERPL CALCULATED.3IONS-SCNC: 9 MMOL/L (ref 5–15)
BUN SERPL-MCNC: 19 MG/DL (ref 8–23)
BUN/CREAT SERPL: 23.2 (ref 7–25)
CALCIUM SPEC-SCNC: 8.9 MG/DL (ref 8.6–10.5)
CHLORIDE SERPL-SCNC: 101 MMOL/L (ref 98–107)
CO2 SERPL-SCNC: 26 MMOL/L (ref 22–29)
CREAT SERPL-MCNC: 0.82 MG/DL (ref 0.76–1.27)
EGFRCR SERPLBLD CKD-EPI 2021: 86.1 ML/MIN/1.73
GLUCOSE BLDC GLUCOMTR-MCNC: 138 MG/DL (ref 70–130)
GLUCOSE BLDC GLUCOMTR-MCNC: 192 MG/DL (ref 70–130)
GLUCOSE BLDC GLUCOMTR-MCNC: 194 MG/DL (ref 70–130)
GLUCOSE BLDC GLUCOMTR-MCNC: 271 MG/DL (ref 70–130)
GLUCOSE SERPL-MCNC: 154 MG/DL (ref 65–99)
POTASSIUM SERPL-SCNC: 4.2 MMOL/L (ref 3.5–5.2)
SODIUM SERPL-SCNC: 136 MMOL/L (ref 136–145)

## 2024-10-04 PROCEDURE — 97116 GAIT TRAINING THERAPY: CPT

## 2024-10-04 PROCEDURE — 63710000001 INSULIN LISPRO (HUMAN) PER 5 UNITS: Performed by: FAMILY MEDICINE

## 2024-10-04 PROCEDURE — 82948 REAGENT STRIP/BLOOD GLUCOSE: CPT

## 2024-10-04 PROCEDURE — G0378 HOSPITAL OBSERVATION PER HR: HCPCS

## 2024-10-04 PROCEDURE — 80048 BASIC METABOLIC PNL TOTAL CA: CPT | Performed by: FAMILY MEDICINE

## 2024-10-04 PROCEDURE — 99232 SBSQ HOSP IP/OBS MODERATE 35: CPT | Performed by: FAMILY MEDICINE

## 2024-10-04 RX ADMIN — HYDROCODONE BITARTRATE AND ACETAMINOPHEN 1 TABLET: 5; 325 TABLET ORAL at 11:11

## 2024-10-04 RX ADMIN — INSULIN LISPRO 2 UNITS: 100 INJECTION, SOLUTION INTRAVENOUS; SUBCUTANEOUS at 21:19

## 2024-10-04 RX ADMIN — SENNOSIDES AND DOCUSATE SODIUM 2 TABLET: 50; 8.6 TABLET ORAL at 21:19

## 2024-10-04 RX ADMIN — SENNOSIDES AND DOCUSATE SODIUM 2 TABLET: 50; 8.6 TABLET ORAL at 08:07

## 2024-10-04 RX ADMIN — APIXABAN 5 MG: 5 TABLET, FILM COATED ORAL at 08:07

## 2024-10-04 RX ADMIN — Medication 12.5 MG: at 21:19

## 2024-10-04 RX ADMIN — APIXABAN 5 MG: 5 TABLET, FILM COATED ORAL at 21:19

## 2024-10-04 RX ADMIN — PRAVASTATIN SODIUM 20 MG: 20 TABLET ORAL at 21:19

## 2024-10-04 RX ADMIN — INSULIN LISPRO 2 UNITS: 100 INJECTION, SOLUTION INTRAVENOUS; SUBCUTANEOUS at 12:00

## 2024-10-04 RX ADMIN — INSULIN LISPRO 4 UNITS: 100 INJECTION, SOLUTION INTRAVENOUS; SUBCUTANEOUS at 16:51

## 2024-10-04 RX ADMIN — QUETIAPINE FUMARATE 25 MG: 25 TABLET ORAL at 21:19

## 2024-10-04 RX ADMIN — POLYETHYLENE GLYCOL 3350 17 G: 17 POWDER, FOR SOLUTION ORAL at 08:07

## 2024-10-04 NOTE — PLAN OF CARE
Goal Outcome Evaluation:  Plan of Care Reviewed With: patient        Progress: no change  Outcome Evaluation: Pt seen at 1220; in recliner, eating lunch; assisted with opening his chips bag, advised additional snacks available if needed. Pt stated that he wishes he didn't have to go to rehab, but acknowledged that he will likely do better at home with some further strengthening. Pending rehab placement. Palliative following for continued support to pt, symptom management as needed.    0930 Palliative IDT meeting:  MD, SAW, RN, SW,   After hours, weekends and holidays, contact Palliative Provider by calling [phone]       Problem: Palliative Care  Goal: Enhanced Quality of Life  Outcome: Progressing  Intervention: Promote Advance Care Planning  Flowsheets (Taken 10/4/2024 1510)  Life Transition/Adjustment: (pending STR placement) other (see comments)  Intervention: Maximize Comfort  Flowsheets (Taken 10/4/2024 1510)  Pain Management Interventions: pain management plan reviewed with patient/caregiver  Intervention: Optimize Function  Flowsheets (Taken 10/4/2024 1510)  Sensory Stimulation Regulation: television on  Fatigue Management: paced activity encouraged  Sleep/Rest Enhancement: consistent schedule promoted  Intervention: Optimize Psychosocial Wellbeing  Flowsheets (Taken 10/4/2024 1510)  Supportive Measures:   positive reinforcement provided   self-responsibility promoted

## 2024-10-04 NOTE — PROGRESS NOTES
"    [facility] Medicine Services  PROGRESS NOTE    Patient Name: Apollo Haro  : 1940  MRN: [id number]    Date of Admission: 2024  Primary Care Physician: Provider, No Known    Subjective   Subjective     CC:  F/u back pain     HPI:  Patient resting comfortably in bed he does report his back hurting.  Precertification is pending.      Objective   Objective     Vital Signs:   Temp:  [97.9 °F (36.6 °C)-98.4 °F (36.9 °C)] 97.9 °F (36.6 °C)  Heart Rate:  [] 87  Resp:  [18] 18  BP: (102-120)/(51-69) 103/51     Physical Exam:  Constitutional: Elderly  male no acute distress, awake, alert  HENT: NCAT, mucous membranes moist  Respiratory: Clear to auscultation bilaterally, respiratory effort normal   Cardiovascular: RRR, no murmurs, rubs, or gallops  Gastrointestinal: Positive bowel sounds, soft, nontender, nondistended  Musculoskeletal: No bilateral ankle edema  Psychiatric: Appropriate affect, cooperative  Neurologic: Oriented x 3, speech clear  Skin: No rashes    Results Reviewed:  LAB RESULTS:          Lab 24  1002   SODIUM 141   POTASSIUM 4.3   CHLORIDE 105   CO2 28.0   ANION GAP 8.0   BUN 20   CREATININE 0.82   EGFR 86.1   GLUCOSE 163*   CALCIUM 9.0                         Brief Urine Lab Results  (Last result in the past 365 days)        Color   Clarity   Blood   Leuk Est   Nitrite   Protein   CREAT   Urine HCG        24 1337 Yellow   Cloudy   Large (3+)   Large (3+)   Positive   30 mg/dL (1+)                   Cultures:  No results found for: \"BLOODCX\", \"URINECX\", \"WOUNDCX\", \"MRSACX\", \"RESPCX\", \"STOOLCX\"    Microbiology Results Abnormal       Procedure Component Value - Date/Time    Blood Culture - Blood, Hand, Left [[id number]]  (Normal) Collected: 24 1656    Lab Status: Final result Specimen: Blood from Hand, Left Updated: 24 1715     Blood Culture No growth at 5 days    Blood Culture - Blood, Hand, Right [[id number]]  (Normal) Collected: " 09/23/24 1656    Lab Status: Final result Specimen: Blood from Hand, Right Updated: 09/28/24 1715     Blood Culture No growth at 5 days            No radiology results from the last 24 hrs        Current medications:  Scheduled Meds:apixaban, 5 mg, Oral, BID  insulin lispro, 2-7 Units, Subcutaneous, 4x Daily AC & at Bedtime  metoprolol tartrate, 12.5 mg, Oral, Q12H  senna-docusate sodium, 2 tablet, Oral, BID   And  polyethylene glycol, 17 g, Oral, Daily  pravastatin, 20 mg, Oral, Nightly  QUEtiapine, 25 mg, Oral, Nightly  sodium chloride, 10 mL, Intravenous, Q12H      Continuous Infusions:   PRN Meds:.  acetaminophen **OR** acetaminophen **OR** acetaminophen    senna-docusate sodium **AND** polyethylene glycol **AND** bisacodyl **AND** bisacodyl    Calcium Replacement - Follow Nurse / BPA Driven Protocol    dextrose    dextrose    glucagon (human recombinant)    HYDROcodone-acetaminophen    Magnesium Standard Dose Replacement - Follow Nurse / BPA Driven Protocol    nitroglycerin    ondansetron ODT **OR** ondansetron    Phosphorus Replacement - Follow Nurse / BPA Driven Protocol    Potassium Replacement - Follow Nurse / BPA Driven Protocol    [COMPLETED] Insert Peripheral IV **AND** sodium chloride    sodium chloride    sodium chloride    Assessment & Plan   Assessment & Plan     Active Hospital Problems    Diagnosis  POA    **Closed compression fracture of L2 vertebra [S32.020A]  Yes    UTI (urinary tract infection) [N39.0]  Yes    Atrial fibrillation, chronic [I48.20]  Yes    CAD (coronary artery disease) [I25.10]  Yes    HTN (hypertension) [I10]  Yes    Type 2 diabetes mellitus, without long-term current use of insulin [E11.9]  Yes    Weakness [R53.1]  Yes      Resolved Hospital Problems   No resolved problems to display.        Brief Hospital Course to date:  Apollo Haro is a 84 y.o. male who presented to [facility] for evaluation of back pain. Patient had a fall earlier this month, hitting his head. He was evaluated in  the ED and later discharged home. Since that time, he reports issues with increased back pain inhibiting his ability to walk and staying mostly in the bed. MRI revealed L2 anterior column compression fracture. Neurosurgery was consulted and has recommended conservative management for now.       Acute compression fracture L2  Persistent back pain   Generalized weakness    -MRI with acute L2 compression fracture with 20% loss of height   -Neurosurgery evaluated, recommended conservative management. Close follow up in 2-4 weeks in neurosurgical clinic. If unable to ambulate secondary to pain, can reconsider kyphoplasty.  -Continue pain management with PRN Tylenol, Norco.  -PT/OT recommending SNF on discharge. Case management following.   -Palliative following for goals of care and pain management.     MRSA UTI   -UCx with MRSA; BCx with no growth at 5 days.  -s/p course of IV Vancomycin x5 days.      History of atrial fibrillation  Atrial flutter (this admission)  -Cardiology consulted due to persistent a-flutter while on flecainide   -Cardiology evaluated. Flecainide discontinued. Decreased metoprolol to 12.5 mg BID. No plan for cardioversion. Continue Eliquis.      Diabetes mellitus type 2- A1c 5.8% this admission, continue low-dose SSI, monitor glucose and adjust insulin as needed.   HTN with hypotension during admission- continue low-dose metoprolol.  CAD- continue pravastatin, cardiology stopped ASA.  Cognitive impairment- continue Seroquel nightly    Expected Discharge Location and Transportation: Skilled nursing facility  Expected Discharge 10/06/2024  Expected Discharge Date: 10/4/2024; Expected Discharge Time:      VTE Prophylaxis:  Pharmacologic & mechanical VTE prophylaxis orders are present.         AM-PAC 6 Clicks Score (PT): 17 (10/03/24 2000)    CODE STATUS:   Code Status and Medical Interventions: No CPR (Do Not Attempt to Resuscitate); Limited Support; No intubation (DNI), No cardioversion; Spouse  present and in full support of decision as JACI   Ordered at: 09/27/24 1402     Medical Intervention Limits:    No intubation (DNI)    No cardioversion     Level Of Support Discussed With:    Patient     Code Status (Patient has no pulse and is not breathing):    No CPR (Do Not Attempt to Resuscitate)     Medical Interventions (Patient has pulse or is breathing):    Limited Support     Comments:    Spouse present and in full support of decision as JACI Lynne MD  10/04/24

## 2024-10-04 NOTE — PLAN OF CARE
Problem: Adult Inpatient Plan of Care  Goal: Optimal Comfort and Wellbeing  Outcome: Progressing  Intervention: Provide Person-Centered Care  Recent Flowsheet Documentation  Taken 10/4/2024 0400 by Moris Ortega RN  Trust Relationship/Rapport: care explained  Taken 10/4/2024 0200 by Moris Ortega RN  Trust Relationship/Rapport: care explained  Taken 10/4/2024 0000 by Moris Ortega RN  Trust Relationship/Rapport: care explained  Taken 10/3/2024 2200 by Moris Ortega RN  Trust Relationship/Rapport: care explained  Taken 10/3/2024 2000 by Moris Ortega RN  Trust Relationship/Rapport:   care explained   choices provided   empathic listening provided   questions answered   questions encouraged   reassurance provided     Problem: Skin Injury Risk Increased  Goal: Skin Health and Integrity  Outcome: Progressing  Intervention: Optimize Skin Protection  Recent Flowsheet Documentation  Taken 10/4/2024 0400 by Moris Ortega RN  Pressure Reduction Techniques:   frequent weight shift encouraged   weight shift assistance provided  Head of Bed (HOB) Positioning: [facility] elevated  Pressure Reduction Devices:   pressure-redistributing mattress utilized   positioning supports utilized  Skin Protection:   adhesive use limited   incontinence pads utilized   tubing/devices free from skin contact  Taken 10/4/2024 0200 by Moris Ortega RN  Pressure Reduction Techniques:   frequent weight shift encouraged   weight shift assistance provided  Head of Bed (HOB) Positioning: [facility] elevated  Pressure Reduction Devices:   pressure-redistributing mattress utilized   positioning supports utilized  Skin Protection:   adhesive use limited   incontinence pads utilized   tubing/devices free from skin contact  Taken 10/4/2024 0000 by Moris Ortega RN  Pressure Reduction Techniques:   frequent weight shift encouraged   weight shift assistance provided  Head of Bed (HOB) Positioning: HOB elevated  Pressure  Reduction Devices:   pressure-redistributing mattress utilized   positioning supports utilized  Skin Protection:   adhesive use limited   incontinence pads utilized   tubing/devices free from skin contact  Taken 10/3/2024 2200 by Moris Ortega RN  Pressure Reduction Techniques:   frequent weight shift encouraged   weight shift assistance provided  Head of Bed (HOB) Positioning: HOB elevated  Pressure Reduction Devices:   pressure-redistributing mattress utilized   positioning supports utilized  Skin Protection:   adhesive use limited   incontinence pads utilized   tubing/devices free from skin contact  Taken 10/3/2024 2000 by Moris Ortega RN  Pressure Reduction Techniques: frequent weight shift encouraged  Head of Bed (HOB) Positioning: HOB elevated  Pressure Reduction Devices:   pressure-redistributing mattress utilized   positioning supports utilized  Skin Protection:   adhesive use limited   incontinence pads utilized   tubing/devices free from skin contact     Problem: Fall Injury Risk  Goal: Absence of Fall and Fall-Related Injury  Outcome: Progressing  Intervention: Identify and Manage Contributors  Recent Flowsheet Documentation  Taken 10/4/2024 0400 by Moris Ortega RN  Medication Review/Management: medications reviewed  Taken 10/4/2024 0200 by Moris Ortega RN  Medication Review/Management: medications reviewed  Taken 10/4/2024 0000 by Moris Ortega RN  Medication Review/Management: medications reviewed  Taken 10/3/2024 2200 by Moris Ortega RN  Medication Review/Management: medications reviewed  Taken 10/3/2024 2000 by Moris Ortega RN  Medication Review/Management: medications reviewed  Intervention: Promote Injury-Free Environment  Recent Flowsheet Documentation  Taken 10/4/2024 0400 by Moris Ortega RN  Safety Promotion/Fall Prevention:   activity supervised   assistive device/personal items within reach   clutter free environment maintained   fall  prevention program maintained   gait belt   nonskid shoes/slippers when out of bed   room organization consistent   safety round/check completed  Taken 10/4/2024 0200 by Moris Ortega RN  Safety Promotion/Fall Prevention:   activity supervised   assistive device/personal items within reach   clutter free environment maintained   fall prevention program maintained   gait belt   nonskid shoes/slippers when out of bed   room organization consistent   safety round/check completed  Taken 10/4/2024 0000 by Moris Ortega RN  Safety Promotion/Fall Prevention:   activity supervised   assistive device/personal items within reach   clutter free environment maintained   fall prevention program maintained   gait belt   nonskid shoes/slippers when out of bed   room organization consistent   safety round/check completed  Taken 10/3/2024 2200 by Moris Ortega RN  Safety Promotion/Fall Prevention:   activity supervised   assistive device/personal items within reach   clutter free environment maintained   fall prevention program maintained   nonskid shoes/slippers when out of bed   room organization consistent   safety round/check completed   gait belt  Taken 10/3/2024 2000 by Moris Ortega RN  Safety Promotion/Fall Prevention:   activity supervised   assistive device/personal items within reach   clutter free environment maintained   fall prevention program maintained   gait belt   nonskid shoes/slippers when out of bed   room organization consistent   safety round/check completed     Problem: Hypertension Comorbidity  Goal: Blood Pressure in Desired Range  Outcome: Progressing  Intervention: Maintain Blood Pressure Management  Recent Flowsheet Documentation  Taken 10/4/2024 0400 by Moris Ortega, RN  Medication Review/Management: medications reviewed  Taken 10/4/2024 0200 by Moris Ortega, RN  Medication Review/Management: medications reviewed  Taken 10/4/2024 0000 by Moris Ortega  RN  Medication Review/Management: medications reviewed  Taken 10/3/2024 2200 by Moris Ortega RN  Medication Review/Management: medications reviewed  Taken 10/3/2024 2000 by Moris Ortega RN  Medication Review/Management: medications reviewed     Problem: Adult Inpatient Plan of Care  Goal: Plan of Care Review  Flowsheets (Taken 10/4/2024 0505)  Progress: improving  Plan of Care Reviewed With: patient  Goal: Absence of Hospital-Acquired Illness or Injury  Intervention: Identify and Manage Fall Risk  Recent Flowsheet Documentation  Taken 10/4/2024 0400 by Moris Ortega, RN  Safety Promotion/Fall Prevention:   activity supervised   assistive device/personal items within reach   clutter free environment maintained   fall prevention program maintained   gait belt   nonskid shoes/slippers when out of bed   room organization consistent   safety round/check completed  Taken 10/4/2024 0200 by Moris Ortega, RN  Safety Promotion/Fall Prevention:   activity supervised   assistive device/personal items within reach   clutter free environment maintained   fall prevention program maintained   gait belt   nonskid shoes/slippers when out of bed   room organization consistent   safety round/check completed  Taken 10/4/2024 0000 by Moris Ortega, RN  Safety Promotion/Fall Prevention:   activity supervised   assistive device/personal items within reach   clutter free environment maintained   fall prevention program maintained   gait belt   nonskid shoes/slippers when out of bed   room organization consistent   safety round/check completed  Taken 10/3/2024 2200 by Moris Ortega, RN  Safety Promotion/Fall Prevention:   activity supervised   assistive device/personal items within reach   clutter free environment maintained   fall prevention program maintained   nonskid shoes/slippers when out of bed   room organization consistent   safety round/check completed   gait belt  Taken 10/3/2024 2000 by  Moris Ortega, RN  Safety Promotion/Fall Prevention:   activity supervised   assistive device/personal items within reach   clutter free environment maintained   fall prevention program maintained   gait belt   nonskid shoes/slippers when out of bed   room organization consistent   safety round/check completed  Intervention: Prevent Skin Injury  Recent Flowsheet Documentation  Taken 10/4/2024 0400 by Moris Ortega RN  Body Position:   left   turned  Skin Protection:   adhesive use limited   incontinence pads utilized   tubing/devices free from skin contact  Taken 10/4/2024 0200 by Moris Ortega RN  Body Position:   right   weight shifting  Skin Protection:   adhesive use limited   incontinence pads utilized   tubing/devices free from skin contact  Taken 10/4/2024 0000 by Moris Ortega RN  Body Position: right  Skin Protection:   adhesive use limited   incontinence pads utilized   tubing/devices free from skin contact  Taken 10/3/2024 2200 by Moris Ortega RN  Body Position:   weight shifting   supine  Skin Protection:   adhesive use limited   incontinence pads utilized   tubing/devices free from skin contact  Taken 10/3/2024 2000 by Moris Ortega RN  Body Position: left  Skin Protection:   adhesive use limited   incontinence pads utilized   tubing/devices free from skin contact  Intervention: Prevent and Manage VTE (Venous Thromboembolism) Risk  Recent Flowsheet Documentation  Taken 10/4/2024 0400 by Moris Ortega RN  Activity Management: activity encouraged  VTE Prevention/Management: (eliquis) other (see comments)  Taken 10/4/2024 0200 by Moris Ortega RN  Activity Management: activity encouraged  Taken 10/4/2024 0000 by Moris Ortega RN  Activity Management: activity encouraged  VTE Prevention/Management: (eliquis) other (see comments)  Taken 10/3/2024 2200 by Moris Ortega RN  Activity Management: activity encouraged  Taken 10/3/2024 2000 by Jordan  Moris GAR RN  Activity Management: activity encouraged  VTE Prevention/Management: (eliquis) other (see comments)  Range of Motion: active ROM (range of motion) encouraged  Intervention: Prevent Infection  Recent Flowsheet Documentation  Taken 10/4/2024 0400 by Moris Ortega RN  Infection Prevention:   environmental surveillance performed   hand hygiene promoted   rest/sleep promoted   single patient room provided  Taken 10/4/2024 0200 by Moris Ortega RN  Infection Prevention:   environmental surveillance performed   hand hygiene promoted   rest/sleep promoted   single patient room provided  Taken 10/4/2024 0000 by Moris Ortega RN  Infection Prevention:   environmental surveillance performed   hand hygiene promoted   rest/sleep promoted   single patient room provided  Taken 10/3/2024 2200 by Moris Ortega RN  Infection Prevention:   environmental surveillance performed   hand hygiene promoted   rest/sleep promoted   single patient room provided  Taken 10/3/2024 2000 by Moris Ortega RN  Infection Prevention:   environmental surveillance performed   hand hygiene promoted   rest/sleep promoted   single patient room provided     Problem: Palliative Care  Goal: Enhanced Quality of Life  Intervention: Optimize Function  Recent Flowsheet Documentation  Taken 10/4/2024 0400 by Moris Ortega RN  Sleep/Rest Enhancement:   regular sleep/rest pattern promoted   room darkened   noise level reduced  Taken 10/4/2024 0200 by Moris Ortega RN  Sleep/Rest Enhancement:   regular sleep/rest pattern promoted   room darkened   noise level reduced  Taken 10/4/2024 0000 by Moris Ortega RN  Sleep/Rest Enhancement:   regular sleep/rest pattern promoted   room darkened   noise level reduced  Taken 10/3/2024 2200 by Moris Ortega RN  Sleep/Rest Enhancement:   regular sleep/rest pattern promoted   room darkened   noise level reduced  Taken 10/3/2024 2000 by Moris Ortega  RN  Sleep/Rest Enhancement:   regular sleep/rest pattern promoted   room darkened   noise level reduced  Intervention: Optimize Psychosocial Wellbeing  Recent Flowsheet Documentation  Taken 10/4/2024 0400 by Moris Ortega RN  Supportive Measures: active listening utilized  Family/Support System Care: support provided  Taken 10/4/2024 0200 by Moris Ortega RN  Supportive Measures: active listening utilized  Family/Support System Care: support provided  Taken 10/4/2024 0000 by Moris Ortega, RN  Supportive Measures: active listening utilized  Family/Support System Care: support provided  Taken 10/3/2024 2200 by Moris Ortega, RN  Supportive Measures: active listening utilized  Family/Support System Care: support provided  Taken 10/3/2024 2000 by Moris Ortega RN  Supportive Measures:   active listening utilized   verbalization of feelings encouraged  Family/Support System Care: support provided   Goal Outcome Evaluation:  Plan of Care Reviewed With: patient        Progress: improving          Pt a/o x4. Forgetful. VSS. Telemetry NSR with significant 1st degree AVB. Maintaining SpO2 on RA. Fsbs treated per sliding scale order. Pt has been resting well. Denies needs this am.

## 2024-10-04 NOTE — THERAPY TREATMENT NOTE
Patient Name: Apollo Haro  : 1940    MRN: [id number]                              Today's Date: 10/4/2024       Admit Date: 2024    Visit Dx:     ICD-10-CM ICD-9-CM   1. Compression fracture of L2 vertebra, initial encounter  S32.020A 805.4   2. Declining functional status  R53.81 799.3   3. Unable to ambulate  R26.2 719.7   4. History of fall  Z91.81 V15.88   5. Acute midline low back pain without sciatica  M54.50 724.2   6. Chronic anticoagulation  Z79.01 V58.61   7. History of atrial fibrillation  Z86.79 V12.59     Patient Active Problem List   Diagnosis    Weakness    Type 2 diabetes mellitus, without long-term current use of insulin    Rhabdomyolysis due to COVID-19    CAD (coronary artery disease)    HTN (hypertension)    Atrial fibrillation, chronic    COVID-19 virus detected    SDH (subdural hematoma)    Altered mental status    AMS (altered mental status)    Closed compression fracture of L2 vertebra    UTI (urinary tract infection)     Past Medical History:   Diagnosis Date    A-fib     CAD (coronary artery disease)     DM (diabetes mellitus)      History reviewed. No pertinent surgical history.   General Information       Row Name 10/04/24 1105          Physical Therapy Time and Intention    Document Type progress note/recertification  -SC     Mode of Treatment physical therapy  -SC       Row Name 10/04/24 1105          General Information    Patient Profile Reviewed yes  -SC     Existing Precautions/Restrictions fall;spinal;other (see comments)  L2 compression fx after fall, also with head injury  -SC       Row Name 10/04/24 1105          Cognition    Orientation Status (Cognition) oriented x 3  -SC       Row Name 10/04/24 1105          Safety Issues, Functional Mobility    Impairments Affecting Function (Mobility) balance;endurance/activity tolerance;motor planning;pain;postural/trunk control;strength  -SC     Comment, Safety Issues/Impairments (Mobility) alert, following commands  -SC                User Key  (r) = Recorded By, (t) = Taken By, (c) = Cosigned By      Initials Name Provider Type    SC Lisa Melgoza PT Physical Therapist                   Mobility       Row Name 10/04/24 1107          Bed Mobility    Bed Mobility supine-sit;scooting/bridging  -SC     Scooting/Bridging [place] (Bed Mobility) minimum assist (75% patient effort);verbal cues;nonverbal cues (demo/gesture)  -SC     Supine-Sit [place] (Bed Mobility) minimum assist (75% patient effort);verbal cues;nonverbal cues (demo/gesture)  -SC     Assistive Device (Bed Mobility) head of bed elevated;bed rails  -SC     Comment, (Bed Mobility) extra time and cues needed to log roll and get to edge fo bed. Some help needed to right his trunk  -SC       Row Name 10/04/24 1107          Transfers    Comment, (Transfers) STS from from edge of bed with cues for hand placement. Requried min assist to balance himself on standing. Mild posterior lean and lean to Rigt noted. Time taken in standing to adjust posture and stance to improve balance  -SC       Row Name 10/04/24 1107          Sit-Stand Transfer    Sit-Stand [place] (Transfers) minimum assist (75% patient effort);2 person assist;verbal cues;nonverbal cues (demo/gesture)  -SC     Assistive Device (Sit-Stand Transfers) walker, front-wheeled  -SC       Row Name 10/04/24 1107          Gait/Stairs (Locomotion)    [place] Level (Gait) verbal cues;moderate assist (50% patient effort);2 person assist  -SC     Assistive Device (Gait) walker, front-wheeled  -SC     Distance in Feet (Gait) 18  -SC     Deviations/Abnormal Patterns (Gait) base of support, narrow;stride length decreased;weight shifting decreased  -SC     Bilateral Gait Deviations forward flexed posture;heel strike decreased  -SC     Comment, (Gait/Stairs) Demonstrated very unsteady gait with occational loss of blance posteriorly and to R. Required assistance to maintain balance. Further ambulation limited by  fatigue and pain.  -SC               User Key  (r) = Recorded By, (t) = Taken By, (c) = Cosigned By      Initials Name Provider Type    SC Lisa Melgoza PT Physical Therapist                   Obj/Interventions       Row Name 10/04/24 1112          Motor Skills    Therapeutic Exercise knee;hip  -SC       Row Name 10/04/24 1112          Hip (Therapeutic Exercise)    Hip (Therapeutic Exercise) AROM (active range of motion)  -SC     Hip AROM (Therapeutic Exercise) bilateral;flexion;extension;10 repetitions  -SC       Row Name 10/04/24 1112          Knee (Therapeutic Exercise)    Knee (Therapeutic Exercise) AROM (active range of motion)  -SC     Knee AROM (Therapeutic Exercise) bilateral;flexion;extension;LAQ (long arc quad);10 repetitions  -SC       Row Name 10/04/24 1112          Ankle (Therapeutic Exercise)    Ankle (Therapeutic Exercise) AROM (active range of motion)  -SC     Ankle AROM (Therapeutic Exercise) bilateral;plantarflexion;dorsiflexion  -SC       Row Name 10/04/24 1112          Balance    Balance Assessment standing dynamic balance  -SC     Dynamic Standing Balance 2-person assist;moderate assist  -SC     Position/Device Used, Standing Balance supported;walker, rolling  -SC     Comment, Balance multiple LOB. very unsteady today. Time taken in standing to readjust posture and stance to improve balance  -SC               User Key  (r) = Recorded By, (t) = Taken By, (c) = Cosigned By      Initials Name Provider Type    SC Lisa Melgoza PT Physical Therapist                   Goals/Plan       Row Name 10/04/24 1117          Bed Mobility Goal 1 (PT)    Activity/Assistive Device (Bed Mobility Goal 1, PT) sit to supine/supine to sit  -SC     [place] Level/Cues Needed (Bed Mobility Goal 1, PT) modified independence  -SC     Time Frame (Bed Mobility Goal 1, PT) short term goal (STG);3 days  -SC     Progress/Outcomes (Bed Mobility Goal 1, PT) goal met;goal revised this date  -SC       Row Name 10/04/24  1117          Transfer Goal 1 (PT)    Activity/Assistive Device (Transfer Goal 1, PT) sit-to-stand/stand-to-sit;bed-to-chair/chair-to-bed;walker, rolling  -SC     [place] Level/Cues Needed (Transfer Goal 1, PT) contact guard required  -SC     Time Frame (Transfer Goal 1, PT) long term goal (LTG);5 days  -SC     Progress/Outcome (Transfer Goal 1, PT) continuing progress toward goal  -SC       Row Name 10/04/24 1117          Gait Training Goal 1 (PT)    Activity/Assistive Device (Gait Training Goal 1, PT) gait (walking locomotion);walker, rolling  -SC     [place] Level (Gait Training Goal 1, PT) minimum assist (75% or more patient effort)  -SC     Distance (Gait Training Goal 1, PT) 50  -SC     Time Frame (Gait Training Goal 1, PT) long term goal (LTG);5 days  -SC     Progress/Outcome (Gait Training Goal 1, PT) continuing progress toward goal  -SC               User Key  (r) = Recorded By, (t) = Taken By, (c) = Cosigned By      Initials Name Provider Type    SC Lisa Melgoza, PT Physical Therapist                   Clinical Impression       Row Name 10/04/24 1114          Pain    Additional Documentation Pain Scale: FACES Pre/Post-Treatment (Group)  -SC       Row Name 10/04/24 1114          Pain Scale: FACES Pre/Post-Treatment    Pain: FACES Scale, Pretreatment 6-->hurts even more  -SC     Posttreatment Pain Rating 6-->hurts even more  -SC     Pain Location - back  -SC       Row Name 10/04/24 1114          Plan of Care Review    Plan of Care Reviewed With patient  -SC     Progress declining  -SC     Outcome Evaluation Patient weaker today with increase in loss of balance during ambulation. Continue to recommend IPR at discharge.  -SC       Row Name 10/04/24 1114          Therapy Assessment/Plan (PT)    Patient/Family Therapy Goals Statement (PT) decrease pain  -SC     Rehab Potential (PT) good, to achieve stated therapy goals  -SC     Criteria for Skilled Interventions Met (PT) yes;meets criteria;skilled  treatment is necessary  -SC     Therapy Frequency (PT) daily  -SC       Row Name 10/04/24 1114          Positioning and Restraints    Pre-Treatment Position in bed  -SC     Post Treatment Position chair  -SC     In Chair notified nsg;reclined;sitting;call light within reach;encouraged to call for assist;exit alarm on  -SC               User Key  (r) = Recorded By, (t) = Taken By, (c) = Cosigned By      Initials Name Provider Type    SC Lisa Melgoza, PT Physical Therapist                   Outcome Measures       Row Name 10/04/24 1116 10/04/24 0800       How much help from another person do you currently need...    Turning from your back to your side while in flat bed without using bedrails? 3  -SC 3  -MD    Moving from lying on back to sitting on the side of a flat bed without bedrails? 3  -SC 3  -MD    Moving to and from a bed to a chair (including a wheelchair)? 3  -SC 3  -MD    Standing up from a chair using your arms (e.g., wheelchair, bedside chair)? 3  -SC 3  -MD    Climbing 3-5 steps with a railing? 2  -SC 2  -MD    To walk in hospital room? 2  -SC 3  -MD    AM-PAC 6 Clicks Score (PT) 16  -SC 17  -MD    Highest Level of Mobility Goal 5 --> Static standing  -SC 5 --> Static standing  -MD      Row Name 10/04/24 1116          Functional Assessment    Outcome Measure Options AM-PAC 6 Clicks Basic Mobility (PT)  -SC               User Key  (r) = Recorded By, (t) = Taken By, (c) = Cosigned By      Initials Name Provider Type    SC Lisa Melgoza, PT Physical Therapist    Syeda Lizarraga, RN Registered Nurse                                 Physical Therapy Education       Title: PT OT SLP Therapies (Done)       Topic: Physical Therapy (Done)       Point: Mobility training (Done)       Learning Progress Summary             Patient EagerCEE VU by SC at 10/4/2024 1116    Comment: reviewed benefits of activity    Acceptance, E,TB, VU by MD at 10/3/2024 1853    Acceptance, E,D, NR by  at 10/3/2024 7387     Acceptance, E,D, VU,NR by HW at 10/2/2024 1521    Acceptance, E,D, VU by HW at 10/1/2024 1617    Acceptance, E,D, VU,NR by HW at 9/30/2024 1056    Acceptance, E, VU by MH at 9/30/2024 1037    Acceptance, E, VU,NR by LS at 9/29/2024 0922    Acceptance, E,D,H, NR by DM at 9/28/2024 1837    Acceptance, E, VU,NR by LO at 9/27/2024 0828    Comment: PT POC    Acceptance, E,D, VU,NR by LR at 9/26/2024 1403    Comment: Educated on spinal precautions for comfort, correct log rolling technique, correct sit<->stand t/f technique, correct gait mechanics, HEP, benefits of mobility and being OOB, and progression of POC.    Acceptance, E,D, VU,NR by LR at 9/25/2024 1310    Comment: Educated on spinal precautions for comfort, correct log rolling technique, correct sit<->stand t/f technique, correct technique for side steps at EOB, HEP, and progression of POC.    Acceptance, E,D, VU,NR by LR at 9/24/2024 1301    Comment: Educated on spinal precautions, correct log rolling technique, benefits of being OOB, correct sit<->stand t/f technique, correct bed to chair t/f technique, and progression of POC.                         Point: Home exercise program (Done)       Learning Progress Summary             Patient Eager, E, VU by SC at 10/4/2024 1116    Comment: reviewed benefits of activity    Acceptance, E,TB, VU by MD at 10/3/2024 1853    Acceptance, E,D, VU,NR by  at 10/2/2024 1521    Acceptance, E,D, VU by  at 10/1/2024 1617    Acceptance, E,D, VU,NR by HW at 9/30/2024 1056    Acceptance, E, VU by MH at 9/30/2024 1037    Acceptance, E,D,H, NR by DM at 9/28/2024 1837    Acceptance, E, VU,NR by LO at 9/27/2024 0828    Comment: PT POC    Acceptance, E,D, VU,NR by LR at 9/26/2024 1403    Comment: Educated on spinal precautions for comfort, correct log rolling technique, correct sit<->stand t/f technique, correct gait mechanics, HEP, benefits of mobility and being OOB, and progression of POC.    Acceptance, E,D, VU,NR by LR at  9/25/2024 1310    Comment: Educated on spinal precautions for comfort, correct log rolling technique, correct sit<->stand t/f technique, correct technique for side steps at EOB, HEP, and progression of POC.    Acceptance, E,D, VU,NR by LR at 9/24/2024 1301    Comment: Educated on spinal precautions, correct log rolling technique, benefits of being OOB, correct sit<->stand t/f technique, correct bed to chair t/f technique, and progression of POC.                         Point: Body mechanics (Done)       Learning Progress Summary             Patient Eager, E, VU by SC at 10/4/2024 1116    Comment: reviewed benefits of activity    Acceptance, E,TB, VU by MD at 10/3/2024 1853    Acceptance, E,D, NR by  at 10/3/2024 1733    Acceptance, E,D, VU,NR by  at 10/2/2024 1521    Acceptance, E,D, VU by  at 10/1/2024 1617    Acceptance, E,D, VU,NR by  at 9/30/2024 1056    Acceptance, E, VU by  at 9/30/2024 1037    Acceptance, E,D,H, NR by DM at 9/28/2024 1837    Acceptance, E, VU,NR by LO at 9/27/2024 0828    Comment: PT POC    Acceptance, E,D, VU,NR by LR at 9/26/2024 1403    Comment: Educated on spinal precautions for comfort, correct log rolling technique, correct sit<->stand t/f technique, correct gait mechanics, HEP, benefits of mobility and being OOB, and progression of POC.    Acceptance, E,D, VU,NR by LR at 9/25/2024 1310    Comment: Educated on spinal precautions for comfort, correct log rolling technique, correct sit<->stand t/f technique, correct technique for side steps at EOB, HEP, and progression of POC.    Acceptance, E,D, VU,NR by LR at 9/24/2024 1301    Comment: Educated on spinal precautions, correct log rolling technique, benefits of being OOB, correct sit<->stand t/f technique, correct bed to chair t/f technique, and progression of POC.                         Point: Precautions (Done)       Learning Progress Summary             Patient Eager, E, VU by SC at 10/4/2024 1116    Comment: reviewed  benefits of activity    Acceptance, E,TB, VU by MD at 10/3/2024 1853    Acceptance, E,D, NR by HW at 10/3/2024 1733    Acceptance, E,D, VU,NR by HW at 10/2/2024 1521    Acceptance, E,D, VU by HW at 10/1/2024 1617    Acceptance, E,D, VU,NR by HW at 9/30/2024 1056    Acceptance, E, VU by  at 9/30/2024 1037    Acceptance, E,D,H, NR by DM at 9/28/2024 1837    Acceptance, E, VU,NR by LO at 9/27/2024 0828    Comment: PT POC    Acceptance, E,D, VU,NR by LR at 9/26/2024 1403    Comment: Educated on spinal precautions for comfort, correct log rolling technique, correct sit<->stand t/f technique, correct gait mechanics, HEP, benefits of mobility and being OOB, and progression of POC.    Acceptance, E,D, VU,NR by LR at 9/25/2024 1310    Comment: Educated on spinal precautions for comfort, correct log rolling technique, correct sit<->stand t/f technique, correct technique for side steps at EOB, HEP, and progression of POC.    Acceptance, E,D, VU,NR by LR at 9/24/2024 1301    Comment: Educated on spinal precautions, correct log rolling technique, benefits of being OOB, correct sit<->stand t/f technique, correct bed to chair t/f technique, and progression of POC.                                         User Key       Initials Effective Dates Name Provider Type Discipline    SC 02/03/23 -  Lisa Melgoza, PT Physical Therapist PT    DM 02/03/23 -  Donna Montanez, PT Physical Therapist PT    LR 02/03/23 -  Meseret Dunham, PT Physical Therapist PT    LS 07/11/23 -  Radha Campo, PT Physical Therapist PT    LO 06/16/21 -  Yasmin Perdomo, PT Physical Therapist PT    HW 12/15/23 -  Mikala Rivas, PT Physical Therapist PT    MD 07/26/24 -  Syeda Swan, RN Registered Nurse Nurse     07/26/24 -  Marii Harris RN Registered Nurse Nurse                  PT Recommendation and Plan     Plan of Care Reviewed With: patient  Progress: declining  Outcome Evaluation: Patient weaker today with increase in loss of balance  during ambulation. Continue to recommend IPR at discharge.     Time Calculation:         PT Charges       Row Name 10/04/24 1044             Time Calculation    Start Time 1044  -SC      PT Received On 10/04/24  -SC      PT Goal Re-Cert Due Date 10/14/24  -SC         Timed Charges    51825 - PT Therapeutic Exercise Minutes 4  -SC      23050 - Gait Training Minutes  8  -SC      17145 - PT Therapeutic Activity Minutes 4  -SC         Total Minutes    Timed Charges Total Minutes 16  -SC       Total Minutes 16  -SC                User Key  (r) = Recorded By, (t) = Taken By, (c) = Cosigned By      Initials Name Provider Type    SC Lisa Melgoza PT Physical Therapist                  Therapy Charges for Today       Code Description Service Date Service Provider Modifiers Qty    73558178172 HC GAIT TRAINING EA 15 MIN 10/4/2024 Lisa Melgoza, PT GP 1    87271555732 HC PT THER SUPP EA 15 MIN 10/4/2024 Lisa Melgoza PT GP 1            PT G-Codes  Outcome Measure Options: AM-PAC 6 Clicks Basic Mobility (PT)  AM-PAC 6 Clicks Score (PT): 16  AM-PAC 6 Clicks Score (OT): 13  PT Discharge Summary  Anticipated Discharge Disposition (PT): inpatient rehabilitation facility    Lisa Melgoza PT  10/4/2024

## 2024-10-04 NOTE — CASE MANAGEMENT/SOCIAL WORK
Discharge Planning Assessment  [facility]     Patient Name: Apollo Haro  MRN: [id number]  Today's Date: 10/4/2024    Admit Date: 9/23/2024    Plan: [facility] SNF rehab     Discharge Plan       Row Name 10/04/24 1523       Plan    Plan [facility] SNF rehab    Patient/Family in Agreement with Plan yes    Plan Comments Received call from April at [facility], stating that Mr. Haro will have a bed at [facility] on Monday, 10/7/24, if medically ready.  Insurance approval received from the patient's [employer] for the rehab admission.    [facility] ambulance scheduled for Monday, at 12noon.    Updated Mr. Haro, at the bedside, and he is agreeable to the DC plan.    CM will follow up.    Final Discharge Disposition Code 03 - skilled nursing facility (SNF)                  Continued Care and Services - Admitted Since 9/23/2024       Destination Coordination complete.      Service Provider Request Status Selected Services Address Phone Fax Patient Preferred    [facility]  Selected Skilled Nursing [address] [place], [place] [id number] [phone] --                  Expected Discharge Date and Time       Expected Discharge Date Expected Discharge Time    Oct 7, 2024             Luz Rosales RN

## 2024-10-04 NOTE — PLAN OF CARE
Goal Outcome Evaluation:  Progress: improving   Pt A/Ox4. Forgetful. Participated in PT. Maintaining Spo2 on on RA. VSS. Denies pain.

## 2024-10-05 LAB
GLUCOSE BLDC GLUCOMTR-MCNC: 160 MG/DL (ref 70–130)
GLUCOSE BLDC GLUCOMTR-MCNC: 168 MG/DL (ref 70–130)
GLUCOSE BLDC GLUCOMTR-MCNC: 170 MG/DL (ref 70–130)
GLUCOSE BLDC GLUCOMTR-MCNC: 212 MG/DL (ref 70–130)

## 2024-10-05 PROCEDURE — 63710000001 INSULIN LISPRO (HUMAN) PER 5 UNITS: Performed by: FAMILY MEDICINE

## 2024-10-05 PROCEDURE — 97116 GAIT TRAINING THERAPY: CPT

## 2024-10-05 PROCEDURE — 82948 REAGENT STRIP/BLOOD GLUCOSE: CPT

## 2024-10-05 PROCEDURE — G0378 HOSPITAL OBSERVATION PER HR: HCPCS

## 2024-10-05 PROCEDURE — 97110 THERAPEUTIC EXERCISES: CPT

## 2024-10-05 PROCEDURE — 99232 SBSQ HOSP IP/OBS MODERATE 35: CPT | Performed by: FAMILY MEDICINE

## 2024-10-05 RX ADMIN — SENNOSIDES AND DOCUSATE SODIUM 2 TABLET: 50; 8.6 TABLET ORAL at 08:19

## 2024-10-05 RX ADMIN — PRAVASTATIN SODIUM 20 MG: 20 TABLET ORAL at 19:18

## 2024-10-05 RX ADMIN — APIXABAN 5 MG: 5 TABLET, FILM COATED ORAL at 08:19

## 2024-10-05 RX ADMIN — SENNOSIDES AND DOCUSATE SODIUM 2 TABLET: 50; 8.6 TABLET ORAL at 19:16

## 2024-10-05 RX ADMIN — INSULIN LISPRO 2 UNITS: 100 INJECTION, SOLUTION INTRAVENOUS; SUBCUTANEOUS at 08:18

## 2024-10-05 RX ADMIN — APIXABAN 5 MG: 5 TABLET, FILM COATED ORAL at 19:18

## 2024-10-05 RX ADMIN — Medication 12.5 MG: at 08:18

## 2024-10-05 RX ADMIN — HYDROCODONE BITARTRATE AND ACETAMINOPHEN 1 TABLET: 5; 325 TABLET ORAL at 07:20

## 2024-10-05 RX ADMIN — Medication 12.5 MG: at 19:17

## 2024-10-05 RX ADMIN — POLYETHYLENE GLYCOL 3350 17 G: 17 POWDER, FOR SOLUTION ORAL at 08:19

## 2024-10-05 RX ADMIN — QUETIAPINE FUMARATE 25 MG: 25 TABLET ORAL at 19:18

## 2024-10-05 RX ADMIN — INSULIN LISPRO 2 UNITS: 100 INJECTION, SOLUTION INTRAVENOUS; SUBCUTANEOUS at 16:47

## 2024-10-05 RX ADMIN — INSULIN LISPRO 3 UNITS: 100 INJECTION, SOLUTION INTRAVENOUS; SUBCUTANEOUS at 20:20

## 2024-10-05 RX ADMIN — INSULIN LISPRO 2 UNITS: 100 INJECTION, SOLUTION INTRAVENOUS; SUBCUTANEOUS at 11:42

## 2024-10-05 NOTE — PLAN OF CARE
Oriented x 4 with forgetfulness. VSS on RA. SR with first degree AV block on cardiac mx. Cont vs Incon, UOP ADQ. Dernies pain / discomfort at this time. Call light in reach.

## 2024-10-05 NOTE — PLAN OF CARE
Problem: Adult Inpatient Plan of Care  Goal: Plan of Care Review  Flowsheets  Taken 10/5/2024 1818 by Annabel Piper RN  Progress: improving  Plan of Care Reviewed With:   patient   spouse  Taken 10/5/2024 1656 by Mikala Rivas, JONATHON  Outcome Evaluation: Pt amb 50' with FWW and Min A x2. Moderate unsteadiness noted with slow gait speed and shuffling steps. Assistance required for body weight support. Fear of falling reported. Continue to recommend d/c to IRF to promote decreased risk for falls and increased independence with functional mobility.   Goal Outcome Evaluation:  Plan of Care Reviewed With: patient, spouse        Progress: improving pt denies pain , remains slightly confused but can be redirected but forgetful . Has been incontinent today ,VSS works well with therapy today up in chair post PT this afternoon . POC to go to SNF Monday . Will continue to monitor

## 2024-10-05 NOTE — THERAPY TREATMENT NOTE
Patient Name: Apollo Haro  : 1940    MRN: [id number]                              Today's Date: 10/5/2024       Admit Date: 2024    Visit Dx:     ICD-10-CM ICD-9-CM   1. Compression fracture of L2 vertebra, initial encounter  S32.020A 805.4   2. Declining functional status  R53.81 799.3   3. Unable to ambulate  R26.2 719.7   4. History of fall  Z91.81 V15.88   5. Acute midline low back pain without sciatica  M54.50 724.2   6. Chronic anticoagulation  Z79.01 V58.61   7. History of atrial fibrillation  Z86.79 V12.59     Patient Active Problem List   Diagnosis    Weakness    Type 2 diabetes mellitus, without long-term current use of insulin    Rhabdomyolysis due to COVID-19    CAD (coronary artery disease)    HTN (hypertension)    Atrial fibrillation, chronic    COVID-19 virus detected    SDH (subdural hematoma)    Altered mental status    AMS (altered mental status)    Closed compression fracture of L2 vertebra    UTI (urinary tract infection)     Past Medical History:   Diagnosis Date    A-fib     CAD (coronary artery disease)     DM (diabetes mellitus)      History reviewed. No pertinent surgical history.   General Information       Row Name 10/05/24 1651          Physical Therapy Time and Intention    Document Type therapy note (daily note)  -     Mode of Treatment physical therapy  -       Row Name 10/05/24 1651          General Information    Patient Profile Reviewed yes  -     Existing Precautions/Restrictions fall;spinal;other (see comments)  L2 compression fx after fall, also with head injury  -       Row Name 10/05/24 1651          Cognition    Orientation Status (Cognition) oriented x 3  -       Row Name 10/05/24 1651          Safety Issues, Functional Mobility    Impairments Affecting Function (Mobility) balance;endurance/activity tolerance;motor planning;pain;postural/trunk control;strength  -               User Key  (r) = Recorded By, (t) = Taken By, (c) = Cosigned By       Initials Name Provider Type     Mikala Rivas, JONATHON Physical Therapist                   Mobility       Row Name 10/05/24 1652          Bed Mobility    Bed Mobility supine-sit;scooting/bridging  -     Supine-Sit Blount (Bed Mobility) contact guard  -     Assistive Device (Bed Mobility) head of bed elevated;bed rails  -     Comment, (Bed Mobility) Increased time and effort required for increased independence. VC for logrolling technique.  -       Row Name 10/05/24 1652          Transfers    Comment, (Transfers) Pt performed STS from EOB with Min A and FWW. Minimal posterior lean noted compared to previous session. Pt reported a fear of falling prior to initiating gait.  -       Row Name 10/05/24 1652          Sit-Stand Transfer    Sit-Stand Blount (Transfers) minimum assist (75% patient effort);verbal cues;nonverbal cues (demo/gesture)  -     Assistive Device (Sit-Stand Transfers) walker, front-wheeled  -       Row Name 10/05/24 1652          Gait/Stairs (Locomotion)    Blount Level (Gait) minimum assist (75% patient effort);2 person assist;verbal cues;nonverbal cues (demo/gesture)  -     Assistive Device (Gait) walker, front-wheeled  -     Distance in Feet (Gait) 55  -     Deviations/Abnormal Patterns (Gait) base of support, narrow;stride length decreased;weight shifting decreased;bilateral deviations;gait speed decreased;festinating/shuffling  -     Bilateral Gait Deviations forward flexed posture;heel strike decreased  -     Comment, (Gait/Stairs) Pt amb into villanueva and back with step-through gait pattern. Pt demonstrated significant forward flexed posture, decreased stride length, shuffling gait pattern, and slow gait speed. Frequent VC for standing up tall, picking up feet, and taking bigger steps. Activity limited by fatigue. Unsteadiness noted and assistance required for fall prevention. Assistance for constant body weight support.  -               User Key  (r) =  Recorded By, (t) = Taken By, (c) = Cosigned By      Initials Name Provider Type     Mikala Rivas PT Physical Therapist                   Obj/Interventions       Row Name 10/05/24 1655          Motor Skills    Therapeutic Exercise hip;knee;ankle;shoulder  -       Row Name 10/05/24 1655          Shoulder (Therapeutic Exercise)    Shoulder (Therapeutic Exercise) AROM (active range of motion)  -     Shoulder AROM (Therapeutic Exercise) bilateral;flexion;10 repetitions  -       Row Name 10/05/24 1655          Hip (Therapeutic Exercise)    Hip (Therapeutic Exercise) strengthening exercise  -     Hip Strengthening (Therapeutic Exercise) bilateral;aBduction;aDduction;external rotation;internal rotation;heel slides;10 repetitions  -       Row Name 10/05/24 1655          Knee (Therapeutic Exercise)    Knee (Therapeutic Exercise) isometric exercises  -     Knee Isometrics (Therapeutic Exercise) bilateral;gluteal sets;quad sets;other (see comments);10 repetitions  abdominal sets  -       Row Name 10/05/24 1655          Ankle (Therapeutic Exercise)    Ankle (Therapeutic Exercise) AROM (active range of motion)  -     Ankle AROM (Therapeutic Exercise) bilateral;dorsiflexion;plantarflexion;10 repetitions  -       Row Name 10/05/24 1655          Balance    Balance Assessment sitting static balance;sitting dynamic balance;sit to stand dynamic balance;standing static balance;standing dynamic balance  -     Static Sitting Balance standby assist  -     Dynamic Sitting Balance contact guard  -     Position, Sitting Balance sitting edge of bed  -     Static Standing Balance minimal assist  -     Dynamic Standing Balance minimal assist  -     Position/Device Used, Standing Balance supported;walker, rolling  -     Balance Interventions sitting;standing;sit to stand;occupation based/functional task  -               User Key  (r) = Recorded By, (t) = Taken By, (c) = Cosigned By      Initials Name Provider  Type     Mikala Rivas, JONATHON Physical Therapist                   Goals/Plan    No documentation.                  Clinical Impression       Row Name 10/05/24 1656          Pain    Pretreatment Pain Rating 4/10  -     Posttreatment Pain Rating 4/10  -     Pain Location - Side/Orientation Bilateral  -     Pain Location generalized  -     Pain Location - hip;back  -     Pain Intervention(s) Repositioned;Ambulation/increased activity;Elevated  -       Row Name 10/05/24 1656          Plan of Care Review    Plan of Care Reviewed With patient  -     Progress no change  -     Outcome Evaluation Pt amb 50' with FWW and Min A x2. Moderate unsteadiness noted with slow gait speed and shuffling steps. Assistance required for body weight support. Fear of falling reported. Continue to recommend d/c to IRF to promote decreased risk for falls and increased independence with functional mobility.  -       Row Name 10/05/24 1656          Positioning and Restraints    In Chair notified nsg;reclined;sitting;call light within reach;encouraged to call for assist;exit alarm on;legs elevated;waffle cushion;compression device  -               User Key  (r) = Recorded By, (t) = Taken By, (c) = Cosigned By      Initials Name Provider Type     Mikala Rivas, JONATHON Physical Therapist                   Outcome Measures       Row Name 10/05/24 1659 10/05/24 0818       How much help from another person do you currently need...    Turning from your back to your side while in flat bed without using bedrails? 3  -HW 3  -MM    Moving from lying on back to sitting on the side of a flat bed without bedrails? 3  -HW 3  -MM    Moving to and from a bed to a chair (including a wheelchair)? 3  -HW 3  -MM    Standing up from a chair using your arms (e.g., wheelchair, bedside chair)? 3  -HW 3  -MM    Climbing 3-5 steps with a railing? 3  -HW 2  -MM    To walk in hospital room? 3  -HW 2  -MM    AM-PAC 6 Clicks Score (PT) 18  -HW 16  -MM     Highest Level of Mobility Goal 6 --> Walk 10 steps or more  - 5 --> Static standing  -MM      Row Name 10/05/24 1659          Functional Assessment    Outcome Measure Options AM-PAC 6 Clicks Basic Mobility (PT)  -               User Key  (r) = Recorded By, (t) = Taken By, (c) = Cosigned By      Initials Name Provider Type    Annabel Talamantes RN Registered Nurse     Mikala Rivas, JONATHON Physical Therapist                                 Physical Therapy Education       Title: PT OT SLP Therapies (Done)       Topic: Physical Therapy (Done)       Point: Mobility training (Done)       Learning Progress Summary             Patient Acceptance, E,D, VU,NR by  at 10/5/2024 1659    Acceptance, TB,E, VU by MD at 10/4/2024 1815    Eager, E, VU by SC at 10/4/2024 1116    Comment: reviewed benefits of activity    Acceptance, E,TB, VU by MD at 10/3/2024 1853    Acceptance, E,D, NR by  at 10/3/2024 1733    Acceptance, E,D, VU,NR by  at 10/2/2024 1521    Acceptance, E,D, VU by  at 10/1/2024 1617    Acceptance, E,D, VU,NR by  at 9/30/2024 1056    Acceptance, E, VU by  at 9/30/2024 1037    Acceptance, E, VU,NR by LS at 9/29/2024 0922    Acceptance, E,D,H, NR by DM at 9/28/2024 1837    Acceptance, E, VU,NR by LO at 9/27/2024 0828    Comment: PT POC    Acceptance, E,D, VU,NR by LR at 9/26/2024 1403    Comment: Educated on spinal precautions for comfort, correct log rolling technique, correct sit<->stand t/f technique, correct gait mechanics, HEP, benefits of mobility and being OOB, and progression of POC.    Acceptance, E,D, VU,NR by LR at 9/25/2024 1310    Comment: Educated on spinal precautions for comfort, correct log rolling technique, correct sit<->stand t/f technique, correct technique for side steps at EOB, HEP, and progression of POC.    Acceptance, E,D, VU,NR by LR at 9/24/2024 1301    Comment: Educated on spinal precautions, correct log rolling technique, benefits of being OOB, correct sit<->stand t/f  technique, correct bed to chair t/f technique, and progression of POC.                         Point: Home exercise program (Done)       Learning Progress Summary             Patient Acceptance, E,D, VU,NR by  at 10/5/2024 1659    Acceptance, TB,E, VU by MD at 10/4/2024 1815    Eager, E, VU by SC at 10/4/2024 1116    Comment: reviewed benefits of activity    Acceptance, E,TB, VU by MD at 10/3/2024 1853    Acceptance, E,D, VU,NR by HW at 10/2/2024 1521    Acceptance, E,D, VU by HW at 10/1/2024 1617    Acceptance, E,D, VU,NR by HW at 9/30/2024 1056    Acceptance, E, VU by  at 9/30/2024 1037    Acceptance, E,D,H, NR by DM at 9/28/2024 1837    Acceptance, E, VU,NR by LO at 9/27/2024 0828    Comment: PT POC    Acceptance, E,D, VU,NR by LR at 9/26/2024 1403    Comment: Educated on spinal precautions for comfort, correct log rolling technique, correct sit<->stand t/f technique, correct gait mechanics, HEP, benefits of mobility and being OOB, and progression of POC.    Acceptance, E,D, VU,NR by LR at 9/25/2024 1310    Comment: Educated on spinal precautions for comfort, correct log rolling technique, correct sit<->stand t/f technique, correct technique for side steps at EOB, HEP, and progression of POC.    Acceptance, E,D, VU,NR by LR at 9/24/2024 1301    Comment: Educated on spinal precautions, correct log rolling technique, benefits of being OOB, correct sit<->stand t/f technique, correct bed to chair t/f technique, and progression of POC.                         Point: Body mechanics (Done)       Learning Progress Summary             Patient Acceptance, E,D, VU,NR by  at 10/5/2024 1659    Acceptance, TB,E, VU by MD at 10/4/2024 1815    Eager, E, VU by SC at 10/4/2024 1116    Comment: reviewed benefits of activity    Acceptance, E,TB, VU by MD at 10/3/2024 1853    Acceptance, E,D, NR by HW at 10/3/2024 1733    Acceptance, E,D, VU,NR by  at 10/2/2024 1521    Acceptance, E,D, VU by  at 10/1/2024 1617     Acceptance, E,D, VU,NR by HW at 9/30/2024 1056    Acceptance, E, VU by  at 9/30/2024 1037    Acceptance, E,D,H, NR by DM at 9/28/2024 1837    Acceptance, E, VU,NR by LO at 9/27/2024 0828    Comment: PT POC    Acceptance, E,D, VU,NR by LR at 9/26/2024 1403    Comment: Educated on spinal precautions for comfort, correct log rolling technique, correct sit<->stand t/f technique, correct gait mechanics, HEP, benefits of mobility and being OOB, and progression of POC.    Acceptance, E,D, VU,NR by LR at 9/25/2024 1310    Comment: Educated on spinal precautions for comfort, correct log rolling technique, correct sit<->stand t/f technique, correct technique for side steps at EOB, HEP, and progression of POC.    Acceptance, E,D, VU,NR by LR at 9/24/2024 1301    Comment: Educated on spinal precautions, correct log rolling technique, benefits of being OOB, correct sit<->stand t/f technique, correct bed to chair t/f technique, and progression of POC.                         Point: Precautions (Done)       Learning Progress Summary             Patient Acceptance, E,D, VU,NR by  at 10/5/2024 1659    Acceptance, TB,E, VU by MD at 10/4/2024 1815    Eager, E, VU by SC at 10/4/2024 1116    Comment: reviewed benefits of activity    Acceptance, E,TB, VU by MD at 10/3/2024 1853    Acceptance, E,D, NR by  at 10/3/2024 1733    Acceptance, E,D, VU,NR by  at 10/2/2024 1521    Acceptance, E,D, VU by  at 10/1/2024 1617    Acceptance, E,D, VU,NR by  at 9/30/2024 1056    Acceptance, E, VU by  at 9/30/2024 1037    Acceptance, E,D,H, NR by DM at 9/28/2024 1837    Acceptance, E, VU,NR by LO at 9/27/2024 0828    Comment: PT POC    Acceptance, E,D, VU,NR by LR at 9/26/2024 1403    Comment: Educated on spinal precautions for comfort, correct log rolling technique, correct sit<->stand t/f technique, correct gait mechanics, HEP, benefits of mobility and being OOB, and progression of POC.    Acceptance, E,D, VU,NR by LR at 9/25/2024 1310     Comment: Educated on spinal precautions for comfort, correct log rolling technique, correct sit<->stand t/f technique, correct technique for side steps at EOB, HEP, and progression of POC.    Acceptance, E,D, VU,NR by LR at 9/24/2024 1301    Comment: Educated on spinal precautions, correct log rolling technique, benefits of being OOB, correct sit<->stand t/f technique, correct bed to chair t/f technique, and progression of POC.                                         User Key       Initials Effective Dates Name Provider Type Discipline    SC 02/03/23 -  Lisa Melgoza, PT Physical Therapist PT    DM 02/03/23 -  Donna Montanez, PT Physical Therapist PT    LR 02/03/23 -  Meseret Dunham, PT Physical Therapist PT    LS 07/11/23 -  Radha Campo, PT Physical Therapist PT    LO 06/16/21 -  Yasmin Perodmo, PT Physical Therapist PT    HW 12/15/23 -  Mikala Rivas, JONATHON Physical Therapist PT    MD 07/26/24 -  Syeda Swan, RN Registered Nurse Nurse     07/26/24 -  Marii Harris RN Registered Nurse Nurse                  PT Recommendation and Plan     Plan of Care Reviewed With: patient  Progress: no change  Outcome Evaluation: Pt amb 50' with FWW and Min A x2. Moderate unsteadiness noted with slow gait speed and shuffling steps. Assistance required for body weight support. Fear of falling reported. Continue to recommend d/c to IRF to promote decreased risk for falls and increased independence with functional mobility.     Time Calculation:         PT Charges       Row Name 10/05/24 1700             Time Calculation    Start Time 1501  -HW      PT Received On 10/05/24  -HW         Timed Charges    74059 - PT Therapeutic Exercise Minutes 6  -HW      65811 - Gait Training Minutes  20  -HW         Total Minutes    Timed Charges Total Minutes 26  -HW       Total Minutes 26  -HW                User Key  (r) = Recorded By, (t) = Taken By, (c) = Cosigned By      Initials Name Provider Type     Mikala Rivas, PT  Physical Therapist                  Therapy Charges for Today       Code Description Service Date Service Provider Modifiers Qty    [id number]  PT THER PROC EA 15 MIN 10/5/2024 Mikala Rivas, PT GP 1    [id number] HC GAIT TRAINING EA 15 MIN 10/5/2024 Mikala Rivas, PT GP 1    [id number]  PT THER SUPP EA 15 MIN 10/5/2024 Mikala Rivas, PT GP 2            PT G-Codes  Outcome Measure Options: AM-PAC 6 Clicks Basic Mobility (PT)  AM-PAC 6 Clicks Score (PT): 18  AM-PAC 6 Clicks Score (OT): 13  PT Discharge Summary  Anticipated Discharge Disposition (PT): inpatient rehabilitation facility    Mikala Rivas PT  10/5/2024

## 2024-10-05 NOTE — PROGRESS NOTES
"    [facility] Medicine Services  PROGRESS NOTE    Patient Name: Apollo Haro  : 1940  MRN: [id number]    Date of Admission: 2024  Primary Care Physician: Provider, No Known    Subjective   Subjective     CC:  F/u back pain     HPI:  Patient resting comfortably in bed. No back pain. Is upset his wife has not answered the phone. Precertification is pending.      Objective   Objective     Vital Signs:   Temp:  [97.6 °F (36.4 °C)-98.4 °F (36.9 °C)] 98.1 °F (36.7 °C)  Heart Rate:  [83-93] 83  Resp:  [17-18] 18  BP: (106-125)/(56-86) 124/59     Physical Exam:  Constitutional: Elderly  male no acute distress, awake, alert  HENT: NCAT, mucous membranes moist  Respiratory: Clear to auscultation bilaterally, respiratory effort normal   Cardiovascular: RRR, no murmurs, rubs, or gallops  Gastrointestinal: Positive bowel sounds, soft, nontender, nondistended  Musculoskeletal: No bilateral ankle edema  Psychiatric: frustrated but cooperative  Neurologic: Oriented x 3, speech clear  Skin: No rashes    Results Reviewed:  LAB RESULTS:          Lab 10/04/24  0841   SODIUM 136   POTASSIUM 4.2   CHLORIDE 101   CO2 26.0   ANION GAP 9.0   BUN 19   CREATININE 0.82   EGFR 86.1   GLUCOSE 154*   CALCIUM 8.9                         Brief Urine Lab Results  (Last result in the past 365 days)        Color   Clarity   Blood   Leuk Est   Nitrite   Protein   CREAT   Urine HCG        24 1337 Yellow   Cloudy   Large (3+)   Large (3+)   Positive   30 mg/dL (1+)                   Cultures:  No results found for: \"BLOODCX\", \"URINECX\", \"WOUNDCX\", \"MRSACX\", \"RESPCX\", \"STOOLCX\"    Microbiology Results Abnormal       Procedure Component Value - Date/Time    Blood Culture - Blood, Hand, Left [[id number]]  (Normal) Collected: 24 1656    Lab Status: Final result Specimen: Blood from Hand, Left Updated: 24 1715     Blood Culture No growth at 5 days    Blood Culture - Blood, Hand, Right [[id number]]  " (Normal) Collected: 09/23/24 1659    Lab Status: Final result Specimen: Blood from Hand, Right Updated: 09/28/24 1715     Blood Culture No growth at 5 days            No radiology results from the last 24 hrs        Current medications:  Scheduled Meds:apixaban, 5 mg, Oral, BID  insulin lispro, 2-7 Units, Subcutaneous, 4x Daily AC & at Bedtime  metoprolol tartrate, 12.5 mg, Oral, Q12H  senna-docusate sodium, 2 tablet, Oral, BID   And  polyethylene glycol, 17 g, Oral, Daily  pravastatin, 20 mg, Oral, Nightly  QUEtiapine, 25 mg, Oral, Nightly  sodium chloride, 10 mL, Intravenous, Q12H      Continuous Infusions:   PRN Meds:.  acetaminophen **OR** acetaminophen **OR** acetaminophen    senna-docusate sodium **AND** polyethylene glycol **AND** bisacodyl **AND** bisacodyl    Calcium Replacement - Follow Nurse / BPA Driven Protocol    dextrose    dextrose    glucagon (human recombinant)    HYDROcodone-acetaminophen    Magnesium Standard Dose Replacement - Follow Nurse / BPA Driven Protocol    nitroglycerin    ondansetron ODT **OR** ondansetron    Phosphorus Replacement - Follow Nurse / BPA Driven Protocol    Potassium Replacement - Follow Nurse / BPA Driven Protocol    [COMPLETED] Insert Peripheral IV **AND** sodium chloride    sodium chloride    sodium chloride    Assessment & Plan   Assessment & Plan     Active Hospital Problems    Diagnosis  POA    **Closed compression fracture of L2 vertebra [S32.020A]  Yes    UTI (urinary tract infection) [N39.0]  Yes    Atrial fibrillation, chronic [I48.20]  Yes    CAD (coronary artery disease) [I25.10]  Yes    HTN (hypertension) [I10]  Yes    Type 2 diabetes mellitus, without long-term current use of insulin [E11.9]  Yes    Weakness [R53.1]  Yes      Resolved Hospital Problems   No resolved problems to display.        Brief Hospital Course to date:  Apollo Haro is a 84 y.o. male who presented to [facility] for evaluation of back pain. Patient had a fall earlier this month, hitting his head.  He was evaluated in the ED and later discharged home. Since that time, he reports issues with increased back pain inhibiting his ability to walk and staying mostly in the bed. MRI revealed L2 anterior column compression fracture. Neurosurgery was consulted and has recommended conservative management for now.       Acute compression fracture L2  Persistent back pain   Generalized weakness    -MRI with acute L2 compression fracture with 20% loss of height   -Neurosurgery evaluated, recommended conservative management. Close follow up in 2-4 weeks in neurosurgical clinic. If unable to ambulate secondary to pain, can reconsider kyphoplasty.  -Continue pain management with PRN Tylenol, Norco.  -PT/OT recommending SNF on discharge. Case management following.   -Palliative following for goals of care and pain management.     MRSA UTI   -UCx with MRSA; BCx with no growth at 5 days.  -s/p course of IV Vancomycin x5 days.      History of atrial fibrillation  Atrial flutter (this admission)  -Cardiology consulted due to persistent a-flutter while on flecainide   -Cardiology evaluated. Flecainide discontinued. Decreased metoprolol to 12.5 mg BID. No plan for cardioversion. Continue Eliquis.      Diabetes mellitus type 2- A1c 5.8% this admission, continue low-dose SSI, monitor glucose and adjust insulin as needed.   HTN with hypotension during admission- continue low-dose metoprolol.  CAD- continue pravastatin, cardiology stopped ASA.  Cognitive impairment- continue Seroquel nightly    Expected Discharge Location and Transportation: Skilled nursing facility  Expected Discharge 10/07/2024  Expected Discharge Date: 10/4/2024; Expected Discharge Time:      VTE Prophylaxis:  Pharmacologic & mechanical VTE prophylaxis orders are present.         AM-PAC 6 Clicks Score (PT): 16 (10/04/24 2119)    CODE STATUS:   Code Status and Medical Interventions: No CPR (Do Not Attempt to Resuscitate); Limited Support; No intubation (DNI), No  cardioversion; Spouse present and in full support of decision as NOK   Ordered at: 09/27/24 1407     Medical Intervention Limits:    No intubation (DNI)    No cardioversion     Level Of Support Discussed With:    Patient     Code Status (Patient has no pulse and is not breathing):    No CPR (Do Not Attempt to Resuscitate)     Medical Interventions (Patient has pulse or is breathing):    Limited Support     Comments:    Spouse present and in full support of decision as NOK       Michelle Lynne MD  10/05/24

## 2024-10-05 NOTE — PLAN OF CARE
Goal Outcome Evaluation:  Plan of Care Reviewed With: patient        Progress: no change  Outcome Evaluation: Pt amb 50' with FWW and Min A x2. Moderate unsteadiness noted with slow gait speed and shuffling steps. Assistance required for body weight support. Fear of falling reported. Continue to recommend d/c to IRF to promote decreased risk for falls and increased independence with functional mobility.      Anticipated Discharge Disposition (PT): inpatient rehabilitation facility

## 2024-10-06 LAB
GLUCOSE BLDC GLUCOMTR-MCNC: 135 MG/DL (ref 70–130)
GLUCOSE BLDC GLUCOMTR-MCNC: 142 MG/DL (ref 70–130)
GLUCOSE BLDC GLUCOMTR-MCNC: 158 MG/DL (ref 70–130)
GLUCOSE BLDC GLUCOMTR-MCNC: 316 MG/DL (ref 70–130)

## 2024-10-06 PROCEDURE — 97116 GAIT TRAINING THERAPY: CPT

## 2024-10-06 PROCEDURE — 99231 SBSQ HOSP IP/OBS SF/LOW 25: CPT | Performed by: FAMILY MEDICINE

## 2024-10-06 PROCEDURE — G0378 HOSPITAL OBSERVATION PER HR: HCPCS

## 2024-10-06 PROCEDURE — 82948 REAGENT STRIP/BLOOD GLUCOSE: CPT

## 2024-10-06 PROCEDURE — 63710000001 INSULIN LISPRO (HUMAN) PER 5 UNITS: Performed by: FAMILY MEDICINE

## 2024-10-06 RX ADMIN — SENNOSIDES AND DOCUSATE SODIUM 2 TABLET: 50; 8.6 TABLET ORAL at 09:04

## 2024-10-06 RX ADMIN — HYDROCODONE BITARTRATE AND ACETAMINOPHEN 1 TABLET: 5; 325 TABLET ORAL at 03:53

## 2024-10-06 RX ADMIN — SENNOSIDES AND DOCUSATE SODIUM 2 TABLET: 50; 8.6 TABLET ORAL at 20:00

## 2024-10-06 RX ADMIN — POLYETHYLENE GLYCOL 3350 17 G: 17 POWDER, FOR SOLUTION ORAL at 09:04

## 2024-10-06 RX ADMIN — INSULIN LISPRO 5 UNITS: 100 INJECTION, SOLUTION INTRAVENOUS; SUBCUTANEOUS at 17:00

## 2024-10-06 RX ADMIN — Medication 12.5 MG: at 20:00

## 2024-10-06 RX ADMIN — PRAVASTATIN SODIUM 20 MG: 20 TABLET ORAL at 20:00

## 2024-10-06 RX ADMIN — Medication 12.5 MG: at 09:04

## 2024-10-06 RX ADMIN — APIXABAN 5 MG: 5 TABLET, FILM COATED ORAL at 09:06

## 2024-10-06 RX ADMIN — HYDROCODONE BITARTRATE AND ACETAMINOPHEN 1 TABLET: 5; 325 TABLET ORAL at 16:20

## 2024-10-06 RX ADMIN — APIXABAN 5 MG: 5 TABLET, FILM COATED ORAL at 20:00

## 2024-10-06 RX ADMIN — QUETIAPINE FUMARATE 25 MG: 25 TABLET ORAL at 20:00

## 2024-10-06 NOTE — PLAN OF CARE
Disoriented to time / place, patient appeared more confused at the beginning of the shift. Easily reoriented. Intermittent low back pain adequately controlled with PO medications. BG remains elevated. On RA. SR with first degree AV block on cardiac mx. Rodrigo pain / discomfort at this time. Call light in reach.

## 2024-10-06 NOTE — THERAPY TREATMENT NOTE
Patient Name: Apollo Haro  : 1940    MRN: [id number]                              Today's Date: 10/6/2024       Admit Date: 2024    Visit Dx:     ICD-10-CM ICD-9-CM   1. Compression fracture of L2 vertebra, initial encounter  S32.020A 805.4   2. Declining functional status  R53.81 799.3   3. Unable to ambulate  R26.2 719.7   4. History of fall  Z91.81 V15.88   5. Acute midline low back pain without sciatica  M54.50 724.2   6. Chronic anticoagulation  Z79.01 V58.61   7. History of atrial fibrillation  Z86.79 V12.59     Patient Active Problem List   Diagnosis    Weakness    Type 2 diabetes mellitus, without long-term current use of insulin    Rhabdomyolysis due to COVID-19    CAD (coronary artery disease)    HTN (hypertension)    Atrial fibrillation, chronic    COVID-19 virus detected    SDH (subdural hematoma)    Altered mental status    AMS (altered mental status)    Closed compression fracture of L2 vertebra    UTI (urinary tract infection)     Past Medical History:   Diagnosis Date    A-fib     CAD (coronary artery disease)     DM (diabetes mellitus)      History reviewed. No pertinent surgical history.   General Information       Row Name 10/06/24 1612          Physical Therapy Time and Intention    Document Type therapy note (daily note)  -     Mode of Treatment physical therapy  -       Row Name 10/06/24 [facility]          General Information    Patient Profile Reviewed yes  -     Existing Precautions/Restrictions fall;spinal;other (see comments)  L2 compression fx after fall, also with head injury  -       Row Name 10/06/24 1612          Cognition    Orientation Status (Cognition) oriented x 3  -       Row Name 10/06/24 1612          Safety Issues, Functional Mobility    Impairments Affecting Function (Mobility) balance;endurance/activity tolerance;motor planning;pain;postural/trunk control;strength  -               User Key  (r) = Recorded By, (t) = Taken By, (c) = Cosigned By       Initials Name Provider Type     Mikala Rivas, PT Physical Therapist                   Mobility       Row Name 10/06/24 1612          Bed Mobility    Bed Mobility supine-sit;scooting/bridging  -     Supine-Sit [place] (Bed Mobility) moderate assist (50% patient effort);verbal cues;nonverbal cues (demo/gesture)  -     Assistive Device (Bed Mobility) bed rails;draw sheet  -     Comment, (Bed Mobility) elevated pain this session and increased time to complete transition. VC for logrolling. Assist for righting trunk with use of draw sheet. Pt required encouragement to participate this session  -       Row Name 10/06/24 1612          Transfers    Comment, (Transfers) Pt performed STS from EOB with Min A x2. No posterior LOB noted this session.  -       Row Name 10/06/24 1612          Sit-Stand Transfer    Sit-Stand [place] (Transfers) minimum assist (75% patient effort);verbal cues;nonverbal cues (demo/gesture);2 person assist  -     Assistive Device (Sit-Stand Transfers) walker, front-wheeled  -       Row Name 10/06/24 1612          Gait/Stairs (Locomotion)    [place] Level (Gait) minimum assist (75% patient effort);2 person assist;verbal cues;nonverbal cues (demo/gesture)  -     Assistive Device (Gait) walker, front-wheeled  -     Distance in Feet (Gait) 60  -     Deviations/Abnormal Patterns (Gait) base of support, narrow;stride length decreased;weight shifting decreased;bilateral deviations;gait speed decreased;festinating/shuffling  -     Bilateral Gait Deviations forward flexed posture;heel strike decreased  -     Comment, (Gait/Stairs) Pt amb into villanueva with step-through gait pattern. VC for upright posture, staying closer to AD, larger steps, and picking up his feet. Good improvement noted. Activity limited by elevated pain and fatigue. Moderate unsteadiness noted with body weight support provided throughout walk.  -               User Key  (r) = Recorded By, (t) = Taken  By, (c) = Cosigned By      Initials Name Provider Type     Mikala Rivas, JONATHON Physical Therapist                   Obj/Interventions       Row Name 10/06/24 1618          Motor Skills    Therapeutic Exercise other (see comments)  deferred due to fatigue and pain  -       Row Name 10/06/24 1618          Balance    Balance Assessment sitting static balance;sit to stand dynamic balance;sitting dynamic balance;standing static balance;standing dynamic balance  -     Static Sitting Balance standby assist  -     Dynamic Sitting Balance contact guard  -     Position, Sitting Balance sitting edge of bed  -     Static Standing Balance contact guard  -     Dynamic Standing Balance minimal assist  -     Position/Device Used, Standing Balance supported;walker, rolling  -     Balance Interventions sitting;standing;sit to stand;occupation based/functional task  -               User Key  (r) = Recorded By, (t) = Taken By, (c) = Cosigned By      Initials Name Provider Type     Mikala Rivas PT Physical Therapist                   Goals/Plan    No documentation.                  Clinical Impression       Row Name 10/06/24 1618          Pain    Pretreatment Pain Rating 6/10  -     Posttreatment Pain Rating 6/10  -     Pain Location lower  -     Pain Location - back  -     Pain Intervention(s) Repositioned;Cold applied;Ambulation/increased activity;Elevated  -       Row Name 10/06/24 1618          Plan of Care Review    Plan of Care Reviewed With patient  -     Progress no change  -     Outcome Evaluation Pt amb 60' with FWW and Min A x2. Fear of falling and unsteadiness noted. Slow gait speed with shuffling steps. Fair improvement with VC. Activity limited by elevated pain and fatigue. Continue to recommend d/c to IRF to promote increased independence with functional mobility and decrease risk for falls.  -       Row Name 10/06/24 1618          Positioning and Restraints    Pre-Treatment Position in  bed  -HW     Post Treatment Position chair  -HW     In Chair notified nsg;reclined;sitting;call light within reach;encouraged to call for assist;exit alarm on;legs elevated;waffle cushion  ice applied  -               User Key  (r) = Recorded By, (t) = Taken By, (c) = Cosigned By      Initials Name Provider Type     Mikala Rivas PT Physical Therapist                   Outcome Measures       Row Name 10/06/24 1620          How much help from another person do you currently need...    Turning from your back to your side while in flat bed without using bedrails? 3  -HW     Moving from lying on back to sitting on the side of a flat bed without bedrails? 3  -HW     Moving to and from a bed to a chair (including a wheelchair)? 3  -HW     Standing up from a chair using your arms (e.g., wheelchair, bedside chair)? 3  -HW     Climbing 3-5 steps with a railing? 2  -HW     To walk in hospital room? 3  -HW     AM-PAC 6 Clicks Score (PT) 17  -HW     Highest Level of Mobility Goal 5 --> Static standing  -       Row Name 10/06/24 1620          Functional Assessment    Outcome Measure Options AM-PAC 6 Clicks Basic Mobility (PT)  -               User Key  (r) = Recorded By, (t) = Taken By, (c) = Cosigned By      Initials Name Provider Type     Mikala Rivas PT Physical Therapist                                 Physical Therapy Education       Title: PT OT SLP Therapies (Done)       Topic: Physical Therapy (Done)       Point: Mobility training (Done)       Learning Progress Summary             Patient Acceptance, E,D, VU,NR by  at 10/6/2024 1621    Acceptance, E,D, VU,NR by  at 10/5/2024 1659    Acceptance, TB,E, VU by MD at 10/4/2024 1815    Eager, E, VU by SC at 10/4/2024 1116    Comment: reviewed benefits of activity    Acceptance, E,TB, VU by MD at 10/3/2024 1853    Acceptance, E,D, NR by  at 10/3/2024 1733    Acceptance, E,D, VU,NR by  at 10/2/2024 1521    Acceptance, E,D, VU by  at 10/1/2024 1617     Acceptance, E,D, VU,NR by  at 9/30/2024 1056    Acceptance, E, VU by  at 9/30/2024 1037    Acceptance, E, VU,NR by LS at 9/29/2024 0922    Acceptance, E,D,H, NR by DM at 9/28/2024 1837    Acceptance, E, VU,NR by LO at 9/27/2024 0828    Comment: PT POC    Acceptance, E,D, VU,NR by LR at 9/26/2024 1403    Comment: Educated on spinal precautions for comfort, correct log rolling technique, correct sit<->stand t/f technique, correct gait mechanics, HEP, benefits of mobility and being OOB, and progression of POC.    Acceptance, E,D, VU,NR by LR at 9/25/2024 1310    Comment: Educated on spinal precautions for comfort, correct log rolling technique, correct sit<->stand t/f technique, correct technique for side steps at EOB, HEP, and progression of POC.    Acceptance, E,D, VU,NR by LR at 9/24/2024 1301    Comment: Educated on spinal precautions, correct log rolling technique, benefits of being OOB, correct sit<->stand t/f technique, correct bed to chair t/f technique, and progression of POC.                         Point: Home exercise program (Done)       Learning Progress Summary             Patient Acceptance, E,D, VU,NR by  at 10/6/2024 1621    Acceptance, E,D, VU,NR by  at 10/5/2024 1659    Acceptance, TB,E, VU by MD at 10/4/2024 1815    Eager, E, VU by SC at 10/4/2024 1116    Comment: reviewed benefits of activity    Acceptance, E,TB, VU by MD at 10/3/2024 1853    Acceptance, E,D, VU,NR by  at 10/2/2024 1521    Acceptance, E,D, VU by  at 10/1/2024 1617    Acceptance, E,D, VU,NR by  at 9/30/2024 1056    Acceptance, E, VU by  at 9/30/2024 1037    Acceptance, E,D,H, NR by DM at 9/28/2024 1837    Acceptance, E, VU,NR by LO at 9/27/2024 0828    Comment: PT POC    Acceptance, E,D, VU,NR by LR at 9/26/2024 1403    Comment: Educated on spinal precautions for comfort, correct log rolling technique, correct sit<->stand t/f technique, correct gait mechanics, HEP, benefits of mobility and being OOB, and progression  of POC.    Acceptance, E,D, VU,NR by LR at 9/25/2024 1310    Comment: Educated on spinal precautions for comfort, correct log rolling technique, correct sit<->stand t/f technique, correct technique for side steps at EOB, HEP, and progression of POC.    Acceptance, E,D, VU,NR by LR at 9/24/2024 1301    Comment: Educated on spinal precautions, correct log rolling technique, benefits of being OOB, correct sit<->stand t/f technique, correct bed to chair t/f technique, and progression of POC.                         Point: Body mechanics (Done)       Learning Progress Summary             Patient Acceptance, E,D, VU,NR by  at 10/6/2024 1621    Acceptance, E,D, VU,NR by  at 10/5/2024 1659    Acceptance, TB,E, VU by MD at 10/4/2024 1815    Eager, E, VU by SC at 10/4/2024 1116    Comment: reviewed benefits of activity    Acceptance, E,TB, VU by MD at 10/3/2024 1853    Acceptance, E,D, NR by  at 10/3/2024 1733    Acceptance, E,D, VU,NR by  at 10/2/2024 1521    Acceptance, E,D, VU by  at 10/1/2024 1617    Acceptance, E,D, VU,NR by  at 9/30/2024 1056    Acceptance, E, VU by  at 9/30/2024 1037    Acceptance, E,D,H, NR by DM at 9/28/2024 1837    Acceptance, E, VU,NR by  at 9/27/2024 0828    Comment: PT POC    Acceptance, E,D, VU,NR by LR at 9/26/2024 1403    Comment: Educated on spinal precautions for comfort, correct log rolling technique, correct sit<->stand t/f technique, correct gait mechanics, HEP, benefits of mobility and being OOB, and progression of POC.    Acceptance, E,D, VU,NR by LR at 9/25/2024 1310    Comment: Educated on spinal precautions for comfort, correct log rolling technique, correct sit<->stand t/f technique, correct technique for side steps at EOB, HEP, and progression of POC.    Acceptance, E,D, VU,NR by LR at 9/24/2024 1301    Comment: Educated on spinal precautions, correct log rolling technique, benefits of being OOB, correct sit<->stand t/f technique, correct bed to chair t/f technique,  and progression of POC.                         Point: Precautions (Done)       Learning Progress Summary             Patient Acceptance, E,D, VU,NR by  at 10/6/2024 1621    Acceptance, E,D, VU,NR by  at 10/5/2024 1659    Acceptance, TB,E, VU by MD at 10/4/2024 1815    Eager, E, VU by SC at 10/4/2024 1116    Comment: reviewed benefits of activity    Acceptance, E,TB, VU by MD at 10/3/2024 1853    Acceptance, E,D, NR by  at 10/3/2024 1733    Acceptance, E,D, VU,NR by  at 10/2/2024 1521    Acceptance, E,D, VU by  at 10/1/2024 1617    Acceptance, E,D, VU,NR by  at 9/30/2024 1056    Acceptance, E, VU by  at 9/30/2024 1037    Acceptance, E,D,H, NR by DM at 9/28/2024 1837    Acceptance, E, VU,NR by  at 9/27/2024 0828    Comment: PT POC    Acceptance, E,D, VU,NR by LR at 9/26/2024 1403    Comment: Educated on spinal precautions for comfort, correct log rolling technique, correct sit<->stand t/f technique, correct gait mechanics, HEP, benefits of mobility and being OOB, and progression of POC.    Acceptance, E,D, VU,NR by LR at 9/25/2024 1310    Comment: Educated on spinal precautions for comfort, correct log rolling technique, correct sit<->stand t/f technique, correct technique for side steps at EOB, HEP, and progression of POC.    Acceptance, E,D, VU,NR by LR at 9/24/2024 1301    Comment: Educated on spinal precautions, correct log rolling technique, benefits of being OOB, correct sit<->stand t/f technique, correct bed to chair t/f technique, and progression of POC.                                         User Key       Initials Effective Dates Name Provider Type Discipline    SC 02/03/23 -  Lisa Melgoza, PT Physical Therapist PT    DM 02/03/23 -  Donna Montanez, PT Physical Therapist PT    LR 02/03/23 -  Meseret Dunham, PT Physical Therapist PT    LS 07/11/23 -  Radha Campo, PT Physical Therapist PT    LO 06/16/21 -  Yasmin Perdomo, PT Physical Therapist PT    HW 12/15/23 -  Evelyn  JONATHON Bach Physical Therapist PT    MD 07/26/24 -  Syeda Swan, RN Registered Nurse Nurse     07/26/24 -  Marii Harris, RN Registered Nurse Nurse                  PT Recommendation and Plan     Plan of Care Reviewed With: patient  Progress: no change  Outcome Evaluation: Pt amb 60' with FWW and Min A x2. Fear of falling and unsteadiness noted. Slow gait speed with shuffling steps. Fair improvement with VC. Activity limited by elevated pain and fatigue. Continue to recommend d/c to IRF to promote increased independence with functional mobility and decrease risk for falls.     Time Calculation:         PT Charges       Row Name 10/06/24 1621             Time Calculation    Start Time 1554  -HW      PT Received On 10/06/24  -HW         Timed Charges    04838 - Gait Training Minutes  15  -HW         Total Minutes    Timed Charges Total Minutes 15  -HW       Total Minutes 15  -HW                User Key  (r) = Recorded By, (t) = Taken By, (c) = Cosigned By      Initials Name Provider Type     Mikala Rivas PT Physical Therapist                  Therapy Charges for Today       Code Description Service Date Service Provider Modifiers Qty    05614229002 HC PT THER PROC EA 15 MIN 10/5/2024 Mikala Rivas, PT GP 1    57681474441 HC GAIT TRAINING EA 15 MIN 10/5/2024 Mikala Rivas, PT GP 1    96024114752 HC PT THER SUPP EA 15 MIN 10/5/2024 Mikala Rivas, PT GP 2    72106787470 HC GAIT TRAINING EA 15 MIN 10/6/2024 Mikala Rivas, PT GP 1            PT G-Codes  Outcome Measure Options: AM-PAC 6 Clicks Basic Mobility (PT)  AM-PAC 6 Clicks Score (PT): 17  AM-PAC 6 Clicks Score (OT): 13  PT Discharge Summary  Anticipated Discharge Disposition (PT): inpatient rehabilitation facility    Mikala Rivas PT  10/6/2024

## 2024-10-06 NOTE — PLAN OF CARE
Goal Outcome Evaluation:  Plan of Care Reviewed With: patient        Progress: no change  Outcome Evaluation: Pt amb 60' with FWW and Min A x2. Fear of falling and unsteadiness noted. Slow gait speed with shuffling steps. Fair improvement with VC. Activity limited by elevated pain and fatigue. Continue to recommend d/c to IRF to promote increased independence with functional mobility and decrease risk for falls.      Anticipated Discharge Disposition (PT): inpatient rehabilitation facility

## 2024-10-06 NOTE — PROGRESS NOTES
"    [facility] Medicine Services  PROGRESS NOTE    Patient Name: Apollo Haro  : 1940  MRN: [id number]    Date of Admission: 2024  Primary Care Physician: Provider, No Known    Subjective   Subjective     CC:  F/u back pain     HPI:  Patient resting comfortably in bed. No back pain. Did not want to eat breakfast this morning but willing to have lunch.     Objective   Objective     Vital Signs:   Temp:  [97.4 °F (36.3 °C)-98.5 °F (36.9 °C)] 97.7 °F (36.5 °C)  Heart Rate:  [] 87  Resp:  [16-18] 18  BP: ()/(55-74) 96/62     Physical Exam:  Constitutional: Elderly  male no acute distress, awake, alert  HENT: NCAT, mucous membranes moist  Respiratory: Clear to auscultation bilaterally, respiratory effort normal   Cardiovascular: RRR, no murmurs, rubs, or gallops  Gastrointestinal: Positive bowel sounds, soft, nontender, nondistended  Musculoskeletal: No bilateral ankle edema  Psychiatric: frustrated but cooperative  Neurologic: Oriented x 3, speech clear  Skin: No rashes    Results Reviewed:  LAB RESULTS:          Lab 10/04/24  0841   SODIUM 136   POTASSIUM 4.2   CHLORIDE 101   CO2 26.0   ANION GAP 9.0   BUN 19   CREATININE 0.82   EGFR 86.1   GLUCOSE 154*   CALCIUM 8.9                         Brief Urine Lab Results  (Last result in the past 365 days)        Color   Clarity   Blood   Leuk Est   Nitrite   Protein   CREAT   Urine HCG        24 1337 Yellow   Cloudy   Large (3+)   Large (3+)   Positive   30 mg/dL (1+)                   Cultures:  No results found for: \"BLOODCX\", \"URINECX\", \"WOUNDCX\", \"MRSACX\", \"RESPCX\", \"STOOLCX\"    Microbiology Results Abnormal       Procedure Component Value - Date/Time    Blood Culture - Blood, Hand, Left [[id number]]  (Normal) Collected: 24 1656    Lab Status: Final result Specimen: Blood from Hand, Left Updated: 24 1715     Blood Culture No growth at 5 days    Blood Culture - Blood, Hand, Right [[id number]]  " (Normal) Collected: 09/23/24 1657    Lab Status: Final result Specimen: Blood from Hand, Right Updated: 09/28/24 1715     Blood Culture No growth at 5 days            No radiology results from the last 24 hrs        Current medications:  Scheduled Meds:apixaban, 5 mg, Oral, BID  insulin lispro, 2-7 Units, Subcutaneous, 4x Daily AC & at Bedtime  metoprolol tartrate, 12.5 mg, Oral, Q12H  senna-docusate sodium, 2 tablet, Oral, BID   And  polyethylene glycol, 17 g, Oral, Daily  pravastatin, 20 mg, Oral, Nightly  QUEtiapine, 25 mg, Oral, Nightly  sodium chloride, 10 mL, Intravenous, Q12H      Continuous Infusions:   PRN Meds:.  acetaminophen **OR** acetaminophen **OR** acetaminophen    senna-docusate sodium **AND** polyethylene glycol **AND** bisacodyl **AND** bisacodyl    Calcium Replacement - Follow Nurse / BPA Driven Protocol    dextrose    dextrose    glucagon (human recombinant)    HYDROcodone-acetaminophen    Magnesium Standard Dose Replacement - Follow Nurse / BPA Driven Protocol    nitroglycerin    ondansetron ODT **OR** ondansetron    Phosphorus Replacement - Follow Nurse / BPA Driven Protocol    Potassium Replacement - Follow Nurse / BPA Driven Protocol    [COMPLETED] Insert Peripheral IV **AND** sodium chloride    sodium chloride    sodium chloride    Assessment & Plan   Assessment & Plan     Active Hospital Problems    Diagnosis  POA    **Closed compression fracture of L2 vertebra [S32.020A]  Yes    UTI (urinary tract infection) [N39.0]  Yes    Atrial fibrillation, chronic [I48.20]  Yes    CAD (coronary artery disease) [I25.10]  Yes    HTN (hypertension) [I10]  Yes    Type 2 diabetes mellitus, without long-term current use of insulin [E11.9]  Yes    Weakness [R53.1]  Yes      Resolved Hospital Problems   No resolved problems to display.        Brief Hospital Course to date:  Apollo Haro is a 84 y.o. male who presented to [facility] for evaluation of back pain. Patient had a fall earlier this month, hitting his head.  He was evaluated in the ED and later discharged home. Since that time, he reports issues with increased back pain inhibiting his ability to walk and staying mostly in the bed. MRI revealed L2 anterior column compression fracture. Neurosurgery was consulted and has recommended conservative management for now.       Acute compression fracture L2  Persistent back pain   Generalized weakness    -MRI with acute L2 compression fracture with 20% loss of height   -Neurosurgery evaluated, recommended conservative management. Close follow up in 2-4 weeks in neurosurgical clinic. If unable to ambulate secondary to pain, can reconsider kyphoplasty.  -Continue pain management with PRN Tylenol, Norco.  -PT/OT recommending SNF on discharge. Case management following.   -Palliative following for goals of care and pain management.     MRSA UTI   -UCx with MRSA; BCx with no growth at 5 days.  -s/p course of IV Vancomycin x5 days.      History of atrial fibrillation  Atrial flutter (this admission)  -Cardiology consulted due to persistent a-flutter while on flecainide   -Cardiology evaluated. Flecainide discontinued. Decreased metoprolol to 12.5 mg BID. No plan for cardioversion. Continue Eliquis.      Diabetes mellitus type 2- A1c 5.8% this admission, continue low-dose SSI, monitor glucose and adjust insulin as needed.   HTN with hypotension during admission- continue low-dose metoprolol.  CAD- continue pravastatin, cardiology stopped ASA.  Cognitive impairment- continue Seroquel nightly    Expected Discharge Location and Transportation: Skilled nursing facility  Expected Discharge 10/07/2024  Expected Discharge Date: 10/4/2024; Expected Discharge Time:      VTE Prophylaxis:  Pharmacologic & mechanical VTE prophylaxis orders are present.         AM-PAC 6 Clicks Score (PT): 18 (10/05/24 1954)    CODE STATUS:   Code Status and Medical Interventions: No CPR (Do Not Attempt to Resuscitate); Limited Support; No intubation (DNI), No  cardioversion; Spouse present and in full support of decision as NOK   Ordered at: 09/27/24 1407     Medical Intervention Limits:    No intubation (DNI)    No cardioversion     Level Of Support Discussed With:    Patient     Code Status (Patient has no pulse and is not breathing):    No CPR (Do Not Attempt to Resuscitate)     Medical Interventions (Patient has pulse or is breathing):    Limited Support     Comments:    Spouse present and in full support of decision as NOK       Michelle Lynne MD  10/06/24

## 2024-10-07 VITALS
RESPIRATION RATE: 16 BRPM | BODY MASS INDEX: 28.88 KG/M2 | OXYGEN SATURATION: 98 % | HEIGHT: 69 IN | WEIGHT: 195 LBS | HEART RATE: 64 BPM | SYSTOLIC BLOOD PRESSURE: 106 MMHG | TEMPERATURE: 98 F | DIASTOLIC BLOOD PRESSURE: 52 MMHG

## 2024-10-07 PROBLEM — N39.0 UTI (URINARY TRACT INFECTION): Status: RESOLVED | Noted: 2024-09-23 | Resolved: 2024-10-07

## 2024-10-07 LAB
ANION GAP SERPL CALCULATED.3IONS-SCNC: 8 MMOL/L (ref 5–15)
BUN SERPL-MCNC: 23 MG/DL (ref 8–23)
BUN/CREAT SERPL: 28 (ref 7–25)
CALCIUM SPEC-SCNC: 9 MG/DL (ref 8.6–10.5)
CHLORIDE SERPL-SCNC: 104 MMOL/L (ref 98–107)
CO2 SERPL-SCNC: 27 MMOL/L (ref 22–29)
CREAT SERPL-MCNC: 0.82 MG/DL (ref 0.76–1.27)
EGFRCR SERPLBLD CKD-EPI 2021: 86.1 ML/MIN/1.73
GLUCOSE BLDC GLUCOMTR-MCNC: 140 MG/DL (ref 70–130)
GLUCOSE BLDC GLUCOMTR-MCNC: 260 MG/DL (ref 70–130)
GLUCOSE SERPL-MCNC: 245 MG/DL (ref 65–99)
MAGNESIUM SERPL-MCNC: 1.8 MG/DL (ref 1.6–2.4)
POTASSIUM SERPL-SCNC: 4.7 MMOL/L (ref 3.5–5.2)
SODIUM SERPL-SCNC: 139 MMOL/L (ref 136–145)

## 2024-10-07 PROCEDURE — 25010000002 MAGNESIUM SULFATE IN D5W 1G/100ML (PREMIX) 1-5 GM/100ML-% SOLUTION: Performed by: FAMILY MEDICINE

## 2024-10-07 PROCEDURE — 80048 BASIC METABOLIC PNL TOTAL CA: CPT | Performed by: FAMILY MEDICINE

## 2024-10-07 PROCEDURE — 83735 ASSAY OF MAGNESIUM: CPT | Performed by: FAMILY MEDICINE

## 2024-10-07 PROCEDURE — 82948 REAGENT STRIP/BLOOD GLUCOSE: CPT

## 2024-10-07 PROCEDURE — G0378 HOSPITAL OBSERVATION PER HR: HCPCS

## 2024-10-07 PROCEDURE — 63710000001 INSULIN LISPRO (HUMAN) PER 5 UNITS: Performed by: FAMILY MEDICINE

## 2024-10-07 RX ORDER — MAGNESIUM SULFATE 1 G/100ML
1 INJECTION INTRAVENOUS ONCE
Status: COMPLETED | OUTPATIENT
Start: 2024-10-07 | End: 2024-10-07

## 2024-10-07 RX ORDER — ASPIRIN 81 MG/1
81 TABLET ORAL DAILY
Start: 2024-10-07

## 2024-10-07 RX ORDER — METOPROLOL TARTRATE 25 MG/1
12.5 TABLET, FILM COATED ORAL EVERY 12 HOURS SCHEDULED
Qty: 30 TABLET | Refills: 0 | Status: SHIPPED | OUTPATIENT
Start: 2024-10-07 | End: 2024-11-06

## 2024-10-07 RX ORDER — NYSTATIN 100000 [USP'U]/G
POWDER TOPICAL EVERY 12 HOURS SCHEDULED
Status: DISCONTINUED | OUTPATIENT
Start: 2024-10-07 | End: 2024-10-07 | Stop reason: HOSPADM

## 2024-10-07 RX ORDER — ACETAMINOPHEN 325 MG/1
650 TABLET ORAL EVERY 4 HOURS PRN
Start: 2024-10-07

## 2024-10-07 RX ORDER — AMOXICILLIN 250 MG
2 CAPSULE ORAL 2 TIMES DAILY
Qty: 120 TABLET | Refills: 0 | Status: SHIPPED | OUTPATIENT
Start: 2024-10-07 | End: 2024-11-06

## 2024-10-07 RX ORDER — HYDROCODONE BITARTRATE AND ACETAMINOPHEN 5; 325 MG/1; MG/1
1 TABLET ORAL EVERY 6 HOURS PRN
Qty: 12 TABLET | Refills: 0 | Status: SHIPPED | OUTPATIENT
Start: 2024-10-07 | End: 2024-10-10

## 2024-10-07 RX ORDER — POLYETHYLENE GLYCOL 3350 17 G/17G
17 POWDER, FOR SOLUTION ORAL DAILY
Qty: 30 EACH | Refills: 0 | Status: SHIPPED | OUTPATIENT
Start: 2024-10-08

## 2024-10-07 RX ADMIN — INSULIN LISPRO 4 UNITS: 100 INJECTION, SOLUTION INTRAVENOUS; SUBCUTANEOUS at 11:23

## 2024-10-07 RX ADMIN — Medication 12.5 MG: at 08:43

## 2024-10-07 RX ADMIN — NYSTATIN 1 APPLICATION: 100000 POWDER TOPICAL at 13:52

## 2024-10-07 RX ADMIN — POLYETHYLENE GLYCOL 3350 17 G: 17 POWDER, FOR SOLUTION ORAL at 08:43

## 2024-10-07 RX ADMIN — SENNOSIDES AND DOCUSATE SODIUM 2 TABLET: 50; 8.6 TABLET ORAL at 08:43

## 2024-10-07 RX ADMIN — ACETAMINOPHEN 650 MG: 325 TABLET ORAL at 08:43

## 2024-10-07 RX ADMIN — MAGNESIUM SULFATE HEPTAHYDRATE 1 G: 10 INJECTION, SOLUTION INTRAVENOUS at 13:37

## 2024-10-07 RX ADMIN — APIXABAN 5 MG: 5 TABLET, FILM COATED ORAL at 08:43

## 2024-10-07 NOTE — CASE MANAGEMENT/SOCIAL WORK
Case Management Discharge Note      Final Note:     Mr. Haro has a skilled bed today at [facility], if medically ready.  Confirmed bed with April at facility.  Insurance approval received from the patient's [employer] for the rehab admission.      Mr. Haro is agreeable to the DC plan.     [facility] ambulance scheduled for today at 3pm.  No PCS form needed.  EMS DNR requested to be completed by the .      Facility pharmacy provider is Tracy Doll, and is noteed in EPIC for e-scribing.      Call report to [facility] at hu [phone].     will fax the DC summary to fax [phone].     Thank you         Selected Continued Care - Admitted Since 9/23/2024       Destination Coordination complete.      Service Provider Selected Services Address Phone Fax Patient Preferred    [facility] [address] [place] [id number] [phone] --                  Transportation Services  Ambulance: [facility]    Final Discharge Disposition Code: 03 - skilled nursing facility (SNF)

## 2024-10-07 NOTE — DISCHARGE SUMMARY
[facility] Medicine Services  DISCHARGE SUMMARY    Patient Name: Apollo Haro  : 1940  MRN: [id number]    Date of Admission: 2024 12:26 PM  Date of Discharge:  10/07/2024  Primary Care Physician: Provider, No Known    Consults       Date and Time Order Name Status Description    2024  3:20 PM Inpatient Palliative Care MD Consult Completed     2024 11:42 AM Inpatient Cardiology Consult Completed     2024  1:49 PM Inpatient Neurosurgery Consult Completed             Hospital Course     Presenting Problem: Follow-up back pain    Active Hospital Problems    Diagnosis  POA    **Closed compression fracture of L2 vertebra [S32.020A]  Yes    Atrial fibrillation, chronic [I48.20]  Yes    CAD (coronary artery disease) [I25.10]  Yes    HTN (hypertension) [I10]  Yes    Type 2 diabetes mellitus, without long-term current use of insulin [E11.9]  Yes    Weakness [R53.1]  Yes      Resolved Hospital Problems    Diagnosis Date Resolved POA    UTI (urinary tract infection) [N39.0] 10/07/2024 Yes          Hospital Course:  Apollo Haro is a 84 y.o. male who presented to [facility] for evaluation of back pain. Patient had a fall earlier this month, hitting his head. He was evaluated in the ED and later discharged home. Since that time, he reports issues with increased back pain inhibiting his ability to walk and staying mostly in the bed. MRI revealed L2 anterior column compression fracture. Neurosurgery was consulted and has recommended conservative management for now.        Acute compression fracture L2  Persistent back pain   Generalized weakness    -MRI with acute L2 compression fracture with 20% loss of height   -Neurosurgery evaluated, recommended conservative management. Close follow up in 2-4 weeks in neurosurgical clinic. If unable to ambulate secondary to pain, can reconsider kyphoplasty.  -Continue pain management with PRN Tylenol, Norco.  -PT/OT recommending SNF on discharge.  Case management following.   -Palliative following for goals of care and pain management.     MRSA UTI   -UCx with MRSA; BCx with no growth at 5 days.  -s/p course of IV Vancomycin x5 days.      History of atrial fibrillation  Atrial flutter (this admission)  -Cardiology consulted due to persistent a-flutter while on flecainide   -Cardiology evaluated. Flecainide discontinued. Decreased metoprolol to 12.5 mg BID. No plan for cardioversion. Continue Eliquis.      Diabetes mellitus type 2- A1c 5.8% this admission, continue low-dose SSI, monitor glucose and adjust insulin as needed.   HTN with hypotension during admission- continue low-dose metoprolol.  CAD- continue pravastatin, cardiology stopped ASA.  Cognitive impairment- continue Seroquel nightly      Discharge Follow Up Recommendations for outpatient labs/diagnostics:  Follow up with PCP in one week   Follow up with in 2-4 weeks neurosurgical clinic     Day of Discharge     HPI:   Patient tolerating breakfast this morning is alert and oriented x 3.  No concerns from nursing staff. Magnesium replaced     Vital Signs:   Temp:  [97.3 °F (36.3 °C)-98.1 °F (36.7 °C)] 98 °F (36.7 °C)  Heart Rate:  [63-99] 70  Resp:  [14-16] 16  BP: ()/(45-69) 106/52      Physical Exam:  Constitutional:  male no acute distress, awake, alert  HENT: NCAT, mucous membranes moist  Respiratory: Clear to auscultation bilaterally, respiratory effort normal   Cardiovascular: RRR, no murmurs, rubs, or gallops  Gastrointestinal: Positive bowel sounds, soft, nontender, nondistended  Musculoskeletal: No bilateral ankle edema  Psychiatric: Appropriate affect, cooperative  Neurologic: Oriented x 3, speech clear  Skin: No rashes    Pertinent  and/or Most Recent Results     LAB RESULTS:          Lab 10/07/24  1142 10/04/24  0841   SODIUM 139 136   POTASSIUM 4.7 4.2   CHLORIDE 104 101   CO2 27.0 26.0   ANION GAP 8.0 9.0   BUN 23 19   CREATININE 0.82 0.82   EGFR 86.1 86.1   GLUCOSE 245*  154*   CALCIUM 9.0 8.9   MAGNESIUM 1.8  --                          Brief Urine Lab Results  (Last result in the past 365 days)        Color   Clarity   Blood   Leuk Est   Nitrite   Protein   CREAT   Urine HCG        09/23/24 1337 Yellow   Cloudy   Large (3+)   Large (3+)   Positive   30 mg/dL (1+)                 Microbiology Results (last 10 days)       ** No results found for the last 240 hours. **            No radiology results for the last 10 days          Discharge Details        Discharge Medications        New Medications        Instructions Start Date   HYDROcodone-acetaminophen 5-325 MG per tablet  Commonly known as: NORCO   1 tablet, Oral, Every 6 Hours PRN      metoprolol tartrate 25 MG tablet  Commonly known as: LOPRESSOR   12.5 mg, Oral, Every 12 Hours Scheduled      polyethylene glycol 17 g packet  Commonly known as: MIRALAX   17 g, Oral, Daily   Start Date: October 8, 2024     sennosides-docusate 8.6-50 MG per tablet  Commonly known as: PERICOLACE   2 tablets, Oral, 2 Times Daily             Continue These Medications        Instructions Start Date   acetaminophen 325 MG tablet  Commonly known as: TYLENOL   650 mg, Oral, Every 4 Hours PRN, OTC      apixaban 5 MG tablet tablet  Commonly known as: ELIQUIS   5 mg, Oral, 2 Times Daily      aspirin 81 MG EC tablet   81 mg, Oral, Daily, OTC      Magnesium 200 MG tablet   1 tablet, Oral, 2 Times Daily, OTC      metFORMIN 500 MG tablet  Commonly known as: GLUCOPHAGE   500 mg, Oral, 2 Times Daily With Meals      multivitamin with minerals tablet tablet   1 tablet, Oral, Daily, OTC      pravastatin 20 MG tablet  Commonly known as: PRAVACHOL   20 mg, Oral, Nightly      QUEtiapine 25 MG tablet  Commonly known as: SEROquel   25 mg, Oral, Nightly             Stop These Medications      flecainide 50 MG tablet  Commonly known as: TAMBOCOR              Allergies   Allergen Reactions    Lidocaine Angioedema         Discharge Disposition:  Skilled Nursing Facility  (DC - External)    Diet:  Hospital:  Diet Order   Procedures    Diet: Cardiac; Healthy Heart (2-3 Na+); Fluid Consistency: Thin (IDDSI 0)       Diet Instructions       Diet: Cardiac Diets; Healthy Heart (2-3 Na+); Regular (IDDSI 7); Thin (IDDSI 0)      Discharge Diet: Cardiac Diets    Cardiac Diet: Healthy Heart (2-3 Na+)    Texture: Regular (IDDSI 7)    Fluid Consistency: Thin (IDDSI 0)      Heart Healthy Diet             Activity:  Activity Instructions    X2 person assist  Lift assist as needed                CODE STATUS:    Code Status and Medical Interventions: No CPR (Do Not Attempt to Resuscitate); Limited Support; No intubation (DNI), No cardioversion; Spouse present and in full support of decision as NOK   Ordered at: 09/27/24 1407     Medical Intervention Limits:    No intubation (DNI)    No cardioversion     Level Of Support Discussed With:    Patient     Code Status (Patient has no pulse and is not breathing):    No CPR (Do Not Attempt to Resuscitate)     Medical Interventions (Patient has pulse or is breathing):    Limited Support     Comments:    Spouse present and in full support of decision as NOK       Future Appointments   Date Time Provider Department Center   10/7/2024  3:15 PM MED 8  [facility]   10/28/2024  1:30 PM Brain Perry MD [facility]       Additional Instructions for the Follow-ups that You Need to Schedule       Discharge Follow-up with PCP   As directed       Currently Documented PCP:    Provider, No Known    PCP Phone Number:    None     Follow Up Details: within one week        Discharge Follow-up with Specified Provider: Dr. Paniagua, Neurosurgery; 2 Weeks   As directed      To: Dr. Paniagua, Neurosurgery   Follow Up: 2 Weeks                      Michelle Lynne MD  10/07/24      Time Spent on Discharge:  I spent  35  minutes on this discharge activity which included: face-to-face encounter with the patient, reviewing the data in the system, coordination of the  care with the nursing staff as well as consultants, documentation, and entering orders.

## 2024-10-07 NOTE — DISCHARGE PLACEMENT REQUEST
"Elizabeth Haro (84 y.o. Male)       Date of Birth   1940    Social Security Number       Address   [address] [id number]    Home Phone   [phone]    MRN   [id number]       [place]   None    Marital Status                               Admission Date   24    Admission Type   Emergency    Admitting Provider   Michelle Lynne MD    Attending Provider   Michelle Lynne MD    Department, Room/Bed   [facility] 3H, S370/1       Discharge Date       Discharge Disposition   Skilled Nursing Facility (DC - External)    Discharge Destination                                 Attending Provider: Michelle Lynne MD    Allergies: Lidocaine    Isolation: None   Infection: MRSA (24)   Code Status: No CPR    Ht: 175.3 cm (69\")   Wt: 88.5 kg (195 lb)    Admission Cmt: None   Principal Problem: Closed compression fracture of L2 vertebra [S32.020A]                   Active Insurance as of 2024       Primary Coverage       Payor Plan Insurance Group Employer/Plan Group    AETNA MEDICARE REPLACEMENT AETNA MED ADV HMO [id number]       Payor Plan Address Payor Plan Phone Number Payor Plan Fax Number Effective Dates    [address] [phone]  3/1/2024 - None Entered    [facility] [place] [address]         Subscriber Name Subscriber Birth Date Member ID       ELIZABETH HARO 1940 [id number]                     Emergency Contacts        (Rel.) Home Phone Work Phone Mobile Phone    ANTONIA HARO (Spouse) [phone] -- --    Maria De Jesus Paredes (Daughter) [phone] -- [phone]                 Discharge Summary        Michelle Lynne MD at 10/07/24 [facility]              [facility] Medicine Services  DISCHARGE SUMMARY    Patient Name: Elizabeth Haro  : 1940  MRN: [id number]    Date of Admission: 2024 12:26 PM  Date of Discharge:  10/07/2024  Primary Care Physician: Provider, No Known    Consults       Date and Time Order Name " Status Description    9/25/2024  3:20 PM Inpatient Palliative Care MD Consult Completed     9/24/2024 11:42 AM Inpatient Cardiology Consult Completed     9/23/2024  1:49 PM Inpatient Neurosurgery Consult Completed             Hospital Course     Presenting Problem: Follow-up back pain    Active Hospital Problems    Diagnosis  POA    **Closed compression fracture of L2 vertebra [S32.020A]  Yes    Atrial fibrillation, chronic [I48.20]  Yes    CAD (coronary artery disease) [I25.10]  Yes    HTN (hypertension) [I10]  Yes    Type 2 diabetes mellitus, without long-term current use of insulin [E11.9]  Yes    Weakness [R53.1]  Yes      Resolved Hospital Problems    Diagnosis Date Resolved POA    UTI (urinary tract infection) [N39.0] 10/07/2024 Yes          Hospital Course:  Apollo Haro is a 84 y.o. male who presented to [facility] for evaluation of back pain. Patient had a fall earlier this month, hitting his head. He was evaluated in the ED and later discharged home. Since that time, he reports issues with increased back pain inhibiting his ability to walk and staying mostly in the bed. MRI revealed L2 anterior column compression fracture. Neurosurgery was consulted and has recommended conservative management for now.        Acute compression fracture L2  Persistent back pain   Generalized weakness    -MRI with acute L2 compression fracture with 20% loss of height   -Neurosurgery evaluated, recommended conservative management. Close follow up in 2-4 weeks in neurosurgical clinic. If unable to ambulate secondary to pain, can reconsider kyphoplasty.  -Continue pain management with PRN Tylenol, Norco.  -PT/OT recommending SNF on discharge. Case management following.   -Palliative following for goals of care and pain management.     MRSA UTI   -UCx with MRSA; BCx with no growth at 5 days.  -s/p course of IV Vancomycin x5 days.      History of atrial fibrillation  Atrial flutter (this admission)  -Cardiology consulted due to persistent  a-flutter while on flecainide   -Cardiology evaluated. Flecainide discontinued. Decreased metoprolol to 12.5 mg BID. No plan for cardioversion. Continue Eliquis.      Diabetes mellitus type 2- A1c 5.8% this admission, continue low-dose SSI, monitor glucose and adjust insulin as needed.   HTN with hypotension during admission- continue low-dose metoprolol.  CAD- continue pravastatin, cardiology stopped ASA.  Cognitive impairment- continue Seroquel nightly      Discharge Follow Up Recommendations for outpatient labs/diagnostics:  Follow up with PCP in one week   Follow up with in 2-4 weeks neurosurgical clinic     Day of Discharge     HPI:   Patient tolerating breakfast this morning is alert and oriented x 3.  No concerns from nursing staff. Magnesium replaced     Vital Signs:   Temp:  [97.3 °F (36.3 °C)-98.1 °F (36.7 °C)] 98 °F (36.7 °C)  Heart Rate:  [63-99] 70  Resp:  [14-16] 16  BP: ()/(45-69) 106/52      Physical Exam:  Constitutional:  male no acute distress, awake, alert  HENT: NCAT, mucous membranes moist  Respiratory: Clear to auscultation bilaterally, respiratory effort normal   Cardiovascular: RRR, no murmurs, rubs, or gallops  Gastrointestinal: Positive bowel sounds, soft, nontender, nondistended  Musculoskeletal: No bilateral ankle edema  Psychiatric: Appropriate affect, cooperative  Neurologic: Oriented x 3, speech clear  Skin: No rashes    Pertinent  and/or Most Recent Results     LAB RESULTS:          Lab 10/07/24  1142 10/04/24  0841   SODIUM 139 136   POTASSIUM 4.7 4.2   CHLORIDE 104 101   CO2 27.0 26.0   ANION GAP 8.0 9.0   BUN 23 19   CREATININE 0.82 0.82   EGFR 86.1 86.1   GLUCOSE 245* 154*   CALCIUM 9.0 8.9   MAGNESIUM 1.8  --                          Brief Urine Lab Results  (Last result in the past 365 days)        Color   Clarity   Blood   Leuk Est   Nitrite   Protein   CREAT   Urine HCG        09/23/24 1337 Yellow   Cloudy   Large (3+)   Large (3+)   Positive   30 mg/dL (1+)                  Microbiology Results (last 10 days)       ** No results found for the last 240 hours. **            No radiology results for the last 10 days          Discharge Details        Discharge Medications        New Medications        Instructions Start Date   HYDROcodone-acetaminophen 5-325 MG per tablet  Commonly known as: NORCO   1 tablet, Oral, Every 6 Hours PRN      metoprolol tartrate 25 MG tablet  Commonly known as: LOPRESSOR   12.5 mg, Oral, Every 12 Hours Scheduled      polyethylene glycol 17 g packet  Commonly known as: MIRALAX   17 g, Oral, Daily   Start Date: October 8, 2024     sennosides-docusate 8.6-50 MG per tablet  Commonly known as: PERICOLACE   2 tablets, Oral, 2 Times Daily             Continue These Medications        Instructions Start Date   acetaminophen 325 MG tablet  Commonly known as: TYLENOL   650 mg, Oral, Every 4 Hours PRN, OTC      apixaban 5 MG tablet tablet  Commonly known as: ELIQUIS   5 mg, Oral, 2 Times Daily      aspirin 81 MG EC tablet   81 mg, Oral, Daily, OTC      Magnesium 200 MG tablet   1 tablet, Oral, 2 Times Daily, OTC      metFORMIN 500 MG tablet  Commonly known as: GLUCOPHAGE   500 mg, Oral, 2 Times Daily With Meals      multivitamin with minerals tablet tablet   1 tablet, Oral, Daily, OTC      pravastatin 20 MG tablet  Commonly known as: PRAVACHOL   20 mg, Oral, Nightly      QUEtiapine 25 MG tablet  Commonly known as: SEROquel   25 mg, Oral, Nightly             Stop These Medications      flecainide 50 MG tablet  Commonly known as: TAMBOCOR              Allergies   Allergen Reactions    Lidocaine Angioedema         Discharge Disposition:  Skilled Nursing Facility (DC - External)    Diet:  Hospital:  Diet Order   Procedures    Diet: Cardiac; Healthy Heart (2-3 Na+); Fluid Consistency: Thin (IDDSI 0)       Diet Instructions       Diet: Cardiac Diets; Healthy Heart (2-3 Na+); Regular (IDDSI 7); Thin (IDDSI 0)      Discharge Diet: Cardiac Diets    Cardiac Diet:  Healthy Heart (2-3 Na+)    Texture: Regular (IDDSI 7)    Fluid Consistency: Thin (IDDSI 0)      Heart Healthy Diet             Activity:  Activity Instructions    X2 person assist  Lift assist as needed                CODE STATUS:    Code Status and Medical Interventions: No CPR (Do Not Attempt to Resuscitate); Limited Support; No intubation (DNI), No cardioversion; Spouse present and in full support of decision as NOK   Ordered at: 09/27/24 1407     Medical Intervention Limits:    No intubation (DNI)    No cardioversion     Level Of Support Discussed With:    Patient     Code Status (Patient has no pulse and is not breathing):    No CPR (Do Not Attempt to Resuscitate)     Medical Interventions (Patient has pulse or is breathing):    Limited Support     Comments:    Spouse present and in full support of decision as NOK       Future Appointments   Date Time Provider Department Center   10/7/2024  3:15 PM MED 8  [facility]   10/28/2024  1:30 PM Brain Perry MD [facility]       Additional Instructions for the Follow-ups that You Need to Schedule       Discharge Follow-up with PCP   As directed       Currently Documented PCP:    Provider, No Known    PCP Phone Number:    None     Follow Up Details: within one week        Discharge Follow-up with Specified Provider: Dr. Paniagua, Neurosurgery; 2 Weeks   As directed      To: Dr. Paniagua, Neurosurgery   Follow Up: 2 Weeks                      Michelle Lynne MD  10/07/24      Time Spent on Discharge:  I spent  35  minutes on this discharge activity which included: face-to-face encounter with the patient, reviewing the data in the system, coordination of the care with the nursing staff as well as consultants, documentation, and entering orders.            Electronically signed by Michelle Lynne MD at 10/07/24 5971

## 2024-11-01 ENCOUNTER — APPOINTMENT (OUTPATIENT)
Dept: MRI IMAGING | Facility: HOSPITAL | Age: 84
End: 2024-11-01
Payer: MEDICARE

## 2024-11-01 ENCOUNTER — APPOINTMENT (OUTPATIENT)
Dept: GENERAL RADIOLOGY | Facility: HOSPITAL | Age: 84
End: 2024-11-01
Payer: MEDICARE

## 2024-11-01 ENCOUNTER — APPOINTMENT (OUTPATIENT)
Dept: CT IMAGING | Facility: HOSPITAL | Age: 84
End: 2024-11-01
Payer: MEDICARE

## 2024-11-01 ENCOUNTER — HOSPITAL ENCOUNTER (INPATIENT)
Facility: HOSPITAL | Age: 84
LOS: 1 days | Discharge: HOME | End: 2024-11-05
Attending: EMERGENCY MEDICINE | Admitting: INTERNAL MEDICINE
Payer: MEDICARE

## 2024-11-01 ENCOUNTER — APPOINTMENT (OUTPATIENT)
Dept: CARDIOLOGY | Facility: HOSPITAL | Age: 84
End: 2024-11-01
Payer: MEDICARE

## 2024-11-01 DIAGNOSIS — M62.82 RHABDOMYOLYSIS DUE TO COVID-19: ICD-10-CM

## 2024-11-01 DIAGNOSIS — R41.841 COGNITIVE COMMUNICATION DEFICIT: ICD-10-CM

## 2024-11-01 DIAGNOSIS — R29.810 FACIAL DROOP: ICD-10-CM

## 2024-11-01 DIAGNOSIS — S32.020D CLOSED COMPRESSION FRACTURE OF L2 LUMBAR VERTEBRA WITH ROUTINE HEALING, SUBSEQUENT ENCOUNTER: ICD-10-CM

## 2024-11-01 DIAGNOSIS — U07.1 COVID-19 VIRUS DETECTED: ICD-10-CM

## 2024-11-01 DIAGNOSIS — U07.1 RHABDOMYOLYSIS DUE TO COVID-19: ICD-10-CM

## 2024-11-01 DIAGNOSIS — R41.0 DELIRIUM: Primary | ICD-10-CM

## 2024-11-01 DIAGNOSIS — R41.82 ALTERED MENTAL STATUS, UNSPECIFIED ALTERED MENTAL STATUS TYPE: ICD-10-CM

## 2024-11-01 DIAGNOSIS — I48.20 ATRIAL FIBRILLATION, CHRONIC: ICD-10-CM

## 2024-11-01 DIAGNOSIS — R53.1 WEAKNESS: ICD-10-CM

## 2024-11-01 DIAGNOSIS — N17.9 AKI (ACUTE KIDNEY INJURY): ICD-10-CM

## 2024-11-01 DIAGNOSIS — S06.5XAA SDH (SUBDURAL HEMATOMA): ICD-10-CM

## 2024-11-01 LAB
AMPHET+METHAMPHET UR QL: NEGATIVE
AMPHETAMINES UR QL: NEGATIVE
ANION GAP SERPL CALCULATED.3IONS-SCNC: 15 MMOL/L (ref 5–15)
APTT PPP: 35.9 SECONDS (ref 22–39)
ASCENDING AORTA: 3.6 CM
BARBITURATES UR QL SCN: NEGATIVE
BASOPHILS # BLD AUTO: 0.04 10*3/MM3 (ref 0–0.2)
BASOPHILS # BLD AUTO: 0.05 10*3/MM3 (ref 0–0.2)
BASOPHILS NFR BLD AUTO: 0.5 % (ref 0–1.5)
BASOPHILS NFR BLD AUTO: 0.5 % (ref 0–1.5)
BENZODIAZ UR QL SCN: NEGATIVE
BH CV ECHO MEAS - AO MAX PG: 4.2 MMHG
BH CV ECHO MEAS - AO ROOT DIAM: 3.6 CM
BH CV ECHO MEAS - AO V2 MAX: 102 CM/SEC
BH CV ECHO MEAS - EF(MOD-BP): 57.8 %
BH CV ECHO MEAS - IVS/LVPW: 1.13 CM
BH CV ECHO MEAS - IVSD: 0.9 CM
BH CV ECHO MEAS - LA DIMENSION: 3.8 CM
BH CV ECHO MEAS - LAT PEAK E' VEL: 15.1 CM/SEC
BH CV ECHO MEAS - LV MAX PG: 2.6 MMHG
BH CV ECHO MEAS - LV MEAN PG: 1.3 MMHG
BH CV ECHO MEAS - LV V1 MAX: 81.3 CM/SEC
BH CV ECHO MEAS - LV V1 VTI: 16.1 CM
BH CV ECHO MEAS - LVIDD: 4.6 CM
BH CV ECHO MEAS - LVIDS: 3 CM
BH CV ECHO MEAS - LVOT DIAM: 2 CM
BH CV ECHO MEAS - LVPWD: 0.8 CM
BH CV ECHO MEAS - MED PEAK E' VEL: 9.4 CM/SEC
BH CV ECHO MEAS - MV A MAX VEL: 89.3 CM/SEC
BH CV ECHO MEAS - MV E MAX VEL: 56 CM/SEC
BH CV ECHO MEAS - MV E/A: 0.63
BH CV ECHO MEAS - MV MAX PG: 5.6 MMHG
BH CV ECHO MEAS - MV P1/2T: 62 MSEC
BH CV ECHO MEAS - PA ACC TIME: 0.09 SEC
BH CV ECHO MEAS - PA V2 MAX: 84.1 CM/SEC
BH CV ECHO MEAS - RAP SYSTOLE: 8 MMHG
BH CV ECHO MEAS - RVSP: 32 MMHG
BH CV ECHO MEAS - TAPSE (>1.6): 1.82 CM
BH CV ECHO MEAS - TR MAX PG: 23.9 MMHG
BH CV ECHO MEAS - TR MAX VEL: 244.4 CM/SEC
BH CV ECHO MEASUREMENTS AVERAGE E/E' RATIO: 4.57
BH CV XLRA - RV BASE: 3.6 CM
BH CV XLRA - RV LENGTH: 7.2 CM
BH CV XLRA - TDI S': 12 CM/SEC
BILIRUB UR QL STRIP: NEGATIVE
BUN BLDA-MCNC: 30 MG/DL (ref 8–26)
BUN SERPL-MCNC: 24 MG/DL (ref 8–23)
BUN/CREAT SERPL: 21.4 (ref 7–25)
BUPRENORPHINE SERPL-MCNC: NEGATIVE NG/ML
CA-I BLDA-SCNC: 1.17 MMOL/L (ref 1.2–1.32)
CALCIUM SPEC-SCNC: 9.9 MG/DL (ref 8.6–10.5)
CANNABINOIDS SERPL QL: NEGATIVE
CHLORIDE BLDA-SCNC: 100 MMOL/L (ref 98–109)
CHLORIDE SERPL-SCNC: 100 MMOL/L (ref 98–107)
CHOLEST SERPL-MCNC: 114 MG/DL (ref 0–200)
CLARITY UR: CLEAR
CO2 BLDA-SCNC: 25 MMOL/L (ref 24–29)
CO2 SERPL-SCNC: 26 MMOL/L (ref 22–29)
COCAINE UR QL: NEGATIVE
COLOR UR: YELLOW
CREAT BLDA-MCNC: 1.4 MG/DL (ref 0.6–1.3)
CREAT SERPL-MCNC: 1.12 MG/DL (ref 0.76–1.27)
CREAT UR-MCNC: 102.1 MG/DL
DEPRECATED RDW RBC AUTO: 42.2 FL (ref 37–54)
DEPRECATED RDW RBC AUTO: 42.7 FL (ref 37–54)
EGFRCR SERPLBLD CKD-EPI 2021: 49.3 ML/MIN/1.73
EGFRCR SERPLBLD CKD-EPI 2021: 64.4 ML/MIN/1.73
EOSINOPHIL # BLD AUTO: 0.06 10*3/MM3 (ref 0–0.4)
EOSINOPHIL # BLD AUTO: 0.11 10*3/MM3 (ref 0–0.4)
EOSINOPHIL NFR BLD AUTO: 0.7 % (ref 0.3–6.2)
EOSINOPHIL NFR BLD AUTO: 1.2 % (ref 0.3–6.2)
EOSINOPHIL SPEC QL MICRO: 0 % EOS/100 CELLS (ref 0–0)
ERYTHROCYTE [DISTWIDTH] IN BLOOD BY AUTOMATED COUNT: 12.5 % (ref 12.3–15.4)
ERYTHROCYTE [DISTWIDTH] IN BLOOD BY AUTOMATED COUNT: 12.7 % (ref 12.3–15.4)
FENTANYL UR-MCNC: NEGATIVE NG/ML
GLUCOSE BLDC GLUCOMTR-MCNC: 132 MG/DL (ref 70–130)
GLUCOSE BLDC GLUCOMTR-MCNC: 148 MG/DL (ref 70–130)
GLUCOSE BLDC GLUCOMTR-MCNC: 166 MG/DL (ref 70–130)
GLUCOSE SERPL-MCNC: 127 MG/DL (ref 65–99)
GLUCOSE UR STRIP-MCNC: NEGATIVE MG/DL
HCT VFR BLD AUTO: 38.6 % (ref 37.5–51)
HCT VFR BLD AUTO: 42.7 % (ref 37.5–51)
HCT VFR BLDA CALC: 43 % (ref 38–51)
HDLC SERPL-MCNC: 42 MG/DL (ref 40–60)
HGB BLD-MCNC: 12.9 G/DL (ref 13–17.7)
HGB BLD-MCNC: 14.2 G/DL (ref 13–17.7)
HGB BLDA-MCNC: 14.6 G/DL (ref 12–17)
HGB UR QL STRIP.AUTO: NEGATIVE
HOLD SPECIMEN: NORMAL
IMM GRANULOCYTES # BLD AUTO: 0.03 10*3/MM3 (ref 0–0.05)
IMM GRANULOCYTES # BLD AUTO: 0.04 10*3/MM3 (ref 0–0.05)
IMM GRANULOCYTES NFR BLD AUTO: 0.3 % (ref 0–0.5)
IMM GRANULOCYTES NFR BLD AUTO: 0.5 % (ref 0–0.5)
INR PPP: 1.8 (ref 0.8–1.2)
KETONES UR QL STRIP: ABNORMAL
LDLC SERPL CALC-MCNC: 54 MG/DL (ref 0–100)
LDLC/HDLC SERPL: 1.25 {RATIO}
LEUKOCYTE ESTERASE UR QL STRIP.AUTO: NEGATIVE
LYMPHOCYTES # BLD AUTO: 1.86 10*3/MM3 (ref 0.7–3.1)
LYMPHOCYTES # BLD AUTO: 2.28 10*3/MM3 (ref 0.7–3.1)
LYMPHOCYTES NFR BLD AUTO: 22.5 % (ref 19.6–45.3)
LYMPHOCYTES NFR BLD AUTO: 24.9 % (ref 19.6–45.3)
MAGNESIUM SERPL-MCNC: 2.1 MG/DL (ref 1.6–2.4)
MCH RBC QN AUTO: 30.8 PG (ref 26.6–33)
MCH RBC QN AUTO: 30.9 PG (ref 26.6–33)
MCHC RBC AUTO-ENTMCNC: 33.3 G/DL (ref 31.5–35.7)
MCHC RBC AUTO-ENTMCNC: 33.4 G/DL (ref 31.5–35.7)
MCV RBC AUTO: 92.3 FL (ref 79–97)
MCV RBC AUTO: 92.6 FL (ref 79–97)
METHADONE UR QL SCN: NEGATIVE
MONOCYTES # BLD AUTO: 0.67 10*3/MM3 (ref 0.1–0.9)
MONOCYTES # BLD AUTO: 0.75 10*3/MM3 (ref 0.1–0.9)
MONOCYTES NFR BLD AUTO: 8.1 % (ref 5–12)
MONOCYTES NFR BLD AUTO: 8.2 % (ref 5–12)
NEUTROPHILS NFR BLD AUTO: 5.58 10*3/MM3 (ref 1.7–7)
NEUTROPHILS NFR BLD AUTO: 5.95 10*3/MM3 (ref 1.7–7)
NEUTROPHILS NFR BLD AUTO: 64.9 % (ref 42.7–76)
NEUTROPHILS NFR BLD AUTO: 67.7 % (ref 42.7–76)
NITRITE UR QL STRIP: NEGATIVE
NRBC BLD AUTO-RTO: 0 /100 WBC (ref 0–0.2)
NRBC BLD AUTO-RTO: 0 /100 WBC (ref 0–0.2)
OPIATES UR QL: POSITIVE
OXYCODONE UR QL SCN: NEGATIVE
PCP UR QL SCN: NEGATIVE
PH UR STRIP.AUTO: 5.5 [PH] (ref 5–8)
PLATELET # BLD AUTO: 170 10*3/MM3 (ref 140–450)
PLATELET # BLD AUTO: 183 10*3/MM3 (ref 140–450)
PMV BLD AUTO: 9.8 FL (ref 6–12)
PMV BLD AUTO: 9.9 FL (ref 6–12)
POTASSIUM BLDA-SCNC: 4.5 MMOL/L (ref 3.5–4.9)
POTASSIUM SERPL-SCNC: 4.9 MMOL/L (ref 3.5–5.2)
PROT UR QL STRIP: NEGATIVE
PROTHROMBIN TIME: 21.5 SECONDS (ref 12.8–15.2)
QT INTERVAL: 388 MS
QTC INTERVAL: 455 MS
RBC # BLD AUTO: 4.18 10*6/MM3 (ref 4.14–5.8)
RBC # BLD AUTO: 4.61 10*6/MM3 (ref 4.14–5.8)
SODIUM BLD-SCNC: 138 MMOL/L (ref 138–146)
SODIUM SERPL-SCNC: 141 MMOL/L (ref 136–145)
SODIUM UR-SCNC: 64 MMOL/L
SP GR UR STRIP: 1.08 (ref 1–1.03)
TRICYCLICS UR QL SCN: POSITIVE
TRIGL SERPL-MCNC: 97 MG/DL (ref 0–150)
TROPONIN T SERPL HS-MCNC: 32 NG/L
UROBILINOGEN UR QL STRIP: ABNORMAL
VLDLC SERPL-MCNC: 18 MG/DL (ref 5–40)
WBC NRBC COR # BLD AUTO: 8.25 10*3/MM3 (ref 3.4–10.8)
WBC NRBC COR # BLD AUTO: 9.17 10*3/MM3 (ref 3.4–10.8)
WHOLE BLOOD HOLD COAG: NORMAL
WHOLE BLOOD HOLD SPECIMEN: NORMAL

## 2024-11-01 PROCEDURE — 25010000002 SULFUR HEXAFLUORIDE MICROSPH 60.7-25 MG RECONSTITUTED SUSPENSION

## 2024-11-01 PROCEDURE — 93005 ELECTROCARDIOGRAM TRACING: CPT | Performed by: EMERGENCY MEDICINE

## 2024-11-01 PROCEDURE — 4A03X5D MEASUREMENT OF ARTERIAL FLOW, INTRACRANIAL, EXTERNAL APPROACH: ICD-10-PCS | Performed by: EMERGENCY MEDICINE

## 2024-11-01 PROCEDURE — G0378 HOSPITAL OBSERVATION PER HR: HCPCS

## 2024-11-01 PROCEDURE — 87205 SMEAR GRAM STAIN: CPT | Performed by: NURSE PRACTITIONER

## 2024-11-01 PROCEDURE — 92523 SPEECH SOUND LANG COMPREHEN: CPT

## 2024-11-01 PROCEDURE — 93005 ELECTROCARDIOGRAM TRACING: CPT | Performed by: INTERNAL MEDICINE

## 2024-11-01 PROCEDURE — 97162 PT EVAL MOD COMPLEX 30 MIN: CPT

## 2024-11-01 PROCEDURE — 93306 TTE W/DOPPLER COMPLETE: CPT

## 2024-11-01 PROCEDURE — 93010 ELECTROCARDIOGRAM REPORT: CPT | Performed by: STUDENT IN AN ORGANIZED HEALTH CARE EDUCATION/TRAINING PROGRAM

## 2024-11-01 PROCEDURE — 80061 LIPID PANEL: CPT

## 2024-11-01 PROCEDURE — 70498 CT ANGIOGRAPHY NECK: CPT

## 2024-11-01 PROCEDURE — 70551 MRI BRAIN STEM W/O DYE: CPT

## 2024-11-01 PROCEDURE — 92610 EVALUATE SWALLOWING FUNCTION: CPT

## 2024-11-01 PROCEDURE — 80048 BASIC METABOLIC PNL TOTAL CA: CPT | Performed by: NURSE PRACTITIONER

## 2024-11-01 PROCEDURE — 85014 HEMATOCRIT: CPT | Performed by: EMERGENCY MEDICINE

## 2024-11-01 PROCEDURE — 71045 X-RAY EXAM CHEST 1 VIEW: CPT

## 2024-11-01 PROCEDURE — 70496 CT ANGIOGRAPHY HEAD: CPT

## 2024-11-01 PROCEDURE — 25510000001 IOPAMIDOL PER 1 ML: Performed by: EMERGENCY MEDICINE

## 2024-11-01 PROCEDURE — 99291 CRITICAL CARE FIRST HOUR: CPT

## 2024-11-01 PROCEDURE — 99222 1ST HOSP IP/OBS MODERATE 55: CPT | Performed by: STUDENT IN AN ORGANIZED HEALTH CARE EDUCATION/TRAINING PROGRAM

## 2024-11-01 PROCEDURE — 81003 URINALYSIS AUTO W/O SCOPE: CPT | Performed by: EMERGENCY MEDICINE

## 2024-11-01 PROCEDURE — 25810000003 SODIUM CHLORIDE 0.9 % SOLUTION: Performed by: NURSE PRACTITIONER

## 2024-11-01 PROCEDURE — 93306 TTE W/DOPPLER COMPLETE: CPT | Performed by: INTERNAL MEDICINE

## 2024-11-01 PROCEDURE — 84484 ASSAY OF TROPONIN QUANT: CPT | Performed by: EMERGENCY MEDICINE

## 2024-11-01 PROCEDURE — 80047 BASIC METABLC PNL IONIZED CA: CPT | Performed by: EMERGENCY MEDICINE

## 2024-11-01 PROCEDURE — 97165 OT EVAL LOW COMPLEX 30 MIN: CPT

## 2024-11-01 PROCEDURE — 85610 PROTHROMBIN TIME: CPT | Performed by: EMERGENCY MEDICINE

## 2024-11-01 PROCEDURE — 82948 REAGENT STRIP/BLOOD GLUCOSE: CPT

## 2024-11-01 PROCEDURE — 80307 DRUG TEST PRSMV CHEM ANLYZR: CPT | Performed by: STUDENT IN AN ORGANIZED HEALTH CARE EDUCATION/TRAINING PROGRAM

## 2024-11-01 PROCEDURE — 85025 COMPLETE CBC W/AUTO DIFF WBC: CPT | Performed by: NURSE PRACTITIONER

## 2024-11-01 PROCEDURE — 83735 ASSAY OF MAGNESIUM: CPT | Performed by: NURSE PRACTITIONER

## 2024-11-01 PROCEDURE — 84300 ASSAY OF URINE SODIUM: CPT | Performed by: NURSE PRACTITIONER

## 2024-11-01 PROCEDURE — 99222 1ST HOSP IP/OBS MODERATE 55: CPT

## 2024-11-01 PROCEDURE — 85730 THROMBOPLASTIN TIME PARTIAL: CPT | Performed by: EMERGENCY MEDICINE

## 2024-11-01 PROCEDURE — 36415 COLL VENOUS BLD VENIPUNCTURE: CPT

## 2024-11-01 PROCEDURE — 70450 CT HEAD/BRAIN W/O DYE: CPT

## 2024-11-01 PROCEDURE — 0042T HC CT CEREBRAL PERFUSION W/WO CONTRAST: CPT

## 2024-11-01 PROCEDURE — 85025 COMPLETE CBC W/AUTO DIFF WBC: CPT | Performed by: EMERGENCY MEDICINE

## 2024-11-01 PROCEDURE — 82570 ASSAY OF URINE CREATININE: CPT | Performed by: NURSE PRACTITIONER

## 2024-11-01 RX ORDER — ATORVASTATIN CALCIUM 40 MG/1
80 TABLET, FILM COATED ORAL NIGHTLY
Status: DISCONTINUED | OUTPATIENT
Start: 2024-11-01 | End: 2024-11-05 | Stop reason: HOSPADM

## 2024-11-01 RX ORDER — SODIUM CHLORIDE 0.9 % (FLUSH) 0.9 %
10 SYRINGE (ML) INJECTION AS NEEDED
Status: DISCONTINUED | OUTPATIENT
Start: 2024-11-01 | End: 2024-11-05 | Stop reason: HOSPADM

## 2024-11-01 RX ORDER — MULTIPLE VITAMINS W/ MINERALS TAB 9MG-400MCG
1 TAB ORAL DAILY
Status: DISCONTINUED | OUTPATIENT
Start: 2024-11-01 | End: 2024-11-05 | Stop reason: HOSPADM

## 2024-11-01 RX ORDER — DEXTROSE MONOHYDRATE 25 G/50ML
25 INJECTION, SOLUTION INTRAVENOUS
Status: DISCONTINUED | OUTPATIENT
Start: 2024-11-01 | End: 2024-11-05 | Stop reason: HOSPADM

## 2024-11-01 RX ORDER — IOPAMIDOL 755 MG/ML
150 INJECTION, SOLUTION INTRAVASCULAR
Status: COMPLETED | OUTPATIENT
Start: 2024-11-01 | End: 2024-11-01

## 2024-11-01 RX ORDER — INSULIN LISPRO 100 [IU]/ML
2-7 INJECTION, SOLUTION INTRAVENOUS; SUBCUTANEOUS
Status: DISCONTINUED | OUTPATIENT
Start: 2024-11-01 | End: 2024-11-05 | Stop reason: HOSPADM

## 2024-11-01 RX ORDER — NICOTINE POLACRILEX 4 MG
15 LOZENGE BUCCAL
Status: DISCONTINUED | OUTPATIENT
Start: 2024-11-01 | End: 2024-11-05 | Stop reason: HOSPADM

## 2024-11-01 RX ORDER — ACETAMINOPHEN 650 MG/1
650 SUPPOSITORY RECTAL EVERY 4 HOURS PRN
Status: DISCONTINUED | OUTPATIENT
Start: 2024-11-01 | End: 2024-11-05 | Stop reason: HOSPADM

## 2024-11-01 RX ORDER — SODIUM CHLORIDE 9 MG/ML
40 INJECTION, SOLUTION INTRAVENOUS AS NEEDED
Status: DISCONTINUED | OUTPATIENT
Start: 2024-11-01 | End: 2024-11-01

## 2024-11-01 RX ORDER — IBUPROFEN 600 MG/1
1 TABLET ORAL
Status: DISCONTINUED | OUTPATIENT
Start: 2024-11-01 | End: 2024-11-05 | Stop reason: HOSPADM

## 2024-11-01 RX ORDER — BISACODYL 10 MG
10 SUPPOSITORY, RECTAL RECTAL DAILY PRN
Status: DISCONTINUED | OUTPATIENT
Start: 2024-11-01 | End: 2024-11-05 | Stop reason: HOSPADM

## 2024-11-01 RX ORDER — ACETAMINOPHEN 160 MG/5ML
650 SOLUTION ORAL EVERY 4 HOURS PRN
Status: DISCONTINUED | OUTPATIENT
Start: 2024-11-01 | End: 2024-11-05 | Stop reason: HOSPADM

## 2024-11-01 RX ORDER — BISACODYL 5 MG/1
5 TABLET, DELAYED RELEASE ORAL DAILY PRN
Status: DISCONTINUED | OUTPATIENT
Start: 2024-11-01 | End: 2024-11-05 | Stop reason: HOSPADM

## 2024-11-01 RX ORDER — AMOXICILLIN 250 MG
2 CAPSULE ORAL 2 TIMES DAILY
Status: DISCONTINUED | OUTPATIENT
Start: 2024-11-01 | End: 2024-11-05 | Stop reason: HOSPADM

## 2024-11-01 RX ORDER — SODIUM CHLORIDE 9 MG/ML
75 INJECTION, SOLUTION INTRAVENOUS CONTINUOUS
Status: ACTIVE | OUTPATIENT
Start: 2024-11-01 | End: 2024-11-01

## 2024-11-01 RX ORDER — POLYETHYLENE GLYCOL 3350 17 G/17G
17 POWDER, FOR SOLUTION ORAL DAILY PRN
Status: DISCONTINUED | OUTPATIENT
Start: 2024-11-01 | End: 2024-11-05 | Stop reason: HOSPADM

## 2024-11-01 RX ORDER — SODIUM CHLORIDE 0.9 % (FLUSH) 0.9 %
10 SYRINGE (ML) INJECTION EVERY 12 HOURS SCHEDULED
Status: DISCONTINUED | OUTPATIENT
Start: 2024-11-01 | End: 2024-11-05 | Stop reason: HOSPADM

## 2024-11-01 RX ORDER — ACETAMINOPHEN 325 MG/1
650 TABLET ORAL EVERY 4 HOURS PRN
Status: DISCONTINUED | OUTPATIENT
Start: 2024-11-01 | End: 2024-11-05 | Stop reason: HOSPADM

## 2024-11-01 RX ORDER — SODIUM CHLORIDE 0.9 % (FLUSH) 0.9 %
10 SYRINGE (ML) INJECTION EVERY 12 HOURS SCHEDULED
Status: DISCONTINUED | OUTPATIENT
Start: 2024-11-01 | End: 2024-11-05

## 2024-11-01 RX ADMIN — IOPAMIDOL 116 ML: 755 INJECTION, SOLUTION INTRAVENOUS at 01:29

## 2024-11-01 RX ADMIN — SULFUR HEXAFLUORIDE 2 ML: KIT at 12:12

## 2024-11-01 RX ADMIN — Medication 12.5 MG: at 21:02

## 2024-11-01 RX ADMIN — SODIUM CHLORIDE 75 ML/HR: 9 INJECTION, SOLUTION INTRAVENOUS at 07:33

## 2024-11-01 RX ADMIN — Medication 10 ML: at 21:03

## 2024-11-01 RX ADMIN — APIXABAN 5 MG: 5 TABLET, FILM COATED ORAL at 21:02

## 2024-11-01 RX ADMIN — ATORVASTATIN CALCIUM 80 MG: 40 TABLET, FILM COATED ORAL at 21:02

## 2024-11-01 RX ADMIN — SENNOSIDES AND DOCUSATE SODIUM 2 TABLET: 50; 8.6 TABLET ORAL at 21:02

## 2024-11-01 NOTE — ED NOTES
Apollo Haro    Nursing Report ED to Floor:  Mental status: aaox3  Ambulatory status: assistx2  Oxygen Therapy:  ra  Cardiac Rhythm: nsr  Admitted from: home  Safety Concerns:  fall risk  Social Issues: n/a  ED Room #:  7    ED Nurse Phone Extension - [phone] or may call [phone].      HPI:   Chief Complaint   Patient presents with    Altered Mental Status       Past Medical History:  Past Medical History:   Diagnosis Date    A-fib     CAD (coronary artery disease)     DM (diabetes mellitus)         Past Surgical History:  History reviewed. No pertinent surgical history.     Admitting Doctor:   Charla Castillo DO    Consulting Provider(s):  Consults       Date and Time Order Name Status Description    11/1/2024 12:50 AM Inpatient Neurology Consult Stroke Completed     9/25/2024  3:20 PM Inpatient Palliative Care MD Consult Completed     9/24/2024 11:42 AM Inpatient Cardiology Consult Completed     9/23/2024  1:49 PM Inpatient Neurosurgery Consult Completed              Admitting Diagnosis:   The encounter diagnosis was Delirium.    Most Recent Vitals:   Vitals:    11/01/24 0049 11/01/24 0224   BP: 111/71 109/60   BP Location: Right arm Right arm   Patient Position: Lying Lying   Pulse: 78 77   Resp: 18 18   Temp: 98.4 °F (36.9 °C)    TempSrc: Oral    SpO2: 96% 97%       Active LDAs/IV Access:   Lines, Drains & Airways       Active LDAs       Name Placement date Placement time Site Days    Peripheral IV 11/01/24 0107 Left Antecubital 11/01/24 0107  Antecubital  less than 1    Peripheral IV 11/01/24 0107 Right Antecubital 11/01/24 0107  Antecubital  less than 1                    Labs (abnormal labs have a star):   Labs Reviewed   SINGLE HS TROPONIN T - Abnormal; Notable for the following components:       Result Value    HS Troponin T 32 (*)     All other components within normal limits    Narrative:     High Sensitive Troponin T Reference Range:  <14.0 ng/L- Negative Female for AMI  <22.0 ng/L- Negative Male for  AMI  >=14 - Abnormal Female indicating possible myocardial injury.  >=22 - Abnormal Male indicating possible myocardial injury.   Clinicians would have to utilize clinical acumen, EKG, Troponin, and serial changes to determine if it is an Acute Myocardial Infarction or myocardial injury due to an underlying chronic condition.        POCT CHEM 8 - Abnormal; Notable for the following components:    Glucose 166 (*)     BUN 30 (*)     Creatinine 1.40 (*)     Ionized Calcium 1.17 (*)     eGFR 49.3 (*)     All other components within normal limits   POCT PROTIME - INR - Abnormal; Notable for the following components:    Protime 21.5 (*)     INR 1.8 (*)     All other components within normal limits   APTT - Normal    Narrative:     PTT = The equivalent PTT values for the therapeutic range of heparin levels at 0.3 to 0.5 U/ml are 60 to 70 seconds.   CBC WITH AUTO DIFFERENTIAL - Normal   RAINBOW DRAW    Narrative:     The following orders were created for panel order [facility] Draw.  Procedure                               Abnormality         Status                     ---------                               -----------         ------                     Green Top (Gel)[[id number]]                                  Final result               Lavender Top[[id number]]                                     Final result               Gold Top - SST[[id number]]                                   Final result               Flores Top[[id number]]                                         Final result               Light Blue Top[[id number]]                                   Final result                 Please view results for these tests on the individual orders.   URINALYSIS W/ CULTURE IF INDICATED   CBC AND DIFFERENTIAL    Narrative:     The following orders were created for panel order CBC & Differential.  Procedure                               Abnormality         Status                     ---------                               -----------          ------                     CBC Auto Differential[[id number]]        Normal              Final result                 Please view results for these tests on the individual orders.   GREEN TOP   LAVENDER TOP   GOLD TOP - SST   GRAY TOP   LIGHT BLUE TOP       Meds Given in ED:   Medications   sodium chloride 0.9 % flush 10 mL (has no administration in time range)   iopamidol (ISOVUE-370) 76 % injection 150 mL (116 mL Intravenous Given 11/1/24 0129)           Last NIH score:  Interval: baseline  1a. Level of Consciousness: 0-->Alert, keenly responsive  1b. LOC Questions: 0-->Answers both questions correctly  1c. LOC Commands: 0-->Performs both tasks correctly  2. Best Gaze: 0-->Normal  3. Visual: 0-->No visual loss  4. Facial Palsy: 1-->Minor paralysis (flattened nasolabial fold, asymmetry on smiling)  5a. Motor Arm, Left: 0-->No drift, limb holds 90 (or 45) degrees for full 10 secs  5b. Motor Arm, Right: 0-->No drift, limb holds 90 (or 45) degrees for full 10 secs  6a. Motor Leg, Left: 0-->No drift, leg holds 30 degree position for full 5 secs  6b. Motor Leg, Right: 0-->No drift, leg holds 30 degree position for full 5 secs  7. Limb Ataxia: 0-->Absent  8. Sensory: 0-->Normal, no sensory loss  9. Best Language: 0-->No aphasia, normal  10. Dysarthria: 0-->Normal  11. Extinction and Inattention (formerly Neglect): 0-->No abnormality    Total (NIH Stroke Scale): 1     Dysphagia screening results:        [place] Coma Scale:  No data recorded     CIWA:        Restraint Type:            Isolation Status:  No active isolations

## 2024-11-01 NOTE — H&P
Attending   Admission Attestation       I have performed an independent face-to-face diagnostic evaluation including performing an independent physical examination.  I approve of the documented plan of care above that was reviewed and developed with the advanced practice clinician (APC) and take responsibility for that plan along with its associated risks.  I have updated the HPI as appropriate.    Brief HPI    84M with PMHx of HTN, CAD, chronic afib, DM2 with peripheral neuropathy, chronic back and hip pain who presented to the ED with AMS. Wife was concerned because patient went to bed on 10/31 evening and woke up confused, accused her of stealing things so wife brought him to the ED for evaluate. Wife no longer present in ED on my evaluation. Patient able to tell me that he knows he came to the ED for confusion, remembers being confused last night and knows wife brought him to the ED for this, but feels well now and has no complaints. Admits that he only sleeps 2 hours per night most nights, has had difficulty sleeping for at least 6 months. No tobacco/EtOH use. No constipation/urinary symptoms. No fevers/chills/URI symptoms.    Attending Physical Exam:  Temp:  [98.4 °F (36.9 °C)] 98.4 °F (36.9 °C)  Heart Rate:  [77-78] 77  Resp:  [18] 18  BP: (109-111)/(60-71) 109/60    Constitutional: Awake, alert, resting comfortably  HENT: NCAT, mucous membranes moist  Respiratory: Clear to auscultation bilaterally, respiratory effort normal   Cardiovascular: RRR, no murmurs, rubs, or gallops  Gastrointestinal: Positive bowel sounds, soft, nontender, nondistended  Musculoskeletal: No bilateral ankle edema  Psychiatric: Appropriate affect, cooperative  Neurologic: Alert, oriented x3, speech clear, left facial droop present, 5/5 strength of bilateral upper and lower extremities, decreased sensation to light touch of bilateral lower extremities (chronic per patient).  Skin: No rashes      Result Review:  I have personally  reviewed the results from the time of this admission to 2024 04:09 EDT and agree with these findings:  [x]  Laboratory list / accordion  []  Microbiology  [x]  Radiology  [x]  EKG/Telemetry   []  Cardiology/Vascular   []  Pathology  []  Old records  []  Other:    Assessment and Plan:     Altered mental status: MRI brain pending. If stroke imaging negative, suspect patient's episode of altered mentation was from long-standing sleep deprivation (only sleeps 2 hours per night most nights) plus recent addition of opiates for chronic back/hip pain.     See assessment and plan documented by APC above and updated/edited by me as appropriate.    Charla Castillo, DO  24             [facility]  HISTORY AND PHYSICAL    Patient Name: Apollo Haro  : 1940  MRN: [id number]  Primary Care Physician: Provider, No Known  Date of admission: 2024    Subjective   Subjective     Chief Complaint:  Altered mental status     HPI:  Apollo Haro is a 84 y.o. male with PMHx significant for afib on eliquis, CAD, T2DM, HTN, chronic back/hip pain who presented to the ED with complaint of altered mental status.     HPI is obtained per medical record and pt. Pt is poor historian.   Per report, pt went to bed around 6pm tonight and woke up confused and accusing his wife of stealing things. She called EMS for transport to the ED for evaluation. The wife reported intermittent episodes of delirium over the past several days.     Upon arrival to the ED, the pt was back to baseline and oriented, He was able to recall going to bed and then reports waking up in the ambulance on the way here.   During this exam, pt is confused and believes he rode in the ambulance with his wife for her to be admitted to the hospital but also reports he woke up confused.   Upon arrival to the ED, labs are concerning for DARIO. Code stroke was initiated secondary to left facial droop. CXR negative for acute  findings. CTA head neck, CTP, and CTH negative for acute findings.     He will be admitted to Hospital Medicine for further evaluation.       Review of Systems   Unable to perform ROS: Mental status change          Personal History     Past Medical History:   Diagnosis Date    A-fib     CAD (coronary artery disease)     DM (diabetes mellitus)          History reviewed. No pertinent surgical history.    Family History:  family history is not on file.     Social History:  reports that he has never smoked. He has never been exposed to tobacco smoke. He has never used smokeless tobacco. He reports that he does not drink alcohol and does not use drugs.  Social History     Social History Narrative    Not on file       Medications:  HYDROcodone-acetaminophen, Magnesium, QUEtiapine, acetaminophen, apixaban, aspirin, metFORMIN, metoprolol tartrate, multivitamin with minerals, naloxone, polyethylene glycol, pravastatin, and sennosides-docusate    Allergies   Allergen Reactions    Lidocaine Angioedema    Procaine Unknown - Low Severity       Objective   Objective     Vital Signs:   Temp:  [98.4 °F (36.9 °C)] 98.4 °F (36.9 °C)  Heart Rate:  [77-78] 77  Resp:  [18] 18  BP: (109-111)/(60-71) 109/60    Physical Exam   Constitutional: Awake, alert, no acute distress   Eyes: PERRLA, sclerae anicteric, no conjunctival injection  HENT: NCAT, mucous membranes moist  Neck: Supple, no thyromegaly, no lymphadenopathy, trachea midline  Respiratory: Clear to auscultation bilaterally, nonlabored respirations   Cardiovascular: RRR, no murmurs, rubs, or gallops, palpable pedal pulses bilaterally  Gastrointestinal: Positive bowel sounds, soft, nontender, nondistended  Musculoskeletal: No bilateral ankle edema, no clubbing or cyanosis to extremities  Psychiatric: Appropriate affect, cooperative  Neurologic: Oriented to self and place, disoriented to time, strength symmetric in all extremities, Cranial Nerves grossly intact to confrontation,  speech clear, left facial droop   Skin: No rashes      Result Review:  I have personally reviewed the results from the time of this admission to 11/1/2024 03:42 EDT and agree with these findings:  [x]  Laboratory list / accordion  [x]  Microbiology  [x]  Radiology  [x]  EKG/Telemetry   []  Cardiology/Vascular   []  Pathology  [x]  Old records  []  Other:  Most notable findings include:     LAB RESULTS:      Lab 11/01/24 0138 11/01/24 0059 11/01/24 0052   WBC  --   --  9.17   HEMOGLOBIN  --   --  14.2   HEMOGLOBIN, POC  --  14.6  --    HEMATOCRIT  --   --  42.7   HEMATOCRIT POC  --  43  --    PLATELETS  --   --  183   NEUTROS ABS  --   --  5.95   IMMATURE GRANS (ABS)  --   --  0.03   LYMPHS ABS  --   --  2.28   MONOS ABS  --   --  0.75   EOS ABS  --   --  0.11   MCV  --   --  92.6   PROTIME 21.5*  --   --    APTT  --   --  35.9         Lab 11/01/24 0059   CREATININE 1.40*   EGFR 49.3*             Lab 11/01/24 0138 11/01/24 0052   HSTROP T  --  32*   PROTIME 21.5*  --    INR 1.8*  --                  Brief Urine Lab Results  (Last result in the past 365 days)        Color   Clarity   Blood   Leuk Est   Nitrite   Protein   CREAT   Urine HCG        09/23/24 1337 Yellow   Cloudy   Large (3+)   Large (3+)   Positive   30 mg/dL (1+)                 Microbiology Results (last 10 days)       ** No results found for the last 240 hours. **            CT Angiogram Head w AI Analysis of LVO    Result Date: 11/1/2024  CT ANGIOGRAM HEAD W AI ANALYSIS OF LVO, CT ANGIOGRAM NECK Date of Exam: 11/1/2024 1:00 AM EDT Indication: Neuro deficit, acute, stroke suspected Neuro deficit, acute stroke suspected. Comparison: 11/1/2024, 9/23/2024. Technique: CTA of the head and neck was performed after the uneventful intravenous administration of intravenous contrast. Reconstructed coronal and sagittal images were also obtained. In addition, a 3-D volume rendered image was created for interpretation. Automated exposure control and  iterative reconstruction methods were used. Findings: No evidence of hemodynamically significant stenosis, AVM or aneurysm within the head or neck by NASCET criteria. No large vessel occlusion identified. No evidence of paucity of vasculature. No evidence of dissection. Soft tissues of the neck demonstrate no acute process. No acute osseous abnormality identified. Lung apices are clear.     Impression: Impression: No evidence of hemodynamically significant stenosis, AVM or aneurysm within the head or neck by NASCET criteria. No large vessel occlusion identified. No acute process. Electronically Signed: Carolina Hassan MD  11/1/2024 1:37 AM EDT  Workstation ID: [id number]    CT Angiogram Neck    Result Date: 11/1/2024  CT ANGIOGRAM HEAD W AI ANALYSIS OF LVO, CT ANGIOGRAM NECK Date of Exam: 11/1/2024 1:00 AM EDT Indication: Neuro deficit, acute, stroke suspected Neuro deficit, acute stroke suspected. Comparison: 11/1/2024, 9/23/2024. Technique: CTA of the head and neck was performed after the uneventful intravenous administration of intravenous contrast. Reconstructed coronal and sagittal images were also obtained. In addition, a 3-D volume rendered image was created for interpretation. Automated exposure control and iterative reconstruction methods were used. Findings: No evidence of hemodynamically significant stenosis, AVM or aneurysm within the head or neck by NASCET criteria. No large vessel occlusion identified. No evidence of paucity of vasculature. No evidence of dissection. Soft tissues of the neck demonstrate no acute process. No acute osseous abnormality identified. Lung apices are clear.     Impression: Impression: No evidence of hemodynamically significant stenosis, AVM or aneurysm within the head or neck by NASCET criteria. No large vessel occlusion identified. No acute process. Electronically Signed: Carolina Hassan MD  11/1/2024 1:37 AM EDT  Workstation ID: [id number]    CT CEREBRAL PERFUSION WITH & WITHOUT  CONTRAST    Result Date: 11/1/2024  CT CEREBRAL PERFUSION W WO CONTRAST Date of Exam: 11/1/2024 1:00 AM EDT Indication: Neuro deficit, acute, stroke suspected Neuro deficit, acute stroke suspected.  Comparison: 11/1/2024, 9/23/2024. Technique: Axial CT images of the brain were obtained prior to and after the administration of intravenous contrast. Core blood volume, core blood flow, mean transit time, and Tmax images were obtained utilizing the Rapid software protocol. A limited CT angiogram of the head was also performed to measure the blood vessel density. The radiation dose reduction device was turned on for each scan per the ALARA (As Low as Reasonably Achievable) protocol. Findings: Cerebral blood flow less than 30% is 0 mL. Tmax greater than 6 seconds is 0 mL. Mismatch volume is 0 mL. Mismatch ratio is none. No suspicious perfusion abnormality identified.     Impression: Impression: No suspicious perfusion abnormality identified. Electronically Signed: Carolina Hassan MD  11/1/2024 1:36 AM EDT  Workstation ID: [id number]    XR Chest 1 View    Result Date: 11/1/2024  XR CHEST 1 VW Date of Exam: 11/1/2024 1:18 AM EDT Indication: Acute Stroke Protocol (onset < 12 hrs) Comparison: 9/16/2024. Findings: There are no airspace consolidations. No pleural fluid. No pneumothorax. The pulmonary vasculature appears within normal limits. The cardiac and mediastinal silhouette appear unremarkable. No acute osseous abnormality identified.     Impression: Impression: No acute cardiopulmonary process. Electronically Signed: Carolina Hassan MD  11/1/2024 1:32 AM EDT  Workstation ID: [id number]    CT Head Without Contrast Stroke Protocol    Result Date: 11/1/2024  CT HEAD WO CONTRAST STROKE PROTOCOL Date of Exam: 11/1/2024 12:56 AM EDT Indication: Neuro deficit, acute, stroke suspected Neuro deficit, acute, stroke suspected. Comparison: 9/23/2024. Technique: Axial CT images were obtained of the head without contrast administration.   Reconstructed coronal images were also obtained. Automated exposure control and iterative construction methods were used. Scan Time: 12:57 a.m. Results discussed with the stroke team at 1:02 a.m. Findings: There is no evidence of hemorrhage. There is no mass effect or midline shift. There is no extracerebral collection. Ventricles are normal in size and configuration for patient's stated age.  Posterior fossa is within normal limits. Calvarium and skull base appear intact.   Visualized sinuses show no air fluid levels. Visualized orbits are unremarkable.     Impression: Impression: No evidence of hemorrhage, mass effect or midline shift. No acute process identified. Electronically Signed: Carolina Hassan MD  11/1/2024 1:03 AM EDT  Workstation ID: [id number]         Assessment & Plan   Assessment & Plan       AMS (altered mental status)    Type 2 diabetes mellitus, without long-term current use of insulin    CAD (coronary artery disease)    HTN (hypertension)    Atrial fibrillation, chronic    DARIO (acute kidney injury)    84 y.o. male with PMHx significant for afib on eliquis, CAD, T2DM, HTN, chronic back/hip pain, who presented to the ED with complaint of altered mental status.     Altered Mental Status   - eval per stroke navigator/ orders initiated   - CTH, CTP, CTA head/neck negative for acute findings   - MRI brain pending   - UA pending   - hold seroquel for now  - recently prescribed lortab, hold for now   -CBC,BMP in am     DARIO   - baseline cr+ ~ 0.82, currently 1.40  - likely holding onto medications contributing AMS   - gentle IV fluid overnight   - Urine studies   - UA pending   - BMP in am     Diabetes Mellitus 2  - hold metformin   -FSBG ACHS  - SS insulin     Afib   - rate controlled   - continue eliquis   - continue metoprolol     Back pain   - recently started on lortab, hold for now due to AMS  - tylenol PRN  - fall precautions     DVT prophylaxis: on Eliquis     CODE STATUS:   Code Status (Patient has no  pulse and is not breathing): CPR (Attempt to Resuscitate)  Medical Interventions (Patient has pulse or is breathing): Full Support      Expected Discharge  Expected Discharge Date: 11/3/2024; Expected Discharge Time:       This note has been completed as part of a split-shared workflow.     Signature: Electronically signed by SAW Rodriguez, 11/01/24, 3:42 AM EDT.

## 2024-11-01 NOTE — ED PROVIDER NOTES
[facility]    EMERGENCY DEPARTMENT ENCOUNTER      Pt Name: Apollo Haro  MRN: [id number]  YOB: 1940  Date of evaluation: 11/1/2024  Provider: Didier Mitchell MD    CHIEF COMPLAINT       Chief Complaint   Patient presents with    Altered Mental Status         HISTORY OF PRESENT ILLNESS   Apollo Haro is a 84 y.o. male who presents to the emergency department for unknown reasons.  EMS is departed by the time of my evaluation of the patient at the bedside.  He states he does not know why he is here.  He states he went to bed around 6:00 in the evening and was feeling in his usual state of health and then when he woke up he was on his way here to the hospital.  He denies being in any pain and specifically denies headache.  Denies any numbness, weakness, slurred speech, denies difficulty breathing, nausea or vomiting.    I attempted to call patient's wife over the phone but go straight to voicemail so no additional history can be obtained    REVIEW OF SYSTEMS     ROS:  A chief complaint appropriate review of systems was completed and is negative except as noted in the HPI.      PAST MEDICAL HISTORY     Past Medical History:   Diagnosis Date    A-fib     CAD (coronary artery disease)     DM (diabetes mellitus)          SURGICAL HISTORY     History reviewed. No pertinent surgical history.      CURRENT MEDICATIONS       Current Facility-Administered Medications:     sodium chloride 0.9 % flush 10 mL, 10 mL, Intravenous, PRN, Didier Mitchell MD    Current Outpatient Medications:     acetaminophen (TYLENOL) 325 MG tablet, Take 2 tablets by mouth Every 4 (Four) Hours As Needed for Mild Pain, Fever or Headache. OTC, Disp: , Rfl:     apixaban (ELIQUIS) 5 MG tablet tablet, Take 1 tablet by mouth 2 (Two) Times a Day., Disp: , Rfl:     aspirin 81 MG EC tablet, Take 1 tablet by mouth Daily. OTC, Disp: , Rfl:     HYDROcodone-acetaminophen (NORCO) 5-325 MG per tablet, Take 1 tablet by mouth As Needed., Disp: , Rfl:      Magnesium 200 MG tablet, Take 1 tablet by mouth 2 (Two) Times a Day. OTC, Disp: , Rfl:     metFORMIN (GLUCOPHAGE) 500 MG tablet, Take 1 tablet by mouth 2 (Two) Times a Day With Meals., Disp: , Rfl:     metoprolol tartrate (LOPRESSOR) 25 MG tablet, Take 0.5 tablets by mouth Every 12 (Twelve) Hours for 30 days., Disp: 30 tablet, Rfl: 0    multivitamin with minerals (CENTRUM SILVER 50+MEN PO), Take 1 tablet by mouth Daily. OTC, Disp: , Rfl:     multivitamin with minerals tablet tablet, Take 1 tablet by mouth Every Morning., Disp: , Rfl:     naloxone (NARCAN) 4 MG/0.1ML nasal spray, Administer 1 spray into the nostril(s) as directed by provider As Needed., Disp: , Rfl:     polyethylene glycol (MIRALAX) 17 g packet, Take 17 g by mouth Daily., Disp: 30 each, Rfl: 0    pravastatin (PRAVACHOL) 20 MG tablet, Take 1 tablet by mouth Every Night., Disp: , Rfl:     QUEtiapine (SEROquel) 25 MG tablet, Take 1 tablet by mouth Every Night., Disp: , Rfl:     sennosides-docusate (PERICOLACE) 8.6-50 MG per tablet, Take 2 tablets by mouth 2 (Two) Times a Day for 30 days., Disp: 120 tablet, Rfl: 0    ALLERGIES     Lidocaine and Procaine    FAMILY HISTORY     History reviewed. No pertinent family history.       SOCIAL HISTORY       Social History     Socioeconomic History    Marital status:    Tobacco Use    Smoking status: Never     Passive exposure: Never    Smokeless tobacco: Never   Vaping Use    Vaping status: Never Used   Substance and Sexual Activity    Alcohol use: Never    Drug use: Never    Sexual activity: Defer         PHYSICAL EXAM    (up to 7 for level 4, 8 or more for level 5)     Vitals:    11/01/24 0049 11/01/24 0224   BP: 111/71 109/60   BP Location: Right arm Right arm   Patient Position: Lying Lying   Pulse: 78 77   Resp: 18 18   Temp: 98.4 °F (36.9 °C)    TempSrc: Oral    SpO2: 96% 97%       Physical Exam  Constitutional:       General: He is not in acute distress.  HENT:      Head: Normocephalic and  atraumatic.   Eyes:      Conjunctiva/sclera: Conjunctivae normal.      Pupils: Pupils are equal, round, and reactive to light.   Cardiovascular:      Rate and Rhythm: Normal rate and regular rhythm.      Pulses: Normal pulses.      Heart sounds: No murmur heard.     No gallop.   Pulmonary:      Effort: Pulmonary effort is normal. No respiratory distress.   Abdominal:      General: Abdomen is flat. There is no distension.      Tenderness: There is no abdominal tenderness.   Musculoskeletal:         General: No swelling or deformity. Normal range of motion.   Skin:     General: Skin is warm and dry.      Capillary Refill: Capillary refill takes less than 2 seconds.   Neurological:      Mental Status: He is alert and oriented to person, place, and time.      Comments: GCS 15.  Cranial Nerves II-XII exam remarkable for left-sided facial droop compared with the right, no other cranial nerve deficits.  Strength 5/5 in the bilateral upper extremities.  Strength 5/5 in the bilateral lower extremities.  Sensation to light touch intact throughout.  Cerebellar function intact via finger-nose-finger.     Psychiatric:         Mood and Affect: Mood normal.         Behavior: Behavior normal.            DIAGNOSTIC RESULTS     EKG: All EKGs are interpreted by the Emergency Department Physician who either signs or Co-signs this chart in the absence of a cardiologist.    ECG 12 Lead Stroke Evaluation   Preliminary Result   Test Reason : Stroke Evaluation   Blood Pressure :   */*   mmHG   Vent. Rate :  83 BPM     Atrial Rate :  83 BPM      P-R Int : 312 ms          QRS Dur : 104 ms       QT Int : 388 ms       P-R-T Axes :  24 -20  26 degrees      QTc Int : 455 ms      Sinus rhythm with 1st degree AV block   Otherwise normal ECG   When compared with ECG of 24-SEP-2024 12:44,   Sinus rhythm has replaced Atrial flutter   Incomplete left bundle branch block is no longer present   T wave amplitude has increased in Anterior leads       Referred By: HORTENCIA           Confirmed By:             RADIOLOGY:   [x] Radiologist's Report Reviewed:  CT Angiogram Head w AI Analysis of LVO   Final Result   Impression:   No evidence of hemodynamically significant stenosis, AVM or aneurysm within the head or neck by NASCET criteria. No large vessel occlusion identified. No acute process.            Electronically Signed: Carolina Hassan MD     11/1/2024 1:37 AM EDT     Workstation ID: [id number]      CT Angiogram Neck   Final Result   Impression:   No evidence of hemodynamically significant stenosis, AVM or aneurysm within the head or neck by NASCET criteria. No large vessel occlusion identified. No acute process.            Electronically Signed: Carolina Hassan MD     11/1/2024 1:37 AM EDT     Workstation ID: [id number]      CT CEREBRAL PERFUSION WITH & WITHOUT CONTRAST   Final Result   Impression:   No suspicious perfusion abnormality identified.                Electronically Signed: Carolina Hassan MD     11/1/2024 1:36 AM EDT     Workstation ID: [id number]      XR Chest 1 View   Final Result   Impression:   No acute cardiopulmonary process.         Electronically Signed: Carolina Hassan MD     11/1/2024 1:32 AM EDT     Workstation ID: [id number]      CT Head Without Contrast Stroke Protocol   Final Result   Impression:   No evidence of hemorrhage, mass effect or midline shift. No acute process identified.            Electronically Signed: Carolina Hassan MD     11/1/2024 1:03 AM EDT     Workstation ID: [id number]          I ordered and independently reviewed the above noted radiographic studies.        LABS:  I independently interpreted all laboratory studies conducted during this ED visit.  The results of these studies can be seen below and my independent interpretation in the ED course      EMERGENCY DEPARTMENT COURSE and DIFFERENTIAL DIAGNOSIS/MDM:   Vitals:  AS OF 04:13 EDT    BP - 109/60  HR - 77  TEMP - 98.4 °F (36.9 °C) (Oral)  O2 SATS - 97%        Discussion below  represents my analysis of pertinent findings related to patient's condition, differential diagnosis, treatment plan and final disposition.      Differential diagnosis:  The differential diagnosis associated with the patient's presentation includes: Acute ischemic or hemorrhagic stroke, delirium, urinary tract infection, metabolic derangement, polypharmacy, sleep disturbance      Independent interpretations (ECG/rhythm strip/X-ray/US/CT scan): See ED course      Additional sources:  Discussed/obtained information from independent historians:   [x] Spouse: Patient's spouse arrives and provides additional details of HPI, notably that patient has had waxing and waning mental state and episodes of confusion over the past couple days, that he has had substantial difficulties with his sleep disturbance, sometimes sleeping only a couple of hours, having irregular sleep hours.   [] Parent:   [] Friend:   [] EMS:   [] Other:    External record review:  10/7/2024 reviewed most recent discharge summary when patient was hospitalized for back pain from an acute compression fracture at L2, urinary tract infection,      Patient's care impacted by:   [x] Diabetes   [] Hypertension   [x] Coronary Artery Disease   [] Cancer   [] Other:     Care significantly affected by Social Determinants of Health (housing and economic circumstances, unemployment)    [] Yes     [x] No   If yes, Patient's care significantly limited by  Social Determinants of Health including:    [] Inadequate housing    [] Low income    [] Alcoholism and drug addiction in family    [] Problems related to primary support group    [] Unemployment    [] Problems related to employment    [] Other Social Determinants of Health:     ED Course:    ED Course as of 11/01/24 0413   Fri Nov 01, 2024   0133 Twelve-lead ECG independently interpreted by myself demonstrates normal sinus rhythm with a first-degree AV block, no ST segment elevation or depression.  Normal axis. [KB]    0410 Laboratory workup independently interpreted by myself demonstrates elevated INR consistent with patient's known anticoagulation, mildly elevated creatinine and high-sensitivity troponin [KB]   0410 CT scan of the brain independently interpreted by myself demonstrates no acute intracranial abnormality [KB]   0410 Vascular studies demonstrate no large vessel occlusion [KB]      ED Course User Index  [KB] Didier Mitchell MD         Upon patient's arrival to the ER with identification of some mild left-sided facial droop a stroke alert protocol was initiated.  He is not a candidate for TNK based on time from last known well, he is not a candidate for mechanical thrombectomy given no large vessel occlusion.  Stroke navigator presented and evaluated the patient at the bedside.    Through patient's ER course he did have a waxing and waning mental state at times fully alert oriented and answers questions appropriately, at other times he gets confused, seems to think his wife is the patient who is getting admitted.  Will admit for further management        PROCEDURES:  Critical Care    Performed by: Didier Mitchell MD  Authorized by: Didier Mitchell MD    Critical care provider statement:     Critical care time (minutes):  35    Critical care time was exclusive of:  Separately billable procedures and treating other patients    Critical care was necessary to treat or prevent imminent or life-threatening deterioration of the following conditions:  CNS failure or compromise    Critical care was time spent personally by me on the following activities:  Development of treatment plan with patient or surrogate, discussions with consultants, evaluation of patient's response to treatment, examination of patient, obtaining history from patient or surrogate, ordering and performing treatments and interventions, ordering and review of laboratory studies, ordering and review of radiographic studies, pulse oximetry, re-evaluation of  patient's condition and review of old charts    I assumed direction of critical care for this patient from another provider in my specialty: no      Care discussed with: admitting provider        CRITICAL CARE TIME    35    CONSULTS   Consulted with hospital medicine for admission    FINAL IMPRESSION      1. Delirium    2. Facial droop          DISPOSITION/PLAN     ED Disposition       ED Disposition   Decision to Admit    Condition   --    Comment   Level of Care: Telemetry [5]   Diagnosis: AMS (altered mental status) [[id number]]                   Comment: Please note this report has been produced using speech recognition software.      Didier Mitchell MD  Attending Emergency Physician    Recent Results (from the past 24 hours)   aPTT    Collection Time: 11/01/24 12:52 AM    Specimen: Blood   Result Value Ref Range    PTT 35.9 22.0 - 39.0 seconds   Single High Sensitivity Troponin T    Collection Time: 11/01/24 12:52 AM    Specimen: Blood   Result Value Ref Range    HS Troponin T 32 (H) <22 ng/L   Green Top (Gel)    Collection Time: 11/01/24 12:52 AM   Result Value Ref Range    Extra Tube Hold for add-ons.    Lavender Top    Collection Time: 11/01/24 12:52 AM   Result Value Ref Range    Extra Tube hold for add-on    Gold Top - SST    Collection Time: 11/01/24 12:52 AM   Result Value Ref Range    Extra Tube Hold for add-ons.    Light Blue Top    Collection Time: 11/01/24 12:52 AM   Result Value Ref Range    Extra Tube Hold for add-ons.    CBC Auto Differential    Collection Time: 11/01/24 12:52 AM    Specimen: Blood   Result Value Ref Range    WBC 9.17 3.40 - 10.80 10*3/mm3    RBC 4.61 4.14 - 5.80 10*6/mm3    Hemoglobin 14.2 13.0 - 17.7 g/dL    Hematocrit 42.7 37.5 - 51.0 %    MCV 92.6 79.0 - 97.0 fL    MCH 30.8 26.6 - 33.0 pg    MCHC 33.3 31.5 - 35.7 g/dL    RDW 12.5 12.3 - 15.4 %    RDW-SD 42.2 37.0 - 54.0 fl    MPV 9.8 6.0 - 12.0 fL    Platelets 183 140 - 450 10*3/mm3    Neutrophil % 64.9 42.7 - 76.0 %    Lymphocyte  % 24.9 19.6 - 45.3 %    Monocyte % 8.2 5.0 - 12.0 %    Eosinophil % 1.2 0.3 - 6.2 %    Basophil % 0.5 0.0 - 1.5 %    Immature Grans % 0.3 0.0 - 0.5 %    Neutrophils, Absolute 5.95 1.70 - 7.00 10*3/mm3    Lymphocytes, Absolute 2.28 0.70 - 3.10 10*3/mm3    Monocytes, Absolute 0.75 0.10 - 0.90 10*3/mm3    Eosinophils, Absolute 0.11 0.00 - 0.40 10*3/mm3    Basophils, Absolute 0.05 0.00 - 0.20 10*3/mm3    Immature Grans, Absolute 0.03 0.00 - 0.05 10*3/mm3    nRBC 0.0 0.0 - 0.2 /100 WBC   POC CHEM 8    Collection Time: 11/01/24 12:59 AM    Specimen: Blood   Result Value Ref Range    Glucose 166 (H) 70 - 130 mg/dL    BUN 30 (H) 8 - 26 mg/dL    Creatinine 1.40 (H) 0.60 - 1.30 mg/dL    Sodium 138 138 - 146 mmol/L    POC Potassium 4.5 3.5 - 4.9 mmol/L    Chloride 100 98 - 109 mmol/L    Total CO2 25 24 - 29 mmol/L    Hemoglobin 14.6 12.0 - 17.0 g/dL    Hematocrit 43 38 - 51 %    Ionized Calcium 1.17 (L) 1.20 - 1.32 mmol/L    eGFR 49.3 (L) >60.0 mL/min/1.73   ECG 12 Lead Stroke Evaluation    Collection Time: 11/01/24  1:26 AM   Result Value Ref Range    QT Interval 388 ms    QTC Interval 455 ms   POCT Protime/INR    Collection Time: 11/01/24  1:38 AM    Specimen: Blood   Result Value Ref Range    Protime 21.5 (H) 12.8 - 15.2 seconds    INR 1.8 (H) 0.8 - 1.2   Gray Top    Collection Time: 11/01/24  1:38 AM   Result Value Ref Range    Extra Tube Hold for add-ons.      Note: In addition to lab results from this visit, the labs listed above may include labs taken at another facility or during a different encounter within the last 24 hours. Please correlate lab times with ED admission and discharge times for further clarification of the services performed during this visit.                 Didier Mitchell MD  11/01/24 0416

## 2024-11-01 NOTE — THERAPY EVALUATION
Patient Name: Apollo Haro  : 1940    MRN: [id number]                              Today's Date: 2024       Admit Date: 2024    Visit Dx:     ICD-10-CM ICD-9-CM   1. Delirium  R41.0 780.09   2. Facial droop  R29.810 781.94   3. Cognitive communication deficit  R41.841 799.52     Patient Active Problem List   Diagnosis    Weakness    Type 2 diabetes mellitus, without long-term current use of insulin    Rhabdomyolysis due to COVID-19    CAD (coronary artery disease)    HTN (hypertension)    Atrial fibrillation, chronic    COVID-19 virus detected    SDH (subdural hematoma)    Altered mental status    AMS (altered mental status)    Closed compression fracture of L2 vertebra    DARIO (acute kidney injury)     Past Medical History:   Diagnosis Date    A-fib     CAD (coronary artery disease)     DM (diabetes mellitus)      History reviewed. No pertinent surgical history.   General Information       Row Name 24 1501          Physical Therapy Time and Intention    Document Type evaluation  -KR     Mode of Treatment physical therapy  -KR       Row Name 24 1501          General Information    Patient Profile Reviewed yes  -KR     Prior Level of Function min assist:;gait;mod assist:;ADL's;dependent:;w/c or scooter;driving;home management  pt reports assist req'd for dressing and bathing. Pt ambulates with FWW at baseline, however reports multiple falls in the past 6 months. WC use in community. Shower chair, walk in shower.  -KR     Existing Precautions/Restrictions fall;orthostatic hypotension  -KR     Barriers to Rehab medically complex;previous functional deficit  -KR       Row Name 24 1501          Living Environment    People in Home spouse;child(clifton), adult;grandchild(clifton)  -KR       Row Name 24 1501          Home Main Entrance    Number of Stairs, Main Entrance none  -KR       Row Name 24 1501          Stairs Within Home, Primary    Number of Stairs, Within Home, Primary none   -KR       Row Name 11/01/24 1501          Cognition    Orientation Status (Cognition) oriented to;person;time;verbal cues/prompts needed for orientation;situation;place  -KR       Row Name 11/01/24 1501          Safety Issues/Impairments Affecting Functional Mobility    Safety Issues Affecting Function (Mobility) insight into deficits/self-awareness;safety precaution awareness;safety precautions follow-through/compliance  -KR     Impairments Affecting Function (Mobility) balance;endurance/activity tolerance;strength;sensation/sensory awareness  -KR               User Key  (r) = Recorded By, (t) = Taken By, (c) = Cosigned By      Initials Name Provider Type    Rhonda Britt PT Physical Therapist                   Mobility       Row Name 11/01/24 1507          Sit-Stand Transfer    Sit-Stand [place] (Transfers) moderate assist (50% patient effort);2 person assist  -KR     Assistive Device (Sit-Stand Transfers) walker, front-wheeled  -KR     Comment, (Sit-Stand Transfer) 1x from bed with cues for hand placement and anterior weight shift. Heavy posterior lean upon standing.  -KR       Row Name 11/01/24 1507          Gait/Stairs (Locomotion)    [place] Level (Gait) moderate assist (50% patient effort);2 person assist  -KR     Assistive Device (Gait) walker, front-wheeled  -KR     Distance in Feet (Gait) 4  -KR     Deviations/Abnormal Patterns (Gait) cindy decreased;gait speed decreased;stride length decreased;bilateral deviations;base of support, narrow  -KR     Bilateral Gait Deviations forward flexed posture  -KR     Comment, (Gait/Stairs) pt ambulated bed to chair with cues for FWW progression and sequencing. Cues for safe approach to chair. Unable to attempt farther ambulation 2/2 dizziness. /64 seated in chair.  -KR               User Key  (r) = Recorded By, (t) = Taken By, (c) = Cosigned By      Initials Name Provider Type    Rhonda Britt PT Physical Therapist                    Obj/Interventions       Row Name 11/01/24 1510          Range of Motion Comprehensive    General Range of Motion no range of motion deficits identified  -KR       Row Name 11/01/24 1510          Strength Comprehensive (MMT)    General Manual Muscle Testing (MMT) Assessment lower extremity strength deficits identified  -KR     Comment, General Manual Muscle Testing (MMT) Assessment RLE grossly 4+/5, LLE grossly 4/5  -KR       Row Name 11/01/24 1510          Balance    Balance Assessment sitting static balance;standing dynamic balance;standing static balance  -KR     Static Sitting Balance standby assist  -KR     Position, Sitting Balance unsupported;sitting in chair  -KR     Static Standing Balance moderate assist;2-person assist  -KR     Dynamic Standing Balance moderate assist;2-person assist  -KR     Position/Device Used, Standing Balance supported;walker, front-wheeled  -KR     Balance Interventions sitting;standing;sit to stand;supported;static;dynamic  -KR       Row Name 11/01/24 1510          Sensory Assessment (Somatosensory)    Sensory Assessment (Somatosensory) other (see comments)  B foot neuropathy, impaired great toe proprioception B.  -KR               User Key  (r) = Recorded By, (t) = Taken By, (c) = Cosigned By      Initials Name Provider Type    Rhonda Britt, PT Physical Therapist                   Goals/Plan       Row Name 11/01/24 1513          Bed Mobility Goal 1 (PT)    Activity/Assistive Device (Bed Mobility Goal 1, PT) bed mobility activities, all  -KR     [place] Level/Cues Needed (Bed Mobility Goal 1, PT) standby assist  -KR     Time Frame (Bed Mobility Goal 1, PT) short term goal (STG);3 days  -KR       Row Name 11/01/24 1513          Transfer Goal 1 (PT)    Activity/Assistive Device (Transfer Goal 1, PT) bed-to-chair/chair-to-bed;sit-to-stand/stand-to-sit;walker, rolling  -KR     [place] Level/Cues Needed (Transfer Goal 1, PT) contact guard required  -KR     Time Frame  (Transfer Goal 1, PT) long term goal (LTG);1 week  -KR       Row Name 11/01/24 1513          Gait Training Goal 1 (PT)    Activity/Assistive Device (Gait Training Goal 1, PT) gait (walking locomotion);improve balance and speed;increase endurance/gait distance;assistive device use;walker, rolling  -KR     [place] Level (Gait Training Goal 1, PT) contact guard required  -KR     Distance (Gait Training Goal 1, PT) 50  -KR     Time Frame (Gait Training Goal 1, PT) long term goal (LTG);1 week  -KR       Row Name 11/01/24 1513          Therapy Assessment/Plan (PT)    Planned Therapy Interventions (PT) balance training;home exercise program;gait training;bed mobility training;neuromuscular re-education;patient/family education;transfer training;stretching;strengthening;ROM (range of motion);postural re-education  -KR               User Key  (r) = Recorded By, (t) = Taken By, (c) = Cosigned By      Initials Name Provider Type    KR Rhonda Wei, PT Physical Therapist                   Clinical Impression       Row Name 11/01/24 1511          Pain    Pretreatment Pain Rating 0/10 - no pain  -KR     Posttreatment Pain Rating 0/10 - no pain  -KR       Row Name 11/01/24 1511          Plan of Care Review    Plan of Care Reviewed With patient  -KR     Outcome Evaluation Pt presents with strength, balance, endurance, and safety awareness below baseline contributing to transfer and ambulation deficits. Pt with dizziness and orthostatic drop in BP with standing and ambulation. Pt will benefit from PT to address aforementioned deficits and return to PLOF. PT rec SNF upon dc.  -KR       Row Name 11/01/24 1511          Therapy Assessment/Plan (PT)    Patient/Family Therapy Goals Statement (PT) to return home  -KR     Rehab Potential (PT) good  -KR     Criteria for Skilled Interventions Met (PT) yes;skilled treatment is necessary  -KR     Therapy Frequency (PT) daily  -KR     Predicted Duration of Therapy Intervention (PT) 1  week  -KR       Row Name 11/01/24 1511          Vital Signs    Pre Systolic BP Rehab 113  -KR     Pre Treatment Diastolic BP 76  -KR     Post Systolic BP Rehab 100  -KR     Post Treatment Diastolic BP 64   -KR     Posttreatment Heart Rate (beats/min) 87  -KR     Pre Patient Position Sitting  -KR     Intra Patient Position Standing  -KR     Post Patient Position Sitting  -KR       Row Name 11/01/24 1511          Positioning and Restraints    Pre-Treatment Position in bed  -KR     Post Treatment Position chair  -KR     In Chair notified nsg;reclined;call light within reach;encouraged to call for assist;exit alarm on;waffle cushion;legs elevated  -KR               User Key  (r) = Recorded By, (t) = Taken By, (c) = Cosigned By      Initials Name Provider Type    Rhonda Britt, PT Physical Therapist                   Outcome Measures       Row Name 11/01/24 1514          How much help from another person do you currently need...    Turning from your back to your side while in flat bed without using bedrails? 3  -KR     Moving from lying on back to sitting on the side of a flat bed without bedrails? 2  -KR     Moving to and from a bed to a chair (including a wheelchair)? 2  -KR     Standing up from a chair using your arms (e.g., wheelchair, bedside chair)? 2  -KR     Climbing 3-5 steps with a railing? 1  -KR     To walk in hospital room? 2  -KR     AM-PAC 6 Clicks Score (PT) 12  -KR     Highest Level of Mobility Goal 4 --> Transfer to chair/commode  -KR       Row Name 11/01/24 1514          Modified [place] Scale    Pre-Stroke Modified [place] Scale 6 - Unable to determine (UTD) from the medical record documentation  -KR     Modified [place] Scale 4 - Moderately severe disability.  Unable to walk without assistance, and unable to attend to own bodily needs without assistance.  -KR       Row Name 11/01/24 1514          Functional Assessment    Outcome Measure Options AM-PAC 6 Clicks Basic Mobility (PT);Modified [place]   -GEMINI               User Key  (r) = Recorded By, (t) = Taken By, (c) = Cosigned By      Initials Name Provider Type    Rhonda Britt PT Physical Therapist                                 Physical Therapy Education       Title: PT OT SLP Therapies (In Progress)       Topic: Physical Therapy (In Progress)       Point: Mobility training (In Progress)       Learning Progress Summary            Patient Acceptance, E, NR by KR at 11/1/2024 1515                      Point: Home exercise program (Not Started)       Learner Progress:  Not documented in this visit.              Point: Body mechanics (In Progress)       Learning Progress Summary            Patient Acceptance, E, NR by KR at 11/1/2024 1515                      Point: Precautions (In Progress)       Learning Progress Summary            Patient Acceptance, E, NR by KR at 11/1/2024 1515                                      User Key       Initials Effective Dates Name Provider Type [facility] 12/30/22 -  Rhonda Wei PT Physical Therapist PT                  PT Recommendation and Plan  Planned Therapy Interventions (PT): balance training, home exercise program, gait training, bed mobility training, neuromuscular re-education, patient/family education, transfer training, stretching, strengthening, ROM (range of motion), postural re-education  Outcome Evaluation: Pt presents with strength, balance, endurance, and safety awareness below baseline contributing to transfer and ambulation deficits. Pt with dizziness and orthostatic drop in BP with standing and ambulation. Pt will benefit from PT to address aforementioned deficits and return to PLOF. PT rec SNF upon dc.     Time Calculation:   PT Evaluation Complexity  History, PT Evaluation Complexity: 3 or more personal factors and/or comorbidities  Examination of Body Systems (PT Eval Complexity): total of 4 or more elements  Clinical Presentation (PT Evaluation Complexity): evolving  Clinical Decision  Making (PT Evaluation Complexity): moderate complexity  Overall Complexity (PT Evaluation Complexity): moderate complexity     PT Charges       Row Name 11/01/24 1517             Time Calculation    Start Time 1445  -KR      PT Received On 11/01/24  -KR      PT Goal Re-Cert Due Date 11/11/24  -KR         Untimed Charges    PT Eval/Re-eval Minutes 48  -KR         Total Minutes    Untimed Charges Total Minutes 48  -KR       Total Minutes 48  -KR                User Key  (r) = Recorded By, (t) = Taken By, (c) = Cosigned By      Initials Name Provider Type    Rhonda Britt, PT Physical Therapist                  Therapy Charges for Today       Code Description Service Date Service Provider Modifiers Qty    20154122204 HC PT EVAL MOD COMPLEXITY 4 11/1/2024 Rhonda Wei, PT GP 1            PT G-Codes  Outcome Measure Options: AM-PAC 6 Clicks Basic Mobility (PT), Modified [place]  AM-PAC 6 Clicks Score (PT): 12  Modified [place] Scale: 4 - Moderately severe disability.  Unable to walk without assistance, and unable to attend to own bodily needs without assistance.  PT Discharge Summary  Anticipated Discharge Disposition (PT): skilled nursing facility    Rhonda Wei PT  11/1/2024

## 2024-11-01 NOTE — THERAPY EVALUATION
Patient Name: Apollo Haro  : 1940    MRN: [id number]                              Today's Date: 2024       Admit Date: 2024    Visit Dx:     ICD-10-CM ICD-9-CM   1. Delirium  R41.0 780.09   2. Facial droop  R29.810 781.94   3. Cognitive communication deficit  R41.841 799.52     Patient Active Problem List   Diagnosis    Weakness    Type 2 diabetes mellitus, without long-term current use of insulin    Rhabdomyolysis due to COVID-19    CAD (coronary artery disease)    HTN (hypertension)    Atrial fibrillation, chronic    COVID-19 virus detected    SDH (subdural hematoma)    Altered mental status    AMS (altered mental status)    Closed compression fracture of L2 vertebra    DARIO (acute kidney injury)     Past Medical History:   Diagnosis Date    A-fib     CAD (coronary artery disease)     DM (diabetes mellitus)      History reviewed. No pertinent surgical history.   General Information       Row Name 24 1529          OT Time and Intention    Document Type evaluation  -MR     Mode of Treatment occupational therapy  -MR     Patient Effort good  -MR       Row Name 24 1529          General Information    Patient Profile Reviewed yes  -MR     Prior Level of Function min assist:;gait;mod assist:;ADL's;dependent:;w/c or scooter;home management  pt reports assist req'd for dressing and bathing. Pt ambulates with FWW at baseline, however reports multiple falls in the past 6 months. WC use in community. Shower chair, walk in shower.  -MR     Existing Precautions/Restrictions fall;orthostatic hypotension  -MR     Barriers to Rehab medically complex;previous functional deficit  -MR       Row Name 24 1529          Living Environment    People in Home spouse;child(clifton), adult;grandchild(clifton)  -MR       Row Name 24 1529          Home Main Entrance    Number of Stairs, Main Entrance none  -MR       Row Name 24 1529          Stairs Within Home, Primary    Number of Stairs, Within Home,  Primary none  -MR       Row Name 11/01/24 1529          Cognition    Orientation Status (Cognition) oriented to;person;time;verbal cues/prompts needed for orientation;situation;place  -MR       Row Name 11/01/24 1529          Safety Issues/Impairments Affecting Functional Mobility    Safety Issues Affecting Function (Mobility) insight into deficits/self-awareness;safety precaution awareness;safety precautions follow-through/compliance  -MR     Impairments Affecting Function (Mobility) balance;endurance/activity tolerance;strength;sensation/sensory awareness  -MR               User Key  (r) = Recorded By, (t) = Taken By, (c) = Cosigned By      Initials Name Provider Type    MR HuaRosalva, OT Occupational Therapist                     Mobility/ADL's       Row Name 11/01/24 1531          Bed Mobility    Bed Mobility supine-sit  -MR     Supine-Sit [place] (Bed Mobility) moderate assist (50% patient effort);verbal cues;nonverbal cues (demo/gesture)  -MR     Assistive Device (Bed Mobility) head of bed elevated;bed rails  -MR       Row Name 11/01/24 1531          Transfers    Transfers sit-stand transfer  -MR     Comment, (Transfers) v/c for hand placement and safety. Pt demonstrating a strong posterior lean in standing.  -MR       Row Name 11/01/24 1531          Sit-Stand Transfer    Sit-Stand [place] (Transfers) moderate assist (50% patient effort);2 person assist  -MR     Assistive Device (Sit-Stand Transfers) walker, front-wheeled  -MR       Row Name 11/01/24 1531          Activities of Daily Living    BADL Assessment/Intervention lower body dressing;upper body dressing  -MR       Row Name 11/01/24 1531          Lower Body Dressing Assessment/Training    [place] Level (Lower Body Dressing) don;socks;dependent (less than 25% patient effort)  -MR     Position (Lower Body Dressing) supine  -MR       Row Name 11/01/24 1531          Upper Body Dressing Assessment/Training    [place] Level (Upper Body  Dressing) other (see comments);moderate assist (50% patient effort);don  adjusting and donning back hospital gown  -MR     Position (Upper Body Dressing) edge of bed sitting  -MR               User Key  (r) = Recorded By, (t) = Taken By, (c) = Cosigned By      Initials Name Provider Type     Rosalva Sequeira OT Occupational Therapist                   Obj/Interventions       Row Name 11/01/24 1534          Sensory Assessment (Somatosensory)    Sensory Assessment (Somatosensory) UE sensation intact  -MR       Row Name 11/01/24 1534          Vision Assessment/Intervention    Visual Impairment/Limitations WFL  -MR       Row Name 11/01/24 1534          Range of Motion Comprehensive    General Range of Motion no range of motion deficits identified  -MR       Row Name 11/01/24 1534          Strength Comprehensive (MMT)    Comment, General Manual Muscle Testing (MMT) Assessment BUE grossly 4/5 in all functional planes  -MR       Row Name 11/01/24 1534          Balance    Balance Assessment sitting static balance;sitting dynamic balance;standing static balance;standing dynamic balance  -MR     Static Sitting Balance standby assist  -MR     Dynamic Sitting Balance contact guard  -MR     Position, Sitting Balance unsupported;sitting edge of bed  -MR     Static Standing Balance moderate assist;2-person assist  -MR     Dynamic Standing Balance moderate assist;2-person assist  -MR     Position/Device Used, Standing Balance supported;walker, front-wheeled  -MR     Balance Interventions sitting;standing;sit to stand;supported;static;dynamic;occupation based/functional task  -MR               User Key  (r) = Recorded By, (t) = Taken By, (c) = Cosigned By      Initials Name Provider Type     Rosalva Sequeira OT Occupational Therapist                   Goals/Plan       Row Name 11/01/24 1541          Bed Mobility Goal 1 (OT)    Activity/Assistive Device (Bed Mobility Goal 1, OT) sit to supine;supine to sit  -MR     [place]  Level/Cues Needed (Bed Mobility Goal 1, OT) contact guard required  -MR     Time Frame (Bed Mobility Goal 1, OT) short term goal (STG);3 days  -MR     Progress/Outcomes (Bed Mobility Goal 1, OT) new goal  -MR       Row Name 11/01/24 1541          Dressing Goal 1 (OT)    Activity/Device (Dressing Goal 1, OT) lower body dressing  -MR     [place]/Cues Needed (Dressing Goal 1, OT) moderate assist (50-74% patient effort)  -MR     Time Frame (Dressing Goal 1, OT) long term goal (LTG);10 days  -MR     Progress/Outcome (Dressing Goal 1, OT) new goal  -MR       Row Name 11/01/24 1541          Grooming Goal 1 (OT)    Activity/Device (Grooming Goal 1, OT) grooming skills, all;other (see comments)  sink side  -MR     [place] (Grooming Goal 1, OT) contact guard required  -MR     Time Frame (Grooming Goal 1, OT) long term goal (LTG);5 days  -MR       Row Name 11/01/24 1541          Therapy Assessment/Plan (OT)    Planned Therapy Interventions (OT) activity tolerance training;adaptive equipment training;IADL retraining;transfer/mobility retraining;functional balance retraining;strengthening exercise;ROM/therapeutic exercise;BADL retraining;occupation/activity based interventions;patient/caregiver education/training;passive ROM/stretching  -MR               User Key  (r) = Recorded By, (t) = Taken By, (c) = Cosigned By      Initials Name Provider Type    Rosalva Kennedy, OT Occupational Therapist                   Clinical Impression       Row Name 11/01/24 1536          Pain Assessment    Pretreatment Pain Rating 0/10 - no pain  -MR     Posttreatment Pain Rating 0/10 - no pain  -MR       Row Name 11/01/24 1536          Plan of Care Review    Plan of Care Reviewed With patient  -MR     Progress no change  Initial Eval  -MR     Outcome Evaluation Pt presents below his functional baseline w/ mobility, transfers and balance limiting independence w/ ADL based tasks. Pt requiring increased assist w/ bed mobility, STS  transfer and LB dressing. Pt limited by dizziness this session w/ slight BP drop. Pt's deficits warrant skilled IP OT services. Recommend SNF at d/c for best functional outcome.  -MR       Row Name 11/01/24 1536          Therapy Assessment/Plan (OT)    Patient/Family Therapy Goal Statement (OT) Return to PLOF  -MR     Rehab Potential (OT) good  -MR     Criteria for Skilled Therapeutic Interventions Met (OT) yes;meets criteria;skilled treatment is necessary  -MR     Therapy Frequency (OT) daily  -MR     Predicted Duration of Therapy Intervention (OT) 5 days  -MR       Row Name 11/01/24 1536          Therapy Plan Review/Discharge Plan (OT)    Anticipated Discharge Disposition (OT) skilled nursing facility  -MR       Row Name 11/01/24 1536          Vital Signs    Pre Systolic BP Rehab 119  -MR     Pre Treatment Diastolic BP 58  -MR     Intra Systolic BP Rehab 113  -MR     Intra Treatment Diastolic BP 76  -MR     Post Systolic BP Rehab 100  -MR     Post Treatment Diastolic BP 64  -MR     Intratreatment Heart Rate (beats/min) 124  -MR     Posttreatment Heart Rate (beats/min) 97  -MR     O2 Delivery Pre Treatment room air  -MR     O2 Delivery Intra Treatment room air  -MR     O2 Delivery Post Treatment room air  -MR     Pre Patient Position Sitting  -MR     Intra Patient Position Standing  -MR     Post Patient Position Sitting  -MR       Row Name 11/01/24 1536          Positioning and Restraints    Pre-Treatment Position in bed  -MR     Post Treatment Position bed  -MR     In Bed sitting EOB;with PT  -MR               User Key  (r) = Recorded By, (t) = Taken By, (c) = Cosigned By      Initials Name Provider Type    MR Rosalva Sequeira, OT Occupational Therapist                   Outcome Measures       Row Name 11/01/24 1543          How much help from another is currently needed...    Putting on and taking off regular lower body clothing? 1  -MR     Bathing (including washing, rinsing, and drying) 2  -MR     Toileting  (which includes using toilet bed pan or urinal) 2  -MR     Putting on and taking off regular upper body clothing 3  -MR     Taking care of personal grooming (such as brushing teeth) 3  -MR     Eating meals 4  -MR     AM-PAC 6 Clicks Score (OT) 15  -MR       Row Name 11/01/24 1514          How much help from another person do you currently need...    Turning from your back to your side while in flat bed without using bedrails? 3  -KR     Moving from lying on back to sitting on the side of a flat bed without bedrails? 2  -KR     Moving to and from a bed to a chair (including a wheelchair)? 2  -KR     Standing up from a chair using your arms (e.g., wheelchair, bedside chair)? 2  -KR     Climbing 3-5 steps with a railing? 1  -KR     To walk in hospital room? 2  -KR     AM-PAC 6 Clicks Score (PT) 12  -KR     Highest Level of Mobility Goal 4 --> Transfer to chair/commode  -KR       Row Name 11/01/24 1543 11/01/24 1514       Modified Ash Scale    Pre-Stroke Modified Ash Scale 6 - Unable to determine (UTD) from the medical record documentation  -MR 6 - Unable to determine (UTD) from the medical record documentation  -KR    Modified Green Scale 4 - Moderately severe disability.  Unable to walk without assistance, and unable to attend to own bodily needs without assistance.  -MR 4 - Moderately severe disability.  Unable to walk without assistance, and unable to attend to own bodily needs without assistance.  -KR      Row Name 11/01/24 1543 11/01/24 1514       Functional Assessment    Outcome Measure Options AM-PAC 6 Clicks Daily Activity (OT);Modified Ash  -MR AM-PAC 6 Clicks Basic Mobility (PT);Modified Green  -KR              User Key  (r) = Recorded By, (t) = Taken By, (c) = Cosigned By      Initials Name Provider Type    Rosalva Kennedy, OT Occupational Therapist    Rhonda Britt, PT Physical Therapist                    Occupational Therapy Education       Title: PT OT SLP Therapies (In Progress)        Topic: Occupational Therapy (In Progress)       Point: ADL training (In Progress)       Description:   Instruct learner(s) on proper safety adaptation and remediation techniques during self care or transfers.   Instruct in proper use of assistive devices.                  Learning Progress Summary            Patient Acceptance, E, NR by MR at 11/1/2024 1544                      Point: Home exercise program (Not Started)       Description:   Instruct learner(s) on appropriate technique for monitoring, assisting and/or progressing therapeutic exercises/activities.                  Learner Progress:  Not documented in this visit.              Point: Precautions (In Progress)       Description:   Instruct learner(s) on prescribed precautions during self-care and functional transfers.                  Learning Progress Summary            Patient Acceptance, E, NR by MR at 11/1/2024 1544                      Point: Body mechanics (In Progress)       Description:   Instruct learner(s) on proper positioning and spine alignment during self-care, functional mobility activities and/or exercises.                  Learning Progress Summary            Patient Acceptance, E, NR by MR at 11/1/2024 1544                                      User Key       Initials Effective Dates Name Provider Type Discipline    MR 09/22/22 -  Rosalva Sequeira OT Occupational Therapist OT                  OT Recommendation and Plan  Planned Therapy Interventions (OT): activity tolerance training, adaptive equipment training, IADL retraining, transfer/mobility retraining, functional balance retraining, strengthening exercise, ROM/therapeutic exercise, BADL retraining, occupation/activity based interventions, patient/caregiver education/training, passive ROM/stretching  Therapy Frequency (OT): daily  Plan of Care Review  Plan of Care Reviewed With: patient  Progress: no change (Initial Eval)  Outcome Evaluation: Pt presents below his functional baseline  w/ mobility, transfers and balance limiting independence w/ ADL based tasks. Pt requiring increased assist w/ bed mobility, STS transfer and LB dressing. Pt limited by dizziness this session w/ slight BP drop. Pt's deficits warrant skilled IP OT services. Recommend SNF at d/c for best functional outcome.     Time Calculation:   Evaluation Complexity (OT)  Review Occupational Profile/Medical/Therapy History Complexity: brief/low complexity  Assessment, Occupational Performance/Identification of Deficit Complexity: 1-3 performance deficits  Clinical Decision Making Complexity (OT): problem focused assessment/low complexity  Overall Complexity of Evaluation (OT): low complexity     Time Calculation- OT       Row Name 11/01/24 1545             Time Calculation- OT    OT Start Time 1438  -MR      OT Received On 11/01/24  -MR      OT Goal Re-Cert Due Date 11/11/24  -MR         Untimed Charges    OT Eval/Re-eval Minutes 46  -MR         Total Minutes    Untimed Charges Total Minutes 46  -MR       Total Minutes 46  -MR                User Key  (r) = Recorded By, (t) = Taken By, (c) = Cosigned By      Initials Name Provider Type    MR Rosalva Sequeira OT Occupational Therapist                  Therapy Charges for Today       Code Description Service Date Service Provider Modifiers Qty    [id number]  OT EVAL LOW COMPLEXITY 4 11/1/2024 Rosalva Sequeira OT GO 1                 Rosalva Sequeira OT  11/1/2024

## 2024-11-01 NOTE — PLAN OF CARE
Goal Outcome Evaluation:  Plan of Care Reviewed With: patient           Outcome Evaluation: Pt presents with strength, balance, endurance, and safety awareness below baseline contributing to transfer and ambulation deficits. Pt with dizziness and orthostatic drop in BP with standing and ambulation. Pt will benefit from PT to address aforementioned deficits and return to PLOF. PT rec SNF upon dc.    Anticipated Discharge Disposition (PT): skilled nursing facility

## 2024-11-01 NOTE — PROGRESS NOTES
"    [facility] Medicine Services  PROGRESS NOTE    Patient Name: Apollo Haro  : 1940  MRN: [id number]    Date of Admission: 2024  Primary Care Physician: Provider, No Known    Subjective   Subjective     CC:  Altered mental status    HPI:  Patient able to say that he is here \"to [facility]\" and that the year is .  Has mild cough      Objective   Objective     Vital Signs:   Temp:  [98.4 °F (36.9 °C)] 98.4 °F (36.9 °C)  Heart Rate:  [62-96] 66  Resp:  [12-18] 18  BP: ()/(52-72) 108/59     Physical Exam:  Appears fatigued, in bed  Mucous membranes moist  RRR  Breath sounds clear bilaterally  Abdomen soft, nontender  Awake speech clear  Flat affect   symmetric      Results Reviewed:  LAB RESULTS:      Lab 24  0537 24   WBC 8.25  --   --  9.17   HEMOGLOBIN 12.9*  --   --  14.2   HEMOGLOBIN, POC  --   --  14.6  --    HEMATOCRIT 38.6  --   --  42.7   HEMATOCRIT POC  --   --  43  --    PLATELETS 170  --   --  183   NEUTROS ABS 5.58  --   --  5.95   IMMATURE GRANS (ABS) 0.04  --   --  0.03   LYMPHS ABS 1.86  --   --  2.28   MONOS ABS 0.67  --   --  0.75   EOS ABS 0.06  --   --  0.11   MCV 92.3  --   --  92.6   PROTIME  --  21.5*  --   --    APTT  --   --   --  35.9   HSTROP T  --   --   --  32*         Lab 24   SODIUM 141  --    POTASSIUM 4.9  --    CHLORIDE 100  --    CO2 26.0  --    ANION GAP 15.0  --    BUN 24*  --    CREATININE 1.12 1.40*   EGFR 64.4 49.3*   GLUCOSE 127*  --    CALCIUM 9.9  --    MAGNESIUM 2.1  --              Lab 24   HSTROP T  --  32*   PROTIME 21.5*  --    INR 1.8*  --          Lab 24  0929   CHOLESTEROL 114   LDL CHOL 54   HDL CHOL 42   TRIGLYCERIDES 97             Brief Urine Lab Results  (Last result in the past 365 days)        Color   Clarity   Blood   Leuk Est   Nitrite   Protein   CREAT   Urine HCG        24 0613             102.1      "    11/01/24 0613 Yellow   Clear   Negative   Negative   Negative   Negative                   Microbiology Results Abnormal       None            Adult Transthoracic Echo Complete W/ Cont if Necessary Per Protocol (With Agitated Saline)    Result Date: 11/1/2024    Left ventricular systolic function is normal. Calculated left ventricular EF = 57.8% Left ventricular ejection fraction appears to be 56 - 60%.   Left ventricular diastolic function is consistent with (grade I) impaired relaxation.   Estimated right ventricular systolic pressure from tricuspid regurgitation is normal (<35 mmHg). Calculated right ventricular systolic pressure from tricuspid regurgitation is 32 mmHg.     CT Angiogram Head w AI Analysis of LVO    Result Date: 11/1/2024  CT ANGIOGRAM HEAD W AI ANALYSIS OF LVO, CT ANGIOGRAM NECK Date of Exam: 11/1/2024 1:00 AM EDT Indication: Neuro deficit, acute, stroke suspected Neuro deficit, acute stroke suspected. Comparison: 11/1/2024, 9/23/2024. Technique: CTA of the head and neck was performed after the uneventful intravenous administration of intravenous contrast. Reconstructed coronal and sagittal images were also obtained. In addition, a 3-D volume rendered image was created for interpretation. Automated exposure control and iterative reconstruction methods were used. Findings: No evidence of hemodynamically significant stenosis, AVM or aneurysm within the head or neck by NASCET criteria. No large vessel occlusion identified. No evidence of paucity of vasculature. No evidence of dissection. Soft tissues of the neck demonstrate no acute process. No acute osseous abnormality identified. Lung apices are clear.     Impression: Impression: No evidence of hemodynamically significant stenosis, AVM or aneurysm within the head or neck by NASCET criteria. No large vessel occlusion identified. No acute process. Electronically Signed: Carolina Hassan MD  11/1/2024 1:37 AM EDT  Workstation ID: [id number]    CT  Angiogram Neck    Result Date: 11/1/2024  CT ANGIOGRAM HEAD W AI ANALYSIS OF LVO, CT ANGIOGRAM NECK Date of Exam: 11/1/2024 1:00 AM EDT Indication: Neuro deficit, acute, stroke suspected Neuro deficit, acute stroke suspected. Comparison: 11/1/2024, 9/23/2024. Technique: CTA of the head and neck was performed after the uneventful intravenous administration of intravenous contrast. Reconstructed coronal and sagittal images were also obtained. In addition, a 3-D volume rendered image was created for interpretation. Automated exposure control and iterative reconstruction methods were used. Findings: No evidence of hemodynamically significant stenosis, AVM or aneurysm within the head or neck by NASCET criteria. No large vessel occlusion identified. No evidence of paucity of vasculature. No evidence of dissection. Soft tissues of the neck demonstrate no acute process. No acute osseous abnormality identified. Lung apices are clear.     Impression: Impression: No evidence of hemodynamically significant stenosis, AVM or aneurysm within the head or neck by NASCET criteria. No large vessel occlusion identified. No acute process. Electronically Signed: Carolina Hassan MD  11/1/2024 1:37 AM EDT  Workstation ID: [id number]    CT CEREBRAL PERFUSION WITH & WITHOUT CONTRAST    Result Date: 11/1/2024  CT CEREBRAL PERFUSION W WO CONTRAST Date of Exam: 11/1/2024 1:00 AM EDT Indication: Neuro deficit, acute, stroke suspected Neuro deficit, acute stroke suspected.  Comparison: 11/1/2024, 9/23/2024. Technique: Axial CT images of the brain were obtained prior to and after the administration of intravenous contrast. Core blood volume, core blood flow, mean transit time, and Tmax images were obtained utilizing the Rapid software protocol. A limited CT angiogram of the head was also performed to measure the blood vessel density. The radiation dose reduction device was turned on for each scan per the ALARA (As Low as Reasonably Achievable)  protocol. Findings: Cerebral blood flow less than 30% is 0 mL. Tmax greater than 6 seconds is 0 mL. Mismatch volume is 0 mL. Mismatch ratio is none. No suspicious perfusion abnormality identified.     Impression: Impression: No suspicious perfusion abnormality identified. Electronically Signed: Carolina Hassan MD  11/1/2024 1:36 AM EDT  Workstation ID: [id number]    XR Chest 1 View    Result Date: 11/1/2024  XR CHEST 1 VW Date of Exam: 11/1/2024 1:18 AM EDT Indication: Acute Stroke Protocol (onset < 12 hrs) Comparison: 9/16/2024. Findings: There are no airspace consolidations. No pleural fluid. No pneumothorax. The pulmonary vasculature appears within normal limits. The cardiac and mediastinal silhouette appear unremarkable. No acute osseous abnormality identified.     Impression: Impression: No acute cardiopulmonary process. Electronically Signed: Carolina Hassan MD  11/1/2024 1:32 AM EDT  Workstation ID: [id number]    CT Head Without Contrast Stroke Protocol    Result Date: 11/1/2024  CT HEAD WO CONTRAST STROKE PROTOCOL Date of Exam: 11/1/2024 12:56 AM EDT Indication: Neuro deficit, acute, stroke suspected Neuro deficit, acute, stroke suspected. Comparison: 9/23/2024. Technique: Axial CT images were obtained of the head without contrast administration.  Reconstructed coronal images were also obtained. Automated exposure control and iterative construction methods were used. Scan Time: 12:57 a.m. Results discussed with the stroke team at 1:02 a.m. Findings: There is no evidence of hemorrhage. There is no mass effect or midline shift. There is no extracerebral collection. Ventricles are normal in size and configuration for patient's stated age.  Posterior fossa is within normal limits. Calvarium and skull base appear intact.   Visualized sinuses show no air fluid levels. Visualized orbits are unremarkable.     Impression: Impression: No evidence of hemorrhage, mass effect or midline shift. No acute process identified.  Electronically Signed: Carolina Hassan MD  11/1/2024 1:03 AM EDT  Workstation ID: [id number]     Results for orders placed during the hospital encounter of 11/01/24    Adult Transthoracic Echo Complete W/ Cont if Necessary Per Protocol (With Agitated Saline)    Interpretation Summary    Left ventricular systolic function is normal. Calculated left ventricular EF = 57.8% Left ventricular ejection fraction appears to be 56 - 60%.    Left ventricular diastolic function is consistent with (grade I) impaired relaxation.    Estimated right ventricular systolic pressure from tricuspid regurgitation is normal (<35 mmHg). Calculated right ventricular systolic pressure from tricuspid regurgitation is 32 mmHg.      Current medications:  Scheduled Meds:apixaban, 5 mg, Oral, BID  atorvastatin, 80 mg, Oral, Nightly  insulin lispro, 2-7 Units, Subcutaneous, 4x Daily AC & at Bedtime  [Held by provider] metoprolol tartrate, 12.5 mg, Oral, Q12H  multivitamin with minerals, 1 tablet, Oral, Daily  senna-docusate sodium, 2 tablet, Oral, BID  sodium chloride, 10 mL, Intravenous, Q12H  sodium chloride, 10 mL, Intravenous, Q12H      Continuous Infusions:sodium chloride, 75 mL/hr, Last Rate: Stopped (11/01/24 1540)      PRN Meds:.  acetaminophen **OR** acetaminophen **OR** acetaminophen    senna-docusate sodium **AND** polyethylene glycol **AND** bisacodyl **AND** bisacodyl    dextrose    dextrose    glucagon (human recombinant)    sodium chloride    sodium chloride    sodium chloride    sodium chloride    sodium chloride    Assessment & Plan   Assessment & Plan     Active Hospital Problems    Diagnosis  POA    **AMS (altered mental status) [R41.82]  Yes    DARIO (acute kidney injury) [N17.9]  Yes    Atrial fibrillation, chronic [I48.20]  Yes    CAD (coronary artery disease) [I25.10]  Yes    HTN (hypertension) [I10]  Yes    Type 2 diabetes mellitus, without long-term current use of insulin [E11.9]  Yes      Resolved Hospital Problems   No resolved  problems to display.        Brief Hospital Course to date:  Apollo Haro is a 84 y.o. male with history of hypertension, CAD, chronic atrial fibrillation, DM2 with peripheral neuropathy, cognitive impariment as well as chronic back and hip pain who presented to the ED with altered mental status.    Altered mental status  -CT head without acute changes, MRI brain pending  -UA pito, chest x-ray no infiltrate  -Delirium possibly secondary to medications in the setting of DARIO and poor sleep with underlying cognitive impairment  -Stroke neurology followed    Chronic back pain  Recent L2 compression fracture  -Recently started on Lortab, hold now due to altered mental status  -Tylenol  -Heating pad as needed    Atrial fibrillation/flutter  - Continue Eliquis and metoprolol  - flecainide discontinued by Cardiology during recent admission    DARIO  -Creatinine 1.4 at admission, 1.1 this morning  -BMP a.m.    DM2  -SSI  -Recent A1c 5.8  -Glucose reviewed      Expected Discharge Location and Transportation:   Expected Discharge   Expected Discharge Date: 11/3/2024; Expected Discharge Time:      VTE Prophylaxis:  Pharmacologic & mechanical VTE prophylaxis orders are present.         AM-PAC 6 Clicks Score (PT): 12 (11/01/24 4034)    CODE STATUS:   Code Status and Medical Interventions: CPR (Attempt to Resuscitate); Full Support   Ordered at: 11/01/24 6496     Code Status (Patient has no pulse and is not breathing):    CPR (Attempt to Resuscitate)     Medical Interventions (Patient has pulse or is breathing):    Full Support       Allen Moreno MD  11/01/24

## 2024-11-01 NOTE — THERAPY EVALUATION
Acute Care - Speech Language Pathology   Swallow Initial Evaluation [facility]  Clinical Swallow Evaluation  & Cognitive-Communication Evaluation       Patient Name: Apollo Haro  : 1940  MRN: [id number]  Today's Date: 2024               Admit Date: 2024    Visit Dx:     ICD-10-CM ICD-9-CM   1. Delirium  R41.0 780.09   2. Facial droop  R29.810 781.94   3. Cognitive communication deficit  R41.841 799.52     Patient Active Problem List   Diagnosis    Weakness    Type 2 diabetes mellitus, without long-term current use of insulin    Rhabdomyolysis due to COVID-19    CAD (coronary artery disease)    HTN (hypertension)    Atrial fibrillation, chronic    COVID-19 virus detected    SDH (subdural hematoma)    Altered mental status    AMS (altered mental status)    Closed compression fracture of L2 vertebra    DARIO (acute kidney injury)     Past Medical History:   Diagnosis Date    A-fib     CAD (coronary artery disease)     DM (diabetes mellitus)      History reviewed. No pertinent surgical history.    SLP Recommendation and Plan  SLP Swallowing Diagnosis: swallow WFL/no suspected pharyngeal impairment (24)  SLP Diet Recommendation: regular textures, thin liquids (24)     SLP Rec. for Method of Medication Administration: as tolerated (24)           Swallow Criteria for Skilled Therapeutic Interventions Met: no problems identified which require skilled intervention (24)  Anticipated Discharge Disposition (SLP): inpatient rehabilitation facility (24 1220)        Predicted Duration Therapy Intervention (Days): 1 week (24 1220)  Oral Care Recommendations: Oral Care BID/PRN, Toothbrush (24)                                               SWALLOW EVALUATION (Last 72 Hours)       SLP Adult Swallow Evaluation       Row Name 24                   Rehab Evaluation    Document Type evaluation  -AC        Subjective Information no complaints   -AC        Patient Observations alert;cooperative  -AC        Patient/Family/Caregiver Comments/Observations No family present. Pt labile at times during eval.  -AC        Patient Effort good  -AC           General Information    Patient Profile Reviewed yes  -AC        Pertinent History Of Current Problem AMS, code stroke. MRI pending.  -AC        Current Method of Nutrition NPO  -AC        Precautions/Limitations, Vision WFL;for purposes of eval  -AC        Precautions/Limitations, Hearing WFL;for purposes of eval  -AC        Prior Level of Function-Communication WFL  -AC        Prior Level of Function-Swallowing no diet consistency restrictions  -AC        Plans/Goals Discussed with patient;agreed upon  -AC        Barriers to Rehab none identified  -AC        Patient's Goals for Discharge return to PO diet  -AC           Pain    Pretreatment Pain Rating 0/10 - no pain  -AC        Posttreatment Pain Rating 0/10 - no pain  -AC           Oral Motor Structure and Function    Dentition Assessment missing teeth  -AC        Secretion Management WNL/WFL  -AC        Mucosal Quality dry  -AC        Volitional Cough WFL  -AC           Oral Musculature and Cranial Nerve Assessment    Oral Motor General Assessment oral labial or buccal impairment;lingual impairment  -AC        Oral Labial or Buccal Impairment, Detail, Cranial Nerve VII (Facial): left labial droop  -AC        Lingual Impairment, Detail. Cranial Nerves IX, XII (Glossopharyngeal and Hypoglossal) other (see comments)  deviation upon protrusion  -AC           General Eating/Swallowing Observations    Respiratory Support Currently in Use room air  -AC        Eating/Swallowing Skills fed by SLP;self-fed;appropriate self-feeding skills observed  -AC        Positioning During Eating upright 90 degree;upright in bed  -AC        Utensils Used spoon;cup;straw  -AC        Consistencies Trialed thin liquids;pureed;regular textures  -AC           Clinical Swallow Eval     Oral Prep Phase WFL  -AC        Oral Transit WFL  -AC        Oral Residue WFL  -AC        Pharyngeal Phase no overt signs/symptoms of pharyngeal impairment  even when administered 3oz H2O test, in addition to several trials of food/drink  -AC        Esophageal Phase unremarkable  -AC           SLP Evaluation Clinical Impression    SLP Swallowing Diagnosis swallow WFL/no suspected pharyngeal impairment  -AC        Swallow Criteria for Skilled Therapeutic Interventions Met no problems identified which require skilled intervention  -AC           Recommendations    SLP Diet Recommendation regular textures;thin liquids  -AC        Oral Care Recommendations Oral Care BID/PRN;Toothbrush  -AC        SLP Rec. for Method of Medication Administration as tolerated  -AC        Anticipated Discharge Disposition (SLP) --  -AC                  User Key  (r) = Recorded By, (t) = Taken By, (c) = Cosigned By      Initials Name Effective Dates    Ellie Diaz MS CCC-SLP 02/03/23 -                     EDUCATION  The patient has been educated in the following areas:   Dysphagia (Swallowing Impairment).        SLP GOALS       Row Name 11/01/24 1220             Patient will demonstrate functional cognitive-linguistic skills for return to discharge environment    [place] with minimal cues  -AC      Time frame 1 week  -AC         SLP Diagnostic Treatment     Patient will participate in further assessment in the following areas cognitive-linguistic  -AC      Time Frame (Diagnostic) 1 week  -AC         Orientation Goal 1 (SLP)    Improve Orientation Through Goal 1 (SLP) demonstrating orientation to place;demonstrating orientation to disease/impairment;100%;with minimal cues (75-90%)  -AC      Time Frame (Orientation Goal 1, SLP) 1 week  -AC                User Key  (r) = Recorded By, (t) = Taken By, (c) = Cosigned By      Initials Name Provider Type    Ellie Diaz MS CCC-SLP Speech and Language Pathologist                          Time Calculation:    Time Calculation- SLP       Row Name 24 1438             Time Calculation- SLP    SLP Start Time 1200  -AC      SLP Received On 24  -AC         Untimed Charges    39857-PO Eval Speech and Production w/ Language Minutes 30  -AC      10729-BY Eval Oral Pharyng Swallow Minutes 36  -AC         Total Minutes    Untimed Charges Total Minutes 66  -AC       Total Minutes 66  -AC                User Key  (r) = Recorded By, (t) = Taken By, (c) = Cosigned By      Initials Name Provider Type    AC Ellie Valerio, MS CCC-SLP Speech and Language Pathologist                    Therapy Charges for Today       Code Description Service Date Service Provider Modifiers Qty    23467687816 HC ST EVAL ORAL PHARYNG SWALLOW 2 2024 Ellie Valerio, MS CCC-SLP GN 1    63605131915 HC ST EVAL SPEECH AND PROD W LANG  2 2024 Ellie Valerio, MS CCC-SLP GN 1                 Ellie Valerio MS CCC-SLP  2024   and Acute Care - Speech Language Pathology Initial Evaluation  [facility]     Patient Name: Apollo Haro  : 1940  MRN: [id number]  Today's Date: 2024               Admit Date: 2024     Visit Dx:    ICD-10-CM ICD-9-CM   1. Delirium  R41.0 780.09   2. Facial droop  R29.810 781.94   3. Cognitive communication deficit  R41.841 799.52     Patient Active Problem List   Diagnosis    Weakness    Type 2 diabetes mellitus, without long-term current use of insulin    Rhabdomyolysis due to COVID-19    CAD (coronary artery disease)    HTN (hypertension)    Atrial fibrillation, chronic    COVID-19 virus detected    SDH (subdural hematoma)    Altered mental status    AMS (altered mental status)    Closed compression fracture of L2 vertebra    DARIO (acute kidney injury)     Past Medical History:   Diagnosis Date    A-fib     CAD (coronary artery disease)     DM (diabetes mellitus)      History reviewed. No pertinent surgical history.    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 Intermittently confused t/o eval. (11/01/24 1220)        Swallow Criteria for Skilled Therapeutic Interventions Met: no problems identified which require skilled intervention (11/01/24 1200)  SLC Criteria for Skilled Therapy Interventions Met: yes (11/01/24 1220)  Anticipated Discharge Disposition (SLP): inpatient rehabilitation facility (11/01/24 1220)        Therapy Frequency (SLP SLC): 5 days per week (11/01/24 1220)  Predicted Duration Therapy Intervention (Days): 1 week (11/01/24 1220)  Oral Care Recommendations: Oral Care BID/PRN, Toothbrush (11/01/24 1200)                                 SLP EVALUATION (Last 72 Hours)       SLP SLC Evaluation       Row Name 11/01/24 1220                   Communication Assessment/Intervention    Document Type evaluation  -AC        Patient Effort good  -AC           General Information    Patient Profile Reviewed yes  -AC        Patient Level of Education 12th grade. [employer].  -AC        Prior Level of Function-Communication WFL  -AC        Plans/Goals Discussed with patient;agreed upon  -AC        Barriers to Rehab none identified  -AC        Patient's Goals for Discharge patient did not state  -AC           Pain    Pretreatment Pain Rating 0/10 - no pain  -AC        Posttreatment Pain Rating 0/10 - no pain  -AC           Comprehension Assessment/Intervention    Comprehension Assessment/Intervention Auditory Comprehension  -AC           Auditory Comprehension Assessment/Intervention    Auditory Comprehension (Communication) WFL;other (see comments)  for basic tasks  -AC        Answers Questions (Communication) WFL;complex;yes/no;simple;wh questions  -AC        Able to Follow Commands (Communication) WFL;2-step  -AC        Narrative Discourse WFL;conversational level  -AC           Expression  Assessment/Intervention    Expression Assessment/Intervention verbal expression  -AC           Verbal Expression Assessment/Intervention    Verbal Expression WFL;other (see comments)  for basic tasks  -AC        Automatic Speech (Communication) WFL;response to greeting  -AC        Responsive Naming WFL;simple  -AC        Confrontational Naming WFL;high frequency  -AC        Conversational Discourse/Fluency WFL  -AC           Motor Speech Assessment/Intervention    Motor Speech Function WFL;unfamiliar listener  -AC        Conversational Speech (Communication) WFL;simple  -AC        Speech intelligibility 100%;in quiet environment;in connected speech;with unfamiliar listener  -AC           Cursory Voice Assessment/Intervention    Quality and Resonance (Voice) WFL  -           Cognitive Assessment Intervention- SLP    Cognitive Function (Cognition) moderate impairment  at least--further assessment needed  -        Orientation Status (Cognition) moderate impairment;situation;mild impairment;place;WFL;person;time  -        Cognition, Comment Intermittently confused t/o eval.  -AC           SLP Evaluation Clinical Impressions    SLP Diagnosis moderate;cognitive-linguistic disorder  suspected--further assessment needed in dx tx.  -AC        SLP Diagnosis Comments Basic speech/language skills intact. Intermittently confused t/o eval.  -AC        Rehab Potential/Prognosis good  -AC        SLC Criteria for Skilled Therapy Interventions Met yes  -AC        Functional Impact decreased ability to respond to situations safely;difficulty completing home management task  -           Recommendations    Therapy Frequency (SLP SLC) 5 days per week  -AC        Predicted Duration Therapy Intervention (Days) 1 week  -AC        Anticipated Discharge Disposition (SLP) inpatient rehabilitation facility  -                  User Key  (r) = Recorded By, (t) = Taken By, (c) = Cosigned By      Initials Name Effective Dates    AC Teodoro  Ellie MALDONADO MS CCC-SLP 02/03/23 -                        EDUCATION  The patient has been educated in the following areas:     Cognitive Impairment Communication Impairment.           SLP GOALS       Row Name 11/01/24 1220             Patient will demonstrate functional cognitive-linguistic skills for return to discharge environment    [place] with minimal cues  -AC      Time frame 1 week  -AC         SLP Diagnostic Treatment     Patient will participate in further assessment in the following areas cognitive-linguistic  -AC      Time Frame (Diagnostic) 1 week  -AC         Orientation Goal 1 (SLP)    Improve Orientation Through Goal 1 (SLP) demonstrating orientation to place;demonstrating orientation to disease/impairment;100%;with minimal cues (75-90%)  -AC      Time Frame (Orientation Goal 1, SLP) 1 week  -AC                User Key  (r) = Recorded By, (t) = Taken By, (c) = Cosigned By      Initials Name Provider Type    Ellie Diaz MS CCC-SLP Speech and Language Pathologist                              Time Calculation:      Time Calculation- SLP       Row Name 11/01/24 1438             Time Calculation- SLP    SLP Start Time 1200  -AC      SLP Received On 11/01/24  -AC         Untimed Charges    20223-KR Eval Speech and Production w/ Language Minutes 30  -AC      23725-TT Eval Oral Pharyng Swallow Minutes 36  -AC         Total Minutes    Untimed Charges Total Minutes 66  -AC       Total Minutes 66  -AC                User Key  (r) = Recorded By, (t) = Taken By, (c) = Cosigned By      Initials Name Provider Type     Ellie Valerio MS CCC-SLP Speech and Language Pathologist                    Therapy Charges for Today       Code Description Service Date Service Provider Modifiers Qty    90645341260 HC ST EVAL ORAL PHARYNG SWALLOW 2 11/1/2024 Ellie Valerio MS CCC-SLP GN 1    [id number] HC ST EVAL SPEECH AND PROD W LANG  2 11/1/2024 Ellie Valerio MS CCC-SLP GN 1                       Ellie Valerio  MS CCC-SLP  11/1/2024

## 2024-11-01 NOTE — CONSULTS
Stroke Consult Note    Patient Name: Apollo Haro      MRN: [id number]  Age: 84 y.o.     Sex: male     : 1940  Primary Care Physician: Provider, No Known  Referring Physician: Dr. Mitchell, [facility] ED  Handedness: Right  Race:   Time Stroke Team Called: 0050        Time Patient Seen: 0100  Chief Complaint/Reason for Consultation: Left facial droop    HPI  Last Known Normal Date/Time: 1800 on 10/31/2024    This patient is an 83-year-old male with past medical history significant for atrial fibrillation (on Eliquis), type 2 diabetes, remote COVID with hospitalization, coronary artery disease, hypertension, hyperlipidemia, falls with prior SDH (2024), chronic compression fractures, and questionable normal pressure hydrocephalus who presented to [facility] ED from home via EMS due to concern for altered mental status and left facial droop.  Patient reportedly went to bed at approximately 1800 this day.  He then woke up and contacted a family member that he lives with who noted the patient to have an altered mental status.  History of recent events is relatively unclear as family is not present at bedside and ED provider's attempts to contact family were unsuccessful.  Patient has limited memory of events.  He states he remembers going to bed at approximately 1800 and the next thing he remembers is being brought into the hospital by the ambulance crew.  Patient has no history of seizure activity and does not take any home AEDs.  There is no evidence of oral trauma or urinary incontinence.  On my exam, NIH is 1 for mild left facial droop.  No other focal neurologic deficits are appreciated.  Code stroke CT imaging is unrevealing.  Patient will be admitted to the hospital medicine service for ongoing management and further workup.    Review of Systems   Constitutional:  Negative for chills and fever.   HENT:  Negative for trouble swallowing.    Eyes:  Negative for visual disturbance.   Respiratory:  Negative for  shortness of breath.    Cardiovascular:  Negative for chest pain.   Gastrointestinal:  Negative for abdominal pain.   Musculoskeletal:  Negative for myalgias.   Neurological:  Negative for tremors, facial asymmetry, speech difficulty, weakness, light-headedness, numbness and headaches.   Psychiatric/Behavioral:  Negative for behavioral problems. The patient is not nervous/anxious.       Objective  Neurological Exam  Mental Status  Alert. Oriented to person, place, time and situation. Speech is normal. Language is fluent with no aphasia. Attention and concentration are normal. Fund of knowledge is appropriate for level of education.    Cranial Nerves  CN II: Visual fields full to confrontation.  CN III, IV, VI: Extraocular movements intact bilaterally. Pupils equal round and reactive to light bilaterally.  CN V:  Right: Facial sensation is normal.  Left: Facial sensation is normal on the left.  CN VII:  Right: There is no facial weakness.  Left: There is central facial weakness.  CN VIII: Hearing grossly intact bilaterally.  CN IX, X: Palate elevates symmetrically  CN XII: Tongue midline without atrophy or fasciculations.    Motor  Normal muscle bulk throughout. Normal muscle tone. Strength is 5/5 throughout all four extremities.    Sensory  Light touch is normal in upper and lower extremities.     Coordination  Right: Finger-to-nose normal. Rapid alternating movement normal.Left: Finger-to-nose normal. Rapid alternating movement normal.    Physical Exam  Constitutional:       General: He is not in acute distress.  HENT:      Head: Normocephalic and atraumatic.   Eyes:      Extraocular Movements: Extraocular movements intact.      Pupils: Pupils are equal, round, and reactive to light.   Cardiovascular:      Rate and Rhythm: Normal rate.   Pulmonary:      Effort: Pulmonary effort is normal.   Musculoskeletal:         General: No swelling.   Skin:     General: Skin is warm and dry.   Neurological:      Mental Status:  He is alert.      Motor: Motor strength is normal.  Psychiatric:         Mood and Affect: Mood normal.         Speech: Speech normal.         Behavior: Behavior normal.     Temp:  [98.4 °F (36.9 °C)] 98.4 °F (36.9 °C)  Heart Rate:  [77-78] 77  Resp:  [18] 18  BP: (109-111)/(60-71) 109/60    Past Medical History:   Diagnosis Date    A-fib     CAD (coronary artery disease)     DM (diabetes mellitus)      History reviewed. No pertinent surgical history.  History reviewed. No pertinent family history.  Social History     Socioeconomic History    Marital status:    Tobacco Use    Smoking status: Never     Passive exposure: Never    Smokeless tobacco: Never   Vaping Use    Vaping status: Never Used   Substance and Sexual Activity    Alcohol use: Never    Drug use: Never    Sexual activity: Defer     Allergies   Allergen Reactions    Lidocaine Angioedema    Procaine Unknown - Low Severity     Prior to Admission medications    Medication Sig Start Date End Date Taking? Authorizing Provider   acetaminophen (TYLENOL) 325 MG tablet Take 2 tablets by mouth Every 4 (Four) Hours As Needed for Mild Pain, Fever or Headache. OTC 10/7/24   Michelle Lynne MD   apixaban (ELIQUIS) 5 MG tablet tablet Take 1 tablet by mouth 2 (Two) Times a Day. 2/12/24   Yamil Fajardo MD   aspirin 81 MG EC tablet Take 1 tablet by mouth Daily. OTC 10/7/24   Michelle Lynne MD   HYDROcodone-acetaminophen (NORCO) 5-325 MG per tablet Take 1 tablet by mouth As Needed. 10/24/24   Jeannie Batista MD   Magnesium 200 MG tablet Take 1 tablet by mouth 2 (Two) Times a Day. OTC    Jeannie Batista MD   metFORMIN (GLUCOPHAGE) 500 MG tablet Take 1 tablet by mouth 2 (Two) Times a Day With Meals.    Jeannie Batista MD   metoprolol tartrate (LOPRESSOR) 25 MG tablet Take 0.5 tablets by mouth Every 12 (Twelve) Hours for 30 days. 10/7/24 11/6/24  Michelle Lynne MD   multivitamin with minerals (CENTRUM SILVER 50+MEN PO) Take 1 tablet by mouth  Daily. OTC    Jeannie Batista MD   multivitamin with minerals tablet tablet Take 1 tablet by mouth Every Morning. 10/18/24   Jeannie Batista MD   naloxone (NARCAN) 4 MG/0.1ML nasal spray Administer 1 spray into the nostril(s) as directed by provider As Needed. 10/18/24   Jeannie Batista MD   polyethylene glycol (MIRALAX) 17 g packet Take 17 g by mouth Daily. 10/8/24   Michelle Lynne MD   pravastatin (PRAVACHOL) 20 MG tablet Take 1 tablet by mouth Every Night.    Jeannie Batista MD   QUEtiapine (SEROquel) 25 MG tablet Take 1 tablet by mouth Every Night. 2/7/24   Yamil Fajardo MD   sennosides-docusate (PERICOLACE) 8.6-50 MG per tablet Take 2 tablets by mouth 2 (Two) Times a Day for 30 days. 10/7/24 11/6/24  Michelle Lynne MD     Acute Stroke Data  Alteplase (tPA) Inclusion / Exclusion Criteria  Person Administering Scale: Ugo Aaron PA-C    Inclusion Criteria  [x]   18 years of age or greater   []   Onset of symptoms < 4.5 hours before beginning treatment (stroke onset = time patient was last seen well or without symptoms).   []   Diagnosis of acute ischemic stroke causing measurable disabling deficit (Complete Hemianopia, Any Aphasia, Visual or Sensory Extinction, Any weakness limiting sustained effort against gravity)   []   Any remaining deficit considered potentially disabling in view of patient and practitioner   Exclusion criteria (Do not proceed with Alteplase if any are checked under exclusion criteria)  []   Onset unknown or GREATER than 4.5 hours   []   ICH on CT/MRI   []   CT demonstrates hypodensity representing acute or subacute infarct   []   Significant head trauma or prior stroke in the previous 3 months   []   Symptoms suggestive of subarachnoid hemorrhage   []   History of un-ruptured intracranial aneurysm GREATER than 10 mm   []   Recent intracranial or intraspinal surgery within the last 3 months   []   Arterial puncture at a non-compressible site in the  previous 7 days   []   Active internal bleeding   []   Acute bleeding tendency   []   Platelet count LESS than 100,000 for known hematological diseases such as leukemia, thrombocytopenia or chronic cirrhosis   []   Current use of anticoagulant with INR GREATER than 1.7 or PT GREATER than 15 seconds, aPTT GREATER than 40 seconds   []   Heparin received within 48 hours, resulting in abnormally elevated aPTT GREATER than upper limit of normal   [x]   Current use of direct thrombin inhibitors or direct factor Xa inhibitors in the past 48 hours   []   Elevated blood pressure refractory to treatment (systolic GREATER than 185 mm/Hg or diastolic  GREATER than 110 mm/Hg   []   Suspected infective endocarditis and aortic arch dissection   []   Current use of therapeutic treatment dose of low-molecular-weight heparin (LMWH) within the previous 24 hours   []   Structural GI malignancy or bleed   Relative exclusion for all patients  []   Only minor non-disabling symptoms   []   Pregnancy   []   Seizure at onset with postictal residual neurological impairments   []   Major surgery or previous trauma within past 14 days   []   History of previous spontaneous ICH, intracranial neoplasm, or AV malformation   []   Postpartum (within previous 14 days)   []   Recent GI or urinary tract hemorrhage (within previous 21 days)   []   Recent acute MI (within previous 3 months)   []   History of un-ruptured intracranial aneurysm LESS than 10 mm   []   History of ruptured intracranial aneurysm   []   Blood glucose LESS than 50 mg/dL (2.7 mmol/L)   []   Dural puncture within the last 7 days   []   Known GREATER than 10 cerebral microbleeds   Additional exclusions for patients with symptoms onset between 3 and 4.5 hours.  []   Age > 80.   []   On any anticoagulants regardless of INR  >>> Warfarin (Coumadin), Heparin, Enoxaparin (Lovenox), fondaparinux (Arixtra), bivalirudin (Angiomax), Argatroban, dabigatran (Pradaxa), rivaroxaban (Xarelto), or  apixaban (Eliquis)   []   Severe stroke (NIHSS > 25).   []   History of BOTH diabetes and previous ischemic stroke.   []   The risks and benefits have been discussed with the patient or family related to the administration of IV Alteplase for stroke symptoms.   []   I have discussed and reviewed the patient's case and imaging with the attending prior to IV Alteplase.   N/A Time Alteplase administered     MODIFIED JOSSELIN SCALE (to be assessed for each patient having history of stroke) []Stroke history but not assessed  []0: No symptoms at all  []1: No significant disability despite symptoms  [x]2: Slight disability  []3: Moderate disability  []4: Moderately severe disability  []5: Severe disability  []6: Death      NIH Stroke Scale  Time: 0055  Person Administering Scale: Ugo Aaron PA-C    1a  Level of consciousness: 0=alert; keenly responsive   1b. LOC questions:  0=Performs both tasks correctly   1c. LOC commands: 0=Performs both tasks correctly   2.  Best Gaze: 0=normal   3.  Visual: 0=No visual loss   4. Facial Palsy: 1=Minor paralysis (flattened nasolabial fold, asymmetric on smiling)   5a.  Motor left arm: 0=No drift, limb holds 90 (or 45) degrees for full 10 seconds   5b.  Motor right arm: 0=No drift, limb holds 90 (or 45) degrees for full 10 seconds   6a. motor left le=No drift, limb holds 90 (or 45) degrees for full 10 seconds   6b  Motor right le=No drift, limb holds 90 (or 45) degrees for full 10 seconds   7. Limb Ataxia: 0=Absent   8.  Sensory: 0=Normal; no sensory loss   9. Best Language:  0=No aphasia, normal   10. Dysarthria: 0=Normal   11. Extinction and Inattention: 0=No abnormality    Total:    1     Hospital Meds  Scheduled-    Infusions-     PRNs-   sodium chloride    Results Reviewed  I have personally reviewed current lab, radiology, and data and agree with results.    CT Angiogram Head w AI Analysis of LVO    Result Date: 2024  Impression: No evidence of hemodynamically  significant stenosis, AVM or aneurysm within the head or neck by NASCET criteria. No large vessel occlusion identified. No acute process. Electronically Signed: Carolina Hassan MD  11/1/2024 1:37 AM EDT  Workstation ID: [id number]    CT Angiogram Neck    Result Date: 11/1/2024  Impression: No evidence of hemodynamically significant stenosis, AVM or aneurysm within the head or neck by NASCET criteria. No large vessel occlusion identified. No acute process. Electronically Signed: Carolina Hassan MD  11/1/2024 1:37 AM EDT  Workstation ID: [id number]    CT CEREBRAL PERFUSION WITH & WITHOUT CONTRAST    Result Date: 11/1/2024  Impression: No suspicious perfusion abnormality identified. Electronically Signed: Carolian Hassan MD  11/1/2024 1:36 AM EDT  Workstation ID: [id number]    XR Chest 1 View    Result Date: 11/1/2024  Impression: No acute cardiopulmonary process. Electronically Signed: Carolina Hassan MD  11/1/2024 1:32 AM EDT  Workstation ID: [id number]    CT Head Without Contrast Stroke Protocol    Result Date: 11/1/2024  Impression: No evidence of hemorrhage, mass effect or midline shift. No acute process identified. Electronically Signed: Carolina Hassan MD  11/1/2024 1:03 AM EDT  Workstation ID: [id number]      Significant Labs  Sodium: 138  Creatinine: 1.40  Glucose: 166  WBC: 9.17  Hgb/HCT: 14.2/42.7  Platelets: 183    Assessment and Plan  This patient is an 83-year-old male with risk factors significant for AF (on Eliquis), T2DM, remote COVID with hospitalization, CAD, HTN, HLD, multiple falls with prior SDH (2/2024) compression fractures, and questionable NPH who presented to [facility] ED from home via EMS due to concern for altered mental status and mild left facial droop.  Patient reportedly contacted a family member after waking from sleep and family member noted patient to be altered.  Initial NIH 1.  Code stroke CT imaging unrevealing.  Patient was not a candidate for IV thrombolytic therapy or emergent  neurointervention.    Antiplatelet PTA: Aspirin  Anticoagulant PTA: Eliquis    Altered mental status, transient  -Differentials include acute stroke/TIA, no evidence of seizure but cannot exclude  -MRI brain without pending  -Low threshold to load with Keppra and order routine EEG if concern for seizure activity during admission  -Recommend continuing home Eliquis  -Recommend continuing home aspirin  -Allow permissive hypertension, SBP goal < 180  -NPO pending bedside swallow eval  -TTE pending  -LDL pending, start atorvastatin 80 mg nightly  -A1c pending  -Serial neurochecks per policy, stat Cth for any acute neurological change  -PT/OT/SLP as appropriate    Disposition: Admit to hospital medicine service      Case discussed with the patient, and Dr. Mitchell.  Thank you for the consult. Stroke neurology will continue to follow.       Ugo Aaron PA-C  [facility]

## 2024-11-01 NOTE — PLAN OF CARE
Goal Outcome Evaluation:  Plan of Care Reviewed With: patient        Progress: no change (Initial Eval)  Outcome Evaluation: Pt presents below his functional baseline w/ mobility, transfers and balance limiting independence w/ ADL based tasks. Pt requiring increased assist w/ bed mobility, STS transfer and LB dressing. Pt limited by dizziness this session w/ slight BP drop. Pt's deficits warrant skilled IP OT services. Recommend SNF at d/c for best functional outcome.    Anticipated Discharge Disposition (OT): skilled nursing facility

## 2024-11-01 NOTE — PLAN OF CARE
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 Intermittently confused t/o eval. (11/01/24 1220)  SLP Swallowing Diagnosis: swallow WFL/no suspected pharyngeal impairment (11/01/24 1200)

## 2024-11-01 NOTE — CONSULTS
Diabetes Education    Patient Name:  Apollo Haro  YOB: 1940  MRN: [id number]  Admit Date:  11/1/2024    Attempted to see patient for diabetes education. Started discussing A1C of 5.8% with patient but he states he was not sure about the medications he was taking or about managing his diabetes. Patient started crying and was asking for his wife. Patient is confused as per provider notes and RN. MRI brain still pending. Not appropriate at this time, diabetes education to follow up Monday as able.    Electronically signed by:  Leda Virgen RN  11/01/24 12:51 EDT

## 2024-11-02 LAB
ANION GAP SERPL CALCULATED.3IONS-SCNC: 9 MMOL/L (ref 5–15)
BUN SERPL-MCNC: 17 MG/DL (ref 8–23)
BUN/CREAT SERPL: 18.3 (ref 7–25)
CALCIUM SPEC-SCNC: 9.2 MG/DL (ref 8.6–10.5)
CHLORIDE SERPL-SCNC: 99 MMOL/L (ref 98–107)
CO2 SERPL-SCNC: 28 MMOL/L (ref 22–29)
CREAT SERPL-MCNC: 0.93 MG/DL (ref 0.76–1.27)
DEPRECATED RDW RBC AUTO: 41.8 FL (ref 37–54)
EGFRCR SERPLBLD CKD-EPI 2021: 80.5 ML/MIN/1.73
ERYTHROCYTE [DISTWIDTH] IN BLOOD BY AUTOMATED COUNT: 13 % (ref 12.3–15.4)
GLUCOSE BLDC GLUCOMTR-MCNC: 114 MG/DL (ref 70–130)
GLUCOSE BLDC GLUCOMTR-MCNC: 122 MG/DL (ref 70–130)
GLUCOSE BLDC GLUCOMTR-MCNC: 152 MG/DL (ref 70–130)
GLUCOSE BLDC GLUCOMTR-MCNC: 159 MG/DL (ref 70–130)
GLUCOSE SERPL-MCNC: 170 MG/DL (ref 65–99)
HBA1C MFR BLD: 6.1 % (ref 4.8–5.6)
HCT VFR BLD AUTO: 40.5 % (ref 37.5–51)
HGB BLD-MCNC: 13.5 G/DL (ref 13–17.7)
MCH RBC QN AUTO: 30.3 PG (ref 26.6–33)
MCHC RBC AUTO-ENTMCNC: 33.3 G/DL (ref 31.5–35.7)
MCV RBC AUTO: 91 FL (ref 79–97)
NT-PROBNP SERPL-MCNC: 377.6 PG/ML (ref 0–1800)
PLATELET # BLD AUTO: 176 10*3/MM3 (ref 140–450)
PMV BLD AUTO: 10 FL (ref 6–12)
POTASSIUM SERPL-SCNC: 4.3 MMOL/L (ref 3.5–5.2)
QT INTERVAL: 370 MS
QTC INTERVAL: 442 MS
RBC # BLD AUTO: 4.45 10*6/MM3 (ref 4.14–5.8)
SODIUM SERPL-SCNC: 136 MMOL/L (ref 136–145)
WBC NRBC COR # BLD AUTO: 7.97 10*3/MM3 (ref 3.4–10.8)

## 2024-11-02 PROCEDURE — G0378 HOSPITAL OBSERVATION PER HR: HCPCS

## 2024-11-02 PROCEDURE — 25810000003 SODIUM CHLORIDE 0.9 % SOLUTION: Performed by: FAMILY MEDICINE

## 2024-11-02 PROCEDURE — 63710000001 INSULIN LISPRO (HUMAN) PER 5 UNITS: Performed by: NURSE PRACTITIONER

## 2024-11-02 PROCEDURE — 85027 COMPLETE CBC AUTOMATED: CPT | Performed by: INTERNAL MEDICINE

## 2024-11-02 PROCEDURE — 82948 REAGENT STRIP/BLOOD GLUCOSE: CPT

## 2024-11-02 PROCEDURE — 80048 BASIC METABOLIC PNL TOTAL CA: CPT | Performed by: INTERNAL MEDICINE

## 2024-11-02 PROCEDURE — 83880 ASSAY OF NATRIURETIC PEPTIDE: CPT | Performed by: INTERNAL MEDICINE

## 2024-11-02 PROCEDURE — 83036 HEMOGLOBIN GLYCOSYLATED A1C: CPT

## 2024-11-02 PROCEDURE — 99232 SBSQ HOSP IP/OBS MODERATE 35: CPT | Performed by: INTERNAL MEDICINE

## 2024-11-02 RX ADMIN — APIXABAN 5 MG: 5 TABLET, FILM COATED ORAL at 12:44

## 2024-11-02 RX ADMIN — POLYETHYLENE GLYCOL 3350 17 G: 17 POWDER, FOR SOLUTION ORAL at 18:37

## 2024-11-02 RX ADMIN — SENNOSIDES AND DOCUSATE SODIUM 2 TABLET: 50; 8.6 TABLET ORAL at 21:22

## 2024-11-02 RX ADMIN — SODIUM CHLORIDE 1000 ML: 9 INJECTION, SOLUTION INTRAVENOUS at 03:16

## 2024-11-02 RX ADMIN — SENNOSIDES AND DOCUSATE SODIUM 2 TABLET: 50; 8.6 TABLET ORAL at 12:44

## 2024-11-02 RX ADMIN — Medication 12.5 MG: at 12:44

## 2024-11-02 RX ADMIN — INSULIN LISPRO 2 UNITS: 100 INJECTION, SOLUTION INTRAVENOUS; SUBCUTANEOUS at 17:22

## 2024-11-02 RX ADMIN — Medication 1 TABLET: at 12:44

## 2024-11-02 RX ADMIN — ATORVASTATIN CALCIUM 80 MG: 40 TABLET, FILM COATED ORAL at 21:22

## 2024-11-02 RX ADMIN — Medication 10 ML: at 12:43

## 2024-11-02 RX ADMIN — APIXABAN 5 MG: 5 TABLET, FILM COATED ORAL at 21:22

## 2024-11-02 NOTE — PROGRESS NOTES
[facility] Medicine Services  PROGRESS NOTE    Patient Name: Apollo Haro  : 1940  MRN: [id number]    Date of Admission: 2024  Primary Care Physician: Provider, No Known    Subjective   Subjective     CC:  Altered mental status    HPI:  Says she doesn't feel well this morning, but cannot further localize or describe.  Knows braulio is in the hospital, but cannot say the name of this particular hospital.      Objective   Objective     Vital Signs:   Temp:  [97.1 °F (36.2 °C)-98.8 °F (37.1 °C)] 97.8 °F (36.6 °C)  Heart Rate:  [] 111  Resp:  [16-18] 18  BP: ()/(36-64) 102/61     Physical Exam:  Non toxic appearing, in bed  MM moist  RRR  Breath sounds grossly clear bilaterally  Abdomen soft, NT  Slightly flat affect  Awake, speech clear, moves all extremities, mild confusion, knows he is in the hospital but cannot name [facility], does not know year.      Results Reviewed:  LAB RESULTS:      Lab 24  0537 248 24   WBC 8.25  --   --  9.17   HEMOGLOBIN 12.9*  --   --  14.2   HEMOGLOBIN, POC  --   --  14.6  --    HEMATOCRIT 38.6  --   --  42.7   HEMATOCRIT POC  --   --  43  --    PLATELETS 170  --   --  183   NEUTROS ABS 5.58  --   --  5.95   IMMATURE GRANS (ABS) 0.04  --   --  0.03   LYMPHS ABS 1.86  --   --  2.28   MONOS ABS 0.67  --   --  0.75   EOS ABS 0.06  --   --  0.11   MCV 92.3  --   --  92.6   PROTIME  --  21.5*  --   --    APTT  --   --   --  35.9   HSTROP T  --   --   --  32*         Lab 24  0612 24   SODIUM 141  --    POTASSIUM 4.9  --    CHLORIDE 100  --    CO2 26.0  --    ANION GAP 15.0  --    BUN 24*  --    CREATININE 1.12 1.40*   EGFR 64.4 49.3*   GLUCOSE 127*  --    CALCIUM 9.9  --    MAGNESIUM 2.1  --              Lab 24  0138 24  0052   HSTROP T  --  32*   PROTIME 21.5*  --    INR 1.8*  --          Lab 24  0929   CHOLESTEROL 114   LDL CHOL 54   HDL CHOL 42   TRIGLYCERIDES 97              Brief Urine Lab Results  (Last result in the past 365 days)        Color   Clarity   Blood   Leuk Est   Nitrite   Protein   CREAT   Urine HCG        11/01/24 0613             102.1         11/01/24 0613 Yellow   Clear   Negative   Negative   Negative   Negative                   Microbiology Results Abnormal       None            MRI Brain Without Contrast    Result Date: 11/1/2024  MRI BRAIN WO CONTRAST Date of Exam: 11/1/2024 8:31 PM EDT Indication: Stroke, follow up Altered mental status, mild left facial droop, acute stroke suspected.  Comparison: Head CT, CTA head neck, CT perfusion 11/1/2024 Technique:  Routine multiplanar/multisequence sequence images of the brain were obtained without contrast administration. Findings: No acute midline shift, extra axial fluid collection, hydrocephalus, or infarct. No subacute to old hemorrhage. There is moderate generalized parenchymal volume loss. Scattered areas of T2/FLAIR hyperintensity in the subcortical and periventricular white matter, nonspecific, perhaps from small vessel ischemic/hypertensive changes in a patient of this age. Appropriate flow voids at the skull base and in the major venous sinuses. Scattered paranasal sinus mucosal thickening, moderate in the left ethmoid air cells and left frontal sinus with bubbly secretions which can be correlated for symptoms of recent sinusitis. Nonspecific trace bilateral mastoid effusion. Right lens replacement. No acute or aggressive appearing bone or extracranial soft tissue process.     Impression: Impression: No acute intracranial finding. Electronically Signed: Mynor Israel  11/1/2024 9:58 PM EDT  Workstation ID: [id number]    Adult Transthoracic Echo Complete W/ Cont if Necessary Per Protocol (With Agitated Saline)    Result Date: 11/1/2024    Left ventricular systolic function is normal. Calculated left ventricular EF = 57.8% Left ventricular ejection fraction appears to be 56 - 60%.   Left ventricular  diastolic function is consistent with (grade I) impaired relaxation.   Estimated right ventricular systolic pressure from tricuspid regurgitation is normal (<35 mmHg). Calculated right ventricular systolic pressure from tricuspid regurgitation is 32 mmHg.     CT Angiogram Head w AI Analysis of LVO    Result Date: 11/1/2024  CT ANGIOGRAM HEAD W AI ANALYSIS OF LVO, CT ANGIOGRAM NECK Date of Exam: 11/1/2024 1:00 AM EDT Indication: Neuro deficit, acute, stroke suspected Neuro deficit, acute stroke suspected. Comparison: 11/1/2024, 9/23/2024. Technique: CTA of the head and neck was performed after the uneventful intravenous administration of intravenous contrast. Reconstructed coronal and sagittal images were also obtained. In addition, a 3-D volume rendered image was created for interpretation. Automated exposure control and iterative reconstruction methods were used. Findings: No evidence of hemodynamically significant stenosis, AVM or aneurysm within the head or neck by NASCET criteria. No large vessel occlusion identified. No evidence of paucity of vasculature. No evidence of dissection. Soft tissues of the neck demonstrate no acute process. No acute osseous abnormality identified. Lung apices are clear.     Impression: Impression: No evidence of hemodynamically significant stenosis, AVM or aneurysm within the head or neck by NASCET criteria. No large vessel occlusion identified. No acute process. Electronically Signed: Carolina Hassan MD  11/1/2024 1:37 AM EDT  Workstation ID: [id number]    CT Angiogram Neck    Result Date: 11/1/2024  CT ANGIOGRAM HEAD W AI ANALYSIS OF LVO, CT ANGIOGRAM NECK Date of Exam: 11/1/2024 1:00 AM EDT Indication: Neuro deficit, acute, stroke suspected Neuro deficit, acute stroke suspected. Comparison: 11/1/2024, 9/23/2024. Technique: CTA of the head and neck was performed after the uneventful intravenous administration of intravenous contrast. Reconstructed coronal and sagittal images were also  obtained. In addition, a 3-D volume rendered image was created for interpretation. Automated exposure control and iterative reconstruction methods were used. Findings: No evidence of hemodynamically significant stenosis, AVM or aneurysm within the head or neck by NASCET criteria. No large vessel occlusion identified. No evidence of paucity of vasculature. No evidence of dissection. Soft tissues of the neck demonstrate no acute process. No acute osseous abnormality identified. Lung apices are clear.     Impression: Impression: No evidence of hemodynamically significant stenosis, AVM or aneurysm within the head or neck by NASCET criteria. No large vessel occlusion identified. No acute process. Electronically Signed: Carolina Hassan MD  11/1/2024 1:37 AM EDT  Workstation ID: [id number]    CT CEREBRAL PERFUSION WITH & WITHOUT CONTRAST    Result Date: 11/1/2024  CT CEREBRAL PERFUSION W WO CONTRAST Date of Exam: 11/1/2024 1:00 AM EDT Indication: Neuro deficit, acute, stroke suspected Neuro deficit, acute stroke suspected.  Comparison: 11/1/2024, 9/23/2024. Technique: Axial CT images of the brain were obtained prior to and after the administration of intravenous contrast. Core blood volume, core blood flow, mean transit time, and Tmax images were obtained utilizing the Rapid software protocol. A limited CT angiogram of the head was also performed to measure the blood vessel density. The radiation dose reduction device was turned on for each scan per the ALARA (As Low as Reasonably Achievable) protocol. Findings: Cerebral blood flow less than 30% is 0 mL. Tmax greater than 6 seconds is 0 mL. Mismatch volume is 0 mL. Mismatch ratio is none. No suspicious perfusion abnormality identified.     Impression: Impression: No suspicious perfusion abnormality identified. Electronically Signed: Carolina Hassan MD  11/1/2024 1:36 AM EDT  Workstation ID: [id number]    XR Chest 1 View    Result Date: 11/1/2024  XR CHEST 1 VW Date of Exam:  11/1/2024 1:18 AM EDT Indication: Acute Stroke Protocol (onset < 12 hrs) Comparison: 9/16/2024. Findings: There are no airspace consolidations. No pleural fluid. No pneumothorax. The pulmonary vasculature appears within normal limits. The cardiac and mediastinal silhouette appear unremarkable. No acute osseous abnormality identified.     Impression: Impression: No acute cardiopulmonary process. Electronically Signed: Carolina Hassan MD  11/1/2024 1:32 AM EDT  Workstation ID: [id number]    CT Head Without Contrast Stroke Protocol    Result Date: 11/1/2024  CT HEAD WO CONTRAST STROKE PROTOCOL Date of Exam: 11/1/2024 12:56 AM EDT Indication: Neuro deficit, acute, stroke suspected Neuro deficit, acute, stroke suspected. Comparison: 9/23/2024. Technique: Axial CT images were obtained of the head without contrast administration.  Reconstructed coronal images were also obtained. Automated exposure control and iterative construction methods were used. Scan Time: 12:57 a.m. Results discussed with the stroke team at 1:02 a.m. Findings: There is no evidence of hemorrhage. There is no mass effect or midline shift. There is no extracerebral collection. Ventricles are normal in size and configuration for patient's stated age.  Posterior fossa is within normal limits. Calvarium and skull base appear intact.   Visualized sinuses show no air fluid levels. Visualized orbits are unremarkable.     Impression: Impression: No evidence of hemorrhage, mass effect or midline shift. No acute process identified. Electronically Signed: Carolina Hassan MD  11/1/2024 1:03 AM EDT  Workstation ID: [id number]     Results for orders placed during the hospital encounter of 11/01/24    Adult Transthoracic Echo Complete W/ Cont if Necessary Per Protocol (With Agitated Saline)    Interpretation Summary    Left ventricular systolic function is normal. Calculated left ventricular EF = 57.8% Left ventricular ejection fraction appears to be 56 - 60%.    Left  ventricular diastolic function is consistent with (grade I) impaired relaxation.    Estimated right ventricular systolic pressure from tricuspid regurgitation is normal (<35 mmHg). Calculated right ventricular systolic pressure from tricuspid regurgitation is 32 mmHg.      Current medications:  Scheduled Meds:apixaban, 5 mg, Oral, BID  atorvastatin, 80 mg, Oral, Nightly  insulin lispro, 2-7 Units, Subcutaneous, 4x Daily AC & at Bedtime  metoprolol tartrate, 12.5 mg, Oral, Q12H  multivitamin with minerals, 1 tablet, Oral, Daily  senna-docusate sodium, 2 tablet, Oral, BID  sodium chloride, 10 mL, Intravenous, Q12H  sodium chloride, 10 mL, Intravenous, Q12H      Continuous Infusions:     PRN Meds:.  acetaminophen **OR** acetaminophen **OR** acetaminophen    senna-docusate sodium **AND** polyethylene glycol **AND** bisacodyl **AND** bisacodyl    dextrose    dextrose    glucagon (human recombinant)    sodium chloride    sodium chloride    sodium chloride    Assessment & Plan   Assessment & Plan     Active Hospital Problems    Diagnosis  POA    **AMS (altered mental status) [R41.82]  Yes    DARIO (acute kidney injury) [N17.9]  Yes    Atrial fibrillation, chronic [I48.20]  Yes    CAD (coronary artery disease) [I25.10]  Yes    HTN (hypertension) [I10]  Yes    Type 2 diabetes mellitus, without long-term current use of insulin [E11.9]  Yes      Resolved Hospital Problems   No resolved problems to display.        Brief Hospital Course to date:  Apollo Haro is a 84 y.o. male with history of hypertension, CAD, chronic atrial fibrillation, DM2 with peripheral neuropathy, cognitive impariment as well as chronic back and hip pain who presented to the ED with altered mental status.    Altered mental status  -CT head without acute changes, MRI brain negative for acute ischemic changes  -UA bland, chest x-ray no infiltrate  -Delirium possibly secondary to medications in the setting of DARIO and poor sleep with underlying cognitive  impairment  -Stroke neurology followed    Chronic back pain  Recent L2 compression fracture  -Recently started on Lortab, hold now due to altered mental status  -Tylenol  -Heating pad as needed    Atrial fibrillation/flutter  - Continue Eliquis and metoprolol  - flecainide discontinued by Cardiology during recent admission    DARIO  -Creatinine 1.4 at admission, improved    DM2  -SSI  -Recent A1c 5.8  -Glucose reviewed    Generalized weakness  - PT/OT -- rehab recommended    Called and updated spouse today      Expected Discharge Location and Transportation:   Expected Discharge   Expected Discharge Date: 11/3/2024; Expected Discharge Time:      VTE Prophylaxis:  Pharmacologic & mechanical VTE prophylaxis orders are present.         AM-PAC 6 Clicks Score (PT): 22 (11/02/24 1615)    CODE STATUS:   Code Status and Medical Interventions: CPR (Attempt to Resuscitate); Full Support   Ordered at: 11/01/24 3756     Code Status (Patient has no pulse and is not breathing):    CPR (Attempt to Resuscitate)     Medical Interventions (Patient has pulse or is breathing):    Full Support       Allen Moreno MD  11/02/24

## 2024-11-02 NOTE — PLAN OF CARE
Problem: Adult Inpatient Plan of Care  Goal: Plan of Care Review  Outcome: Progressing  Flowsheets  Taken 11/1/2024 1536 by Rosalva Sequeira OT  Progress: (Initial Eval) no change  Outcome Evaluation: Pt presents below his functional baseline w/ mobility, transfers and balance limiting independence w/ ADL based tasks. Pt requiring increased assist w/ bed mobility, STS transfer and LB dressing. Pt limited by dizziness this session w/ slight BP drop. Pt's deficits warrant skilled IP OT services. Recommend SNF at d/c for best functional outcome.  Taken 11/1/2024 1511 by Rhonda Wei PT  Plan of Care Reviewed With: patient  Goal: Patient-Specific Goal (Individualized)  Outcome: Progressing  Goal: Absence of Hospital-Acquired Illness or Injury  Outcome: Progressing  Intervention: Identify and Manage Fall Risk  Recent Flowsheet Documentation  Taken 11/2/2024 0557 by Leonardo Diehl RN  Safety Promotion/Fall Prevention:   safety round/check completed   activity supervised  Taken 11/2/2024 0401 by Leonardo Diehl RN  Safety Promotion/Fall Prevention:   safety round/check completed   activity supervised  Taken 11/2/2024 0201 by Leonardo Diehl RN  Safety Promotion/Fall Prevention:   safety round/check completed   activity supervised  Taken 11/2/2024 0007 by Leonardo Diehl RN  Safety Promotion/Fall Prevention:   safety round/check completed   activity supervised  Taken 11/1/2024 2150 by Leonardo Diehl RN  Safety Promotion/Fall Prevention:   safety round/check completed   activity supervised  Taken 11/1/2024 2007 by Leonardo Diehl RN  Safety Promotion/Fall Prevention:   safety round/check completed   activity supervised  Intervention: Prevent Skin Injury  Recent Flowsheet Documentation  Taken 11/2/2024 0557 by Leonardo Diehl, RN  Body Position: position changed independently  Skin Protection: transparent dressing maintained  Taken 11/2/2024 0401 by Leonardo Diehl, RN  Body  Position: position changed independently  Skin Protection: transparent dressing maintained  Taken 11/2/2024 0201 by Leonardo iDehl RN  Body Position: position changed independently  Skin Protection: transparent dressing maintained  Taken 11/2/2024 0007 by Leonardo Diehl RN  Body Position: position changed independently  Skin Protection: transparent dressing maintained  Taken 11/1/2024 2150 by Leonardo Diehl RN  Body Position: position changed independently  Skin Protection: transparent dressing maintained  Taken 11/1/2024 2007 by Leonardo Diehl RN  Body Position: position changed independently  Skin Protection: transparent dressing maintained  Intervention: Prevent and Manage VTE (Venous Thromboembolism) Risk  Recent Flowsheet Documentation  Taken 11/2/2024 0557 by Leonardo Diehl RN  VTE Prevention/Management:   bilateral   SCDs (sequential compression devices) off  Taken 11/2/2024 0401 by Leonardo Diehl RN  VTE Prevention/Management:   bilateral   SCDs (sequential compression devices) off  Taken 11/2/2024 0201 by Leonardo Diehl RN  VTE Prevention/Management:   bilateral   SCDs (sequential compression devices) off  Taken 11/2/2024 0007 by Leonardo Diehl RN  VTE Prevention/Management:   bilateral   SCDs (sequential compression devices) off  Taken 11/1/2024 2150 by Leonardo Diehl RN  VTE Prevention/Management:   bilateral   SCDs (sequential compression devices) off  Taken 11/1/2024 2007 by Leonardo Diehl RN  VTE Prevention/Management:   bilateral   SCDs (sequential compression devices) off  Intervention: Prevent Infection  Recent Flowsheet Documentation  Taken 11/2/2024 0557 by Leonardo Diehl RN  Infection Prevention: environmental surveillance performed  Taken 11/2/2024 0401 by Leonardo Diehl RN  Infection Prevention: environmental surveillance performed  Taken 11/2/2024 0201 by Leonardo Diehl RN  Infection Prevention: environmental  surveillance performed  Taken 11/2/2024 0007 by Leonardo Diehl, RN  Infection Prevention: environmental surveillance performed  Taken 11/1/2024 2150 by Leonardo Diehl RN  Infection Prevention: environmental surveillance performed  Taken 11/1/2024 2007 by Leonardo Diehl RN  Infection Prevention: environmental surveillance performed  Goal: Optimal Comfort and Wellbeing  Outcome: Progressing  Intervention: Provide Person-Centered Care  Recent Flowsheet Documentation  Taken 11/2/2024 0557 by Leonardo Diehl RN  Trust Relationship/Rapport:   care explained   choices provided   questions answered   questions encouraged   thoughts/feelings acknowledged  Taken 11/2/2024 0401 by Leonardo Diehl, RN  Trust Relationship/Rapport:   care explained   choices provided   questions answered   questions encouraged   thoughts/feelings acknowledged  Taken 11/2/2024 0201 by Leonardo Diehl, JENNY  Trust Relationship/Rapport:   care explained   choices provided   questions answered   questions encouraged   thoughts/feelings acknowledged  Taken 11/2/2024 0007 by Leonardo Diehl, JENNY  Trust Relationship/Rapport:   care explained   choices provided   questions answered   questions encouraged   thoughts/feelings acknowledged  Taken 11/1/2024 2150 by Leonardo Diehl, RN  Trust Relationship/Rapport:   care explained   choices provided   questions answered   questions encouraged   thoughts/feelings acknowledged  Taken 11/1/2024 2007 by Leonardo Diehl RN  Trust Relationship/Rapport:   care explained   choices provided   questions answered   questions encouraged   thoughts/feelings acknowledged  Goal: Readiness for Transition of Care  Outcome: Progressing     Problem: Skin Injury Risk Increased  Goal: Skin Health and Integrity  Outcome: Progressing  Intervention: Optimize Skin Protection  Recent Flowsheet Documentation  Taken 11/2/2024 0557 by Leonardo Diehl, RN  Activity Management: activity  encouraged  Pressure Reduction Techniques:   weight shift assistance provided   pressure points protected   heels elevated off bed  Head of Bed (HOB) Positioning: HOB elevated  Pressure Reduction Devices:   pressure-redistributing mattress utilized   positioning supports utilized  Skin Protection: transparent dressing maintained  Taken 11/2/2024 0401 by Leonardo Diehl RN  Activity Management: activity encouraged  Pressure Reduction Techniques:   weight shift assistance provided   pressure points protected   heels elevated off bed  Head of Bed (HOB) Positioning: HOB elevated  Pressure Reduction Devices:   pressure-redistributing mattress utilized   positioning supports utilized  Skin Protection: transparent dressing maintained  Taken 11/2/2024 0201 by Leonardo Diehl RN  Activity Management: activity encouraged  Pressure Reduction Techniques:   weight shift assistance provided   pressure points protected   heels elevated off bed  Head of Bed (HOB) Positioning: HOB elevated  Pressure Reduction Devices:   pressure-redistributing mattress utilized   positioning supports utilized  Skin Protection: transparent dressing maintained  Taken 11/2/2024 0007 by Leonardo Diehl RN  Activity Management: activity encouraged  Pressure Reduction Techniques:   weight shift assistance provided   pressure points protected   heels elevated off bed  Head of Bed (HOB) Positioning: HOB elevated  Pressure Reduction Devices:   pressure-redistributing mattress utilized   positioning supports utilized  Skin Protection: transparent dressing maintained  Taken 11/1/2024 2150 by Leonardo Diehl RN  Activity Management: activity encouraged  Pressure Reduction Techniques:   weight shift assistance provided   pressure points protected   heels elevated off bed  Head of Bed (HOB) Positioning: HOB elevated  Pressure Reduction Devices:   pressure-redistributing mattress utilized   positioning supports utilized  Skin Protection:  transparent dressing maintained  Taken 11/1/2024 2007 by Leonardo Diehl, RN  Activity Management: activity encouraged  Pressure Reduction Techniques:   weight shift assistance provided   pressure points protected   heels elevated off bed  Head of Bed (HOB) Positioning: HOB elevated  Pressure Reduction Devices:   pressure-redistributing mattress utilized   positioning supports utilized  Skin Protection: transparent dressing maintained     Problem: Fall Injury Risk  Goal: Absence of Fall and Fall-Related Injury  Outcome: Progressing  Intervention: Identify and Manage Contributors  Recent Flowsheet Documentation  Taken 11/2/2024 0557 by Leonardo Diehl RN  Medication Review/Management: medications reviewed  Taken 11/2/2024 0401 by Leonardo Diehl RN  Medication Review/Management: medications reviewed  Taken 11/2/2024 0201 by Leonardo Diehl RN  Medication Review/Management: medications reviewed  Taken 11/2/2024 0007 by Leonardo Diehl RN  Medication Review/Management: medications reviewed  Taken 11/1/2024 2150 by Leonardo Diehl RN  Medication Review/Management: medications reviewed  Taken 11/1/2024 2007 by Leonardo Diehl RN  Medication Review/Management: medications reviewed  Intervention: Promote Injury-Free Environment  Recent Flowsheet Documentation  Taken 11/2/2024 0557 by Leonardo Diehl, RN  Safety Promotion/Fall Prevention:   safety round/check completed   activity supervised  Taken 11/2/2024 0401 by Leonardo Diehl RN  Safety Promotion/Fall Prevention:   safety round/check completed   activity supervised  Taken 11/2/2024 0201 by Leonardo Diehl, RN  Safety Promotion/Fall Prevention:   safety round/check completed   activity supervised  Taken 11/2/2024 0007 by Leonardo Diehl RN  Safety Promotion/Fall Prevention:   safety round/check completed   activity supervised  Taken 11/1/2024 2150 by Leonardo Diehl, RN  Safety Promotion/Fall Prevention:   safety  round/check completed   activity supervised  Taken 11/1/2024 2007 by Leonardo Diehl, RN  Safety Promotion/Fall Prevention:   safety round/check completed   activity supervised   Goal Outcome Evaluation:

## 2024-11-03 LAB
GLUCOSE BLDC GLUCOMTR-MCNC: 179 MG/DL (ref 70–130)
GLUCOSE BLDC GLUCOMTR-MCNC: 182 MG/DL (ref 70–130)
GLUCOSE BLDC GLUCOMTR-MCNC: 212 MG/DL (ref 70–130)
GLUCOSE BLDC GLUCOMTR-MCNC: 280 MG/DL (ref 70–130)

## 2024-11-03 PROCEDURE — 82948 REAGENT STRIP/BLOOD GLUCOSE: CPT

## 2024-11-03 PROCEDURE — G0378 HOSPITAL OBSERVATION PER HR: HCPCS

## 2024-11-03 PROCEDURE — 63710000001 INSULIN LISPRO (HUMAN) PER 5 UNITS: Performed by: NURSE PRACTITIONER

## 2024-11-03 RX ADMIN — APIXABAN 5 MG: 5 TABLET, FILM COATED ORAL at 21:04

## 2024-11-03 RX ADMIN — INSULIN LISPRO 3 UNITS: 100 INJECTION, SOLUTION INTRAVENOUS; SUBCUTANEOUS at 10:23

## 2024-11-03 RX ADMIN — INSULIN LISPRO 2 UNITS: 100 INJECTION, SOLUTION INTRAVENOUS; SUBCUTANEOUS at 13:05

## 2024-11-03 RX ADMIN — INSULIN LISPRO 2 UNITS: 100 INJECTION, SOLUTION INTRAVENOUS; SUBCUTANEOUS at 17:27

## 2024-11-03 RX ADMIN — ACETAMINOPHEN 650 MG: 325 TABLET ORAL at 21:04

## 2024-11-03 RX ADMIN — Medication 10 ML: at 10:23

## 2024-11-03 RX ADMIN — INSULIN LISPRO 4 UNITS: 100 INJECTION, SOLUTION INTRAVENOUS; SUBCUTANEOUS at 21:04

## 2024-11-03 RX ADMIN — Medication 10 ML: at 10:24

## 2024-11-03 RX ADMIN — SENNOSIDES AND DOCUSATE SODIUM 2 TABLET: 50; 8.6 TABLET ORAL at 10:24

## 2024-11-03 RX ADMIN — Medication 12.5 MG: at 10:24

## 2024-11-03 RX ADMIN — ATORVASTATIN CALCIUM 80 MG: 40 TABLET, FILM COATED ORAL at 21:03

## 2024-11-03 RX ADMIN — Medication 1 TABLET: at 10:25

## 2024-11-03 RX ADMIN — APIXABAN 5 MG: 5 TABLET, FILM COATED ORAL at 10:25

## 2024-11-04 LAB
ALBUMIN SERPL-MCNC: 2.9 G/DL (ref 3.5–5.2)
ALBUMIN/GLOB SERPL: 1.1 G/DL
ALP SERPL-CCNC: 94 U/L (ref 39–117)
ALT SERPL W P-5'-P-CCNC: 12 U/L (ref 1–41)
ANION GAP SERPL CALCULATED.3IONS-SCNC: 9 MMOL/L (ref 5–15)
AST SERPL-CCNC: 22 U/L (ref 1–40)
BASOPHILS # BLD AUTO: 0.04 10*3/MM3 (ref 0–0.2)
BASOPHILS NFR BLD AUTO: 0.5 % (ref 0–1.5)
BILIRUB SERPL-MCNC: 0.8 MG/DL (ref 0–1.2)
BUN SERPL-MCNC: 19 MG/DL (ref 8–23)
BUN/CREAT SERPL: 22.6 (ref 7–25)
CALCIUM SPEC-SCNC: 8.5 MG/DL (ref 8.6–10.5)
CHLORIDE SERPL-SCNC: 102 MMOL/L (ref 98–107)
CO2 SERPL-SCNC: 24 MMOL/L (ref 22–29)
CORTIS AM PEAK SERPL-MCNC: 8.04 MCG/DL
CREAT SERPL-MCNC: 0.84 MG/DL (ref 0.76–1.27)
DEPRECATED RDW RBC AUTO: 43.6 FL (ref 37–54)
EGFRCR SERPLBLD CKD-EPI 2021: 85.5 ML/MIN/1.73
EOSINOPHIL # BLD AUTO: 0.17 10*3/MM3 (ref 0–0.4)
EOSINOPHIL NFR BLD AUTO: 2.1 % (ref 0.3–6.2)
ERYTHROCYTE [DISTWIDTH] IN BLOOD BY AUTOMATED COUNT: 12.9 % (ref 12.3–15.4)
GLOBULIN UR ELPH-MCNC: 2.7 GM/DL
GLUCOSE BLDC GLUCOMTR-MCNC: 142 MG/DL (ref 70–130)
GLUCOSE BLDC GLUCOMTR-MCNC: 142 MG/DL (ref 70–130)
GLUCOSE BLDC GLUCOMTR-MCNC: 184 MG/DL (ref 70–130)
GLUCOSE BLDC GLUCOMTR-MCNC: 232 MG/DL (ref 70–130)
GLUCOSE SERPL-MCNC: 158 MG/DL (ref 65–99)
HCT VFR BLD AUTO: 35.9 % (ref 37.5–51)
HGB BLD-MCNC: 11.7 G/DL (ref 13–17.7)
IMM GRANULOCYTES # BLD AUTO: 0.02 10*3/MM3 (ref 0–0.05)
IMM GRANULOCYTES NFR BLD AUTO: 0.2 % (ref 0–0.5)
LYMPHOCYTES # BLD AUTO: 2.12 10*3/MM3 (ref 0.7–3.1)
LYMPHOCYTES NFR BLD AUTO: 25.9 % (ref 19.6–45.3)
MCH RBC QN AUTO: 30.3 PG (ref 26.6–33)
MCHC RBC AUTO-ENTMCNC: 32.6 G/DL (ref 31.5–35.7)
MCV RBC AUTO: 93 FL (ref 79–97)
MONOCYTES # BLD AUTO: 0.83 10*3/MM3 (ref 0.1–0.9)
MONOCYTES NFR BLD AUTO: 10.1 % (ref 5–12)
NEUTROPHILS NFR BLD AUTO: 5.01 10*3/MM3 (ref 1.7–7)
NEUTROPHILS NFR BLD AUTO: 61.2 % (ref 42.7–76)
NRBC BLD AUTO-RTO: 0 /100 WBC (ref 0–0.2)
PLATELET # BLD AUTO: 140 10*3/MM3 (ref 140–450)
PMV BLD AUTO: 10.2 FL (ref 6–12)
POTASSIUM SERPL-SCNC: 3.9 MMOL/L (ref 3.5–5.2)
PROT SERPL-MCNC: 5.6 G/DL (ref 6–8.5)
RBC # BLD AUTO: 3.86 10*6/MM3 (ref 4.14–5.8)
SODIUM SERPL-SCNC: 135 MMOL/L (ref 136–145)
WBC NRBC COR # BLD AUTO: 8.19 10*3/MM3 (ref 3.4–10.8)

## 2024-11-04 PROCEDURE — 85025 COMPLETE CBC W/AUTO DIFF WBC: CPT

## 2024-11-04 PROCEDURE — 80053 COMPREHEN METABOLIC PANEL: CPT

## 2024-11-04 PROCEDURE — 99232 SBSQ HOSP IP/OBS MODERATE 35: CPT | Performed by: HOSPITALIST

## 2024-11-04 PROCEDURE — 25010000002 ALBUMIN HUMAN 25% PER 50 ML

## 2024-11-04 PROCEDURE — 25810000003 SODIUM CHLORIDE 0.9 % SOLUTION

## 2024-11-04 PROCEDURE — 63710000001 INSULIN LISPRO (HUMAN) PER 5 UNITS: Performed by: NURSE PRACTITIONER

## 2024-11-04 PROCEDURE — P9047 ALBUMIN (HUMAN), 25%, 50ML: HCPCS

## 2024-11-04 PROCEDURE — 25810000003 SODIUM CHLORIDE 0.9 % SOLUTION: Performed by: HOSPITALIST

## 2024-11-04 PROCEDURE — 82533 TOTAL CORTISOL: CPT | Performed by: HOSPITALIST

## 2024-11-04 PROCEDURE — 82948 REAGENT STRIP/BLOOD GLUCOSE: CPT

## 2024-11-04 PROCEDURE — 25810000003 SODIUM CHLORIDE 0.9 % SOLUTION: Performed by: NURSE PRACTITIONER

## 2024-11-04 RX ORDER — SODIUM CHLORIDE 9 MG/ML
100 INJECTION, SOLUTION INTRAVENOUS CONTINUOUS
Status: ACTIVE | OUTPATIENT
Start: 2024-11-04 | End: 2024-11-05

## 2024-11-04 RX ORDER — QUETIAPINE FUMARATE 25 MG/1
12.5 TABLET, FILM COATED ORAL NIGHTLY
Status: DISCONTINUED | OUTPATIENT
Start: 2024-11-04 | End: 2024-11-05 | Stop reason: HOSPADM

## 2024-11-04 RX ORDER — SODIUM CHLORIDE 9 MG/ML
100 INJECTION, SOLUTION INTRAVENOUS CONTINUOUS
Status: ACTIVE | OUTPATIENT
Start: 2024-11-04 | End: 2024-11-04

## 2024-11-04 RX ORDER — ALBUMIN (HUMAN) 12.5 G/50ML
12.5 SOLUTION INTRAVENOUS ONCE
Status: COMPLETED | OUTPATIENT
Start: 2024-11-04 | End: 2024-11-04

## 2024-11-04 RX ADMIN — Medication 10 ML: at 08:41

## 2024-11-04 RX ADMIN — SODIUM CHLORIDE 250 ML: 9 INJECTION, SOLUTION INTRAVENOUS at 21:33

## 2024-11-04 RX ADMIN — Medication 10 ML: at 20:40

## 2024-11-04 RX ADMIN — APIXABAN 5 MG: 5 TABLET, FILM COATED ORAL at 08:41

## 2024-11-04 RX ADMIN — SENNOSIDES AND DOCUSATE SODIUM 2 TABLET: 50; 8.6 TABLET ORAL at 08:40

## 2024-11-04 RX ADMIN — Medication 1 TABLET: at 08:41

## 2024-11-04 RX ADMIN — ATORVASTATIN CALCIUM 80 MG: 40 TABLET, FILM COATED ORAL at 20:40

## 2024-11-04 RX ADMIN — SODIUM CHLORIDE 100 ML/HR: 9 INJECTION, SOLUTION INTRAVENOUS at 18:55

## 2024-11-04 RX ADMIN — APIXABAN 5 MG: 5 TABLET, FILM COATED ORAL at 20:40

## 2024-11-04 RX ADMIN — INSULIN LISPRO 2 UNITS: 100 INJECTION, SOLUTION INTRAVENOUS; SUBCUTANEOUS at 17:58

## 2024-11-04 RX ADMIN — SODIUM CHLORIDE 100 ML/HR: 9 INJECTION, SOLUTION INTRAVENOUS at 08:41

## 2024-11-04 RX ADMIN — ALBUMIN (HUMAN) 12.5 G: 0.25 INJECTION, SOLUTION INTRAVENOUS at 04:10

## 2024-11-04 RX ADMIN — INSULIN LISPRO 3 UNITS: 100 INJECTION, SOLUTION INTRAVENOUS; SUBCUTANEOUS at 12:22

## 2024-11-04 RX ADMIN — SODIUM CHLORIDE 500 ML: 9 INJECTION, SOLUTION INTRAVENOUS at 02:01

## 2024-11-04 RX ADMIN — QUETIAPINE FUMARATE 12.5 MG: 25 TABLET ORAL at 20:40

## 2024-11-04 NOTE — PROGRESS NOTES
[facility] Medicine Services  PROGRESS NOTE    Patient Name: Apollo Haro  : 1940  MRN: [id number]    Date of Admission: 2024  Primary Care Physician: Provider, No Known    Subjective   Subjective     CC:  F/U AMS    HPI:  Seen this morning, no complaints. BP ran low all night. He denies dizziness or lightheadedness. Says he just wants to go home.       Objective   Objective     Vital Signs:   Temp:  [97.9 °F (36.6 °C)-98.1 °F (36.7 °C)] 98 °F (36.7 °C)  Heart Rate:  [] 71  Resp:  [18] 18  BP: ()/(41-60) 108/49     Physical Exam:  Gen-no acute distress, chronically ill appearing  HENT-NCAT, mucous membranes moist  CV-RRR, S1 S2 normal, no m/r/g  Resp-CTAB, no wheezes or rales  Abd-soft, NT, ND, +BS  Ext-no edema  Neuro-awake, mild confusion noted, no focal deficits  Skin-no rashes  Psych-flat      Results Reviewed:  LAB RESULTS:      Lab 24  0336 24  1801 24  0537 24  0138 24  0059 24  0052   WBC 8.19 7.97 8.25  --   --  9.17   HEMOGLOBIN 11.7* 13.5 12.9*  --   --  14.2   HEMOGLOBIN, POC  --   --   --   --  14.6  --    HEMATOCRIT 35.9* 40.5 38.6  --   --  42.7   HEMATOCRIT POC  --   --   --   --  43  --    PLATELETS 140 176 170  --   --  183   NEUTROS ABS 5.01  --  5.58  --   --  5.95   IMMATURE GRANS (ABS) 0.02  --  0.04  --   --  0.03   LYMPHS ABS 2.12  --  1.86  --   --  2.28   MONOS ABS 0.83  --  0.67  --   --  0.75   EOS ABS 0.17  --  0.06  --   --  0.11   MCV 93.0 91.0 92.3  --   --  92.6   PROTIME  --   --   --  21.5*  --   --    APTT  --   --   --   --   --  35.9   HSTROP T  --   --   --   --   --  32*         Lab 24  0336 24  1801 24  0612 24  0059   SODIUM 135* 136 141  --    POTASSIUM 3.9 4.3 4.9  --    CHLORIDE 102 99 100  --    CO2 24.0 28.0 26.0  --    ANION GAP 9.0 9.0 15.0  --    BUN 19 17 24*  --    CREATININE 0.84 0.93 1.12 1.40*   EGFR 85.5 80.5 64.4 49.3*   GLUCOSE 158* 170* 127*  --     CALCIUM 8.5* 9.2 9.9  --    MAGNESIUM  --   --  2.1  --    HEMOGLOBIN A1C  --  6.10*  --   --          Lab 11/04/24  0336   TOTAL PROTEIN 5.6*   ALBUMIN 2.9*   GLOBULIN 2.7   ALT (SGPT) 12   AST (SGOT) 22   BILIRUBIN 0.8   ALK PHOS 94         Lab 11/02/24  1801 11/01/24  0138 11/01/24  0052   PROBNP 377.6  --   --    HSTROP T  --   --  32*   PROTIME  --  21.5*  --    INR  --  1.8*  --          Lab 11/01/24  0929   CHOLESTEROL 114   LDL CHOL 54   HDL CHOL 42   TRIGLYCERIDES 97             Brief Urine Lab Results  (Last result in the past 365 days)        Color   Clarity   Blood   Leuk Est   Nitrite   Protein   CREAT   Urine HCG        11/01/24 0613             102.1         11/01/24 0613 Yellow   Clear   Negative   Negative   Negative   Negative                   Microbiology Results Abnormal       None            No radiology results from the last 24 hrs    Results for orders placed during the hospital encounter of 11/01/24    Adult Transthoracic Echo Complete W/ Cont if Necessary Per Protocol (With Agitated Saline)    Interpretation Summary    Left ventricular systolic function is normal. Calculated left ventricular EF = 57.8% Left ventricular ejection fraction appears to be 56 - 60%.    Left ventricular diastolic function is consistent with (grade I) impaired relaxation.    Estimated right ventricular systolic pressure from tricuspid regurgitation is normal (<35 mmHg). Calculated right ventricular systolic pressure from tricuspid regurgitation is 32 mmHg.      Current medications:  Scheduled Meds:apixaban, 5 mg, Oral, BID  atorvastatin, 80 mg, Oral, Nightly  insulin lispro, 2-7 Units, Subcutaneous, 4x Daily AC & at Bedtime  [Held by provider] metoprolol tartrate, 12.5 mg, Oral, Q12H  multivitamin with minerals, 1 tablet, Oral, Daily  QUEtiapine, 12.5 mg, Oral, Nightly  senna-docusate sodium, 2 tablet, Oral, BID  sodium chloride, 10 mL, Intravenous, Q12H  sodium chloride, 10 mL, Intravenous, Q12H      Continuous  Infusions:sodium chloride, 100 mL/hr, Last Rate: 100 mL/hr (11/04/24 0841)      PRN Meds:.  acetaminophen **OR** acetaminophen **OR** acetaminophen    senna-docusate sodium **AND** polyethylene glycol **AND** bisacodyl **AND** bisacodyl    dextrose    dextrose    glucagon (human recombinant)    sodium chloride    sodium chloride    sodium chloride    Assessment & Plan   Assessment & Plan     Active Hospital Problems    Diagnosis  POA    **AMS (altered mental status) [R41.82]  Yes    DARIO (acute kidney injury) [N17.9]  Yes    Atrial fibrillation, chronic [I48.20]  Yes    CAD (coronary artery disease) [I25.10]  Yes    HTN (hypertension) [I10]  Yes    Type 2 diabetes mellitus, without long-term current use of insulin [E11.9]  Yes      Resolved Hospital Problems   No resolved problems to display.        Brief Hospital Course to date:  Apollo Haro is a 84 y.o. male with hx of HTN, CAD, chronic atrial fibrillation, DM2 with peripheral neuropathy, cognitive impairment, and chronic back and hip pain who presented to the ED with altered mental status. Reports poor sleep x several months. Apparently he had accused his wife of stealing things after having several days of episodes of delirium, and was finally brought to the hospital by EMS for further evaluation.    Recent admission to [facility] 9/23-10/7/24 due to L2 compression fracture following a fall. Neurosurgery recommended conservative management. He was also treated with IV Vanc for MRSA UTI. Seen by Cardiology and had Flecainide discontinued and Metoprolol dose reduced. He was documented as having hypotension during that admission. Discharged to [facility] for rehab.     This patient's problems and plans were partially entered by my partner and updated as appropriate by me 11/04/24. Copied text in this note has been reviewed and is accurate as of today's date.      Altered mental status, suspected multifactorial delirium  Cognitive impairment  --CT head and MRI brain  unremarkable  --UA bland, CXR no infiltrate  --Delirium possibly secondary to medications in the setting of DARIO and poor sleep with underlying cognitive impairment  --Stroke Neurology has seen and signed off as no evidence of acute stroke  --Appears to be improving  --Had been on Seroquel 25 mg nightly prior to admission - will resume at lower dose 12.5 mg this evening     DARIO, resolved  --Cr 1.4 at admission, now resolved s/p IV fluids    Hypotension  Hx of HTN  --BP running low since admission, initially improved with fluids but now low again since last night - he appears to be asymptomatic  --s/p IV fluid bolus and albumin overnight   --Start NS at 100 cc/hr x 12 hours and monitor  --Check AM cortisol     Chronic back pain  Recent L2 compression fracture  --Recently started on Lortab, hold for now due to altered mental status  --Tylenol and heating pad as needed     Atrial fibrillation/flutter  --Continue Eliquis and Metoprolol  --Flecainide discontinued by Cardiology during recent admission     DM2  --HbA1c 6.1%  --Low dose SSI    Generalized weakness  --PT/OT recommended rehab      Expected Discharge Location and Transportation: rehab  Expected Discharge   Expected Discharge Date: 11/5/2024; Expected Discharge Time:      VTE Prophylaxis:  Pharmacologic & mechanical VTE prophylaxis orders are present.         AM-PAC 6 Clicks Score (PT): 16 (11/04/24 0800)    CODE STATUS:   Code Status and Medical Interventions: CPR (Attempt to Resuscitate); Full Support   Ordered at: 11/01/24 5590     Code Status (Patient has no pulse and is not breathing):    CPR (Attempt to Resuscitate)     Medical Interventions (Patient has pulse or is breathing):    Full Support       Brenda Perez MD  11/04/24

## 2024-11-04 NOTE — CASE MANAGEMENT/SOCIAL WORK
Continued Stay Note  [facility]     Patient Name: Apollo Haro  MRN: [id number]  Today's Date: 11/4/2024    Admit Date: 11/1/2024    Plan: Rehab   Discharge Plan       Row Name 11/04/24 1538       Plan    Plan Rehab    Plan Comments Unable to reach his wife by phone but I spoke with his daughter in regards to discharge planning. Patient resides with spouse, son and grandchildren in [place]. He has a rolling walker and wheelchair. His PCP is Dunia Gomes. His insurance is [employer].   He has advanced directives.    I reviewed rehab recommendations and she would like a referral back to [facility] and also [facility] which I will give to both.    Final Discharge Disposition Code 03 - skilled nursing facility (SNF)                   Discharge Codes    No documentation.                 Expected Discharge Date and Time       Expected Discharge Date Expected Discharge Time    Nov 5, 2024               Susana Fernandez RN

## 2024-11-04 NOTE — PAYOR COMM NOTE
"Elizabeth Haro (84 y.o. Male)       Date of Birth   1940    Social Security Number       Address   [address] [place] [id number]    Home Phone   [phone]    MRN   [id number]       Jainism   None    Marital Status                               Admission Date   11/1/24    Admission Type   Emergency    Admitting Provider   Brenda Perez MD    Attending Provider   Brenda Perez MD    Department, Room/Bed   [facility] 3F, S312/1       Discharge Date       Discharge Disposition       Discharge Destination                                 Attending Provider: Brenda Perez MD    Allergies: Lidocaine, Procaine    Isolation: None   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)   Code Status: CPR    Ht: 175.3 cm (69.02\")   Wt: 88 kg (194 lb 0.1 oz)    Admission Cmt: None   Principal Problem: AMS (altered mental status) [R41.82]                   Active Insurance as of 11/1/2024       Primary Coverage       Payor Plan Insurance Group Employer/Plan Group    AETNA MEDICARE REPLACEMENT AETNA MED ADV HMO [id number]       Payor Plan Address Payor Plan Phone Number Payor Plan Fax Number Effective Dates    [address] [phone]  3/1/2024 - None Entered    [place] [address]         Subscriber Name Subscriber Birth Date Member ID       DASHAWNELIZABETH 1940 [id number]                     Emergency Contacts        (Rel.) Home Phone Work Phone Mobile Phone    ANTONIA HARO (Spouse) [phone] -- [phone]    FRANCINEAFSANEH (Daughter) [phone] -- [phone]    RICK HARO (Son) [phone] -- [phone]              [place]: NPI [id number] Tax ID [id number]  Insurance Information                  AETNA MEDICARE REPLACEMENT/AETNA MED ADV HMO Phone: [phone]    Subscriber: Elizabeth Haro Subscriber#: [id number]    Group#: [id number] Precert#: --    Authorization#: [id number] Effective Date: --             History & Physical        Charla Castillo,  at " 11/01/24 0321            Attending   Admission Attestation       I have performed an independent face-to-face diagnostic evaluation including performing an independent physical examination.  I approve of the documented plan of care above that was reviewed and developed with the advanced practice clinician (APC) and take responsibility for that plan along with its associated risks.  I have updated the HPI as appropriate.    Brief HPI    84M with PMHx of HTN, CAD, chronic afib, DM2 with peripheral neuropathy, chronic back and hip pain who presented to the ED with AMS. Wife was concerned because patient went to bed on 10/31 evening and woke up confused, accused her of stealing things so wife brought him to the ED for evaluate. Wife no longer present in ED on my evaluation. Patient able to tell me that he knows he came to the ED for confusion, remembers being confused last night and knows wife brought him to the ED for this, but feels well now and has no complaints. Admits that he only sleeps 2 hours per night most nights, has had difficulty sleeping for at least 6 months. No tobacco/EtOH use. No constipation/urinary symptoms. No fevers/chills/URI symptoms.    Attending Physical Exam:  Temp:  [98.4 °F (36.9 °C)] 98.4 °F (36.9 °C)  Heart Rate:  [77-78] 77  Resp:  [18] 18  BP: (109-111)/(60-71) 109/60    Constitutional: Awake, alert, resting comfortably  HENT: NCAT, mucous membranes moist  Respiratory: Clear to auscultation bilaterally, respiratory effort normal   Cardiovascular: RRR, no murmurs, rubs, or gallops  Gastrointestinal: Positive bowel sounds, soft, nontender, nondistended  Musculoskeletal: No bilateral ankle edema  Psychiatric: Appropriate affect, cooperative  Neurologic: Alert, oriented x3, speech clear, left facial droop present, 5/5 strength of bilateral upper and lower extremities, decreased sensation to light touch of bilateral lower extremities (chronic per patient).  Skin: No rashes      Result  Review:  I have personally reviewed the results from the time of this admission to 2024 04:09 EDT and agree with these findings:  [x]  Laboratory list / accordion  []  Microbiology  [x]  Radiology  [x]  EKG/Telemetry   []  Cardiology/Vascular   []  Pathology  []  Old records  []  Other:    Assessment and Plan:     Altered mental status: MRI brain pending. If stroke imaging negative, suspect patient's episode of altered mentation was from long-standing sleep deprivation (only sleeps 2 hours per night most nights) plus recent addition of opiates for chronic back/hip pain.     See assessment and plan documented by APC above and updated/edited by me as appropriate.    Charla Castillo, DO  24             [facility] Medicine Services  HISTORY AND PHYSICAL    Patient Name: Apollo Haro  : 1940  MRN: [id number]  Primary Care Physician: Provider, No Known  Date of admission: 2024    Subjective  Subjective     Chief Complaint:  Altered mental status     HPI:  Apollo Haro is a 84 y.o. male with PMHx significant for afib on eliquis, CAD, T2DM, HTN, chronic back/hip pain who presented to the ED with complaint of altered mental status.     HPI is obtained per medical record and pt. Pt is poor historian.   Per report, pt went to bed around 6pm tonight and woke up confused and accusing his wife of stealing things. She called EMS for transport to the ED for evaluation. The wife reported intermittent episodes of delirium over the past several days.     Upon arrival to the ED, the pt was back to baseline and oriented, He was able to recall going to bed and then reports waking up in the ambulance on the way here.   During this exam, pt is confused and believes he rode in the ambulance with his wife for her to be admitted to the hospital but also reports he woke up confused.   Upon arrival to the ED, labs are concerning for DARIO. Code stroke was initiated secondary to left facial droop.  CXR negative for acute findings. CTA head neck, CTP, and CTH negative for acute findings.     He will be admitted to Hospital Medicine for further evaluation.       Review of Systems   Unable to perform ROS: Mental status change          Personal History     Past Medical History:   Diagnosis Date    A-fib     CAD (coronary artery disease)     DM (diabetes mellitus)          History reviewed. No pertinent surgical history.    Family History:  family history is not on file.     Social History:  reports that he has never smoked. He has never been exposed to tobacco smoke. He has never used smokeless tobacco. He reports that he does not drink alcohol and does not use drugs.  Social History     Social History Narrative    Not on file       Medications:  HYDROcodone-acetaminophen, Magnesium, QUEtiapine, acetaminophen, apixaban, aspirin, metFORMIN, metoprolol tartrate, multivitamin with minerals, naloxone, polyethylene glycol, pravastatin, and sennosides-docusate    Allergies   Allergen Reactions    Lidocaine Angioedema    Procaine Unknown - Low Severity       Objective  Objective     Vital Signs:   Temp:  [98.4 °F (36.9 °C)] 98.4 °F (36.9 °C)  Heart Rate:  [77-78] 77  Resp:  [18] 18  BP: (109-111)/(60-71) 109/60    Physical Exam   Constitutional: Awake, alert, no acute distress   Eyes: PERRLA, sclerae anicteric, no conjunctival injection  HENT: NCAT, mucous membranes moist  Neck: Supple, no thyromegaly, no lymphadenopathy, trachea midline  Respiratory: Clear to auscultation bilaterally, nonlabored respirations   Cardiovascular: RRR, no murmurs, rubs, or gallops, palpable pedal pulses bilaterally  Gastrointestinal: Positive bowel sounds, soft, nontender, nondistended  Musculoskeletal: No bilateral ankle edema, no clubbing or cyanosis to extremities  Psychiatric: Appropriate affect, cooperative  Neurologic: Oriented to self and place, disoriented to time, strength symmetric in all extremities, Cranial Nerves grossly intact  to confrontation, speech clear, left facial droop   Skin: No rashes      Result Review:  I have personally reviewed the results from the time of this admission to 11/1/2024 03:42 EDT and agree with these findings:  [x]  Laboratory list / accordion  [x]  Microbiology  [x]  Radiology  [x]  EKG/Telemetry   []  Cardiology/Vascular   []  Pathology  [x]  Old records  []  Other:  Most notable findings include:     LAB RESULTS:      Lab 11/01/24 0138 11/01/24 0059 11/01/24 0052   WBC  --   --  9.17   HEMOGLOBIN  --   --  14.2   HEMOGLOBIN, POC  --  14.6  --    HEMATOCRIT  --   --  42.7   HEMATOCRIT POC  --  43  --    PLATELETS  --   --  183   NEUTROS ABS  --   --  5.95   IMMATURE GRANS (ABS)  --   --  0.03   LYMPHS ABS  --   --  2.28   MONOS ABS  --   --  0.75   EOS ABS  --   --  0.11   MCV  --   --  92.6   PROTIME 21.5*  --   --    APTT  --   --  35.9         Lab 11/01/24 0059   CREATININE 1.40*   EGFR 49.3*             Lab 11/01/24 0138 11/01/24 0052   HSTROP T  --  32*   PROTIME 21.5*  --    INR 1.8*  --                  Brief Urine Lab Results  (Last result in the past 365 days)        Color   Clarity   Blood   Leuk Est   Nitrite   Protein   CREAT   Urine HCG        09/23/24 1337 Yellow   Cloudy   Large (3+)   Large (3+)   Positive   30 mg/dL (1+)                 Microbiology Results (last 10 days)       ** No results found for the last 240 hours. **            CT Angiogram Head w AI Analysis of LVO    Result Date: 11/1/2024  CT ANGIOGRAM HEAD W AI ANALYSIS OF LVO, CT ANGIOGRAM NECK Date of Exam: 11/1/2024 1:00 AM EDT Indication: Neuro deficit, acute, stroke suspected Neuro deficit, acute stroke suspected. Comparison: 11/1/2024, 9/23/2024. Technique: CTA of the head and neck was performed after the uneventful intravenous administration of intravenous contrast. Reconstructed coronal and sagittal images were also obtained. In addition, a 3-D volume rendered image was created for interpretation. Automated exposure  control and iterative reconstruction methods were used. Findings: No evidence of hemodynamically significant stenosis, AVM or aneurysm within the head or neck by NASCET criteria. No large vessel occlusion identified. No evidence of paucity of vasculature. No evidence of dissection. Soft tissues of the neck demonstrate no acute process. No acute osseous abnormality identified. Lung apices are clear.     Impression: Impression: No evidence of hemodynamically significant stenosis, AVM or aneurysm within the head or neck by NASCET criteria. No large vessel occlusion identified. No acute process. Electronically Signed: Carolina Hassan MD  11/1/2024 1:37 AM EDT  Workstation ID: [id number]    CT Angiogram Neck    Result Date: 11/1/2024  CT ANGIOGRAM HEAD W AI ANALYSIS OF LVO, CT ANGIOGRAM NECK Date of Exam: 11/1/2024 1:00 AM EDT Indication: Neuro deficit, acute, stroke suspected Neuro deficit, acute stroke suspected. Comparison: 11/1/2024, 9/23/2024. Technique: CTA of the head and neck was performed after the uneventful intravenous administration of intravenous contrast. Reconstructed coronal and sagittal images were also obtained. In addition, a 3-D volume rendered image was created for interpretation. Automated exposure control and iterative reconstruction methods were used. Findings: No evidence of hemodynamically significant stenosis, AVM or aneurysm within the head or neck by NASCET criteria. No large vessel occlusion identified. No evidence of paucity of vasculature. No evidence of dissection. Soft tissues of the neck demonstrate no acute process. No acute osseous abnormality identified. Lung apices are clear.     Impression: Impression: No evidence of hemodynamically significant stenosis, AVM or aneurysm within the head or neck by NASCET criteria. No large vessel occlusion identified. No acute process. Electronically Signed: Carolina Hassan MD  11/1/2024 1:37 AM EDT  Workstation ID: [id number]    CT CEREBRAL PERFUSION WITH &  WITHOUT CONTRAST    Result Date: 11/1/2024  CT CEREBRAL PERFUSION W WO CONTRAST Date of Exam: 11/1/2024 1:00 AM EDT Indication: Neuro deficit, acute, stroke suspected Neuro deficit, acute stroke suspected.  Comparison: 11/1/2024, 9/23/2024. Technique: Axial CT images of the brain were obtained prior to and after the administration of intravenous contrast. Core blood volume, core blood flow, mean transit time, and Tmax images were obtained utilizing the Rapid software protocol. A limited CT angiogram of the head was also performed to measure the blood vessel density. The radiation dose reduction device was turned on for each scan per the ALARA (As Low as Reasonably Achievable) protocol. Findings: Cerebral blood flow less than 30% is 0 mL. Tmax greater than 6 seconds is 0 mL. Mismatch volume is 0 mL. Mismatch ratio is none. No suspicious perfusion abnormality identified.     Impression: Impression: No suspicious perfusion abnormality identified. Electronically Signed: Carolina Hassan MD  11/1/2024 1:36 AM EDT  Workstation ID: [id number]    XR Chest 1 View    Result Date: 11/1/2024  XR CHEST 1 VW Date of Exam: 11/1/2024 1:18 AM EDT Indication: Acute Stroke Protocol (onset < 12 hrs) Comparison: 9/16/2024. Findings: There are no airspace consolidations. No pleural fluid. No pneumothorax. The pulmonary vasculature appears within normal limits. The cardiac and mediastinal silhouette appear unremarkable. No acute osseous abnormality identified.     Impression: Impression: No acute cardiopulmonary process. Electronically Signed: Carolina Hassan MD  11/1/2024 1:32 AM EDT  Workstation ID: [id number]    CT Head Without Contrast Stroke Protocol    Result Date: 11/1/2024  CT HEAD WO CONTRAST STROKE PROTOCOL Date of Exam: 11/1/2024 12:56 AM EDT Indication: Neuro deficit, acute, stroke suspected Neuro deficit, acute, stroke suspected. Comparison: 9/23/2024. Technique: Axial CT images were obtained of the head without contrast administration.   Reconstructed coronal images were also obtained. Automated exposure control and iterative construction methods were used. Scan Time: 12:57 a.m. Results discussed with the stroke team at 1:02 a.m. Findings: There is no evidence of hemorrhage. There is no mass effect or midline shift. There is no extracerebral collection. Ventricles are normal in size and configuration for patient's stated age.  Posterior fossa is within normal limits. Calvarium and skull base appear intact.   Visualized sinuses show no air fluid levels. Visualized orbits are unremarkable.     Impression: Impression: No evidence of hemorrhage, mass effect or midline shift. No acute process identified. Electronically Signed: Carolina Hassan MD  11/1/2024 1:03 AM EDT  Workstation ID: [id number]         Assessment & Plan  Assessment & Plan       AMS (altered mental status)    Type 2 diabetes mellitus, without long-term current use of insulin    CAD (coronary artery disease)    HTN (hypertension)    Atrial fibrillation, chronic    DARIO (acute kidney injury)    84 y.o. male with PMHx significant for afib on eliquis, CAD, T2DM, HTN, chronic back/hip pain, who presented to the ED with complaint of altered mental status.     Altered Mental Status   - eval per stroke navigator/ orders initiated   - CTH, CTP, CTA head/neck negative for acute findings   - MRI brain pending   - UA pending   - hold seroquel for now  - recently prescribed lortab, hold for now   -CBC,BMP in am     DARIO   - baseline cr+ ~ 0.82, currently 1.40  - likely holding onto medications contributing AMS   - gentle IV fluid overnight   - Urine studies   - UA pending   - BMP in am     Diabetes Mellitus 2  - hold metformin   -FSBG ACHS  - SS insulin     Afib   - rate controlled   - continue eliquis   - continue metoprolol     Back pain   - recently started on lortab, hold for now due to AMS  - tylenol PRN  - fall precautions     DVT prophylaxis: on Eliquis     CODE STATUS:   Code Status (Patient has no  pulse and is not breathing): CPR (Attempt to Resuscitate)  Medical Interventions (Patient has pulse or is breathing): Full Support      Expected Discharge  Expected Discharge Date: 11/3/2024; Expected Discharge Time:       This note has been completed as part of a split-shared workflow.     Signature: Electronically signed by SAW Rodriguez, 11/01/24, 3:42 AM EDT.                    Electronically signed by Charla Castillo DO at 11/01/24 0448       Current Facility-Administered Medications   Medication Dose Route Frequency Provider Last Rate Last Admin    acetaminophen (TYLENOL) tablet 650 mg  650 mg Oral Q4H PRN Samantha Gill APRN   650 mg at 11/03/24 2104    Or    acetaminophen (TYLENOL) 160 MG/5ML oral solution 650 mg  650 mg Oral Q4H PRN Samantha Gill APRN        Or    acetaminophen (TYLENOL) suppository 650 mg  650 mg Rectal Q4H PRN Samantha Gill APRN        apixaban (ELIQUIS) tablet 5 mg  5 mg Oral BID Samantha Gill APRN   5 mg at 11/04/24 0841    atorvastatin (LIPITOR) tablet 80 mg  80 mg Oral Nightly Ugo Aaron PA-C   80 mg at 11/03/24 2103    sennosides-docusate (PERICOLACE) 8.6-50 MG per tablet 2 tablet  2 tablet Oral BID Samantha Gill APRN   2 tablet at 11/04/24 0840    And    polyethylene glycol (MIRALAX) packet 17 g  17 g Oral Daily PRN Samantha Gill APRN   17 g at 11/02/24 1837    And    bisacodyl (DULCOLAX) EC tablet 5 mg  5 mg Oral Daily PRN Samantha Gill APRN        And    bisacodyl (DULCOLAX) suppository 10 mg  10 mg Rectal Daily PRN Samantha Gill APRN        dextrose (D50W) (25 g/50 mL) IV injection 25 g  25 g Intravenous Q15 Min PRN Samantha Gill APRN        dextrose (GLUTOSE) oral gel 15 g  15 g Oral Q15 Min PRN Samantha Gill APRN        glucagon (GLUCAGEN) injection 1 mg  1 mg Intramuscular Q15 Min PRN Gill, Samantha R, APRN        Insulin Lispro (humaLOG) injection 2-7 Units  2-7 Units Subcutaneous 4x Daily AC & at Bedtime  Samantha Gill APRN   3 Units at 11/04/24 1222    [Held by provider] metoprolol tartrate (LOPRESSOR) half tablet 12.5 mg  12.5 mg Oral Q12H Allen Moreno MD   12.5 mg at 11/03/24 1024    multivitamin with minerals 1 tablet  1 tablet Oral Daily Samantha Gill APRN   1 tablet at 11/04/24 0841    QUEtiapine (SEROquel) tablet 12.5 mg  12.5 mg Oral Nightly Brenda Perez MD        sodium chloride 0.9 % flush 10 mL  10 mL Intravenous PRN Didier Mitchell MD        sodium chloride 0.9 % flush 10 mL  10 mL Intravenous Q12H Ugo Aaron PA-C   10 mL at 11/04/24 0841    sodium chloride 0.9 % flush 10 mL  10 mL Intravenous PRN Ugo Aaron PA-C        sodium chloride 0.9 % flush 10 mL  10 mL Intravenous Q12H Samantha Gill APRN   10 mL at 11/04/24 0841    sodium chloride 0.9 % flush 10 mL  10 mL Intravenous PRN Samantha Gill APRN        sodium chloride 0.9 % infusion  100 mL/hr Intravenous Continuous Brenda Perez  mL/hr at 11/04/24 0841 100 mL/hr at 11/04/24 0841     Lab Results (last 24 hours)       Procedure Component Value Units Date/Time    POC Glucose Once [[id number]]  (Abnormal) Collected: 11/04/24 1109    Specimen: Blood Updated: 11/04/24 1110     Glucose 232 mg/dL     POC Glucose Once [[id number]]  (Abnormal) Collected: 11/04/24 0744    Specimen: Blood Updated: 11/04/24 0744     Glucose 142 mg/dL     CBC & Differential [[id number]]  (Abnormal) Collected: 11/04/24 0336    Specimen: Blood Updated: 11/04/24 0413    Narrative:      The following orders were created for panel order CBC & Differential.  Procedure                               Abnormality         Status                     ---------                               -----------         ------                     CBC Auto Differential[[id number]]        Abnormal            Final result                 Please view results for these tests on the individual orders.    CBC Auto Differential [[id number]]  (Abnormal)  Collected: 11/04/24 0336    Specimen: Blood Updated: 11/04/24 0413     WBC 8.19 10*3/mm3      RBC 3.86 10*6/mm3      Hemoglobin 11.7 g/dL      Hematocrit 35.9 %      MCV 93.0 fL      MCH 30.3 pg      MCHC 32.6 g/dL      RDW 12.9 %      RDW-SD 43.6 fl      MPV 10.2 fL      Platelets 140 10*3/mm3      Neutrophil % 61.2 %      Lymphocyte % 25.9 %      Monocyte % 10.1 %      Eosinophil % 2.1 %      Basophil % 0.5 %      Immature Grans % 0.2 %      Neutrophils, Absolute 5.01 10*3/mm3      Lymphocytes, Absolute 2.12 10*3/mm3      Monocytes, Absolute 0.83 10*3/mm3      Eosinophils, Absolute 0.17 10*3/mm3      Basophils, Absolute 0.04 10*3/mm3      Immature Grans, Absolute 0.02 10*3/mm3      nRBC 0.0 /100 WBC     Comprehensive Metabolic Panel [[id number]]  (Abnormal) Collected: 11/04/24 0336    Specimen: Blood Updated: 11/04/24 0407     Glucose 158 mg/dL      BUN 19 mg/dL      Creatinine 0.84 mg/dL      Sodium 135 mmol/L      Potassium 3.9 mmol/L      Chloride 102 mmol/L      CO2 24.0 mmol/L      Calcium 8.5 mg/dL      Total Protein 5.6 g/dL      Albumin 2.9 g/dL      ALT (SGPT) 12 U/L      AST (SGOT) 22 U/L      Alkaline Phosphatase 94 U/L      Total Bilirubin 0.8 mg/dL      Globulin 2.7 gm/dL      Comment: Calculated Result        A/G Ratio 1.1 g/dL      BUN/Creatinine Ratio 22.6     Anion Gap 9.0 mmol/L      eGFR 85.5 mL/min/1.73     Narrative:      GFR Normal >60  Chronic Kidney Disease <60  Kidney Failure <15    The GFR formula is only valid for adults with stable renal function between ages 18 and 70.    POC Glucose Once [[id number]]  (Abnormal) Collected: 11/03/24 2006    Specimen: Blood Updated: 11/03/24 2007     Glucose 280 mg/dL     POC Glucose Once [[id number]]  (Abnormal) Collected: 11/03/24 1633    Specimen: Blood Updated: 11/03/24 1711     Glucose 182 mg/dL           Imaging Results (Last 24 Hours)       ** No results found for the last 24 hours. **          Orders (last 24 hrs)        Start     Ordered     11/05/24 0600  CBC (No Diff)  Morning Draw         11/04/24 1150    11/05/24 0600  Basic Metabolic Panel  Morning Draw         11/04/24 1150    11/04/24 2100  QUEtiapine (SEROquel) tablet 12.5 mg  Nightly         11/04/24 1158    11/04/24 1151  Cortisol - AM  Once         11/04/24 1150    11/04/24 1111  POC Glucose Once  PROCEDURE ONCE        Comments: Complete no more than 45 minutes prior to patient eating      11/04/24 1109    11/04/24 0955  Inpatient Admission  Once         11/04/24 0954    11/04/24 0900  sodium chloride 0.9 % infusion  Continuous         11/04/24 0805    11/04/24 0745  POC Glucose Once  PROCEDURE ONCE        Comments: Complete no more than 45 minutes prior to patient eating      11/04/24 0744    11/04/24 0600  CBC & Differential  Morning Draw         11/04/24 0311    11/04/24 0600  Comprehensive Metabolic Panel  Morning Draw         11/04/24 0311    11/04/24 0600  CBC Auto Differential  PROCEDURE ONCE         11/04/24 0311    11/04/24 0415  albumin human 25 % IV SOLN 12.5 g  Once         11/04/24 0315    11/04/24 0045  sodium chloride 0.9 % bolus 500 mL  Once         11/03/24 2358    11/03/24 2008  POC Glucose Once  PROCEDURE ONCE        Comments: Complete no more than 45 minutes prior to patient eating      11/03/24 2006    11/03/24 1712  POC Glucose Once  PROCEDURE ONCE        Comments: Complete no more than 45 minutes prior to patient eating      11/03/24 1633    11/01/24 2100  atorvastatin (LIPITOR) tablet 80 mg  Nightly         11/01/24 0908    11/01/24 1200  Intake and Output  Every 4 Hours       11/01/24 0908    11/01/24 1000  sodium chloride 0.9 % flush 10 mL  Every 12 Hours Scheduled         11/01/24 0908    11/01/24 0908  sodium chloride 0.9 % flush 10 mL  As Needed         11/01/24 0908    11/01/24 0900  apixaban (ELIQUIS) tablet 5 mg  2 Times Daily         11/01/24 0418    11/01/24 0900  [Held by provider]  metoprolol tartrate (LOPRESSOR) half tablet 12.5 mg  Every 12 Hours  "Scheduled        (On hold since today at 0804 until manually unheld; held by Brenda Perez MDHold Reason: Abnormal Labs)    11/01/24 0418 11/01/24 0900  multivitamin with minerals 1 tablet  Daily         11/01/24 0418 11/01/24 0900  sodium chloride 0.9 % flush 10 mL  Every 12 Hours Scheduled         11/01/24 0418 11/01/24 0900  sennosides-docusate (PERICOLACE) 8.6-50 MG per tablet 2 tablet  2 Times Daily        Placed in \"And\" Linked Group    11/01/24 0418    11/01/24 0800  Vital Signs  Every 4 Hours       11/01/24 0418    11/01/24 0800  Oral Care  2 Times Daily       11/01/24 0418 11/01/24 0730  Insulin Lispro (humaLOG) injection 2-7 Units  4 Times Daily Before Meals & Nightly         11/01/24 0418 11/01/24 0700  POC Glucose 4x Daily Before Meals & at Bedtime  4 Times Daily Before Meals & at Bedtime      Comments: Complete no more than 45 minutes prior to patient eating      11/01/24 0418    11/01/24 0500  Strict Intake & Output  Every Hour       11/01/24 0418    11/01/24 0419  Intake & Output  Every Shift       11/01/24 0418    11/01/24 0418  sodium chloride 0.9 % flush 10 mL  As Needed         11/01/24 0418    11/01/24 0418  dextrose (GLUTOSE) oral gel 15 g  Every 15 Minutes PRN         11/01/24 0418    11/01/24 0418  dextrose (D50W) (25 g/50 mL) IV injection 25 g  Every 15 Minutes PRN         11/01/24 0418    11/01/24 0418  glucagon (GLUCAGEN) injection 1 mg  Every 15 Minutes PRN         11/01/24 0418    11/01/24 0418  acetaminophen (TYLENOL) tablet 650 mg  Every 4 Hours PRN        Placed in \"Or\" Linked Group    11/01/24 0418    11/01/24 0418  acetaminophen (TYLENOL) 160 MG/5ML oral solution 650 mg  Every 4 Hours PRN        Placed in \"Or\" Linked Group    11/01/24 0418    11/01/24 0418  acetaminophen (TYLENOL) suppository 650 mg  Every 4 Hours PRN        Placed in \"Or\" Linked Group    11/01/24 0418    11/01/24 0418  polyethylene glycol (MIRALAX) packet 17 g  Daily PRN        Placed in \"And\" " "Linked Group    24  bisacodyl (DULCOLAX) EC tablet 5 mg  Daily PRN        Placed in \"And\" Linked Group    24  bisacodyl (DULCOLAX) suppository 10 mg  Daily PRN        Placed in \"And\" Linked Group    248    24 0200  Neuro Checks  Every 2 Hours      Comments: On arrival and handoff, increase frequency as clinically indicated or for thrombolytic administration, otherwise Q2 hours.  Documentation of arrival assessment and any neuro change required.    24 004  sodium chloride 0.9 % flush 10 mL  As Needed         24    Unscheduled  Oxygen Therapy- Nasal Cannula; Titrate 1-6 LPM Per SpO2; 90 - 95%  Continuous PRN       24    Unscheduled  Order CT Head Without Contrast for Neurological Decline  As Needed       2408    Unscheduled  Follow Hypoglycemia Standing Orders For Blood Glucose <70 & Notify Provider of Treatment  As Needed      Comments: Follow Hypoglycemia Orders As Outlined in Process Instructions (Open Order Report to View Full Instructions)  Notify Provider Any Time Hypoglycemia Treatment is Administered    24    Unscheduled  Up With Assistance  As Needed       24                     Physician Progress Notes (last 24 hours)        Brenda Perez MD at 24 1144              [facility] Medicine Services  PROGRESS NOTE    Patient Name: Apollo Haro  : 1940  MRN: [id number]    Date of Admission: 2024  Primary Care Physician: Provider, No Known    Subjective   Subjective     CC:  F/U AMS    HPI:  Seen this morning, no complaints. BP ran low all night. He denies dizziness or lightheadedness. Says he just wants to go home.       Objective   Objective     Vital Signs:   Temp:  [97.9 °F (36.6 °C)-98.1 °F (36.7 °C)] 98 °F (36.7 °C)  Heart Rate:  [] 71  Resp:  [18] 18  BP: ()/(41-60) 108/49     Physical Exam:  Gen-no acute " distress, chronically ill appearing  HENT-NCAT, mucous membranes moist  CV-RRR, S1 S2 normal, no m/r/g  Resp-CTAB, no wheezes or rales  Abd-soft, NT, ND, +BS  Ext-no edema  Neuro-awake, mild confusion noted, no focal deficits  Skin-no rashes  Psych-flat      Results Reviewed:  LAB RESULTS:      Lab 11/04/24 0336 11/02/24 1801 11/01/24  0537 11/01/24  0138 11/01/24 0059 11/01/24 0052   WBC 8.19 7.97 8.25  --   --  9.17   HEMOGLOBIN 11.7* 13.5 12.9*  --   --  14.2   HEMOGLOBIN, POC  --   --   --   --  14.6  --    HEMATOCRIT 35.9* 40.5 38.6  --   --  42.7   HEMATOCRIT POC  --   --   --   --  43  --    PLATELETS 140 176 170  --   --  183   NEUTROS ABS 5.01  --  5.58  --   --  5.95   IMMATURE GRANS (ABS) 0.02  --  0.04  --   --  0.03   LYMPHS ABS 2.12  --  1.86  --   --  2.28   MONOS ABS 0.83  --  0.67  --   --  0.75   EOS ABS 0.17  --  0.06  --   --  0.11   MCV 93.0 91.0 92.3  --   --  92.6   PROTIME  --   --   --  21.5*  --   --    APTT  --   --   --   --   --  35.9   HSTROP T  --   --   --   --   --  32*         Lab 11/04/24 0336 11/02/24 1801 11/01/24  0612 11/01/24 0059   SODIUM 135* 136 141  --    POTASSIUM 3.9 4.3 4.9  --    CHLORIDE 102 99 100  --    CO2 24.0 28.0 26.0  --    ANION GAP 9.0 9.0 15.0  --    BUN 19 17 24*  --    CREATININE 0.84 0.93 1.12 1.40*   EGFR 85.5 80.5 64.4 49.3*   GLUCOSE 158* 170* 127*  --    CALCIUM 8.5* 9.2 9.9  --    MAGNESIUM  --   --  2.1  --    HEMOGLOBIN A1C  --  6.10*  --   --          Lab 11/04/24  0336   TOTAL PROTEIN 5.6*   ALBUMIN 2.9*   GLOBULIN 2.7   ALT (SGPT) 12   AST (SGOT) 22   BILIRUBIN 0.8   ALK PHOS 94         Lab 11/02/24  1801 11/01/24  0138 11/01/24  0052   PROBNP 377.6  --   --    HSTROP T  --   --  32*   PROTIME  --  21.5*  --    INR  --  1.8*  --          Lab 11/01/24  0929   CHOLESTEROL 114   LDL CHOL 54   HDL CHOL 42   TRIGLYCERIDES 97             Brief Urine Lab Results  (Last result in the past 365 days)        Color   Clarity   Blood   Leuk Est    Nitrite   Protein   CREAT   Urine HCG        11/01/24 0613             102.1         11/01/24 0613 Yellow   Clear   Negative   Negative   Negative   Negative                   Microbiology Results Abnormal       None            No radiology results from the last 24 hrs    Results for orders placed during the hospital encounter of 11/01/24    Adult Transthoracic Echo Complete W/ Cont if Necessary Per Protocol (With Agitated Saline)    Interpretation Summary    Left ventricular systolic function is normal. Calculated left ventricular EF = 57.8% Left ventricular ejection fraction appears to be 56 - 60%.    Left ventricular diastolic function is consistent with (grade I) impaired relaxation.    Estimated right ventricular systolic pressure from tricuspid regurgitation is normal (<35 mmHg). Calculated right ventricular systolic pressure from tricuspid regurgitation is 32 mmHg.      Current medications:  Scheduled Meds:apixaban, 5 mg, Oral, BID  atorvastatin, 80 mg, Oral, Nightly  insulin lispro, 2-7 Units, Subcutaneous, 4x Daily AC & at Bedtime  [Held by provider] metoprolol tartrate, 12.5 mg, Oral, Q12H  multivitamin with minerals, 1 tablet, Oral, Daily  QUEtiapine, 12.5 mg, Oral, Nightly  senna-docusate sodium, 2 tablet, Oral, BID  sodium chloride, 10 mL, Intravenous, Q12H  sodium chloride, 10 mL, Intravenous, Q12H      Continuous Infusions:sodium chloride, 100 mL/hr, Last Rate: 100 mL/hr (11/04/24 0841)      PRN Meds:.  acetaminophen **OR** acetaminophen **OR** acetaminophen    senna-docusate sodium **AND** polyethylene glycol **AND** bisacodyl **AND** bisacodyl    dextrose    dextrose    glucagon (human recombinant)    sodium chloride    sodium chloride    sodium chloride    Assessment & Plan   Assessment & Plan     Active Hospital Problems    Diagnosis  POA    **AMS (altered mental status) [R41.82]  Yes    DARIO (acute kidney injury) [N17.9]  Yes    Atrial fibrillation, chronic [I48.20]  Yes    CAD (coronary artery  disease) [I25.10]  Yes    HTN (hypertension) [I10]  Yes    Type 2 diabetes mellitus, without long-term current use of insulin [E11.9]  Yes      Resolved Hospital Problems   No resolved problems to display.        Brief Hospital Course to date:  Apollo Haro is a 84 y.o. male with hx of HTN, CAD, chronic atrial fibrillation, DM2 with peripheral neuropathy, cognitive impairment, and chronic back and hip pain who presented to the ED with altered mental status. Reports poor sleep x several months. Apparently he had accused his wife of stealing things after having several days of episodes of delirium, and was finally brought to the hospital by EMS for further evaluation.    Recent admission to [facility] 9/23-10/7/24 due to L2 compression fracture following a fall. Neurosurgery recommended conservative management. He was also treated with IV Vanc for MRSA UTI. Seen by Cardiology and had Flecainide discontinued and Metoprolol dose reduced. He was documented as having hypotension during that admission. Discharged to [facility] for rehab.     This patient's problems and plans were partially entered by my partner and updated as appropriate by me 11/04/24. Copied text in this note has been reviewed and is accurate as of today's date.      Altered mental status, suspected multifactorial delirium  Cognitive impairment  --CT head and MRI brain unremarkable  --UA bland, CXR no infiltrate  --Delirium possibly secondary to medications in the setting of DARIO and poor sleep with underlying cognitive impairment  --Stroke Neurology has seen and signed off as no evidence of acute stroke  --Appears to be improving  --Had been on Seroquel 25 mg nightly prior to admission - will resume at lower dose 12.5 mg this evening     DARIO, resolved  --Cr 1.4 at admission, now resolved s/p IV fluids    Hypotension  Hx of HTN  --BP running low since admission, initially improved with fluids but now low again since last night - he appears to be  asymptomatic  --s/p IV fluid bolus and albumin overnight   --Start NS at 100 cc/hr x 12 hours and monitor  --Check AM cortisol     Chronic back pain  Recent L2 compression fracture  --Recently started on Lortab, hold for now due to altered mental status  --Tylenol and heating pad as needed     Atrial fibrillation/flutter  --Continue Eliquis and Metoprolol  --Flecainide discontinued by Cardiology during recent admission     DM2  --HbA1c 6.1%  --Low dose SSI    Generalized weakness  --PT/OT recommended rehab      Expected Discharge Location and Transportation: rehab  Expected Discharge   Expected Discharge Date: 11/5/2024; Expected Discharge Time:      VTE Prophylaxis:  Pharmacologic & mechanical VTE prophylaxis orders are present.         AM-PAC 6 Clicks Score (PT): 16 (11/04/24 0800)    CODE STATUS:   Code Status and Medical Interventions: CPR (Attempt to Resuscitate); Full Support   Ordered at: 11/01/24 0342     Code Status (Patient has no pulse and is not breathing):    CPR (Attempt to Resuscitate)     Medical Interventions (Patient has pulse or is breathing):    Full Support       Brenda Perez MD  11/04/24        Electronically signed by Brenda Perez MD at 11/04/24 1202       Consult Notes (last 24 hours)  Notes from 11/03/24 1301 through 11/04/24 1301   No notes of this type exist for this encounter.

## 2024-11-04 NOTE — CONSULTS
"EMV charted less than 15, will see as able  Not a candidate for the stroke/diabetes telehealth class as \"MRI brain negative for acute ischemic changes \" per most recent notes.  "

## 2024-11-04 NOTE — PROGRESS NOTES
Nutrition Services    Patient Name:  Apollo Haro  YOB: 1940  MRN: [id number]  Admit Date:  11/1/2024    Patient screened for possible pressure injury. Chart reviewed. No pressure injury stage 2 or greater noted per documentation at this time. No nutrition risks identified currently. RDN following per protocol. Available via consult.    Electronically signed by:  Pippa Stephenson MS,PABLO,ZACK  11/04/24 07:55 EST

## 2024-11-05 ENCOUNTER — READMISSION MANAGEMENT (OUTPATIENT)
Dept: CALL CENTER | Facility: HOSPITAL | Age: 84
End: 2024-11-05
Payer: MEDICARE

## 2024-11-05 VITALS
OXYGEN SATURATION: 98 % | TEMPERATURE: 98.1 F | SYSTOLIC BLOOD PRESSURE: 106 MMHG | HEIGHT: 69 IN | HEART RATE: 85 BPM | RESPIRATION RATE: 16 BRPM | WEIGHT: 194 LBS | BODY MASS INDEX: 28.73 KG/M2 | DIASTOLIC BLOOD PRESSURE: 65 MMHG

## 2024-11-05 LAB
ANION GAP SERPL CALCULATED.3IONS-SCNC: 10 MMOL/L (ref 5–15)
BUN SERPL-MCNC: 13 MG/DL (ref 8–23)
BUN/CREAT SERPL: 14.8 (ref 7–25)
CALCIUM SPEC-SCNC: 8.5 MG/DL (ref 8.6–10.5)
CHLORIDE SERPL-SCNC: 109 MMOL/L (ref 98–107)
CO2 SERPL-SCNC: 23 MMOL/L (ref 22–29)
CREAT SERPL-MCNC: 0.88 MG/DL (ref 0.76–1.27)
DEPRECATED RDW RBC AUTO: 44.6 FL (ref 37–54)
EGFRCR SERPLBLD CKD-EPI 2021: 84.3 ML/MIN/1.73
ERYTHROCYTE [DISTWIDTH] IN BLOOD BY AUTOMATED COUNT: 13.1 % (ref 12.3–15.4)
GLUCOSE BLDC GLUCOMTR-MCNC: 157 MG/DL (ref 70–130)
GLUCOSE BLDC GLUCOMTR-MCNC: 160 MG/DL (ref 70–130)
GLUCOSE SERPL-MCNC: 157 MG/DL (ref 65–99)
HCT VFR BLD AUTO: 34.3 % (ref 37.5–51)
HGB BLD-MCNC: 11.1 G/DL (ref 13–17.7)
MCH RBC QN AUTO: 30.3 PG (ref 26.6–33)
MCHC RBC AUTO-ENTMCNC: 32.4 G/DL (ref 31.5–35.7)
MCV RBC AUTO: 93.7 FL (ref 79–97)
PLATELET # BLD AUTO: 135 10*3/MM3 (ref 140–450)
PMV BLD AUTO: 10.3 FL (ref 6–12)
POTASSIUM SERPL-SCNC: 4.1 MMOL/L (ref 3.5–5.2)
RBC # BLD AUTO: 3.66 10*6/MM3 (ref 4.14–5.8)
SODIUM SERPL-SCNC: 142 MMOL/L (ref 136–145)
WBC NRBC COR # BLD AUTO: 7.26 10*3/MM3 (ref 3.4–10.8)

## 2024-11-05 PROCEDURE — 63710000001 INSULIN LISPRO (HUMAN) PER 5 UNITS: Performed by: NURSE PRACTITIONER

## 2024-11-05 PROCEDURE — 99232 SBSQ HOSP IP/OBS MODERATE 35: CPT | Performed by: INTERNAL MEDICINE

## 2024-11-05 PROCEDURE — 97535 SELF CARE MNGMENT TRAINING: CPT

## 2024-11-05 PROCEDURE — 85027 COMPLETE CBC AUTOMATED: CPT | Performed by: HOSPITALIST

## 2024-11-05 PROCEDURE — 92507 TX SP LANG VOICE COMM INDIV: CPT | Performed by: SPEECH-LANGUAGE PATHOLOGIST

## 2024-11-05 PROCEDURE — 80048 BASIC METABOLIC PNL TOTAL CA: CPT | Performed by: HOSPITALIST

## 2024-11-05 PROCEDURE — 97116 GAIT TRAINING THERAPY: CPT

## 2024-11-05 PROCEDURE — 25810000003 SODIUM CHLORIDE 0.9 % SOLUTION: Performed by: NURSE PRACTITIONER

## 2024-11-05 PROCEDURE — 82948 REAGENT STRIP/BLOOD GLUCOSE: CPT

## 2024-11-05 RX ORDER — QUETIAPINE FUMARATE 25 MG/1
12.5 TABLET, FILM COATED ORAL NIGHTLY
Start: 2024-11-05 | End: 2024-12-05

## 2024-11-05 RX ORDER — MIDODRINE HYDROCHLORIDE 5 MG/1
5 TABLET ORAL
Qty: 60 TABLET | Refills: 0 | Status: SHIPPED | OUTPATIENT
Start: 2024-11-05 | End: 2024-12-05

## 2024-11-05 RX ORDER — MIDODRINE HYDROCHLORIDE 5 MG/1
5 TABLET ORAL
Status: DISCONTINUED | OUTPATIENT
Start: 2024-11-05 | End: 2024-11-05 | Stop reason: HOSPADM

## 2024-11-05 RX ADMIN — SODIUM CHLORIDE 100 ML/HR: 9 INJECTION, SOLUTION INTRAVENOUS at 05:57

## 2024-11-05 RX ADMIN — SENNOSIDES AND DOCUSATE SODIUM 2 TABLET: 50; 8.6 TABLET ORAL at 09:52

## 2024-11-05 RX ADMIN — INSULIN LISPRO 2 UNITS: 100 INJECTION, SOLUTION INTRAVENOUS; SUBCUTANEOUS at 09:52

## 2024-11-05 RX ADMIN — APIXABAN 5 MG: 5 TABLET, FILM COATED ORAL at 09:52

## 2024-11-05 RX ADMIN — Medication 10 ML: at 09:53

## 2024-11-05 RX ADMIN — INSULIN LISPRO 2 UNITS: 100 INJECTION, SOLUTION INTRAVENOUS; SUBCUTANEOUS at 12:09

## 2024-11-05 RX ADMIN — Medication 1 TABLET: at 09:51

## 2024-11-05 RX ADMIN — MIDODRINE HYDROCHLORIDE 5 MG: 5 TABLET ORAL at 11:08

## 2024-11-05 NOTE — CASE MANAGEMENT/SOCIAL WORK
Continued Stay Note   [facility]     Patient Name: Apollo Haro  MRN: [id number]  Today's Date: 11/5/2024    Admit Date: 11/1/2024    Plan: Home   Discharge Plan       Row Name 11/05/24 1456       Plan    Plan Home    Plan Comments I discussed rehab with patient, his wife and daughter over the phone. They tell me he is current with [facility]. I reviewed rehab recommendations and they plan for home.    I have sent orders to Danay with [facility]. [facility] will transport home by stretcher and family aware of time.    I ran this by the 's manager as no wheelchair transports opened with either company.     Final Discharge Disposition Code 06 - home with home health care                   Discharge Codes    No documentation.                 Expected Discharge Date and Time       Expected Discharge Date Expected Discharge Time    Nov 7, 2024               Susana Fernandez RN

## 2024-11-05 NOTE — DISCHARGE SUMMARY
[facility] Medicine Services  DISCHARGE SUMMARY    Patient Name: Apollo Haro  : 1940  MRN: [id number]    Date of Admission: 2024 12:48 AM  Date of Discharge:  2024  Primary Care Physician: Provider, No Known    Consults       Date and Time Order Name Status Description    2024 12:50 AM Inpatient Neurology Consult Stroke Completed     2024  3:20 PM Inpatient Palliative Care MD Consult Completed     2024 11:42 AM Inpatient Cardiology Consult Completed     2024  1:49 PM Inpatient Neurosurgery Consult Completed             Hospital Course     Presenting Problem: delirium    Active Hospital Problems    Diagnosis  POA   • **AMS (altered mental status) [R41.82]  Yes   • DARIO (acute kidney injury) [N17.9]  Yes   • Atrial fibrillation, chronic [I48.20]  Yes   • CAD (coronary artery disease) [I25.10]  Yes   • HTN (hypertension) [I10]  Yes   • Type 2 diabetes mellitus, without long-term current use of insulin [E11.9]  Yes      Resolved Hospital Problems   No resolved problems to display.          Hospital Course:  Apollo Haro is a 84 y.o. male with hx of HTN, CAD, chronic atrial fibrillation, DM2 with peripheral neuropathy, cognitive impairment, and chronic back and hip pain who presented to the ED with altered mental status. Reports poor sleep x several months. Apparently he had accused his wife of stealing things after having several days of episodes of delirium, and was finally brought to the hospital by EMS for further evaluation.     Recent admission to [facility] -10/7/24 due to L2 compression fracture following a fall. Neurosurgery recommended conservative management. He was also treated with IV Vanc for MRSA UTI. Seen by Cardiology and had Flecainide discontinued and Metoprolol dose reduced. He was documented as having hypotension during that admission. Discharged to [facility] for rehab.      This patient's problems and plans were partially entered by my  partner and updated as appropriate by me 11/05/24.        Altered mental status, suspected multifactorial delirium  Cognitive impairment  --CT head and MRI brain unremarkable  --UA bland, CXR no infiltrate  --Delirium possibly secondary to medications in the setting of DARIO and poor sleep with underlying cognitive impairment  --Stroke Neurology has seen and signed off as no evidence of acute stroke  --Had been on Seroquel 25 mg nightly prior to admission - will resume at lower dose 12.5 mg this evening      DARIO, resolved  --s/p IV fluids     Hypotension  Hx of HTN  --BP running low since admission, initially improved with fluids  --s/p IV fluid bolus and albumin 11/4  --trial of midodrine, sent at DC  -- Continue to hold metoprolol     --AM cortisol within range        Chronic back pain  Recent L2 compression fracture  --Recently started on Lortab, hold at dc      Atrial fibrillation/flutter  --Continue Eliquis and Metoprolol  --Flecainide discontinued by Cardiology during recent admission     DM2  --HbA1c 6.1%    Generalized weakness  --improved, home with HH      Discharge Follow Up Recommendations for outpatient labs/diagnostics:   Pcp in 1 week    Day of Discharge     HPI:   See progress note on same day    Review of Systems  Gen- No fevers, chills  CV- No chest pain, palpitations  Resp- No cough, dyspnea  GI- No N/V/D, abd pain      Vital Signs:   Temp:  [97.8 °F (36.6 °C)-98.5 °F (36.9 °C)] 98.5 °F (36.9 °C)  Heart Rate:  [] 77  Resp:  [16-18] 16  BP: ()/(42-79) 108/57      Physical Exam:      Pertinent  and/or Most Recent Results     LAB RESULTS:      Lab 11/05/24  0717 11/04/24  0336 11/02/24  1801 11/01/24  0537 11/01/24  0138 11/01/24  0059 11/01/24  0052   WBC 7.26 8.19 7.97 8.25  --   --  9.17   HEMOGLOBIN 11.1* 11.7* 13.5 12.9*  --   --  14.2   HEMOGLOBIN, POC  --   --   --   --   --  14.6  --    HEMATOCRIT 34.3* 35.9* 40.5 38.6  --   --  42.7   HEMATOCRIT POC  --   --   --   --   --  43  --     PLATELETS 135* 140 176 170  --   --  183   NEUTROS ABS  --  5.01  --  5.58  --   --  5.95   IMMATURE GRANS (ABS)  --  0.02  --  0.04  --   --  0.03   LYMPHS ABS  --  2.12  --  1.86  --   --  2.28   MONOS ABS  --  0.83  --  0.67  --   --  0.75   EOS ABS  --  0.17  --  0.06  --   --  0.11   MCV 93.7 93.0 91.0 92.3  --   --  92.6   PROTIME  --   --   --   --  21.5*  --   --    APTT  --   --   --   --   --   --  35.9         Lab 11/05/24  0717 11/04/24  0336 11/02/24  1801 11/01/24  0612 11/01/24  0059   SODIUM 142 135* 136 141  --    POTASSIUM 4.1 3.9 4.3 4.9  --    CHLORIDE 109* 102 99 100  --    CO2 23.0 24.0 28.0 26.0  --    ANION GAP 10.0 9.0 9.0 15.0  --    BUN 13 19 17 24*  --    CREATININE 0.88 0.84 0.93 1.12 1.40*   EGFR 84.3 85.5 80.5 64.4 49.3*   GLUCOSE 157* 158* 170* 127*  --    CALCIUM 8.5* 8.5* 9.2 9.9  --    MAGNESIUM  --   --   --  2.1  --    HEMOGLOBIN A1C  --   --  6.10*  --   --          Lab 11/04/24  0336   TOTAL PROTEIN 5.6*   ALBUMIN 2.9*   GLOBULIN 2.7   ALT (SGPT) 12   AST (SGOT) 22   BILIRUBIN 0.8   ALK PHOS 94         Lab 11/02/24  1801 11/01/24  0138 11/01/24  0052   PROBNP 377.6  --   --    HSTROP T  --   --  32*   PROTIME  --  21.5*  --    INR  --  1.8*  --          Lab 11/01/24  0929   CHOLESTEROL 114   LDL CHOL 54   HDL CHOL 42   TRIGLYCERIDES 97             Brief Urine Lab Results  (Last result in the past 365 days)        Color   Clarity   Blood   Leuk Est   Nitrite   Protein   CREAT   Urine HCG        11/01/24 0613             102.1         11/01/24 0613 Yellow   Clear   Negative   Negative   Negative   Negative                 Microbiology Results (last 10 days)       Procedure Component Value - Date/Time    Eosinophil Smear - Urine, Urine, Clean Catch [[id number]]  (Normal) Collected: 11/01/24 0613    Lab Status: Final result Specimen: Urine, Clean Catch Updated: 11/01/24 0822     Eosinophil Smear 0 % EOS/100 Cells     Narrative:      No eosinophil seen            MRI Brain Without  Contrast    Result Date: 11/1/2024  MRI BRAIN WO CONTRAST Date of Exam: 11/1/2024 8:31 PM EDT Indication: Stroke, follow up Altered mental status, mild left facial droop, acute stroke suspected.  Comparison: Head CT, CTA head neck, CT perfusion 11/1/2024 Technique:  Routine multiplanar/multisequence sequence images of the brain were obtained without contrast administration. Findings: No acute midline shift, extra axial fluid collection, hydrocephalus, or infarct. No subacute to old hemorrhage. There is moderate generalized parenchymal volume loss. Scattered areas of T2/FLAIR hyperintensity in the subcortical and periventricular white matter, nonspecific, perhaps from small vessel ischemic/hypertensive changes in a patient of this age. Appropriate flow voids at the skull base and in the major venous sinuses. Scattered paranasal sinus mucosal thickening, moderate in the left ethmoid air cells and left frontal sinus with bubbly secretions which can be correlated for symptoms of recent sinusitis. Nonspecific trace bilateral mastoid effusion. Right lens replacement. No acute or aggressive appearing bone or extracranial soft tissue process.     Impression: No acute intracranial finding. Electronically Signed: Mynor Israel  11/1/2024 9:58 PM EDT  Workstation ID: [id number]    CT Angiogram Head w AI Analysis of LVO    Result Date: 11/1/2024  CT ANGIOGRAM HEAD W AI ANALYSIS OF LVO, CT ANGIOGRAM NECK Date of Exam: 11/1/2024 1:00 AM EDT Indication: Neuro deficit, acute, stroke suspected Neuro deficit, acute stroke suspected. Comparison: 11/1/2024, 9/23/2024. Technique: CTA of the head and neck was performed after the uneventful intravenous administration of intravenous contrast. Reconstructed coronal and sagittal images were also obtained. In addition, a 3-D volume rendered image was created for interpretation. Automated exposure control and iterative reconstruction methods were used. Findings: No evidence of hemodynamically  significant stenosis, AVM or aneurysm within the head or neck by NASCET criteria. No large vessel occlusion identified. No evidence of paucity of vasculature. No evidence of dissection. Soft tissues of the neck demonstrate no acute process. No acute osseous abnormality identified. Lung apices are clear.     Impression: No evidence of hemodynamically significant stenosis, AVM or aneurysm within the head or neck by NASCET criteria. No large vessel occlusion identified. No acute process. Electronically Signed: Carolina Hassan MD  11/1/2024 1:37 AM EDT  Workstation ID: [id number]    CT Angiogram Neck    Result Date: 11/1/2024  CT ANGIOGRAM HEAD W AI ANALYSIS OF LVO, CT ANGIOGRAM NECK Date of Exam: 11/1/2024 1:00 AM EDT Indication: Neuro deficit, acute, stroke suspected Neuro deficit, acute stroke suspected. Comparison: 11/1/2024, 9/23/2024. Technique: CTA of the head and neck was performed after the uneventful intravenous administration of intravenous contrast. Reconstructed coronal and sagittal images were also obtained. In addition, a 3-D volume rendered image was created for interpretation. Automated exposure control and iterative reconstruction methods were used. Findings: No evidence of hemodynamically significant stenosis, AVM or aneurysm within the head or neck by NASCET criteria. No large vessel occlusion identified. No evidence of paucity of vasculature. No evidence of dissection. Soft tissues of the neck demonstrate no acute process. No acute osseous abnormality identified. Lung apices are clear.     Impression: No evidence of hemodynamically significant stenosis, AVM or aneurysm within the head or neck by NASCET criteria. No large vessel occlusion identified. No acute process. Electronically Signed: Carolina Hassan MD  11/1/2024 1:37 AM EDT  Workstation ID: [id number]    CT CEREBRAL PERFUSION WITH & WITHOUT CONTRAST    Result Date: 11/1/2024  CT CEREBRAL PERFUSION W WO CONTRAST Date of Exam: 11/1/2024 1:00 AM EDT  Indication: Neuro deficit, acute, stroke suspected Neuro deficit, acute stroke suspected.  Comparison: 11/1/2024, 9/23/2024. Technique: Axial CT images of the brain were obtained prior to and after the administration of intravenous contrast. Core blood volume, core blood flow, mean transit time, and Tmax images were obtained utilizing the Rapid software protocol. A limited CT angiogram of the head was also performed to measure the blood vessel density. The radiation dose reduction device was turned on for each scan per the ALARA (As Low as Reasonably Achievable) protocol. Findings: Cerebral blood flow less than 30% is 0 mL. Tmax greater than 6 seconds is 0 mL. Mismatch volume is 0 mL. Mismatch ratio is none. No suspicious perfusion abnormality identified.     Impression: No suspicious perfusion abnormality identified. Electronically Signed: Carolina Hassan MD  11/1/2024 1:36 AM EDT  Workstation ID: [id number]    XR Chest 1 View    Result Date: 11/1/2024  XR CHEST 1 VW Date of Exam: 11/1/2024 1:18 AM EDT Indication: Acute Stroke Protocol (onset < 12 hrs) Comparison: 9/16/2024. Findings: There are no airspace consolidations. No pleural fluid. No pneumothorax. The pulmonary vasculature appears within normal limits. The cardiac and mediastinal silhouette appear unremarkable. No acute osseous abnormality identified.     Impression: No acute cardiopulmonary process. Electronically Signed: Carolina Hassan MD  11/1/2024 1:32 AM EDT  Workstation ID: [id number]    CT Head Without Contrast Stroke Protocol    Result Date: 11/1/2024  CT HEAD WO CONTRAST STROKE PROTOCOL Date of Exam: 11/1/2024 12:56 AM EDT Indication: Neuro deficit, acute, stroke suspected Neuro deficit, acute, stroke suspected. Comparison: 9/23/2024. Technique: Axial CT images were obtained of the head without contrast administration.  Reconstructed coronal images were also obtained. Automated exposure control and iterative construction methods were used. Scan Time: 12:57  a.m. Results discussed with the stroke team at 1:02 a.m. Findings: There is no evidence of hemorrhage. There is no mass effect or midline shift. There is no extracerebral collection. Ventricles are normal in size and configuration for patient's stated age.  Posterior fossa is within normal limits. Calvarium and skull base appear intact.   Visualized sinuses show no air fluid levels. Visualized orbits are unremarkable.     Impression: No evidence of hemorrhage, mass effect or midline shift. No acute process identified. Electronically Signed: Carolina Hassan MD  11/1/2024 1:03 AM EDT  Workstation ID: [id number]             Results for orders placed during the hospital encounter of 11/01/24    Adult Transthoracic Echo Complete W/ Cont if Necessary Per Protocol (With Agitated Saline)    Interpretation Summary  •  Left ventricular systolic function is normal. Calculated left ventricular EF = 57.8% Left ventricular ejection fraction appears to be 56 - 60%.  •  Left ventricular diastolic function is consistent with (grade I) impaired relaxation.  •  Estimated right ventricular systolic pressure from tricuspid regurgitation is normal (<35 mmHg). Calculated right ventricular systolic pressure from tricuspid regurgitation is 32 mmHg.      Plan for Follow-up of Pending Labs/Results:     Discharge Details        Discharge Medications        New Medications        Instructions Start Date   midodrine 5 MG tablet  Commonly known as: PROAMATINE   5 mg, Oral, 2 Times Daily Before Meals             Changes to Medications        Instructions Start Date   QUEtiapine 25 MG tablet  Commonly known as: SEROquel  What changed: how much to take   12.5 mg, Oral, Nightly             Continue These Medications        Instructions Start Date   acetaminophen 325 MG tablet  Commonly known as: TYLENOL   650 mg, Oral, Every 4 Hours PRN, OTC      apixaban 5 MG tablet tablet  Commonly known as: ELIQUIS   5 mg, Oral, 2 Times Daily      multivitamin with  minerals tablet tablet   1 tablet, Oral, Daily, OTC      pravastatin 20 MG tablet  Commonly known as: PRAVACHOL   20 mg, Oral, Nightly             Stop These Medications      aspirin 81 MG EC tablet     HYDROcodone-acetaminophen 5-325 MG per tablet  Commonly known as: NORCO     Magnesium 200 MG tablet     metFORMIN 500 MG tablet  Commonly known as: GLUCOPHAGE     metoprolol tartrate 25 MG tablet  Commonly known as: LOPRESSOR     multivitamin with minerals tablet tablet  Generic drug: multivitamin with minerals     naloxone 4 MG/0.1ML nasal spray  Commonly known as: NARCAN     polyethylene glycol 17 g packet  Commonly known as: MIRALAX     sennosides-docusate 8.6-50 MG per tablet  Commonly known as: PERICOLACE              Allergies   Allergen Reactions   • Lidocaine Angioedema   • Procaine Unknown - Low Severity         Discharge Disposition:  Home or Self Care    Diet:  Hospital:  Diet Order   Procedures   • Diet: Cardiac, Diabetic; Healthy Heart (2-3 Na+); Consistent Carbohydrate; Texture: Regular (IDDSI 7); Fluid Consistency: Thin (IDDSI 0)            Activity:      Restrictions or Other Recommendations:         CODE STATUS:    Code Status and Medical Interventions: CPR (Attempt to Resuscitate); Full Support   Ordered at: 11/01/24 0342     Code Status (Patient has no pulse and is not breathing):    CPR (Attempt to Resuscitate)     Medical Interventions (Patient has pulse or is breathing):    Full Support       No future appointments.    Additional Instructions for the Follow-ups that You Need to Schedule       Ambulatory Referral to Home Health   As directed      Face to Face Visit Date: 11/5/2024   Follow-up provider for Plan of Care?: I treated the patient in an acute care facility and will not continue treatment after discharge.   Follow-up provider: HILL GOSS [[id number]]   Reason/Clinical Findings: chronic low back pain, delerium   Describe mobility limitations that make leaving home difficult: use of  walker   Nursing/Therapeutic Services Requested: Skilled Nursing Physical Therapy Occupational Therapy   Skilled nursing orders: Medication education   PT orders: Strengthening   Occupational orders: Activities of daily living   Frequency: Other (2-3 times a week)                      Wendy Neal DO  11/05/24      Time Spent on Discharge:  I spent  35  minutes on this discharge activity which included: face-to-face encounter with the patient, reviewing the data in the system, coordination of the care with the nursing staff as well as consultants, documentation, and entering orders.

## 2024-11-05 NOTE — NURSING NOTE
NP notified of low BP.     2133 Per NP Bp goal 90+ and map greater than 65. Iv fluids restarted. Bolus ordered patient is asymptomatic

## 2024-11-05 NOTE — PROGRESS NOTES
[facility] Medicine Services  PROGRESS NOTE    Patient Name: Apollo Haro  : 1940  MRN: [id number]    Date of Admission: 2024  Primary Care Physician: Provider, No Known    Subjective   Subjective     CC:  Weakness    HPI:  Crying about wanting to go home.      Objective   Objective     Vital Signs:   Temp:  [97.8 °F (36.6 °C)-98.5 °F (36.9 °C)] 98.5 °F (36.9 °C)  Heart Rate:  [] 77  Resp:  [16-18] 16  BP: ()/(42-79) 108/57     Physical Exam:  Constitutional: Emotional, elderly male  HENT: NCAT, mucous membranes moist  Respiratory: Respiratory effort normal   Cardiovascular: RRR, no murmurs, rubs, or gallops  Musculoskeletal: No bilateral ankle edema  Psychiatric: Appropriate affect, cooperative  Neurologic: Oriented x 3, speech clear  Skin: No rashes      Results Reviewed:  LAB RESULTS:      Lab 24  0717 24  0336 24  1801 24  0537 24  0138 24  0059 24  0052   WBC 7.26 8.19 7.97 8.25  --   --  9.17   HEMOGLOBIN 11.1* 11.7* 13.5 12.9*  --   --  14.2   HEMOGLOBIN, POC  --   --   --   --   --  14.6  --    HEMATOCRIT 34.3* 35.9* 40.5 38.6  --   --  42.7   HEMATOCRIT POC  --   --   --   --   --  43  --    PLATELETS 135* 140 176 170  --   --  183   NEUTROS ABS  --  5.01  --  5.58  --   --  5.95   IMMATURE GRANS (ABS)  --  0.02  --  0.04  --   --  0.03   LYMPHS ABS  --  2.12  --  1.86  --   --  2.28   MONOS ABS  --  0.83  --  0.67  --   --  0.75   EOS ABS  --  0.17  --  0.06  --   --  0.11   MCV 93.7 93.0 91.0 92.3  --   --  92.6   PROTIME  --   --   --   --  21.5*  --   --    APTT  --   --   --   --   --   --  35.9   HSTROP T  --   --   --   --   --   --  32*         Lab 24  0717 24  0336 24  1801 24  0612 24  0059   SODIUM 142 135* 136 141  --    POTASSIUM 4.1 3.9 4.3 4.9  --    CHLORIDE 109* 102 99 100  --    CO2 23.0 24.0 28.0 26.0  --    ANION GAP 10.0 9.0 9.0 15.0  --    BUN 13 19 17 24*  --     CREATININE 0.88 0.84 0.93 1.12 1.40*   EGFR 84.3 85.5 80.5 64.4 49.3*   GLUCOSE 157* 158* 170* 127*  --    CALCIUM 8.5* 8.5* 9.2 9.9  --    MAGNESIUM  --   --   --  2.1  --    HEMOGLOBIN A1C  --   --  6.10*  --   --          Lab 11/04/24  0336   TOTAL PROTEIN 5.6*   ALBUMIN 2.9*   GLOBULIN 2.7   ALT (SGPT) 12   AST (SGOT) 22   BILIRUBIN 0.8   ALK PHOS 94         Lab 11/02/24  1801 11/01/24  0138 11/01/24  0052   PROBNP 377.6  --   --    HSTROP T  --   --  32*   PROTIME  --  21.5*  --    INR  --  1.8*  --          Lab 11/01/24  0929   CHOLESTEROL 114   LDL CHOL 54   HDL CHOL 42   TRIGLYCERIDES 97             Brief Urine Lab Results  (Last result in the past 365 days)        Color   Clarity   Blood   Leuk Est   Nitrite   Protein   CREAT   Urine HCG        11/01/24 0613             102.1         11/01/24 0613 Yellow   Clear   Negative   Negative   Negative   Negative                   Microbiology Results Abnormal       None            No radiology results from the last 24 hrs    Results for orders placed during the hospital encounter of 11/01/24    Adult Transthoracic Echo Complete W/ Cont if Necessary Per Protocol (With Agitated Saline)    Interpretation Summary    Left ventricular systolic function is normal. Calculated left ventricular EF = 57.8% Left ventricular ejection fraction appears to be 56 - 60%.    Left ventricular diastolic function is consistent with (grade I) impaired relaxation.    Estimated right ventricular systolic pressure from tricuspid regurgitation is normal (<35 mmHg). Calculated right ventricular systolic pressure from tricuspid regurgitation is 32 mmHg.      Current medications:  Scheduled Meds:apixaban, 5 mg, Oral, BID  atorvastatin, 80 mg, Oral, Nightly  insulin lispro, 2-7 Units, Subcutaneous, 4x Daily AC & at Bedtime  [Held by provider] metoprolol tartrate, 12.5 mg, Oral, Q12H  midodrine, 5 mg, Oral, BID AC  multivitamin with minerals, 1 tablet, Oral, Daily  QUEtiapine, 12.5 mg, Oral,  Nightly  senna-docusate sodium, 2 tablet, Oral, BID  sodium chloride, 10 mL, Intravenous, Q12H      Continuous Infusions:     PRN Meds:.  acetaminophen **OR** acetaminophen **OR** acetaminophen    senna-docusate sodium **AND** polyethylene glycol **AND** bisacodyl **AND** bisacodyl    dextrose    dextrose    glucagon (human recombinant)    sodium chloride    sodium chloride    sodium chloride    Assessment & Plan   Assessment & Plan     Active Hospital Problems    Diagnosis  POA    **AMS (altered mental status) [R41.82]  Yes    DARIO (acute kidney injury) [N17.9]  Yes    Atrial fibrillation, chronic [I48.20]  Yes    CAD (coronary artery disease) [I25.10]  Yes    HTN (hypertension) [I10]  Yes    Type 2 diabetes mellitus, without long-term current use of insulin [E11.9]  Yes      Resolved Hospital Problems   No resolved problems to display.        Brief Hospital Course to date:  Apollo Haro is a 84 y.o. male with hx of HTN, CAD, chronic atrial fibrillation, DM2 with peripheral neuropathy, cognitive impairment, and chronic back and hip pain who presented to the ED with altered mental status. Reports poor sleep x several months. Apparently he had accused his wife of stealing things after having several days of episodes of delirium, and was finally brought to the hospital by EMS for further evaluation.     Recent admission to [facility] 9/23-10/7/24 due to L2 compression fracture following a fall. Neurosurgery recommended conservative management. He was also treated with IV Vanc for MRSA UTI. Seen by Cardiology and had Flecainide discontinued and Metoprolol dose reduced. He was documented as having hypotension during that admission. Discharged to [facility] for rehab.     This patient's problems and plans were partially entered by my partner and updated as appropriate by me 11/05/24.       Altered mental status, suspected multifactorial delirium  Cognitive impairment  --CT head and MRI brain unremarkable  --UA bland, CXR no  infiltrate  --Delirium possibly secondary to medications in the setting of DARIO and poor sleep with underlying cognitive impairment  --Stroke Neurology has seen and signed off as no evidence of acute stroke  --Had been on Seroquel 25 mg nightly prior to admission - will resume at lower dose 12.5 mg this evening      DARIO, resolved  --s/p IV fluids     Hypotension  Hx of HTN  --BP running low since admission, initially improved with fluids  --s/p IV fluid bolus and albumin 11/4  --trial of midodrine  -- Continue to hold metoprolol    --AM cortisol within range       Chronic back pain  Recent L2 compression fracture  --Recently started on Lortab, hold for now due to altered mental status  --Tylenol and heating pad as needed     Atrial fibrillation/flutter  --Continue Eliquis and Metoprolol  --Flecainide discontinued by Cardiology during recent admission     DM2  --HbA1c 6.1%  --Low dose SSI     Generalized weakness  --PT/OT recommended rehab       Expected Discharge Location and Transportation: snf?  Expected Discharge   Expected Discharge Date: 11/7/2024; Expected Discharge Time:      VTE Prophylaxis:  Pharmacologic & mechanical VTE prophylaxis orders are present.         AM-PAC 6 Clicks Score (PT): 18 (11/05/24 1031)    CODE STATUS:   Code Status and Medical Interventions: CPR (Attempt to Resuscitate); Full Support   Ordered at: 11/01/24 5896     Code Status (Patient has no pulse and is not breathing):    CPR (Attempt to Resuscitate)     Medical Interventions (Patient has pulse or is breathing):    Full Support       Wendy Neal, DO  11/05/24

## 2024-11-05 NOTE — PLAN OF CARE
Goal Outcome Evaluation:  Plan of Care Reviewed With: patient        Progress: improving  Outcome Evaluation: PT DEMONSTRATED IMPROVED BED MOBILITY AND TRANSFERS. AMBULATED 120 FEET WITH R WALKER AND MIN ASSIST. PT GIVES GOOD EFFORT BUT IS EMOTIONALLY LABILE. WANTS TO GO HOME. CONTINUE TO RECOMMEND SNF AT D/C UNLESS 24/7 ASSIST AVAILABLE FOR MOBILITY. PT IS NOT SAFE TO BE UP WITH WALKER UNASSISTED AT THIS TIME. RECOMMEND CONTINUED MONITORING OF ORTHOSTATIC.BP.    Anticipated Discharge Disposition (PT): skilled nursing facility

## 2024-11-05 NOTE — PLAN OF CARE
Goal Outcome Evaluation:  Plan of Care Reviewed With: patient, spouse        Progress: improving       Anticipated Discharge Disposition (SLP): No further SLP services warranted             Treatment Assessment (SLP): improved, cognitive-linguistic disorder (11/05/24 1455)  Treatment Assessment Comments (SLP): Pt and spouse report pt is back to his premorbid baseline. Pt requesting to d/c home at this time. No further SLP f/u warranted (11/05/24 1455)  Plan for Continued Treatment (SLP): patient/family has declined further intervention, treatment no longer indicated as all goals met (11/05/24 1455)

## 2024-11-05 NOTE — THERAPY TREATMENT NOTE
Patient Name: Apollo Haro  : 1940    MRN: [id number]                              Today's Date: 2024       Admit Date: 2024    Visit Dx:     ICD-10-CM ICD-9-CM   1. Delirium  R41.0 780.09   2. Facial droop  R29.810 781.94   3. Cognitive communication deficit  R41.841 799.52     Patient Active Problem List   Diagnosis    Weakness    Type 2 diabetes mellitus, without long-term current use of insulin    Rhabdomyolysis due to COVID-19    CAD (coronary artery disease)    HTN (hypertension)    Atrial fibrillation, chronic    COVID-19 virus detected    SDH (subdural hematoma)    Altered mental status    AMS (altered mental status)    Closed compression fracture of L2 vertebra    DARIO (acute kidney injury)     Past Medical History:   Diagnosis Date    A-fib     CAD (coronary artery disease)     DM (diabetes mellitus)      History reviewed. No pertinent surgical history.   General Information       Row Name 24 1000          Physical Therapy Time and Intention    Document Type therapy note (daily note)  -CD     Mode of Treatment physical therapy  -CD       Row Name 24 1000          General Information    Patient Profile Reviewed yes  -CD     Prior Level of Function independent:  -CD     Existing Precautions/Restrictions fall;orthostatic hypotension  -CD     Barriers to Rehab medically complex;previous functional deficit  -CD       Row Name 24 1000          Cognition    Orientation Status (Cognition) oriented to;person;disoriented to;situation;place  -CD       Row Name 24 1000          Safety Issues/Impairments Affecting Functional Mobility    Safety Issues Affecting Function (Mobility) insight into deficits/self-awareness;positioning of assistive device;safety precaution awareness;safety precautions follow-through/compliance;sequencing abilities  -CD     Impairments Affecting Function (Mobility) balance;endurance/activity tolerance;strength;sensation/sensory awareness;coordination  -CD                User Key  (r) = Recorded By, (t) = Taken By, (c) = Cosigned By      Initials Name Provider Type    CD Heidi Suarez, PT Physical Therapist                   Mobility       Row Name 11/05/24 1002          Bed Mobility    Supine-Sit [place] (Bed Mobility) contact guard;verbal cues  -CD     Assistive Device (Bed Mobility) bed rails;head of bed elevated  -CD     Comment, (Bed Mobility) INCREASED TIME AND EFFORT. CUES FOR SEQUENCING. DENIED DIZZINESS UPON SITTING. BP STABLE.  -CD       Row Name 11/05/24 1002          Transfers    Comment, (Transfers) CUES FOR HAND PLACEMENT. STS FROM EOB AND FROM RECLINER. POSTERIOR LEAN IN STANDING. BP STABLE SIT TO STAND. DENIED DIZZINESS.  -CD       Row Name 11/05/24 1002          Bed-Chair Transfer    Bed-Chair [place] (Transfers) moderate assist (50% patient effort);verbal cues  -CD     Assistive Device (Bed-Chair Transfers) walker, front-wheeled  -CD     Comment, (Bed-Chair Transfer) PT UNSTEADY AND HAD DIFFICULTY WITH NEGOTIATING TURN BED TO CHAIR WITH WALKER. REQUESTED TO SIT ABRUPTLY.  -       Row Name 11/05/24 1002          Sit-Stand Transfer    Sit-Stand [place] (Transfers) moderate assist (50% patient effort)  PROGRESSED TO MIN ASSIST ON 2ND REP FROM RECLINER.  -CD     Assistive Device (Sit-Stand Transfers) walker, front-wheeled  -CD     Comment, (Sit-Stand Transfer) MANUAL ASSIST TO SHIFT WT OVER COG.  -       Row Name 11/05/24 1002          Gait/Stairs (Locomotion)    [place] Level (Gait) minimum assist (75% patient effort)  -CD     Distance in Feet (Gait) 150  -CD     Deviations/Abnormal Patterns (Gait) cindy decreased;gait speed decreased;stride length decreased  -CD     Bilateral Gait Deviations forward flexed posture;heel strike decreased  -CD     Comment, (Gait/Stairs) PT DEMONSTRATED STEP TO GAIT PATTERN. REQUIRED MAX CUES FOR UPRIGHT POSTURE AND FORWARD GAZE. FOLLOWED WITH RECLINER. DENIED DIZZINESS BUT BP UPON SITTING IN ROOM  69/51, RE-TOOK /67.  -CD               User Key  (r) = Recorded By, (t) = Taken By, (c) = Cosigned By      Initials Name Provider Type    Heidi Sheikh PT Physical Therapist                   Obj/Interventions       Row Name 11/05/24 1024          Motor Skills    Therapeutic Exercise --  COMPLETED SEATED B LE THER EX: LAQ, HIP FLEX, AP'S X 10 REPS. MILD POSTERIOR LEAN NOTED.  -CD       Row Name 11/05/24 1024          Balance    Static Sitting Balance standby assist  -CD     Dynamic Sitting Balance contact guard  -CD     Position, Sitting Balance unsupported;sitting edge of bed;sitting in chair  -CD     Static Standing Balance minimal assist  -CD     Dynamic Standing Balance minimal assist  -CD     Position/Device Used, Standing Balance supported;walker, front-wheeled  -CD     Comment, Balance POSTERIOR LEAN IN SITTING AND STANDING BUT ABLE TO CORRECT WITH VERBAL/TACTILE CUES. GAIT IS UNSTEADY WITH PT RELYING HEAVILY ON R WALKER FOR SUPPORT AND MIN ASSIST FOR BALANCE.  -CD               User Key  (r) = Recorded By, (t) = Taken By, (c) = Cosigned By      Initials Name Provider Type    Heidi Sheikh PT Physical Therapist                   Goals/Plan    No documentation.                  Clinical Impression       Row Name 11/05/24 1026          Pain    Pretreatment Pain Rating 0/10 - no pain  -CD     Posttreatment Pain Rating 0/10 - no pain  -CD       Row Name 11/05/24 1026          Plan of Care Review    Plan of Care Reviewed With patient  -CD     Progress improving  -CD     Outcome Evaluation PT DEMONSTRATED IMPROVED BED MOBILITY AND TRANSFERS. AMBULATED 120 FEET WITH R WALKER AND MIN ASSIST. PT GIVES GOOD EFFORT BUT IS EMOTIONALLY LABILE. WANTS TO GO HOME. CONTINUE TO RECOMMEND SNF AT D/C UNLESS 24/7 ASSIST AVAILABLE FOR MOBILITY. PT IS NOT SAFE TO BE UP WITH WALKER UNASSISTED AT THIS TIME. RECOMMEND CONTINUED MONITORING OF ORTHOSTATIC.BP.  -CD       Row Name 11/05/24 1026          Therapy  Assessment/Plan (PT)    Patient/Family Therapy Goals Statement (PT) TO GO HOME.  -CD     Rehab Potential (PT) good  -CD     Criteria for Skilled Interventions Met (PT) yes;skilled treatment is necessary  -CD     Therapy Frequency (PT) daily  -CD       Row Name 11/05/24 1026          Vital Signs    Pre Systolic BP Rehab 90  -CD     Pre Treatment Diastolic BP 54  SUPINE  -CD     Intra Systolic BP Rehab 115  -CD     Intra Treatment Diastolic BP 81  SIT  -CD     Post Systolic BP Rehab 117  -CD     Post Treatment Diastolic BP 79  STAND. F/B 69/51 SEATED AFTER GAIT /67 WITH 3 MIN REST BREAK, SEATED.  -CD     O2 Delivery Intra Treatment room air  -CD     O2 Delivery Post Treatment room air  -CD     Pre Patient Position Supine  -CD     Intra Patient Position Standing  -CD     Post Patient Position Sitting  -CD       Row Name 11/05/24 1026          Positioning and Restraints    Pre-Treatment Position in bed  -CD     Post Treatment Position chair  -CD     In Chair reclined;call light within reach;encouraged to call for assist;exit alarm on;notified nsg;legs elevated  -CD               User Key  (r) = Recorded By, (t) = Taken By, (c) = Cosigned By      Initials Name Provider Type    CD Heidi Suarez, PT Physical Therapist                   Outcome Measures       Row Name 11/05/24 1034 11/05/24 0800       How much help from another person do you currently need...    Turning from your back to your side while in flat bed without using bedrails? 4  -CD 4  -PB    Moving from lying on back to sitting on the side of a flat bed without bedrails? 3  -CD 3  -PB    Moving to and from a bed to a chair (including a wheelchair)? 3  -CD 3  -PB    Standing up from a chair using your arms (e.g., wheelchair, bedside chair)? 3  -CD 2  -PB    Climbing 3-5 steps with a railing? 2  -CD 2  -PB    To walk in hospital room? 3  -CD 2  -PB    AM-PAC 6 Clicks Score (PT) 18  -CD 16  -PB    Highest Level of Mobility Goal 6 --> Walk 10 steps or more   -CD 5 --> Static standing  -PB      Row Name 11/05/24 1034          Modified [place] Scale    Modified [place] Scale 3 - Moderate disability.  Requiring some help, but able to walk without assistance.  -CD       Row Name 11/05/24 1034          Functional Assessment    Outcome Measure Options AM-PAC 6 Clicks Basic Mobility (PT)  -CD               User Key  (r) = Recorded By, (t) = Taken By, (c) = Cosigned By      Initials Name Provider Type    CD Heidi Suarez, PT Physical Therapist    Bettie Velazquez, RN Registered Nurse                                 Physical Therapy Education       Title: PT OT SLP Therapies (In Progress)       Topic: Physical Therapy (Done)       Point: Mobility training (Done)       Learning Progress Summary            Patient Acceptance, E, VU,NR by CD at 11/5/2024 1034    Comment: SEE FLOWSHEET    Acceptance, E, NR by KR at 11/1/2024 1515                      Point: Home exercise program (Done)       Learning Progress Summary            Patient Acceptance, E, VU,NR by CD at 11/5/2024 1034    Comment: SEE FLOWSHEET                      Point: Body mechanics (Done)       Learning Progress Summary            Patient Acceptance, E, VU,NR by CD at 11/5/2024 1034    Comment: SEE FLOWSHEET    Acceptance, E, NR by KR at 11/1/2024 1515                      Point: Precautions (Done)       Learning Progress Summary            Patient Acceptance, E, VU,NR by CD at 11/5/2024 1034    Comment: SEE FLOWSHEET    Acceptance, E, NR by KR at 11/1/2024 1515                                      User Key       Initials Effective Dates Name Provider Type Discipline    CD 02/03/23 -  Heidi Suarez, PT Physical Therapist PT    GEMINI 12/30/22 -  Rhonda Wei, JONATHON Physical Therapist PT                  PT Recommendation and Plan     Progress: improving  Outcome Evaluation: PT DEMONSTRATED IMPROVED BED MOBILITY AND TRANSFERS. AMBULATED 120 FEET WITH R WALKER AND MIN ASSIST. PT GIVES GOOD EFFORT BUT IS EMOTIONALLY  LABILE. WANTS TO GO HOME. CONTINUE TO RECOMMEND SNF AT D/C UNLESS 24/7 ASSIST AVAILABLE FOR MOBILITY. PT IS NOT SAFE TO BE UP WITH WALKER UNASSISTED AT THIS TIME. RECOMMEND CONTINUED MONITORING OF ORTHOSTATIC.BP.     Time Calculation:         PT Charges       Row Name 11/05/24 1036             Time Calculation    Start Time 0838  -CD      PT Received On 11/05/24  -CD         Time Calculation- PT    Total Timed Code Minutes- PT 15 minute(s)  -CD         Timed Charges    29852 - Gait Training Minutes  15  -CD         Total Minutes    Timed Charges Total Minutes 15  -CD       Total Minutes 15  -CD                User Key  (r) = Recorded By, (t) = Taken By, (c) = Cosigned By      Initials Name Provider Type    CD Heidi Suarez, PT Physical Therapist                  Therapy Charges for Today       Code Description Service Date Service Provider Modifiers Qty    38434180104 HC GAIT TRAINING EA 15 MIN 11/5/2024 Heidi Suarez PT GP 1            PT G-Codes  Outcome Measure Options: AM-PAC 6 Clicks Basic Mobility (PT)  AM-PAC 6 Clicks Score (PT): 18  AM-PAC 6 Clicks Score (OT): 15  Modified [place] Scale: 3 - Moderate disability.  Requiring some help, but able to walk without assistance.  PT Discharge Summary  Anticipated Discharge Disposition (PT): skilled nursing facility    Heidi Suarez PT  11/5/2024

## 2024-11-05 NOTE — THERAPY TREATMENT NOTE
Patient Name: Apollo Haro  : 1940    MRN: [id number]                              Today's Date: 2024       Admit Date: 2024    Visit Dx:     ICD-10-CM ICD-9-CM   1. Delirium  R41.0 780.09   2. Facial droop  R29.810 781.94   3. Cognitive communication deficit  R41.841 799.52     Patient Active Problem List   Diagnosis    Weakness    Type 2 diabetes mellitus, without long-term current use of insulin    Rhabdomyolysis due to COVID-19    CAD (coronary artery disease)    HTN (hypertension)    Atrial fibrillation, chronic    COVID-19 virus detected    SDH (subdural hematoma)    Altered mental status    AMS (altered mental status)    Closed compression fracture of L2 vertebra    DARIO (acute kidney injury)     Past Medical History:   Diagnosis Date    A-fib     CAD (coronary artery disease)     DM (diabetes mellitus)      History reviewed. No pertinent surgical history.   General Information       Row Name 24 1024          OT Time and Intention    Subjective Information no complaints  -     Document Type therapy note (daily note)  -     Mode of Treatment occupational therapy  -     Patient Effort good  -       Row Name 24 1024          General Information    Patient Profile Reviewed yes  -LC     Prior Level of Function --  See IE  -     Existing Precautions/Restrictions fall;orthostatic hypotension  -     Barriers to Rehab medically complex;previous functional deficit  -       Row Name 24 1024          Cognition    Orientation Status (Cognition) oriented to;person;disoriented to;place;situation  -       Row Name 24 1024          Safety Issues/Impairments Affecting Functional Mobility    Safety Issues Affecting Function (Mobility) awareness of need for assistance;insight into deficits/self-awareness;positioning of assistive device;safety precaution awareness;safety precautions follow-through/compliance;sequencing abilities  -     Impairments Affecting Function  (Mobility) balance;endurance/activity tolerance;strength;sensation/sensory awareness  -               User Key  (r) = Recorded By, (t) = Taken By, (c) = Cosigned By      Initials Name Provider Type     Meseret Richey OT Occupational Therapist                     Mobility/ADL's       Row Name 11/05/24 1027          Bed Mobility    Bed Mobility scooting/bridging;supine-sit  -     Scooting/Bridging [place] (Bed Mobility) contact guard;verbal cues  -     Supine-Sit [place] (Bed Mobility) contact guard;verbal cues  -     Comment, (Bed Mobility) VC's to sequence and increased time/effort to transition from supine to sit EOB. BP stable and monitored with transitions.  -       Row Name 11/05/24 1027          Transfers    Transfers sit-stand transfer;bed-chair transfer  -     Comment, (Transfers) VC's for hand placement and sequecing.  -[facility] Name 11/05/24 1027          Bed-Chair Transfer    Bed-Chair [place] (Transfers) moderate assist (50% patient effort);verbal cues  -     Assistive Device (Bed-Chair Transfers) walker, front-wheeled  -[facility] Name 11/05/24 1027          Sit-Stand Transfer    Sit-Stand [place] (Transfers) minimum assist (75% patient effort);verbal cues;2 person assist  -     Assistive Device (Sit-Stand Transfers) walker, front-wheeled  -     Comment, (Sit-Stand Transfer) STS from recliner Min A x 2.  -[facility] Name 11/05/24 1027          Activities of Daily Living    BADL Assessment/Intervention upper body dressing;lower body dressing;grooming  -[facility] Name 11/05/24 1027          Lower Body Dressing Assessment/Training    [place] Level (Lower Body Dressing) don;socks;dependent (less than 25% patient effort)  -     Position (Lower Body Dressing) sitting up in bed  -[facility] Name 11/05/24 1027          Upper Body Dressing Assessment/Training    [place] Level (Upper Body Dressing) don;doff;front opening garment;minimum assist (75%  patient effort)  -     Position (Upper Body Dressing) sitting up in bed  -       Row Name 11/05/24 1027          Grooming Assessment/Training    [place] Level (Grooming) wash face, hands;set up  -     Position (Grooming) edge of bed sitting  -               User Key  (r) = Recorded By, (t) = Taken By, (c) = Cosigned By      Initials Name Provider Type     Meseret Richey OT Occupational Therapist                   Obj/Interventions       Row Name 11/05/24 1039          Balance    Balance Assessment sitting static balance;sitting dynamic balance;standing static balance;standing dynamic balance  -     Static Sitting Balance standby assist  -     Dynamic Sitting Balance contact guard  -     Position, Sitting Balance unsupported;sitting edge of bed  -     Static Standing Balance 2-person assist;verbal cues;minimal assist  -     Dynamic Standing Balance minimal assist;2-person assist;verbal cues  -     Position/Device Used, Standing Balance supported;walker, front-wheeled  -     Balance Interventions sitting;sit to stand;standing;supported;occupation based/functional task;weight shifting activity  -     Comment, Balance VC's needed for upright posture to improve stability in standing. Demonstrates flexed posture  -               User Key  (r) = Recorded By, (t) = Taken By, (c) = Cosigned By      Initials Name Provider Type    Meseret Leroy, DORIAN Occupational Therapist                   Goals/Plan    No documentation.                  Clinical Impression       Row Name 11/05/24 1041          Pain Assessment    Pretreatment Pain Rating 0/10 - no pain  -     Posttreatment Pain Rating 0/10 - no pain  -       Row Name 11/05/24 1041          Plan of Care Review    Plan of Care Reviewed With patient  -     Progress improving  -     Outcome Evaluation Pt. progressing towards baseline with ADLs and functional mobility. Continued to be limited by decreased activity tolerance, generalized  weakness, and balance. Completed STS  with Min A x 2 and UBD with Min A. Will continue to progress as able. Recommend SNF at discharge.  -       Row Name 11/05/24 1041          Therapy Plan Review/Discharge Plan (OT)    Anticipated Discharge Disposition (OT) skilled nursing facility  -       Row Name 11/05/24 1041          Vital Signs    Pre Systolic BP Rehab 90  -LC     Pre Treatment Diastolic BP 54  -LC     Intra Systolic BP Rehab 115  -LC     Intra Treatment Diastolic BP 81  -LC     Post Systolic BP Rehab 117  -LC     Post Treatment Diastolic BP 79  -LC     Pre Patient Position Supine  -LC     Intra Patient Position Sitting  -LC     Post Patient Position Standing  -       Row Name 11/05/24 1041          Positioning and Restraints    Pre-Treatment Position in bed  -LC     Post Treatment Position chair  -LC     In Chair notified nsg;reclined;call light within reach;encouraged to call for assist;exit alarm on;waffle cushion;legs elevated;with nsg  -LC               User Key  (r) = Recorded By, (t) = Taken By, (c) = Cosigned By      Initials Name Provider Type     Meseret Richey, OT Occupational Therapist                   Outcome Measures       Row Name 11/05/24 1043          How much help from another is currently needed...    Putting on and taking off regular lower body clothing? 2  -LC     Bathing (including washing, rinsing, and drying) 2  -LC     Toileting (which includes using toilet bed pan or urinal) 2  -LC     Putting on and taking off regular upper body clothing 3  -LC     Taking care of personal grooming (such as brushing teeth) 3  -LC     Eating meals 4  -LC     AM-PAC 6 Clicks Score (OT) 16  -LC       Row Name 11/05/24 1034 11/05/24 0800       How much help from another person do you currently need...    Turning from your back to your side while in flat bed without using bedrails? 4  -CD 4  -PB    Moving from lying on back to sitting on the side of a flat bed without bedrails? 3  -CD 3  -PB     Moving to and from a bed to a chair (including a wheelchair)? 3  -CD 3  -PB    Standing up from a chair using your arms (e.g., wheelchair, bedside chair)? 3  -CD 2  -PB    Climbing 3-5 steps with a railing? 2  -CD 2  -PB    To walk in hospital room? 3  -CD 2  -PB    AM-PAC 6 Clicks Score (PT) 18  -CD 16  -PB    Highest Level of Mobility Goal 6 --> Walk 10 steps or more  -CD 5 --> Static standing  -PB      Row Name 11/05/24 1034          Modified [place] Scale    Modified [place] Scale 3 - Moderate disability.  Requiring some help, but able to walk without assistance.  -CD       Row Name 11/05/24 1043 11/05/24 1034       Functional Assessment    Outcome Measure Options AM-PAC 6 Clicks Daily Activity (OT)  - AM-PAC 6 Clicks Basic Mobility (PT)  -CD              User Key  (r) = Recorded By, (t) = Taken By, (c) = Cosigned By      Initials Name Provider Type    CD Heidi Suarez PT Physical Therapist    Meseret Leroy, OT Occupational Therapist    Bettie Velazquez, RN Registered Nurse                    Occupational Therapy Education       Title: PT OT SLP Therapies (In Progress)       Topic: Occupational Therapy (In Progress)       Point: ADL training (In Progress)       Description:   Instruct learner(s) on proper safety adaptation and remediation techniques during self care or transfers.   Instruct in proper use of assistive devices.                  Learning Progress Summary            Patient Acceptance, E, NR by  at 11/5/2024 0940    Acceptance, E, NR by MR at 11/1/2024 1544                      Point: Home exercise program (Not Started)       Description:   Instruct learner(s) on appropriate technique for monitoring, assisting and/or progressing therapeutic exercises/activities.                  Learner Progress:  Not documented in this visit.              Point: Precautions (In Progress)       Description:   Instruct learner(s) on prescribed precautions during self-care and functional transfers.                   Learning Progress Summary            Patient Acceptance, E, NR by  at 11/5/2024 0940    Acceptance, E, NR by MR at 11/1/2024 1544                      Point: Body mechanics (In Progress)       Description:   Instruct learner(s) on proper positioning and spine alignment during self-care, functional mobility activities and/or exercises.                  Learning Progress Summary            Patient Acceptance, E, NR by  at 11/5/2024 0940    Acceptance, E, NR by MR at 11/1/2024 1544                                      User Key       Initials Effective Dates Name Provider Type Discipline     06/16/21 -  Meseret Richey, OT Occupational Therapist OT    MR 09/22/22 -  Rosalva Sequeira, OT Occupational Therapist OT                  OT Recommendation and Plan     Plan of Care Review  Plan of Care Reviewed With: patient  Progress: improving  Outcome Evaluation: Pt. progressing towards baseline with ADLs and functional mobility. Continued to be limited by decreased activity tolerance, generalized weakness, and balance. Completed STS  with Min A x 2 and UBD with Min A. Will continue to progress as able. Recommend SNF at discharge.     Time Calculation:         Time Calculation- OT       Row Name 11/05/24 1044 11/05/24 1036          Time Calculation- OT    OT Start Time 0940 - --     OT Received On 11/05/24 - --     OT Goal Re-Cert Due Date 11/11/24 - --        Timed Charges    61010 - Gait Training Minutes  -- 15  -CD     99592 - OT Self Care/Mgmt Minutes 14  - --        Total Minutes    Timed Charges Total Minutes 14  -LC 15  -CD      Total Minutes 14  -LC 15  -CD               User Key  (r) = Recorded By, (t) = Taken By, (c) = Cosigned By      Initials Name Provider Type    Heidi Sheikh PT Physical Therapist     Meseret Richey, OT Occupational Therapist                  Therapy Charges for Today       Code Description Service Date Service Provider Modifiers Qty    06509716884 HC OT SELF  CARE/MGMT/TRAIN EA 15 MIN 11/5/2024 Meseret Richey, OT GO 1                 Meseret Richey, OT  11/5/2024

## 2024-11-05 NOTE — THERAPY TREATMENT NOTE
Acute Care - Speech Language Pathology Treatment Note  [facility]     Patient Name: Apollo Haro  : 1940  MRN: [id number]  Today's Date: 2024               Admit Date: 2024     Visit Dx:    ICD-10-CM ICD-9-CM   1. Delirium  R41.0 780.09   2. Facial droop  R29.810 781.94   3. Cognitive communication deficit  R41.841 799.52   4. DARIO (acute kidney injury)  N17.9 584.9   5. Closed compression fracture of L2 lumbar vertebra with routine healing, subsequent encounter  S32.020D V54.17   6. Altered mental status, unspecified altered mental status type  R41.82 780.97   7. SDH (subdural hematoma)  S06.5XAA 432.1   8. COVID-19 virus detected  U07.1 079.89   9. Atrial fibrillation, chronic  I48.20 427.31   10. Rhabdomyolysis due to COVID-19  U07.1 728.88    M62.82 079.89   11. Weakness  R53.1 780.79     Patient Active Problem List   Diagnosis    Weakness    Type 2 diabetes mellitus, without long-term current use of insulin    Rhabdomyolysis due to COVID-19    CAD (coronary artery disease)    HTN (hypertension)    Atrial fibrillation, chronic    COVID-19 virus detected    SDH (subdural hematoma)    Altered mental status    AMS (altered mental status)    Closed compression fracture of L2 vertebra    DARIO (acute kidney injury)     Past Medical History:   Diagnosis Date    A-fib     CAD (coronary artery disease)     DM (diabetes mellitus)      History reviewed. No pertinent surgical history.    SLP Recommendation and Plan                    Anticipated Discharge Disposition (SLP): No further SLP services warranted (24)        Therapy Frequency (SLP SLC): evaluation only (24)           Daily Summary of Progress (SLP): progress toward functional goals is good, prepare for discharge (24)           Treatment Assessment (SLP): improved, cognitive-linguistic disorder (24)  Treatment Assessment Comments (SLP): Pt and spouse report pt is back to his premorbid baseline. Pt  requesting to d/c home at this time. No further SLP f/u warranted (11/05/24 1455)  Plan for Continued Treatment (SLP): patient/family has declined further intervention, treatment no longer indicated as all goals met (11/05/24 1455)  Progress: improving (11/05/24 1535)      SLP EVALUATION (Last 72 Hours)       SLP SLC Evaluation       Row Name 11/05/24 1455                   Communication Assessment/Intervention    Document Type therapy note (daily note)  -CJ        Subjective Information no complaints  -CJ        Patient Observations alert;cooperative;agree to therapy  -CJ        Patient/Family/Caregiver Comments/Observations spouse present; extremely labile  -CJ        Patient Effort good  -CJ        Symptoms Noted During/After Treatment none  -CJ           Pain    Additional Documentation Pain Scale: FACES Pre/Post-Treatment (Group)  -CJ           Pain Scale: FACES Pre/Post-Treatment    Pain: FACES Scale, Pretreatment 0-->no hurt  -CJ        Posttreatment Pain Rating 0-->no hurt  -CJ           SLP Treatment Clinical Impressions    Treatment Assessment (SLP) improved;cognitive-linguistic disorder  -CJ        Treatment Assessment Comments (SLP) Pt and spouse report pt is back to his premorbid baseline. Pt requesting to d/c home at this time. No further SLP f/u warranted  -CJ        Daily Summary of Progress (SLP) progress toward functional goals is good;prepare for discharge  -CJ        Plan for Continued Treatment (SLP) patient/family has declined further intervention;treatment no longer indicated as all goals met  -CJ        Care Plan Review care plan/treatment goals reviewed  -CJ        Care Plan Review, Other Participant(s) spouse  -CJ           Recommendations    Therapy Frequency (SLP SLC) evaluation only  -CJ        Anticipated Discharge Disposition (SLP) No further SLP services warranted  -                  User Key  (r) = Recorded By, (t) = Taken By, (c) = Cosigned By      Initials Name Effective Dates     Carolyn Patten MS CCC-SLP 10/22/24 -                        EDUCATION  The patient has been educated in the following areas:     Cognitive Impairment Communication Impairment.           SLP GOALS       Row Name 11/05/24 1455             Patient will demonstrate functional cognitive-linguistic skills for return to discharge environment    [facility] with minimal cues  -CJ      Time frame 1 week  -CJ      Progress/Outcomes goal met  -CJ         SLP Diagnostic Treatment     Patient will participate in further assessment in the following areas cognitive-linguistic  -CJ      Time Frame (Diagnostic) 1 week  -CJ      Progress/Outcomes (Additional Goal 1, SLP) goal met  -CJ      Comment (Diagnostic) pt and spouse report back to his premorbid baseline w/ no further SLC concerns  -CJ         Orientation Goal 1 (SLP)    Improve Orientation Through Goal 1 (SLP) demonstrating orientation to place;demonstrating orientation to disease/impairment;100%;with minimal cues (75-90%)  -CJ      Time Frame (Orientation Goal 1, SLP) 1 week  -CJ      Progress (Orientation Goal 1, SLP) 100%;independently (over 90% accuracy)  -CJ      Progress/Outcomes (Orientation Goal 1, SLP) goal met  -CJ                User Key  (r) = Recorded By, (t) = Taken By, (c) = Cosigned By      Initials Name Provider Type    Carolyn Patten MS CCC-SLP Speech and Language Pathologist                              Time Calculation:      Time Calculation- SLP       Row Name 11/05/24 1536             Time Calculation- SLP    SLP Start Time 1455  -CJ      SLP Received On 11/05/24  -CJ         Untimed Charges    14019-BU Treatment/ST Modification Prosth Aug Alter  39  -CJ         Total Minutes    Untimed Charges Total Minutes 39  -CJ       Total Minutes 39  -CJ                User Key  (r) = Recorded By, (t) = Taken By, (c) = Cosigned By      Initials Name Provider Type    Carolyn Patten MS CCC-SLP Speech and Language Pathologist                     Therapy Charges for Today       Code Description Service Date Service Provider Modifiers Qty    [id number] [facility] TREATMENT SPEECH 3 11/5/2024 Carolyn Jesus, MS CCC-SLP GN 1                       Carolyn Jesus MS CCC-SLP  11/5/2024

## 2024-11-05 NOTE — PLAN OF CARE
Goal Outcome Evaluation:  Plan of Care Reviewed With: patient        Progress: improving  Outcome Evaluation: Pt. progressing towards baseline with ADLs and functional mobility. Continued to be limited by decreased activity tolerance, generalized weakness, and balance. Completed STS  with Min A x 2 and UBD with Min A. Will continue to progress as able. Recommend SNF at discharge.    Anticipated Discharge Disposition (OT): skilled nursing facility

## 2024-11-06 NOTE — OUTREACH NOTE
Prep Survey      Flowsheet Row Responses   [facility] facility patient discharged from? [facility]   Is LACE score < 7 ? No   Eligibility Readm Mgmt   Discharge diagnosis AMS (altered mental status)   Does the patient have one of the following disease processes/diagnoses(primary or secondary)? Other   Prep survey completed? Yes            Renea GAR - Registered Nurse

## 2024-11-14 ENCOUNTER — READMISSION MANAGEMENT (OUTPATIENT)
Dept: CALL CENTER | Facility: HOSPITAL | Age: 84
End: 2024-11-14
Payer: MEDICARE

## 2024-11-14 NOTE — OUTREACH NOTE
Medical Week 2 Survey      Flowsheet Row Responses   [facility] patient discharged from? [facility]   Does the patient have one of the following disease processes/diagnoses(primary or secondary)? Other   Week 2 attempt successful? No   Unsuccessful attempts Attempt 1            Ana NIELSEN - Registered Nurse

## 2024-11-19 ENCOUNTER — READMISSION MANAGEMENT (OUTPATIENT)
Dept: CALL CENTER | Facility: HOSPITAL | Age: 84
End: 2024-11-19
Payer: MEDICARE

## 2024-11-19 NOTE — OUTREACH NOTE
Medical Week 2 Survey      Flowsheet Row Responses   [facility] [place] patient discharged from? [facility]   Does the patient have one of the following disease processes/diagnoses(primary or secondary)? Other   Week 2 attempt successful? No   Unsuccessful attempts Attempt 2            Rocio RODRIGUEZ - Licensed Nurse

## 2024-12-02 ENCOUNTER — READMISSION MANAGEMENT (OUTPATIENT)
Dept: CALL CENTER | Facility: HOSPITAL | Age: 84
End: 2024-12-02
Payer: MEDICARE

## 2024-12-02 NOTE — OUTREACH NOTE
Medical Week 3 Survey      Flowsheet Row Responses   [facility] patient discharged from? [place]   Does the patient have one of the following disease processes/diagnoses(primary or secondary)? Other   Week 3 attempt successful? No   Unsuccessful attempts Attempt 1  [attempted all numbers listed]            SON PEÑA - Registered Nurse

## 2024-12-05 ENCOUNTER — READMISSION MANAGEMENT (OUTPATIENT)
Dept: CALL CENTER | Facility: HOSPITAL | Age: 84
End: 2024-12-05
Payer: MEDICARE

## 2024-12-05 NOTE — OUTREACH NOTE
Medical Week 3 Survey      Flowsheet Row Responses   [facility] patient discharged from? [facility]   Does the patient have one of the following disease processes/diagnoses(primary or secondary)? Other   Week 3 attempt successful? No   Unsuccessful attempts Attempt 2   Revoke Decline to participate            Peggy MALDONADO - Registered Nurse

## 2025-01-27 NOTE — PROGRESS NOTES
[facility]  PROGRESS NOTE    Patient Name: Apollo Haro  : 1940  MRN: [id number]    Date of Admission: 2024  Primary Care Physician: Provider, No Known    Subjective   Subjective     CC:  F/u back pain    HPI:   Patient resting in bed.  Denies concerns.  NAD    Objective   Objective     Vital Signs:   Temp:  [97.5 °F (36.4 °C)-97.9 °F (36.6 °C)] 97.5 °F (36.4 °C)  Heart Rate:  [] 86  Resp:  [18] 18  BP: ()/(44-80) 108/66     Physical Exam:   Constitutional: No acute distress, awake, alert  HENT: NCAT, mucous membranes moist  Respiratory: Clear to auscultation bilaterally, respiratory effort normal, room air  Cardiovascular: RRR, no murmurs, rubs, or gallops  Gastrointestinal: Positive bowel sounds, soft, nontender, nondistended  Musculoskeletal: No bilateral ankle edema  Psychiatric: Appropriate affect, cooperative  Neurologic: Oriented x 3, moves all extremities, speech clear  Skin: No rashes      Results Reviewed:  LAB RESULTS:            Lab 24  1002   SODIUM 141   POTASSIUM 4.3   CHLORIDE 105   CO2 28.0   ANION GAP 8.0   BUN 20   CREATININE 0.82   EGFR 86.1   GLUCOSE 163*   CALCIUM 9.0                         Brief Urine Lab Results  (Last result in the past 365 days)        Color   Clarity   Blood   Leuk Est   Nitrite   Protein   CREAT   Urine HCG        24 1337 Yellow   Cloudy   Large (3+)   Large (3+)   Positive   30 mg/dL (1+)                   Microbiology Results Abnormal       Procedure Component Value - Date/Time    Blood Culture - Blood, Hand, Left [[id number]]  (Normal) Collected: 24    Lab Status: Final result Specimen: Blood from Hand, Left Updated: 24     Blood Culture No growth at 5 days    Blood Culture - Blood, Hand, Right [[id number]]  (Normal) Collected: 24    Lab Status: Final result Specimen: Blood from Hand, Right Updated: 24     Blood Culture No growth at 5 days             No radiology results from the last 24 hrs        Current medications:  Scheduled Meds:apixaban, 5 mg, Oral, BID  insulin lispro, 2-7 Units, Subcutaneous, 4x Daily AC & at Bedtime  metoprolol tartrate, 12.5 mg, Oral, Q12H  senna-docusate sodium, 2 tablet, Oral, BID   And  polyethylene glycol, 17 g, Oral, Daily  pravastatin, 20 mg, Oral, Nightly  QUEtiapine, 25 mg, Oral, Nightly  sodium chloride, 10 mL, Intravenous, Q12H      Continuous Infusions:   PRN Meds:.  acetaminophen **OR** acetaminophen **OR** acetaminophen    senna-docusate sodium **AND** polyethylene glycol **AND** bisacodyl **AND** bisacodyl    Calcium Replacement - Follow Nurse / BPA Driven Protocol    dextrose    dextrose    glucagon (human recombinant)    HYDROcodone-acetaminophen    Magnesium Standard Dose Replacement - Follow Nurse / BPA Driven Protocol    nitroglycerin    ondansetron ODT **OR** ondansetron    Phosphorus Replacement - Follow Nurse / BPA Driven Protocol    Potassium Replacement - Follow Nurse / BPA Driven Protocol    [COMPLETED] Insert Peripheral IV **AND** sodium chloride    sodium chloride    sodium chloride    Assessment & Plan   Assessment & Plan     Active Hospital Problems    Diagnosis  POA    **Closed compression fracture of L2 vertebra [S32.020A]  Yes    UTI (urinary tract infection) [N39.0]  Yes    Atrial fibrillation, chronic [I48.20]  Yes    CAD (coronary artery disease) [I25.10]  Yes    HTN (hypertension) [I10]  Yes    Type 2 diabetes mellitus, without long-term current use of insulin [E11.9]  Yes    Weakness [R53.1]  Yes      Resolved Hospital Problems   No resolved problems to display.        Brief Hospital Course to date:  Apollo Haro is a 84 y.o. male who presented to [facility] for evaluation of back pain. Patient had a fall earlier this month, hitting his head. He was evaluated in the ED and later discharged home. Since that time, he reports issues with increased back pain inhibiting his ability to walk and staying  mostly in the bed. MRI revealed L2 anterior column compression fracture. Neurosurgery was consulted and has recommended conservative management for now.     This patient's problems and plans were partially entered by my partner and updated as appropriate by me 10/03/24.    Acute compression fracture L2  Persistent back pain   Generalized weakness    -MRI with acute L2 compression fracture with 20% loss of height   -Neurosurgery evaluated, recommended conservative management. Close follow up in 2-4 weeks in neurosurgical clinic. If unable to ambulate secondary to pain, can reconsider kyphoplasty.  -Continue pain management with PRN Tylenol, Norco.  -PT/OT recommending SNF on discharge. Case management following.   -Palliative following for goals of care and pain management.     MRSA UTI   -UCx with MRSA; BCx with no growth at 5 days.  -s/p course of IV Vancomycin x5 days.      History of atrial fibrillation  Atrial flutter (this admission)  -Cardiology consulted due to persistent a-flutter while on flecainide   -Cardiology evaluated. Flecainide discontinued. Decreased metoprolol to 12.5 mg BID. No plan for cardioversion. Continue Eliquis.      Diabetes mellitus type 2- A1c 5.8% this admission, continue low-dose SSI, monitor glucose and adjust insulin as needed.   HTN with hypotension during admission- continue low-dose metoprolol.  CAD- continue pravastatin, cardiology stopped ASA.  Cognitive impairment- continue Seroquel nightly       Expected Discharge Location and Transportation: [facility]  Expected Discharge pending insurance approval  Expected Discharge Date: 10/4/2024; Expected Discharge Time:      VTE Prophylaxis:  Pharmacologic & mechanical VTE prophylaxis orders are present.         AM-PAC 6 Clicks Score (PT): 12 (10/03/24 0800)    CODE STATUS:   Code Status and Medical Interventions: No CPR (Do Not Attempt to Resuscitate); Limited Support; No intubation (DNI), No cardioversion; Spouse present and in  full support of decision as [employer]   Ordered at: 09/27/24 1403     Medical Intervention Limits:    No intubation (DNI)    No cardioversion     Level Of Support Discussed With:    Patient     Code Status (Patient has no pulse and is not breathing):    No CPR (Do Not Attempt to Resuscitate)     Medical Interventions (Patient has pulse or is breathing):    Limited Support     Comments:    Spouse present and in full support of decision as JACI Bray, APRN  10/03/24       good balance

## 2025-02-05 NOTE — PLAN OF CARE
Goal Outcome Evaluation:  Plan of Care Reviewed With: patient, spouse, grandchild(clifton)        Progress: no change  Outcome Evaluation: Patient took shuffling steps from bed to chair with mod assist x2 with RW, limited by pain and weakness. Significant pain with transition from sidelying to sitting. Patient currently below baseline functioning, demonstrating decreased functional mobility status, impaired balance, decreased endurance, and decreased strength. Will address these deficits to promote return to PLOF. Recommend SNF at d/c.      Anticipated Discharge Disposition (PT): skilled nursing facility                         Patient is scheduled for surgery on 02/13/25 needs to be seen by a provider in office in order to be cleared for surgery. IF Dr. Munson request any labs needs to complete prior to appointment please assist with scheduling.   LMTCB 2X